# Patient Record
Sex: MALE | Race: WHITE | NOT HISPANIC OR LATINO | Employment: OTHER | URBAN - METROPOLITAN AREA
[De-identification: names, ages, dates, MRNs, and addresses within clinical notes are randomized per-mention and may not be internally consistent; named-entity substitution may affect disease eponyms.]

---

## 2017-01-12 ENCOUNTER — HOSPITAL ENCOUNTER (EMERGENCY)
Facility: HOSPITAL | Age: 67
Discharge: HOME | End: 2017-01-12
Attending: EMERGENCY MEDICINE
Payer: MEDICARE

## 2017-01-12 ENCOUNTER — APPOINTMENT (EMERGENCY)
Dept: RADIOLOGY | Facility: HOSPITAL | Age: 67
End: 2017-01-12
Payer: MEDICARE

## 2017-01-12 VITALS
RESPIRATION RATE: 15 BRPM | OXYGEN SATURATION: 97 % | DIASTOLIC BLOOD PRESSURE: 86 MMHG | WEIGHT: 235.4 LBS | HEART RATE: 80 BPM | TEMPERATURE: 98.2 F | BODY MASS INDEX: 31.93 KG/M2 | SYSTOLIC BLOOD PRESSURE: 152 MMHG

## 2017-01-12 DIAGNOSIS — G89.29 CHRONIC PAIN: ICD-10-CM

## 2017-01-12 DIAGNOSIS — I10 HYPERTENSION: ICD-10-CM

## 2017-01-12 DIAGNOSIS — E11.42 DM TYPE 2 WITH DIABETIC PERIPHERAL NEUROPATHY (HCC): ICD-10-CM

## 2017-01-12 DIAGNOSIS — R07.89 ATYPICAL CHEST PAIN: Primary | ICD-10-CM

## 2017-01-12 LAB
ANION GAP SERPL CALCULATED.3IONS-SCNC: 9 MMOL/L (ref 4–13)
BASOPHILS # BLD AUTO: 0.09 THOUSANDS/ΜL (ref 0–0.1)
BASOPHILS NFR BLD AUTO: 1 % (ref 0–1)
BUN SERPL-MCNC: 20 MG/DL (ref 5–25)
CALCIUM SERPL-MCNC: 9.1 MG/DL (ref 8.3–10.1)
CHLORIDE SERPL-SCNC: 104 MMOL/L (ref 100–108)
CO2 SERPL-SCNC: 26 MMOL/L (ref 21–32)
CREAT SERPL-MCNC: 1.02 MG/DL (ref 0.6–1.3)
EOSINOPHIL # BLD AUTO: 0.23 THOUSAND/ΜL (ref 0–0.61)
EOSINOPHIL NFR BLD AUTO: 2 % (ref 0–6)
ERYTHROCYTE [DISTWIDTH] IN BLOOD BY AUTOMATED COUNT: 18.8 % (ref 11.6–15.1)
GFR SERPL CREATININE-BSD FRML MDRD: >60 ML/MIN/1.73SQ M
GLUCOSE SERPL-MCNC: 216 MG/DL (ref 65–140)
HCT VFR BLD AUTO: 34.8 % (ref 36.5–49.3)
HGB BLD-MCNC: 11 G/DL (ref 12–17)
LYMPHOCYTES # BLD AUTO: 2.11 THOUSANDS/ΜL (ref 0.6–4.47)
LYMPHOCYTES NFR BLD AUTO: 21 % (ref 14–44)
MCH RBC QN AUTO: 23.8 PG (ref 26.8–34.3)
MCHC RBC AUTO-ENTMCNC: 31.6 G/DL (ref 31.4–37.4)
MCV RBC AUTO: 75 FL (ref 82–98)
MONOCYTES # BLD AUTO: 0.96 THOUSAND/ΜL (ref 0.17–1.22)
MONOCYTES NFR BLD AUTO: 10 % (ref 4–12)
NEUTROPHILS # BLD AUTO: 6.6 THOUSANDS/ΜL (ref 1.85–7.62)
NEUTS SEG NFR BLD AUTO: 66 % (ref 43–75)
PLATELET # BLD AUTO: 367 THOUSANDS/UL (ref 149–390)
PMV BLD AUTO: 9.2 FL (ref 8.9–12.7)
POTASSIUM SERPL-SCNC: 4.4 MMOL/L (ref 3.5–5.3)
RBC # BLD AUTO: 4.62 MILLION/UL (ref 3.88–5.62)
SODIUM SERPL-SCNC: 139 MMOL/L (ref 136–145)
SPECIMEN SOURCE: NORMAL
SPECIMEN SOURCE: NORMAL
TROPONIN I BLD-MCNC: 0 NG/ML (ref 0–0.08)
TROPONIN I BLD-MCNC: 0.01 NG/ML (ref 0–0.08)
WBC # BLD AUTO: 9.99 THOUSAND/UL (ref 4.31–10.16)

## 2017-01-12 PROCEDURE — 71020 HB CHEST X-RAY 2VW FRONTAL&LATL: CPT

## 2017-01-12 PROCEDURE — 93005 ELECTROCARDIOGRAM TRACING: CPT

## 2017-01-12 PROCEDURE — 36415 COLL VENOUS BLD VENIPUNCTURE: CPT | Performed by: EMERGENCY MEDICINE

## 2017-01-12 PROCEDURE — 80048 BASIC METABOLIC PNL TOTAL CA: CPT | Performed by: EMERGENCY MEDICINE

## 2017-01-12 PROCEDURE — 99285 EMERGENCY DEPT VISIT HI MDM: CPT

## 2017-01-12 PROCEDURE — 93005 ELECTROCARDIOGRAM TRACING: CPT | Performed by: EMERGENCY MEDICINE

## 2017-01-12 PROCEDURE — 85025 COMPLETE CBC W/AUTO DIFF WBC: CPT | Performed by: EMERGENCY MEDICINE

## 2017-01-12 PROCEDURE — 84484 ASSAY OF TROPONIN QUANT: CPT

## 2017-01-12 RX ORDER — PREGABALIN 100 MG/1
100 CAPSULE ORAL 3 TIMES DAILY
Qty: 90 CAPSULE | Refills: 0 | Status: ON HOLD | OUTPATIENT
Start: 2017-01-12 | End: 2017-03-21

## 2017-01-12 RX ORDER — TRAMADOL HYDROCHLORIDE 50 MG/1
50 TABLET ORAL EVERY 6 HOURS PRN
COMMUNITY
End: 2017-01-12

## 2017-01-12 RX ORDER — PANTOPRAZOLE SODIUM 40 MG/1
40 TABLET, DELAYED RELEASE ORAL DAILY
Qty: 30 TABLET | Refills: 0 | Status: SHIPPED | OUTPATIENT
Start: 2017-01-12 | End: 2017-06-13

## 2017-01-12 RX ORDER — TRAMADOL HYDROCHLORIDE 50 MG/1
50 TABLET ORAL EVERY 6 HOURS PRN
Qty: 30 TABLET | Refills: 0 | Status: SHIPPED | OUTPATIENT
Start: 2017-01-12 | End: 2017-01-22

## 2017-01-12 RX ORDER — ASPIRIN 81 MG/1
324 TABLET, CHEWABLE ORAL ONCE
Status: COMPLETED | OUTPATIENT
Start: 2017-01-12 | End: 2017-01-12

## 2017-01-12 RX ADMIN — ASPIRIN 81 MG 324 MG: 81 TABLET ORAL at 12:21

## 2017-01-13 ENCOUNTER — GENERIC CONVERSION - ENCOUNTER (OUTPATIENT)
Dept: OTHER | Facility: OTHER | Age: 67
End: 2017-01-13

## 2017-01-13 LAB
ATRIAL RATE: 84 BPM
ATRIAL RATE: 97 BPM
P AXIS: 67 DEGREES
P AXIS: 69 DEGREES
PR INTERVAL: 140 MS
PR INTERVAL: 142 MS
QRS AXIS: 33 DEGREES
QRS AXIS: 44 DEGREES
QRSD INTERVAL: 76 MS
QRSD INTERVAL: 78 MS
QT INTERVAL: 324 MS
QT INTERVAL: 374 MS
QTC INTERVAL: 411 MS
QTC INTERVAL: 439 MS
T WAVE AXIS: 19 DEGREES
T WAVE AXIS: 34 DEGREES
VENTRICULAR RATE: 83 BPM
VENTRICULAR RATE: 97 BPM

## 2017-02-14 ENCOUNTER — ALLSCRIPTS OFFICE VISIT (OUTPATIENT)
Dept: OTHER | Facility: OTHER | Age: 67
End: 2017-02-14

## 2017-02-14 DIAGNOSIS — M54.9 DORSALGIA: ICD-10-CM

## 2017-02-14 DIAGNOSIS — E11.49 TYPE 2 DIABETES MELLITUS WITH OTHER DIABETIC NEUROLOGICAL COMPLICATION (HCC): ICD-10-CM

## 2017-02-14 DIAGNOSIS — F32.9 MAJOR DEPRESSIVE DISORDER, SINGLE EPISODE: ICD-10-CM

## 2017-02-14 DIAGNOSIS — I71.4 ABDOMINAL AORTIC ANEURYSM WITHOUT RUPTURE (HCC): ICD-10-CM

## 2017-02-14 DIAGNOSIS — E11.40 TYPE 2 DIABETES MELLITUS WITH DIABETIC NEUROPATHY (HCC): ICD-10-CM

## 2017-02-14 DIAGNOSIS — K21.9 GASTRO-ESOPHAGEAL REFLUX DISEASE WITHOUT ESOPHAGITIS: ICD-10-CM

## 2017-03-16 ENCOUNTER — HOSPITAL ENCOUNTER (INPATIENT)
Facility: HOSPITAL | Age: 67
LOS: 5 days | Discharge: HOME HEALTH | DRG: 871 | End: 2017-03-21
Attending: EMERGENCY MEDICINE | Admitting: INTERNAL MEDICINE
Payer: MEDICARE

## 2017-03-16 ENCOUNTER — APPOINTMENT (EMERGENCY)
Dept: RADIOLOGY | Facility: HOSPITAL | Age: 67
DRG: 871 | End: 2017-03-16
Payer: MEDICARE

## 2017-03-16 DIAGNOSIS — R65.10 SIRS (SYSTEMIC INFLAMMATORY RESPONSE SYNDROME) (HCC): Primary | ICD-10-CM

## 2017-03-16 DIAGNOSIS — R77.8 ELEVATED TROPONIN: ICD-10-CM

## 2017-03-16 PROBLEM — I21.4 NSTEMI (NON-ST ELEVATED MYOCARDIAL INFARCTION) (HCC): Status: ACTIVE | Noted: 2017-03-16

## 2017-03-16 LAB
ALBUMIN SERPL BCP-MCNC: 3.3 G/DL (ref 3.5–5)
ALP SERPL-CCNC: 101 U/L (ref 46–116)
ALT SERPL W P-5'-P-CCNC: 18 U/L (ref 12–78)
ANION GAP SERPL CALCULATED.3IONS-SCNC: 11 MMOL/L (ref 4–13)
APTT PPP: 25 SECONDS (ref 24–36)
AST SERPL W P-5'-P-CCNC: 18 U/L (ref 5–45)
BASOPHILS # BLD MANUAL: 0 THOUSAND/UL (ref 0–0.1)
BASOPHILS NFR MAR MANUAL: 0 % (ref 0–1)
BILIRUB SERPL-MCNC: 0.31 MG/DL (ref 0.2–1)
BUN SERPL-MCNC: 24 MG/DL (ref 5–25)
CALCIUM SERPL-MCNC: 8.8 MG/DL (ref 8.3–10.1)
CHLORIDE SERPL-SCNC: 103 MMOL/L (ref 100–108)
CO2 SERPL-SCNC: 25 MMOL/L (ref 21–32)
CREAT SERPL-MCNC: 1.33 MG/DL (ref 0.6–1.3)
EOSINOPHIL # BLD MANUAL: 0 THOUSAND/UL (ref 0–0.4)
EOSINOPHIL NFR BLD MANUAL: 0 % (ref 0–6)
ERYTHROCYTE [DISTWIDTH] IN BLOOD BY AUTOMATED COUNT: 19.2 % (ref 11.6–15.1)
FLUAV AG SPEC QL IA: NEGATIVE
FLUBV AG SPEC QL IA: NEGATIVE
GFR SERPL CREATININE-BSD FRML MDRD: 53.8 ML/MIN/1.73SQ M
GLUCOSE SERPL-MCNC: 167 MG/DL (ref 65–140)
GLUCOSE SERPL-MCNC: 194 MG/DL (ref 65–140)
GLUCOSE SERPL-MCNC: 260 MG/DL (ref 65–140)
GLUCOSE SERPL-MCNC: 351 MG/DL (ref 65–140)
HCT VFR BLD AUTO: 35.3 % (ref 36.5–49.3)
HGB BLD-MCNC: 11.1 G/DL (ref 12–17)
INR PPP: 1.02 (ref 0.86–1.16)
L PNEUMO1 AG UR QL IA.RAPID: NEGATIVE
LACTATE SERPL-SCNC: 1.6 MMOL/L (ref 0.5–2)
LACTATE SERPL-SCNC: 2.1 MMOL/L (ref 0.5–2)
LYMPHOCYTES # BLD AUTO: 1.27 THOUSAND/UL (ref 0.6–4.47)
LYMPHOCYTES # BLD AUTO: 5 % (ref 14–44)
MCH RBC QN AUTO: 24 PG (ref 26.8–34.3)
MCHC RBC AUTO-ENTMCNC: 31.4 G/DL (ref 31.4–37.4)
MCV RBC AUTO: 76 FL (ref 82–98)
MONOCYTES # BLD AUTO: 0.51 THOUSAND/UL (ref 0–1.22)
MONOCYTES NFR BLD: 2 % (ref 4–12)
NEUTROPHILS # BLD MANUAL: 23.58 THOUSAND/UL (ref 1.85–7.62)
NEUTS BAND NFR BLD MANUAL: 2 % (ref 0–8)
NEUTS SEG NFR BLD AUTO: 91 % (ref 43–75)
NRBC BLD AUTO-RTO: 0 /100 WBCS
PLATELET # BLD AUTO: 355 THOUSANDS/UL (ref 149–390)
PLATELET BLD QL SMEAR: ADEQUATE
PMV BLD AUTO: 9.9 FL (ref 8.9–12.7)
POTASSIUM SERPL-SCNC: 4 MMOL/L (ref 3.5–5.3)
PROT SERPL-MCNC: 8.1 G/DL (ref 6.4–8.2)
PROTHROMBIN TIME: 13.4 SECONDS (ref 12–14.3)
RBC # BLD AUTO: 4.62 MILLION/UL (ref 3.88–5.62)
RBC MORPH BLD: NORMAL
S PNEUM AG UR QL: NEGATIVE
SODIUM SERPL-SCNC: 139 MMOL/L (ref 136–145)
SPECIMEN SOURCE: ABNORMAL
SPECIMEN SOURCE: ABNORMAL
TOTAL CELLS COUNTED SPEC: 100
TROPONIN I BLD-MCNC: 0.25 NG/ML (ref 0–0.08)
TROPONIN I BLD-MCNC: 3.48 NG/ML (ref 0–0.08)
TROPONIN I SERPL-MCNC: 17.39 NG/ML
TROPONIN I SERPL-MCNC: 6.99 NG/ML
WBC # BLD AUTO: 25.35 THOUSAND/UL (ref 4.31–10.16)

## 2017-03-16 PROCEDURE — 83605 ASSAY OF LACTIC ACID: CPT | Performed by: EMERGENCY MEDICINE

## 2017-03-16 PROCEDURE — 85730 THROMBOPLASTIN TIME PARTIAL: CPT | Performed by: EMERGENCY MEDICINE

## 2017-03-16 PROCEDURE — 94640 AIRWAY INHALATION TREATMENT: CPT

## 2017-03-16 PROCEDURE — 87798 DETECT AGENT NOS DNA AMP: CPT | Performed by: EMERGENCY MEDICINE

## 2017-03-16 PROCEDURE — 87400 INFLUENZA A/B EACH AG IA: CPT | Performed by: EMERGENCY MEDICINE

## 2017-03-16 PROCEDURE — 93005 ELECTROCARDIOGRAM TRACING: CPT

## 2017-03-16 PROCEDURE — 85610 PROTHROMBIN TIME: CPT | Performed by: EMERGENCY MEDICINE

## 2017-03-16 PROCEDURE — 80053 COMPREHEN METABOLIC PANEL: CPT | Performed by: EMERGENCY MEDICINE

## 2017-03-16 PROCEDURE — 84484 ASSAY OF TROPONIN QUANT: CPT | Performed by: PHYSICIAN ASSISTANT

## 2017-03-16 PROCEDURE — 92610 EVALUATE SWALLOWING FUNCTION: CPT

## 2017-03-16 PROCEDURE — 87070 CULTURE OTHR SPECIMN AEROBIC: CPT | Performed by: PHYSICIAN ASSISTANT

## 2017-03-16 PROCEDURE — 87449 NOS EACH ORGANISM AG IA: CPT | Performed by: PHYSICIAN ASSISTANT

## 2017-03-16 PROCEDURE — 85007 BL SMEAR W/DIFF WBC COUNT: CPT | Performed by: EMERGENCY MEDICINE

## 2017-03-16 PROCEDURE — 87205 SMEAR GRAM STAIN: CPT | Performed by: PHYSICIAN ASSISTANT

## 2017-03-16 PROCEDURE — 36415 COLL VENOUS BLD VENIPUNCTURE: CPT | Performed by: EMERGENCY MEDICINE

## 2017-03-16 PROCEDURE — 96365 THER/PROPH/DIAG IV INF INIT: CPT

## 2017-03-16 PROCEDURE — 96375 TX/PRO/DX INJ NEW DRUG ADDON: CPT

## 2017-03-16 PROCEDURE — 85027 COMPLETE CBC AUTOMATED: CPT | Performed by: EMERGENCY MEDICINE

## 2017-03-16 PROCEDURE — 87147 CULTURE TYPE IMMUNOLOGIC: CPT | Performed by: PHYSICIAN ASSISTANT

## 2017-03-16 PROCEDURE — 84484 ASSAY OF TROPONIN QUANT: CPT | Performed by: INTERNAL MEDICINE

## 2017-03-16 PROCEDURE — 82948 REAGENT STRIP/BLOOD GLUCOSE: CPT

## 2017-03-16 PROCEDURE — 87040 BLOOD CULTURE FOR BACTERIA: CPT | Performed by: EMERGENCY MEDICINE

## 2017-03-16 PROCEDURE — 71020 HB CHEST X-RAY 2VW FRONTAL&LATL: CPT

## 2017-03-16 PROCEDURE — 84484 ASSAY OF TROPONIN QUANT: CPT

## 2017-03-16 PROCEDURE — 94760 N-INVAS EAR/PLS OXIMETRY 1: CPT

## 2017-03-16 PROCEDURE — 99284 EMERGENCY DEPT VISIT MOD MDM: CPT

## 2017-03-16 RX ORDER — NITROGLYCERIN 0.4 MG/1
0.4 TABLET SUBLINGUAL
Status: DISCONTINUED | OUTPATIENT
Start: 2017-03-16 | End: 2017-03-21 | Stop reason: HOSPADM

## 2017-03-16 RX ORDER — ONDANSETRON 2 MG/ML
4 INJECTION INTRAMUSCULAR; INTRAVENOUS EVERY 6 HOURS PRN
Status: DISCONTINUED | OUTPATIENT
Start: 2017-03-16 | End: 2017-03-21 | Stop reason: HOSPADM

## 2017-03-16 RX ORDER — MAGNESIUM HYDROXIDE/ALUMINUM HYDROXICE/SIMETHICONE 120; 1200; 1200 MG/30ML; MG/30ML; MG/30ML
30 SUSPENSION ORAL EVERY 6 HOURS PRN
Status: DISCONTINUED | OUTPATIENT
Start: 2017-03-16 | End: 2017-03-21 | Stop reason: HOSPADM

## 2017-03-16 RX ORDER — CLOPIDOGREL BISULFATE 75 MG/1
300 TABLET ORAL ONCE
Status: COMPLETED | OUTPATIENT
Start: 2017-03-16 | End: 2017-03-16

## 2017-03-16 RX ORDER — ASPIRIN 325 MG
TABLET ORAL
Status: COMPLETED
Start: 2017-03-16 | End: 2017-03-16

## 2017-03-16 RX ORDER — ASPIRIN 81 MG/1
81 TABLET, CHEWABLE ORAL DAILY
Status: DISCONTINUED | OUTPATIENT
Start: 2017-03-17 | End: 2017-03-16

## 2017-03-16 RX ORDER — PANTOPRAZOLE SODIUM 40 MG/1
40 TABLET, DELAYED RELEASE ORAL
Status: DISCONTINUED | OUTPATIENT
Start: 2017-03-16 | End: 2017-03-20

## 2017-03-16 RX ORDER — MORPHINE SULFATE 2 MG/ML
2 INJECTION, SOLUTION INTRAMUSCULAR; INTRAVENOUS EVERY 4 HOURS PRN
Status: DISCONTINUED | OUTPATIENT
Start: 2017-03-16 | End: 2017-03-21 | Stop reason: HOSPADM

## 2017-03-16 RX ORDER — LORAZEPAM 2 MG/ML
0.5 INJECTION INTRAMUSCULAR 4 TIMES DAILY PRN
Status: DISCONTINUED | OUTPATIENT
Start: 2017-03-16 | End: 2017-03-21 | Stop reason: HOSPADM

## 2017-03-16 RX ORDER — FAMOTIDINE 20 MG/1
20 TABLET, FILM COATED ORAL 2 TIMES DAILY
Status: DISCONTINUED | OUTPATIENT
Start: 2017-03-16 | End: 2017-03-20

## 2017-03-16 RX ORDER — AMPICILLIN AND SULBACTAM 1; .5 G/1; G/1
1.5 INJECTION, POWDER, FOR SOLUTION INTRAMUSCULAR; INTRAVENOUS EVERY 6 HOURS
Status: DISCONTINUED | OUTPATIENT
Start: 2017-03-16 | End: 2017-03-16 | Stop reason: SDUPTHER

## 2017-03-16 RX ORDER — TRAMADOL HYDROCHLORIDE 50 MG/1
50 TABLET ORAL EVERY 6 HOURS PRN
Status: DISCONTINUED | OUTPATIENT
Start: 2017-03-16 | End: 2017-03-21 | Stop reason: HOSPADM

## 2017-03-16 RX ORDER — HEPARIN SODIUM 5000 [USP'U]/ML
5000 INJECTION, SOLUTION INTRAVENOUS; SUBCUTANEOUS EVERY 8 HOURS SCHEDULED
Status: DISCONTINUED | OUTPATIENT
Start: 2017-03-16 | End: 2017-03-21 | Stop reason: HOSPADM

## 2017-03-16 RX ORDER — LEVALBUTEROL 1.25 MG/.5ML
1.25 SOLUTION, CONCENTRATE RESPIRATORY (INHALATION) EVERY 6 HOURS PRN
Status: DISCONTINUED | OUTPATIENT
Start: 2017-03-16 | End: 2017-03-19

## 2017-03-16 RX ORDER — ATORVASTATIN CALCIUM 40 MG/1
40 TABLET, FILM COATED ORAL
Status: DISCONTINUED | OUTPATIENT
Start: 2017-03-16 | End: 2017-03-21 | Stop reason: HOSPADM

## 2017-03-16 RX ORDER — ACETAMINOPHEN 325 MG/1
650 TABLET ORAL EVERY 6 HOURS PRN
Status: DISCONTINUED | OUTPATIENT
Start: 2017-03-16 | End: 2017-03-21 | Stop reason: HOSPADM

## 2017-03-16 RX ORDER — ASPIRIN 81 MG/1
324 TABLET, CHEWABLE ORAL DAILY
Status: DISCONTINUED | OUTPATIENT
Start: 2017-03-17 | End: 2017-03-21 | Stop reason: HOSPADM

## 2017-03-16 RX ORDER — ASPIRIN 325 MG
325 TABLET, DELAYED RELEASE (ENTERIC COATED) ORAL ONCE
Status: DISCONTINUED | OUTPATIENT
Start: 2017-03-16 | End: 2017-03-21 | Stop reason: HOSPADM

## 2017-03-16 RX ORDER — PREGABALIN 50 MG/1
100 CAPSULE ORAL 3 TIMES DAILY
Status: DISCONTINUED | OUTPATIENT
Start: 2017-03-16 | End: 2017-03-21 | Stop reason: HOSPADM

## 2017-03-16 RX ORDER — SODIUM CHLORIDE FOR INHALATION 0.9 %
3 VIAL, NEBULIZER (ML) INHALATION EVERY 6 HOURS PRN
Status: DISCONTINUED | OUTPATIENT
Start: 2017-03-16 | End: 2017-03-21 | Stop reason: HOSPADM

## 2017-03-16 RX ORDER — INSULIN GLARGINE 100 [IU]/ML
35 INJECTION, SOLUTION SUBCUTANEOUS
Status: DISCONTINUED | OUTPATIENT
Start: 2017-03-16 | End: 2017-03-21 | Stop reason: HOSPADM

## 2017-03-16 RX ADMIN — INSULIN LISPRO 6 UNITS: 100 INJECTION, SOLUTION INTRAVENOUS; SUBCUTANEOUS at 18:06

## 2017-03-16 RX ADMIN — ASPIRIN 325 MG: 325 TABLET ORAL at 13:14

## 2017-03-16 RX ADMIN — SODIUM CHLORIDE 1.5 G: 9 INJECTION, SOLUTION INTRAVENOUS at 21:10

## 2017-03-16 RX ADMIN — SODIUM CHLORIDE 1.5 G: 9 INJECTION, SOLUTION INTRAVENOUS at 14:05

## 2017-03-16 RX ADMIN — HEPARIN SODIUM 5000 UNITS: 5000 INJECTION, SOLUTION INTRAVENOUS; SUBCUTANEOUS at 21:10

## 2017-03-16 RX ADMIN — HEPARIN SODIUM 5000 UNITS: 5000 INJECTION, SOLUTION INTRAVENOUS; SUBCUTANEOUS at 14:08

## 2017-03-16 RX ADMIN — FAMOTIDINE 20 MG: 20 TABLET ORAL at 18:05

## 2017-03-16 RX ADMIN — CEFEPIME 2000 MG: 2 INJECTION, POWDER, FOR SOLUTION INTRAMUSCULAR; INTRAVENOUS at 10:51

## 2017-03-16 RX ADMIN — INSULIN LISPRO 1 UNITS: 100 INJECTION, SOLUTION INTRAVENOUS; SUBCUTANEOUS at 21:56

## 2017-03-16 RX ADMIN — INSULIN LISPRO 5 UNITS: 100 INJECTION, SOLUTION INTRAVENOUS; SUBCUTANEOUS at 18:06

## 2017-03-16 RX ADMIN — SODIUM CHLORIDE 3240 ML: 0.9 INJECTION, SOLUTION INTRAVENOUS at 10:53

## 2017-03-16 RX ADMIN — METOPROLOL TARTRATE 5 MG: 5 INJECTION INTRAVENOUS at 13:17

## 2017-03-16 RX ADMIN — TRAMADOL HYDROCHLORIDE 50 MG: 50 TABLET, FILM COATED ORAL at 14:08

## 2017-03-16 RX ADMIN — LEVALBUTEROL 1.25 MG: 1.25 SOLUTION, CONCENTRATE RESPIRATORY (INHALATION) at 17:15

## 2017-03-16 RX ADMIN — ATORVASTATIN CALCIUM 40 MG: 40 TABLET, FILM COATED ORAL at 18:06

## 2017-03-16 RX ADMIN — HYDROMORPHONE HYDROCHLORIDE 1 MG: 1 INJECTION, SOLUTION INTRAMUSCULAR; INTRAVENOUS; SUBCUTANEOUS at 10:24

## 2017-03-16 RX ADMIN — CLOPIDOGREL BISULFATE 300 MG: 75 TABLET, FILM COATED ORAL at 18:06

## 2017-03-16 RX ADMIN — PANTOPRAZOLE SODIUM 40 MG: 40 TABLET, DELAYED RELEASE ORAL at 14:08

## 2017-03-16 RX ADMIN — ACETAMINOPHEN 650 MG: 325 TABLET, FILM COATED ORAL at 18:28

## 2017-03-16 RX ADMIN — METOPROLOL TARTRATE 5 MG: 5 INJECTION INTRAVENOUS at 18:28

## 2017-03-16 RX ADMIN — INSULIN GLARGINE 35 UNITS: 100 INJECTION, SOLUTION SUBCUTANEOUS at 21:55

## 2017-03-16 RX ADMIN — PREGABALIN 100 MG: 50 CAPSULE ORAL at 21:10

## 2017-03-16 RX ADMIN — PREGABALIN 100 MG: 50 CAPSULE ORAL at 18:05

## 2017-03-17 ENCOUNTER — APPOINTMENT (INPATIENT)
Dept: NON INVASIVE DIAGNOSTICS | Facility: HOSPITAL | Age: 67
DRG: 871 | End: 2017-03-17
Payer: MEDICARE

## 2017-03-17 LAB
ANION GAP SERPL CALCULATED.3IONS-SCNC: 8 MMOL/L (ref 4–13)
BUN SERPL-MCNC: 28 MG/DL (ref 5–25)
CALCIUM SERPL-MCNC: 8.4 MG/DL (ref 8.3–10.1)
CHLORIDE SERPL-SCNC: 107 MMOL/L (ref 100–108)
CHOLEST SERPL-MCNC: 171 MG/DL (ref 50–200)
CO2 SERPL-SCNC: 23 MMOL/L (ref 21–32)
CREAT SERPL-MCNC: 1.26 MG/DL (ref 0.6–1.3)
ERYTHROCYTE [DISTWIDTH] IN BLOOD BY AUTOMATED COUNT: 19.8 % (ref 11.6–15.1)
FLUAV AG SPEC QL: NORMAL
FLUBV AG SPEC QL: NORMAL
GFR SERPL CREATININE-BSD FRML MDRD: 57.3 ML/MIN/1.73SQ M
GLUCOSE SERPL-MCNC: 143 MG/DL (ref 65–140)
GLUCOSE SERPL-MCNC: 155 MG/DL (ref 65–140)
GLUCOSE SERPL-MCNC: 160 MG/DL (ref 65–140)
GLUCOSE SERPL-MCNC: 227 MG/DL (ref 65–140)
GLUCOSE SERPL-MCNC: 240 MG/DL (ref 65–140)
HCT VFR BLD AUTO: 34.2 % (ref 36.5–49.3)
HDLC SERPL-MCNC: 48 MG/DL (ref 40–60)
HGB BLD-MCNC: 10.5 G/DL (ref 12–17)
LDLC SERPL CALC-MCNC: 109 MG/DL (ref 0–100)
MCH RBC QN AUTO: 23.9 PG (ref 26.8–34.3)
MCHC RBC AUTO-ENTMCNC: 30.7 G/DL (ref 31.4–37.4)
MCV RBC AUTO: 78 FL (ref 82–98)
NT-PROBNP SERPL-MCNC: 2252 PG/ML
PLATELET # BLD AUTO: 314 THOUSANDS/UL (ref 149–390)
PMV BLD AUTO: 9.9 FL (ref 8.9–12.7)
POTASSIUM SERPL-SCNC: 4.1 MMOL/L (ref 3.5–5.3)
RBC # BLD AUTO: 4.4 MILLION/UL (ref 3.88–5.62)
RSV B RNA SPEC QL NAA+PROBE: NORMAL
SODIUM SERPL-SCNC: 138 MMOL/L (ref 136–145)
TRIGL SERPL-MCNC: 70 MG/DL
TROPONIN I SERPL-MCNC: 14.73 NG/ML
WBC # BLD AUTO: 25.18 THOUSAND/UL (ref 4.31–10.16)

## 2017-03-17 PROCEDURE — 83880 ASSAY OF NATRIURETIC PEPTIDE: CPT | Performed by: INTERNAL MEDICINE

## 2017-03-17 PROCEDURE — 85027 COMPLETE CBC AUTOMATED: CPT | Performed by: PHYSICIAN ASSISTANT

## 2017-03-17 PROCEDURE — 80061 LIPID PANEL: CPT | Performed by: PHYSICIAN ASSISTANT

## 2017-03-17 PROCEDURE — 84484 ASSAY OF TROPONIN QUANT: CPT | Performed by: INTERNAL MEDICINE

## 2017-03-17 PROCEDURE — 93306 TTE W/DOPPLER COMPLETE: CPT

## 2017-03-17 PROCEDURE — 80048 BASIC METABOLIC PNL TOTAL CA: CPT | Performed by: PHYSICIAN ASSISTANT

## 2017-03-17 PROCEDURE — 82948 REAGENT STRIP/BLOOD GLUCOSE: CPT

## 2017-03-17 PROCEDURE — 93005 ELECTROCARDIOGRAM TRACING: CPT

## 2017-03-17 RX ORDER — FUROSEMIDE 20 MG/1
20 TABLET ORAL ONCE
Status: COMPLETED | OUTPATIENT
Start: 2017-03-17 | End: 2017-03-17

## 2017-03-17 RX ORDER — LEVOFLOXACIN 5 MG/ML
750 INJECTION, SOLUTION INTRAVENOUS EVERY 24 HOURS
Status: DISCONTINUED | OUTPATIENT
Start: 2017-03-17 | End: 2017-03-20

## 2017-03-17 RX ORDER — CLOPIDOGREL BISULFATE 75 MG/1
75 TABLET ORAL DAILY
Status: DISCONTINUED | OUTPATIENT
Start: 2017-03-17 | End: 2017-03-21 | Stop reason: HOSPADM

## 2017-03-17 RX ADMIN — HEPARIN SODIUM 5000 UNITS: 5000 INJECTION, SOLUTION INTRAVENOUS; SUBCUTANEOUS at 13:58

## 2017-03-17 RX ADMIN — INSULIN LISPRO 2 UNITS: 100 INJECTION, SOLUTION INTRAVENOUS; SUBCUTANEOUS at 17:26

## 2017-03-17 RX ADMIN — SODIUM CHLORIDE 1.5 G: 9 INJECTION, SOLUTION INTRAVENOUS at 13:58

## 2017-03-17 RX ADMIN — TRAMADOL HYDROCHLORIDE 50 MG: 50 TABLET, FILM COATED ORAL at 17:33

## 2017-03-17 RX ADMIN — ATORVASTATIN CALCIUM 40 MG: 40 TABLET, FILM COATED ORAL at 17:25

## 2017-03-17 RX ADMIN — INSULIN LISPRO 5 UNITS: 100 INJECTION, SOLUTION INTRAVENOUS; SUBCUTANEOUS at 11:47

## 2017-03-17 RX ADMIN — FUROSEMIDE 20 MG: 20 TABLET ORAL at 17:25

## 2017-03-17 RX ADMIN — MORPHINE SULFATE 2 MG: 2 INJECTION, SOLUTION INTRAMUSCULAR; INTRAVENOUS at 01:27

## 2017-03-17 RX ADMIN — TRAMADOL HYDROCHLORIDE 50 MG: 50 TABLET, FILM COATED ORAL at 02:57

## 2017-03-17 RX ADMIN — PANTOPRAZOLE SODIUM 40 MG: 40 TABLET, DELAYED RELEASE ORAL at 05:31

## 2017-03-17 RX ADMIN — HEPARIN SODIUM 5000 UNITS: 5000 INJECTION, SOLUTION INTRAVENOUS; SUBCUTANEOUS at 22:14

## 2017-03-17 RX ADMIN — FAMOTIDINE 20 MG: 20 TABLET ORAL at 17:25

## 2017-03-17 RX ADMIN — INSULIN LISPRO 5 UNITS: 100 INJECTION, SOLUTION INTRAVENOUS; SUBCUTANEOUS at 17:26

## 2017-03-17 RX ADMIN — CLOPIDOGREL BISULFATE 75 MG: 75 TABLET, FILM COATED ORAL at 13:58

## 2017-03-17 RX ADMIN — MORPHINE SULFATE 2 MG: 2 INJECTION, SOLUTION INTRAMUSCULAR; INTRAVENOUS at 20:13

## 2017-03-17 RX ADMIN — INSULIN LISPRO 1 UNITS: 100 INJECTION, SOLUTION INTRAVENOUS; SUBCUTANEOUS at 08:07

## 2017-03-17 RX ADMIN — SODIUM CHLORIDE 1.5 G: 9 INJECTION, SOLUTION INTRAVENOUS at 08:07

## 2017-03-17 RX ADMIN — INSULIN LISPRO 5 UNITS: 100 INJECTION, SOLUTION INTRAVENOUS; SUBCUTANEOUS at 08:07

## 2017-03-17 RX ADMIN — PREGABALIN 100 MG: 50 CAPSULE ORAL at 08:06

## 2017-03-17 RX ADMIN — FAMOTIDINE 20 MG: 20 TABLET ORAL at 08:06

## 2017-03-17 RX ADMIN — HEPARIN SODIUM 5000 UNITS: 5000 INJECTION, SOLUTION INTRAVENOUS; SUBCUTANEOUS at 05:28

## 2017-03-17 RX ADMIN — ASPIRIN 324 MG: 81 TABLET, CHEWABLE ORAL at 08:06

## 2017-03-17 RX ADMIN — METOPROLOL TARTRATE 25 MG: 25 TABLET ORAL at 08:06

## 2017-03-17 RX ADMIN — INSULIN LISPRO 1 UNITS: 100 INJECTION, SOLUTION INTRAVENOUS; SUBCUTANEOUS at 22:18

## 2017-03-17 RX ADMIN — PREGABALIN 100 MG: 50 CAPSULE ORAL at 17:25

## 2017-03-17 RX ADMIN — ONDANSETRON 4 MG: 2 INJECTION INTRAMUSCULAR; INTRAVENOUS at 20:20

## 2017-03-17 RX ADMIN — INSULIN GLARGINE 35 UNITS: 100 INJECTION, SOLUTION SUBCUTANEOUS at 22:15

## 2017-03-17 RX ADMIN — INSULIN LISPRO 3 UNITS: 100 INJECTION, SOLUTION INTRAVENOUS; SUBCUTANEOUS at 11:47

## 2017-03-17 RX ADMIN — METOPROLOL TARTRATE 25 MG: 25 TABLET ORAL at 22:19

## 2017-03-17 RX ADMIN — SODIUM CHLORIDE 1.5 G: 9 INJECTION, SOLUTION INTRAVENOUS at 02:07

## 2017-03-17 RX ADMIN — METRONIDAZOLE 500 MG: 500 INJECTION, SOLUTION INTRAVENOUS at 17:41

## 2017-03-17 RX ADMIN — LEVOFLOXACIN 750 MG: 5 INJECTION, SOLUTION INTRAVENOUS at 18:28

## 2017-03-18 PROBLEM — G47.00 INSOMNIA: Status: ACTIVE | Noted: 2017-03-18

## 2017-03-18 PROBLEM — D64.9 ANEMIA: Status: ACTIVE | Noted: 2017-03-18

## 2017-03-18 PROBLEM — K59.00 CONSTIPATION: Status: ACTIVE | Noted: 2017-03-18

## 2017-03-18 LAB
ANION GAP SERPL CALCULATED.3IONS-SCNC: 9 MMOL/L (ref 4–13)
BACTERIA SPT RESP CULT: NORMAL
BACTERIA SPT RESP CULT: NORMAL
BUN SERPL-MCNC: 24 MG/DL (ref 5–25)
CALCIUM SERPL-MCNC: 8.3 MG/DL (ref 8.3–10.1)
CHLORIDE SERPL-SCNC: 105 MMOL/L (ref 100–108)
CO2 SERPL-SCNC: 26 MMOL/L (ref 21–32)
CREAT SERPL-MCNC: 1.13 MG/DL (ref 0.6–1.3)
ERYTHROCYTE [DISTWIDTH] IN BLOOD BY AUTOMATED COUNT: 19.9 % (ref 11.6–15.1)
GFR SERPL CREATININE-BSD FRML MDRD: >60 ML/MIN/1.73SQ M
GLUCOSE SERPL-MCNC: 114 MG/DL (ref 65–140)
GLUCOSE SERPL-MCNC: 116 MG/DL (ref 65–140)
GLUCOSE SERPL-MCNC: 142 MG/DL (ref 65–140)
GLUCOSE SERPL-MCNC: 171 MG/DL (ref 65–140)
GLUCOSE SERPL-MCNC: 285 MG/DL (ref 65–140)
GRAM STN SPEC: NORMAL
HCT VFR BLD AUTO: 30.3 % (ref 36.5–49.3)
HGB BLD-MCNC: 9.2 G/DL (ref 12–17)
MCH RBC QN AUTO: 23.5 PG (ref 26.8–34.3)
MCHC RBC AUTO-ENTMCNC: 30.4 G/DL (ref 31.4–37.4)
MCV RBC AUTO: 77 FL (ref 82–98)
PLATELET # BLD AUTO: 336 THOUSANDS/UL (ref 149–390)
PMV BLD AUTO: 9.6 FL (ref 8.9–12.7)
POTASSIUM SERPL-SCNC: 3.9 MMOL/L (ref 3.5–5.3)
RBC # BLD AUTO: 3.92 MILLION/UL (ref 3.88–5.62)
SODIUM SERPL-SCNC: 140 MMOL/L (ref 136–145)
WBC # BLD AUTO: 15.86 THOUSAND/UL (ref 4.31–10.16)

## 2017-03-18 PROCEDURE — 82948 REAGENT STRIP/BLOOD GLUCOSE: CPT

## 2017-03-18 PROCEDURE — 85027 COMPLETE CBC AUTOMATED: CPT | Performed by: INTERNAL MEDICINE

## 2017-03-18 PROCEDURE — 80048 BASIC METABOLIC PNL TOTAL CA: CPT | Performed by: INTERNAL MEDICINE

## 2017-03-18 RX ORDER — LACTULOSE 20 G/30ML
20 SOLUTION ORAL 3 TIMES DAILY
Status: DISPENSED | OUTPATIENT
Start: 2017-03-18 | End: 2017-03-19

## 2017-03-18 RX ORDER — AMOXICILLIN 250 MG
1 CAPSULE ORAL 2 TIMES DAILY
Status: DISCONTINUED | OUTPATIENT
Start: 2017-03-18 | End: 2017-03-21 | Stop reason: HOSPADM

## 2017-03-18 RX ORDER — TEMAZEPAM 15 MG/1
30 CAPSULE ORAL
Status: DISCONTINUED | OUTPATIENT
Start: 2017-03-18 | End: 2017-03-21 | Stop reason: HOSPADM

## 2017-03-18 RX ORDER — METOPROLOL TARTRATE 50 MG/1
50 TABLET, FILM COATED ORAL EVERY 12 HOURS SCHEDULED
Status: DISCONTINUED | OUTPATIENT
Start: 2017-03-18 | End: 2017-03-21 | Stop reason: HOSPADM

## 2017-03-18 RX ADMIN — ASPIRIN 324 MG: 81 TABLET, CHEWABLE ORAL at 10:11

## 2017-03-18 RX ADMIN — INSULIN LISPRO 5 UNITS: 100 INJECTION, SOLUTION INTRAVENOUS; SUBCUTANEOUS at 13:42

## 2017-03-18 RX ADMIN — Medication 1 TABLET: at 17:00

## 2017-03-18 RX ADMIN — ATORVASTATIN CALCIUM 40 MG: 40 TABLET, FILM COATED ORAL at 16:56

## 2017-03-18 RX ADMIN — INSULIN LISPRO 5 UNITS: 100 INJECTION, SOLUTION INTRAVENOUS; SUBCUTANEOUS at 17:01

## 2017-03-18 RX ADMIN — CLOPIDOGREL BISULFATE 75 MG: 75 TABLET, FILM COATED ORAL at 10:11

## 2017-03-18 RX ADMIN — PREGABALIN 100 MG: 50 CAPSULE ORAL at 22:44

## 2017-03-18 RX ADMIN — LACTULOSE 20 G: 10 SOLUTION ORAL at 16:55

## 2017-03-18 RX ADMIN — PANTOPRAZOLE SODIUM 40 MG: 40 TABLET, DELAYED RELEASE ORAL at 06:17

## 2017-03-18 RX ADMIN — LEVOFLOXACIN 750 MG: 5 INJECTION, SOLUTION INTRAVENOUS at 16:56

## 2017-03-18 RX ADMIN — PREGABALIN 100 MG: 50 CAPSULE ORAL at 01:08

## 2017-03-18 RX ADMIN — HEPARIN SODIUM 5000 UNITS: 5000 INJECTION, SOLUTION INTRAVENOUS; SUBCUTANEOUS at 06:18

## 2017-03-18 RX ADMIN — TRAMADOL HYDROCHLORIDE 50 MG: 50 TABLET, FILM COATED ORAL at 22:45

## 2017-03-18 RX ADMIN — FAMOTIDINE 20 MG: 20 TABLET ORAL at 10:10

## 2017-03-18 RX ADMIN — INSULIN LISPRO 1 UNITS: 100 INJECTION, SOLUTION INTRAVENOUS; SUBCUTANEOUS at 17:02

## 2017-03-18 RX ADMIN — INSULIN LISPRO 5 UNITS: 100 INJECTION, SOLUTION INTRAVENOUS; SUBCUTANEOUS at 10:13

## 2017-03-18 RX ADMIN — HEPARIN SODIUM 5000 UNITS: 5000 INJECTION, SOLUTION INTRAVENOUS; SUBCUTANEOUS at 13:44

## 2017-03-18 RX ADMIN — TRAMADOL HYDROCHLORIDE 50 MG: 50 TABLET, FILM COATED ORAL at 10:15

## 2017-03-18 RX ADMIN — PREGABALIN 100 MG: 50 CAPSULE ORAL at 10:10

## 2017-03-18 RX ADMIN — FAMOTIDINE 20 MG: 20 TABLET ORAL at 17:02

## 2017-03-18 RX ADMIN — PREGABALIN 100 MG: 50 CAPSULE ORAL at 16:55

## 2017-03-18 RX ADMIN — Medication 1 TABLET: at 10:10

## 2017-03-18 RX ADMIN — TEMAZEPAM 30 MG: 15 CAPSULE ORAL at 22:49

## 2017-03-18 RX ADMIN — METRONIDAZOLE 500 MG: 500 INJECTION, SOLUTION INTRAVENOUS at 10:17

## 2017-03-18 RX ADMIN — LACTULOSE 20 G: 10 SOLUTION ORAL at 10:12

## 2017-03-18 RX ADMIN — METRONIDAZOLE 500 MG: 500 INJECTION, SOLUTION INTRAVENOUS at 18:42

## 2017-03-18 RX ADMIN — METOPROLOL TARTRATE 50 MG: 50 TABLET ORAL at 22:44

## 2017-03-18 RX ADMIN — HEPARIN SODIUM 5000 UNITS: 5000 INJECTION, SOLUTION INTRAVENOUS; SUBCUTANEOUS at 22:45

## 2017-03-18 RX ADMIN — METOPROLOL TARTRATE 25 MG: 25 TABLET ORAL at 10:11

## 2017-03-18 RX ADMIN — METRONIDAZOLE 500 MG: 500 INJECTION, SOLUTION INTRAVENOUS at 01:16

## 2017-03-18 RX ADMIN — INSULIN LISPRO 4 UNITS: 100 INJECTION, SOLUTION INTRAVENOUS; SUBCUTANEOUS at 13:43

## 2017-03-18 RX ADMIN — INSULIN GLARGINE 35 UNITS: 100 INJECTION, SOLUTION SUBCUTANEOUS at 22:45

## 2017-03-19 PROBLEM — D50.9 IRON DEFICIENCY ANEMIA: Status: ACTIVE | Noted: 2017-03-18

## 2017-03-19 LAB
ANION GAP SERPL CALCULATED.3IONS-SCNC: 7 MMOL/L (ref 4–13)
ATRIAL RATE: 132 BPM
BUN SERPL-MCNC: 22 MG/DL (ref 5–25)
CALCIUM SERPL-MCNC: 8.4 MG/DL (ref 8.3–10.1)
CHLORIDE SERPL-SCNC: 103 MMOL/L (ref 100–108)
CO2 SERPL-SCNC: 28 MMOL/L (ref 21–32)
CREAT SERPL-MCNC: 1.23 MG/DL (ref 0.6–1.3)
ERYTHROCYTE [DISTWIDTH] IN BLOOD BY AUTOMATED COUNT: 19.4 % (ref 11.6–15.1)
GFR SERPL CREATININE-BSD FRML MDRD: 58.9 ML/MIN/1.73SQ M
GLUCOSE SERPL-MCNC: 127 MG/DL (ref 65–140)
GLUCOSE SERPL-MCNC: 152 MG/DL (ref 65–140)
GLUCOSE SERPL-MCNC: 152 MG/DL (ref 65–140)
GLUCOSE SERPL-MCNC: 166 MG/DL (ref 65–140)
GLUCOSE SERPL-MCNC: 271 MG/DL (ref 65–140)
HCT VFR BLD AUTO: 32.8 % (ref 36.5–49.3)
HGB BLD-MCNC: 9.9 G/DL (ref 12–17)
IRON SATN MFR SERPL: 7 %
IRON SERPL-MCNC: 26 UG/DL (ref 65–175)
MCH RBC QN AUTO: 23.2 PG (ref 26.8–34.3)
MCHC RBC AUTO-ENTMCNC: 30.2 G/DL (ref 31.4–37.4)
MCV RBC AUTO: 77 FL (ref 82–98)
P AXIS: 66 DEGREES
PLATELET # BLD AUTO: 359 THOUSANDS/UL (ref 149–390)
PMV BLD AUTO: 9.7 FL (ref 8.9–12.7)
POTASSIUM SERPL-SCNC: 4.3 MMOL/L (ref 3.5–5.3)
PR INTERVAL: 126 MS
QRS AXIS: 49 DEGREES
QRSD INTERVAL: 80 MS
QT INTERVAL: 302 MS
QTC INTERVAL: 447 MS
RBC # BLD AUTO: 4.26 MILLION/UL (ref 3.88–5.62)
SODIUM SERPL-SCNC: 138 MMOL/L (ref 136–145)
T WAVE AXIS: 201 DEGREES
TIBC SERPL-MCNC: 356 UG/DL (ref 250–450)
VENTRICULAR RATE: 132 BPM
WBC # BLD AUTO: 12.76 THOUSAND/UL (ref 4.31–10.16)

## 2017-03-19 PROCEDURE — 85027 COMPLETE CBC AUTOMATED: CPT | Performed by: INTERNAL MEDICINE

## 2017-03-19 PROCEDURE — 94640 AIRWAY INHALATION TREATMENT: CPT

## 2017-03-19 PROCEDURE — 83540 ASSAY OF IRON: CPT | Performed by: INTERNAL MEDICINE

## 2017-03-19 PROCEDURE — 94760 N-INVAS EAR/PLS OXIMETRY 1: CPT

## 2017-03-19 PROCEDURE — 83550 IRON BINDING TEST: CPT | Performed by: INTERNAL MEDICINE

## 2017-03-19 PROCEDURE — 82948 REAGENT STRIP/BLOOD GLUCOSE: CPT

## 2017-03-19 PROCEDURE — 80048 BASIC METABOLIC PNL TOTAL CA: CPT | Performed by: INTERNAL MEDICINE

## 2017-03-19 RX ORDER — SODIUM CHLORIDE FOR INHALATION 0.9 %
3 VIAL, NEBULIZER (ML) INHALATION
Status: DISCONTINUED | OUTPATIENT
Start: 2017-03-19 | End: 2017-03-21 | Stop reason: HOSPADM

## 2017-03-19 RX ORDER — FERROUS SULFATE 325(65) MG
325 TABLET ORAL 2 TIMES DAILY WITH MEALS
Status: DISCONTINUED | OUTPATIENT
Start: 2017-03-19 | End: 2017-03-21 | Stop reason: HOSPADM

## 2017-03-19 RX ORDER — METRONIDAZOLE 500 MG/1
500 TABLET ORAL ONCE
Status: COMPLETED | OUTPATIENT
Start: 2017-03-20 | End: 2017-03-20

## 2017-03-19 RX ORDER — HYDROCODONE POLISTIREX AND CHLORPHENIRAMINE POLISTIREX 10; 8 MG/5ML; MG/5ML
5 SUSPENSION, EXTENDED RELEASE ORAL EVERY 12 HOURS PRN
Status: DISCONTINUED | OUTPATIENT
Start: 2017-03-19 | End: 2017-03-20

## 2017-03-19 RX ORDER — LEVALBUTEROL 1.25 MG/.5ML
1.25 SOLUTION, CONCENTRATE RESPIRATORY (INHALATION)
Status: DISCONTINUED | OUTPATIENT
Start: 2017-03-19 | End: 2017-03-21 | Stop reason: HOSPADM

## 2017-03-19 RX ORDER — LEVOFLOXACIN 750 MG/1
750 TABLET ORAL ONCE
Status: COMPLETED | OUTPATIENT
Start: 2017-03-19 | End: 2017-03-19

## 2017-03-19 RX ORDER — LEVOFLOXACIN 750 MG/1
750 TABLET ORAL ONCE
Status: DISCONTINUED | OUTPATIENT
Start: 2017-03-20 | End: 2017-03-19

## 2017-03-19 RX ADMIN — METOPROLOL TARTRATE 50 MG: 50 TABLET ORAL at 08:12

## 2017-03-19 RX ADMIN — INSULIN GLARGINE 35 UNITS: 100 INJECTION, SOLUTION SUBCUTANEOUS at 22:34

## 2017-03-19 RX ADMIN — ATORVASTATIN CALCIUM 40 MG: 40 TABLET, FILM COATED ORAL at 17:22

## 2017-03-19 RX ADMIN — FERROUS SULFATE TAB 325 MG (65 MG ELEMENTAL FE) 325 MG: 325 (65 FE) TAB at 17:22

## 2017-03-19 RX ADMIN — PREGABALIN 100 MG: 50 CAPSULE ORAL at 17:22

## 2017-03-19 RX ADMIN — LEVOFLOXACIN 750 MG: 750 TABLET, FILM COATED ORAL at 22:43

## 2017-03-19 RX ADMIN — INSULIN LISPRO 5 UNITS: 100 INJECTION, SOLUTION INTRAVENOUS; SUBCUTANEOUS at 12:51

## 2017-03-19 RX ADMIN — FAMOTIDINE 20 MG: 20 TABLET ORAL at 08:13

## 2017-03-19 RX ADMIN — INSULIN LISPRO 1 UNITS: 100 INJECTION, SOLUTION INTRAVENOUS; SUBCUTANEOUS at 12:51

## 2017-03-19 RX ADMIN — PREGABALIN 100 MG: 50 CAPSULE ORAL at 22:34

## 2017-03-19 RX ADMIN — PREGABALIN 100 MG: 50 CAPSULE ORAL at 08:13

## 2017-03-19 RX ADMIN — ISODIUM CHLORIDE 3 ML: 0.03 SOLUTION RESPIRATORY (INHALATION) at 19:12

## 2017-03-19 RX ADMIN — ASPIRIN 324 MG: 81 TABLET, CHEWABLE ORAL at 08:13

## 2017-03-19 RX ADMIN — METRONIDAZOLE 500 MG: 500 INJECTION, SOLUTION INTRAVENOUS at 08:12

## 2017-03-19 RX ADMIN — FAMOTIDINE 20 MG: 20 TABLET ORAL at 17:23

## 2017-03-19 RX ADMIN — TEMAZEPAM 30 MG: 15 CAPSULE ORAL at 22:28

## 2017-03-19 RX ADMIN — CLOPIDOGREL BISULFATE 75 MG: 75 TABLET, FILM COATED ORAL at 08:13

## 2017-03-19 RX ADMIN — HEPARIN SODIUM 5000 UNITS: 5000 INJECTION, SOLUTION INTRAVENOUS; SUBCUTANEOUS at 14:27

## 2017-03-19 RX ADMIN — INSULIN LISPRO 5 UNITS: 100 INJECTION, SOLUTION INTRAVENOUS; SUBCUTANEOUS at 17:23

## 2017-03-19 RX ADMIN — INSULIN LISPRO 1 UNITS: 100 INJECTION, SOLUTION INTRAVENOUS; SUBCUTANEOUS at 17:23

## 2017-03-19 RX ADMIN — INSULIN LISPRO 5 UNITS: 100 INJECTION, SOLUTION INTRAVENOUS; SUBCUTANEOUS at 08:13

## 2017-03-19 RX ADMIN — LEVOFLOXACIN 750 MG: 5 INJECTION, SOLUTION INTRAVENOUS at 18:16

## 2017-03-19 RX ADMIN — METRONIDAZOLE 500 MG: 500 INJECTION, SOLUTION INTRAVENOUS at 17:23

## 2017-03-19 RX ADMIN — LEVALBUTEROL 1.25 MG: 1.25 SOLUTION, CONCENTRATE RESPIRATORY (INHALATION) at 16:32

## 2017-03-19 RX ADMIN — HYDROCODONE POLISTIREX AND CHLORPHENIRAMINE POLISTIREX 5 ML: 10; 8 SUSPENSION, EXTENDED RELEASE ORAL at 04:06

## 2017-03-19 RX ADMIN — LEVALBUTEROL 1.25 MG: 1.25 SOLUTION, CONCENTRATE RESPIRATORY (INHALATION) at 19:14

## 2017-03-19 RX ADMIN — HEPARIN SODIUM 5000 UNITS: 5000 INJECTION, SOLUTION INTRAVENOUS; SUBCUTANEOUS at 05:20

## 2017-03-19 RX ADMIN — METOPROLOL TARTRATE 50 MG: 50 TABLET ORAL at 22:41

## 2017-03-19 RX ADMIN — INSULIN LISPRO 4 UNITS: 100 INJECTION, SOLUTION INTRAVENOUS; SUBCUTANEOUS at 22:39

## 2017-03-19 RX ADMIN — TRAMADOL HYDROCHLORIDE 50 MG: 50 TABLET, FILM COATED ORAL at 16:37

## 2017-03-19 RX ADMIN — ISODIUM CHLORIDE 3 ML: 0.03 SOLUTION RESPIRATORY (INHALATION) at 19:14

## 2017-03-19 RX ADMIN — PANTOPRAZOLE SODIUM 40 MG: 40 TABLET, DELAYED RELEASE ORAL at 08:16

## 2017-03-19 RX ADMIN — HEPARIN SODIUM 5000 UNITS: 5000 INJECTION, SOLUTION INTRAVENOUS; SUBCUTANEOUS at 22:45

## 2017-03-19 RX ADMIN — METRONIDAZOLE 500 MG: 500 INJECTION, SOLUTION INTRAVENOUS at 03:00

## 2017-03-19 RX ADMIN — ISODIUM CHLORIDE 3 ML: 0.03 SOLUTION RESPIRATORY (INHALATION) at 16:32

## 2017-03-20 LAB
ATRIAL RATE: 65 BPM
GLUCOSE SERPL-MCNC: 123 MG/DL (ref 65–140)
GLUCOSE SERPL-MCNC: 149 MG/DL (ref 65–140)
GLUCOSE SERPL-MCNC: 159 MG/DL (ref 65–140)
GLUCOSE SERPL-MCNC: 232 MG/DL (ref 65–140)
P AXIS: 58 DEGREES
PR INTERVAL: 140 MS
QRS AXIS: 3 DEGREES
QRSD INTERVAL: 80 MS
QT INTERVAL: 442 MS
QTC INTERVAL: 459 MS
T WAVE AXIS: -16 DEGREES
VENTRICULAR RATE: 65 BPM

## 2017-03-20 PROCEDURE — 94640 AIRWAY INHALATION TREATMENT: CPT

## 2017-03-20 PROCEDURE — 94760 N-INVAS EAR/PLS OXIMETRY 1: CPT

## 2017-03-20 PROCEDURE — 82948 REAGENT STRIP/BLOOD GLUCOSE: CPT

## 2017-03-20 RX ORDER — PANTOPRAZOLE SODIUM 40 MG/1
40 TABLET, DELAYED RELEASE ORAL
Status: DISCONTINUED | OUTPATIENT
Start: 2017-03-20 | End: 2017-03-21 | Stop reason: HOSPADM

## 2017-03-20 RX ORDER — GUAIFENESIN/DEXTROMETHORPHAN 100-10MG/5
10 SYRUP ORAL EVERY 4 HOURS PRN
Status: DISCONTINUED | OUTPATIENT
Start: 2017-03-20 | End: 2017-03-21 | Stop reason: HOSPADM

## 2017-03-20 RX ADMIN — INSULIN LISPRO 5 UNITS: 100 INJECTION, SOLUTION INTRAVENOUS; SUBCUTANEOUS at 18:06

## 2017-03-20 RX ADMIN — ISODIUM CHLORIDE 3 ML: 0.03 SOLUTION RESPIRATORY (INHALATION) at 13:26

## 2017-03-20 RX ADMIN — MENTHOL 5.4 MG: 5.4 LOZENGE ORAL at 18:17

## 2017-03-20 RX ADMIN — PREGABALIN 100 MG: 50 CAPSULE ORAL at 09:26

## 2017-03-20 RX ADMIN — INSULIN LISPRO 5 UNITS: 100 INJECTION, SOLUTION INTRAVENOUS; SUBCUTANEOUS at 09:27

## 2017-03-20 RX ADMIN — PREGABALIN 100 MG: 50 CAPSULE ORAL at 22:10

## 2017-03-20 RX ADMIN — INSULIN LISPRO 5 UNITS: 100 INJECTION, SOLUTION INTRAVENOUS; SUBCUTANEOUS at 12:05

## 2017-03-20 RX ADMIN — FERROUS SULFATE TAB 325 MG (65 MG ELEMENTAL FE) 325 MG: 325 (65 FE) TAB at 18:05

## 2017-03-20 RX ADMIN — METOPROLOL TARTRATE 50 MG: 50 TABLET ORAL at 22:10

## 2017-03-20 RX ADMIN — TRAMADOL HYDROCHLORIDE 50 MG: 50 TABLET, FILM COATED ORAL at 12:04

## 2017-03-20 RX ADMIN — FAMOTIDINE 20 MG: 20 TABLET ORAL at 09:26

## 2017-03-20 RX ADMIN — HEPARIN SODIUM 5000 UNITS: 5000 INJECTION, SOLUTION INTRAVENOUS; SUBCUTANEOUS at 06:19

## 2017-03-20 RX ADMIN — INSULIN LISPRO 1 UNITS: 100 INJECTION, SOLUTION INTRAVENOUS; SUBCUTANEOUS at 22:15

## 2017-03-20 RX ADMIN — METOPROLOL TARTRATE 50 MG: 50 TABLET ORAL at 09:27

## 2017-03-20 RX ADMIN — ISODIUM CHLORIDE 3 ML: 0.03 SOLUTION RESPIRATORY (INHALATION) at 07:40

## 2017-03-20 RX ADMIN — INSULIN LISPRO 3 UNITS: 100 INJECTION, SOLUTION INTRAVENOUS; SUBCUTANEOUS at 12:05

## 2017-03-20 RX ADMIN — HEPARIN SODIUM 5000 UNITS: 5000 INJECTION, SOLUTION INTRAVENOUS; SUBCUTANEOUS at 13:46

## 2017-03-20 RX ADMIN — ATORVASTATIN CALCIUM 40 MG: 40 TABLET, FILM COATED ORAL at 18:05

## 2017-03-20 RX ADMIN — TRAMADOL HYDROCHLORIDE 50 MG: 50 TABLET, FILM COATED ORAL at 22:10

## 2017-03-20 RX ADMIN — TEMAZEPAM 30 MG: 15 CAPSULE ORAL at 22:11

## 2017-03-20 RX ADMIN — PANTOPRAZOLE SODIUM 40 MG: 40 TABLET, DELAYED RELEASE ORAL at 12:03

## 2017-03-20 RX ADMIN — METRONIDAZOLE 500 MG: 500 TABLET ORAL at 02:30

## 2017-03-20 RX ADMIN — ASPIRIN 324 MG: 81 TABLET, CHEWABLE ORAL at 09:24

## 2017-03-20 RX ADMIN — PANTOPRAZOLE SODIUM 40 MG: 40 TABLET, DELAYED RELEASE ORAL at 06:20

## 2017-03-20 RX ADMIN — INSULIN GLARGINE 35 UNITS: 100 INJECTION, SOLUTION SUBCUTANEOUS at 22:14

## 2017-03-20 RX ADMIN — FERROUS SULFATE TAB 325 MG (65 MG ELEMENTAL FE) 325 MG: 325 (65 FE) TAB at 09:30

## 2017-03-20 RX ADMIN — GUAIFENESIN AND DEXTROMETHORPHAN 10 ML: 100; 10 SYRUP ORAL at 18:06

## 2017-03-20 RX ADMIN — HEPARIN SODIUM 5000 UNITS: 5000 INJECTION, SOLUTION INTRAVENOUS; SUBCUTANEOUS at 22:13

## 2017-03-20 RX ADMIN — CLOPIDOGREL BISULFATE 75 MG: 75 TABLET, FILM COATED ORAL at 09:27

## 2017-03-20 RX ADMIN — LEVALBUTEROL 1.25 MG: 1.25 SOLUTION, CONCENTRATE RESPIRATORY (INHALATION) at 07:40

## 2017-03-20 RX ADMIN — HYDROCODONE POLISTIREX AND CHLORPHENIRAMINE POLISTIREX 5 ML: 10; 8 SUSPENSION, EXTENDED RELEASE ORAL at 06:21

## 2017-03-20 RX ADMIN — ISODIUM CHLORIDE 3 ML: 0.03 SOLUTION RESPIRATORY (INHALATION) at 19:00

## 2017-03-20 RX ADMIN — PREGABALIN 100 MG: 50 CAPSULE ORAL at 18:06

## 2017-03-20 RX ADMIN — LEVALBUTEROL 1.25 MG: 1.25 SOLUTION, CONCENTRATE RESPIRATORY (INHALATION) at 19:00

## 2017-03-20 RX ADMIN — LEVALBUTEROL 1.25 MG: 1.25 SOLUTION, CONCENTRATE RESPIRATORY (INHALATION) at 13:25

## 2017-03-21 VITALS
HEIGHT: 72 IN | DIASTOLIC BLOOD PRESSURE: 59 MMHG | HEART RATE: 67 BPM | RESPIRATION RATE: 20 BRPM | TEMPERATURE: 97.9 F | OXYGEN SATURATION: 94 % | WEIGHT: 241.4 LBS | SYSTOLIC BLOOD PRESSURE: 123 MMHG | BODY MASS INDEX: 32.7 KG/M2

## 2017-03-21 PROBLEM — K59.00 CONSTIPATION: Status: RESOLVED | Noted: 2017-03-18 | Resolved: 2017-03-21

## 2017-03-21 LAB
BACTERIA BLD CULT: NORMAL
BACTERIA BLD CULT: NORMAL
GLUCOSE SERPL-MCNC: 143 MG/DL (ref 65–140)
GLUCOSE SERPL-MCNC: 193 MG/DL (ref 65–140)

## 2017-03-21 PROCEDURE — 82948 REAGENT STRIP/BLOOD GLUCOSE: CPT

## 2017-03-21 PROCEDURE — 94760 N-INVAS EAR/PLS OXIMETRY 1: CPT

## 2017-03-21 PROCEDURE — 94640 AIRWAY INHALATION TREATMENT: CPT

## 2017-03-21 RX ORDER — CLOPIDOGREL BISULFATE 75 MG/1
75 TABLET ORAL DAILY
Qty: 30 TABLET | Refills: 0 | Status: SHIPPED | OUTPATIENT
Start: 2017-03-21 | End: 2018-04-18 | Stop reason: SDUPTHER

## 2017-03-21 RX ORDER — NITROGLYCERIN 0.4 MG/1
0.4 TABLET SUBLINGUAL
Qty: 90 TABLET | Refills: 0 | Status: SHIPPED | OUTPATIENT
Start: 2017-03-21 | End: 2018-04-19 | Stop reason: SDUPTHER

## 2017-03-21 RX ORDER — ALBUTEROL SULFATE 90 UG/1
2 AEROSOL, METERED RESPIRATORY (INHALATION) EVERY 6 HOURS PRN
Qty: 1 INHALER | Refills: 1 | Status: SHIPPED | OUTPATIENT
Start: 2017-03-21 | End: 2017-04-20

## 2017-03-21 RX ORDER — ATORVASTATIN CALCIUM 40 MG/1
40 TABLET, FILM COATED ORAL
Qty: 30 TABLET | Refills: 0 | Status: SHIPPED | OUTPATIENT
Start: 2017-03-21 | End: 2018-02-14 | Stop reason: SDUPTHER

## 2017-03-21 RX ORDER — INSULIN GLARGINE 100 [IU]/ML
35 INJECTION, SOLUTION SUBCUTANEOUS
Qty: 1050 UNITS | Refills: 0
Start: 2017-03-21 | End: 2017-06-13

## 2017-03-21 RX ORDER — TRAMADOL HYDROCHLORIDE 50 MG/1
50 TABLET ORAL EVERY 6 HOURS PRN
Qty: 30 TABLET | Refills: 0 | Status: SHIPPED | OUTPATIENT
Start: 2017-03-21 | End: 2017-03-31

## 2017-03-21 RX ORDER — ASPIRIN 81 MG/1
81 TABLET ORAL DAILY
Qty: 30 TABLET | Refills: 0 | Status: SHIPPED | OUTPATIENT
Start: 2017-03-21 | End: 2018-02-14 | Stop reason: SDUPTHER

## 2017-03-21 RX ORDER — METOPROLOL TARTRATE 50 MG/1
50 TABLET, FILM COATED ORAL EVERY 12 HOURS SCHEDULED
Qty: 60 TABLET | Refills: 0 | Status: SHIPPED | OUTPATIENT
Start: 2017-03-21 | End: 2018-02-14 | Stop reason: SDUPTHER

## 2017-03-21 RX ORDER — PREGABALIN 100 MG/1
100 CAPSULE ORAL 3 TIMES DAILY
Qty: 30 CAPSULE | Refills: 0 | Status: SHIPPED | OUTPATIENT
Start: 2017-03-21 | End: 2017-04-06

## 2017-03-21 RX ORDER — METRONIDAZOLE 500 MG/1
500 TABLET ORAL 3 TIMES DAILY
Qty: 12 TABLET | Refills: 0 | Status: SHIPPED | OUTPATIENT
Start: 2017-03-21 | End: 2017-03-25

## 2017-03-21 RX ORDER — GUAIFENESIN/DEXTROMETHORPHAN 100-10MG/5
10 SYRUP ORAL EVERY 4 HOURS PRN
Qty: 118 ML | Refills: 0 | Status: SHIPPED | OUTPATIENT
Start: 2017-03-21 | End: 2017-04-20

## 2017-03-21 RX ORDER — FERROUS SULFATE 325(65) MG
325 TABLET ORAL 2 TIMES DAILY WITH MEALS
Qty: 60 TABLET | Refills: 0 | Status: SHIPPED | OUTPATIENT
Start: 2017-03-21 | End: 2018-01-31

## 2017-03-21 RX ORDER — LEVOFLOXACIN 500 MG/1
500 TABLET, FILM COATED ORAL DAILY
Qty: 4 TABLET | Refills: 0 | Status: SHIPPED | OUTPATIENT
Start: 2017-03-21 | End: 2017-03-25

## 2017-03-21 RX ADMIN — INSULIN LISPRO 2 UNITS: 100 INJECTION, SOLUTION INTRAVENOUS; SUBCUTANEOUS at 08:36

## 2017-03-21 RX ADMIN — INSULIN LISPRO 5 UNITS: 100 INJECTION, SOLUTION INTRAVENOUS; SUBCUTANEOUS at 08:36

## 2017-03-21 RX ADMIN — HEPARIN SODIUM 5000 UNITS: 5000 INJECTION, SOLUTION INTRAVENOUS; SUBCUTANEOUS at 05:41

## 2017-03-21 RX ADMIN — CLOPIDOGREL BISULFATE 75 MG: 75 TABLET, FILM COATED ORAL at 08:33

## 2017-03-21 RX ADMIN — INSULIN LISPRO 5 UNITS: 100 INJECTION, SOLUTION INTRAVENOUS; SUBCUTANEOUS at 12:59

## 2017-03-21 RX ADMIN — LEVALBUTEROL 1.25 MG: 1.25 SOLUTION, CONCENTRATE RESPIRATORY (INHALATION) at 07:47

## 2017-03-21 RX ADMIN — MENTHOL 5.4 MG: 5.4 LOZENGE ORAL at 08:31

## 2017-03-21 RX ADMIN — Medication 1 TABLET: at 08:35

## 2017-03-21 RX ADMIN — PREGABALIN 100 MG: 50 CAPSULE ORAL at 08:33

## 2017-03-21 RX ADMIN — TRAMADOL HYDROCHLORIDE 50 MG: 50 TABLET, FILM COATED ORAL at 08:33

## 2017-03-21 RX ADMIN — ISODIUM CHLORIDE 3 ML: 0.03 SOLUTION RESPIRATORY (INHALATION) at 07:47

## 2017-03-21 RX ADMIN — PANTOPRAZOLE SODIUM 40 MG: 40 TABLET, DELAYED RELEASE ORAL at 05:41

## 2017-03-21 RX ADMIN — FERROUS SULFATE TAB 325 MG (65 MG ELEMENTAL FE) 325 MG: 325 (65 FE) TAB at 08:34

## 2017-03-21 RX ADMIN — METOPROLOL TARTRATE 50 MG: 50 TABLET ORAL at 08:34

## 2017-03-21 RX ADMIN — ASPIRIN 324 MG: 81 TABLET, CHEWABLE ORAL at 08:31

## 2017-03-24 ENCOUNTER — GENERIC CONVERSION - ENCOUNTER (OUTPATIENT)
Dept: OTHER | Facility: OTHER | Age: 67
End: 2017-03-24

## 2017-03-27 ENCOUNTER — GENERIC CONVERSION - ENCOUNTER (OUTPATIENT)
Dept: OTHER | Facility: OTHER | Age: 67
End: 2017-03-27

## 2017-04-06 ENCOUNTER — HOSPITAL ENCOUNTER (EMERGENCY)
Facility: HOSPITAL | Age: 67
Discharge: HOME | End: 2017-04-06
Payer: MEDICARE

## 2017-04-06 VITALS
BODY MASS INDEX: 31.74 KG/M2 | RESPIRATION RATE: 20 BRPM | WEIGHT: 234 LBS | OXYGEN SATURATION: 98 % | DIASTOLIC BLOOD PRESSURE: 79 MMHG | HEART RATE: 74 BPM | SYSTOLIC BLOOD PRESSURE: 174 MMHG | TEMPERATURE: 97.9 F

## 2017-04-06 DIAGNOSIS — Z76.0 ENCOUNTER FOR MEDICATION REFILL: Primary | ICD-10-CM

## 2017-04-06 PROCEDURE — 99281 EMR DPT VST MAYX REQ PHY/QHP: CPT

## 2017-04-06 RX ORDER — TRAMADOL HYDROCHLORIDE 50 MG/1
50 TABLET ORAL EVERY 8 HOURS PRN
COMMUNITY
End: 2018-02-14 | Stop reason: ALTCHOICE

## 2017-04-06 RX ORDER — PREGABALIN 100 MG/1
100 CAPSULE ORAL 3 TIMES DAILY
Qty: 30 CAPSULE | Refills: 0 | Status: SHIPPED | OUTPATIENT
Start: 2017-04-06 | End: 2017-06-13

## 2017-04-26 ENCOUNTER — GENERIC CONVERSION - ENCOUNTER (OUTPATIENT)
Dept: OTHER | Facility: OTHER | Age: 67
End: 2017-04-26

## 2017-04-26 ENCOUNTER — APPOINTMENT (EMERGENCY)
Dept: RADIOLOGY | Facility: HOSPITAL | Age: 67
End: 2017-04-26
Payer: MEDICARE

## 2017-04-26 ENCOUNTER — HOSPITAL ENCOUNTER (EMERGENCY)
Facility: HOSPITAL | Age: 67
Discharge: LEFT BEFORE DISCHARGE | End: 2017-04-26
Attending: EMERGENCY MEDICINE | Admitting: EMERGENCY MEDICINE
Payer: MEDICARE

## 2017-04-26 VITALS
WEIGHT: 238 LBS | HEART RATE: 77 BPM | TEMPERATURE: 97.9 F | DIASTOLIC BLOOD PRESSURE: 70 MMHG | RESPIRATION RATE: 24 BRPM | BODY MASS INDEX: 32.28 KG/M2 | SYSTOLIC BLOOD PRESSURE: 152 MMHG | OXYGEN SATURATION: 98 %

## 2017-04-26 DIAGNOSIS — J20.9 ACUTE BRONCHITIS: Primary | ICD-10-CM

## 2017-04-26 DIAGNOSIS — M94.0 COSTOCHONDRITIS: ICD-10-CM

## 2017-04-26 DIAGNOSIS — Z76.5 DRUG-SEEKING BEHAVIOR: ICD-10-CM

## 2017-04-26 LAB
ALBUMIN SERPL BCP-MCNC: 3.2 G/DL (ref 3.5–5)
ALP SERPL-CCNC: 99 U/L (ref 46–116)
ALT SERPL W P-5'-P-CCNC: 17 U/L (ref 12–78)
ANION GAP SERPL CALCULATED.3IONS-SCNC: 2 MMOL/L (ref 4–13)
AST SERPL W P-5'-P-CCNC: 13 U/L (ref 5–45)
ATRIAL RATE: 74 BPM
BASOPHILS # BLD AUTO: 0.08 THOUSANDS/ΜL (ref 0–0.1)
BASOPHILS NFR BLD AUTO: 0 % (ref 0–1)
BILIRUB SERPL-MCNC: 0.44 MG/DL (ref 0.2–1)
BUN SERPL-MCNC: 20 MG/DL (ref 5–25)
CALCIUM SERPL-MCNC: 8.7 MG/DL (ref 8.3–10.1)
CHLORIDE SERPL-SCNC: 103 MMOL/L (ref 100–108)
CO2 SERPL-SCNC: 27 MMOL/L (ref 21–32)
CREAT SERPL-MCNC: 1.3 MG/DL (ref 0.6–1.3)
EOSINOPHIL # BLD AUTO: 0.18 THOUSAND/ΜL (ref 0–0.61)
EOSINOPHIL NFR BLD AUTO: 1 % (ref 0–6)
ERYTHROCYTE [DISTWIDTH] IN BLOOD BY AUTOMATED COUNT: 19.1 % (ref 11.6–15.1)
GFR SERPL CREATININE-BSD FRML MDRD: 55.2 ML/MIN/1.73SQ M
GLUCOSE SERPL-MCNC: 252 MG/DL (ref 65–140)
HCT VFR BLD AUTO: 35 % (ref 36.5–49.3)
HGB BLD-MCNC: 11 G/DL (ref 12–17)
LACTATE SERPL-SCNC: 1.5 MMOL/L (ref 0.5–2)
LYMPHOCYTES # BLD AUTO: 1.65 THOUSANDS/ΜL (ref 0.6–4.47)
LYMPHOCYTES NFR BLD AUTO: 9 % (ref 14–44)
MCH RBC QN AUTO: 24.2 PG (ref 26.8–34.3)
MCHC RBC AUTO-ENTMCNC: 31.4 G/DL (ref 31.4–37.4)
MCV RBC AUTO: 77 FL (ref 82–98)
MONOCYTES # BLD AUTO: 1.35 THOUSAND/ΜL (ref 0.17–1.22)
MONOCYTES NFR BLD AUTO: 7 % (ref 4–12)
NEUTROPHILS # BLD AUTO: 15.43 THOUSANDS/ΜL (ref 1.85–7.62)
NEUTS SEG NFR BLD AUTO: 83 % (ref 43–75)
NRBC BLD AUTO-RTO: 0 /100 WBCS
P AXIS: 64 DEGREES
PLATELET # BLD AUTO: 313 THOUSANDS/UL (ref 149–390)
PMV BLD AUTO: 9.5 FL (ref 8.9–12.7)
POTASSIUM SERPL-SCNC: 4.3 MMOL/L (ref 3.5–5.3)
PR INTERVAL: 140 MS
PROT SERPL-MCNC: 7.6 G/DL (ref 6.4–8.2)
QRS AXIS: 29 DEGREES
QRSD INTERVAL: 80 MS
QT INTERVAL: 390 MS
QTC INTERVAL: 432 MS
RBC # BLD AUTO: 4.54 MILLION/UL (ref 3.88–5.62)
SODIUM SERPL-SCNC: 132 MMOL/L (ref 136–145)
SPECIMEN SOURCE: NORMAL
T WAVE AXIS: 33 DEGREES
TROPONIN I BLD-MCNC: 0 NG/ML (ref 0–0.08)
VENTRICULAR RATE: 74 BPM
WBC # BLD AUTO: 18.69 THOUSAND/UL (ref 4.31–10.16)

## 2017-04-26 PROCEDURE — 80053 COMPREHEN METABOLIC PANEL: CPT

## 2017-04-26 PROCEDURE — 96365 THER/PROPH/DIAG IV INF INIT: CPT

## 2017-04-26 PROCEDURE — 83605 ASSAY OF LACTIC ACID: CPT | Performed by: EMERGENCY MEDICINE

## 2017-04-26 PROCEDURE — 71020 HB CHEST X-RAY 2VW FRONTAL&LATL: CPT

## 2017-04-26 PROCEDURE — 85025 COMPLETE CBC W/AUTO DIFF WBC: CPT

## 2017-04-26 PROCEDURE — 87040 BLOOD CULTURE FOR BACTERIA: CPT | Performed by: EMERGENCY MEDICINE

## 2017-04-26 PROCEDURE — 36415 COLL VENOUS BLD VENIPUNCTURE: CPT | Performed by: EMERGENCY MEDICINE

## 2017-04-26 PROCEDURE — 93005 ELECTROCARDIOGRAM TRACING: CPT

## 2017-04-26 PROCEDURE — 96375 TX/PRO/DX INJ NEW DRUG ADDON: CPT

## 2017-04-26 PROCEDURE — 84484 ASSAY OF TROPONIN QUANT: CPT

## 2017-04-26 PROCEDURE — 99285 EMERGENCY DEPT VISIT HI MDM: CPT

## 2017-04-26 RX ORDER — LIDOCAINE 50 MG/G
1 PATCH TOPICAL ONCE
Status: DISCONTINUED | OUTPATIENT
Start: 2017-04-26 | End: 2017-04-26 | Stop reason: HOSPADM

## 2017-04-26 RX ORDER — KETOROLAC TROMETHAMINE 30 MG/ML
15 INJECTION, SOLUTION INTRAMUSCULAR; INTRAVENOUS ONCE
Status: COMPLETED | OUTPATIENT
Start: 2017-04-26 | End: 2017-04-26

## 2017-04-26 RX ORDER — LEVOFLOXACIN 5 MG/ML
750 INJECTION, SOLUTION INTRAVENOUS ONCE
Status: COMPLETED | OUTPATIENT
Start: 2017-04-26 | End: 2017-04-26

## 2017-04-26 RX ORDER — IPRATROPIUM BROMIDE AND ALBUTEROL SULFATE 2.5; .5 MG/3ML; MG/3ML
3 SOLUTION RESPIRATORY (INHALATION)
Status: DISCONTINUED | OUTPATIENT
Start: 2017-04-26 | End: 2017-04-26 | Stop reason: HOSPADM

## 2017-04-26 RX ORDER — ONDANSETRON 2 MG/ML
4 INJECTION INTRAMUSCULAR; INTRAVENOUS ONCE
Status: DISCONTINUED | OUTPATIENT
Start: 2017-04-26 | End: 2017-04-26 | Stop reason: HOSPADM

## 2017-04-26 RX ADMIN — LIDOCAINE 1 PATCH: 50 PATCH CUTANEOUS at 13:28

## 2017-04-26 RX ADMIN — SODIUM CHLORIDE 500 ML: 0.9 INJECTION, SOLUTION INTRAVENOUS at 14:20

## 2017-04-26 RX ADMIN — IPRATROPIUM BROMIDE AND ALBUTEROL SULFATE 3 ML: .5; 3 SOLUTION RESPIRATORY (INHALATION) at 13:27

## 2017-04-26 RX ADMIN — LEVOFLOXACIN 750 MG: 5 INJECTION, SOLUTION INTRAVENOUS at 14:27

## 2017-04-26 RX ADMIN — KETOROLAC TROMETHAMINE 15 MG: 30 INJECTION, SOLUTION INTRAMUSCULAR at 13:28

## 2017-04-27 ENCOUNTER — APPOINTMENT (EMERGENCY)
Dept: RADIOLOGY | Facility: HOSPITAL | Age: 67
End: 2017-04-27
Payer: MEDICARE

## 2017-04-27 ENCOUNTER — HOSPITAL ENCOUNTER (EMERGENCY)
Facility: HOSPITAL | Age: 67
Discharge: HOME | End: 2017-04-27
Attending: EMERGENCY MEDICINE | Admitting: EMERGENCY MEDICINE
Payer: MEDICARE

## 2017-04-27 VITALS
WEIGHT: 245.81 LBS | TEMPERATURE: 98 F | HEART RATE: 72 BPM | SYSTOLIC BLOOD PRESSURE: 138 MMHG | DIASTOLIC BLOOD PRESSURE: 78 MMHG | RESPIRATION RATE: 19 BRPM | OXYGEN SATURATION: 99 % | BODY MASS INDEX: 33.34 KG/M2

## 2017-04-27 DIAGNOSIS — Z76.5 DRUG-SEEKING BEHAVIOR: ICD-10-CM

## 2017-04-27 DIAGNOSIS — R07.9 CHEST PAIN WITH LOW RISK OF ACUTE CORONARY SYNDROME: Primary | ICD-10-CM

## 2017-04-27 DIAGNOSIS — R05.9 COUGH: ICD-10-CM

## 2017-04-27 DIAGNOSIS — G89.29 CHRONIC PAIN: ICD-10-CM

## 2017-04-27 LAB
ALBUMIN SERPL BCP-MCNC: 3.1 G/DL (ref 3.5–5)
ALP SERPL-CCNC: 98 U/L (ref 46–116)
ALT SERPL W P-5'-P-CCNC: 18 U/L (ref 12–78)
ANION GAP SERPL CALCULATED.3IONS-SCNC: 6 MMOL/L (ref 4–13)
AST SERPL W P-5'-P-CCNC: 19 U/L (ref 5–45)
ATRIAL RATE: 681 BPM
BASOPHILS # BLD AUTO: 0.08 THOUSANDS/ΜL (ref 0–0.1)
BASOPHILS NFR BLD AUTO: 1 % (ref 0–1)
BILIRUB SERPL-MCNC: 0.24 MG/DL (ref 0.2–1)
BUN SERPL-MCNC: 23 MG/DL (ref 5–25)
CALCIUM SERPL-MCNC: 9 MG/DL (ref 8.3–10.1)
CHLORIDE SERPL-SCNC: 106 MMOL/L (ref 100–108)
CO2 SERPL-SCNC: 28 MMOL/L (ref 21–32)
CREAT SERPL-MCNC: 1.23 MG/DL (ref 0.6–1.3)
EOSINOPHIL # BLD AUTO: 0.22 THOUSAND/ΜL (ref 0–0.61)
EOSINOPHIL NFR BLD AUTO: 2 % (ref 0–6)
ERYTHROCYTE [DISTWIDTH] IN BLOOD BY AUTOMATED COUNT: 19.4 % (ref 11.6–15.1)
GFR SERPL CREATININE-BSD FRML MDRD: 58.9 ML/MIN/1.73SQ M
GLUCOSE SERPL-MCNC: 202 MG/DL (ref 65–140)
HCT VFR BLD AUTO: 35 % (ref 36.5–49.3)
HGB BLD-MCNC: 10.8 G/DL (ref 12–17)
LYMPHOCYTES # BLD AUTO: 2.13 THOUSANDS/ΜL (ref 0.6–4.47)
LYMPHOCYTES NFR BLD AUTO: 21 % (ref 14–44)
MCH RBC QN AUTO: 23.8 PG (ref 26.8–34.3)
MCHC RBC AUTO-ENTMCNC: 30.9 G/DL (ref 31.4–37.4)
MCV RBC AUTO: 77 FL (ref 82–98)
MONOCYTES # BLD AUTO: 0.93 THOUSAND/ΜL (ref 0.17–1.22)
MONOCYTES NFR BLD AUTO: 9 % (ref 4–12)
NEUTROPHILS # BLD AUTO: 6.89 THOUSANDS/ΜL (ref 1.85–7.62)
NEUTS SEG NFR BLD AUTO: 67 % (ref 43–75)
NRBC BLD AUTO-RTO: 0 /100 WBCS
NT-PROBNP SERPL-MCNC: 923 PG/ML
PLATELET # BLD AUTO: 303 THOUSANDS/UL (ref 149–390)
PMV BLD AUTO: 9.7 FL (ref 8.9–12.7)
POTASSIUM SERPL-SCNC: 4.6 MMOL/L (ref 3.5–5.3)
PROT SERPL-MCNC: 7.7 G/DL (ref 6.4–8.2)
QRS AXIS: 31 DEGREES
QRSD INTERVAL: 78 MS
QT INTERVAL: 376 MS
QTC INTERVAL: 423 MS
RBC # BLD AUTO: 4.53 MILLION/UL (ref 3.88–5.62)
SODIUM SERPL-SCNC: 140 MMOL/L (ref 136–145)
SPECIMEN SOURCE: NORMAL
T WAVE AXIS: 26 DEGREES
TROPONIN I BLD-MCNC: 0.01 NG/ML (ref 0–0.08)
VENTRICULAR RATE: 76 BPM
WBC # BLD AUTO: 10.25 THOUSAND/UL (ref 4.31–10.16)

## 2017-04-27 PROCEDURE — 99284 EMERGENCY DEPT VISIT MOD MDM: CPT

## 2017-04-27 PROCEDURE — 36415 COLL VENOUS BLD VENIPUNCTURE: CPT | Performed by: EMERGENCY MEDICINE

## 2017-04-27 PROCEDURE — 96374 THER/PROPH/DIAG INJ IV PUSH: CPT

## 2017-04-27 PROCEDURE — 85025 COMPLETE CBC W/AUTO DIFF WBC: CPT | Performed by: EMERGENCY MEDICINE

## 2017-04-27 PROCEDURE — 80053 COMPREHEN METABOLIC PANEL: CPT | Performed by: EMERGENCY MEDICINE

## 2017-04-27 PROCEDURE — 83880 ASSAY OF NATRIURETIC PEPTIDE: CPT | Performed by: EMERGENCY MEDICINE

## 2017-04-27 PROCEDURE — 84484 ASSAY OF TROPONIN QUANT: CPT

## 2017-04-27 PROCEDURE — 93005 ELECTROCARDIOGRAM TRACING: CPT | Performed by: EMERGENCY MEDICINE

## 2017-04-27 PROCEDURE — 71020 HB CHEST X-RAY 2VW FRONTAL&LATL: CPT

## 2017-04-27 RX ORDER — BENZONATATE 100 MG/1
100 CAPSULE ORAL 3 TIMES DAILY PRN
Qty: 21 CAPSULE | Refills: 0 | Status: SHIPPED | OUTPATIENT
Start: 2017-04-27 | End: 2017-05-04

## 2017-04-27 RX ORDER — KETOROLAC TROMETHAMINE 30 MG/ML
15 INJECTION, SOLUTION INTRAMUSCULAR; INTRAVENOUS ONCE
Status: COMPLETED | OUTPATIENT
Start: 2017-04-27 | End: 2017-04-27

## 2017-04-27 RX ORDER — NAPROXEN 250 MG/1
250 TABLET ORAL
Qty: 21 TABLET | Refills: 0 | Status: SHIPPED | OUTPATIENT
Start: 2017-04-27 | End: 2018-01-31

## 2017-04-27 RX ADMIN — KETOROLAC TROMETHAMINE 15 MG: 30 INJECTION, SOLUTION INTRAMUSCULAR at 12:15

## 2017-04-28 ENCOUNTER — ALLSCRIPTS OFFICE VISIT (OUTPATIENT)
Dept: OTHER | Facility: OTHER | Age: 67
End: 2017-04-28

## 2017-05-01 LAB
BACTERIA BLD CULT: NORMAL
BACTERIA BLD CULT: NORMAL

## 2017-06-13 ENCOUNTER — HOSPITAL ENCOUNTER (EMERGENCY)
Facility: HOSPITAL | Age: 67
Discharge: HOME | End: 2017-06-13
Attending: EMERGENCY MEDICINE | Admitting: EMERGENCY MEDICINE
Payer: MEDICARE

## 2017-06-13 VITALS
DIASTOLIC BLOOD PRESSURE: 80 MMHG | SYSTOLIC BLOOD PRESSURE: 179 MMHG | HEART RATE: 97 BPM | TEMPERATURE: 98.4 F | OXYGEN SATURATION: 97 % | RESPIRATION RATE: 16 BRPM

## 2017-06-13 DIAGNOSIS — Z76.0 ENCOUNTER FOR MEDICATION REFILL: Primary | ICD-10-CM

## 2017-06-13 PROCEDURE — 99283 EMERGENCY DEPT VISIT LOW MDM: CPT

## 2017-06-13 RX ORDER — PANTOPRAZOLE SODIUM 40 MG/1
40 TABLET, DELAYED RELEASE ORAL DAILY
Qty: 30 TABLET | Refills: 0 | Status: SHIPPED | OUTPATIENT
Start: 2017-06-13 | End: 2018-01-31

## 2017-06-13 RX ORDER — PREGABALIN 100 MG/1
100 CAPSULE ORAL 3 TIMES DAILY
Qty: 90 CAPSULE | Refills: 0 | Status: SHIPPED | OUTPATIENT
Start: 2017-06-13 | End: 2018-01-31

## 2017-06-13 RX ORDER — INSULIN GLARGINE 100 [IU]/ML
35 INJECTION, SOLUTION SUBCUTANEOUS
Qty: 1050 UNITS | Refills: 0 | Status: SHIPPED | OUTPATIENT
Start: 2017-06-13 | End: 2018-02-14 | Stop reason: SDUPTHER

## 2017-08-02 ENCOUNTER — APPOINTMENT (EMERGENCY)
Dept: CT IMAGING | Facility: HOSPITAL | Age: 67
End: 2017-08-02
Payer: MEDICARE

## 2017-08-02 ENCOUNTER — HOSPITAL ENCOUNTER (EMERGENCY)
Facility: HOSPITAL | Age: 67
Discharge: HOME | End: 2017-08-02
Attending: EMERGENCY MEDICINE
Payer: MEDICARE

## 2017-08-02 VITALS
SYSTOLIC BLOOD PRESSURE: 179 MMHG | WEIGHT: 297 LBS | DIASTOLIC BLOOD PRESSURE: 78 MMHG | HEART RATE: 74 BPM | RESPIRATION RATE: 16 BRPM | OXYGEN SATURATION: 96 % | TEMPERATURE: 97.7 F | BODY MASS INDEX: 40.28 KG/M2

## 2017-08-02 DIAGNOSIS — Z76.0 MEDICINE REFILL: ICD-10-CM

## 2017-08-02 DIAGNOSIS — R10.9 ABDOMINAL PAIN: Primary | ICD-10-CM

## 2017-08-02 LAB
ALBUMIN SERPL BCP-MCNC: 3.8 G/DL (ref 3.5–5)
ALP SERPL-CCNC: 84 U/L (ref 46–116)
ALT SERPL W P-5'-P-CCNC: 23 U/L (ref 12–78)
ANION GAP SERPL CALCULATED.3IONS-SCNC: 10 MMOL/L (ref 4–13)
APTT PPP: 30 SECONDS (ref 23–35)
AST SERPL W P-5'-P-CCNC: 32 U/L (ref 5–45)
ATRIAL RATE: 70 BPM
BACTERIA UR QL AUTO: ABNORMAL /HPF
BASOPHILS # BLD AUTO: 0.11 THOUSANDS/ΜL (ref 0–0.1)
BASOPHILS NFR BLD AUTO: 1 % (ref 0–1)
BILIRUB SERPL-MCNC: 0.52 MG/DL (ref 0.2–1)
BILIRUB UR QL STRIP: NEGATIVE
BUN SERPL-MCNC: 24 MG/DL (ref 5–25)
CALCIUM SERPL-MCNC: 8.9 MG/DL (ref 8.3–10.1)
CHLORIDE SERPL-SCNC: 101 MMOL/L (ref 100–108)
CLARITY UR: CLEAR
CO2 SERPL-SCNC: 25 MMOL/L (ref 21–32)
COLOR UR: YELLOW
CREAT SERPL-MCNC: 1.12 MG/DL (ref 0.6–1.3)
EOSINOPHIL # BLD AUTO: 0.33 THOUSAND/ΜL (ref 0–0.61)
EOSINOPHIL NFR BLD AUTO: 3 % (ref 0–6)
ERYTHROCYTE [DISTWIDTH] IN BLOOD BY AUTOMATED COUNT: 20.1 % (ref 11.6–15.1)
GFR SERPL CREATININE-BSD FRML MDRD: 68 ML/MIN/1.73SQ M
GLUCOSE SERPL-MCNC: 187 MG/DL (ref 65–140)
GLUCOSE UR STRIP-MCNC: ABNORMAL MG/DL
HCT VFR BLD AUTO: 36.6 % (ref 36.5–49.3)
HGB BLD-MCNC: 11.7 G/DL (ref 12–17)
HGB UR QL STRIP.AUTO: ABNORMAL
INR PPP: 0.99 (ref 0.86–1.16)
KETONES UR STRIP-MCNC: NEGATIVE MG/DL
LEUKOCYTE ESTERASE UR QL STRIP: NEGATIVE
LIPASE SERPL-CCNC: 116 U/L (ref 73–393)
LYMPHOCYTES # BLD AUTO: 2.02 THOUSANDS/ΜL (ref 0.6–4.47)
LYMPHOCYTES NFR BLD AUTO: 21 % (ref 14–44)
MCH RBC QN AUTO: 24 PG (ref 26.8–34.3)
MCHC RBC AUTO-ENTMCNC: 32 G/DL (ref 31.4–37.4)
MCV RBC AUTO: 75 FL (ref 82–98)
MONOCYTES # BLD AUTO: 0.93 THOUSAND/ΜL (ref 0.17–1.22)
MONOCYTES NFR BLD AUTO: 10 % (ref 4–12)
NEUTROPHILS # BLD AUTO: 6.38 THOUSANDS/ΜL (ref 1.85–7.62)
NEUTS SEG NFR BLD AUTO: 65 % (ref 43–75)
NITRITE UR QL STRIP: NEGATIVE
NON-SQ EPI CELLS URNS QL MICRO: ABNORMAL /HPF
NRBC BLD AUTO-RTO: 0 /100 WBCS
P AXIS: 63 DEGREES
PH UR STRIP.AUTO: 5 [PH] (ref 4.5–8)
PLATELET # BLD AUTO: 311 THOUSANDS/UL (ref 149–390)
PMV BLD AUTO: 9.8 FL (ref 8.9–12.7)
POTASSIUM SERPL-SCNC: 4.1 MMOL/L (ref 3.5–5.3)
PR INTERVAL: 142 MS
PROT SERPL-MCNC: 8.8 G/DL (ref 6.4–8.2)
PROT UR STRIP-MCNC: ABNORMAL MG/DL
PROTHROMBIN TIME: 13.1 SECONDS (ref 12.1–14.4)
QRS AXIS: 27 DEGREES
QRSD INTERVAL: 82 MS
QT INTERVAL: 388 MS
QTC INTERVAL: 419 MS
RBC # BLD AUTO: 4.87 MILLION/UL (ref 3.88–5.62)
RBC #/AREA URNS AUTO: ABNORMAL /HPF
SODIUM SERPL-SCNC: 136 MMOL/L (ref 136–145)
SP GR UR STRIP.AUTO: 1.02 (ref 1–1.03)
T WAVE AXIS: 20 DEGREES
UROBILINOGEN UR QL STRIP.AUTO: 0.2 E.U./DL
VENTRICULAR RATE: 70 BPM
WBC # BLD AUTO: 9.77 THOUSAND/UL (ref 4.31–10.16)
WBC #/AREA URNS AUTO: ABNORMAL /HPF

## 2017-08-02 PROCEDURE — 96361 HYDRATE IV INFUSION ADD-ON: CPT

## 2017-08-02 PROCEDURE — 80053 COMPREHEN METABOLIC PANEL: CPT | Performed by: EMERGENCY MEDICINE

## 2017-08-02 PROCEDURE — 81001 URINALYSIS AUTO W/SCOPE: CPT

## 2017-08-02 PROCEDURE — 99284 EMERGENCY DEPT VISIT MOD MDM: CPT

## 2017-08-02 PROCEDURE — 83690 ASSAY OF LIPASE: CPT | Performed by: EMERGENCY MEDICINE

## 2017-08-02 PROCEDURE — 85730 THROMBOPLASTIN TIME PARTIAL: CPT | Performed by: EMERGENCY MEDICINE

## 2017-08-02 PROCEDURE — 74177 CT ABD & PELVIS W/CONTRAST: CPT

## 2017-08-02 PROCEDURE — 81002 URINALYSIS NONAUTO W/O SCOPE: CPT | Performed by: EMERGENCY MEDICINE

## 2017-08-02 PROCEDURE — 85025 COMPLETE CBC W/AUTO DIFF WBC: CPT | Performed by: EMERGENCY MEDICINE

## 2017-08-02 PROCEDURE — 93005 ELECTROCARDIOGRAM TRACING: CPT | Performed by: EMERGENCY MEDICINE

## 2017-08-02 PROCEDURE — 96374 THER/PROPH/DIAG INJ IV PUSH: CPT

## 2017-08-02 PROCEDURE — 85610 PROTHROMBIN TIME: CPT | Performed by: EMERGENCY MEDICINE

## 2017-08-02 PROCEDURE — 36415 COLL VENOUS BLD VENIPUNCTURE: CPT | Performed by: EMERGENCY MEDICINE

## 2017-08-02 PROCEDURE — 96375 TX/PRO/DX INJ NEW DRUG ADDON: CPT

## 2017-08-02 RX ORDER — MORPHINE SULFATE 4 MG/ML
2 INJECTION, SOLUTION INTRAMUSCULAR; INTRAVENOUS ONCE
Status: COMPLETED | OUTPATIENT
Start: 2017-08-02 | End: 2017-08-02

## 2017-08-02 RX ORDER — RISPERIDONE 2 MG/1
2 TABLET, FILM COATED ORAL
COMMUNITY
End: 2018-02-14 | Stop reason: ALTCHOICE

## 2017-08-02 RX ORDER — ONDANSETRON 2 MG/ML
4 INJECTION INTRAMUSCULAR; INTRAVENOUS ONCE
Status: COMPLETED | OUTPATIENT
Start: 2017-08-02 | End: 2017-08-02

## 2017-08-02 RX ORDER — PREGABALIN 100 MG/1
100 CAPSULE ORAL 3 TIMES DAILY
Qty: 30 CAPSULE | Refills: 0 | Status: SHIPPED | OUTPATIENT
Start: 2017-08-02 | End: 2018-03-01 | Stop reason: SDUPTHER

## 2017-08-02 RX ORDER — PANTOPRAZOLE SODIUM 40 MG/1
40 TABLET, DELAYED RELEASE ORAL DAILY
Qty: 15 TABLET | Refills: 0 | Status: SHIPPED | OUTPATIENT
Start: 2017-08-02 | End: 2018-02-14 | Stop reason: SDUPTHER

## 2017-08-02 RX ADMIN — SODIUM CHLORIDE 1000 ML: 0.9 INJECTION, SOLUTION INTRAVENOUS at 08:23

## 2017-08-02 RX ADMIN — ONDANSETRON 4 MG: 2 INJECTION INTRAMUSCULAR; INTRAVENOUS at 08:26

## 2017-08-02 RX ADMIN — IOHEXOL 100 ML: 350 INJECTION, SOLUTION INTRAVENOUS at 10:11

## 2017-08-02 RX ADMIN — MORPHINE SULFATE 2 MG: 4 INJECTION, SOLUTION INTRAMUSCULAR; INTRAVENOUS at 08:26

## 2017-09-13 ENCOUNTER — ALLSCRIPTS OFFICE VISIT (OUTPATIENT)
Dept: OTHER | Facility: OTHER | Age: 67
End: 2017-09-13

## 2017-09-13 ENCOUNTER — GENERIC CONVERSION - ENCOUNTER (OUTPATIENT)
Dept: OTHER | Facility: OTHER | Age: 67
End: 2017-09-13

## 2017-09-13 DIAGNOSIS — F32.9 MAJOR DEPRESSIVE DISORDER, SINGLE EPISODE: ICD-10-CM

## 2017-09-13 DIAGNOSIS — E78.5 HYPERLIPIDEMIA: ICD-10-CM

## 2017-09-13 DIAGNOSIS — I10 ESSENTIAL (PRIMARY) HYPERTENSION: ICD-10-CM

## 2017-09-13 DIAGNOSIS — E11.49 TYPE 2 DIABETES MELLITUS WITH OTHER DIABETIC NEUROLOGICAL COMPLICATION (HCC): ICD-10-CM

## 2017-10-24 ENCOUNTER — GENERIC CONVERSION - ENCOUNTER (OUTPATIENT)
Dept: OTHER | Facility: OTHER | Age: 67
End: 2017-10-24

## 2017-10-25 ENCOUNTER — GENERIC CONVERSION - ENCOUNTER (OUTPATIENT)
Dept: OTHER | Facility: OTHER | Age: 67
End: 2017-10-25

## 2017-10-28 ENCOUNTER — GENERIC CONVERSION - ENCOUNTER (OUTPATIENT)
Dept: OTHER | Facility: OTHER | Age: 67
End: 2017-10-28

## 2017-11-15 ENCOUNTER — ALLSCRIPTS OFFICE VISIT (OUTPATIENT)
Dept: OTHER | Facility: OTHER | Age: 67
End: 2017-11-15

## 2017-11-17 NOTE — PROGRESS NOTES
Assessment    1  Chronic bronchitis (491 9) (J42)    Plan  Chronic bronchitis    · Benzonatate 100 MG Oral Capsule; TAKE 1 CAPSULE 3 TIMES DAILY AS NEEDED   · Cefuroxime Axetil 500 MG Oral Tablet; TAKE 1 TABLET EVERY 12 HOURS DAILY   · PredniSONE 10 MG Oral Tablet; Take 3 tablets for 2 days, then 2 tablets for 2 days, then 1tablet for 2 days, then stop  TAKE WITH FOOD   · Proventil  (90 Base) MCG/ACT Inhalation Aerosol Solution; 1-2 puffs daily prn  Chronic diastolic congestive heart failure    · Complete PFT; Status:Hold For - Scheduling; Requested for:90Mxr9410;     Discussion/Summary  Discussion Summary:   Per patient, he does not have a history of asthma or COPD  Hospital diagnosis was acute bronchitis  Suspect that patient does have COPD or asthma, but would like to have PFT completed before starting other inhalers  Will start abx for chronic bronchitis today, as well as prednisone taper  Take albuterol every 6 hours for the next 3-4 days  Follow up in 1 week for PFT if feeling good, if not feeling well that day will call in the morning and come in for a follow up bronchitis instead of PFT  with the patient today that I will not refill his tramadol as we have not assessed his pain  He will need to make another appointment to address this concern  Of note the patient's blood pressure has been elevated continuously both today and on prior visits, I encouraged him to continue taking his metoprolol as prescribed and recommended checking his blood pressure at the pharmacy once a day  At his follow-up appointment we may need to consider adding on another medication for better control  A discussed with the patient that I do not feel his dizziness is coming from metoprolol as he has been on this for 7 months, his symptoms are more likely related to his illness at the current time and will reassess when patient is feeling better  discharge summary and emergency room records reviewed at length     Counseling Documentation With Imm: The patient was counseled regarding diagnostic results,-- instructions for management,-- risk factor reductions,-- prognosis,-- patient and family education,-- impressions,-- risks and benefits of treatment options,-- importance of compliance with treatment  total time of encounter was 30 minutes-- and-- 25 minutes was spent counseling  Medication SE Review and Pt Understands Tx: Possible side effects of new medications were reviewed with the patient/guardian today  The treatment plan was reviewed with the patient/guardian  The patient/guardian understands and agrees with the treatment plan      Chief Complaint  Chief Complaint Free Text Note Form: pt is here for hospital f/u of COPD , pt was at [address] [facility] on 10/24/2017 and discharged 10/26/2017c/o feeling dizzy, pt would like a prescription cough syrup and a refill of benzonatate, pt would like refill on Tramadol, pt states he does not like metoprolol it does not agree with you and a lot of side effects and gives him a dull dizzy feeling, pt states his sugar was 458 last night       History of Present Illness  HPI: Patient is here today for hospital follow up  He was seen at [facility] on 10/24 and admitted through the ER for chest pain and dizziness  He also had complaints of left rib and left upper quadrant abdominal pain that were worsened by coughing  An EKG was done which showed sinus tachycardia and nonspecific ST and T-wave abnormalities  A CT of the chest was negative for active disease  Blood work showed a mildly elevated troponin at 0 10  He was admitted for observation and was seen by Cardiology  His remaining troponins trended down at 0 08 and 0 07  He then had a stress test completed for which Cardiology did not feel there was ischemia present and no further workup was indicated   Throughout hospital stay he did have wheezing shortness of breath, and productive cough he was diagnosed with acute bronchitis and started on a steroid taper, antitussives and mucolytic therapy  It was felt that his bronchitis was the source of his chest pain  He was then discharged home in stable condition  After being at home he reported back to the emergency room on 10/28 with vomiting and feeling dizzy as well as pain in his legs and feet  It was noted that the patient was asking for pain meds  In Dr Darrick Cuevas note from his hospital summary it did note that the patient PA P D and P site shows 37 different narcotic prescription with 16 different providers  He also complained of fever of 101 then night before  Labs were checked, troponin was normal, EKG showed prolonged QT interval but was otherwise normal, chest x-ray was unremarkable  The patient was discharged home with no change to medications, Zofran was given for nausea  is here today for hospital follow-up, he states that he has finished his steroid pack and feels slightly better but continues to have nasal congestion, runny nose, shortness of breath at rest and on exertion, dizziness, productive cough with yellow mucus, and chest tenderness with coughing, and wheezing  He reports the symptoms are worse at nighttime  He is concerned that his dizziness is caused by his metoprolol and would like it to be changed to a different medication  He states that he would like a refill of his Cassandria Dandy because they were very helpful  He notes that his blood sugar was significantly elevated greater than 400 last night, but âI know it was elevated, I ate a lot of candy, I know it was dumb and i shouldn'tât have done it, I will not do it againâ of note the patient also states that his peripheral neuropathy has been worse since his cold symptoms started and he is requesting a refill of his tramadol for this pain  history includes about --45 years of smoking anywhere from 1-6 cigarettes per day  note, I do not seen any record of COPD exacerbation in patient DC summary, appears to be acute bronchitis  Review of Systems  Complete-Male:  Constitutional: recent 10 lb weight gain-- and-- chills, but-- no fever,-- not feeling poorly-- and-- not feeling tired  ENT: no earache-- and-- no sore throat--   The patient presents with complaints of nasal discharge (yellowish mucous )  Respiratory: shortness of breath,-- wheezing-- and-- shortness of breath during exertion--   The patient presents with complaints of cough (yellowish-green mucous)  Gastrointestinal: no abdominal pain,-- no nausea-- and-- no vomiting  Active Problems  1  Abdominal aortic aneurysm (AAA) (441 4) (I71 4)   2  Chest pain (786 50) (R07 9)   3  Chronic back pain (724 5,338 29) (M54 9,G89 29)   4  Chronic diastolic congestive heart failure (428 32,428 0) (I50 32)   5  Chronic pain (338 29) (G89 29)   6  COPD (chronic obstructive pulmonary disease) (496) (J44 9)   7  Depression (311) (F32 9)   8  Diabetes mellitus type 2 with neurological manifestations (250 60) (E11 49)   9  Diabetic neuropathy (250 60,357 2) (E11 40)   10  Epigastric pain (789 06) (R10 13)   11  Fatty liver disease, nonalcoholic (966 1) (Z05 5)   12  GERD (gastroesophageal reflux disease) (530 81) (K21 9)   13  Hyperlipidemia (272 4) (E78 5)   14  Hypertension (401 9) (I10)   15  Leukocytosis (288 60) (D72 829)   16  Neuropathy (355 9) (G62 9)   17  Troponin level elevated (790 6) (R74 8)   18  Type 2 diabetes mellitus treated with insulin (250 00,V58 67) (E11 9,Z79  4)    Past Medical History  1  History of cardiac murmur (V12 59) (Z86 79)   2  History of myocardial infarction (412) (I25 2)  Active Problems And Past Medical History Reviewed: The active problems and past medical history were reviewed and updated today  Surgical History  1  History of Appendectomy   2  History of Tonsillectomy  Surgical History Reviewed: The surgical history was reviewed and updated today  Family History  Mother    1   Family history of diabetes mellitus (V18 0) (Z83 3)  Father    2  Family history of cardiac disorder (V17 49) (Z82 49)   3  Family history of hypertension (V17 49) (Z82 49)  Brother    4  FH: stomach cancer (V16 0) (Z80 0)  Family History Reviewed: The family history was reviewed and updated today  Social History     · Does not drink alcohol (V49 89) (Z78 9)   · Former smoker (P42 78) (U05 340)   · No illicit drug use  Social History Reviewed: The social history was reviewed and updated today  Current Meds   1  Aspirin 81 MG Oral Tablet Delayed Release; take 1 tablet by mouth every day; Therapy: 28Apr2017 to (Evaluate:98Kci5011)  Requested for: 28Apr2017; Last Rx:28Apr2017 Ordered   2  Atorvastatin Calcium 40 MG Oral Tablet; TAKE 1 TABLET DAILY; Therapy: 28Apr2017 to (Evaluate:11Jan2018)  Requested for: 50Iea9964; Last Rx:13Sep2017 Ordered   3  BD Pen Needle Mini U/F 31G X 5 MM Miscellaneous; USE AS DIRECTED; Therapy: 58KTO0574 to (Evaluate:16Aug2017)  Requested for: 54Fsp9556; Last Rx:10Vwn0465 Ordered   4  DULoxetine HCl - 30 MG Oral Capsule Delayed Release Particles; TAKE 1 CAPSULE DAILY FOR 1 WEEK THEN 2 CAPSULES DAILY; Therapy: 28Apr2017 to (Angel Lewis)  Requested for: 23Oct2017; Last Rx:23Oct2017 Ordered   5  FreeStyle Freedom Lite w/Device Kit; 1 kit - DX  E11 9; Therapy: 79DXV5769 to (Last Rx:04Oct2017)  Requested for: 23PTA0592 Ordered   6  FreeStyle Lite Test In Vitro Strip; TEST TWICE DAILY  Requested for: 81QZL3410; Last Rx:05Oct2017 Ordered   7  Lantus SoloStar 100 UNIT/ML Subcutaneous Solution Pen-injector; inject 35 units every morning  Requested for: 19Zdh4167; Last Rx:13Sep2017 Ordered   8  Lyrica 100 MG Oral Capsule; TAKE 1 CAPSULE 3 times daily; Therapy: 82Efh6591 to (Evaluate:02Jan2018)  Requested for: 11YYH6884; Last Rx:03Nov2017; Status: ACTIVE - Renewal Denied Ordered   9  Metoprolol Tartrate 50 MG Oral Tablet; TAKE 1 TABLET TWICE DAILY;  Therapy: 28Apr2017 to (Evaluate:12Nov2017)  Requested for: 13Sep2017; Last Rx: 74VZG7816 Ordered   10  Pantoprazole Sodium 40 MG Oral Tablet Delayed Release; Take 1 tablet daily  Requested for:  56SEB1410; Last Rx:04Gqm9336 Ordered   11  Proventil  (90 Base) MCG/ACT Inhalation Aerosol Solution; 1-2 puffs daily prn; Therapy: 45Zpk0084 to (Last Rx:95Qfd7129)  Requested for: 69PKQ4360 Ordered   12  TraMADol HCl - 50 MG Oral Tablet; Take 1 tablet every 6 hours as needed for pain; Last  Rx:08Lbs4565 Ordered  Medication List Reviewed: The medication list was reviewed and updated today  Allergies  1  No Known Drug Allergies  2  No Known Environmental Allergies   3  No Known Food Allergies    Vitals  Vital Signs    Recorded: 98VWL9906 02:26PM Recorded: 62QRO1970 01:19PM   Temperature  97 6 F, Oral   Heart Rate  84   Systolic 259 660, LUE, Sitting   Diastolic 85 90, LUE, Sitting   BP CUFF SIZE  Large   Height  5 ft 8 in   Weight  248 lb    BMI Calculated  37 71   BSA Calculated  2 24   O2 Saturation  98, RA       Physical Exam   Constitutional  General appearance: Abnormal   well developed,-- appears healthy,-- well nourished-- and-- obese  Eyes  Conjunctiva and lids: No swelling, erythema, or discharge  Pupils and irises: Equal, round and reactive to light  Ears, Nose, Mouth, and Throat  External inspection of ears and nose: Normal    Otoscopic examination: Tympanic membrance translucent with normal light reflex  Canals patent without erythema  Nasal mucosa, septum, and turbinates: Normal without edema or erythema  Oropharynx: Normal with no erythema, edema, exudate or lesions  Pulmonary  Respiratory effort: No increased work of breathing or signs of respiratory distress  Auscultation of lungs: Abnormal   Auscultation of the lungs revealed diffuse wheezing  Cardiovascular  Auscultation of heart: Normal rate and rhythm, normal S1 and S2, without murmurs     Examination of extremities for edema and/or varicosities: Normal    Lymphatic  Palpation of lymph nodes in neck: No lymphadenopathy  Musculoskeletal  Gait and station: Normal    Psychiatric  Orientation to person, place and time: Normal    Mood and affect: Normal    Additional Exam:  No sinus tenderness  Health Management  History of Screening for genitourinary condition   *VB - Urinary Incontinence Screen (Dx Z13 89 Screen for UI); every 1 year; Last 17Xhh0761; NextDue: 43NIQ8999; Active  History of Screening for neurological condition   *VB - Fall Risk Assessment  (Dx Z13 89 Screen for Neurologic Disorder); every 1 year; ZQZF28YVR9497; Next Due: 63Boa1835; Active  Positive depression screening   *VB-Depression Screening; every 1 year; Last 86Ktw0643; Next Due: 47Hls0760;  Active    Future Appointments    Date/Time Provider Specialty Site   11/28/2017 10:30 AM Salome Cano Internal Medicine [facility] [place] 02/01/2018 10:00 AM Ajay Nazario MD Internal Medicine [facility] [place]       Signatures   Electronically signed by : Glenna Coleman; Nov 15 2017  3:16PM EST                       (Author)    Electronically signed by : Andrew Ramos MD; Nov 16 2017  9:18AM EST                       (Author)

## 2017-11-21 ENCOUNTER — ALLSCRIPTS OFFICE VISIT (OUTPATIENT)
Dept: OTHER | Facility: OTHER | Age: 67
End: 2017-11-21

## 2017-11-21 LAB
BILIRUB UR QL STRIP: NEGATIVE
CLARITY UR: ABNORMAL
COLOR UR: YELLOW
GLUCOSE (HISTORICAL): ABNORMAL
GLUCOSE SERPL-MCNC: 341 MG/DL
HGB UR QL STRIP.AUTO: ABNORMAL
KETONES UR STRIP-MCNC: NEGATIVE MG/DL
LEUKOCYTE ESTERASE UR QL STRIP: NEGATIVE
NITRITE UR QL STRIP: NEGATIVE
PH UR STRIP.AUTO: 5.5 [PH]
PROT UR STRIP-MCNC: ABNORMAL MG/DL
SP GR UR STRIP.AUTO: 1.01
UROBILINOGEN UR QL STRIP.AUTO: 0.2

## 2017-12-14 ENCOUNTER — GENERIC CONVERSION - ENCOUNTER (OUTPATIENT)
Dept: OTHER | Facility: OTHER | Age: 67
End: 2017-12-14

## 2017-12-20 ENCOUNTER — GENERIC CONVERSION - ENCOUNTER (OUTPATIENT)
Dept: OTHER | Facility: OTHER | Age: 67
End: 2017-12-20

## 2018-01-01 ENCOUNTER — TELEPHONE (OUTPATIENT)
Dept: INTERNAL MEDICINE CLINIC | Facility: CLINIC | Age: 68
End: 2018-01-01

## 2018-01-01 ENCOUNTER — HOSPITAL ENCOUNTER (OUTPATIENT)
Facility: HOSPITAL | Age: 68
Setting detail: OBSERVATION
Discharge: HOME | End: 2018-09-30
Attending: EMERGENCY MEDICINE | Admitting: INTERNAL MEDICINE
Payer: COMMERCIAL

## 2018-01-01 ENCOUNTER — APPOINTMENT (EMERGENCY)
Dept: RADIOLOGY | Facility: HOSPITAL | Age: 68
End: 2018-01-01
Payer: COMMERCIAL

## 2018-01-01 ENCOUNTER — OFFICE VISIT (OUTPATIENT)
Dept: INTERNAL MEDICINE CLINIC | Facility: CLINIC | Age: 68
End: 2018-01-01
Payer: COMMERCIAL

## 2018-01-01 ENCOUNTER — HOSPITAL ENCOUNTER (INPATIENT)
Facility: HOSPITAL | Age: 68
LOS: 1 days | Discharge: HOME | DRG: 303 | End: 2018-12-11
Attending: EMERGENCY MEDICINE | Admitting: INTERNAL MEDICINE
Payer: COMMERCIAL

## 2018-01-01 ENCOUNTER — APPOINTMENT (INPATIENT)
Dept: NON INVASIVE DIAGNOSTICS | Facility: HOSPITAL | Age: 68
DRG: 303 | End: 2018-01-01
Payer: COMMERCIAL

## 2018-01-01 ENCOUNTER — TRANSITIONAL CARE MANAGEMENT (OUTPATIENT)
Dept: INTERNAL MEDICINE CLINIC | Facility: CLINIC | Age: 68
End: 2018-01-01

## 2018-01-01 ENCOUNTER — APPOINTMENT (EMERGENCY)
Dept: RADIOLOGY | Facility: HOSPITAL | Age: 68
DRG: 303 | End: 2018-01-01
Payer: COMMERCIAL

## 2018-01-01 VITALS
HEIGHT: 69 IN | WEIGHT: 230.8 LBS | SYSTOLIC BLOOD PRESSURE: 130 MMHG | HEART RATE: 88 BPM | DIASTOLIC BLOOD PRESSURE: 82 MMHG | BODY MASS INDEX: 34.18 KG/M2 | OXYGEN SATURATION: 95 % | TEMPERATURE: 98.8 F

## 2018-01-01 VITALS
BODY MASS INDEX: 33.59 KG/M2 | WEIGHT: 234.61 LBS | DIASTOLIC BLOOD PRESSURE: 87 MMHG | HEIGHT: 70 IN | OXYGEN SATURATION: 96 % | HEART RATE: 72 BPM | TEMPERATURE: 98.6 F | SYSTOLIC BLOOD PRESSURE: 142 MMHG | RESPIRATION RATE: 18 BRPM

## 2018-01-01 VITALS
BODY MASS INDEX: 35.49 KG/M2 | TEMPERATURE: 98.4 F | HEIGHT: 68 IN | SYSTOLIC BLOOD PRESSURE: 112 MMHG | HEART RATE: 70 BPM | OXYGEN SATURATION: 98 % | DIASTOLIC BLOOD PRESSURE: 60 MMHG | WEIGHT: 234.2 LBS

## 2018-01-01 VITALS
HEIGHT: 69 IN | BODY MASS INDEX: 33.98 KG/M2 | TEMPERATURE: 97.9 F | DIASTOLIC BLOOD PRESSURE: 92 MMHG | SYSTOLIC BLOOD PRESSURE: 166 MMHG | OXYGEN SATURATION: 98 % | WEIGHT: 229.4 LBS | HEART RATE: 75 BPM

## 2018-01-01 VITALS
RESPIRATION RATE: 18 BRPM | SYSTOLIC BLOOD PRESSURE: 137 MMHG | OXYGEN SATURATION: 96 % | HEART RATE: 77 BPM | DIASTOLIC BLOOD PRESSURE: 62 MMHG | WEIGHT: 231 LBS | HEIGHT: 68 IN | BODY MASS INDEX: 35.01 KG/M2 | TEMPERATURE: 98.2 F

## 2018-01-01 VITALS
HEART RATE: 80 BPM | DIASTOLIC BLOOD PRESSURE: 74 MMHG | OXYGEN SATURATION: 97 % | HEIGHT: 68 IN | SYSTOLIC BLOOD PRESSURE: 124 MMHG | TEMPERATURE: 98.4 F | BODY MASS INDEX: 34.89 KG/M2 | WEIGHT: 230.2 LBS

## 2018-01-01 VITALS
BODY MASS INDEX: 35.13 KG/M2 | HEART RATE: 96 BPM | WEIGHT: 231.8 LBS | TEMPERATURE: 98.1 F | HEIGHT: 68 IN | SYSTOLIC BLOOD PRESSURE: 150 MMHG | OXYGEN SATURATION: 97 % | DIASTOLIC BLOOD PRESSURE: 90 MMHG

## 2018-01-01 VITALS
WEIGHT: 223.8 LBS | BODY MASS INDEX: 33.15 KG/M2 | SYSTOLIC BLOOD PRESSURE: 106 MMHG | OXYGEN SATURATION: 97 % | HEIGHT: 69 IN | TEMPERATURE: 98.9 F | HEART RATE: 80 BPM | DIASTOLIC BLOOD PRESSURE: 72 MMHG

## 2018-01-01 VITALS
TEMPERATURE: 98.4 F | WEIGHT: 238.2 LBS | HEIGHT: 69 IN | SYSTOLIC BLOOD PRESSURE: 130 MMHG | DIASTOLIC BLOOD PRESSURE: 78 MMHG | HEART RATE: 102 BPM | OXYGEN SATURATION: 98 % | BODY MASS INDEX: 35.28 KG/M2

## 2018-01-01 VITALS
SYSTOLIC BLOOD PRESSURE: 140 MMHG | HEART RATE: 77 BPM | TEMPERATURE: 98.7 F | WEIGHT: 234.9 LBS | HEIGHT: 70 IN | OXYGEN SATURATION: 97 % | BODY MASS INDEX: 33.63 KG/M2 | DIASTOLIC BLOOD PRESSURE: 88 MMHG

## 2018-01-01 DIAGNOSIS — I10 ESSENTIAL HYPERTENSION: Primary | ICD-10-CM

## 2018-01-01 DIAGNOSIS — R61 DIAPHORESIS: ICD-10-CM

## 2018-01-01 DIAGNOSIS — I25.118 CORONARY ARTERY DISEASE OF NATIVE ARTERY OF NATIVE HEART WITH STABLE ANGINA PECTORIS (HCC): Chronic | ICD-10-CM

## 2018-01-01 DIAGNOSIS — T14.8XXA BRUISING: Primary | ICD-10-CM

## 2018-01-01 DIAGNOSIS — G62.9 NEUROPATHY: ICD-10-CM

## 2018-01-01 DIAGNOSIS — R07.9 CHEST PAIN: Primary | ICD-10-CM

## 2018-01-01 DIAGNOSIS — B36.9 FUNGAL SKIN INFECTION: ICD-10-CM

## 2018-01-01 DIAGNOSIS — E11.9 TYPE 2 DIABETES MELLITUS TREATED WITH INSULIN (HCC): Primary | ICD-10-CM

## 2018-01-01 DIAGNOSIS — Z79.4 TYPE 2 DIABETES MELLITUS WITH DIABETIC NEUROPATHY, WITH LONG-TERM CURRENT USE OF INSULIN (HCC): ICD-10-CM

## 2018-01-01 DIAGNOSIS — F11.29 OPIOID DEPENDENCE WITH OPIOID-INDUCED DISORDER (HCC): ICD-10-CM

## 2018-01-01 DIAGNOSIS — R58 ECCHYMOSIS: ICD-10-CM

## 2018-01-01 DIAGNOSIS — I10 ESSENTIAL HYPERTENSION: ICD-10-CM

## 2018-01-01 DIAGNOSIS — R35.0 URINARY FREQUENCY: Primary | ICD-10-CM

## 2018-01-01 DIAGNOSIS — I25.10 DISEASE OF CARDIOVASCULAR SYSTEM: ICD-10-CM

## 2018-01-01 DIAGNOSIS — J41.1 MUCOPURULENT CHRONIC BRONCHITIS (HCC): Primary | ICD-10-CM

## 2018-01-01 DIAGNOSIS — J42 CHRONIC BRONCHITIS, UNSPECIFIED CHRONIC BRONCHITIS TYPE (HCC): ICD-10-CM

## 2018-01-01 DIAGNOSIS — R35.1 BPH ASSOCIATED WITH NOCTURIA: ICD-10-CM

## 2018-01-01 DIAGNOSIS — E11.40 TYPE 2 DIABETES MELLITUS WITH DIABETIC NEUROPATHY, WITH LONG-TERM CURRENT USE OF INSULIN (HCC): ICD-10-CM

## 2018-01-01 DIAGNOSIS — R93.89 ABNORMAL CT OF THE CHEST: Primary | ICD-10-CM

## 2018-01-01 DIAGNOSIS — Z79.4 TYPE 2 DIABETES MELLITUS TREATED WITH INSULIN (HCC): Primary | ICD-10-CM

## 2018-01-01 DIAGNOSIS — I25.10 LEFT MAIN CORONARY ARTERY DISEASE: ICD-10-CM

## 2018-01-01 DIAGNOSIS — Z12.11 SCREENING FOR COLON CANCER: Primary | ICD-10-CM

## 2018-01-01 DIAGNOSIS — M54.42 CHRONIC BILATERAL LOW BACK PAIN WITH BILATERAL SCIATICA: ICD-10-CM

## 2018-01-01 DIAGNOSIS — J41.1 MUCOPURULENT CHRONIC BRONCHITIS (HCC): ICD-10-CM

## 2018-01-01 DIAGNOSIS — K57.30 DIVERTICULOSIS OF COLON: ICD-10-CM

## 2018-01-01 DIAGNOSIS — K21.9 GASTROESOPHAGEAL REFLUX DISEASE, ESOPHAGITIS PRESENCE NOT SPECIFIED: ICD-10-CM

## 2018-01-01 DIAGNOSIS — M54.41 CHRONIC BILATERAL LOW BACK PAIN WITH BILATERAL SCIATICA: ICD-10-CM

## 2018-01-01 DIAGNOSIS — G89.29 CHRONIC BILATERAL LOW BACK PAIN WITH BILATERAL SCIATICA: ICD-10-CM

## 2018-01-01 DIAGNOSIS — S32.009D CLOSED FRACTURE OF TRANSVERSE PROCESS OF LUMBAR VERTEBRA WITH ROUTINE HEALING: ICD-10-CM

## 2018-01-01 DIAGNOSIS — F17.200 SMOKER: ICD-10-CM

## 2018-01-01 DIAGNOSIS — R05.9 COUGH: Primary | ICD-10-CM

## 2018-01-01 DIAGNOSIS — R35.0 INCREASED URINARY FREQUENCY: ICD-10-CM

## 2018-01-01 DIAGNOSIS — N40.1 BPH ASSOCIATED WITH NOCTURIA: ICD-10-CM

## 2018-01-01 DIAGNOSIS — T40.2X5A THERAPEUTIC OPIOID INDUCED CONSTIPATION: ICD-10-CM

## 2018-01-01 DIAGNOSIS — N30.00 ACUTE CYSTITIS WITHOUT HEMATURIA: ICD-10-CM

## 2018-01-01 DIAGNOSIS — E11.65 UNCONTROLLED TYPE 2 DIABETES MELLITUS WITH HYPERGLYCEMIA (HCC): ICD-10-CM

## 2018-01-01 DIAGNOSIS — B35.6 FUNGAL INFECTION OF THE GROIN: ICD-10-CM

## 2018-01-01 DIAGNOSIS — E11.42 DM TYPE 2 WITH DIABETIC PERIPHERAL NEUROPATHY (HCC): ICD-10-CM

## 2018-01-01 DIAGNOSIS — R35.0 FREQUENT URINATION: Primary | ICD-10-CM

## 2018-01-01 DIAGNOSIS — J44.1 COPD EXACERBATION (HCC): ICD-10-CM

## 2018-01-01 DIAGNOSIS — K59.03 THERAPEUTIC OPIOID INDUCED CONSTIPATION: ICD-10-CM

## 2018-01-01 LAB
ALBUMIN SERPL BCP-MCNC: 2.9 G/DL (ref 3.5–5)
ALBUMIN SERPL BCP-MCNC: 3.2 G/DL (ref 3.5–5)
ALBUMIN/CREAT UR: 81.5 MG/G CREAT (ref 0–30)
ALP SERPL-CCNC: 101 U/L (ref 46–116)
ALP SERPL-CCNC: 107 U/L (ref 46–116)
ALT SERPL W P-5'-P-CCNC: 21 U/L (ref 12–78)
ALT SERPL W P-5'-P-CCNC: 27 U/L (ref 12–78)
ANION GAP SERPL CALCULATED.3IONS-SCNC: 5 MMOL/L (ref 4–13)
ANION GAP SERPL CALCULATED.3IONS-SCNC: 6 MMOL/L (ref 4–13)
ANION GAP SERPL CALCULATED.3IONS-SCNC: 7 MMOL/L (ref 4–13)
ANION GAP SERPL CALCULATED.3IONS-SCNC: 7 MMOL/L (ref 4–13)
APPEARANCE UR: CLEAR
APPEARANCE UR: CLEAR
APTT PPP: 29 SEC (ref 24–33)
AST SERPL W P-5'-P-CCNC: 12 U/L (ref 5–45)
AST SERPL W P-5'-P-CCNC: 16 U/L (ref 5–45)
ATRIAL RATE: 68 BPM
ATRIAL RATE: 73 BPM
ATRIAL RATE: 76 BPM
ATRIAL RATE: 82 BPM
ATRIAL RATE: 87 BPM
BACTERIA UR CULT: NORMAL
BACTERIA UR CULT: NORMAL
BACTERIA URNS QL MICRO: ABNORMAL
BACTERIA URNS QL MICRO: NORMAL
BASOPHILS # BLD AUTO: 0.1 X10E3/UL (ref 0–0.2)
BASOPHILS # BLD AUTO: 0.14 THOUSANDS/ΜL (ref 0–0.1)
BASOPHILS # BLD AUTO: 0.14 THOUSANDS/ΜL (ref 0–0.1)
BASOPHILS # BLD AUTO: 0.17 THOUSANDS/ΜL (ref 0–0.1)
BASOPHILS NFR BLD AUTO: 1 %
BASOPHILS NFR BLD AUTO: 1 % (ref 0–1)
BASOPHILS NFR BLD AUTO: 2 % (ref 0–1)
BASOPHILS NFR BLD AUTO: 2 % (ref 0–1)
BILIRUB SERPL-MCNC: 0.26 MG/DL (ref 0.2–1)
BILIRUB SERPL-MCNC: 0.53 MG/DL (ref 0.2–1)
BILIRUB UR QL STRIP: NEGATIVE
BILIRUB UR QL STRIP: NEGATIVE
BUN SERPL-MCNC: 14 MG/DL (ref 5–25)
BUN SERPL-MCNC: 16 MG/DL (ref 5–25)
CALCIUM SERPL-MCNC: 8.6 MG/DL (ref 8.3–10.1)
CALCIUM SERPL-MCNC: 8.6 MG/DL (ref 8.3–10.1)
CALCIUM SERPL-MCNC: 9 MG/DL (ref 8.3–10.1)
CALCIUM SERPL-MCNC: 9 MG/DL (ref 8.3–10.1)
CHLORIDE SERPL-SCNC: 102 MMOL/L (ref 100–108)
CHLORIDE SERPL-SCNC: 104 MMOL/L (ref 100–108)
CHLORIDE SERPL-SCNC: 104 MMOL/L (ref 100–108)
CHLORIDE SERPL-SCNC: 105 MMOL/L (ref 100–108)
CHOLEST SERPL-MCNC: 198 MG/DL (ref 50–200)
CO2 SERPL-SCNC: 23 MMOL/L (ref 21–32)
CO2 SERPL-SCNC: 24 MMOL/L (ref 21–32)
CO2 SERPL-SCNC: 26 MMOL/L (ref 21–32)
CO2 SERPL-SCNC: 26 MMOL/L (ref 21–32)
COLOR UR: YELLOW
COLOR UR: YELLOW
CREAT SERPL-MCNC: 1.12 MG/DL (ref 0.6–1.3)
CREAT SERPL-MCNC: 1.15 MG/DL (ref 0.6–1.3)
CREAT SERPL-MCNC: 1.34 MG/DL (ref 0.6–1.3)
CREAT SERPL-MCNC: 1.39 MG/DL (ref 0.6–1.3)
CREAT UR-MCNC: 89.7 MG/DL
EOSINOPHIL # BLD AUTO: 0.19 THOUSAND/ΜL (ref 0–0.61)
EOSINOPHIL # BLD AUTO: 0.2 X10E3/UL (ref 0–0.4)
EOSINOPHIL # BLD AUTO: 0.28 THOUSAND/ΜL (ref 0–0.61)
EOSINOPHIL # BLD AUTO: 0.33 THOUSAND/ΜL (ref 0–0.61)
EOSINOPHIL NFR BLD AUTO: 2 %
EOSINOPHIL NFR BLD AUTO: 2 % (ref 0–6)
EOSINOPHIL NFR BLD AUTO: 3 % (ref 0–6)
EOSINOPHIL NFR BLD AUTO: 3 % (ref 0–6)
EPI CELLS #/AREA URNS HPF: ABNORMAL /HPF
EPI CELLS #/AREA URNS HPF: NORMAL /HPF
ERYTHROCYTE [DISTWIDTH] IN BLOOD BY AUTOMATED COUNT: 18.2 % (ref 11.6–15.1)
ERYTHROCYTE [DISTWIDTH] IN BLOOD BY AUTOMATED COUNT: 18.6 % (ref 11.6–15.1)
ERYTHROCYTE [DISTWIDTH] IN BLOOD BY AUTOMATED COUNT: 18.6 % (ref 11.6–15.1)
ERYTHROCYTE [DISTWIDTH] IN BLOOD BY AUTOMATED COUNT: 18.7 % (ref 11.6–15.1)
ERYTHROCYTE [DISTWIDTH] IN BLOOD BY AUTOMATED COUNT: 19.8 % (ref 12.3–15.4)
GFR SERPL CREATININE-BSD FRML MDRD: 52 ML/MIN/1.73SQ M
GFR SERPL CREATININE-BSD FRML MDRD: 54 ML/MIN/1.73SQ M
GFR SERPL CREATININE-BSD FRML MDRD: 65 ML/MIN/1.73SQ M
GFR SERPL CREATININE-BSD FRML MDRD: 67 ML/MIN/1.73SQ M
GLUCOSE P FAST SERPL-MCNC: 299 MG/DL (ref 65–99)
GLUCOSE SERPL-MCNC: 207 MG/DL (ref 65–140)
GLUCOSE SERPL-MCNC: 236 MG/DL (ref 65–140)
GLUCOSE SERPL-MCNC: 258 MG/DL (ref 65–140)
GLUCOSE SERPL-MCNC: 289 MG/DL (ref 65–140)
GLUCOSE SERPL-MCNC: 296 MG/DL (ref 65–140)
GLUCOSE SERPL-MCNC: 299 MG/DL (ref 65–140)
GLUCOSE SERPL-MCNC: 320 MG/DL (ref 65–140)
GLUCOSE SERPL-MCNC: 321 MG/DL (ref 65–140)
GLUCOSE SERPL-MCNC: 342 MG/DL (ref 65–140)
GLUCOSE SERPL-MCNC: 386 MG/DL (ref 65–140)
GLUCOSE SERPL-MCNC: 439 MG/DL (ref 65–140)
GLUCOSE UR QL: ABNORMAL
GLUCOSE UR QL: ABNORMAL
HCT VFR BLD AUTO: 37.4 % (ref 36.5–49.3)
HCT VFR BLD AUTO: 41.7 % (ref 36.5–49.3)
HCT VFR BLD AUTO: 42 % (ref 36.5–49.3)
HCT VFR BLD AUTO: 42.8 % (ref 37.5–51)
HCT VFR BLD AUTO: 43.6 % (ref 36.5–49.3)
HDLC SERPL-MCNC: 38 MG/DL (ref 40–60)
HGB BLD-MCNC: 11.7 G/DL (ref 12–17)
HGB BLD-MCNC: 12.9 G/DL (ref 12–17)
HGB BLD-MCNC: 12.9 G/DL (ref 12–17)
HGB BLD-MCNC: 13.6 G/DL (ref 12–17)
HGB BLD-MCNC: 14.2 G/DL (ref 13–17.7)
HGB UR QL STRIP: NEGATIVE
HGB UR QL STRIP: NEGATIVE
IMM GRANULOCYTES # BLD AUTO: 0.12 THOUSAND/UL (ref 0–0.2)
IMM GRANULOCYTES # BLD AUTO: 0.13 THOUSAND/UL (ref 0–0.2)
IMM GRANULOCYTES # BLD AUTO: 0.22 THOUSAND/UL (ref 0–0.2)
IMM GRANULOCYTES # BLD: 0.1 X10E3/UL (ref 0–0.1)
IMM GRANULOCYTES NFR BLD AUTO: 1 % (ref 0–2)
IMM GRANULOCYTES NFR BLD AUTO: 1 % (ref 0–2)
IMM GRANULOCYTES NFR BLD AUTO: 2 % (ref 0–2)
IMM GRANULOCYTES NFR BLD: 1 %
INR PPP: 1 (ref 0.8–1.2)
KETONES UR QL STRIP: NEGATIVE
KETONES UR QL STRIP: NEGATIVE
LDLC SERPL CALC-MCNC: 119 MG/DL (ref 0–100)
LEUKOCYTE ESTERASE UR QL STRIP: ABNORMAL
LEUKOCYTE ESTERASE UR QL STRIP: NEGATIVE
LYMPHOCYTES # BLD AUTO: 1.51 THOUSANDS/ΜL (ref 0.6–4.47)
LYMPHOCYTES # BLD AUTO: 2 X10E3/UL (ref 0.7–3.1)
LYMPHOCYTES # BLD AUTO: 2.48 THOUSANDS/ΜL (ref 0.6–4.47)
LYMPHOCYTES # BLD AUTO: 2.52 THOUSANDS/ΜL (ref 0.6–4.47)
LYMPHOCYTES NFR BLD AUTO: 14 % (ref 14–44)
LYMPHOCYTES NFR BLD AUTO: 21 %
LYMPHOCYTES NFR BLD AUTO: 24 % (ref 14–44)
LYMPHOCYTES NFR BLD AUTO: 28 % (ref 14–44)
Lab: NO GROWTH
Lab: NO GROWTH
MCH RBC QN AUTO: 26.3 PG (ref 26.8–34.3)
MCH RBC QN AUTO: 26.6 PG (ref 26.8–34.3)
MCH RBC QN AUTO: 26.7 PG (ref 26.6–33)
MCH RBC QN AUTO: 26.8 PG (ref 26.8–34.3)
MCH RBC QN AUTO: 26.9 PG (ref 26.8–34.3)
MCHC RBC AUTO-ENTMCNC: 30.7 G/DL (ref 31.4–37.4)
MCHC RBC AUTO-ENTMCNC: 30.9 G/DL (ref 31.4–37.4)
MCHC RBC AUTO-ENTMCNC: 31.2 G/DL (ref 31.4–37.4)
MCHC RBC AUTO-ENTMCNC: 31.3 G/DL (ref 31.4–37.4)
MCHC RBC AUTO-ENTMCNC: 33.2 G/DL (ref 31.5–35.7)
MCV RBC AUTO: 81 FL (ref 79–97)
MCV RBC AUTO: 86 FL (ref 82–98)
MICRO URNS: ABNORMAL
MICRO URNS: ABNORMAL
MICROALBUMIN UR-MCNC: 73.1 UG/ML
MONOCYTES # BLD AUTO: 0.8 X10E3/UL (ref 0.1–0.9)
MONOCYTES # BLD AUTO: 0.84 THOUSAND/ΜL (ref 0.17–1.22)
MONOCYTES # BLD AUTO: 0.87 THOUSAND/ΜL (ref 0.17–1.22)
MONOCYTES # BLD AUTO: 1 THOUSAND/ΜL (ref 0.17–1.22)
MONOCYTES NFR BLD AUTO: 11 % (ref 4–12)
MONOCYTES NFR BLD AUTO: 8 % (ref 4–12)
MONOCYTES NFR BLD AUTO: 8 % (ref 4–12)
MONOCYTES NFR BLD AUTO: 9 %
MUCOUS THREADS URNS QL MICRO: PRESENT
NEUTROPHILS # BLD AUTO: 5.08 THOUSANDS/ΜL (ref 1.85–7.62)
NEUTROPHILS # BLD AUTO: 6.3 X10E3/UL (ref 1.4–7)
NEUTROPHILS # BLD AUTO: 6.33 THOUSANDS/ΜL (ref 1.85–7.62)
NEUTROPHILS # BLD AUTO: 7.82 THOUSANDS/ΜL (ref 1.85–7.62)
NEUTROPHILS NFR BLD AUTO: 66 %
NEUTS SEG NFR BLD AUTO: 55 % (ref 43–75)
NEUTS SEG NFR BLD AUTO: 62 % (ref 43–75)
NEUTS SEG NFR BLD AUTO: 73 % (ref 43–75)
NITRITE UR QL STRIP: NEGATIVE
NITRITE UR QL STRIP: POSITIVE
NONHDLC SERPL-MCNC: 160 MG/DL
NRBC BLD AUTO-RTO: 0 /100 WBCS
P AXIS: 48 DEGREES
P AXIS: 53 DEGREES
P AXIS: 56 DEGREES
P AXIS: 68 DEGREES
P AXIS: 69 DEGREES
PH UR STRIP: 5 [PH] (ref 5–7.5)
PH UR STRIP: 6 [PH] (ref 5–7.5)
PLATELET # BLD AUTO: 152 THOUSANDS/UL (ref 149–390)
PLATELET # BLD AUTO: 162 THOUSANDS/UL (ref 149–390)
PLATELET # BLD AUTO: 173 THOUSANDS/UL (ref 149–390)
PLATELET # BLD AUTO: 176 THOUSANDS/UL (ref 149–390)
PLATELET # BLD AUTO: 184 THOUSANDS/UL (ref 149–390)
PLATELET # BLD AUTO: 208 X10E3/UL (ref 150–379)
PMV BLD AUTO: 10.7 FL (ref 8.9–12.7)
PMV BLD AUTO: 11.2 FL (ref 8.9–12.7)
PMV BLD AUTO: 11.2 FL (ref 8.9–12.7)
PMV BLD AUTO: 11.7 FL (ref 8.9–12.7)
PMV BLD AUTO: 11.8 FL (ref 8.9–12.7)
POTASSIUM SERPL-SCNC: 3.8 MMOL/L (ref 3.5–5.3)
POTASSIUM SERPL-SCNC: 3.9 MMOL/L (ref 3.5–5.3)
POTASSIUM SERPL-SCNC: 4.4 MMOL/L (ref 3.5–5.3)
POTASSIUM SERPL-SCNC: 5.1 MMOL/L (ref 3.5–5.3)
PR INTERVAL: 138 MS
PR INTERVAL: 144 MS
PR INTERVAL: 148 MS
PR INTERVAL: 148 MS
PR INTERVAL: 152 MS
PROT SERPL-MCNC: 7.2 G/DL (ref 6.4–8.2)
PROT SERPL-MCNC: 7.6 G/DL (ref 6.4–8.2)
PROT UR QL STRIP: ABNORMAL
PROT UR QL STRIP: NEGATIVE
PROTHROMBIN TIME: 10.5 SEC (ref 9.1–12)
QRS AXIS: -3 DEGREES
QRS AXIS: 0 DEGREES
QRS AXIS: 36 DEGREES
QRS AXIS: 49 DEGREES
QRS AXIS: 8 DEGREES
QRSD INTERVAL: 76 MS
QRSD INTERVAL: 80 MS
QRSD INTERVAL: 80 MS
QRSD INTERVAL: 82 MS
QRSD INTERVAL: 82 MS
QT INTERVAL: 354 MS
QT INTERVAL: 372 MS
QT INTERVAL: 390 MS
QT INTERVAL: 414 MS
QT INTERVAL: 420 MS
QTC INTERVAL: 413 MS
QTC INTERVAL: 438 MS
QTC INTERVAL: 446 MS
QTC INTERVAL: 447 MS
QTC INTERVAL: 456 MS
RBC # BLD AUTO: 4.35 MILLION/UL (ref 3.88–5.62)
RBC # BLD AUTO: 4.85 MILLION/UL (ref 3.88–5.62)
RBC # BLD AUTO: 4.9 MILLION/UL (ref 3.88–5.62)
RBC # BLD AUTO: 5.08 MILLION/UL (ref 3.88–5.62)
RBC # BLD AUTO: 5.31 X10E6/UL (ref 4.14–5.8)
RBC #/AREA URNS HPF: ABNORMAL /HPF
RBC #/AREA URNS HPF: NORMAL /HPF
SL AMB  POCT GLUCOSE, UA: 500
SL AMB LEUKOCYTE ESTERASE,UA: NEGATIVE
SL AMB POCT BILIRUBIN,UA: NORMAL
SL AMB POCT BLOOD,UA: NORMAL
SL AMB POCT CLARITY,UA: NORMAL
SL AMB POCT COLOR,UA: NORMAL
SL AMB POCT KETONES,UA: NEGATIVE
SL AMB POCT NITRITE,UA: POSITIVE
SL AMB POCT PH,UA: 6
SL AMB POCT SPECIFIC GRAVITY,UA: 1.01
SL AMB POCT URINE PROTEIN: NEGATIVE
SL AMB POCT UROBILINOGEN: 0.2
SL AMB URINALYSIS REFLEX: ABNORMAL
SL AMB URINALYSIS REFLEX: ABNORMAL
SODIUM SERPL-SCNC: 133 MMOL/L (ref 136–145)
SODIUM SERPL-SCNC: 134 MMOL/L (ref 136–145)
SODIUM SERPL-SCNC: 134 MMOL/L (ref 136–145)
SODIUM SERPL-SCNC: 138 MMOL/L (ref 136–145)
SP GR UR: >=1.03 (ref 1–1.03)
SP GR UR: >=1.03 (ref 1–1.03)
T WAVE AXIS: 15 DEGREES
T WAVE AXIS: 20 DEGREES
T WAVE AXIS: 48 DEGREES
T WAVE AXIS: 61 DEGREES
T WAVE AXIS: 62 DEGREES
TRIGL SERPL-MCNC: 204 MG/DL
TROPONIN I SERPL-MCNC: <0.02 NG/ML
UROBILINOGEN UR STRIP-ACNC: 0.2 EU/DL (ref 0.2–1)
UROBILINOGEN UR STRIP-ACNC: 0.2 EU/DL (ref 0.2–1)
VENTRICULAR RATE: 68 BPM
VENTRICULAR RATE: 73 BPM
VENTRICULAR RATE: 76 BPM
VENTRICULAR RATE: 82 BPM
VENTRICULAR RATE: 87 BPM
WBC # BLD AUTO: 10.37 THOUSAND/UL (ref 4.31–10.16)
WBC # BLD AUTO: 10.44 THOUSAND/UL (ref 4.31–10.16)
WBC # BLD AUTO: 10.66 THOUSAND/UL (ref 4.31–10.16)
WBC # BLD AUTO: 9.14 THOUSAND/UL (ref 4.31–10.16)
WBC # BLD AUTO: 9.4 X10E3/UL (ref 3.4–10.8)
WBC #/AREA URNS HPF: ABNORMAL /HPF
WBC #/AREA URNS HPF: NORMAL /HPF

## 2018-01-01 PROCEDURE — 85049 AUTOMATED PLATELET COUNT: CPT | Performed by: INTERNAL MEDICINE

## 2018-01-01 PROCEDURE — 99214 OFFICE O/P EST MOD 30 MIN: CPT | Performed by: FAMILY MEDICINE

## 2018-01-01 PROCEDURE — 1111F DSCHRG MED/CURRENT MED MERGE: CPT | Performed by: NURSE PRACTITIONER

## 2018-01-01 PROCEDURE — 80053 COMPREHEN METABOLIC PANEL: CPT | Performed by: EMERGENCY MEDICINE

## 2018-01-01 PROCEDURE — 99214 OFFICE O/P EST MOD 30 MIN: CPT | Performed by: INTERNAL MEDICINE

## 2018-01-01 PROCEDURE — 85025 COMPLETE CBC W/AUTO DIFF WBC: CPT | Performed by: EMERGENCY MEDICINE

## 2018-01-01 PROCEDURE — 74174 CTA ABD&PLVS W/CONTRAST: CPT

## 2018-01-01 PROCEDURE — 1160F RVW MEDS BY RX/DR IN RCRD: CPT | Performed by: INTERNAL MEDICINE

## 2018-01-01 PROCEDURE — 71046 X-RAY EXAM CHEST 2 VIEWS: CPT

## 2018-01-01 PROCEDURE — 93005 ELECTROCARDIOGRAM TRACING: CPT

## 2018-01-01 PROCEDURE — 84484 ASSAY OF TROPONIN QUANT: CPT | Performed by: INTERNAL MEDICINE

## 2018-01-01 PROCEDURE — 93010 ELECTROCARDIOGRAM REPORT: CPT | Performed by: INTERNAL MEDICINE

## 2018-01-01 PROCEDURE — 99223 1ST HOSP IP/OBS HIGH 75: CPT | Performed by: INTERNAL MEDICINE

## 2018-01-01 PROCEDURE — 99220 PR INITIAL OBSERVATION CARE/DAY 70 MINUTES: CPT | Performed by: INTERNAL MEDICINE

## 2018-01-01 PROCEDURE — 84484 ASSAY OF TROPONIN QUANT: CPT | Performed by: EMERGENCY MEDICINE

## 2018-01-01 PROCEDURE — 99496 TRANSJ CARE MGMT HIGH F2F 7D: CPT | Performed by: INTERNAL MEDICINE

## 2018-01-01 PROCEDURE — 82948 REAGENT STRIP/BLOOD GLUCOSE: CPT

## 2018-01-01 PROCEDURE — 81003 URINALYSIS AUTO W/O SCOPE: CPT | Performed by: INTERNAL MEDICINE

## 2018-01-01 PROCEDURE — 80061 LIPID PANEL: CPT | Performed by: INTERNAL MEDICINE

## 2018-01-01 PROCEDURE — 99213 OFFICE O/P EST LOW 20 MIN: CPT | Performed by: INTERNAL MEDICINE

## 2018-01-01 PROCEDURE — 36415 COLL VENOUS BLD VENIPUNCTURE: CPT

## 2018-01-01 PROCEDURE — 1111F DSCHRG MED/CURRENT MED MERGE: CPT | Performed by: INTERNAL MEDICINE

## 2018-01-01 PROCEDURE — 99217 PR OBSERVATION CARE DISCHARGE MANAGEMENT: CPT | Performed by: INTERNAL MEDICINE

## 2018-01-01 PROCEDURE — 99285 EMERGENCY DEPT VISIT HI MDM: CPT

## 2018-01-01 PROCEDURE — 71275 CT ANGIOGRAPHY CHEST: CPT

## 2018-01-01 PROCEDURE — 85025 COMPLETE CBC W/AUTO DIFF WBC: CPT | Performed by: INTERNAL MEDICINE

## 2018-01-01 PROCEDURE — 36415 COLL VENOUS BLD VENIPUNCTURE: CPT | Performed by: INTERNAL MEDICINE

## 2018-01-01 PROCEDURE — 99496 TRANSJ CARE MGMT HIGH F2F 7D: CPT | Performed by: NURSE PRACTITIONER

## 2018-01-01 PROCEDURE — 80048 BASIC METABOLIC PNL TOTAL CA: CPT | Performed by: INTERNAL MEDICINE

## 2018-01-01 PROCEDURE — 81002 URINALYSIS NONAUTO W/O SCOPE: CPT | Performed by: FAMILY MEDICINE

## 2018-01-01 PROCEDURE — 85027 COMPLETE CBC AUTOMATED: CPT | Performed by: INTERNAL MEDICINE

## 2018-01-01 PROCEDURE — 96360 HYDRATION IV INFUSION INIT: CPT

## 2018-01-01 PROCEDURE — 3008F BODY MASS INDEX DOCD: CPT | Performed by: INTERNAL MEDICINE

## 2018-01-01 RX ORDER — ALBUTEROL SULFATE 90 UG/1
2 AEROSOL, METERED RESPIRATORY (INHALATION) 2 TIMES DAILY
Status: DISCONTINUED | OUTPATIENT
Start: 2018-01-01 | End: 2018-01-01 | Stop reason: SDUPTHER

## 2018-01-01 RX ORDER — AMLODIPINE BESYLATE 5 MG/1
5 TABLET ORAL DAILY
Status: DISCONTINUED | OUTPATIENT
Start: 2018-01-01 | End: 2018-01-01 | Stop reason: HOSPADM

## 2018-01-01 RX ORDER — AZITHROMYCIN 250 MG/1
250 TABLET, FILM COATED ORAL EVERY 24 HOURS
Qty: 4 TABLET | Refills: 0 | OUTPATIENT
Start: 2018-01-01 | End: 2018-01-01

## 2018-01-01 RX ORDER — METOPROLOL TARTRATE 50 MG/1
50 TABLET, FILM COATED ORAL EVERY 12 HOURS SCHEDULED
Qty: 180 TABLET | Refills: 1 | Status: SHIPPED | OUTPATIENT
Start: 2018-01-01 | End: 2019-01-01 | Stop reason: SDUPTHER

## 2018-01-01 RX ORDER — TRAMADOL HYDROCHLORIDE 50 MG/1
50 TABLET ORAL EVERY 6 HOURS PRN
Qty: 30 TABLET | Refills: 0 | Status: CANCELLED | OUTPATIENT
Start: 2018-01-01

## 2018-01-01 RX ORDER — GUAIFENESIN 600 MG
600 TABLET, EXTENDED RELEASE 12 HR ORAL 2 TIMES DAILY
Status: DISCONTINUED | OUTPATIENT
Start: 2018-01-01 | End: 2018-01-01 | Stop reason: HOSPADM

## 2018-01-01 RX ORDER — ATORVASTATIN CALCIUM 40 MG/1
40 TABLET, FILM COATED ORAL
Status: DISCONTINUED | OUTPATIENT
Start: 2018-01-01 | End: 2018-01-01 | Stop reason: HOSPADM

## 2018-01-01 RX ORDER — PEN NEEDLE, DIABETIC 31 GX5/16"
NEEDLE, DISPOSABLE MISCELLANEOUS
Qty: 180 EACH | Refills: 1 | Status: SHIPPED | OUTPATIENT
Start: 2018-01-01

## 2018-01-01 RX ORDER — PANTOPRAZOLE SODIUM 40 MG/1
40 TABLET, DELAYED RELEASE ORAL DAILY
Qty: 90 TABLET | Refills: 0 | Status: SHIPPED | OUTPATIENT
Start: 2018-01-01 | End: 2019-01-01 | Stop reason: SDUPTHER

## 2018-01-01 RX ORDER — ATORVASTATIN CALCIUM 80 MG/1
80 TABLET, FILM COATED ORAL
COMMUNITY
Start: 2018-01-01

## 2018-01-01 RX ORDER — POLYETHYLENE GLYCOL 3350 17 G/17G
17 POWDER, FOR SOLUTION ORAL DAILY
Status: DISCONTINUED | OUTPATIENT
Start: 2018-01-01 | End: 2018-01-01 | Stop reason: HOSPADM

## 2018-01-01 RX ORDER — INSULIN GLARGINE 100 [IU]/ML
20 INJECTION, SOLUTION SUBCUTANEOUS
Status: DISCONTINUED | OUTPATIENT
Start: 2018-01-01 | End: 2018-01-01 | Stop reason: HOSPADM

## 2018-01-01 RX ORDER — BENZONATATE 100 MG/1
200 CAPSULE ORAL 3 TIMES DAILY PRN
Status: DISCONTINUED | OUTPATIENT
Start: 2018-01-01 | End: 2018-01-01 | Stop reason: HOSPADM

## 2018-01-01 RX ORDER — OXYCODONE HYDROCHLORIDE AND ACETAMINOPHEN 5; 325 MG/1; MG/1
1 TABLET ORAL EVERY 12 HOURS PRN
Qty: 60 TABLET | Refills: 0 | Status: SHIPPED | OUTPATIENT
Start: 2018-01-01 | End: 2018-01-01 | Stop reason: ALTCHOICE

## 2018-01-01 RX ORDER — OXYCODONE HYDROCHLORIDE AND ACETAMINOPHEN 5; 325 MG/1; MG/1
1 TABLET ORAL EVERY 12 HOURS PRN
Qty: 60 TABLET | Refills: 0 | Status: CANCELLED | OUTPATIENT
Start: 2018-01-01

## 2018-01-01 RX ORDER — OXYCODONE HYDROCHLORIDE AND ACETAMINOPHEN 5; 325 MG/1; MG/1
1 TABLET ORAL EVERY 12 HOURS PRN
Qty: 60 TABLET | Refills: 0 | Status: SHIPPED | OUTPATIENT
Start: 2018-01-01 | End: 2019-01-01 | Stop reason: SDUPTHER

## 2018-01-01 RX ORDER — PREGABALIN 100 MG/1
100 CAPSULE ORAL 3 TIMES DAILY
Status: DISCONTINUED | OUTPATIENT
Start: 2018-01-01 | End: 2018-01-01 | Stop reason: HOSPADM

## 2018-01-01 RX ORDER — NICOTINE 21 MG/24HR
1 PATCH, TRANSDERMAL 24 HOURS TRANSDERMAL DAILY
Status: DISCONTINUED | OUTPATIENT
Start: 2018-01-01 | End: 2018-01-01

## 2018-01-01 RX ORDER — ALBUTEROL SULFATE 90 UG/1
2 AEROSOL, METERED RESPIRATORY (INHALATION) EVERY 6 HOURS PRN
Qty: 1 INHALER | Refills: 1 | Status: SHIPPED | OUTPATIENT
Start: 2018-01-01 | End: 2019-01-01 | Stop reason: SDUPTHER

## 2018-01-01 RX ORDER — OXYCODONE HYDROCHLORIDE 5 MG/1
5 TABLET ORAL ONCE
Status: COMPLETED | OUTPATIENT
Start: 2018-01-01 | End: 2018-01-01

## 2018-01-01 RX ORDER — QUETIAPINE FUMARATE 25 MG/1
25 TABLET, FILM COATED ORAL
Qty: 30 TABLET | Refills: 3 | Status: SHIPPED | OUTPATIENT
Start: 2018-01-01 | End: 2019-01-01 | Stop reason: SDUPTHER

## 2018-01-01 RX ORDER — ISOSORBIDE MONONITRATE 30 MG/1
30 TABLET, EXTENDED RELEASE ORAL DAILY
Qty: 90 TABLET | Refills: 1 | Status: CANCELLED | OUTPATIENT
Start: 2018-01-01

## 2018-01-01 RX ORDER — ALBUTEROL SULFATE 90 UG/1
2 AEROSOL, METERED RESPIRATORY (INHALATION) EVERY 4 HOURS PRN
Status: DISCONTINUED | OUTPATIENT
Start: 2018-01-01 | End: 2018-01-01 | Stop reason: HOSPADM

## 2018-01-01 RX ORDER — AMLODIPINE BESYLATE 5 MG/1
5 TABLET ORAL DAILY
Qty: 90 TABLET | Refills: 1 | Status: SHIPPED | OUTPATIENT
Start: 2018-01-01 | End: 2018-01-01 | Stop reason: SDUPTHER

## 2018-01-01 RX ORDER — SIMETHICONE 80 MG
80 TABLET,CHEWABLE ORAL 4 TIMES DAILY PRN
Status: DISCONTINUED | OUTPATIENT
Start: 2018-01-01 | End: 2018-01-01 | Stop reason: HOSPADM

## 2018-01-01 RX ORDER — GUAIFENESIN 100 MG/5ML
200 SYRUP ORAL 3 TIMES DAILY PRN
Qty: 120 ML | Refills: 0 | Status: SHIPPED | OUTPATIENT
Start: 2018-01-01 | End: 2018-01-01

## 2018-01-01 RX ORDER — PANTOPRAZOLE SODIUM 40 MG/1
40 TABLET, DELAYED RELEASE ORAL
Status: DISCONTINUED | OUTPATIENT
Start: 2018-01-01 | End: 2018-01-01 | Stop reason: HOSPADM

## 2018-01-01 RX ORDER — OXYCODONE HYDROCHLORIDE AND ACETAMINOPHEN 5; 325 MG/1; MG/1
1 TABLET ORAL EVERY 12 HOURS PRN
Qty: 60 TABLET | Refills: 0 | Status: SHIPPED | OUTPATIENT
Start: 2018-01-01 | End: 2018-01-01 | Stop reason: SDUPTHER

## 2018-01-01 RX ORDER — ASPIRIN 81 MG/1
81 TABLET, CHEWABLE ORAL DAILY
Status: DISCONTINUED | OUTPATIENT
Start: 2018-01-01 | End: 2018-01-01 | Stop reason: HOSPADM

## 2018-01-01 RX ORDER — LEVOFLOXACIN 500 MG/1
500 TABLET, FILM COATED ORAL EVERY 24 HOURS
Qty: 6 TABLET | Refills: 0 | Status: SHIPPED | OUTPATIENT
Start: 2018-01-01 | End: 2018-01-01

## 2018-01-01 RX ORDER — NITROGLYCERIN 0.4 MG/1
0.4 TABLET SUBLINGUAL
Status: DISCONTINUED | OUTPATIENT
Start: 2018-01-01 | End: 2018-01-01 | Stop reason: HOSPADM

## 2018-01-01 RX ORDER — NYSTATIN 100000 U/G
OINTMENT TOPICAL 2 TIMES DAILY
Qty: 45 G | Refills: 1 | Status: SHIPPED | OUTPATIENT
Start: 2018-01-01 | End: 2019-01-01

## 2018-01-01 RX ORDER — MULTIVITAMIN
1 TABLET ORAL DAILY
COMMUNITY

## 2018-01-01 RX ORDER — ASPIRIN 81 MG/1
324 TABLET, CHEWABLE ORAL ONCE
Status: COMPLETED | OUTPATIENT
Start: 2018-01-01 | End: 2018-01-01

## 2018-01-01 RX ORDER — OXYCODONE HYDROCHLORIDE AND ACETAMINOPHEN 5; 325 MG/1; MG/1
1 TABLET ORAL ONCE
Status: COMPLETED | OUTPATIENT
Start: 2018-01-01 | End: 2018-01-01

## 2018-01-01 RX ORDER — ALBUTEROL SULFATE 90 UG/1
2 AEROSOL, METERED RESPIRATORY (INHALATION) EVERY 6 HOURS PRN
Status: DISCONTINUED | OUTPATIENT
Start: 2018-01-01 | End: 2018-01-01

## 2018-01-01 RX ORDER — ACETAMINOPHEN 325 MG/1
650 TABLET ORAL EVERY 4 HOURS PRN
Status: DISCONTINUED | OUTPATIENT
Start: 2018-01-01 | End: 2018-01-01 | Stop reason: HOSPADM

## 2018-01-01 RX ORDER — ISOSORBIDE MONONITRATE 30 MG/1
30 TABLET, EXTENDED RELEASE ORAL DAILY
Status: DISCONTINUED | OUTPATIENT
Start: 2018-01-01 | End: 2018-01-01 | Stop reason: HOSPADM

## 2018-01-01 RX ORDER — PREDNISONE 20 MG/1
40 TABLET ORAL ONCE
Status: COMPLETED | OUTPATIENT
Start: 2018-01-01 | End: 2018-01-01

## 2018-01-01 RX ORDER — FERROUS SULFATE 325(65) MG
325 TABLET ORAL
Status: DISCONTINUED | OUTPATIENT
Start: 2018-01-01 | End: 2018-01-01 | Stop reason: HOSPADM

## 2018-01-01 RX ORDER — PREDNISONE 10 MG/1
TABLET ORAL
COMMUNITY
Start: 2018-01-01 | End: 2018-01-01

## 2018-01-01 RX ORDER — ISOSORBIDE MONONITRATE 60 MG/1
60 TABLET, EXTENDED RELEASE ORAL DAILY
Qty: 30 TABLET | Refills: 0 | Status: CANCELLED | OUTPATIENT
Start: 2018-01-01

## 2018-01-01 RX ORDER — GUAIFENESIN 600 MG
1200 TABLET, EXTENDED RELEASE 12 HR ORAL EVERY 12 HOURS SCHEDULED
Qty: 120 TABLET | Refills: 1 | Status: SHIPPED | OUTPATIENT
Start: 2018-01-01 | End: 2018-01-01

## 2018-01-01 RX ORDER — PREDNISONE 20 MG/1
50 TABLET ORAL DAILY
Qty: 5 TABLET | Refills: 0 | Status: SHIPPED | OUTPATIENT
Start: 2018-01-01 | End: 2018-01-01

## 2018-01-01 RX ORDER — CLOPIDOGREL BISULFATE 75 MG/1
75 TABLET ORAL DAILY
Status: DISCONTINUED | OUTPATIENT
Start: 2018-01-01 | End: 2018-01-01 | Stop reason: HOSPADM

## 2018-01-01 RX ORDER — PHENAZOPYRIDINE HYDROCHLORIDE 100 MG/1
200 TABLET, FILM COATED ORAL
Status: DISCONTINUED | OUTPATIENT
Start: 2018-01-01 | End: 2018-01-01 | Stop reason: HOSPADM

## 2018-01-01 RX ORDER — PREGABALIN 100 MG/1
100 CAPSULE ORAL 3 TIMES DAILY
Qty: 270 CAPSULE | Refills: 1 | Status: SHIPPED | OUTPATIENT
Start: 2018-01-01 | End: 2019-01-01

## 2018-01-01 RX ORDER — ACETAMINOPHEN 325 MG/1
650 TABLET ORAL EVERY 6 HOURS PRN
Status: DISCONTINUED | OUTPATIENT
Start: 2018-01-01 | End: 2018-01-01 | Stop reason: HOSPADM

## 2018-01-01 RX ORDER — BENZONATATE 100 MG/1
100 CAPSULE ORAL 3 TIMES DAILY PRN
Status: DISCONTINUED | OUTPATIENT
Start: 2018-01-01 | End: 2018-01-01 | Stop reason: SDUPTHER

## 2018-01-01 RX ORDER — METOPROLOL TARTRATE 50 MG/1
50 TABLET, FILM COATED ORAL EVERY 12 HOURS SCHEDULED
Status: DISCONTINUED | OUTPATIENT
Start: 2018-01-01 | End: 2018-01-01 | Stop reason: HOSPADM

## 2018-01-01 RX ORDER — TAMSULOSIN HYDROCHLORIDE 0.4 MG/1
0.4 CAPSULE ORAL
Status: DISCONTINUED | OUTPATIENT
Start: 2018-01-01 | End: 2018-01-01 | Stop reason: HOSPADM

## 2018-01-01 RX ORDER — PHENAZOPYRIDINE HYDROCHLORIDE 200 MG/1
200 TABLET, FILM COATED ORAL
Qty: 10 TABLET | Refills: 0 | Status: SHIPPED | OUTPATIENT
Start: 2018-01-01 | End: 2019-01-01

## 2018-01-01 RX ORDER — OXYCODONE HYDROCHLORIDE AND ACETAMINOPHEN 5; 325 MG/1; MG/1
1 TABLET ORAL EVERY 12 HOURS PRN
Status: DISCONTINUED | OUTPATIENT
Start: 2018-01-01 | End: 2018-01-01

## 2018-01-01 RX ORDER — PREDNISONE 20 MG/1
40 TABLET ORAL DAILY
Status: DISCONTINUED | OUTPATIENT
Start: 2018-01-01 | End: 2018-01-01 | Stop reason: HOSPADM

## 2018-01-01 RX ORDER — BENZONATATE 100 MG/1
100 CAPSULE ORAL 3 TIMES DAILY PRN
Qty: 20 CAPSULE | Refills: 0 | Status: CANCELLED
Start: 2018-01-01

## 2018-01-01 RX ORDER — SENNOSIDES 8.6 MG
1 TABLET ORAL
Status: DISCONTINUED | OUTPATIENT
Start: 2018-01-01 | End: 2018-01-01 | Stop reason: HOSPADM

## 2018-01-01 RX ORDER — DOCUSATE SODIUM 100 MG/1
100 CAPSULE, LIQUID FILLED ORAL 2 TIMES DAILY
Status: DISCONTINUED | OUTPATIENT
Start: 2018-01-01 | End: 2018-01-01 | Stop reason: HOSPADM

## 2018-01-01 RX ORDER — ALBUTEROL SULFATE 90 UG/1
2 AEROSOL, METERED RESPIRATORY (INHALATION) EVERY 6 HOURS PRN
Qty: 1 INHALER | Refills: 5 | Status: SHIPPED | OUTPATIENT
Start: 2018-01-01 | End: 2018-01-01 | Stop reason: SDUPTHER

## 2018-01-01 RX ORDER — AMLODIPINE BESYLATE 5 MG/1
5 TABLET ORAL DAILY
COMMUNITY
Start: 2018-01-01 | End: 2018-01-01 | Stop reason: SDUPTHER

## 2018-01-01 RX ORDER — AMLODIPINE BESYLATE 5 MG/1
5 TABLET ORAL DAILY
Qty: 90 TABLET | Refills: 1 | Status: SHIPPED | OUTPATIENT
Start: 2018-01-01 | End: 2019-01-01 | Stop reason: SDUPTHER

## 2018-01-01 RX ORDER — ISOSORBIDE MONONITRATE 30 MG/1
30 TABLET, EXTENDED RELEASE ORAL DAILY
Status: DISCONTINUED | OUTPATIENT
Start: 2018-01-01 | End: 2018-01-01

## 2018-01-01 RX ORDER — QUETIAPINE FUMARATE 25 MG/1
25 TABLET, FILM COATED ORAL
Status: DISCONTINUED | OUTPATIENT
Start: 2018-01-01 | End: 2018-01-01 | Stop reason: HOSPADM

## 2018-01-01 RX ORDER — ALBUTEROL SULFATE 90 UG/1
2 AEROSOL, METERED RESPIRATORY (INHALATION) EVERY 6 HOURS PRN
Qty: 1 INHALER | Refills: 1 | Status: CANCELLED | OUTPATIENT
Start: 2018-01-01

## 2018-01-01 RX ORDER — ASPIRIN 81 MG/1
81 TABLET ORAL DAILY
Status: DISCONTINUED | OUTPATIENT
Start: 2018-01-01 | End: 2018-01-01 | Stop reason: HOSPADM

## 2018-01-01 RX ORDER — CLOPIDOGREL BISULFATE 75 MG/1
75 TABLET ORAL DAILY
Refills: 0 | COMMUNITY
Start: 2018-01-01 | End: 2018-01-01

## 2018-01-01 RX ORDER — IPRATROPIUM BROMIDE AND ALBUTEROL SULFATE 2.5; .5 MG/3ML; MG/3ML
3 SOLUTION RESPIRATORY (INHALATION) 4 TIMES DAILY PRN
Status: DISCONTINUED | OUTPATIENT
Start: 2018-01-01 | End: 2018-01-01

## 2018-01-01 RX ORDER — ISOSORBIDE MONONITRATE 60 MG/1
60 TABLET, EXTENDED RELEASE ORAL DAILY
Qty: 90 TABLET | Refills: 1 | Status: SHIPPED | OUTPATIENT
Start: 2018-01-01 | End: 2019-01-01 | Stop reason: SDUPTHER

## 2018-01-01 RX ORDER — BENZONATATE 100 MG/1
100 CAPSULE ORAL 3 TIMES DAILY PRN
COMMUNITY
End: 2018-01-01 | Stop reason: SDUPTHER

## 2018-01-01 RX ORDER — HEPARIN SODIUM 5000 [USP'U]/ML
5000 INJECTION, SOLUTION INTRAVENOUS; SUBCUTANEOUS EVERY 8 HOURS SCHEDULED
Status: DISCONTINUED | OUTPATIENT
Start: 2018-01-01 | End: 2018-01-01 | Stop reason: HOSPADM

## 2018-01-01 RX ORDER — ALBUTEROL SULFATE 2.5 MG/3ML
2.5 SOLUTION RESPIRATORY (INHALATION) EVERY 2 HOUR PRN
COMMUNITY

## 2018-01-01 RX ORDER — ISOSORBIDE MONONITRATE 60 MG/1
60 TABLET, EXTENDED RELEASE ORAL DAILY
Status: DISCONTINUED | OUTPATIENT
Start: 2018-01-01 | End: 2018-01-01 | Stop reason: HOSPADM

## 2018-01-01 RX ORDER — FLUTICASONE FUROATE AND VILANTEROL 200; 25 UG/1; UG/1
1 POWDER RESPIRATORY (INHALATION) DAILY
Status: DISCONTINUED | OUTPATIENT
Start: 2018-01-01 | End: 2018-01-01 | Stop reason: HOSPADM

## 2018-01-01 RX ORDER — ATORVASTATIN CALCIUM 80 MG/1
80 TABLET, FILM COATED ORAL
Status: DISCONTINUED | OUTPATIENT
Start: 2018-01-01 | End: 2018-01-01 | Stop reason: HOSPADM

## 2018-01-01 RX ORDER — PREDNISONE 20 MG/1
20 TABLET ORAL 2 TIMES DAILY WITH MEALS
Qty: 10 TABLET | Refills: 0 | Status: SHIPPED | OUTPATIENT
Start: 2018-01-01 | End: 2018-01-01

## 2018-01-01 RX ORDER — LIDOCAINE 50 MG/G
1 PATCH TOPICAL DAILY
Status: DISCONTINUED | OUTPATIENT
Start: 2018-01-01 | End: 2018-01-01 | Stop reason: HOSPADM

## 2018-01-01 RX ORDER — BENZONATATE 100 MG/1
200 CAPSULE ORAL 3 TIMES DAILY PRN
Qty: 30 CAPSULE | Refills: 1 | Status: SHIPPED | OUTPATIENT
Start: 2018-01-01 | End: 2019-01-01 | Stop reason: SDUPTHER

## 2018-01-01 RX ORDER — ALBUTEROL SULFATE 2.5 MG/3ML
2.5 SOLUTION RESPIRATORY (INHALATION) EVERY 2 HOUR PRN
Status: CANCELLED | OUTPATIENT
Start: 2018-01-01

## 2018-01-01 RX ORDER — NYSTATIN 100000 [USP'U]/G
POWDER TOPICAL 3 TIMES DAILY
Qty: 56.7 G | Refills: 1 | Status: SHIPPED | OUTPATIENT
Start: 2018-01-01 | End: 2019-01-01 | Stop reason: HOSPADM

## 2018-01-01 RX ORDER — PREDNISONE 20 MG/1
40 TABLET ORAL DAILY
Qty: 8 TABLET | Refills: 0 | OUTPATIENT
Start: 2018-01-01

## 2018-01-01 RX ORDER — PREGABALIN 100 MG/1
100 CAPSULE ORAL 3 TIMES DAILY
Qty: 90 CAPSULE | Refills: 0 | Status: SHIPPED | OUTPATIENT
Start: 2018-01-01 | End: 2018-01-01 | Stop reason: SDUPTHER

## 2018-01-01 RX ORDER — OMEGA-3-ACID ETHYL ESTERS 1 G/1
1 CAPSULE, LIQUID FILLED ORAL DAILY
COMMUNITY

## 2018-01-01 RX ORDER — LEVOFLOXACIN 500 MG/1
500 TABLET, FILM COATED ORAL EVERY 24 HOURS
Status: CANCELLED | OUTPATIENT
Start: 2018-01-01 | End: 2018-01-01

## 2018-01-01 RX ORDER — POLYVINYL ALCOHOL 14 MG/ML
1 SOLUTION/ DROPS OPHTHALMIC AS NEEDED
COMMUNITY

## 2018-01-01 RX ORDER — OXYCODONE HYDROCHLORIDE AND ACETAMINOPHEN 5; 325 MG/1; MG/1
1 TABLET ORAL EVERY 12 HOURS PRN
Status: DISCONTINUED | OUTPATIENT
Start: 2018-01-01 | End: 2018-01-01 | Stop reason: HOSPADM

## 2018-01-01 RX ORDER — PANTOPRAZOLE SODIUM 40 MG/1
40 TABLET, DELAYED RELEASE ORAL DAILY
Qty: 90 TABLET | Refills: 1 | Status: SHIPPED | OUTPATIENT
Start: 2018-01-01 | End: 2018-01-01 | Stop reason: SDUPTHER

## 2018-01-01 RX ORDER — AZITHROMYCIN 250 MG/1
500 TABLET, FILM COATED ORAL EVERY 24 HOURS
Status: DISCONTINUED | OUTPATIENT
Start: 2018-01-01 | End: 2018-01-01 | Stop reason: HOSPADM

## 2018-01-01 RX ORDER — FLUTICASONE FUROATE AND VILANTEROL 200; 25 UG/1; UG/1
1 POWDER RESPIRATORY (INHALATION)
Status: DISCONTINUED | OUTPATIENT
Start: 2018-01-01 | End: 2018-01-01 | Stop reason: HOSPADM

## 2018-01-01 RX ORDER — ONDANSETRON 2 MG/ML
4 INJECTION INTRAMUSCULAR; INTRAVENOUS EVERY 6 HOURS PRN
Status: DISCONTINUED | OUTPATIENT
Start: 2018-01-01 | End: 2018-01-01 | Stop reason: HOSPADM

## 2018-01-01 RX ORDER — BENZONATATE 100 MG/1
100 CAPSULE ORAL
Qty: 20 CAPSULE | Refills: 0 | Status: SHIPPED | OUTPATIENT
Start: 2018-01-01 | End: 2018-01-01

## 2018-01-01 RX ORDER — SULFAMETHOXAZOLE AND TRIMETHOPRIM 800; 160 MG/1; MG/1
1 TABLET ORAL EVERY 12 HOURS SCHEDULED
Qty: 14 TABLET | Refills: 0 | Status: SHIPPED | OUTPATIENT
Start: 2018-01-01 | End: 2018-01-01

## 2018-01-01 RX ORDER — POLYVINYL ALCOHOL 14 MG/ML
1 SOLUTION/ DROPS OPHTHALMIC AS NEEDED
Status: DISCONTINUED | OUTPATIENT
Start: 2018-01-01 | End: 2018-01-01 | Stop reason: HOSPADM

## 2018-01-01 RX ORDER — TRAMADOL HYDROCHLORIDE 50 MG/1
50 TABLET ORAL EVERY 6 HOURS PRN
COMMUNITY
End: 2018-01-01

## 2018-01-01 RX ORDER — ISOSORBIDE MONONITRATE 30 MG/1
30 TABLET, EXTENDED RELEASE ORAL DAILY
Qty: 90 TABLET | Refills: 0 | Status: SHIPPED | OUTPATIENT
Start: 2018-01-01 | End: 2018-01-01 | Stop reason: SDUPTHER

## 2018-01-01 RX ORDER — IPRATROPIUM BROMIDE AND ALBUTEROL SULFATE 2.5; .5 MG/3ML; MG/3ML
3 SOLUTION RESPIRATORY (INHALATION) 4 TIMES DAILY
COMMUNITY
Start: 2018-01-01 | End: 2019-10-29

## 2018-01-01 RX ORDER — PREGABALIN 50 MG/1
100 CAPSULE ORAL 3 TIMES DAILY
Status: DISCONTINUED | OUTPATIENT
Start: 2018-01-01 | End: 2018-01-01 | Stop reason: HOSPADM

## 2018-01-01 RX ADMIN — OXYCODONE HYDROCHLORIDE AND ACETAMINOPHEN 1 TABLET: 5; 325 TABLET ORAL at 13:37

## 2018-01-01 RX ADMIN — PREGABALIN 100 MG: 50 CAPSULE ORAL at 01:03

## 2018-01-01 RX ADMIN — ISOSORBIDE MONONITRATE 30 MG: 30 TABLET, EXTENDED RELEASE ORAL at 11:48

## 2018-01-01 RX ADMIN — INSULIN GLARGINE 20 UNITS: 100 INJECTION, SOLUTION SUBCUTANEOUS at 01:12

## 2018-01-01 RX ADMIN — QUETIAPINE FUMARATE 25 MG: 25 TABLET ORAL at 01:03

## 2018-01-01 RX ADMIN — METOPROLOL TARTRATE 50 MG: 50 TABLET ORAL at 21:52

## 2018-01-01 RX ADMIN — INSULIN LISPRO 9 UNITS: 100 INJECTION, SOLUTION INTRAVENOUS; SUBCUTANEOUS at 06:07

## 2018-01-01 RX ADMIN — OXYCODONE HYDROCHLORIDE 5 MG: 5 TABLET ORAL at 22:19

## 2018-01-01 RX ADMIN — PANTOPRAZOLE SODIUM 40 MG: 40 TABLET, DELAYED RELEASE ORAL at 00:59

## 2018-01-01 RX ADMIN — ASPIRIN 81 MG: 81 TABLET, COATED ORAL at 08:44

## 2018-01-01 RX ADMIN — LIDOCAINE 1 PATCH: 50 PATCH TOPICAL at 10:18

## 2018-01-01 RX ADMIN — FERROUS SULFATE TAB 325 MG (65 MG ELEMENTAL FE) 325 MG: 325 (65 FE) TAB at 08:43

## 2018-01-01 RX ADMIN — PANTOPRAZOLE SODIUM 40 MG: 40 TABLET, DELAYED RELEASE ORAL at 09:30

## 2018-01-01 RX ADMIN — HEPARIN SODIUM 5000 UNITS: 5000 INJECTION, SOLUTION INTRAVENOUS; SUBCUTANEOUS at 21:55

## 2018-01-01 RX ADMIN — HEPARIN SODIUM 5000 UNITS: 5000 INJECTION, SOLUTION INTRAVENOUS; SUBCUTANEOUS at 05:23

## 2018-01-01 RX ADMIN — INSULIN LISPRO 8 UNITS: 100 INJECTION, SOLUTION INTRAVENOUS; SUBCUTANEOUS at 09:28

## 2018-01-01 RX ADMIN — OXYCODONE HYDROCHLORIDE AND ACETAMINOPHEN 1 TABLET: 5; 325 TABLET ORAL at 12:51

## 2018-01-01 RX ADMIN — INSULIN LISPRO 5 UNITS: 100 INJECTION, SOLUTION INTRAVENOUS; SUBCUTANEOUS at 08:43

## 2018-01-01 RX ADMIN — SENNOSIDES 8.6 MG: 8.6 TABLET, FILM COATED ORAL at 01:03

## 2018-01-01 RX ADMIN — PREGABALIN 100 MG: 100 CAPSULE ORAL at 08:44

## 2018-01-01 RX ADMIN — INSULIN LISPRO 2 UNITS: 100 INJECTION, SOLUTION INTRAVENOUS; SUBCUTANEOUS at 11:48

## 2018-01-01 RX ADMIN — INSULIN LISPRO 6 UNITS: 100 INJECTION, SOLUTION INTRAVENOUS; SUBCUTANEOUS at 09:22

## 2018-01-01 RX ADMIN — ATORVASTATIN CALCIUM 40 MG: 40 TABLET, FILM COATED ORAL at 18:07

## 2018-01-01 RX ADMIN — METOPROLOL TARTRATE 50 MG: 25 TABLET, FILM COATED ORAL at 01:02

## 2018-01-01 RX ADMIN — ASPIRIN 81 MG 81 MG: 81 TABLET ORAL at 09:43

## 2018-01-01 RX ADMIN — INSULIN LISPRO 3 UNITS: 100 INJECTION, SOLUTION INTRAVENOUS; SUBCUTANEOUS at 01:26

## 2018-01-01 RX ADMIN — IOHEXOL 100 ML: 350 INJECTION, SOLUTION INTRAVENOUS at 14:14

## 2018-01-01 RX ADMIN — INSULIN LISPRO 9 UNITS: 100 INJECTION, SOLUTION INTRAVENOUS; SUBCUTANEOUS at 11:48

## 2018-01-01 RX ADMIN — PREGABALIN 100 MG: 50 CAPSULE ORAL at 10:17

## 2018-01-01 RX ADMIN — PREGABALIN 100 MG: 100 CAPSULE ORAL at 21:51

## 2018-01-01 RX ADMIN — TAMSULOSIN HYDROCHLORIDE 0.4 MG: 0.4 CAPSULE ORAL at 18:08

## 2018-01-01 RX ADMIN — OXYCODONE HYDROCHLORIDE AND ACETAMINOPHEN 1 TABLET: 5; 325 TABLET ORAL at 13:34

## 2018-01-01 RX ADMIN — NITROGLYCERIN 0.4 MG: 0.4 TABLET SUBLINGUAL at 15:31

## 2018-01-01 RX ADMIN — AMLODIPINE BESYLATE 5 MG: 5 TABLET ORAL at 09:26

## 2018-01-01 RX ADMIN — ASPIRIN 81 MG 324 MG: 81 TABLET ORAL at 22:19

## 2018-01-01 RX ADMIN — OXYCODONE HYDROCHLORIDE AND ACETAMINOPHEN 1 TABLET: 5; 325 TABLET ORAL at 01:04

## 2018-01-01 RX ADMIN — SODIUM CHLORIDE 500 ML: 0.9 INJECTION, SOLUTION INTRAVENOUS at 14:05

## 2018-01-01 RX ADMIN — ATORVASTATIN CALCIUM 80 MG: 80 TABLET, FILM COATED ORAL at 01:01

## 2018-01-01 RX ADMIN — PREDNISONE 40 MG: 20 TABLET ORAL at 09:42

## 2018-01-01 RX ADMIN — ACETAMINOPHEN 650 MG: 325 TABLET, FILM COATED ORAL at 05:04

## 2018-01-01 RX ADMIN — DOCUSATE SODIUM 100 MG: 100 CAPSULE, LIQUID FILLED ORAL at 09:43

## 2018-01-01 RX ADMIN — METOPROLOL TARTRATE 50 MG: 25 TABLET, FILM COATED ORAL at 09:41

## 2018-01-01 RX ADMIN — CLOPIDOGREL BISULFATE 75 MG: 75 TABLET, FILM COATED ORAL at 08:44

## 2018-01-01 RX ADMIN — GUAIFENESIN 600 MG: 600 TABLET, EXTENDED RELEASE ORAL at 09:33

## 2018-01-01 RX ADMIN — PHENAZOPYRIDINE 200 MG: 100 TABLET ORAL at 09:40

## 2018-01-01 RX ADMIN — OXYCODONE HYDROCHLORIDE AND ACETAMINOPHEN 1 TABLET: 5; 325 TABLET ORAL at 20:04

## 2018-01-01 RX ADMIN — PREGABALIN 100 MG: 100 CAPSULE ORAL at 18:08

## 2018-01-01 RX ADMIN — AZITHROMYCIN 500 MG: 250 TABLET, FILM COATED ORAL at 09:32

## 2018-01-01 RX ADMIN — DOCUSATE SODIUM 100 MG: 100 CAPSULE, LIQUID FILLED ORAL at 18:08

## 2018-01-01 RX ADMIN — METOPROLOL TARTRATE 50 MG: 50 TABLET ORAL at 08:44

## 2018-01-01 RX ADMIN — QUETIAPINE 25 MG: 25 TABLET ORAL at 21:51

## 2018-01-01 RX ADMIN — PREDNISONE 40 MG: 20 TABLET ORAL at 01:04

## 2018-01-01 RX ADMIN — SENNOSIDES 8.6 MG: 8.6 TABLET, FILM COATED ORAL at 21:51

## 2018-01-01 RX ADMIN — INSULIN GLARGINE 20 UNITS: 100 INJECTION, SOLUTION SUBCUTANEOUS at 21:52

## 2018-01-01 RX ADMIN — INSULIN LISPRO 9 UNITS: 100 INJECTION, SOLUTION INTRAVENOUS; SUBCUTANEOUS at 18:10

## 2018-01-10 ENCOUNTER — GENERIC CONVERSION - ENCOUNTER (OUTPATIENT)
Dept: OTHER | Facility: OTHER | Age: 68
End: 2018-01-10

## 2018-01-11 NOTE — MISCELLANEOUS
Chief Complaint  Chief Complaint Free Text Note Form: Tried to contact patient numerous time to schedule a hospital follow up, patient did not return my calls  History of Present Illness  TCM Communication [facility]: The patient is being contacted for follow-up after hospitalization and N/A  He was hospitalized at [facility]  The dates of hospitalization: 5, date of admission: 03/16/2017, date of discharge: 03/21/2017  Diagnosis: SIRS and Elevated Troponin  Medications were not reviewed today  Communication performed and completed by Carlene Lefort      Active Problems    1  Abdominal aortic aneurysm (AAA) (441 4) (I71 4)   2  Chronic back pain (724 5,338 29) (M54 9,G89 29)   3  Depression (311) (F32 9)   4  Diabetes mellitus type 2 with neurological manifestations (250 60) (E11 49)   5  Diabetic neuropathy (250 60,357 2) (E11 40)   6  GERD (gastroesophageal reflux disease) (530 81) (K21 9)   7  High blood pressure (401 9) (I10)    Family History  Mother    1  Family history of diabetes mellitus (V18 0) (Z83 3)  Father    2  Family history of cardiac disorder (V17 49) (Z82 49)   3  Family history of hypertension (V17 49) (Z82 49)  Brother    4  FH: stomach cancer (V16 0) (Z80 0)    Social History    · Does not drink alcohol (V49 89) (Z78 9)   · Never a smoker    Current Meds   1  BD Pen Needle Mini U/F 31G X 5 MM Miscellaneous; USE AS DIRECTED; Therapy: 69EQX8293 to (Evaluate:03Rvs7236)  Requested for: 18Vzt6987; Last   Rx:03Pga7659 Ordered   2  Blood Glucose Test In Vitro Strip; TEST TWICE DAILY; Therapy: 95PGS9653 to (Evaluate:60Pxn6750)  Requested for: 61GFQ3504; Last   Rx:06Guo7355 Ordered   3  HumaLOG KwikPen 100 UNIT/ML Subcutaneous Solution Pen-injector; INJECT 5U   BEFORE MEALS  Requested for: 59XHE9875; Last Rx:64Krw5048 Ordered   4  Lantus SoloStar 100 UNIT/ML Subcutaneous Solution Pen-injector; inject 35 units every   morning  Requested for: 87UDG5512; Last Rx:16Iau5895 Ordered   5   Lyrica 100 MG Oral Capsule; TAKE 1 CAPSULE 3 TIMES DAILY; Last Rx:59Zlm5744   Ordered   6  Pantoprazole Sodium 40 MG Oral Tablet Delayed Release; Take 1 tablet daily    Requested for: 96NVD2617; Last Rx:69Mce3083 Ordered   7  TraMADol HCl - 50 MG Oral Tablet; Take 1 tablet every 6 hours as needed for pain; Last   Rx:74Wqa4520 Ordered    Allergies    1  No Known Drug Allergies    Message   Recorded as Task   Date: 03/21/2017 02:17 PM, Created By: System   Task Name: Beba Urrutia   Assigned To: Kamille Tesfaye   Regarding Patient: Melissa Whitley, Status: In Progress   Comment:    System - 21 Mar 2017 2:17 PM     Patient discharged from hospital   Patient Name: Baudilio Browning  Patient YOB: 1950  Discharge Date: 3/21/2017  Facility: [facility] 21 Mar 2017 3:02 PM     TASK REASSIGNED: Previously Assigned To Jemima Sue - 23 Mar 2017 9:21 AM     TASK EDITED  Left message for the patient to call me back   Kamille Tesfaye - 23 Mar 2017 9:21 AM     TASK IN PROGRESS   Kamille Tesfaye - 24 Mar 2017 9:15 AM     TASK EDITED  Left message for the patient to call me back  Kamille Tesfaye - 24 Mar 2017 1:56 PM     TASK EDITED  Left patient a message to give me a call back  Kamille Tesfaye - 24 Mar 2017 2:24 PM     TASK EDITED  Patient called back the office, phone call got disconnected  Called patient back and left a message to please give me a call back       Signatures   Electronically signed by : RASHI Resendiz ; Mar 24 2017  3:25PM EST                       (Author)

## 2018-01-12 VITALS
BODY MASS INDEX: 35.63 KG/M2 | TEMPERATURE: 98.9 F | SYSTOLIC BLOOD PRESSURE: 152 MMHG | WEIGHT: 235.13 LBS | DIASTOLIC BLOOD PRESSURE: 90 MMHG | HEART RATE: 102 BPM | HEIGHT: 68 IN | OXYGEN SATURATION: 98 %

## 2018-01-13 VITALS
DIASTOLIC BLOOD PRESSURE: 90 MMHG | HEIGHT: 68 IN | WEIGHT: 238.5 LBS | HEART RATE: 96 BPM | BODY MASS INDEX: 36.15 KG/M2 | RESPIRATION RATE: 16 BRPM | TEMPERATURE: 98.8 F | SYSTOLIC BLOOD PRESSURE: 160 MMHG

## 2018-01-14 VITALS
WEIGHT: 238.5 LBS | TEMPERATURE: 98.6 F | RESPIRATION RATE: 17 BRPM | BODY MASS INDEX: 36.15 KG/M2 | HEIGHT: 68 IN | HEART RATE: 73 BPM | SYSTOLIC BLOOD PRESSURE: 177 MMHG | DIASTOLIC BLOOD PRESSURE: 88 MMHG | OXYGEN SATURATION: 97 %

## 2018-01-14 VITALS
HEIGHT: 68 IN | OXYGEN SATURATION: 98 % | SYSTOLIC BLOOD PRESSURE: 142 MMHG | WEIGHT: 251.25 LBS | DIASTOLIC BLOOD PRESSURE: 84 MMHG | HEART RATE: 86 BPM | TEMPERATURE: 98.1 F | BODY MASS INDEX: 38.08 KG/M2

## 2018-01-15 VITALS
HEART RATE: 84 BPM | DIASTOLIC BLOOD PRESSURE: 85 MMHG | OXYGEN SATURATION: 98 % | HEIGHT: 68 IN | BODY MASS INDEX: 37.59 KG/M2 | SYSTOLIC BLOOD PRESSURE: 150 MMHG | TEMPERATURE: 97.6 F | WEIGHT: 248 LBS

## 2018-01-16 NOTE — MISCELLANEOUS
Message     Date: 27 Mar 2017 11:08 AM EST, Recorded By: Kel Pineda   Calling For: Julia Reeder   Caller: Yusuf SANDOVAL, Other   Reason: Other   Patient refused home health and PT- not feeling up to it right now  Active Problems    1  Abdominal aortic aneurysm (AAA) (441 4) (I71 4)   2  Chronic back pain (724 5,338 29) (M54 9,G89 29)   3  Depression (311) (F32 9)   4  Diabetes mellitus type 2 with neurological manifestations (250 60) (E11 49)   5  Diabetic neuropathy (250 60,357 2) (E11 40)   6  GERD (gastroesophageal reflux disease) (530 81) (K21 9)   7  High blood pressure (401 9) (I10)    Current Meds   1  BD Pen Needle Mini U/F 31G X 5 MM Miscellaneous; USE AS DIRECTED; Therapy: 21TEM3941 to (Evaluate:51Cel7929)  Requested for: 33Qlp2461; Last   Rx:15Wqn1393 Ordered   2  Blood Glucose Test In Vitro Strip; TEST TWICE DAILY; Therapy: 78YJB0798 to (Evaluate:15Qau3190)  Requested for: 13YFK9943; Last   Rx:29Haj3294 Ordered   3  HumaLOG KwikPen 100 UNIT/ML Subcutaneous Solution Pen-injector; INJECT 5U   BEFORE MEALS  Requested for: 31CTF0129; Last Rx:04Lnb8699 Ordered   4  Lantus SoloStar 100 UNIT/ML Subcutaneous Solution Pen-injector; inject 35 units every   morning  Requested for: 69PBO6768; Last Rx:39Bsi4130 Ordered   5  Lyrica 100 MG Oral Capsule; TAKE 1 CAPSULE 3 TIMES DAILY; Last Rx:45Qzs5130   Ordered   6  Pantoprazole Sodium 40 MG Oral Tablet Delayed Release; Take 1 tablet daily    Requested for: 59DTW4217; Last Rx:60Eln5405 Ordered   7  TraMADol HCl - 50 MG Oral Tablet; Take 1 tablet every 6 hours as needed for pain; Last   Rx:48Mrp2258 Ordered    Allergies    1  No Known Drug Allergies    Discussion/Summary    We have been unable to contact the patient either  Patient has not returned calls        Signatures   Electronically signed by : RASHI Mena ; Mar 28 2017 11:36AM EST                       (Author)

## 2018-01-23 NOTE — MISCELLANEOUS
Message   Recorded as Task   Date: 12/13/2017 04:51 PM, Created By: Mirela Sauceda   Task Name: Med Renewal Request   Assigned To: Pratik Rothman   Regarding Patient: Colletta Colt, Status: Active   CommentJennifer Ramsey - 13 Dec 2017 4:51 PM     TASK CREATED  Caller: Self; Renew Medication; [phone] (Mobile Phone)    PLEASE DISCUSS WITH DR CHIRINOS --    PT L/M ON BT RX LINE REQUESTING REFILL FOR ALBUTEROL SULFATE SOLUTION FOR NEBULIZER  IT WAS LAST PRESCRIBED BY ER DOCTOR FROM  [address] IN [place]  I CONFIRMED THIS WITH [facility]  RX WAS WRITTEN TO USE 1 VIAL VIA NEBULIZER Q2H PRN FOR WHEEZING  NOT SURE IF DR CHIRINOS WANTS TO KEEP INSTRUCTIONS THE SAME OR CHANGE IT TO Q4H WHICH IS USUAL  I HAVE IT RECORDED IN THE CHART THIS WAY UNTIL YOU VERIFY IT WITH HIM  PT WOULD LIKE RX SENT TO [facility] IN [place]  ALSO, PLEASE LET HIM KNOW THAT THE PT IS REQUESTING HIS LYRICA RX EARLY  I VERIFIED PDMP SITE AND WITH [facility] THAT IT WAS LAST FILLED ON 12/4/2017  THE EARLIEST THE MED CAN BE FILLED IS 1/2/2018  I DID ANNOTATE THE MED IN HIS CHART  THANK YOU! Amberly Portillo - 15 Dec 2017 7:59 AM     TASK EDITED  See task note from 12/14/17  This request was addressed by Dr William Paez  Active Problems    1  Abdominal aortic aneurysm (AAA) (441 4) (I71 4)   2  Chronic back pain (724 5,338 29) (M54 9,G89 29)   3  Chronic bronchitis (491 9) (J42)   4  Chronic diastolic congestive heart failure (428 32,428 0) (I50 32)   5  COPD (chronic obstructive pulmonary disease) (496) (J44 9)   6  Depression (311) (F32 9)   7  Diabetes mellitus type 2 with neurological manifestations (250 60) (E11 49)   8  Fatty liver disease, nonalcoholic (710 4) (N82 7)   9  GERD (gastroesophageal reflux disease) (530 81) (K21 9)   10  Hyperlipidemia (272 4) (E78 5)   11  Hypertension (401 9) (I10)   12  Urinary frequency (788 41) (R35 0)    Current Meds   1   Advair Diskus 250-50 MCG/DOSE Inhalation Aerosol Powder Breath Activated; INHALE   1 PUFFS Twice daily; Therapy: 56WBT3462 to (Last Rx:21Nov2017) Ordered   2  Albuterol Sulfate (2 5 MG/3ML) 0 083% Inhalation Nebulization Solution; USE 1 VIAL   Every 4 hours VIA NEBULIZER PRN FOR WHEEZING - DX  J44 9; Therapy: 54JWS0137 to (Evaluate:73Tnn1850)  Requested for: 20JNW3781; Last   Rx:18Cre2743 Ordered   3  Aspirin 81 MG Oral Tablet Delayed Release; take 1 tablet by mouth every day; Therapy: 06ZXS1986 to (Evaluate:87Uyy1460)  Requested for: 28Apr2017; Last   Rx:28Apr2017 Ordered   4  Atorvastatin Calcium 40 MG Oral Tablet; TAKE 1 TABLET DAILY; Therapy: 28Apr2017 to (Evaluate:11Jan2018)  Requested for: 56Bvr0082; Last   Rx:84Pah6552 Ordered   5  BD Pen Needle Mini U/F 31G X 5 MM Miscellaneous; USE AS DIRECTED; Therapy: 65EXX8568 to (Evaluate:16Aug2017)  Requested for: 81Pti3210; Last   Rx:17Feb2017 Ordered   6  Benzonatate 100 MG Oral Capsule; TAKE 1 CAPSULE 3 TIMES DAILY AS NEEDED; Therapy: 41URX7660 to (Evaluate:25Nov2017)  Requested for: 56EEC8409; Last   Rx:15Nov2017 Ordered   7  Cefuroxime Axetil 500 MG Oral Tablet; TAKE 1 TABLET EVERY 12 HOURS DAILY; Therapy: 09PTF6866 to (Evaluate:25Nov2017)  Requested for: 34ERY7415; Last   Rx:15Nov2017 Ordered   8  DULoxetine HCl - 30 MG Oral Capsule Delayed Release Particles (Cymbalta); TAKE 1   CAPSULE DAILY FOR 1 WEEK THEN 2 CAPSULES DAILY; Therapy: 28Apr2017 to (Evaluate:31Ntp0250)  Requested for: 43VGE6160; Last   Rx:22Nov2017 Ordered   9  FreeStyle Freedom Lite w/Device Kit; 1 kit - DX  E11 9; Therapy: 80SEO7226 to (Last Rx:04Oct2017)  Requested for: 74AUI5479 Ordered   10  FreeStyle Lite Test In Vitro Strip; TEST TWICE DAILY  Requested for: 77LUO1642; Last    Rx:05Oct2017 Ordered   11  Gabapentin 100 MG Oral Capsule; TAKE 1 CAPSULE AT BEDTIME; Therapy: 74HZG2955 to (Evaluate:37Ems7218)  Requested for: 26XCB8069; Last    Rx:21Nov2017 Ordered   12   HumaLOG KwikPen 100 UNIT/ML Subcutaneous Solution Pen-injector; INJECT    SUBCUTANEOUSLY AS DIRECTED; Therapy: 13QYT6461 to (Last Rx:21Nov2017)  Requested for: 21Nov2017 Ordered   13  Lantus SoloStar 100 UNIT/ML Subcutaneous Solution Pen-injector; INJECT 45 UNIT    Every morning  Requested for: 21Nov2017; Last Rx:21Nov2017 Ordered   14  Lyrica 100 MG Oral Capsule; TAKE 1 CAPSULE 3 times daily; Therapy: 74Uwy9616 to (Evaluate:02Jan2018)  Requested for: 85Kth5968; Last    Rx:03Nov2017; Status: ACTIVE - Renewal Denied Ordered   15  Metoprolol Tartrate 50 MG Oral Tablet; TAKE 1 TABLET TWICE DAILY; Therapy: 28Apr2017 to (Evaluate:12Nov2017)  Requested for: 52Oui6547; Last    Rx:13Sep2017 Ordered   16  Nebulizer Miscellaneous; Therapy: (Recorded:21Nov2017) to Recorded   17  Pantoprazole Sodium 40 MG Oral Tablet Delayed Release; Take 1 tablet daily     Requested for: 60IZT5641; Last Rx:13Sep2017 Ordered   18  PredniSONE 10 MG Oral Tablet; Take 3 tablets for 2 days, then 2 tablets for 2 days, then    1 tablet for 2 days, then stop  TAKE WITH FOOD; Therapy: 21HZN5730 to (Evaluate:21Nov2017)  Requested for: 56VVA3774; Last    Rx:15Nov2017 Ordered   19  Proventil  (90 Base) MCG/ACT Inhalation Aerosol Solution; 1-2 puffs daily prn; Therapy: 31Kgg0826 to (Last Rx:15Nov2017)  Requested for: 54OYQ6414 Ordered   20  TraMADol HCl - 50 MG Oral Tablet; Take 1 tablet every 6 hours as needed for pain; Last    Rx:85Mvy7904 Ordered    Allergies    1  No Known Drug Allergies    2  No Known Environmental Allergies   3   No Known Food Allergies    Signatures   Electronically signed by : Cecille Willard RN; Dec 15 2017  7:59AM EST                       (Author)

## 2018-01-23 NOTE — MISCELLANEOUS
Message   Recorded as Task   Date: 12/14/2017 02:36 PM, Created By: Arsalan Donaldson   Task Name: Care Coordination   Assigned To: Sayra Bentley   Regarding Patient: Yuliana Carter, Status: Active   CommentLeifkorina Taylor - 14 Dec 2017 2:36 PM     TASK CREATED  Can you please contact Mr Carpio to let him know that I refilled his albuterol nebulization  solution however I did adjust the frequency to every 4 hours as needed for wheezing/ shortness of breath  regarding his request for refill of Lyrica, I will defer on refilling this medication at this time as his next scheduled refill date is January 2, 2018  if the patient has been having increased pain, he should schedule appointment with our office  Thank you  Amberly Portillo - 14 Dec 2017 3:03 PM     TASK EDITED  Message with Dr Jomar Faulkner recommendations on [phone] with a reqeust to call back to onfirm receipt of thee message  Alonsocynthia Preciado - 14 Dec 2017 3:43 PM     TASK EDITED  pt  called back to confirm that he received the msg  Active Problems    1  Abdominal aortic aneurysm (AAA) (441 4) (I71 4)   2  Chronic back pain (724 5,338 29) (M54 9,G89 29)   3  Chronic bronchitis (491 9) (J42)   4  Chronic diastolic congestive heart failure (428 32,428 0) (I50 32)   5  COPD (chronic obstructive pulmonary disease) (496) (J44 9)   6  Depression (311) (F32 9)   7  Diabetes mellitus type 2 with neurological manifestations (250 60) (E11 49)   8  Fatty liver disease, nonalcoholic (101 1) (E39 8)   9  GERD (gastroesophageal reflux disease) (530 81) (K21 9)   10  Hyperlipidemia (272 4) (E78 5)   11  Hypertension (401 9) (I10)   12  Urinary frequency (788 41) (R35 0)    Current Meds   1  Advair Diskus 250-50 MCG/DOSE Inhalation Aerosol Powder Breath Activated; INHALE   1 PUFFS Twice daily; Therapy: 01AUT9851 to (Last Rx:21Nov2017) Ordered   2   Albuterol Sulfate (2 5 MG/3ML) 0 083% Inhalation Nebulization Solution; USE 1 VIAL   Every 4 hours VIA NEBULIZER PRN FOR WHEEZING - DX  J44 9; Therapy: 84HRI3095 to (Evaluate:27Yna6556)  Requested for: 98QZU6366; Last   Rx:05Fci1309 Ordered   3  Aspirin 81 MG Oral Tablet Delayed Release; take 1 tablet by mouth every day; Therapy: 86QZO0582 to (Evaluate:58Vvx9337)  Requested for: 64Xcd5532; Last   Rx:28Apr2017 Ordered   4  Atorvastatin Calcium 40 MG Oral Tablet; TAKE 1 TABLET DAILY; Therapy: 92Nvv8569 to (Evaluate:11Jan2018)  Requested for: 54Bqb3607; Last   Rx:70Xsu1979 Ordered   5  BD Pen Needle Mini U/F 31G X 5 MM Miscellaneous; USE AS DIRECTED; Therapy: 88UJP9811 to (Evaluate:85Nsm9678)  Requested for: 63Qxm7643; Last   Rx:17Feb2017 Ordered   6  Benzonatate 100 MG Oral Capsule; TAKE 1 CAPSULE 3 TIMES DAILY AS NEEDED; Therapy: 93LMC8294 to (Evaluate:25Nov2017)  Requested for: 46DPI9382; Last   Rx:49Tbp2313 Ordered   7  Cefuroxime Axetil 500 MG Oral Tablet; TAKE 1 TABLET EVERY 12 HOURS DAILY; Therapy: 13GZF4295 to (Evaluate:25Nov2017)  Requested for: 04RSU7136; Last   Rx:15Nov2017 Ordered   8  DULoxetine HCl - 30 MG Oral Capsule Delayed Release Particles (Cymbalta); TAKE 1   CAPSULE DAILY FOR 1 WEEK THEN 2 CAPSULES DAILY; Therapy: 28Apr2017 to (Evaluate:71Num0309)  Requested for: 15DHK7221; Last   Rx:22Nov2017 Ordered   9  FreeStyle Freedom Lite w/Device Kit; 1 kit - DX  E11 9; Therapy: 64HZQ9730 to (Last Rx:04Oct2017)  Requested for: 96JIC9378 Ordered   10  FreeStyle Lite Test In Vitro Strip; TEST TWICE DAILY  Requested for: 17XYP9490; Last    Rx:05Oct2017 Ordered   11  Gabapentin 100 MG Oral Capsule; TAKE 1 CAPSULE AT BEDTIME; Therapy: 72VND6635 to (Evaluate:24Vcb5595)  Requested for: 81JAT8236; Last    Rx:21Nov2017 Ordered   12  HumaLOG KwikPen 100 UNIT/ML Subcutaneous Solution Pen-injector; INJECT    SUBCUTANEOUSLY AS DIRECTED; Therapy: 30JHD7501 to (Last Rx:21Nov2017)  Requested for: 21Nov2017 Ordered   13   Lantus SoloStar 100 UNIT/ML Subcutaneous Solution Pen-injector; INJECT 45 UNIT Every morning  Requested for: 21Nov2017; Last Rx:21Nov2017 Ordered   14  Lyrica 100 MG Oral Capsule; TAKE 1 CAPSULE 3 times daily; Therapy: 20Fgo4553 to (Evaluate:02Jan2018)  Requested for: 66Scw7127; Last    Rx:03Nov2017; Status: ACTIVE - Renewal Denied Ordered   15  Metoprolol Tartrate 50 MG Oral Tablet; TAKE 1 TABLET TWICE DAILY; Therapy: 28Apr2017 to (Evaluate:12Nov2017)  Requested for: 38Tuq5537; Last    Rx:46Zld7916 Ordered   16  Nebulizer Miscellaneous; Therapy: (Recorded:21Nov2017) to Recorded   17  Pantoprazole Sodium 40 MG Oral Tablet Delayed Release; Take 1 tablet daily     Requested for: 69CYD3216; Last Rx:13Sep2017 Ordered   18  PredniSONE 10 MG Oral Tablet; Take 3 tablets for 2 days, then 2 tablets for 2 days, then    1 tablet for 2 days, then stop  TAKE WITH FOOD; Therapy: 03IND1928 to (Evaluate:21Nov2017)  Requested for: 64AQL8702; Last    Rx:68Mmr7658 Ordered   19  Proventil  (90 Base) MCG/ACT Inhalation Aerosol Solution; 1-2 puffs daily prn; Therapy: 99Vem2136 to (Last Rx:15Nov2017)  Requested for: 95BNN4422 Ordered   20  TraMADol HCl - 50 MG Oral Tablet; Take 1 tablet every 6 hours as needed for pain; Last    Rx:17Jpb6423 Ordered    Allergies    1  No Known Drug Allergies    2  No Known Environmental Allergies   3   No Known Food Allergies    Signatures   Electronically signed by : Negin Dallas RN; Dec 15 2017  7:58AM EST                       (Author)

## 2018-01-24 VITALS
TEMPERATURE: 98 F | WEIGHT: 248.25 LBS | HEART RATE: 110 BPM | SYSTOLIC BLOOD PRESSURE: 124 MMHG | DIASTOLIC BLOOD PRESSURE: 72 MMHG | HEIGHT: 68 IN | BODY MASS INDEX: 37.62 KG/M2 | OXYGEN SATURATION: 95 %

## 2018-01-24 VITALS
OXYGEN SATURATION: 97 % | TEMPERATURE: 97.8 F | DIASTOLIC BLOOD PRESSURE: 84 MMHG | SYSTOLIC BLOOD PRESSURE: 126 MMHG | HEART RATE: 76 BPM

## 2018-01-31 ENCOUNTER — OFFICE VISIT (OUTPATIENT)
Dept: INTERNAL MEDICINE CLINIC | Facility: CLINIC | Age: 68
End: 2018-01-31
Payer: MEDICARE

## 2018-01-31 ENCOUNTER — APPOINTMENT (EMERGENCY)
Dept: RADIOLOGY | Facility: HOSPITAL | Age: 68
End: 2018-01-31
Payer: MEDICARE

## 2018-01-31 ENCOUNTER — HOSPITAL ENCOUNTER (EMERGENCY)
Facility: HOSPITAL | Age: 68
Discharge: HOME | End: 2018-01-31
Attending: EMERGENCY MEDICINE
Payer: MEDICARE

## 2018-01-31 VITALS
HEART RATE: 109 BPM | HEIGHT: 71 IN | TEMPERATURE: 97.8 F | WEIGHT: 237.2 LBS | DIASTOLIC BLOOD PRESSURE: 82 MMHG | OXYGEN SATURATION: 98 % | BODY MASS INDEX: 33.21 KG/M2 | SYSTOLIC BLOOD PRESSURE: 134 MMHG

## 2018-01-31 VITALS
OXYGEN SATURATION: 98 % | WEIGHT: 238.1 LBS | HEART RATE: 76 BPM | SYSTOLIC BLOOD PRESSURE: 170 MMHG | BODY MASS INDEX: 33.33 KG/M2 | DIASTOLIC BLOOD PRESSURE: 81 MMHG | HEIGHT: 71 IN | TEMPERATURE: 97.7 F | RESPIRATION RATE: 18 BRPM

## 2018-01-31 DIAGNOSIS — E11.10 TYPE 2 DIABETES MELLITUS WITH KETOACIDOSIS WITHOUT COMA, WITH LONG-TERM CURRENT USE OF INSULIN (HCC): ICD-10-CM

## 2018-01-31 DIAGNOSIS — R10.32 ABDOMINAL PAIN, ACUTE, LEFT LOWER QUADRANT: Primary | ICD-10-CM

## 2018-01-31 DIAGNOSIS — Z79.4 TYPE 2 DIABETES MELLITUS WITH KETOACIDOSIS WITHOUT COMA, WITH LONG-TERM CURRENT USE OF INSULIN (HCC): ICD-10-CM

## 2018-01-31 DIAGNOSIS — R10.31 ABDOMINAL PAIN, ACUTE, BILATERAL LOWER QUADRANT: Primary | ICD-10-CM

## 2018-01-31 DIAGNOSIS — R10.32 ABDOMINAL PAIN, ACUTE, BILATERAL LOWER QUADRANT: Primary | ICD-10-CM

## 2018-01-31 DIAGNOSIS — S32.009A LUMBAR TRANSVERSE PROCESS FRACTURE (HCC): ICD-10-CM

## 2018-01-31 DIAGNOSIS — R33.8 ACUTE URINARY RETENTION: ICD-10-CM

## 2018-01-31 PROBLEM — E11.49 DIABETES MELLITUS TYPE 2 WITH NEUROLOGICAL MANIFESTATIONS (HCC): Status: ACTIVE | Noted: 2017-02-14

## 2018-01-31 PROBLEM — R77.8 TROPONIN LEVEL ELEVATED: Status: ACTIVE | Noted: 2017-11-14

## 2018-01-31 PROBLEM — K76.0 FATTY LIVER DISEASE, NONALCOHOLIC: Status: ACTIVE | Noted: 2017-11-14

## 2018-01-31 PROBLEM — E11.40 DIABETIC NEUROPATHY (HCC): Status: RESOLVED | Noted: 2017-02-14 | Resolved: 2018-01-31

## 2018-01-31 PROBLEM — E11.49 DIABETES MELLITUS TYPE 2 WITH NEUROLOGICAL MANIFESTATIONS (HCC): Status: RESOLVED | Noted: 2017-02-14 | Resolved: 2018-01-31

## 2018-01-31 PROBLEM — R35.0 INCREASED FREQUENCY OF URINATION: Status: ACTIVE | Noted: 2017-11-21

## 2018-01-31 PROBLEM — I50.32 CHRONIC DIASTOLIC CONGESTIVE HEART FAILURE (HCC): Status: ACTIVE | Noted: 2017-11-14

## 2018-01-31 PROBLEM — I71.4 ABDOMINAL AORTIC ANEURYSM (AAA) (HCC): Status: ACTIVE | Noted: 2017-02-14

## 2018-01-31 PROBLEM — G89.29 CHRONIC BACK PAIN: Status: ACTIVE | Noted: 2017-02-14

## 2018-01-31 PROBLEM — M54.9 CHRONIC BACK PAIN: Status: ACTIVE | Noted: 2017-02-14

## 2018-01-31 PROBLEM — J42 CHRONIC BRONCHITIS (HCC): Status: ACTIVE | Noted: 2017-11-15

## 2018-01-31 PROBLEM — G62.9 NEUROPATHY: Status: ACTIVE | Noted: 2017-11-14

## 2018-01-31 PROBLEM — R10.13 EPIGASTRIC PAIN: Status: ACTIVE | Noted: 2017-11-14

## 2018-01-31 PROBLEM — E11.9 TYPE 2 DIABETES MELLITUS TREATED WITH INSULIN (HCC): Status: RESOLVED | Noted: 2017-10-04 | Resolved: 2018-01-31

## 2018-01-31 PROBLEM — E11.9 TYPE 2 DIABETES MELLITUS TREATED WITH INSULIN (HCC): Status: ACTIVE | Noted: 2017-10-04

## 2018-01-31 PROBLEM — E11.40 DIABETIC NEUROPATHY (HCC): Status: ACTIVE | Noted: 2017-02-14

## 2018-01-31 PROBLEM — J44.9 COPD (CHRONIC OBSTRUCTIVE PULMONARY DISEASE) (HCC): Status: ACTIVE | Noted: 2017-09-13

## 2018-01-31 LAB
ALBUMIN SERPL BCP-MCNC: 3.3 G/DL (ref 3.5–5)
ALP SERPL-CCNC: 105 U/L (ref 46–116)
ALT SERPL W P-5'-P-CCNC: 18 U/L (ref 12–78)
ANION GAP SERPL CALCULATED.3IONS-SCNC: 4 MMOL/L (ref 4–13)
AST SERPL W P-5'-P-CCNC: 13 U/L (ref 5–45)
BACTERIA UR QL AUTO: ABNORMAL /HPF
BASOPHILS # BLD AUTO: 0.07 THOUSANDS/ΜL (ref 0–0.1)
BASOPHILS NFR BLD AUTO: 1 % (ref 0–1)
BILIRUB SERPL-MCNC: 0.84 MG/DL (ref 0.2–1)
BILIRUB UR QL STRIP: NEGATIVE
BUN SERPL-MCNC: 19 MG/DL (ref 5–25)
CALCIUM SERPL-MCNC: 9 MG/DL (ref 8.3–10.1)
CHLORIDE SERPL-SCNC: 105 MMOL/L (ref 100–108)
CLARITY UR: CLEAR
CO2 SERPL-SCNC: 29 MMOL/L (ref 21–32)
COLOR UR: YELLOW
COLOR, POC: NORMAL
CREAT SERPL-MCNC: 1.02 MG/DL (ref 0.6–1.3)
EOSINOPHIL # BLD AUTO: 0.22 THOUSAND/ΜL (ref 0–0.61)
EOSINOPHIL NFR BLD AUTO: 2 % (ref 0–6)
ERYTHROCYTE [DISTWIDTH] IN BLOOD BY AUTOMATED COUNT: 21 % (ref 11.6–15.1)
GFR SERPL CREATININE-BSD FRML MDRD: 76 ML/MIN/1.73SQ M
GLUCOSE SERPL-MCNC: 143 MG/DL (ref 65–140)
GLUCOSE UR STRIP-MCNC: NEGATIVE MG/DL
HCT VFR BLD AUTO: 39 % (ref 36.5–49.3)
HGB BLD-MCNC: 12.2 G/DL (ref 12–17)
HGB UR QL STRIP.AUTO: NEGATIVE
HYALINE CASTS #/AREA URNS LPF: ABNORMAL /LPF
KETONES UR STRIP-MCNC: NEGATIVE MG/DL
LEUKOCYTE ESTERASE UR QL STRIP: NEGATIVE
LIPASE SERPL-CCNC: 87 U/L (ref 73–393)
LYMPHOCYTES # BLD AUTO: 2.52 THOUSANDS/ΜL (ref 0.6–4.47)
LYMPHOCYTES NFR BLD AUTO: 22 % (ref 14–44)
MCH RBC QN AUTO: 23.6 PG (ref 26.8–34.3)
MCHC RBC AUTO-ENTMCNC: 31.3 G/DL (ref 31.4–37.4)
MCV RBC AUTO: 75 FL (ref 82–98)
MONOCYTES # BLD AUTO: 1.16 THOUSAND/ΜL (ref 0.17–1.22)
MONOCYTES NFR BLD AUTO: 10 % (ref 4–12)
NEUTROPHILS # BLD AUTO: 7.38 THOUSANDS/ΜL (ref 1.85–7.62)
NEUTS SEG NFR BLD AUTO: 65 % (ref 43–75)
NITRITE UR QL STRIP: NEGATIVE
NON-SQ EPI CELLS URNS QL MICRO: ABNORMAL /HPF
NRBC BLD AUTO-RTO: 0 /100 WBCS
PH UR STRIP.AUTO: 5.5 [PH] (ref 4.5–8)
PLATELET # BLD AUTO: 318 THOUSANDS/UL (ref 149–390)
PMV BLD AUTO: 9.4 FL (ref 8.9–12.7)
POTASSIUM SERPL-SCNC: 3.6 MMOL/L (ref 3.5–5.3)
PROT SERPL-MCNC: 7.9 G/DL (ref 6.4–8.2)
PROT UR STRIP-MCNC: ABNORMAL MG/DL
RBC # BLD AUTO: 5.18 MILLION/UL (ref 3.88–5.62)
RBC #/AREA URNS AUTO: ABNORMAL /HPF
SL AMB  POCT GLUCOSE, UA: 100
SL AMB LEUKOCYTE ESTERASE,UA: ABNORMAL
SL AMB POCT BILIRUBIN,UA: ABNORMAL
SL AMB POCT BLOOD,UA: ABNORMAL
SL AMB POCT CLARITY,UA: ABNORMAL
SL AMB POCT COLOR,UA: ABNORMAL
SL AMB POCT GLUCOSE BLD: 177
SL AMB POCT KETONES,UA: ABNORMAL
SL AMB POCT NITRITE,UA: ABNORMAL
SL AMB POCT PH,UA: 6
SL AMB POCT SPECIFIC GRAVITY,UA: 1.02
SL AMB POCT URINE PROTEIN: 100
SL AMB POCT UROBILINOGEN: 1
SODIUM SERPL-SCNC: 138 MMOL/L (ref 136–145)
SP GR UR STRIP.AUTO: 1.01 (ref 1–1.03)
UROBILINOGEN UR QL STRIP.AUTO: 0.2 E.U./DL
WBC # BLD AUTO: 11.38 THOUSAND/UL (ref 4.31–10.16)
WBC #/AREA URNS AUTO: ABNORMAL /HPF

## 2018-01-31 PROCEDURE — 85025 COMPLETE CBC W/AUTO DIFF WBC: CPT | Performed by: EMERGENCY MEDICINE

## 2018-01-31 PROCEDURE — 81002 URINALYSIS NONAUTO W/O SCOPE: CPT | Performed by: EMERGENCY MEDICINE

## 2018-01-31 PROCEDURE — 99284 EMERGENCY DEPT VISIT MOD MDM: CPT | Performed by: NEUROLOGICAL SURGERY

## 2018-01-31 PROCEDURE — 74177 CT ABD & PELVIS W/CONTRAST: CPT

## 2018-01-31 PROCEDURE — 99214 OFFICE O/P EST MOD 30 MIN: CPT | Performed by: PHYSICIAN ASSISTANT

## 2018-01-31 PROCEDURE — 96361 HYDRATE IV INFUSION ADD-ON: CPT

## 2018-01-31 PROCEDURE — 81003 URINALYSIS AUTO W/O SCOPE: CPT | Performed by: PHYSICIAN ASSISTANT

## 2018-01-31 PROCEDURE — 99284 EMERGENCY DEPT VISIT MOD MDM: CPT

## 2018-01-31 PROCEDURE — 83690 ASSAY OF LIPASE: CPT | Performed by: EMERGENCY MEDICINE

## 2018-01-31 PROCEDURE — 36415 COLL VENOUS BLD VENIPUNCTURE: CPT | Performed by: EMERGENCY MEDICINE

## 2018-01-31 PROCEDURE — 80053 COMPREHEN METABOLIC PANEL: CPT | Performed by: EMERGENCY MEDICINE

## 2018-01-31 PROCEDURE — 81001 URINALYSIS AUTO W/SCOPE: CPT

## 2018-01-31 PROCEDURE — 82948 REAGENT STRIP/BLOOD GLUCOSE: CPT | Performed by: PHYSICIAN ASSISTANT

## 2018-01-31 PROCEDURE — 96374 THER/PROPH/DIAG INJ IV PUSH: CPT

## 2018-01-31 RX ORDER — MORPHINE SULFATE 4 MG/ML
4 INJECTION, SOLUTION INTRAMUSCULAR; INTRAVENOUS ONCE
Status: COMPLETED | OUTPATIENT
Start: 2018-01-31 | End: 2018-01-31

## 2018-01-31 RX ORDER — OXYCODONE HYDROCHLORIDE AND ACETAMINOPHEN 5; 325 MG/1; MG/1
1 TABLET ORAL EVERY 6 HOURS PRN
Qty: 12 TABLET | Refills: 0 | Status: SHIPPED | OUTPATIENT
Start: 2018-01-31 | End: 2018-02-10

## 2018-01-31 RX ORDER — LIDOCAINE 50 MG/G
1 PATCH TOPICAL ONCE
Status: DISCONTINUED | OUTPATIENT
Start: 2018-01-31 | End: 2018-01-31 | Stop reason: HOSPADM

## 2018-01-31 RX ORDER — OXYCODONE HYDROCHLORIDE AND ACETAMINOPHEN 5; 325 MG/1; MG/1
1 TABLET ORAL ONCE
Status: COMPLETED | OUTPATIENT
Start: 2018-01-31 | End: 2018-01-31

## 2018-01-31 RX ORDER — LIDOCAINE 50 MG/G
1 PATCH TOPICAL DAILY
Status: DISCONTINUED | OUTPATIENT
Start: 2018-02-01 | End: 2018-01-31

## 2018-01-31 RX ADMIN — OXYCODONE HYDROCHLORIDE AND ACETAMINOPHEN 1 TABLET: 5; 325 TABLET ORAL at 18:32

## 2018-01-31 RX ADMIN — MORPHINE SULFATE 4 MG: 4 INJECTION INTRAVENOUS at 13:09

## 2018-01-31 RX ADMIN — SODIUM CHLORIDE 1000 ML: 0.9 INJECTION, SOLUTION INTRAVENOUS at 13:09

## 2018-01-31 RX ADMIN — LIDOCAINE 1 PATCH: 50 PATCH TOPICAL at 18:06

## 2018-01-31 RX ADMIN — IOHEXOL 100 ML: 350 INJECTION, SOLUTION INTRAVENOUS at 15:36
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
## 2018-01-31 NOTE — ED ATTENDING ATTESTATION
Mile Zaragoza MD, saw and evaluated the patient  I have discussed the patient with the resident/non-physician practitioner and agree with the resident's/non-physician practitioner's findings, Plan of Care, and MDM as documented in the resident's/non-physician practitioner's note, except where noted  All available labs and Radiology studies were reviewed  At this point I agree with the current assessment done in the Emergency Department  I have conducted an independent evaluation of this patient a history and physical is as follows:      Critical Care Time  CritCare Time    Procedures     80 yo male with llq abdominal pain for two weeks and seen at [facility] and dx with uti and tx with abx  Pt symptoms improved and four days ago had a fall and then symptoms worsened  Pt went to pcp today and concern for urinary retention  Pt with no trouble urinating  No n/v/d, no fever  Vss, afebrile, lungs cta, rrr, abdomen soft tender llq,  Urine, labs, ct a/p, bladder scan, ivf  Pain meds

## 2018-01-31 NOTE — ASSESSMENT & PLAN NOTE
Patient with history of ER visit to [facility] 2 weeks ago, underwent CT and urine studies  Tx with abx and d/c to home  Later was called and tx for what appears to be yeast infection in urine  Worsening of urinary retention symptoms  Intermittent fever, low urine output with distended urinary bladder with concerns for acute urinary retention vs acute diverticulitis  Patient needs further eval and treat of acute abdominal pain with acute urinary retention  Discussed at length with patient risks of going without treatment  Will need further eval in ER of acute pain as well as acute urinary retention  Urine dip today in office showing neg nitrites, neg leuks with trace lyced blood  Patient agreeable to go to [facility] by EMS BLS  Prior CT showing elagrged prostate 10/2017 no recent PSA  Needs further eval and treat  Patient was taken to [facility] by [facility]  Will need DIANE upon hospital discharge  ER notified of patient's arrival by [facility] staff

## 2018-01-31 NOTE — PATIENT INSTRUCTIONS
Abdominal pain, acute, bilateral lower quadrant  Patient with history of ER visit to [facility] 2 weeks ago, underwent CT and urine studies  Tx with abx and d/c to home  Later was called and tx for what appears to be yeast infection in urine  Worsening of urinary retention symptoms  Intermittent fever, low urine output with distended urinary bladder with concerns for acute urinary retention vs acute diverticulitis  Patient needs further eval and treat of acute abdominal pain with acute urinary retention  Discussed at length with patient risks of going without treatment  Will need further eval in ER of acute pain as well as acute urinary retention  Urine dip today in office showing neg nitrites, neg leuks with trace lyced blood  Patient agreeable to go to [facility] by EMS BLS  Prior CT showing elagrged prostate 10/2017 no recent PSA  Needs further eval and treat  Increased frequency of urination  See acute abdominal pain plan

## 2018-02-01 NOTE — DISCHARGE INSTRUCTIONS
Acute Low Back Pain   WHAT YOU NEED TO KNOW:   Acute low back pain is sudden discomfort in your lower back area that lasts for up to 6 weeks  The discomfort makes it difficult to tolerate activity  DISCHARGE INSTRUCTIONS:   Return to the emergency department if:   · You have severe pain  · You have sudden stiffness and heaviness on both buttocks down to both legs  · You have numbness or weakness in one leg, or pain in both legs  · You have numbness in your genital area or across your lower back  · You cannot control your urine or bowel movements  Contact your healthcare provider if:   · You have a fever  · You have pain at night or when you rest     · Your pain does not get better with treatment  · You have pain that worsens when you cough or sneeze  · You suddenly feel something pop or snap in your back  · You have questions or concerns about your condition or care  Medicines: The following medicines may be ordered by your healthcare provider:  · Acetaminophen  decreases pain  It is available without a doctor's order  Ask how much to take and how often to take it  Follow directions  Acetaminophen can cause liver damage if not taken correctly  · NSAIDs  help decrease swelling and pain  This medicine is available with or without a doctor's order  NSAIDs can cause stomach bleeding or kidney problems in certain people  If you take blood thinner medicine, always ask your healthcare provider if NSAIDs are safe for you  Always read the medicine label and follow directions  · Prescription pain medicine  may be given  Ask your healthcare provider how to take this medicine safely  · Muscle relaxers  decrease pain by relaxing the muscles in your lower spine  · Take your medicine as directed  Contact your healthcare provider if you think your medicine is not helping or if you have side effects  Tell him of her if you are allergic to any medicine   Keep a list of the medicines, vitamins, and herbs you take  Include the amounts, and when and why you take them  Bring the list or the pill bottles to follow-up visits  Carry your medicine list with you in case of an emergency  Self-care:   · Stay active  as much as you can without causing more pain  Bed rest could make your back pain worse  Start with some light exercises such as walking  Avoid heavy lifting until your pain is gone  Ask for more information about the activities or exercises that are right for you  · Ice  helps decrease swelling, pain, and muscle spams  Put crushed ice in a plastic bag  Cover it with a towel  Place it on your lower back for 20 to 30 minutes every 2 hours  Do this for about 2 to 3 days after your pain starts, or as directed  · Heat  helps decrease pain and muscle spasms  Start to use heat after treatment with ice has stopped  Use a small towel dampened with warm water or a heating pad, or sit in a warm bath  Apply heat on the area for 20 to 30 minutes every 2 hours for as many days as directed  Alternate heat and ice  Prevent acute low back pain:   · Use proper body mechanics  ¨ Bend at the hips and knees when you  objects  Do not bend from the waist  Use your leg muscles as you lift the load  Do not use your back  Keep the object close to your chest as you lift it  Try not to twist or lift anything above your waist     ¨ Change your position often when you stand for long periods of time  Rest one foot on a small box or footrest, and then switch to the other foot often  ¨ Try not to sit for long periods of time  When you do, sit in a straight-backed chair with your feet flat on the floor  Never reach, pull, or push while you are sitting  · Do exercises that strengthen your back muscles  Warm up before you exercise  Ask your healthcare provider the best exercises for you  · Maintain a healthy weight  Ask your healthcare provider how much you should weigh   Ask him to help you create a weight loss plan if you are overweight  Follow up with your healthcare provider as directed:  Return for a follow-up visit if you still have pain after 1 to 3 weeks of treatment  You may need to visit an orthopedist if your back pain lasts more than 12 weeks  Write down your questions so you remember to ask them during your visits  © 2017 [employer]  Information is for End User's use only and may not be sold, redistributed or otherwise used for commercial purposes  All illustrations and images included in CareNotes® are the copyrighted property of [employer]  or Jamil Henson  The above information is an  only  It is not intended as medical advice for individual conditions or treatments  Talk to your doctor, nurse or pharmacist before following any medical regimen to see if it is safe and effective for you

## 2018-02-09 PROBLEM — J20.9 BRONCHITIS, ACUTE: Status: ACTIVE | Noted: 2017-09-25

## 2018-02-09 PROBLEM — D72.829 LEUKOCYTOSIS: Status: ACTIVE | Noted: 2017-10-24

## 2018-02-14 ENCOUNTER — OFFICE VISIT (OUTPATIENT)
Dept: INTERNAL MEDICINE CLINIC | Facility: CLINIC | Age: 68
End: 2018-02-14
Payer: MEDICARE

## 2018-02-14 VITALS
HEART RATE: 106 BPM | OXYGEN SATURATION: 97 % | WEIGHT: 245.2 LBS | HEIGHT: 71 IN | TEMPERATURE: 97.8 F | DIASTOLIC BLOOD PRESSURE: 84 MMHG | SYSTOLIC BLOOD PRESSURE: 130 MMHG | BODY MASS INDEX: 34.33 KG/M2

## 2018-02-14 DIAGNOSIS — R91.8 LUNG INFILTRATE: Primary | ICD-10-CM

## 2018-02-14 DIAGNOSIS — Z79.4 TYPE 2 DIABETES MELLITUS WITH COMPLICATION, WITH LONG-TERM CURRENT USE OF INSULIN (HCC): ICD-10-CM

## 2018-02-14 DIAGNOSIS — K80.20 CALCULUS OF GALLBLADDER WITHOUT CHOLECYSTITIS WITHOUT OBSTRUCTION: ICD-10-CM

## 2018-02-14 DIAGNOSIS — G89.29 OTHER CHRONIC PAIN: ICD-10-CM

## 2018-02-14 DIAGNOSIS — J42 CHRONIC BRONCHITIS, UNSPECIFIED CHRONIC BRONCHITIS TYPE (HCC): ICD-10-CM

## 2018-02-14 DIAGNOSIS — J18.9 PNEUMONIA OF LEFT LOWER LOBE DUE TO INFECTIOUS ORGANISM: ICD-10-CM

## 2018-02-14 DIAGNOSIS — E78.5 HYPERLIPIDEMIA, UNSPECIFIED HYPERLIPIDEMIA TYPE: ICD-10-CM

## 2018-02-14 DIAGNOSIS — K21.9 GASTROESOPHAGEAL REFLUX DISEASE, ESOPHAGITIS PRESENCE NOT SPECIFIED: ICD-10-CM

## 2018-02-14 DIAGNOSIS — S32.009D CLOSED FRACTURE OF TRANSVERSE PROCESS OF LUMBAR VERTEBRA WITH ROUTINE HEALING: ICD-10-CM

## 2018-02-14 DIAGNOSIS — Z79.4 TYPE 2 DIABETES MELLITUS WITH DIABETIC NEUROPATHY, WITH LONG-TERM CURRENT USE OF INSULIN (HCC): ICD-10-CM

## 2018-02-14 DIAGNOSIS — E11.40 TYPE 2 DIABETES MELLITUS WITH DIABETIC NEUROPATHY, WITH LONG-TERM CURRENT USE OF INSULIN (HCC): ICD-10-CM

## 2018-02-14 DIAGNOSIS — E11.8 TYPE 2 DIABETES MELLITUS WITH COMPLICATION, WITH LONG-TERM CURRENT USE OF INSULIN (HCC): ICD-10-CM

## 2018-02-14 DIAGNOSIS — I10 ESSENTIAL HYPERTENSION: ICD-10-CM

## 2018-02-14 PROBLEM — R10.13 EPIGASTRIC PAIN: Status: RESOLVED | Noted: 2017-11-14 | Resolved: 2018-02-14

## 2018-02-14 PROBLEM — R35.0 INCREASED FREQUENCY OF URINATION: Status: RESOLVED | Noted: 2017-11-21 | Resolved: 2018-02-14

## 2018-02-14 PROBLEM — R10.31 ABDOMINAL PAIN, ACUTE, BILATERAL LOWER QUADRANT: Status: RESOLVED | Noted: 2018-01-31 | Resolved: 2018-02-14

## 2018-02-14 PROBLEM — R10.32 ABDOMINAL PAIN, ACUTE, BILATERAL LOWER QUADRANT: Status: RESOLVED | Noted: 2018-01-31 | Resolved: 2018-02-14

## 2018-02-14 PROCEDURE — 99214 OFFICE O/P EST MOD 30 MIN: CPT | Performed by: PHYSICIAN ASSISTANT

## 2018-02-14 RX ORDER — OXYCODONE HYDROCHLORIDE AND ACETAMINOPHEN 5; 325 MG/1; MG/1
1 TABLET ORAL EVERY 6 HOURS PRN
Qty: 45 TABLET | Refills: 0 | Status: SHIPPED | OUTPATIENT
Start: 2018-02-14 | End: 2018-03-01 | Stop reason: SDUPTHER

## 2018-02-14 RX ORDER — ATORVASTATIN CALCIUM 40 MG/1
40 TABLET, FILM COATED ORAL
Qty: 30 TABLET | Refills: 2 | Status: SHIPPED | OUTPATIENT
Start: 2018-02-14 | End: 2018-02-14 | Stop reason: SDUPTHER

## 2018-02-14 RX ORDER — ATORVASTATIN CALCIUM 40 MG/1
40 TABLET, FILM COATED ORAL
Qty: 30 TABLET | Refills: 0 | Status: SHIPPED | OUTPATIENT
Start: 2018-02-14 | End: 2018-02-14 | Stop reason: SDUPTHER

## 2018-02-14 RX ORDER — INSULIN GLARGINE 100 [IU]/ML
45 INJECTION, SOLUTION SUBCUTANEOUS
Qty: 1350 UNITS | Refills: 0 | Status: SHIPPED | OUTPATIENT
Start: 2018-02-14 | End: 2018-04-01 | Stop reason: HOSPADM

## 2018-02-14 RX ORDER — ASPIRIN 81 MG/1
81 TABLET ORAL DAILY
Qty: 30 TABLET | Refills: 0 | Status: SHIPPED | OUTPATIENT
Start: 2018-02-14 | End: 2018-04-06 | Stop reason: SDUPTHER

## 2018-02-14 RX ORDER — ASPIRIN 81 MG/1
81 TABLET ORAL DAILY
Qty: 30 TABLET | Refills: 5 | Status: SHIPPED | OUTPATIENT
Start: 2018-02-14 | End: 2018-02-14 | Stop reason: SDUPTHER

## 2018-02-14 RX ORDER — PANTOPRAZOLE SODIUM 40 MG/1
40 TABLET, DELAYED RELEASE ORAL DAILY
Qty: 15 TABLET | Refills: 0 | Status: SHIPPED | OUTPATIENT
Start: 2018-02-14 | End: 2018-02-14 | Stop reason: SDUPTHER

## 2018-02-14 RX ORDER — PANTOPRAZOLE SODIUM 40 MG/1
40 TABLET, DELAYED RELEASE ORAL DAILY
Qty: 30 TABLET | Refills: 0 | Status: SHIPPED | OUTPATIENT
Start: 2018-02-14 | End: 2018-03-13 | Stop reason: SDUPTHER

## 2018-02-14 RX ORDER — OXYCODONE HYDROCHLORIDE AND ACETAMINOPHEN 5; 325 MG/1; MG/1
1 TABLET ORAL EVERY 6 HOURS PRN
COMMUNITY
End: 2018-02-14 | Stop reason: SDUPTHER

## 2018-02-14 RX ORDER — INSULIN GLARGINE 100 [IU]/ML
45 INJECTION, SOLUTION SUBCUTANEOUS
Qty: 1350 UNITS | Refills: 0 | Status: SHIPPED | OUTPATIENT
Start: 2018-02-14 | End: 2018-02-14 | Stop reason: SDUPTHER

## 2018-02-14 RX ORDER — BENZONATATE 100 MG/1
100 CAPSULE ORAL 3 TIMES DAILY PRN
Qty: 20 CAPSULE | Refills: 0 | Status: SHIPPED | OUTPATIENT
Start: 2018-02-14 | End: 2018-02-15 | Stop reason: SDUPTHER

## 2018-02-14 RX ORDER — ATORVASTATIN CALCIUM 40 MG/1
40 TABLET, FILM COATED ORAL
Qty: 30 TABLET | Refills: 0 | Status: SHIPPED | OUTPATIENT
Start: 2018-02-14 | End: 2018-05-10 | Stop reason: SDUPTHER

## 2018-02-14 RX ORDER — BENZONATATE 100 MG/1
100 CAPSULE ORAL 3 TIMES DAILY PRN
Qty: 20 CAPSULE | Refills: 0 | Status: SHIPPED | OUTPATIENT
Start: 2018-02-14 | End: 2018-02-14 | Stop reason: SDUPTHER

## 2018-02-14 RX ORDER — PANTOPRAZOLE SODIUM 40 MG/1
40 TABLET, DELAYED RELEASE ORAL DAILY
Qty: 30 TABLET | Refills: 2 | Status: SHIPPED | OUTPATIENT
Start: 2018-02-14 | End: 2018-02-14 | Stop reason: SDUPTHER

## 2018-02-14 NOTE — ASSESSMENT & PLAN NOTE
COPD with chronic bronchitis  Prior on adviar, has since ran out  Samples given today  Start 1 puff twice daily

## 2018-02-14 NOTE — ASSESSMENT & PLAN NOTE
CT of the abdomen and pelvis reviewed from 1/31/2018 emergency room visit showing left lower lobe infiltrate  Patient with questionable chronic bronchitis history using nebulizer treatments as directed will continue with Tessalon Perles  We will send patient today for chest x-ray PA and lateral to further evaluate for any infiltrate  Patient will need follow-up CT of the chest in 2-3 months    Recommend pulmonary function test as well once back pain improved to evaluate for underlying COPD

## 2018-02-14 NOTE — PATIENT INSTRUCTIONS
Chronic bronchitis (HCC)  COPD with chronic bronchitis  Prior on adviar, has since ran out  Samples given today  Start 1 puff twice daily  GERD (gastroesophageal reflux disease)   Protonix 40 mg once daily felt today in office  We will continue with close follow-up    Calculus of gallbladder without cholecystitis without obstruction   Without symptoms at this time  Continue with low fat diet  Monitor for any sudden onset right upper quadrant abdominal pain symptoms which could be acute gallbladder pain  In this case will need immediate emergency room evaluation and treatment    Type 2 diabetes mellitus with diabetic neuropathy, with long-term current use of insulin ([address] )   Blood sugars relatively stable per patient  Currently using Lantus 45 units subcu daily as well as Humalog 18 units subcu 3 times daily with meals  Continue with close blood sugar logs  Bring blood sugar logs to follow-up so that we can further adjust insulin dosing    Hypertension   Blood pressure elevated today in the office however patient has not been taking metoprolol daily due to it causing dizziness  Blood pressure is noted today as well as heart rate  Will restart metoprolol tartrate at 25 mg twice daily  Discussed importance of taking medication as directed will titrate up dosage if patient tolerates 25 mg twice daily    Lung infiltrate  CT of the abdomen and pelvis reviewed from 1/31/2018 emergency room visit showing left lower lobe infiltrate  Patient with questionable chronic bronchitis history using nebulizer treatments as directed will continue with Tessalon Perles  We will send patient today for chest x-ray PA and lateral to further evaluate for any infiltrate  Patient will need follow-up CT of the chest in 2-3 months  Recommend pulmonary function test as well once back pain improved to evaluate for underlying COPD    Pneumonia due to infectious organism   Patient recently completed azithromycin course    Continue with nebulizer treatments  Will check PA and lateral chest x-ray to further evaluate for infiltrate  Follow-up CT of the chest in 2-3 months  Closed fracture of transverse process of lumbar vertebra with routine healing    L3-L4 L2 transverse process fracture noted on CT of the abdomen and pelvis 1/31/2018 during emergency room visit  Will refer to neurosurgery for ongoing outpatient management  Hold on physical therapy at this time  Will control patient's pain with Percocet 1 tablet every 6 hours as needed for severe pain  Will give patient prescription for back brace to be worn to help reduce back pain  Discussed red flag symptoms and ER precautions which need of media eval and treat    Hyperlipidemia   Current taking Lipitor  40mg daily  Continue with statin therapy  ER labs reviewed  Normal liver function test   Mildly elevated glucose of 143  BUN and creatinine normal albumin 3 3  Chronic pain   Taking Lyrica for chronic pain  Previously on Ultram   With acute fracture will treat with Percocet along with lyrica for flare up   plans to transition back to Lyrica with p r n  Ultram,  Once acute back pain is resolved  Follow neurosurgery consult    [facility] at [facility] time due to acute fracture

## 2018-02-14 NOTE — ASSESSMENT & PLAN NOTE
Without symptoms at this time  Continue with low fat diet  Monitor for any sudden onset right upper quadrant abdominal pain symptoms which could be acute gallbladder pain    In this case will need immediate emergency room evaluation and treatment

## 2018-02-14 NOTE — ASSESSMENT & PLAN NOTE
Blood sugars relatively stable per patient  Currently using Lantus 45 units subcu daily as well as Humalog 18 units subcu 3 times daily with meals  Continue with close blood sugar logs    Bring blood sugar logs to follow-up so that we can further adjust insulin dosing

## 2018-02-14 NOTE — ASSESSMENT & PLAN NOTE
Patient recently completed azithromycin course  Continue with nebulizer treatments  Will check PA and lateral chest x-ray to further evaluate for infiltrate  Follow-up CT of the chest in 2-3 months

## 2018-02-14 NOTE — ASSESSMENT & PLAN NOTE
Taking Lyrica for chronic pain  Previously on Ultram   With acute fracture will treat with Percocet along with lyrica for flare up   plans to transition back to Lyrica with p r n  Ultram,  Once acute back pain is resolved  Follow neurosurgery consult    [facility] at this time due to acute fracture
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
## 2018-02-14 NOTE — ASSESSMENT & PLAN NOTE
Current taking Lipitor  40mg daily  Continue with statin therapy  ER labs reviewed  Normal liver function test   Mildly elevated glucose of 143  BUN and creatinine normal albumin 3 3

## 2018-02-14 NOTE — ASSESSMENT & PLAN NOTE
Blood pressure elevated today in the office however patient has not been taking metoprolol daily due to it causing dizziness  Blood pressure is noted today as well as heart rate  Will restart metoprolol tartrate at 25 mg twice daily    Discussed importance of taking medication as directed will titrate up dosage if patient tolerates 25 mg twice daily

## 2018-02-14 NOTE — ASSESSMENT & PLAN NOTE
L3-L4 L2 transverse process fracture noted on CT of the abdomen and pelvis 1/31/2018 during emergency room visit  Will refer to neurosurgery for ongoing outpatient management  Hold on physical therapy at this time  Will control patient's pain with Percocet 1 tablet every 6 hours as needed for severe pain  Will give patient prescription for back brace to be worn to help reduce back pain    Discussed red flag symptoms and ER precautions which need of media eval and treat

## 2018-02-15 DIAGNOSIS — R91.8 LUNG INFILTRATE: ICD-10-CM

## 2018-02-15 RX ORDER — BENZONATATE 100 MG/1
100 CAPSULE ORAL 3 TIMES DAILY PRN
Qty: 20 CAPSULE | Refills: 0 | Status: SHIPPED | OUTPATIENT
Start: 2018-02-15 | End: 2018-03-01 | Stop reason: ALTCHOICE

## 2018-02-28 ENCOUNTER — TELEPHONE (OUTPATIENT)
Dept: INTERNAL MEDICINE CLINIC | Facility: CLINIC | Age: 68
End: 2018-02-28

## 2018-03-01 ENCOUNTER — OFFICE VISIT (OUTPATIENT)
Dept: INTERNAL MEDICINE CLINIC | Facility: CLINIC | Age: 68
End: 2018-03-01
Payer: MEDICARE

## 2018-03-01 VITALS
WEIGHT: 244.8 LBS | SYSTOLIC BLOOD PRESSURE: 148 MMHG | HEART RATE: 76 BPM | DIASTOLIC BLOOD PRESSURE: 90 MMHG | RESPIRATION RATE: 22 BRPM | BODY MASS INDEX: 36.26 KG/M2 | OXYGEN SATURATION: 98 % | TEMPERATURE: 98.2 F | HEIGHT: 69 IN

## 2018-03-01 DIAGNOSIS — I50.32 CHRONIC DIASTOLIC CHF (CONGESTIVE HEART FAILURE) (HCC): ICD-10-CM

## 2018-03-01 DIAGNOSIS — I10 ESSENTIAL HYPERTENSION: ICD-10-CM

## 2018-03-01 DIAGNOSIS — Z12.11 SCREENING FOR COLON CANCER: ICD-10-CM

## 2018-03-01 DIAGNOSIS — G62.9 NEUROPATHY: ICD-10-CM

## 2018-03-01 DIAGNOSIS — Z13.6 ENCOUNTER FOR ABDOMINAL AORTIC ANEURYSM (AAA) SCREENING: ICD-10-CM

## 2018-03-01 DIAGNOSIS — J18.9 PNEUMONIA OF LEFT LOWER LOBE DUE TO INFECTIOUS ORGANISM: ICD-10-CM

## 2018-03-01 DIAGNOSIS — J42 CHRONIC BRONCHITIS, UNSPECIFIED CHRONIC BRONCHITIS TYPE (HCC): ICD-10-CM

## 2018-03-01 DIAGNOSIS — R91.8 LUNG INFILTRATE: ICD-10-CM

## 2018-03-01 DIAGNOSIS — I71.4 ABDOMINAL AORTIC ANEURYSM (AAA) WITHOUT RUPTURE (HCC): ICD-10-CM

## 2018-03-01 DIAGNOSIS — K21.9 GASTROESOPHAGEAL REFLUX DISEASE WITHOUT ESOPHAGITIS: ICD-10-CM

## 2018-03-01 DIAGNOSIS — S32.009D CLOSED FRACTURE OF TRANSVERSE PROCESS OF LUMBAR VERTEBRA WITH ROUTINE HEALING: Primary | ICD-10-CM

## 2018-03-01 DIAGNOSIS — I25.2 HISTORY OF NON-ST ELEVATION MYOCARDIAL INFARCTION (NSTEMI): ICD-10-CM

## 2018-03-01 PROBLEM — J20.9 BRONCHITIS, ACUTE: Status: RESOLVED | Noted: 2017-09-25 | Resolved: 2018-03-01

## 2018-03-01 PROBLEM — R33.8 ACUTE URINARY RETENTION: Status: RESOLVED | Noted: 2018-01-31 | Resolved: 2018-03-01

## 2018-03-01 PROCEDURE — 99214 OFFICE O/P EST MOD 30 MIN: CPT | Performed by: PHYSICIAN ASSISTANT

## 2018-03-01 RX ORDER — INSULIN GLARGINE 100 [IU]/ML
INJECTION, SOLUTION SUBCUTANEOUS
Refills: 0 | COMMUNITY
Start: 2018-02-14 | End: 2018-04-01 | Stop reason: HOSPADM

## 2018-03-01 RX ORDER — OXYCODONE HYDROCHLORIDE AND ACETAMINOPHEN 5; 325 MG/1; MG/1
1 TABLET ORAL EVERY 6 HOURS PRN
Qty: 45 TABLET | Refills: 0 | Status: SHIPPED | OUTPATIENT
Start: 2018-03-01 | End: 2018-03-13 | Stop reason: SDUPTHER

## 2018-03-01 RX ORDER — PREGABALIN 100 MG/1
100 CAPSULE ORAL 3 TIMES DAILY
Qty: 30 CAPSULE | Refills: 0 | Status: SHIPPED | OUTPATIENT
Start: 2018-03-01 | End: 2018-03-01 | Stop reason: SDUPTHER

## 2018-03-01 RX ORDER — PREGABALIN 100 MG/1
100 CAPSULE ORAL 3 TIMES DAILY
Qty: 60 CAPSULE | Refills: 0 | Status: SHIPPED | OUTPATIENT
Start: 2018-03-01 | End: 2018-03-20 | Stop reason: SDUPTHER

## 2018-03-01 NOTE — ASSESSMENT & PLAN NOTE
Weight has been stable per patient not currently taking any diuretics not currently following with Cardiology patient had a nuclear stress test 10/2017 showing no abnormal ST changes with pharmacologic stress perfusion study showed decreased uptake involving the inferior wall at stress and rest may be due to attenuation or adjacent diaphragm rather than inferior wall infarct mild hypokinesis of the septum with an EF of 51%  Discussed at length with patient importance of following with Cardiology for regular CHF followup    Cardiology appointment made for patient with [facility] cardiology by [facility] staff

## 2018-03-01 NOTE — ASSESSMENT & PLAN NOTE
The patient is scheduled next week to follow up with his neurosurgeon  He has been set up with a back brace however it did not fit properly and had to be refitted  He is walking with a cane for assistance  He has been without his oxycodone and lyrica  Refills given today  Keep NS follow up     Discussed with time will wean oxycodone as this is not a long-term medication, currently being used for acute pain due to lumbar fracture

## 2018-03-01 NOTE — ASSESSMENT & PLAN NOTE
Blood pressure elevated today on exam however patient has  Yet taken metoprolol this morning  He typically has been taking this medication as directed 25 mg twice daily    Encouraged him to take medication as directed and will follow-up blood pressure in 2-3 week follow-up
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
## 2018-03-01 NOTE — ASSESSMENT & PLAN NOTE
Patient has not been following with Gastroenterology    GERD symptoms controlled at this time on Protonix however with no previous upper endoscopy recommend gastroenterology evaluation for endoscopy

## 2018-03-01 NOTE — ASSESSMENT & PLAN NOTE
Patient has been on Lyrica 3 times daily for some time  Discussed this is not a medication that is to be stopped abruptly and restarted  He is to make us aware immediately if he is running out of his medications to prevent any lapses in his dose schedule

## 2018-03-01 NOTE — PATIENT INSTRUCTIONS
History of non-ST elevation myocardial infarction (NSTEMI)  Weight has been stable per patient not currently taking any diuretics not currently following with Cardiology patient had a nuclear stress test 10/2017 showing no abnormal ST changes with pharmacologic stress perfusion study showed decreased uptake involving the inferior wall at stress and rest may be due to attenuation or adjacent diaphragm rather than inferior wall infarct mild hypokinesis of the septum with an EF of 51%  Discussed at length with patient importance of following with Cardiology  Cardiology appointment made for patient with [facility]ology by [facility] staff    Chronic diastolic CHF (congestive heart failure) ([facility] [address] )   Continue to monitor weight    Closed fracture of transverse process of lumbar vertebra with routine healing   The patient is scheduled next week to follow up with his neurosurgeon  He has been set up with a back brace however it did not fit properly and had to be refitted  He is walking with a cane for assistance  He has been without his oxycodone and lyrica  Refills given today  Keep NS follow up  GERD (gastroesophageal reflux disease)    Patient has not been following with Gastroenterology  GERD symptoms controlled at this time on Protonix however with no previous upper endoscopy recommend gastroenterology evaluation for endoscopy    Hypertension   Blood pressure elevated today on exam however patient has  Yet taken metoprolol this morning  He typically has been taking this medication as directed 25 mg twice daily    Encouraged him to take medication as directed and will follow-up blood pressure in 2-3 week follow-up

## 2018-03-01 NOTE — ASSESSMENT & PLAN NOTE
Patient previously treated for pneumonia and subsequently had a CT showing infiltrate versus previous infectious process  Will follow up CT of the chest to evaluate for resolution  Breathing symptoms much improved per patient  Using inhalers as directed

## 2018-03-02 ENCOUNTER — TELEPHONE (OUTPATIENT)
Dept: INTERNAL MEDICINE CLINIC | Facility: CLINIC | Age: 68
End: 2018-03-02

## 2018-03-02 NOTE — TELEPHONE ENCOUNTER
Lyrica 100mg capsule script take 1 capsule by mouth three times a day for 10 days printed by Cristobal More on 3/1/18 #30/0 was shredded in NH office

## 2018-03-05 ENCOUNTER — TELEPHONE (OUTPATIENT)
Dept: NEUROSURGERY | Facility: CLINIC | Age: 68
End: 2018-03-05

## 2018-03-07 NOTE — PROGRESS NOTES
Dear Anneliese Siddiqui,  My name is Darryn and I am a Registered Nurse and Care Coordinator for [address]  We recently spoke  on the phone regarding your hospital stay and you opted to receive follow-up phone calls from me  Because of the reason that you were in the hospital, Medicare has placed you in a program called [address]  One of the benefits  of the program is that you can have a nurse call regularly to answer any questions or concerns you may have  My phone number is listed below in case you would need to contact me        Sincerely,     Nadeem [address]  [phone]          Electronically signed by:Soha Mann RN  Dec 19 2016  2:10PM EST

## 2018-03-13 DIAGNOSIS — K21.9 GASTROESOPHAGEAL REFLUX DISEASE, ESOPHAGITIS PRESENCE NOT SPECIFIED: ICD-10-CM

## 2018-03-13 DIAGNOSIS — S32.009D CLOSED FRACTURE OF TRANSVERSE PROCESS OF LUMBAR VERTEBRA WITH ROUTINE HEALING: ICD-10-CM

## 2018-03-13 RX ORDER — PANTOPRAZOLE SODIUM 40 MG/1
40 TABLET, DELAYED RELEASE ORAL DAILY
Qty: 30 TABLET | Refills: 1 | Status: SHIPPED | OUTPATIENT
Start: 2018-03-13 | End: 2018-04-10 | Stop reason: SDUPTHER

## 2018-03-13 RX ORDER — OXYCODONE HYDROCHLORIDE AND ACETAMINOPHEN 5; 325 MG/1; MG/1
1 TABLET ORAL EVERY 6 HOURS PRN
Qty: 45 TABLET | Refills: 0 | Status: SHIPPED | OUTPATIENT
Start: 2018-03-13 | End: 2018-04-10 | Stop reason: SDUPTHER

## 2018-03-20 DIAGNOSIS — G62.9 NEUROPATHY: ICD-10-CM

## 2018-03-20 RX ORDER — PREGABALIN 100 MG/1
100 CAPSULE ORAL 3 TIMES DAILY
Qty: 90 CAPSULE | Refills: 0 | Status: SHIPPED | OUTPATIENT
Start: 2018-03-20 | End: 2018-04-16

## 2018-03-27 ENCOUNTER — APPOINTMENT (EMERGENCY)
Dept: RADIOLOGY | Facility: HOSPITAL | Age: 68
DRG: 286 | End: 2018-03-27
Payer: MEDICARE

## 2018-03-27 ENCOUNTER — HOSPITAL ENCOUNTER (INPATIENT)
Facility: HOSPITAL | Age: 68
LOS: 4 days | Discharge: HOME HEALTH | DRG: 286 | End: 2018-04-01
Attending: EMERGENCY MEDICINE | Admitting: INTERNAL MEDICINE
Payer: MEDICARE

## 2018-03-27 DIAGNOSIS — G89.29 CHRONIC PAIN: ICD-10-CM

## 2018-03-27 DIAGNOSIS — E11.40 TYPE 2 DIABETES MELLITUS WITH DIABETIC NEUROPATHY, WITH LONG-TERM CURRENT USE OF INSULIN (HCC): ICD-10-CM

## 2018-03-27 DIAGNOSIS — Z01.810 PRE-OPERATIVE CARDIOVASCULAR EXAMINATION: ICD-10-CM

## 2018-03-27 DIAGNOSIS — R07.9 CHEST PAIN: Primary | ICD-10-CM

## 2018-03-27 DIAGNOSIS — J41.1 MUCOPURULENT CHRONIC BRONCHITIS (HCC): ICD-10-CM

## 2018-03-27 DIAGNOSIS — D50.9 IRON DEFICIENCY ANEMIA, UNSPECIFIED IRON DEFICIENCY ANEMIA TYPE: ICD-10-CM

## 2018-03-27 DIAGNOSIS — I25.10 LEFT MAIN CORONARY ARTERY DISEASE: ICD-10-CM

## 2018-03-27 DIAGNOSIS — Z79.4 TYPE 2 DIABETES MELLITUS WITH DIABETIC NEUROPATHY, WITH LONG-TERM CURRENT USE OF INSULIN (HCC): ICD-10-CM

## 2018-03-27 DIAGNOSIS — R42 DIZZINESS: ICD-10-CM

## 2018-03-27 LAB
ALBUMIN SERPL BCP-MCNC: 3.2 G/DL (ref 3.5–5)
ALP SERPL-CCNC: 92 U/L (ref 46–116)
ALT SERPL W P-5'-P-CCNC: 16 U/L (ref 12–78)
ANION GAP SERPL CALCULATED.3IONS-SCNC: 7 MMOL/L (ref 4–13)
AST SERPL W P-5'-P-CCNC: 10 U/L (ref 5–45)
ATRIAL RATE: 71 BPM
BACTERIA UR QL AUTO: ABNORMAL /HPF
BASOPHILS # BLD AUTO: 0.08 THOUSANDS/ΜL (ref 0–0.1)
BASOPHILS NFR BLD AUTO: 1 % (ref 0–1)
BILIRUB SERPL-MCNC: 0.32 MG/DL (ref 0.2–1)
BILIRUB UR QL STRIP: NEGATIVE
BUN SERPL-MCNC: 15 MG/DL (ref 5–25)
CALCIUM SERPL-MCNC: 8.8 MG/DL (ref 8.3–10.1)
CHLORIDE SERPL-SCNC: 105 MMOL/L (ref 100–108)
CLARITY UR: CLEAR
CLARITY, POC: CLEAR
CO2 SERPL-SCNC: 27 MMOL/L (ref 21–32)
COLOR UR: YELLOW
COLOR, POC: YELLOW
CREAT SERPL-MCNC: 1.02 MG/DL (ref 0.6–1.3)
EOSINOPHIL # BLD AUTO: 0.21 THOUSAND/ΜL (ref 0–0.61)
EOSINOPHIL NFR BLD AUTO: 2 % (ref 0–6)
ERYTHROCYTE [DISTWIDTH] IN BLOOD BY AUTOMATED COUNT: 20.3 % (ref 11.6–15.1)
GFR SERPL CREATININE-BSD FRML MDRD: 76 ML/MIN/1.73SQ M
GLUCOSE SERPL-MCNC: 73 MG/DL (ref 65–140)
GLUCOSE UR STRIP-MCNC: NEGATIVE MG/DL
HCT VFR BLD AUTO: 33.1 % (ref 36.5–49.3)
HGB BLD-MCNC: 10.2 G/DL (ref 12–17)
HGB UR QL STRIP.AUTO: NEGATIVE
HYALINE CASTS #/AREA URNS LPF: ABNORMAL /LPF
KETONES UR STRIP-MCNC: NEGATIVE MG/DL
LEUKOCYTE ESTERASE UR QL STRIP: ABNORMAL
LYMPHOCYTES # BLD AUTO: 2.57 THOUSANDS/ΜL (ref 0.6–4.47)
LYMPHOCYTES NFR BLD AUTO: 19 % (ref 14–44)
MCH RBC QN AUTO: 22.6 PG (ref 26.8–34.3)
MCHC RBC AUTO-ENTMCNC: 30.8 G/DL (ref 31.4–37.4)
MCV RBC AUTO: 73 FL (ref 82–98)
MONOCYTES # BLD AUTO: 1.1 THOUSAND/ΜL (ref 0.17–1.22)
MONOCYTES NFR BLD AUTO: 8 % (ref 4–12)
NEUTROPHILS # BLD AUTO: 9.23 THOUSANDS/ΜL (ref 1.85–7.62)
NEUTS SEG NFR BLD AUTO: 70 % (ref 43–75)
NITRITE UR QL STRIP: NEGATIVE
NON-SQ EPI CELLS URNS QL MICRO: ABNORMAL /HPF
NRBC BLD AUTO-RTO: 0 /100 WBCS
P AXIS: 57 DEGREES
PH UR STRIP.AUTO: 7 [PH] (ref 4.5–8)
PLATELET # BLD AUTO: 316 THOUSANDS/UL (ref 149–390)
PMV BLD AUTO: 9.6 FL (ref 8.9–12.7)
POTASSIUM SERPL-SCNC: 3.5 MMOL/L (ref 3.5–5.3)
PR INTERVAL: 152 MS
PROT SERPL-MCNC: 7.6 G/DL (ref 6.4–8.2)
PROT UR STRIP-MCNC: NEGATIVE MG/DL
QRS AXIS: 23 DEGREES
QRSD INTERVAL: 76 MS
QT INTERVAL: 378 MS
QTC INTERVAL: 410 MS
RBC # BLD AUTO: 4.51 MILLION/UL (ref 3.88–5.62)
RBC #/AREA URNS AUTO: ABNORMAL /HPF
SODIUM SERPL-SCNC: 139 MMOL/L (ref 136–145)
SP GR UR STRIP.AUTO: 1.01 (ref 1–1.03)
T WAVE AXIS: 42 DEGREES
TROPONIN I SERPL-MCNC: <0.02 NG/ML
UROBILINOGEN UR QL STRIP.AUTO: 0.2 E.U./DL
VENTRICULAR RATE: 71 BPM
WBC # BLD AUTO: 13.27 THOUSAND/UL (ref 4.31–10.16)
WBC #/AREA URNS AUTO: ABNORMAL /HPF

## 2018-03-27 PROCEDURE — 71046 X-RAY EXAM CHEST 2 VIEWS: CPT

## 2018-03-27 PROCEDURE — 80053 COMPREHEN METABOLIC PANEL: CPT | Performed by: EMERGENCY MEDICINE

## 2018-03-27 PROCEDURE — 72100 X-RAY EXAM L-S SPINE 2/3 VWS: CPT

## 2018-03-27 PROCEDURE — 81001 URINALYSIS AUTO W/SCOPE: CPT

## 2018-03-27 PROCEDURE — 84484 ASSAY OF TROPONIN QUANT: CPT | Performed by: EMERGENCY MEDICINE

## 2018-03-27 PROCEDURE — 93010 ELECTROCARDIOGRAM REPORT: CPT | Performed by: INTERNAL MEDICINE

## 2018-03-27 PROCEDURE — 36415 COLL VENOUS BLD VENIPUNCTURE: CPT | Performed by: EMERGENCY MEDICINE

## 2018-03-27 PROCEDURE — 99285 EMERGENCY DEPT VISIT HI MDM: CPT

## 2018-03-27 PROCEDURE — 81002 URINALYSIS NONAUTO W/O SCOPE: CPT | Performed by: EMERGENCY MEDICINE

## 2018-03-27 PROCEDURE — 96374 THER/PROPH/DIAG INJ IV PUSH: CPT

## 2018-03-27 PROCEDURE — 85025 COMPLETE CBC W/AUTO DIFF WBC: CPT | Performed by: EMERGENCY MEDICINE

## 2018-03-27 PROCEDURE — 93005 ELECTROCARDIOGRAM TRACING: CPT

## 2018-03-27 RX ORDER — PREGABALIN 50 MG/1
100 CAPSULE ORAL ONCE
Status: COMPLETED | OUTPATIENT
Start: 2018-03-27 | End: 2018-03-27

## 2018-03-27 RX ORDER — ASPIRIN 81 MG/1
324 TABLET, CHEWABLE ORAL ONCE
Status: COMPLETED | OUTPATIENT
Start: 2018-03-27 | End: 2018-03-27

## 2018-03-27 RX ORDER — KETOROLAC TROMETHAMINE 30 MG/ML
15 INJECTION, SOLUTION INTRAMUSCULAR; INTRAVENOUS ONCE
Status: COMPLETED | OUTPATIENT
Start: 2018-03-27 | End: 2018-03-27

## 2018-03-27 RX ADMIN — PREGABALIN 100 MG: 50 CAPSULE ORAL at 19:04

## 2018-03-27 RX ADMIN — ASPIRIN 81 MG 324 MG: 81 TABLET ORAL at 19:40

## 2018-03-27 RX ADMIN — KETOROLAC TROMETHAMINE 15 MG: 30 INJECTION, SOLUTION INTRAMUSCULAR at 19:04
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
## 2018-03-27 NOTE — Clinical Note
Case was discussed with SOD and the patient's admission status was agreed to be Admission Status: observation status to the service of

## 2018-03-28 ENCOUNTER — APPOINTMENT (OUTPATIENT)
Dept: NON INVASIVE DIAGNOSTICS | Facility: HOSPITAL | Age: 68
DRG: 286 | End: 2018-03-28
Payer: MEDICARE

## 2018-03-28 LAB
ATRIAL RATE: 64 BPM
CHOLEST SERPL-MCNC: 148 MG/DL (ref 50–200)
EST. AVERAGE GLUCOSE BLD GHB EST-MCNC: 260 MG/DL
FERRITIN SERPL-MCNC: 29 NG/ML (ref 8–388)
GLUCOSE SERPL-MCNC: 135 MG/DL (ref 65–140)
GLUCOSE SERPL-MCNC: 224 MG/DL (ref 65–140)
GLUCOSE SERPL-MCNC: 287 MG/DL (ref 65–140)
GLUCOSE SERPL-MCNC: 85 MG/DL (ref 65–140)
GLUCOSE SERPL-MCNC: 86 MG/DL (ref 65–140)
HBA1C MFR BLD: 10.7 % (ref 4.2–6.3)
HDLC SERPL-MCNC: 36 MG/DL (ref 40–60)
IRON SATN MFR SERPL: 5 %
IRON SERPL-MCNC: 18 UG/DL (ref 65–175)
LDLC SERPL CALC-MCNC: 83 MG/DL (ref 0–100)
P AXIS: 57 DEGREES
PR INTERVAL: 144 MS
QRS AXIS: 23 DEGREES
QRSD INTERVAL: 92 MS
QT INTERVAL: 448 MS
QTC INTERVAL: 462 MS
T WAVE AXIS: 27 DEGREES
TIBC SERPL-MCNC: 328 UG/DL (ref 250–450)
TRIGL SERPL-MCNC: 146 MG/DL
TROPONIN I SERPL-MCNC: <0.02 NG/ML
TROPONIN I SERPL-MCNC: <0.02 NG/ML
VENTRICULAR RATE: 64 BPM

## 2018-03-28 PROCEDURE — G8988 SELF CARE GOAL STATUS: HCPCS

## 2018-03-28 PROCEDURE — G8978 MOBILITY CURRENT STATUS: HCPCS

## 2018-03-28 PROCEDURE — 80061 LIPID PANEL: CPT | Performed by: INTERNAL MEDICINE

## 2018-03-28 PROCEDURE — 93005 ELECTROCARDIOGRAM TRACING: CPT | Performed by: INTERNAL MEDICINE

## 2018-03-28 PROCEDURE — 93005 ELECTROCARDIOGRAM TRACING: CPT

## 2018-03-28 PROCEDURE — G8979 MOBILITY GOAL STATUS: HCPCS

## 2018-03-28 PROCEDURE — 93306 TTE W/DOPPLER COMPLETE: CPT

## 2018-03-28 PROCEDURE — G8987 SELF CARE CURRENT STATUS: HCPCS

## 2018-03-28 PROCEDURE — 82728 ASSAY OF FERRITIN: CPT | Performed by: INTERNAL MEDICINE

## 2018-03-28 PROCEDURE — 83540 ASSAY OF IRON: CPT | Performed by: INTERNAL MEDICINE

## 2018-03-28 PROCEDURE — 99222 1ST HOSP IP/OBS MODERATE 55: CPT | Performed by: INTERNAL MEDICINE

## 2018-03-28 PROCEDURE — 97166 OT EVAL MOD COMPLEX 45 MIN: CPT

## 2018-03-28 PROCEDURE — 83036 HEMOGLOBIN GLYCOSYLATED A1C: CPT | Performed by: INTERNAL MEDICINE

## 2018-03-28 PROCEDURE — 93306 TTE W/DOPPLER COMPLETE: CPT | Performed by: INTERNAL MEDICINE

## 2018-03-28 PROCEDURE — 82948 REAGENT STRIP/BLOOD GLUCOSE: CPT

## 2018-03-28 PROCEDURE — 97163 PT EVAL HIGH COMPLEX 45 MIN: CPT

## 2018-03-28 PROCEDURE — 83550 IRON BINDING TEST: CPT | Performed by: INTERNAL MEDICINE

## 2018-03-28 PROCEDURE — 84484 ASSAY OF TROPONIN QUANT: CPT | Performed by: INTERNAL MEDICINE

## 2018-03-28 RX ORDER — LISINOPRIL 5 MG/1
5 TABLET ORAL DAILY
Status: DISCONTINUED | OUTPATIENT
Start: 2018-03-28 | End: 2018-03-30

## 2018-03-28 RX ORDER — ACETAMINOPHEN 325 MG/1
650 TABLET ORAL EVERY 4 HOURS PRN
Status: DISCONTINUED | OUTPATIENT
Start: 2018-03-28 | End: 2018-04-01 | Stop reason: HOSPADM

## 2018-03-28 RX ORDER — INSULIN GLARGINE 100 [IU]/ML
45 INJECTION, SOLUTION SUBCUTANEOUS
Status: DISCONTINUED | OUTPATIENT
Start: 2018-03-28 | End: 2018-03-28 | Stop reason: SDUPTHER

## 2018-03-28 RX ORDER — SODIUM CHLORIDE 450 MG/100ML
50 INJECTION, SOLUTION INTRAVENOUS CONTINUOUS
Status: DISCONTINUED | OUTPATIENT
Start: 2018-03-29 | End: 2018-04-01 | Stop reason: HOSPADM

## 2018-03-28 RX ORDER — ASPIRIN 81 MG/1
81 TABLET ORAL DAILY
Status: DISCONTINUED | OUTPATIENT
Start: 2018-03-28 | End: 2018-04-01 | Stop reason: HOSPADM

## 2018-03-28 RX ORDER — CLOPIDOGREL BISULFATE 75 MG/1
75 TABLET ORAL DAILY
Status: DISCONTINUED | OUTPATIENT
Start: 2018-03-28 | End: 2018-03-28

## 2018-03-28 RX ORDER — CLOPIDOGREL BISULFATE 75 MG/1
75 TABLET ORAL DAILY
Status: DISCONTINUED | OUTPATIENT
Start: 2018-03-30 | End: 2018-03-29

## 2018-03-28 RX ORDER — PREGABALIN 100 MG/1
100 CAPSULE ORAL 3 TIMES DAILY
Status: DISCONTINUED | OUTPATIENT
Start: 2018-03-28 | End: 2018-04-01 | Stop reason: HOSPADM

## 2018-03-28 RX ORDER — ALBUTEROL SULFATE 90 UG/1
2 AEROSOL, METERED RESPIRATORY (INHALATION) EVERY 4 HOURS PRN
Status: DISCONTINUED | OUTPATIENT
Start: 2018-03-28 | End: 2018-04-01 | Stop reason: HOSPADM

## 2018-03-28 RX ORDER — PANTOPRAZOLE SODIUM 40 MG/1
40 TABLET, DELAYED RELEASE ORAL
Status: DISCONTINUED | OUTPATIENT
Start: 2018-03-28 | End: 2018-04-01 | Stop reason: HOSPADM

## 2018-03-28 RX ORDER — NITROGLYCERIN 0.4 MG/1
0.4 TABLET SUBLINGUAL
Status: DISCONTINUED | OUTPATIENT
Start: 2018-03-28 | End: 2018-04-01 | Stop reason: HOSPADM

## 2018-03-28 RX ORDER — OXYCODONE HYDROCHLORIDE AND ACETAMINOPHEN 5; 325 MG/1; MG/1
1 TABLET ORAL EVERY 6 HOURS PRN
Status: DISCONTINUED | OUTPATIENT
Start: 2018-03-28 | End: 2018-04-01 | Stop reason: HOSPADM

## 2018-03-28 RX ORDER — INSULIN GLARGINE 100 [IU]/ML
45 INJECTION, SOLUTION SUBCUTANEOUS
Status: DISCONTINUED | OUTPATIENT
Start: 2018-03-28 | End: 2018-03-29

## 2018-03-28 RX ORDER — ATORVASTATIN CALCIUM 40 MG/1
40 TABLET, FILM COATED ORAL
Status: DISCONTINUED | OUTPATIENT
Start: 2018-03-28 | End: 2018-04-01 | Stop reason: HOSPADM

## 2018-03-28 RX ORDER — POTASSIUM CHLORIDE 20 MEQ/1
40 TABLET, EXTENDED RELEASE ORAL ONCE
Status: COMPLETED | OUTPATIENT
Start: 2018-03-28 | End: 2018-03-28

## 2018-03-28 RX ORDER — LIDOCAINE 50 MG/G
1 PATCH TOPICAL DAILY
Status: DISCONTINUED | OUTPATIENT
Start: 2018-03-28 | End: 2018-03-28

## 2018-03-28 RX ORDER — FERROUS SULFATE 325(65) MG
325 TABLET ORAL
Status: DISCONTINUED | OUTPATIENT
Start: 2018-03-28 | End: 2018-04-01 | Stop reason: HOSPADM

## 2018-03-28 RX ORDER — FUROSEMIDE 10 MG/ML
40 INJECTION INTRAMUSCULAR; INTRAVENOUS ONCE
Status: COMPLETED | OUTPATIENT
Start: 2018-03-28 | End: 2018-03-28

## 2018-03-28 RX ORDER — LIDOCAINE 50 MG/G
1 PATCH TOPICAL DAILY
Status: DISCONTINUED | OUTPATIENT
Start: 2018-03-28 | End: 2018-04-01 | Stop reason: HOSPADM

## 2018-03-28 RX ORDER — CLOPIDOGREL BISULFATE 75 MG/1
300 TABLET ORAL ONCE
Status: COMPLETED | OUTPATIENT
Start: 2018-03-29 | End: 2018-03-29

## 2018-03-28 RX ADMIN — INSULIN GLARGINE 45 UNITS: 100 INJECTION, SOLUTION SUBCUTANEOUS at 21:17

## 2018-03-28 RX ADMIN — OXYCODONE HYDROCHLORIDE AND ACETAMINOPHEN 1 TABLET: 5; 325 TABLET ORAL at 21:17

## 2018-03-28 RX ADMIN — OXYCODONE HYDROCHLORIDE AND ACETAMINOPHEN 1 TABLET: 5; 325 TABLET ORAL at 01:52

## 2018-03-28 RX ADMIN — POTASSIUM CHLORIDE 40 MEQ: 1500 TABLET, EXTENDED RELEASE ORAL at 17:17

## 2018-03-28 RX ADMIN — INSULIN LISPRO 9 UNITS: 100 INJECTION, SOLUTION INTRAVENOUS; SUBCUTANEOUS at 12:07

## 2018-03-28 RX ADMIN — ATORVASTATIN CALCIUM 40 MG: 40 TABLET, FILM COATED ORAL at 17:17

## 2018-03-28 RX ADMIN — INSULIN LISPRO 18 UNITS: 100 INJECTION, SOLUTION INTRAVENOUS; SUBCUTANEOUS at 08:06

## 2018-03-28 RX ADMIN — FUROSEMIDE 40 MG: 10 INJECTION, SOLUTION INTRAMUSCULAR; INTRAVENOUS at 12:07

## 2018-03-28 RX ADMIN — OXYCODONE HYDROCHLORIDE AND ACETAMINOPHEN 1 TABLET: 5; 325 TABLET ORAL at 08:13

## 2018-03-28 RX ADMIN — INSULIN LISPRO 9 UNITS: 100 INJECTION, SOLUTION INTRAVENOUS; SUBCUTANEOUS at 17:26

## 2018-03-28 RX ADMIN — DICLOFENAC 2 G: 10 GEL TOPICAL at 17:21

## 2018-03-28 RX ADMIN — PANTOPRAZOLE SODIUM 40 MG: 40 TABLET, DELAYED RELEASE ORAL at 05:46

## 2018-03-28 RX ADMIN — LIDOCAINE 1 PATCH: 50 PATCH TOPICAL at 03:57

## 2018-03-28 RX ADMIN — PREGABALIN 100 MG: 100 CAPSULE ORAL at 21:17

## 2018-03-28 RX ADMIN — PREGABALIN 100 MG: 100 CAPSULE ORAL at 08:01

## 2018-03-28 RX ADMIN — INSULIN LISPRO 2 UNITS: 100 INJECTION, SOLUTION INTRAVENOUS; SUBCUTANEOUS at 08:06

## 2018-03-28 RX ADMIN — FLUTICASONE PROPIONATE AND SALMETEROL 1 PUFF: 50; 250 POWDER RESPIRATORY (INHALATION) at 08:07

## 2018-03-28 RX ADMIN — CLOPIDOGREL BISULFATE 75 MG: 75 TABLET ORAL at 08:00

## 2018-03-28 RX ADMIN — ENOXAPARIN SODIUM 40 MG: 40 INJECTION SUBCUTANEOUS at 08:01

## 2018-03-28 RX ADMIN — INSULIN GLARGINE 45 UNITS: 100 INJECTION, SOLUTION SUBCUTANEOUS at 02:11

## 2018-03-28 RX ADMIN — METOPROLOL TARTRATE 25 MG: 25 TABLET ORAL at 08:00

## 2018-03-28 RX ADMIN — OXYCODONE HYDROCHLORIDE AND ACETAMINOPHEN 1 TABLET: 5; 325 TABLET ORAL at 15:15

## 2018-03-28 RX ADMIN — LISINOPRIL 5 MG: 5 TABLET ORAL at 12:07

## 2018-03-28 RX ADMIN — FERROUS SULFATE TAB 325 MG (65 MG ELEMENTAL FE) 325 MG: 325 (65 FE) TAB at 08:01

## 2018-03-28 RX ADMIN — ASPIRIN 81 MG: 81 TABLET, COATED ORAL at 08:01

## 2018-03-28 RX ADMIN — DICLOFENAC 2 G: 10 GEL TOPICAL at 21:19

## 2018-03-28 RX ADMIN — PREGABALIN 100 MG: 100 CAPSULE ORAL at 15:15

## 2018-03-28 RX ADMIN — DICLOFENAC 2 G: 10 GEL TOPICAL at 12:10

## 2018-03-28 RX ADMIN — METOPROLOL TARTRATE 25 MG: 25 TABLET ORAL at 21:17

## 2018-03-28 RX ADMIN — FLUTICASONE PROPIONATE AND SALMETEROL 1 PUFF: 50; 250 POWDER RESPIRATORY (INHALATION) at 21:19

## 2018-03-28 NOTE — ORTHOTIC NOTE
Orthotic Note            Date: 3/28/2018      Patient Name: Conrad Gutiérrez  Reason for Consult:  Patient Active Problem List   Diagnosis    Type 2 diabetes mellitus with diabetic neuropathy, with long-term current use of insulin ([facility] [address] )    Hypertension    GERD (gastroesophageal reflux disease)    Hyperlipidemia    Opioid dependence ([facility] [address] )    Insomnia    Iron deficiency anemia    Abdominal aortic aneurysm (AAA) without rupture (HCC)    Chronic back pain    Chronic diastolic CHF (congestive heart failure) (HCC)    COPD (chronic obstructive pulmonary disease) (HCC)    Neuropathy    Closed fracture of transverse process of lumbar vertebra with routine healing    Chest pain       (20 minutes)    Ortho Tech measured/adjusted/fit/and donned [place] LSO brace to pt while pt was sitting EOB  Originally bracing order was placed for [facility] but spoke with [employer] about using an LSO due to L2-L4 with transverse process fractures [place] LSO braces are normally used  With that being said LSO brace was adjusted to an XXL for optimal fit/comfort  Pt tolerated well during fitting  Educated pt on donning/doffing, and instructions/adjustments of brace  All/any additional questions were answered during this time  Business card and brace left at bedside  Will continue to follow up daily RN Ronold Peabody aware  Recommendations:  Please call ext  [phone] with questions regarding bracing instruction and or adjustment(s)      Sapna VILLA RS

## 2018-03-28 NOTE — CASE MANAGEMENT
Initial Clinical Review    Admission: Date/Time/Statement:   3/27/18 AT 2026 OBSERVATION    Orders Placed This Encounter   Procedures    Place in Observation (expected length of stay for this patient is less than two midnights)     Standing Status:   Standing     Number of Occurrences:   1     Order Specific Question:   Admitting Physician     Answer:   Amy Padron     Order Specific Question:   Level of Care     Answer:   Med Surg [16]     Order Specific Question:   Bed Type     Answer:   Lisa [4]     ED: Date/Time/Mode of Arrival:   ED Arrival Information     Expected Arrival Acuity Means of Arrival Escorted By Service Admission Type    - 3/27/2018 17:22 Urgent Ambulance [address] Urgent    Arrival Complaint    SOB        Chief Complaint:   Chief Complaint   Patient presents with    Shortness of Breath     Patient presents to the E R  with shortness of breath, back pain, leg pain and chest pain  Chief complaint:  Chest pain and shortness of breath     History of Present Illness      Zo Lopez  is a 79 y o  male with a past medical history of hypertension, hyperlipidemia, presumed coronary artery disease, chronic diastolic heart failure, type 2 diabetes mellitus with neuropathy, chest pain, chronic back pain with acute closed fracture of L2, L3 and L4 right transverse process in January 2018, opiate dependence, COPD, iron deficiency anemia who presented to the emergency department with complaints of chest pain and shortness of breath  Patient is a very poor historian  Patient states that he developed chest pain yesterday in the morning while in bed at rest   He describes the pain as retrosternal burning and pressure-like with radiation down his bilateral arms  Patient states that the pain was present for about an hour and resolved spontaneously  He did not take any nitroglycerin    When asked if nitroglycerin relieved his chest pain patient states "he thinks so sometimes"  No associated nausea or vomiting  Does have associated shortness of breath but is unclear if this is related to his underlying COPD versus chest pain  On review of hospital records, patient apparently had an admission in January of 2017 for pneumonia where he had a troponin elevation  At that time Cardiology was consulted, who believed it was likely an NSTEMI type 1 and felt patient likely had significant coronary artery disease  An ischemic evaluation was planned at that time with cardiac catheterization however patient refused and decided he would like a 2nd opinion after his hospital discharge  Patient fortunately never followed up with Cardiology and had an ischemic workup in the outpatient setting, however it was recommended per Cardiology that patient continue dual antiplatelets for at least 1 year as well as continue beta-blocker, aspirin and statin per their note  Other than this cardiac history, patient has had no prior cardiac surgery or cardiac catheterization  Patient did have a nuclear stress test in October of 2017 which showed no ST changes but decreased uptake involving the inferior wall  He also had an echo in March of 2017 which showed moderate hypokinesis of the mid apical and inferior wall      In the ED, patient received  mg x1 p o  as well as x1 dose Toradol 15 mg IV and Lyrica 100 mg x1 dose as he was complaining of lower back and leg pain (chronic with acute component in setting of mechanical fall back in January 2018 patient suffered L2-L4 right-sided transverse process fracture which has been managed conservatively in the outpatient setting with cane, TLSO brace, and Percocet which was recently filled on 3/1/18 when patient saw his PCP in the office      Troponin x1 negative  No concerning ST or or T-wave changes on EKG - normal sinus rhythm unchanged from prior    Heart score for 4 - patient will be admitted under observation status for ACS workup possible cardiology evaluation with ischemic workup     Review of Systems     Constitutional: Negative for chills, fatigue and fever  Respiratory: Positive for shortness of breath  Negative for cough and wheezing  Cardiovascular: Positive for chest pain and palpitations  Negative for leg swelling  Gastrointestinal: Negative for abdominal pain, constipation, diarrhea, nausea and vomiting  Genitourinary: Negative  Musculoskeletal: Positive for arthralgias, back pain, gait problem and myalgias  Skin: Negative  Neurological: Positive for numbness  Negative for seizures and syncope  Physical Exam  GENERAL: Appears well-developed and well-nourished  Appears in no acute distress   HEENT: Normocephalic and atraumatic  No scleral icterus  PERRLA  EOMI B/L  No oropharyngeal edema  MM moist    NECK: Neck supple with no lymphadenopathy  Trachea midline  No JVD  CARDIOVASCULAR: S1 and S2 are present  Regular rate and rhythm  No murmurs, rubs, or gallops  RESPIRATORY: CTA B/L, no rales, rhonci or wheezes  Normal respiratory expansion           ED Vital Signs:   ED Triage Vitals   Temperature Pulse Respirations Blood Pressure SpO2   03/27/18 1729 03/27/18 1725 03/27/18 1725 03/27/18 1725 03/27/18 1725   98 4 °F (36 9 °C) 75 20 (!) 196/86 96 %      Temp Source Heart Rate Source Patient Position - Orthostatic VS BP Location FiO2 (%)   03/27/18 1729 03/27/18 1912 03/27/18 1725 03/27/18 1725 --   Oral Monitor Lying Right arm       Pain Score       03/27/18 1725       9        Wt Readings from Last 1 Encounters:   03/27/18 108 kg (238 lb)        03/27 0701  03/28 0700 03/28 0701  03/28 1031  Most Recent    Temperature (°F) 97 798 6    97 9 (36 6)    Pulse 6888    71    Respirations 1822    18    Blood Pressure 112/51196/86    143/65    SpO2 (%) 9697    96        LABS/Diagnostic Test Results:   CBC  Results from last 7 days  Lab Units 03/27/18  1903   WBC Thousand/uL 13 27*   RBC Million/uL 4 51 HEMOGLOBIN g/dL 10 2*   HEMATOCRIT % 33 1*   MCV fL 73*   MCH pg 22 6*   MCHC g/dL 30 8*   RDW % 20 3*   MPV fL 9 6   PLATELETS Thousands/uL 316   NRBC AUTO /100 WBCs 0   NEUTROS PCT % 70   LYMPHS PCT % 19   MONOS PCT % 8   EOS PCT % 2   BASOS PCT % 1   NEUTROS ABS Thousands/µL 9 23*   LYMPHS ABS Thousands/µL 2 57   MONOS ABS Thousand/µL 1 10   EOS ABS Thousand/µL 0 21     Results from last 7 days  Lab Units 03/27/18  1903   SODIUM mmol/L 139   POTASSIUM mmol/L 3 5   CHLORIDE mmol/L 105   CO2 mmol/L 27   ANION GAP mmol/L 7   BUN mg/dL 15   CREATININE mg/dL 1 02   GLUCOSE RANDOM mg/dL 73   CALCIUM mg/dL 8 8   AST U/L 10   ALT U/L 16   ALK PHOS U/L 92   TOTAL PROTEIN g/dL 7 6   BILIRUBIN TOTAL mg/dL 0 32   EGFR ml/min/1 73sq m 76      Urinalysis Results from last 7 days  Lab Units 03/27/18  1910 03/27/18  1909   COLOR UA   yellow Yellow   CLARITY UA   clear Clear   SPEC GRAV UA    --  1 015   PH UA    --  7 0   LEUKOCYTES UA    --  Small*   NITRITE UA    --  Negative   PROTEIN UA mg/dl  --  Negative   GLUCOSE UA mg/dl  --  Negative   KETONES UA mg/dl  --  Negative   BILIRUBIN UA    --  Negative   BLOOD UA    --  Negative       Urine Micro  Lab Units 03/27/18  1909   RBC UA /hpf None Seen   WBC UA /hpf 4-10*   EPITHELIAL CELLS WET PREP /hpf None Seen   BACTERIA UA /hpf None Seen       TROPONIN NEG X 3    EKG (per ED ):  Normal sinus rhythm  Normal ECG  When compared with ECG of 02-AUG-2017 08:23,  No significant change was found      ED Treatment:   Medication Administration from 03/27/2018 1722 to 03/27/2018 2125       Date/Time Order Dose Route Action Action by Comments     03/27/2018 1904 pregabalin (LYRICA) capsule 100 mg 100 mg Oral Given Felicity To RN      03/27/2018 1904 ketorolac (TORADOL) injection 15 mg 15 mg Intravenous Given Felicity To RN      03/27/2018 1940 aspirin chewable tablet 324 mg 324 mg Oral Given Kulwinder Henson RN           Past Medical/Surgical History:    Active Ambulatory Problems     Diagnosis Date Noted    Type 2 diabetes mellitus with diabetic neuropathy, with long-term current use of insulin ([place] [id number] )     Hypertension     GERD (gastroesophageal reflux disease)     Hyperlipidemia     Opioid dependence ([place] [address] )     Insomnia 03/18/2017    Iron deficiency anemia 03/18/2017    Abdominal aortic aneurysm (AAA) without rupture ([place] [address] ) 02/14/2017    Chronic back pain 02/14/2017    Chronic diastolic [facility]est[facility] failure) [facility] 11/14/2017    COPD (chronic obstructive pulmonary disease) ([place] [address] ) 09/13/2017    Neuropathy 11/14/2017    Closed fracture of transverse process of lumbar vertebra with routine healing 02/14/2018     Resolved Ambulatory Problems     Diagnosis Date Noted    Chronic midline low back pain without sciatica     History of suicidal ideation     Atypical chest pain 08/15/2016    CAP (community acquired pneumonia) 08/15/2016    Chronic pain     Abdominal pain 11/17/2016    History of aspiration pneumonia     Constipation 03/18/2017    Chronic bronchitis ([place] [address] ) 11/15/2017    Diabetes mellitus type 2 with neurological manifestations ([place] [address] ) 02/14/2017    Diabetic neuropathy ([place] [address] ) 02/14/2017    Epigastric pain 11/14/2017    Increased frequency of urination 11/21/2017    Type 2 diabetes mellitus treated with insulin ([place] [address] ) 10/04/2017    Abdominal pain, acute, bilateral lower quadrant 01/31/2018    Acute urinary retention 01/31/2018    Bronchitis, acute 09/25/2017     Past Medical History:   Diagnosis Date    Back pain     Cardiac murmur     Chest pain     Chronic pain     DM type 2 with diabetic peripheral neuropathy (HCC)     GERD (gastroesophageal reflux disease)     History of aspiration pneumonia     History of suicidal ideation     HLD (hyperlipidemia)     Hypertension     Leukocytosis     MDD (major depressive disorder)     Myocardial infarction     Opioid dependence ([place] [address] )     PTSD (post-traumatic stress disorder)     Troponin level elevated        Admitting Diagnosis: Dizziness [R42]  Chest pain [R07 9]  SOB (shortness of breath) [R06 02]  Chronic pain [G89 29]    Age/Sex: 79 y o  male       Assessment and Plan      1  Chest pain  · Heart score 4  No concerning ST or T-wave abnormalities on EKG, unchanged from prior  Consider ACS unstable angina/NSTEMI versus stable angina  · Patient was given 324 ASA x1 dose, Toradol 15 mg and Lyrica 100 mg in the ED  · Troponin x1 negative thus far, will trend  Trend trops x3  · Continue ASA 81 mg and statin  · Repeat EKG in a m  · Telemetry monitoring  · Patient had a nuclear stress test in 9/15  which showed no ST changes but did show decreased uptake involving the inferior wall  Patient also had an echocardiogram in March of 2017 which showed moderate hypokinesis of the mid apical and inferior wall  Patient was evaluated by Cardiology March of 2017 on prior admission for NSTEMI type 1 versus type 2 in setting of elevated troponin, it was thought at that time that he likely had significant coronary artery disease and was started on ASA Plavix, beta-blocker, statin  He was supposed to have an ischemic workup with cardiac catheterization though I cannot find records that this actually took place - it appears patient refused? And requested 2nd opinion at that time, therefore will consult Cardiology consult for possibility of cardiac catheterization this admission versus following up with Cardiology as an outpatient for cardiac catheterization     2  History of presumed coronary artery disease  · See #1  · Never confirmed, though highly suspected per Cardiology on prior admission in 3/17  Patient declined ischemic eval with cardiac catheterization at that time  Cardiology planned on continuing dual antiplatelets for 1 year as well as statin beta-blocker  · Continue ASA, beta blocker, Plavix and statin  · Recheck echo  · Cardiology consult     3  Hypertension  · Continue beta-blocker     4  Hyperlipidemia  · Continue statin  Check lipid panel     5  Chronic diastolic heart failure  · Stable  Echo in 3/2017 showed EF of 55-60% with moderate hypokinesis of the mid apical inferior wall  Mild mitral and tricuspid regurg  Mild left atrial dilatation  No evidence of impaired diastolic relaxation  Continue blood pressure control as outlined in #3     6  Type 2 diabetes mellitus with neuropathy  · Check A1c  Continue home Lantus 45 units q h s  and Humalog 18 units t i d  with meals  Will add insulin sliding scale and adjust as needed for blood glucose goal  · 140-180 while inpatient continue to monitor  · Continue Lyrica 100 mg t i d      7   Recent closed fracture of transverse process of L2-L4 transverse process seen on CT on 1/31/18  · In setting of mechanical fall a few weeks ago  Recent x-ray of back on 03/27 does not show any acute fractures  Did show mild diffuse facet and endplate degenerative changes  · Patient was evaluated by Orthopedic surgery who recommend did no surgical intervention but did prescribe TLSO brace  Patient has chronic back pain which is now acutely exacerbated  Patient does not have back brace with him and is requesting  Will order TLSO brace for patient while inpatient  · Continue prescribed Percocet ercocet for now, though would consider weaning prior to discharge as patient has history of opiate dependence  · PT/OT eval     8  Chronic back pain  · Worsened acutely in setting of #7  · Patient was prescribed course of Percocet 5-325 q 6 p r n      9  Abdominal aortic aneurysm without rupture  · Incidentally found on CT chest abdomen pelvis on in 11/2016  CT showed stable tiny aneurysm in the left distal abdominal aorta measuring 8 mm, though not seen on follow-up imaging  Continue to monitor     10  COPD  · Patient complains of shortness of breath unclear if associated with his chest pain or not    His lungs are clear on examination he does not appear to be in exacerbation  He likely is noncompliant with his maintenance inhalers  · Chest x-ray on 03/27 showed no acute cardiopulmonary disease  · Will continue home Advair  Albuterol HFA inhaler Q 4 p r n  for wheezing or shortness of breath     11  Chronic opiate dependence  · In setting of chronic pain, specifically back  · Wean opiates  Patient recently seen by his PCP Lazarus Phan PA-C on 03/01 at which time refills were given for Lyrica and Percocet  He was advised at that time that this is not a long-term medication and is only being used for acute pain  · Will add non-narcotics to pain regimen including lidocaine patch and Voltaren cream     12  History of iron deficiency anemia? · With low MCV  Unclear etiology as no recent history of acute blood loss  Denies any melena or hematochezia  No hematemesis  No obvious signs of bleeding, thought concerning in middle-aged male without colonoscopy  · Check iron panel and FOBT  Start supplementation with ferrous sulfate 325mg daily      Code Status: Level 1 - Full Code  VTE Pharmacologic Prophylaxis: Enoxaparin (Lovenox)   VTE Mechanical Prophylaxis: sequential compression device    Admission Status: OBSERVATION        Admission Orders:  3/27/18 AT 2026 OBSERVATION  TELEMETRY  ST SEGMENT MONOITORING  VS Q4HRS    Up + OOB as Tolerated  SCD    Diet Torey/CHO Controlled; Consistent Carbohydrate Diet Level 2 (5 carb servings/75 grams CHO/meal);  Cardiac TLC 2 3 GM NA       IV ACCESS    Scheduled Meds:   Current Facility-Administered Medications:  acetaminophen 650 mg Oral Q4H PRN Karel Johnson MD   albuterol 2 puff Inhalation Q4H PRN Karel Johnson MD   aspirin 81 mg Oral Daily Karel Johnson MD   atorvastatin 40 mg Oral Daily With MD Michelle   clopidogrel 75 mg Oral Daily Karel Johnson MD   diclofenac sodium 2 g Topical 4x Daily Bhupendra Gaming MD   enoxaparin 40 mg Subcutaneous Daily Karel Johnson MD   ferrous sulfate 325 mg Oral Daily With Randall Mckenna MD   fluticasone-salmeterol 1 puff Inhalation Q12H [address] [facility], [address], MD   insulin glargine 45 Units Subcutaneous HS Radha Petty MD   insulin lispro 1-5 Units Subcutaneous HS Radha Petty MD   insulin lispro 1-6 Units Subcutaneous TID Geoff Harris MD   insulin lispro 18 Units Subcutaneous TID AC Radha Petty MD   lidocaine 1 patch Transdermal Daily Bhupendra Gaming MD   lisinopril 5 mg Oral Daily Tristin Rhodes MD   metoprolol tartrate 25 mg Oral Q12H [address] [facility], [address], MD   nitroglycerin 0 4 mg Sublingual Q5 Min PRN Radha Petty MD   oxyCODONE-acetaminophen 1 tablet Oral Q6H PRN Radha Petty MD   pantoprazole 40 mg Oral Early Morning Radha Petty MD   pregabalin 100 mg Oral TID Radha Petty MD     PRN Meds:     acetaminophen    albuterol    nitroglycerin    oxyCODONE-acetaminophen 1 Tablet q6hrs prn given x 2      CONSULT CARD    ECHO    PT EVAL    OT EVAL       Cardiology  Consults Date of Service: 3/28/2018  9:11 AM     Principal Problem:    Chest pain  Active Problems:    Type 2 diabetes mellitus with diabetic neuropathy, with long-term current use of insulin ([facility])    Hypertension    GERD (gastroesophageal reflux disease)    Hyperlipidemia    Opioid dependence ([facility])    Insomnia    Iron deficiency anemia    Abdominal aortic aneurysm (AAA) without rupture ([facility])    Chronic back pain    Chronic diastolic CHF (congestive heart failure) ([facility])    COPD (chronic obstructive pulmonary disease) ([facility])    Neuropathy    Closed fracture of transverse process of lumbar vertebra with routine healing     Assessment and plan:   Chest pain  - his pain is on and off and occurs when he lays flat and improves on standing  The last episode this morning  He states nitroglycerin improved his pain  His EKG this admission reveals normal sinus rhythm with no significant ST or T-wave changes  Troponins x3 are negative      -NSTEMI possibly type  1 in February 2017 in the setting of pneumonia, through went up to 17, had inferolateral ST depressions   Sherre Soulier Refused catheterization at that time  Started on aspirin and Plavix which has been compliant on  He had a Lexiscan nuclear stress test at [facility] in10/2017 for the same chest pain  There was no perfusion defects as per reports  Echo in 09/2017 revealed an LVEF of 55%, normal RV size and function, mild diastolic dysfunction, mild AS     Plan      In view of has a long history of intermittent pains, risk factors suggest former smoking, diabetes and resolution of pain with nitroglycerin, he might be a candidate for cardiac catheterization  Will discuss with attending  - he was given aspirin 324 yesterday, continue aspirin 81, Plavix, atorvastatin 40 Hs, metoprolol 25 q 12h  - repeat an echo to evaluate any new wall motion abnormalities     Hypertension  Uncontrolled on presentation, continue metoprolol 25 q 12h  Would add lisinopril 5 mg daily        Dyslipidemia  Lipid panel 03/2018, total cholesterol is 148, LDL 83, HDL 36, triglyceride 146  On atorvastatin 40 HS     Diabetes with peripheral neuropathy  Uncontrolled, HB A1c is 10 7  On insulin, pregabalin        Closed fracture of transverse process of L2-L4 in 01/2018  - opiate dependence, is on OxyContin at home     AAA  Found on CT in 3/2016, mild ectasia of the proximal descending aorta measuring 3 4 cm
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
## 2018-03-28 NOTE — PLAN OF CARE
Problem: PHYSICAL THERAPY ADULT  Goal: Performs mobility at highest level of function for planned discharge setting  See evaluation for individualized goals  Treatment/Interventions: Functional transfer training, LE strengthening/ROM, Therapeutic exercise, Endurance training, Equipment eval/education, Gait training, Spoke to nursing  Equipment Recommended:  (TBD)       See flowsheet documentation for full assessment, interventions and recommendations  Prognosis: Good  Problem List: Decreased strength, Decreased endurance, Impaired balance, Orthopedic restrictions, Decreased mobility  Assessment: Pt is 79 y o  admitted with hx of CP and SOB; undergoing w/u  Pt 's comorbidities affecting POC include: hypertension, presumed coronary artery disease, chronic diastolic heart failure, type 2 diabetes mellitus with neuropathy, chest pain, chronic back pain with acute closed fracture of L2, L3 and L4 right transverse process in January 2018, opiate dependence, COPD, and iron deficiency anemia  and personal factors of: living alone  Pt's clinical presentation is currently unstable/unpredictable which is evident in ongoing telem monitoring w/ cardio consult pending, abn lab values, current need for spinal bracing and need for stand by assist w/ all phases of observed OOB mobility when usually mobilizing independently  Pt presents w/ generalized weakness, incl min decreased LE strength, decreased functional endurance (HR is stable in the 70s bpm post mobilization), min impaired balance w/ min gait deviations (currently uses SPC; will trial rw next visit), somewhat decreased awareness of general spinal precautions (verbal instructions provided) w/ LSO in place for mobilization at this time and fall risk  Will cont to follow pt in PT for progressive mobilization to address above functional deficits and to max level of (I), endurance, and safety   Otherwise, anticipate pt will return home upon D/C provided he cont improving w/ mobility skills, safety, and endurance and when medically cleared; home PT follow up may need to be considered  Will follow  Barriers to Discharge: Decreased caregiver support     Recommendation: Home PT          See flowsheet documentation for full assessment
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
## 2018-03-28 NOTE — PROGRESS NOTES
Spoke with Lashonda with SOD, pts blood sugar 86, will give half of normal dose which is 9 units of humalog total  Will continue to monitor

## 2018-03-28 NOTE — PROGRESS NOTES
Patient came up from ED at 2130  SOD still has not seen patient  No orders are in  SOD called on previous shift  Called SOD and was told they will be up within the next hour  Notified SOD of patients back and radiating chest pain  No orders placed  Will call again if they do not come see the patient within the hour  Patient is becoming increasingly more irritated with this issue throughout the night
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
## 2018-03-28 NOTE — SOCIAL WORK
Cm spoke with pt and he stated he has no ride home or money to pay for a ride home  Pt is agreeable to [facility] services with [facility]  Cm will f/u with pt at d/c to arrange for transportation

## 2018-03-28 NOTE — PLAN OF CARE
CARDIOVASCULAR - ADULT     Maintains optimal cardiac output and hemodynamic stability Progressing     Absence of cardiac dysrhythmias or at baseline rhythm Progressing        DISCHARGE PLANNING - CARE MANAGEMENT     Discharge to post-acute care or home with appropriate resources Progressing        METABOLIC, FLUID AND ELECTROLYTES - ADULT     Electrolytes maintained within normal limits Progressing     Fluid balance maintained Progressing     Glucose maintained within target range Progressing        Potential for Falls     Patient will remain free of falls Progressing        RESPIRATORY - ADULT     Achieves optimal ventilation and oxygenation Progressing

## 2018-03-28 NOTE — PLAN OF CARE
Problem: OCCUPATIONAL THERAPY ADULT  Goal: Performs self-care activities at highest level of function for planned discharge setting  See evaluation for individualized goals  Treatment Interventions: ADL retraining, Functional transfer training, Endurance training, Patient/family training, Cognitive reorientation, Equipment evaluation/education, Compensatory technique education, Continued evaluation, Energy conservation (cognitive assess/tx  LSO edu)          See flowsheet documentation for full assessment, interventions and recommendations  Outcome: Progressing  Limitation: Decreased ADL status, Decreased Safe judgement during ADL, Decreased cognition, Decreased endurance, Decreased self-care trans, Decreased high-level ADLs  Prognosis: Fair  Assessment: Pt  is a 79 y o  male who was admitted to [facility] on 3/27/2018 with chest pain  Pt  w/a significant PMH of hypertension, presumed coronary artery disease, chronic diastolic heart failure, type 2 diabetes mellitus with neuropathy, chest pain, chronic back pain with acute closed fracture of L2, L3 and L4 right transverse process in January 2018, opiate dependence, COPD, and iron deficiency anemia    Pt  currently lives in a [facility] apartment alone  PTA, pt  (I) in all ADLs/IADLs +driving  Pt used a cane for functional mobility  Currently, pt  requires (S) for functional mobility and transfers, min A-s/u for UB ADLs and (S) for LB ADLs  A is needed d/t the following impairments: decreased functional activity tolerance, decreased balance, increased impulsivity, increased distractibility, decreased problem solving, decreased STM, poor safety/insight/judgment and awareness into deficits, decreased attention/concentration to task, and additional time required for delayed processing  Additionally, pt  requires mod v c  during functional activity 2* to cognitive deficits noted   Therefore, pt  will benefit from skilled OT services to maximize functional gains, monitor status and prevent decline  Will continue to follow pt  2-3x/week to meet the goals described below in 7-10 days  Recommend home with home OT once medically stable  Also going to complete cognitive assessment as pt  Lives alone, drives and is demonstrating various cognitive and safety impairments  Further dispo recs  For level of (s) at home pending cognitive assessment  From an OT perspective pt  Is not ready for d c  Until cognitive screen is completed  OT Discharge Recommendation: (S) Home OT ((S) rec   pending ACLS score)  OT - OK to Discharge: No
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
## 2018-03-28 NOTE — PROGRESS NOTES
Patient with multiple risk factors for coronary artery disease including former smoker, diabetes with long history of intermittent chest pain that resolves with nitroglycerin  Seen and evaluated by cardiology  Plan for cardiac catheterization tomorrow

## 2018-03-28 NOTE — CASE MANAGEMENT
Initial Clinical Review     Admission: Date/Time/Statement:   3/28/18 AT 1655 ADMIT INPATIENT  (3/27/18 AT 2026 OBSERVATION)     03/28/18 1656  Inpatient Admission Once     Transfer Service: General Medicine       Question Answer Comment   Admitting Physician Jose Franz    Level of Care Med Surg    Estimated length of stay More than 2 Midnights    Certification I certify that inpatient services are medically necessary for this patient for a duration of greater than two midnights  See H&P and MD Progress Notes for additional information about the patient's course of treatment  03/28/18 1655       ED: Date/Time/Mode of Arrival:             ED Arrival Information      Expected Arrival Acuity Means of Arrival Escorted By Service Admission Type     - 3/27/2018 17:22 Urgent Ambulance [address] Urgent     Arrival Complaint     SOB          Chief Complaint:        Chief Complaint   Patient presents with    Shortness of Breath       Patient presents to the E R  with shortness of breath, back pain, leg pain and chest pain          Chief complaint:  Chest pain and shortness of breath     History of Present Illness      Ju Ken Jese Kingston Jr  is a 79 y  o  male with a past medical history of hypertension, hyperlipidemia, presumed coronary artery disease, chronic diastolic heart failure, type 2 diabetes mellitus with neuropathy, chest pain, chronic back pain with acute closed fracture of L2, L3 and L4 right transverse process in January 2018, opiate dependence, COPD, iron deficiency anemia who presented to the emergency department with complaints of chest pain and shortness of breath   Patient is a very poor historian  Delon Bennett states that he developed chest pain yesterday in the morning while in bed at rest  [place] describes the pain as retrosternal burning and pressure-like with radiation down his bilateral arms   Patient states that the pain was present for about an hour and resolved spontaneously  Viji Hernandez did not take any nitroglycerin   When asked if nitroglycerin relieved his chest pain patient states "he thinks so sometimes"   No associated nausea or vomiting   Does have associated shortness of breath but is unclear if this is related to his underlying COPD versus chest pain   On review of hospital records, patient apparently had an admission in January of 2017 for pneumonia where he had a troponin elevation   At that time Cardiology was consulted, who believed it was likely an NSTEMI type 1 and felt patient likely had significant coronary artery disease   An ischemic evaluation was planned at that time with cardiac catheterization however patient refused and decided he would like a 2nd opinion after his hospital discharge  Latesha Mcintyre fortunately never followed up with Cardiology and had an ischemic workup in the outpatient setting, however it was recommended per Cardiology that patient continue dual antiplatelets for at least 1 year as well as continue beta-blocker, aspirin and statin per their note   Other than this cardiac history, patient has had no prior cardiac surgery or cardiac catheterization   Patient did have a nuclear stress test in October of 2017 which showed no ST changes but decreased uptake involving the inferior wall   He also had an echo in March of 2017 which showed moderate hypokinesis of the mid apical and inferior wall      In the ED, patient received  mg x1 p o  as well as x1 dose Toradol 15 mg IV and Lyrica 100 mg x1 dose as he was complaining of lower back and leg pain (chronic with acute component in setting of mechanical fall back in January 2018 patient suffered L2-L4 right-sided transverse process fracture which has been managed conservatively in the outpatient setting with cane, TLSO brace, and Percocet which was recently filled on 3/1/18 when patient saw his PCP in the office      Troponin x1 negative   No concerning ST or or T-wave changes on EKG - normal sinus rhythm unchanged from prior   Heart score for 4 - patient will be admitted under observation status for ACS workup possible cardiology evaluation with ischemic workup     Review of Systems      Constitutional: Negative for chills, fatigue and fever  Respiratory: Positive for shortness of breath  Negative for cough and wheezing     Cardiovascular: Positive for chest pain and palpitations  Negative for leg swelling  Gastrointestinal: Negative for abdominal pain, constipation, diarrhea, nausea and vomiting  Genitourinary: Negative     Musculoskeletal: Positive for arthralgias, back pain, gait problem and myalgias  Skin: Negative     Neurological: Positive for numbness  Negative for seizures and syncope          Physical Exam  GENERAL: Appears well-developed and well-nourished  Appears in no acute distress   HEENT: Normocephalic and atraumatic  No scleral icterus  PERRLA  EOMI B/L  No oropharyngeal edema  MM moist    NECK: Neck supple with no lymphadenopathy  Trachea midline  No JVD  CARDIOVASCULAR: S1 and S2 are present   Regular rate and rhythm  No murmurs, rubs, or gallops  RESPIRATORY: CTA B/L, no rales, rhonci or wheezes   Normal respiratory expansion             ED Vital Signs:            ED Triage Vitals   Temperature Pulse Respirations Blood Pressure SpO2   03/27/18 1729 03/27/18 1725 03/27/18 1725 03/27/18 1725 03/27/18 1725   98 4 °F (36 9 °C) 75 20 (!) 196/86 96 %       Temp Source Heart Rate Source Patient Position - Orthostatic VS BP Location FiO2 (%)   03/27/18 1729 03/27/18 1912 03/27/18 1725 03/27/18 1725 --   Oral Monitor Lying Right arm         Pain Score           03/27/18 1725           9                Wt Readings from Last 1 Encounters:   03/27/18 108 kg (238 lb)           03/27 0701  03/28 0700 03/28 0701  03/28 1031   Most Recent     Temperature (°F) 97 798  6     97 9 (36 6)     Pulse 6888     71     Respirations 1822     18     Blood Pressure 112/51196/86     143/65   SpO2 (%) 9697     96           LABS/Diagnostic Test Results:   CBC  Results from last 7 days  Lab Units 03/27/18  1903   WBC Thousand/uL 13 27*   RBC Million/uL 4 51   HEMOGLOBIN g/dL 10 2*   HEMATOCRIT % 33 1*   MCV fL 73*   MCH pg 22 6*   MCHC g/dL 30 8*   RDW % 20 3*   MPV fL 9 6   PLATELETS Thousands/uL 316   NRBC AUTO /100 WBCs 0   NEUTROS PCT % 70   LYMPHS PCT % 19   MONOS PCT % 8   EOS PCT % 2   BASOS PCT % 1   NEUTROS ABS Thousands/µL 9 23*   LYMPHS ABS Thousands/µL 2 57   MONOS ABS Thousand/µL 1 10   EOS ABS Thousand/µL 0 21      Results from last 7 days  Lab Units 03/27/18  1903   SODIUM mmol/L 139   POTASSIUM mmol/L 3 5   CHLORIDE mmol/L 105   CO2 mmol/L 27   ANION GAP mmol/L 7   BUN mg/dL 15   CREATININE mg/dL 1 02   GLUCOSE RANDOM mg/dL 73   CALCIUM mg/dL 8 8   AST U/L 10   ALT U/L 16   ALK PHOS U/L 92   TOTAL PROTEIN g/dL 7 6   BILIRUBIN TOTAL mg/dL 0 32   EGFR ml/min/1 73sq m 76      Urinalysis Results from last 7 days  Lab Units 03/27/18  1910 03/27/18  1909   COLOR UA   yellow Yellow   CLARITY UA   clear Clear   SPEC GRAV UA    --  1 015   PH UA    --  7 0   LEUKOCYTES UA    --  Small*   NITRITE UA    --  Negative   PROTEIN UA mg/dl  --  Negative   GLUCOSE UA mg/dl  --  Negative   KETONES UA mg/dl  --  Negative   BILIRUBIN UA    --  Negative   BLOOD UA    --  Negative       Urine Micro  Lab Units 03/27/18  1909   RBC UA /hpf None Seen   WBC UA /hpf 4-10*   EPITHELIAL CELLS WET PREP /hpf None Seen   BACTERIA UA /hpf None Seen         TROPONIN NEG X 3     EKG (per ED ):  Normal sinus rhythm  Normal ECG  When compared with ECG of 02-AUG-2017 08:23,  No significant change was found        ED Treatment:              Medication Administration from 03/27/2018 1722 to 03/27/2018 2125        Date/Time Order Dose Route Action Action by Comments       03/27/2018 1904 pregabalin (LYRICA) capsule 100 mg 100 mg Oral Given Nasir Cancer, RN         03/27/2018 1904 ketorolac (TORADOL) injection 15 mg 15 mg Intravenous Given Ebonie Mukherjee RN         03/27/2018 1940 aspirin chewable tablet 324 mg 324 mg Oral Given Tobi Hernandez RN               Past Medical/Surgical History:         Active Ambulatory Problems     Diagnosis Date Noted    Type 2 diabetes mellitus with diabetic neuropathy, with long-term current use of insulin (HCC)      Hypertension      GERD (gastroesophageal reflux disease)      Hyperlipidemia      Opioid dependence ([facility] )      Insomnia 03/18/2017    Iron deficiency anemia 03/18/2017    Abdominal aortic aneurysm (AAA) without rupture ([place] [address] ) 02/14/2017    Chronic back pain 02/14/2017    Chronic diastolic CHF (congestive heart failure) ([facility] ) 11/14/2017    COPD (chronic obstructive pulmonary disease) ([place] [address] ) 09/13/2017    Neuropathy 11/14/2017    Closed fracture of transverse process of lumbar vertebra with routine healing 02/14/2018           Resolved Ambulatory Problems     Diagnosis Date Noted    Chronic midline low back pain without sciatica      History of suicidal ideation      Atypical chest pain 08/15/2016    CAP (community acquired pneumonia) 08/15/2016    Chronic pain      Abdominal pain 11/17/2016    History of aspiration pneumonia      Constipation 03/18/2017    Chronic bronchitis ([facility] ) 11/15/2017    Diabetes mellitus type 2 with neurological manifestations ([place] [address] ) 02/14/2017    Diabetic neuropathy ([place] [address] ) 02/14/2017    Epigastric pain 11/14/2017    Increased frequency of urination 11/21/2017    Type 2 diabetes mellitus treated with insulin ([facility] ) 10/04/2017    Abdominal pain, acute, bilateral lower quadrant 01/31/2018    Acute urinary retention 01/31/2018    Bronchitis, acute 09/25/2017           Past Medical History:   Diagnosis Date    Back pain      Cardiac murmur      Chest pain      Chronic pain      DM type 2 with diabetic peripheral neuropathy (HCC)      GERD (gastroesophageal reflux disease)      History of aspiration pneumonia      History of suicidal ideation      HLD (hyperlipidemia)      Hypertension      Leukocytosis      MDD (major depressive disorder)      Myocardial infarction      Opioid dependence (HCC)      PTSD (post-traumatic stress disorder)      Troponin level elevated           Admitting Diagnosis: Dizziness [R42]  Chest pain [R07 9]  SOB (shortness of breath) [R06 02]  Chronic pain [G89 29]     Age/Sex: 79 y o  male        Assessment and Plan      1   Chest pain  · Heart score 4   No concerning ST or T-wave abnormalities on EKG, unchanged from prior   Consider ACS unstable angina/NSTEMI versus stable angina  · Patient was given 324 ASA x1 dose, Toradol 15 mg and Lyrica 100 mg in the ED  · Troponin x1 negative thus far, will trend  Trend trops x3  · Continue ASA 81 mg and statin  · Repeat EKG in a m    · Telemetry monitoring  · Patient had a nuclear stress test in 9/15  which showed no ST changes but did show decreased uptake involving the inferior wall   Patient also had an echocardiogram in March of 2017 which showed moderate hypokinesis of the mid apical and inferior wall   Patient was evaluated by Cardiology March of 2017 on prior admission for NSTEMI type 1 versus type 2 in setting of elevated troponin, it was thought at that time that he likely had significant coronary artery disease and was started on ASA Plavix, beta-blocker, statin  Edwardo Scanlon was supposed to have an ischemic workup with cardiac catheterization though I cannot find records that this actually took place - it appears patient refused?  And requested 2nd opinion at that time, therefore will consult Cardiology consult for possibility of cardiac catheterization this admission versus following up with Cardiology as an outpatient for cardiac catheterization     2   History of presumed coronary artery disease  · See #1  · Never confirmed, though highly suspected per Cardiology on prior admission in 3/17  Evonne Piper declined ischemic eval with cardiac catheterization at that time   Cardiology planned on continuing dual antiplatelets for 1 year as well as statin beta-blocker  · Continue ASA, beta blocker, Plavix and statin  · Recheck echo  · Cardiology consult     3   Hypertension  · Continue beta-blocker     4   Hyperlipidemia  · Continue statin   Check lipid panel     5   Chronic diastolic heart failure  · Stable   Echo in 3/2017 showed EF of 55-60% with moderate hypokinesis of the mid apical inferior wall   Mild mitral and tricuspid regurg   Mild left atrial dilatation   No evidence of impaired diastolic relaxation   Continue blood pressure control as outlined in #3     6   Type 2 diabetes mellitus with neuropathy  · Check A1c   Continue home Lantus 45 units q h s  and Humalog 18 units t i d  with meals   Will add insulin sliding scale and adjust as needed for blood glucose goal  · 140-180 while inpatient continue to monitor  · Continue Lyrica 100 mg t i d      7   Recent closed fracture of transverse process of L2-L4 transverse process seen on CT on 1/31/18  · In setting of mechanical fall a few weeks ago   Recent x-ray of back on 03/27 does not show any acute fractures   Did show mild diffuse facet and endplate degenerative changes  · Patient was evaluated by Orthopedic surgery who recommend did no surgical intervention but did prescribe TLSO brace   Patient has chronic back pain which is now acutely exacerbated   Patient does not have back brace with him and is requesting   Will order TLSO brace for patient while inpatient  · Continue prescribed Percocet ercocet for now, though would consider weaning prior to discharge as patient has history of opiate dependence  · PT/OT eval     8   Chronic back pain  · Worsened acutely in setting of #7  · Patient was prescribed course of Percocet 5-325 q 6 p r n      9   Abdominal aortic aneurysm without rupture  · Incidentally found on CT chest abdomen pelvis on in 11/2016   CT showed stable tiny aneurysm in the left distal abdominal aorta measuring 8 mm, though not seen on follow-up imaging   Continue to monitor     10   COPD  · Patient complains of shortness of breath unclear if associated with his chest pain or not   His lungs are clear on examination he does not appear to be in exacerbation   He likely is noncompliant with his maintenance inhalers  · Chest x-ray on 03/27 showed no acute cardiopulmonary disease  · Will continue home Advair   Albuterol HFA inhaler Q 4 p r n  for wheezing or shortness of breath     11   Chronic opiate dependence  · In setting of chronic pain, specifically back  · Wean opiates   Patient recently seen by his PCP Manuel Cervantes PA-C on 03/01 at which time refills were given for Lyrica and Myriam Schillingo was advised at that time that this is not a long-term medication and is only being used for acute pain  · Will add non-narcotics to pain regimen including lidocaine patch and Voltaren cream     12   History of iron deficiency anemia? · With low MCV   Unclear etiology as no recent history of acute blood loss   Denies any melena or hematochezia   No hematemesis   No obvious signs of bleeding, thought concerning in middle-aged male without colonoscopy  · Check iron panel and FOBT   Start supplementation with ferrous sulfate 325mg daily      Code Status: Level 1 - Full Code  VTE Pharmacologic Prophylaxis: Enoxaparin (Lovenox)   VTE Mechanical Prophylaxis: sequential compression device     Admission Status: OBSERVATION           Admission Orders:  3/28/18 AT 1655 ADMIT INPATIENT  (3/27/18 AT 2026 OBSERVATION)   TELEMETRY  ST SEGMENT MONOITORING  VS Q4HRS     Up + OOB as Tolerated  SCD     Diet Torey/CHO Controlled; Consistent Carbohydrate Diet Level 2 (5 carb servings/75 grams CHO/meal);  Cardiac TLC 2 3 GM NA        IV ACCESS     Scheduled Meds:   Current Facility-Administered Medications:  acetaminophen 650 mg Oral Q4H PRN Mykel Eldridge MD   albuterol 2 puff Inhalation Q4H PRN Mykel Eldridge MD   aspirin 81 mg Oral Daily Krunal Barlow MD   atorvastatin 40 mg Oral Daily With Splashscore, MD   clopidogrel 75 mg Oral Daily Krunal Barlow MD   diclofenac sodium 2 g Topical 4x Daily Bhupendra Gaming MD   enoxaparin 40 mg Subcutaneous Daily Krunal Barlow MD   ferrous sulfate 325 mg Oral Daily With Breakfast Krunal Barlow MD   fluticasone-salmeterol 1 puff Inhalation Q12H [facility] Krunal Barlow MD   insulin glargine 45 Units Subcutaneous HS Krunal Barlow MD   insulin lispro 1-5 Units Subcutaneous HS Krunal Barlow MD   insulin lispro 1-6 Units Subcutaneous TID Lainey Anders MD   insulin lispro 18 Units Subcutaneous TID AC Krunal Barlow MD   lidocaine 1 patch Transdermal Daily Bhupendra Gaming MD   lisinopril 5 mg Oral Daily Astrid Degroot MD   metoprolol tartrate 25 mg Oral Q12H [facility] [address], MD   nitroglycerin 0 4 mg Sublingual Q5 Min PRN Krunal Barlow MD   oxyCODONE-acetaminophen 1 tablet Oral Q6H PRN Krunal Barlow MD   pantoprazole 40 mg Oral Early Morning Krunal Barlow MD   pregabalin 100 mg Oral TID Krunal Barlow MD      PRN Meds:     acetaminophen    albuterol    nitroglycerin    oxyCODONE-acetaminophen 1 Tablet q6hrs prn given x 2        CONSULT CARD     ECHO     PT EVAL     OT EVAL          Cardiology   Consults Date of Service: 3/28/2018  9:11 AM      Principal Problem:    Chest pain  Active Problems:    Type 2 diabetes mellitus with diabetic neuropathy, with long-term current use of insulin ([facility])    Hypertension    GERD (gastroesophageal reflux disease)    Hyperlipidemia    Opioid dependence ([facility] )    Insomnia    Iron deficiency anemia    Abdominal aortic aneurysm (AAA) without rupture ([facility])    Chronic back pain    Chronic diastolic CHF (congestive heart failure) ([facility])    COPD (chronic obstructive pulmonary disease) ([facility] )    Neuropathy    Closed fracture of transverse process of lumbar vertebra with routine healing     Assessment and plan:   Chest pain  - his pain is on and off and occurs when he lays flat and improves on standing   The last episode this morning   He states nitroglycerin improved his pain  His EKG this admission reveals normal sinus rhythm with no significant ST or T-wave changes   Troponins x3 are negative     -NSTEMI possibly type  1 in February 2017 in the setting of pneumonia, through went up to 17, had inferolateral ST depressions      Refused catheterization at that time  Started on aspirin and Plavix which has been compliant on  He had a Lexiscan nuclear stress test at [facility] in10/2017 for the same chest pain  Nell [place] was no perfusion defects as per reports    Echo in 09/2017 revealed an LVEF of 55%, normal RV size and function, mild diastolic dysfunction, mild AS     Plan      In view of has a long history of intermittent pains, risk factors suggest former smoking, diabetes and resolution of pain with nitroglycerin, he might be a candidate for cardiac catheterization   Will discuss with attending  - he was given aspirin 324 yesterday, continue aspirin 81, Plavix, atorvastatin 40 Hs, metoprolol 25 q 12h  - repeat an echo to evaluate any new wall motion abnormalities     Hypertension  Uncontrolled on presentation, continue metoprolol 25 q 12h  Would add lisinopril 5 mg daily        Dyslipidemia  Lipid panel 03/2018, total cholesterol is 148, LDL 83, HDL 36, triglyceride 146  On atorvastatin 40 HS     Diabetes with peripheral neuropathy  Uncontrolled, HB A1c is 10 7  On insulin, pregabalin        Closed fracture of transverse process of L2-L4 in 01/2018  - opiate dependence, is on OxyContin at home     AAA  Found on CT in 3/2016, mild ectasia of the proximal descending aorta measuring 3 4 cm

## 2018-03-28 NOTE — SOCIAL WORK
CM met with patient to review CM role and to complete assessment  Patient lives alone in a second story apartment with no steps to enter and elevator access  Patient has a rolling walker and wheelchair at home  He uses a cane for ambulation  Patient was independent with ADLs prior to admission  Patient does not drive  Typically friends transport patient  Patient will require transportation at discharge  Per patient his brother is currently in [place] and his friends are not available to transport  Patient is retired  His PCP is Dr Bianca Siemens ([address])  He reports his brother, Lucila Ledesma ([phone]) is his POA, CM requested a copy of POA for patient's medical record  Patient does not have a history of STR or Acute Rehab stays  Patient denies psychiatric history or substance abuse history  Patient prefers [facility] in [place]  Patient has used [facility] in the past     Discharge checklist reviewed with patient and left a copy in patient's room  CM reviewed d/c planning process including the following: identifying help at home, patient preference for d/c planning needs, Discharge Lounge, [facility] Meds to Bed program, availability of treatment team to discuss questions or concerns patient and/or family may have regarding understanding medications and recognizing signs and symptoms once discharged  CM also encouraged patient to follow up with all recommended appointments after discharge  Patient advised of importance for patient and family to participate in managing patients medical well being

## 2018-03-29 ENCOUNTER — APPOINTMENT (INPATIENT)
Dept: NON INVASIVE DIAGNOSTICS | Facility: HOSPITAL | Age: 68
DRG: 286 | End: 2018-03-29
Payer: MEDICARE

## 2018-03-29 ENCOUNTER — APPOINTMENT (INPATIENT)
Dept: NON INVASIVE DIAGNOSTICS | Facility: HOSPITAL | Age: 68
DRG: 286 | End: 2018-03-29
Attending: INTERNAL MEDICINE
Payer: MEDICARE

## 2018-03-29 LAB
ABO GROUP BLD: NORMAL
ANION GAP SERPL CALCULATED.3IONS-SCNC: 5 MMOL/L (ref 4–13)
ATRIAL RATE: 58 BPM
ATRIAL RATE: 63 BPM
BILIRUB UR QL STRIP: NEGATIVE
BLD GP AB SCN SERPL QL: NEGATIVE
BUN SERPL-MCNC: 25 MG/DL (ref 5–25)
CALCIUM SERPL-MCNC: 8.4 MG/DL (ref 8.3–10.1)
CHLORIDE SERPL-SCNC: 106 MMOL/L (ref 100–108)
CLARITY UR: CLEAR
CO2 SERPL-SCNC: 27 MMOL/L (ref 21–32)
COLOR UR: YELLOW
CREAT SERPL-MCNC: 1.33 MG/DL (ref 0.6–1.3)
ERYTHROCYTE [DISTWIDTH] IN BLOOD BY AUTOMATED COUNT: 20.9 % (ref 11.6–15.1)
GFR SERPL CREATININE-BSD FRML MDRD: 55 ML/MIN/1.73SQ M
GLUCOSE SERPL-MCNC: 122 MG/DL (ref 65–140)
GLUCOSE SERPL-MCNC: 144 MG/DL (ref 65–140)
GLUCOSE SERPL-MCNC: 215 MG/DL (ref 65–140)
GLUCOSE SERPL-MCNC: 249 MG/DL (ref 65–140)
GLUCOSE SERPL-MCNC: 257 MG/DL (ref 65–140)
GLUCOSE SERPL-MCNC: 392 MG/DL (ref 65–140)
GLUCOSE SERPL-MCNC: 498 MG/DL (ref 65–140)
GLUCOSE SERPL-MCNC: 83 MG/DL (ref 65–140)
GLUCOSE UR STRIP-MCNC: NEGATIVE MG/DL
HCT VFR BLD AUTO: 34.5 % (ref 36.5–49.3)
HEMOCCULT STL QL: NEGATIVE
HGB BLD-MCNC: 10.6 G/DL (ref 12–17)
HGB UR QL STRIP.AUTO: NEGATIVE
KETONES UR STRIP-MCNC: NEGATIVE MG/DL
LEUKOCYTE ESTERASE UR QL STRIP: NEGATIVE
MCH RBC QN AUTO: 22.8 PG (ref 26.8–34.3)
MCHC RBC AUTO-ENTMCNC: 30.7 G/DL (ref 31.4–37.4)
MCV RBC AUTO: 74 FL (ref 82–98)
NITRITE UR QL STRIP: NEGATIVE
P AXIS: 50 DEGREES
P AXIS: 56 DEGREES
PH UR STRIP.AUTO: 6 [PH] (ref 4.5–8)
PLATELET # BLD AUTO: 317 THOUSANDS/UL (ref 149–390)
PMV BLD AUTO: 9.6 FL (ref 8.9–12.7)
POTASSIUM SERPL-SCNC: 4.7 MMOL/L (ref 3.5–5.3)
PR INTERVAL: 142 MS
PR INTERVAL: 142 MS
PROT UR STRIP-MCNC: NEGATIVE MG/DL
QRS AXIS: 15 DEGREES
QRS AXIS: 24 DEGREES
QRSD INTERVAL: 76 MS
QRSD INTERVAL: 92 MS
QT INTERVAL: 464 MS
QT INTERVAL: 468 MS
QTC INTERVAL: 459 MS
QTC INTERVAL: 474 MS
RBC # BLD AUTO: 4.64 MILLION/UL (ref 3.88–5.62)
RH BLD: POSITIVE
SODIUM SERPL-SCNC: 138 MMOL/L (ref 136–145)
SP GR UR STRIP.AUTO: 1.03 (ref 1–1.03)
SPECIMEN EXPIRATION DATE: NORMAL
T WAVE AXIS: 27 DEGREES
T WAVE AXIS: 35 DEGREES
UROBILINOGEN UR QL STRIP.AUTO: 1 E.U./DL
VENTRICULAR RATE: 58 BPM
VENTRICULAR RATE: 63 BPM
WBC # BLD AUTO: 10.06 THOUSAND/UL (ref 4.31–10.16)

## 2018-03-29 PROCEDURE — 82948 REAGENT STRIP/BLOOD GLUCOSE: CPT

## 2018-03-29 PROCEDURE — 87081 CULTURE SCREEN ONLY: CPT | Performed by: PHYSICIAN ASSISTANT

## 2018-03-29 PROCEDURE — 93454 CORONARY ARTERY ANGIO S&I: CPT | Performed by: INTERNAL MEDICINE

## 2018-03-29 PROCEDURE — 86900 BLOOD TYPING SEROLOGIC ABO: CPT | Performed by: PHYSICIAN ASSISTANT

## 2018-03-29 PROCEDURE — 97110 THERAPEUTIC EXERCISES: CPT

## 2018-03-29 PROCEDURE — 82272 OCCULT BLD FECES 1-3 TESTS: CPT | Performed by: INTERNAL MEDICINE

## 2018-03-29 PROCEDURE — 99232 SBSQ HOSP IP/OBS MODERATE 35: CPT | Performed by: INTERNAL MEDICINE

## 2018-03-29 PROCEDURE — 93970 EXTREMITY STUDY: CPT | Performed by: SURGERY

## 2018-03-29 PROCEDURE — 99152 MOD SED SAME PHYS/QHP 5/>YRS: CPT | Performed by: INTERNAL MEDICINE

## 2018-03-29 PROCEDURE — 93005 ELECTROCARDIOGRAM TRACING: CPT

## 2018-03-29 PROCEDURE — 97530 THERAPEUTIC ACTIVITIES: CPT

## 2018-03-29 PROCEDURE — 80048 BASIC METABOLIC PNL TOTAL CA: CPT | Performed by: INTERNAL MEDICINE

## 2018-03-29 PROCEDURE — 85027 COMPLETE CBC AUTOMATED: CPT | Performed by: INTERNAL MEDICINE

## 2018-03-29 PROCEDURE — 87147 CULTURE TYPE IMMUNOLOGIC: CPT | Performed by: PHYSICIAN ASSISTANT

## 2018-03-29 PROCEDURE — 97116 GAIT TRAINING THERAPY: CPT

## 2018-03-29 PROCEDURE — 93880 EXTRACRANIAL BILAT STUDY: CPT

## 2018-03-29 PROCEDURE — 81003 URINALYSIS AUTO W/O SCOPE: CPT | Performed by: PHYSICIAN ASSISTANT

## 2018-03-29 PROCEDURE — 4A023N6 MEASUREMENT OF CARDIAC SAMPLING AND PRESSURE, RIGHT HEART, PERCUTANEOUS APPROACH: ICD-10-PCS | Performed by: INTERNAL MEDICINE

## 2018-03-29 PROCEDURE — C1769 GUIDE WIRE: HCPCS | Performed by: INTERNAL MEDICINE

## 2018-03-29 PROCEDURE — B2111ZZ FLUOROSCOPY OF MULTIPLE CORONARY ARTERIES USING LOW OSMOLAR CONTRAST: ICD-10-PCS | Performed by: INTERNAL MEDICINE

## 2018-03-29 PROCEDURE — C1894 INTRO/SHEATH, NON-LASER: HCPCS | Performed by: INTERNAL MEDICINE

## 2018-03-29 PROCEDURE — 86850 RBC ANTIBODY SCREEN: CPT | Performed by: PHYSICIAN ASSISTANT

## 2018-03-29 PROCEDURE — 99153 MOD SED SAME PHYS/QHP EA: CPT | Performed by: INTERNAL MEDICINE

## 2018-03-29 PROCEDURE — 86901 BLOOD TYPING SEROLOGIC RH(D): CPT | Performed by: PHYSICIAN ASSISTANT

## 2018-03-29 PROCEDURE — 99223 1ST HOSP IP/OBS HIGH 75: CPT | Performed by: PHYSICIAN ASSISTANT

## 2018-03-29 PROCEDURE — 93971 EXTREMITY STUDY: CPT

## 2018-03-29 PROCEDURE — 93880 EXTRACRANIAL BILAT STUDY: CPT | Performed by: SURGERY

## 2018-03-29 RX ORDER — SODIUM CHLORIDE 9 MG/ML
INJECTION, SOLUTION INTRAVENOUS
Status: COMPLETED | OUTPATIENT
Start: 2018-03-29 | End: 2018-03-29

## 2018-03-29 RX ORDER — INSULIN GLARGINE 100 [IU]/ML
20 INJECTION, SOLUTION SUBCUTANEOUS
Status: DISCONTINUED | OUTPATIENT
Start: 2018-03-29 | End: 2018-04-01 | Stop reason: HOSPADM

## 2018-03-29 RX ORDER — VERAPAMIL HYDROCHLORIDE 2.5 MG/ML
INJECTION, SOLUTION INTRAVENOUS CODE/TRAUMA/SEDATION MEDICATION
Status: COMPLETED | OUTPATIENT
Start: 2018-03-29 | End: 2018-03-29

## 2018-03-29 RX ORDER — LIDOCAINE HYDROCHLORIDE 10 MG/ML
INJECTION, SOLUTION INFILTRATION; PERINEURAL CODE/TRAUMA/SEDATION MEDICATION
Status: COMPLETED | OUTPATIENT
Start: 2018-03-29 | End: 2018-03-29

## 2018-03-29 RX ORDER — HEPARIN SODIUM 1000 [USP'U]/ML
INJECTION, SOLUTION INTRAVENOUS; SUBCUTANEOUS CODE/TRAUMA/SEDATION MEDICATION
Status: COMPLETED | OUTPATIENT
Start: 2018-03-29 | End: 2018-03-29

## 2018-03-29 RX ORDER — SODIUM CHLORIDE 9 MG/ML
100 INJECTION, SOLUTION INTRAVENOUS CONTINUOUS
Status: DISPENSED | OUTPATIENT
Start: 2018-03-29 | End: 2018-03-29

## 2018-03-29 RX ORDER — MIDAZOLAM HYDROCHLORIDE 1 MG/ML
INJECTION INTRAMUSCULAR; INTRAVENOUS CODE/TRAUMA/SEDATION MEDICATION
Status: COMPLETED | OUTPATIENT
Start: 2018-03-29 | End: 2018-03-29

## 2018-03-29 RX ORDER — LABETALOL HYDROCHLORIDE 5 MG/ML
INJECTION, SOLUTION INTRAVENOUS
Status: COMPLETED
Start: 2018-03-29 | End: 2018-03-29

## 2018-03-29 RX ORDER — NITROGLYCERIN 20 MG/100ML
INJECTION INTRAVENOUS CODE/TRAUMA/SEDATION MEDICATION
Status: COMPLETED | OUTPATIENT
Start: 2018-03-29 | End: 2018-03-29

## 2018-03-29 RX ORDER — LABETALOL HYDROCHLORIDE 5 MG/ML
10 INJECTION, SOLUTION INTRAVENOUS ONCE
Status: COMPLETED | OUTPATIENT
Start: 2018-03-29 | End: 2018-03-29

## 2018-03-29 RX ORDER — NITROGLYCERIN 20 MG/100ML
5-200 INJECTION INTRAVENOUS
Status: DISCONTINUED | OUTPATIENT
Start: 2018-03-29 | End: 2018-04-01 | Stop reason: HOSPADM

## 2018-03-29 RX ORDER — FENTANYL CITRATE 50 UG/ML
INJECTION, SOLUTION INTRAMUSCULAR; INTRAVENOUS CODE/TRAUMA/SEDATION MEDICATION
Status: COMPLETED | OUTPATIENT
Start: 2018-03-29 | End: 2018-03-29

## 2018-03-29 RX ORDER — LABETALOL HYDROCHLORIDE 5 MG/ML
5 INJECTION, SOLUTION INTRAVENOUS ONCE
Status: COMPLETED | OUTPATIENT
Start: 2018-03-29 | End: 2018-03-29

## 2018-03-29 RX ORDER — HYDRALAZINE HYDROCHLORIDE 20 MG/ML
5 INJECTION INTRAMUSCULAR; INTRAVENOUS EVERY 6 HOURS PRN
Status: DISCONTINUED | OUTPATIENT
Start: 2018-03-29 | End: 2018-04-01 | Stop reason: HOSPADM

## 2018-03-29 RX ORDER — METOPROLOL TARTRATE 50 MG/1
50 TABLET, FILM COATED ORAL EVERY 12 HOURS SCHEDULED
Status: DISCONTINUED | OUTPATIENT
Start: 2018-03-29 | End: 2018-04-01 | Stop reason: HOSPADM

## 2018-03-29 RX ORDER — BENZONATATE 100 MG/1
100 CAPSULE ORAL 3 TIMES DAILY PRN
Status: DISCONTINUED | OUTPATIENT
Start: 2018-03-29 | End: 2018-04-01 | Stop reason: HOSPADM

## 2018-03-29 RX ADMIN — OXYCODONE HYDROCHLORIDE AND ACETAMINOPHEN 1 TABLET: 5; 325 TABLET ORAL at 03:27

## 2018-03-29 RX ADMIN — SODIUM CHLORIDE 100 ML/HR: 0.9 INJECTION, SOLUTION INTRAVENOUS at 09:10

## 2018-03-29 RX ADMIN — INSULIN GLARGINE 20 UNITS: 100 INJECTION, SOLUTION SUBCUTANEOUS at 22:33

## 2018-03-29 RX ADMIN — IOHEXOL 100 ML: 350 INJECTION, SOLUTION INTRAVENOUS at 09:34

## 2018-03-29 RX ADMIN — NITROGLYCERIN 5 MCG/MIN: 20 INJECTION INTRAVENOUS at 13:09

## 2018-03-29 RX ADMIN — LISINOPRIL 5 MG: 5 TABLET ORAL at 08:14

## 2018-03-29 RX ADMIN — INSULIN LISPRO 18 UNITS: 100 INJECTION, SOLUTION INTRAVENOUS; SUBCUTANEOUS at 07:45

## 2018-03-29 RX ADMIN — HEPARIN SODIUM 4000 UNITS: 1000 INJECTION INTRAVENOUS; SUBCUTANEOUS at 09:20

## 2018-03-29 RX ADMIN — LIDOCAINE 1 PATCH: 50 PATCH TOPICAL at 08:17

## 2018-03-29 RX ADMIN — INSULIN LISPRO 2 UNITS: 100 INJECTION, SOLUTION INTRAVENOUS; SUBCUTANEOUS at 22:34

## 2018-03-29 RX ADMIN — PREGABALIN 100 MG: 100 CAPSULE ORAL at 22:28

## 2018-03-29 RX ADMIN — SODIUM CHLORIDE 100 ML/HR: 0.9 INJECTION, SOLUTION INTRAVENOUS at 10:16

## 2018-03-29 RX ADMIN — FLUTICASONE PROPIONATE AND SALMETEROL 1 PUFF: 50; 250 POWDER RESPIRATORY (INHALATION) at 22:27

## 2018-03-29 RX ADMIN — CLOPIDOGREL BISULFATE 300 MG: 75 TABLET ORAL at 07:48

## 2018-03-29 RX ADMIN — OXYCODONE HYDROCHLORIDE AND ACETAMINOPHEN 1 TABLET: 5; 325 TABLET ORAL at 19:38

## 2018-03-29 RX ADMIN — LABETALOL 20 MG/4 ML (5 MG/ML) INTRAVENOUS SYRINGE 10 MG: at 11:46

## 2018-03-29 RX ADMIN — LABETALOL HYDROCHLORIDE 5 MG: 5 INJECTION, SOLUTION INTRAVENOUS at 10:19

## 2018-03-29 RX ADMIN — LABETALOL HYDROCHLORIDE 10 MG: 5 INJECTION, SOLUTION INTRAVENOUS at 11:46

## 2018-03-29 RX ADMIN — IOHEXOL 10 ML: 350 INJECTION, SOLUTION INTRAVENOUS at 09:41

## 2018-03-29 RX ADMIN — METOPROLOL TARTRATE 50 MG: 50 TABLET ORAL at 22:27

## 2018-03-29 RX ADMIN — ALBUTEROL SULFATE 2 PUFF: 90 AEROSOL, METERED RESPIRATORY (INHALATION) at 03:47

## 2018-03-29 RX ADMIN — METOPROLOL TARTRATE 25 MG: 25 TABLET ORAL at 08:14

## 2018-03-29 RX ADMIN — NITROGLYCERIN 0.4 MG: 0.4 TABLET SUBLINGUAL at 06:34

## 2018-03-29 RX ADMIN — LIDOCAINE HYDROCHLORIDE 1 ML: 10 INJECTION, SOLUTION INFILTRATION; PERINEURAL at 09:18

## 2018-03-29 RX ADMIN — ATORVASTATIN CALCIUM 40 MG: 40 TABLET, FILM COATED ORAL at 16:50

## 2018-03-29 RX ADMIN — MUPIROCIN 1 APPLICATION: 20 OINTMENT TOPICAL at 22:28

## 2018-03-29 RX ADMIN — PANTOPRAZOLE SODIUM 40 MG: 40 TABLET, DELAYED RELEASE ORAL at 05:42

## 2018-03-29 RX ADMIN — NITROGLYCERIN 0.4 MG: 0.4 TABLET SUBLINGUAL at 06:25

## 2018-03-29 RX ADMIN — LABETALOL 20 MG/4 ML (5 MG/ML) INTRAVENOUS SYRINGE 5 MG: at 10:19

## 2018-03-29 RX ADMIN — NITROGLYCERIN 200 MCG: 20 INJECTION INTRAVENOUS at 09:20

## 2018-03-29 RX ADMIN — MIDAZOLAM 1 MG: 1 INJECTION INTRAMUSCULAR; INTRAVENOUS at 09:15

## 2018-03-29 RX ADMIN — OXYCODONE HYDROCHLORIDE AND ACETAMINOPHEN 1 TABLET: 5; 325 TABLET ORAL at 13:21

## 2018-03-29 RX ADMIN — DICLOFENAC 2 G: 10 GEL TOPICAL at 08:20

## 2018-03-29 RX ADMIN — VERAPAMIL HYDROCHLORIDE 2.5 MG: 2.5 INJECTION, SOLUTION INTRAVENOUS at 09:20

## 2018-03-29 RX ADMIN — PREGABALIN 100 MG: 100 CAPSULE ORAL at 16:50

## 2018-03-29 RX ADMIN — SODIUM CHLORIDE 50 ML/HR: 0.45 INJECTION, SOLUTION INTRAVENOUS at 05:42

## 2018-03-29 RX ADMIN — FERROUS SULFATE TAB 325 MG (65 MG ELEMENTAL FE) 325 MG: 325 (65 FE) TAB at 07:49

## 2018-03-29 RX ADMIN — PREGABALIN 100 MG: 100 CAPSULE ORAL at 08:14

## 2018-03-29 RX ADMIN — FENTANYL CITRATE 50 MCG: 50 INJECTION, SOLUTION INTRAMUSCULAR; INTRAVENOUS at 09:15

## 2018-03-29 RX ADMIN — OXYCODONE HYDROCHLORIDE AND ACETAMINOPHEN 1 TABLET: 5; 325 TABLET ORAL at 07:49

## 2018-03-29 RX ADMIN — ASPIRIN 81 MG: 81 TABLET, COATED ORAL at 07:49

## 2018-03-29 NOTE — PHYSICAL THERAPY NOTE
Physical Therapy Progress Note     03/29/18 1540   Pain Assessment   Pain Assessment No/denies pain   Pain Score No Pain   Restrictions/Precautions   Weight Bearing Precautions Per Order No   Braces or Orthoses LSO   Other Precautions Cognitive; Bed Alarm;Spinal precautions; Telemetry;O2   General   Chart Reviewed Yes   Family/Caregiver Present No   Cognition   Overall Cognitive Status Impaired   Arousal/Participation Cooperative   Subjective   Subjective Pt agrees to participate   Bed Mobility   Sit to Supine 6  Modified independent   Transfers   Sit to Stand 5  Supervision   Additional items Assist x 1;Verbal cues   Stand to Sit 5  Supervision   Additional items Assist x 1;Verbal cues   Ambulation/Elevation   Gait pattern Excessively slow; Short stride; Wide CASH; Inconsistent grant   Gait Assistance 4  Minimal assist   Additional items Assist x 1;Verbal cues   Assistive Device Rolling walker   Distance 120 feet x 2   Stair Management Assistance Not tested   Balance   Static Sitting Fair +   Static Standing Fair   Dynamic Standing Poor +   Ambulatory Poor +   Activity Tolerance   Activity Tolerance Patient limited by fatigue   Nurse [address] to see per EYAL Bejarano   Exercises   THR Supine;20 reps;AROM; Bilateral   Assessment   Prognosis Good   Problem List Decreased strength;Decreased endurance; Impaired balance;Decreased mobility; Decreased cognition; Impaired judgement;Decreased safety awareness   Assessment Pt is making steady progress with use of rolling walker  Safety cues required throughout session to avoid walking into nurse computers in hallway, min assist   Reported mild chest pain initially however upon ambulating stated "pain went away "  Rehab aide assisted with IV pole and oxygen tank  Steady today however due to cognitive status required occasional min assist   Pt would benefit from continued physical therapy to maximize functional mobility and safety     Barriers to Discharge Decreased caregiver support   Goals   Patient Goals None stated   STG Expiration Date 04/07/18   Plan   Treatment/Interventions Functional transfer training;LE strengthening/ROM; Therapeutic exercise; Endurance training;Cognitive reorientation;Patient/family training;Bed mobility;Gait training   Progress Progressing toward goals   PT Frequency 5x/wk   Recommendation   Recommendation Home PT   Equipment Recommended Cane;Walker     Ma Friday, PTA

## 2018-03-29 NOTE — PLAN OF CARE
Problem: PHYSICAL THERAPY ADULT  Goal: Performs mobility at highest level of function for planned discharge setting  See evaluation for individualized goals  Treatment/Interventions: Functional transfer training, LE strengthening/ROM, Therapeutic exercise, Endurance training, Equipment eval/education, Gait training, Spoke to nursing  Equipment Recommended:  (TBD)       See flowsheet documentation for full assessment, interventions and recommendations  Outcome: Progressing  Prognosis: Good  Problem List: Decreased strength, Decreased endurance, Impaired balance, Decreased mobility, Decreased cognition, Impaired judgement, Decreased safety awareness  Assessment: Pt is making steady progress with use of rolling walker  Safety cues required throughout session to avoid walking into nurse computers in hallway, min assist   Reported mild chest pain initially however upon ambulating stated "pain went away "  Rehab aide assisted with IV pole and oxygen tank  Steady today however due to cognitive status required occasional min assist   Pt would benefit from continued physical therapy to maximize functional mobility and safety  Barriers to Discharge: Decreased caregiver support     Recommendation: Home PT          See flowsheet documentation for full assessment
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
## 2018-03-29 NOTE — PROGRESS NOTES
Patient complaining of pain  Patient no due for Percocet at this time    OK to administer early per Dr Sarah Harris with [facility]

## 2018-03-29 NOTE — PLAN OF CARE
Problem: OCCUPATIONAL THERAPY ADULT  Goal: Performs self-care activities at highest level of function for planned discharge setting  See evaluation for individualized goals  Treatment Interventions: ADL retraining, Functional transfer training, Endurance training, Patient/family training, Cognitive reorientation, Equipment evaluation/education, Compensatory technique education, Continued evaluation, Energy conservation (cognitive assess/tx  LSO edu)          See flowsheet documentation for full assessment, interventions and recommendations  Outcome: Progressing  Limitation: Decreased ADL status, Decreased Safe judgement during ADL, Decreased cognition, Decreased endurance, Decreased self-care trans, Decreased high-level ADLs  Prognosis: Fair  Assessment: Pt  seen for OT treatment session to address cognition/safety via ACLS screen  Pt  pleasant and agreeable to participate and overall, pt  tolerated session well  Of note, when entering room, pt  Was up OOB with RN staff, pt  Had gotten OOB without A causing bed alarm to go off, brace was not on and pt  Urinating on the floor  With help of RN was able to guide pt  To chair and donned brace  ACLS was completed and pt  scored 3 4 indicating 24 hour care is recommended to sequence through routine steps of toileting, bathing, grooming and dressing  Pt  will continue to benefit from OT services to address noted deficits and maximize functional gains  Continue to rec   Home with home OT and 24 hr  S s/p d c      OT Discharge Recommendation: (S) Home OT (24 hr  S)  OT - OK to Discharge: Yes (needs 24 hr  S/A)

## 2018-03-29 NOTE — PROGRESS NOTES
Paged and spoke to Dr Lara Brumfield again  Nitro gtt not started bc sbps 126  Dr Lara Brumfield agreed to leave nitro gtt of and give SL nitro as needed for CP  Will monitor
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
## 2018-03-29 NOTE — PROGRESS NOTES
Verified with Dr Edilma Garrett from cardiology  Pt with minimal chest pain, but chest pain has been on going since 2mos before admission (per shift report)  Pt needing CABG  Clarified with her that pt is to be on nitro gtt at low dose to assist with CP  Keep SBPs no lower than 117  Will monitor

## 2018-03-29 NOTE — PROGRESS NOTES
Patients /84  Dr Radha Carter with SOD notified  5mg labetalol ordered for patient  Will continue to monitor

## 2018-03-29 NOTE — PROGRESS NOTES
Paged [facility]-C's Dr Sofia Arguelles and notified of pt elevated B//90s s/p 5mg iv labetolol  Told to paged cardiology since pt is s/p Cardiology cath  Spoke with Dr Isiah Kirkpatrick and IV labetolol ordered-10mg  Will monitor

## 2018-03-29 NOTE — OCCUPATIONAL THERAPY NOTE
[address] Progress Note     Patient Name: Saint Snipe  Today's Date: 3/29/2018  Problem List  Patient Active Problem List   Diagnosis    Type 2 diabetes mellitus with diabetic neuropathy, with long-term current use of insulin ([facility] )    Hypertension    GERD (gastroesophageal reflux disease)    Hyperlipidemia    Opioid dependence ([facility] )    Insomnia    Iron deficiency anemia    Abdominal aortic aneurysm (AAA) without rupture (HCC)    Chronic back pain    Chronic diastolic CHF (congestive heart failure) ([facility] )    COPD (chronic obstructive pulmonary disease) ([place] [address] )    Neuropathy    Closed fracture of transverse process of lumbar vertebra with routine healing    Chest pain        03/29/18 1400   Restrictions/Precautions   Weight Bearing Precautions Per Order No   Other Precautions Cognitive; Bed Alarm; Fall Risk;Telemetry;Multiple lines;O2   Pain Assessment   Pain Assessment No/denies pain   Pain Score No Pain   Cognition   Overall Cognitive Status Impaired   Arousal/Participation Alert; Cooperative   Attention Attends with cues to redirect   Memory Decreased short term memory;Decreased recall of precautions   Following Commands Follows one step commands with increased time or repetition   Cognition Assessment Tools ACLS   Score 3 4   Activity Tolerance   Activity Tolerance Patient limited by fatigue   Medical Staff Made Aware yes, Spoke with EYAL Hernandez and CM    Assessment   Assessment Pt  seen for OT treatment session to address cognition/safety via ACLS screen  Pt  pleasant and agreeable to participate and overall, pt  tolerated session well  Of note, when entering room, pt  Was up OOB with RN staff, pt  Had gotten OOB without A causing bed alarm to go off, brace was not on and pt  Urinating on the floor  With help of RN was able to guide pt  To chair and donned brace   ACLS was completed and pt  scored 3 4 indicating 24 hour care is recommended to sequence through routine steps of toileting, bathing, grooming and dressing  Pt  will continue to benefit from OT services to address noted deficits and maximize functional gains  Continue to rec  Home with home OT and 24 hr  S s/p d c    Plan   Treatment Interventions ADL retraining;Functional transfer training; Endurance training;Patient/family training;Cognitive reorientation;Equipment evaluation/education; Compensatory technique education;Continued evaluation; Energy conservation  (cognitive assess/tx  LSO edu)   Goal Expiration Date 04/07/18   OT Frequency 3-5x/wk   Recommendation   OT Discharge Recommendation Home OT  (24 hr  S)   OT - OK to Discharge Yes  (needs 24 hr  S/A)   Modified [place] Scale   Modified [place] Scale 3     3 4    Administered Dwayne Cognitive Level Screen (ACLS)  Pt scored 3 4/6 0 indicating 24 hour care is recommended to sequence through routine steps of toileting, bathing, grooming and dressing  Behavior:  Speaks without considering comprehension of listener  May be distractible  Grooming:  Spontaneously sustains actions of combing, brushing, shaving with electric razor or applying makeup  Uses too much or too little toothpaste/makeup  Looks at objects but fails to note effects  Restrict access to harmful objects  Dressing:  Selects items laid out and begins to don garments  May pick several items and be unable to decide  May quit before finished or don several layers  May not pay attention to condition of garments (cleanliness or need for repair)  Bathing:  Picks up washcloth, soap, towel and wipes easy to reach body parts  May wash in one spot, forget to use soap, may not remove all dirt or quit before task is complete  Patient should not be left alone during bathing tasks  Walking and exercising:  Ambulates within 2 or 3 familiar rooms to access desirable activities  Can alter ambulation pace but is easily distracted  May be impulsive when changing positions    Has difficulty walking and talking at the same time   Is at risk for falls  Eating:  May anticipate meal times based on familiar signs (activity in kitchen)  Uses all utensils except knife  Rate may be rushed and may eat strongly preferred items only  Failure to observe manners may alienate others  Check food and beverage temperature  Cannot follow dietary restrictions  Toileting:  Recognizes need to void and goes to familiar bathrooms  May not close door while in bathroom  Dons and doffs clothing slowly, may need help with unusual fasteners  Wipes but does not check results or wipes repeatedly using excess toilet paper  May forget to wash hands  May leave zipper open  May need reminders to toilet in order to avoid accidents  Medications: May recognize medications by color or shape when it is given daily but does not note amounts or time of day  Does not understand purpose of medications or side effects, may mistake for candy  Prescription bottles need to be child proof  Store medications in secure location away from patient  Pre-measured medications should be handed to the patient  Use of adaptive devices: May be able to propel a wheelchair but cannot get around furniture and may get lost if allowed outside  May not be able to utilize adaptive devices in safe manner  May not be able to learn use of new adaptive device  Housekeeping:  No awareness of need for housekeeping  May  cloth and begin action of cleaning  Does not note results and may quit when distracted  Meal Preparation:  Does not plan for food  May eat from refrigerator  May be able to replicate repetitive actions for simple meal prep with supervision  May be impulsive and require frequent redirection  Restrict access to sharp tools and hot objects/surfaces  Spending money:  May recognize local currency  May hand money to another person in a familiar exchange situation; however, may not attend to amount given/received    May not understand money is owed for services  May loose money  Should have assistance for all finances  Shopping: Follows a guide to shopping areas  Looks in windows or at displays with no intent to purchase  May take items without paying  May fail to notice price or if they have enough money to pay  Do not leave alone in shopping areas  Laundry:  May not recognize clothing is dirty  Has no awareness of methods of doing laundry  May do repetitive actions but may not be able to sequence through steps of task  Traveling: May be able to sit for 30 minutes in a car  May not be aware of destination  May recognize features on a familiar route  May attempt to enter or leave car before it is fully stopped  Use child safety locks  Telephone:  Able to  phone when it rings and say hello  May be able to call for another person or hang up when phone is not for them  Not able to take messages  May dial 1 or 2 known numbers, but may call for no reason  May forget to hang up     Driving:  Should not operate a motor vehicle     Time: [address] Crowder, RONNIE, OTR/L

## 2018-03-29 NOTE — PROGRESS NOTES
Cardiology Progress Note - Mary Felder  79 y o  male MRN: [id number]    Unit/Bed#: [facility] 522-01 Encounter: [id number]        Subjective:   Patient still with intermittent chest pain  Cath demonstrated left main and multivessel CAD  Review of Systems   Cardiovascular: Positive for chest pain  Negative for leg swelling and palpitations  Respiratory: Negative for shortness of breath  Objective:   Vitals: Blood pressure 149/79, pulse 88, temperature 97 7 °F (36 5 °C), resp  rate (!) 24, height 5' 10" (1 778 m), weight 112 kg (247 lb 12 8 oz), SpO2 96 %  , Body mass index is 35 56 kg/m² , Orthostatic Blood Pressures    Flowsheet Row Most Recent Value   Blood Pressure  149/79 filed at 03/29/2018 1353   Patient Position - Orthostatic VS  Lying filed at 03/29/2018 8288         Systolic (17GTC), RCE:990 , Min:116 , HNI:521     Diastolic (76MGJ), ZDR:46, Min:59, Max:96      Intake/Output Summary (Last 24 hours) at 03/29/18 1400  Last data filed at 03/29/18 1201   Gross per 24 hour   Intake              595 ml   Output             2450 ml   Net            -1855 ml     Weight (last 2 days)     Date/Time   Weight    03/28/18 1109  112 (247 8)    03/27/18 1725  108 (238)                Telemetry Review: NSR    Physical Exam   Cardiovascular: Normal rate, regular rhythm and normal heart sounds  Exam reveals no gallop and no friction rub  No murmur heard  Pulmonary/Chest: Breath sounds normal  He has no wheezes  He has no rales  Musculoskeletal: He exhibits no edema           Laboratory Results:    Results from last 7 days  Lab Units 03/28/18  0503 03/28/18  0207 03/27/18  1903   TROPONIN I ng/mL <0 02 <0 02 <0 02       CBC with diff:   Results from last 7 days  Lab Units 03/29/18  0432 03/27/18  1903   WBC Thousand/uL 10 06 13 27*   HEMOGLOBIN g/dL 10 6* 10 2*   HEMATOCRIT % 34 5* 33 1*   MCV fL 74* 73*   PLATELETS Thousands/uL 317 316   MCH pg 22 8* 22 6*   MCHC g/dL 30 7* 30 8*   RDW % 20 9* 20 3* MPV fL 9 6 9 6   NRBC AUTO /100 WBCs  --  0         CMP:  Results from last 7 days  Lab Units 18  0432 18  1903   SODIUM mmol/L 138 139   POTASSIUM mmol/L 4 7 3 5   CHLORIDE mmol/L 106 105   CO2 mmol/L 27 27   ANION GAP mmol/L 5 7   BUN mg/dL 25 15   CREATININE mg/dL 1 33* 1 02   GLUCOSE RANDOM mg/dL 215* 73   CALCIUM mg/dL 8 4 8 8   AST U/L  --  10   ALT U/L  --  16   ALK PHOS U/L  --  92   TOTAL PROTEIN g/dL  --  7 6   BILIRUBIN TOTAL mg/dL  --  0 32   EGFR ml/min/1 73sq m 55 76         BMP:  Results from last 7 days  Lab Units 18  0432 18  1903   SODIUM mmol/L 138 139   POTASSIUM mmol/L 4 7 3 5   CHLORIDE mmol/L 106 105   CO2 mmol/L 27 27   BUN mg/dL 25 15   CREATININE mg/dL 1 33* 1 02   GLUCOSE RANDOM mg/dL 215* 73   CALCIUM mg/dL 8 4 8 8     Lipid Profile:     Results from last 7 days  Lab Units 18  0503   CHOLESTEROL mg/dL 148   TRIGLYCERIDES mg/dL 146   HDL mg/dL 36*           Cardiac testing:   Results for orders placed during the hospital encounter of 18   Echo complete with contrast if indicated    Quintin Ayala [address]  [address] [place] [address]  [phone]    Transthoracic Echocardiogram  2D, M-mode, Doppler, and Color Doppler    Study date:  28-Mar-2018    Patient: Leyla Quick  MR number: [id number]  Account number: [de-identified]  : 1950  Age: 79 years  Gender: Male  Status: Outpatient  Location: Bedside  Height: 70 in  Weight: 238 lb  BP: 107/ 62 mmHg    Indications: CAD    Diagnoses: I25 119 - Atherosclerotic heart disease of native coronary artery with unspecified angina pectoris    Sonographer:  ALLEN Lopez, RDCS  Primary Physician: Leila Esparza MD  Referring Physician:  David Marques MD  Group:  [id number]  Interpreting Physician:  Sri Larsen MD    SUMMARY    LEFT VENTRICLE:  Systolic function was normal  Ejection fraction was estimated to be 60 %    There were no regional wall motion abnormalities  Doppler parameters were consistent with abnormal left ventricular relaxation (grade 1 diastolic dysfunction)  LEFT ATRIUM:  The atrium was mildly dilated  AORTIC VALVE:  Transaortic velocity was increased due to valvular stenosis  There was mild stenosis  Valve mean gradient was 14 mmHg  HISTORY: PRIOR HISTORY: MI, Former Smoker, Diabetes    PROCEDURE: The procedure was performed at the bedside  This was a routine study  The transthoracic approach was used  The study included complete 2D imaging, M-mode, complete spectral Doppler, and color Doppler  The heart rate was 80 bpm,  at the start of the study  Images were obtained from the parasternal, apical, subcostal, and suprasternal notch acoustic windows  This was a technically difficult study  LEFT VENTRICLE: Size was normal  Systolic function was normal  Ejection fraction was estimated to be 60 %  There were no regional wall motion abnormalities  Wall thickness was normal  DOPPLER: Doppler parameters were consistent with  abnormal left ventricular relaxation (grade 1 diastolic dysfunction)  RIGHT VENTRICLE: The size was normal  Systolic function was normal  Wall thickness was normal     LEFT ATRIUM: The atrium was mildly dilated  RIGHT ATRIUM: Size was normal     MITRAL VALVE: Valve structure was normal  There was normal leaflet separation  DOPPLER: The transmitral velocity was within the normal range  There was no evidence for stenosis  There was no significant regurgitation  AORTIC VALVE: The valve was trileaflet  Leaflets exhibited mildly increased thickness, mild calcification, and mildly reduced cuspal separation  DOPPLER: Transaortic velocity was increased due to valvular stenosis  There was mild stenosis  There was no significant regurgitation  TRICUSPID VALVE: The valve structure was normal  There was normal leaflet separation  DOPPLER: The transtricuspid velocity was within the normal range   There was no evidence for stenosis  There was no significant regurgitation  PULMONIC VALVE: Leaflets exhibited normal thickness, no calcification, and normal cuspal separation  DOPPLER: The transpulmonic velocity was within the normal range  There was no significant regurgitation  PERICARDIUM: There was no pericardial effusion  The pericardium was normal in appearance  AORTA: The root exhibited normal size  SYSTEMIC VEINS: IVC: The inferior vena cava was normal in size  MEASUREMENT TABLES    DOPPLER MEASUREMENTS  Aortic valve   (Reference normals)  Peak sánchez   281 cm/s   (--)  Peak gradient   32 mmHg   (--)  Mean gradient   14 mmHg   (--)    SYSTEM MEASUREMENT TABLES    2D  %FS: 28 91 %  Ao Diam: 3 11 cm  EDV(Teich): 125 79 ml  EF(Teich): 55 26 %  ESV(Teich): 56 28 ml  IVSd: 0 98 cm  LA Area: 24 43 cm2  LA Diam: 4 44 cm  LVEDV MOD A4C: 154 09 ml  LVEF MOD A4C: 62 22 %  LVESV MOD A4C: 58 22 ml  LVIDd: 5 14 cm  LVIDs: 3 65 cm  LVLd A4C: 8 36 cm  LVLs A4C: 6 88 cm  LVOT Diam: 2 11 cm  LVPWd: 1 01 cm  RA Area: 14 88 cm2  RVIDd: 3 71 cm  SV MOD A4C: 95 87 ml  SV(Teich): 69 51 ml    CW  AV Env  Ti: 291 15 ms  AV VTI: 46 6 cm  AV Vmax: 2 62 m/s  AV Vmax: 2 57 m/s  AV Vmean: 1 61 m/s  AV maxP 79 mmHg  AV maxP 71 mmHg  AV meanP 2 mmHg  TR Vmax: 2 68 m/s  TR maxP 79 mmHg    MM  TAPSE: 1 8 cm    PW  E': 0 05 m/s  E/E': 21 69  MV A Sánchez: 1 31 m/s  MV Dec [place]: 5 17 m/s2  MV DecT: 225 58 ms  MV E Sánchez: 1 17 m/s  MV E/A Ratio: 0 89  MV PHT: 65 42 ms  MVA By PHT: 3 36 cm2    [employer] Accredited Echocardiography Laboratory    Prepared and electronically signed by    Linda Gonzales MD  Signed 28-Mar-2018 12:12:42       Meds/Allergies     Current Facility-Administered Medications:  acetaminophen 650 mg Oral Q4H PRN Adonis Nguyen MD    albuterol 2 puff Inhalation Q4H PRN Adonis Nguyen MD    aspirin 81 mg Oral Daily Adonis Nguyen MD    atorvastatin 40 mg Oral Daily With [employer], MD benzonatate 100 mg Oral TID PRN Jesseea Montezr, DO    diclofenac sodium 2 g Topical 4x Daily Bhupendra Gaming MD    ferrous sulfate 325 mg Oral Daily With Breakfast Mykel Eldridge MD    fluticasone-salmeterol 1 puff Inhalation Q12H [facility] Mykel Eldridge MD    hydrALAZINE 5 mg Intravenous Q6H PRN Doyal Flakes, DO    insulin glargine 20 Units Subcutaneous HS Kayla Lau, DO    insulin lispro 1-5 Units Subcutaneous HS Mykel Eldridge MD    insulin lispro 1-6 Units Subcutaneous TID AC Mykel Eldridge MD    insulin lispro 9 Units Subcutaneous TID AC Kayla Lau,     lidocaine 1 patch Transdermal Daily Bhupendra Gaming MD    lisinopril 5 mg Oral Daily Gaby Vergara MD    metoprolol tartrate 25 mg Oral Q12H [address] [facility],  [address], MD    mupirocin 1 application Nasal Z71P [facility] Yoly Durant PA-C    nitroglycerin 0 4 mg Sublingual Q5 Min PRN Mykel Eldridge MD    nitroGLYcerin 5-200 mcg/min Intravenous Titrated Doyal Flakes, DO Last Rate: 10 mcg/min (03/29/18 1358)   oxyCODONE-acetaminophen 1 tablet Oral Q6H PRN Mykel Eldridge MD    pantoprazole 40 mg Oral Early Morning Mykel Eldridge MD    pregabalin 100 mg Oral TID Mykel Eldridge MD    sodium chloride 100 mL/hr Intravenous Continuous Rea Dumas MD Last Rate: 100 mL/hr (03/29/18 1016)   sodium chloride 50 mL/hr Intravenous Continuous Andrea Modi MD Last Rate: Stopped (03/29/18 1004)       nitroGLYcerin 5-200 mcg/min Last Rate: 10 mcg/min (03/29/18 1358)   sodium chloride 100 mL/hr Last Rate: 100 mL/hr (03/29/18 1016)   sodium chloride 50 mL/hr Last Rate: Stopped (03/29/18 1004)     Prescriptions Prior to Admission   Medication    aspirin (ECOTRIN LOW STRENGTH) 81 mg EC tablet    atorvastatin (LIPITOR) 40 mg tablet    clopidogrel (PLAVIX) 75 mg tablet    fluticasone-salmeterol (ADVAIR DISKUS) 250-50 mcg/dose inhaler    insulin glargine (LANTUS) 100 units/mL subcutaneous injection    insulin lispro (HumaLOG) 100 units/mL injection    LANTUS SOLOSTAR injection pen 100 units/mL    metoprolol tartrate (LOPRESSOR) 25 mg tablet    nitroglycerin (NITROSTAT) 0 4 mg SL tablet    oxyCODONE-acetaminophen (PERCOCET) 5-325 mg per tablet    pantoprazole (PROTONIX) 40 mg tablet    pregabalin (LYRICA) 100 mg capsule       Assessment:  Principal Problem:    Chest pain  Active Problems:    Type 2 diabetes mellitus with diabetic neuropathy, with long-term current use of insulin ([facility])    Hypertension    GERD (gastroesophageal reflux disease)    Hyperlipidemia    Opioid dependence ([facility])    Insomnia    Iron deficiency anemia    Abdominal aortic aneurysm (AAA) without rupture ([facility])    Chronic back pain    Chronic diastolic CHF (congestive heart failure) ([facility])    COPD (chronic obstructive pulmonary disease) ([facility])    Neuropathy    Closed fracture of transverse process of lumbar vertebra with routine healing      Impression:  1  CAD - patient with LM/3VD - will need surgical evaluation  2  HTN    Plan:  1  Continue current medications  2  Continue NTG gtt  3  Plan on cardiac surgical evaluation

## 2018-03-29 NOTE — RESTORATIVE TECHNICIAN NOTE
Restorative Specialist Mobility Note       Pt refused OOB/Ambulation attempt at this time  Pt states he didn't get any sleep last night and just wants to rest  Will continue to follow up as needed       Nasra Ott Restorative Technician

## 2018-03-29 NOTE — PROGRESS NOTES
IM Residency Progress Note   Unit/Bed#: [facility] 522-01 Encounter: [id number]  SOD Team C       [place]  79 y o  male [id number]    Hospital Stay Days: 1      Assessment/Plan:    Principal Problem:    Chest pain  Chest pain is improved, although required nitro glycerin x2 overnight  Follow-up EKG this morning  Telemetry monitoring:  Bradycardic overnight in the 40s  Continue aspirin Plavix and statin  Troponin were negative x3  Patient is going for catheterization today    CAD/chronic diastolic heart failure  To continue with home meds aspirin Lipitor Plavix metoprolol 25 mg Q 12 lisinopril 5 mg daily  Echocardiogram :  Ejection fraction 60% no regional wall abnormalities  Grade 1 diastolic dysfunction    Active Problems:    Type 2 diabetes mellitus with diabetic neuropathy, with long-term current use of insulin ([facility])  A1c above 11, likely noncompliant with insulin at home  Supposed to be on Lantus 45 units nighttime and lispro 18 units 3 times daily with meals  His sugars this morning were in the 500 range  Suspect Symoggi after Lantus at nighttime  Will decrease his insulin home regimen Lantus 20 units at nighttime plus lispro 9 units 3 times a day with meals plus sliding scale insulin  Will further adjust based on the readings      Hypertension  Not well  Control  Home medications include beta-blocker lisinopril 5  Blood pressure persistent elevated after catheterization  Refractory to IV labetalol and hydralazine  Per Cardiology recommendation to be started on nitro drip  Will continue to monitor      GERD (gastroesophageal reflux disease)  Continue home pantoprazole 40 mg daily      Hyperlipidemia  Continue home statin      Opioid dependence ([facility] )  Likely related lumbar radiculopathy  Percocet as needed       Iron deficiency anemia  Fecal occult blood test negative  Her hemoglobin remained stable 10 6  Item panel confirm iron deficient anemia  Will likely benefit from IV iron    Will start that tomorrow  At  discharge okay to continue with ferrous sulfate 325 daily with breakfast      Abdominal aortic aneurysm (AAA) without rupture (HCC)  No surgical intervention recommended at this time  Continue to monitor      COPD (chronic obstructive pulmonary disease) ([facility] [address] )  Not exacerbated  Continue with albuterol two puff q 4 hours p r n  Continue with Advair 250/50 mcg 1 puff q 12 hours      Neuropathy  Continue home pre couple in 100 mg 3 times daily      Closed fracture of transverse process of lumbar vertebra with routine healing  Continue home Percocet q 6 hours p r n  as needed for moderate to severe pain      Disposition:   Continue medical care       Subjective:   Patient seen examined bedside  Looks anxious this morning  Aware the plan is for him to go to [facility]  Mild shortness of breath however is no hypoxic, using 2 L per nasal cannula oxygen for comfort  Complaining of mild chest pain  Denies lightheadedness dizziness abdominal pain     Vitals: Temp (24hrs), Av 5 °F (36 9 °C), Min:97 7 °F (36 5 °C), Max:98 9 °F (37 2 °C)  Current: Temperature: 97 7 °F (36 5 °C)  Vitals:    18 1100 18 1129 18 1135 18 1208   BP: (!) 180/95 (!) 216/96 (!) 183/80 (!) 204/86   BP Location:       Pulse: 67 74 73 77   Resp:       Temp:   97 7 °F (36 5 °C)    TempSrc:       SpO2:   96%    Weight:       Height:        Body mass index is 35 56 kg/m²  I/O last 24 hours:   In: 4457 [P O :956; I V :175]  Out: 2900 [Urine:2900]      Physical Exam: BP (!) 204/86   Pulse 77   Temp 97 7 °F (36 5 °C)   Resp (!) 24   Ht 5' 10" (1 778 m)   Wt 112 kg (247 lb 12 8 oz)   SpO2 96%   BMI 35 56 kg/m²     General Appearance:    Alert, cooperative, no distress, appears stated age   Head:    Normocephalic, without obvious abnormality, atraumatic   Eyes:    PERRL, conjunctiva/corneas clear, EOM's intact, fundi     benign, both eyes        Neck:   Supple, no JVD   Lungs:     Clear to auscultation bilaterally, respirations unlabored   Heart:    Regular rate and rhythm, S1 and S2 normal, no murmur, rub   or gallop   Abdomen:     Soft, non-tender, bowel sounds active all four quadrants,     no masses, no organomegaly   Extremities:   Extremities normal, atraumatic, no cyanosis or edema   Neurologic:   CNII-XII intact   Normal strength, sensation and reflexes       throughout        Invasive Devices     Peripheral Intravenous Line            Peripheral IV 03/28/18 Left Antecubital 1 day                          Labs:   Recent Results (from the past 24 hour(s))   Fingerstick Glucose (POCT)    Collection Time: 03/28/18  4:14 PM   Result Value Ref Range    POC Glucose 86 65 - 140 mg/dl   Fingerstick Glucose (POCT)    Collection Time: 03/28/18  9:05 PM   Result Value Ref Range    POC Glucose 135 65 - 140 mg/dl   Basic metabolic panel    Collection Time: 03/29/18  4:32 AM   Result Value Ref Range    Sodium 138 136 - 145 mmol/L    Potassium 4 7 3 5 - 5 3 mmol/L    Chloride 106 100 - 108 mmol/L    CO2 27 21 - 32 mmol/L    Anion Gap 5 4 - 13 mmol/L    BUN 25 5 - 25 mg/dL    Creatinine 1 33 (H) 0 60 - 1 30 mg/dL    Glucose 215 (H) 65 - 140 mg/dL    Calcium 8 4 8 3 - 10 1 mg/dL    eGFR 55 ml/min/1 73sq m   CBC    Collection Time: 03/29/18  4:32 AM   Result Value Ref Range    WBC 10 06 4 31 - 10 16 Thousand/uL    RBC 4 64 3 88 - 5 62 Million/uL    Hemoglobin 10 6 (L) 12 0 - 17 0 g/dL    Hematocrit 34 5 (L) 36 5 - 49 3 %    MCV 74 (L) 82 - 98 fL    MCH 22 8 (L) 26 8 - 34 3 pg    MCHC 30 7 (L) 31 4 - 37 4 g/dL    RDW 20 9 (H) 11 6 - 15 1 %    Platelets 783 587 - 421 Thousands/uL    MPV 9 6 8 9 - 12 7 fL   Fingerstick Glucose (POCT)    Collection Time: 03/29/18  6:23 AM   Result Value Ref Range    POC Glucose 392 (H) 65 - 140 mg/dl   ECG 12 lead    Collection Time: 03/29/18  6:51 AM   Result Value Ref Range    Ventricular Rate 63 BPM    Atrial Rate 63 BPM    VA Interval 142 ms    QRSD Interval 76 ms    QT Interval 464 ms    QTC Interval 474 ms    P Axis 56 degrees    QRS Axis 24 degrees    T Wave Axis 35 degrees   Fingerstick Glucose (POCT)    Collection Time: 03/29/18  7:41 AM   Result Value Ref Range    POC Glucose 498 (H) 65 - 140 mg/dl   Fingerstick Glucose (POCT)    Collection Time: 03/29/18  8:15 AM   Result Value Ref Range    POC Glucose 249 (H) 65 - 140 mg/dl   Occult blood 1-3, stool    Collection Time: 03/29/18  8:50 AM   Result Value Ref Range    Fecal Occult Blood Diagnostic Negative Negative    Fecal Occult Blood Diagnostic 2  Negative    Fecal Occult Blood Diagnostic 3  Negative   Fingerstick Glucose (POCT)    Collection Time: 03/29/18 10:51 AM   Result Value Ref Range    POC Glucose 122 65 - 140 mg/dl       Radiology Results: I have personally reviewed pertinent reports  Other Diagnostic Testing:   I have personally reviewed pertinent reports          Active Meds:   Current Facility-Administered Medications   Medication Dose Route Frequency    acetaminophen (TYLENOL) tablet 650 mg  650 mg Oral Q4H PRN    albuterol (PROVENTIL HFA,VENTOLIN HFA) inhaler 2 puff  2 puff Inhalation Q4H PRN    aspirin (ECOTRIN LOW STRENGTH) EC tablet 81 mg  81 mg Oral Daily    atorvastatin (LIPITOR) tablet 40 mg  40 mg Oral Daily With Dinner    benzonatate (TESSALON PERLES) capsule 100 mg  100 mg Oral TID PRN    diclofenac sodium (VOLTAREN) 1 % topical gel 2 g  2 g Topical 4x Daily    ferrous sulfate tablet 325 mg  325 mg Oral Daily With Breakfast    fluticasone-salmeterol (ADVAIR) 250-50 mcg/dose inhaler 1 puff  1 puff Inhalation Q12H [facility]    hydrALAZINE (APRESOLINE) injection 5 mg  5 mg Intravenous Q6H PRN    insulin glargine (LANTUS) subcutaneous injection 20 Units  20 Units Subcutaneous HS    insulin lispro (HumaLOG) 100 units/mL subcutaneous injection 1-5 Units  1-5 Units Subcutaneous HS    insulin lispro (HumaLOG) 100 units/mL subcutaneous injection 1-6 Units  1-6 Units Subcutaneous TID AC    insulin lispro (HumaLOG) 100 units/mL subcutaneous injection 9 Units  9 Units Subcutaneous TID AC    lidocaine (LIDODERM) 5 % patch 1 patch  1 patch Transdermal Daily    lisinopril (ZESTRIL) tablet 5 mg  5 mg Oral Daily    metoprolol tartrate (LOPRESSOR) tablet 25 mg  25 mg Oral Q12H [address]    nitroglycerin (NITROSTAT) SL tablet 0 4 mg  0 4 mg Sublingual Q5 Min PRN    nitroPRUSside (NIPRIDE) 50,000 mcg in dextrose 5 % 250 mL infusion  0 1-2 mcg/kg/min Intravenous Titrated    oxyCODONE-acetaminophen (PERCOCET) 5-325 mg per tablet 1 tablet  1 tablet Oral Q6H PRN    pantoprazole (PROTONIX) EC tablet 40 mg  40 mg Oral Early Morning    pregabalin (LYRICA) capsule 100 mg  100 mg Oral TID    sodium chloride 0 9 % infusion  100 mL/hr Intravenous Continuous    sodium chloride infusion 0 45 %  50 mL/hr Intravenous Continuous         VTE Pharmacologic Prophylaxis: Heparin  VTE Mechanical Prophylaxis: sequential compression device    Jimy Ohara DO

## 2018-03-29 NOTE — PROGRESS NOTES
Called Dr Juan Srivastava to clarify if he wanted to nitroprusside gtt vs  Nitroglycerin gt  Per , he wanted to order the latter

## 2018-03-30 ENCOUNTER — APPOINTMENT (INPATIENT)
Dept: RADIOLOGY | Facility: HOSPITAL | Age: 68
DRG: 286 | End: 2018-03-30
Payer: MEDICARE

## 2018-03-30 PROBLEM — I25.10 DISEASE OF CARDIOVASCULAR SYSTEM: Status: ACTIVE | Noted: 2018-03-27

## 2018-03-30 LAB
ANION GAP SERPL CALCULATED.3IONS-SCNC: 3 MMOL/L (ref 4–13)
BASOPHILS # BLD AUTO: 0.06 THOUSANDS/ΜL (ref 0–0.1)
BASOPHILS NFR BLD AUTO: 0 % (ref 0–1)
BUN SERPL-MCNC: 35 MG/DL (ref 5–25)
CALCIUM SERPL-MCNC: 8.3 MG/DL (ref 8.3–10.1)
CHLORIDE SERPL-SCNC: 103 MMOL/L (ref 100–108)
CO2 SERPL-SCNC: 28 MMOL/L (ref 21–32)
CREAT SERPL-MCNC: 1.77 MG/DL (ref 0.6–1.3)
EOSINOPHIL # BLD AUTO: 0.04 THOUSAND/ΜL (ref 0–0.61)
EOSINOPHIL NFR BLD AUTO: 0 % (ref 0–6)
ERYTHROCYTE [DISTWIDTH] IN BLOOD BY AUTOMATED COUNT: 20.9 % (ref 11.6–15.1)
ERYTHROCYTE [DISTWIDTH] IN BLOOD BY AUTOMATED COUNT: 21 % (ref 11.6–15.1)
GFR SERPL CREATININE-BSD FRML MDRD: 39 ML/MIN/1.73SQ M
GLUCOSE SERPL-MCNC: 143 MG/DL (ref 65–140)
GLUCOSE SERPL-MCNC: 164 MG/DL (ref 65–140)
GLUCOSE SERPL-MCNC: 209 MG/DL (ref 65–140)
GLUCOSE SERPL-MCNC: 212 MG/DL (ref 65–140)
GLUCOSE SERPL-MCNC: 216 MG/DL (ref 65–140)
GLUCOSE SERPL-MCNC: 335 MG/DL (ref 65–140)
HCT VFR BLD AUTO: 33.8 % (ref 36.5–49.3)
HCT VFR BLD AUTO: 34.9 % (ref 36.5–49.3)
HGB BLD-MCNC: 10.5 G/DL (ref 12–17)
HGB BLD-MCNC: 10.8 G/DL (ref 12–17)
L PNEUMO1 AG UR QL IA.RAPID: NEGATIVE
LYMPHOCYTES # BLD AUTO: 1.63 THOUSANDS/ΜL (ref 0.6–4.47)
LYMPHOCYTES NFR BLD AUTO: 4 % (ref 14–44)
MCH RBC QN AUTO: 22.8 PG (ref 26.8–34.3)
MCH RBC QN AUTO: 22.9 PG (ref 26.8–34.3)
MCHC RBC AUTO-ENTMCNC: 30.9 G/DL (ref 31.4–37.4)
MCHC RBC AUTO-ENTMCNC: 31.1 G/DL (ref 31.4–37.4)
MCV RBC AUTO: 74 FL (ref 82–98)
MCV RBC AUTO: 74 FL (ref 82–98)
MONOCYTES # BLD AUTO: 3.37 THOUSAND/ΜL (ref 0.17–1.22)
MONOCYTES NFR BLD AUTO: 9 % (ref 4–12)
MRSA NOSE QL CULT: ABNORMAL
NEUTROPHILS # BLD AUTO: 33.41 THOUSANDS/ΜL (ref 1.85–7.62)
NEUTS SEG NFR BLD AUTO: 87 % (ref 43–75)
NRBC BLD AUTO-RTO: 0 /100 WBCS
PLATELET # BLD AUTO: 309 THOUSANDS/UL (ref 149–390)
PLATELET # BLD AUTO: 326 THOUSANDS/UL (ref 149–390)
PMV BLD AUTO: 10.3 FL (ref 8.9–12.7)
PMV BLD AUTO: 10.6 FL (ref 8.9–12.7)
POTASSIUM SERPL-SCNC: 5.3 MMOL/L (ref 3.5–5.3)
RBC # BLD AUTO: 4.59 MILLION/UL (ref 3.88–5.62)
RBC # BLD AUTO: 4.74 MILLION/UL (ref 3.88–5.62)
S PNEUM AG UR QL: NEGATIVE
SODIUM SERPL-SCNC: 134 MMOL/L (ref 136–145)
WBC # BLD AUTO: 37.64 THOUSAND/UL (ref 4.31–10.16)
WBC # BLD AUTO: 38.9 THOUSAND/UL (ref 4.31–10.16)

## 2018-03-30 PROCEDURE — 97535 SELF CARE MNGMENT TRAINING: CPT

## 2018-03-30 PROCEDURE — 97530 THERAPEUTIC ACTIVITIES: CPT

## 2018-03-30 PROCEDURE — 99232 SBSQ HOSP IP/OBS MODERATE 35: CPT | Performed by: INTERNAL MEDICINE

## 2018-03-30 PROCEDURE — 87040 BLOOD CULTURE FOR BACTERIA: CPT | Performed by: INTERNAL MEDICINE

## 2018-03-30 PROCEDURE — 80048 BASIC METABOLIC PNL TOTAL CA: CPT | Performed by: INTERNAL MEDICINE

## 2018-03-30 PROCEDURE — 71045 X-RAY EXAM CHEST 1 VIEW: CPT

## 2018-03-30 PROCEDURE — 71250 CT THORAX DX C-: CPT

## 2018-03-30 PROCEDURE — 87449 NOS EACH ORGANISM AG IA: CPT | Performed by: INTERNAL MEDICINE

## 2018-03-30 PROCEDURE — 85027 COMPLETE CBC AUTOMATED: CPT | Performed by: INTERNAL MEDICINE

## 2018-03-30 PROCEDURE — 85025 COMPLETE CBC W/AUTO DIFF WBC: CPT | Performed by: INTERNAL MEDICINE

## 2018-03-30 PROCEDURE — 99231 SBSQ HOSP IP/OBS SF/LOW 25: CPT | Performed by: THORACIC SURGERY (CARDIOTHORACIC VASCULAR SURGERY)

## 2018-03-30 PROCEDURE — 82948 REAGENT STRIP/BLOOD GLUCOSE: CPT

## 2018-03-30 RX ORDER — SODIUM CHLORIDE 9 MG/ML
100 INJECTION, SOLUTION INTRAVENOUS ONCE
Status: COMPLETED | OUTPATIENT
Start: 2018-03-30 | End: 2018-03-31

## 2018-03-30 RX ORDER — CHLORHEXIDINE GLUCONATE 0.12 MG/ML
15 RINSE ORAL EVERY 12 HOURS SCHEDULED
Status: DISCONTINUED | OUTPATIENT
Start: 2018-03-30 | End: 2018-04-01 | Stop reason: HOSPADM

## 2018-03-30 RX ADMIN — SODIUM CHLORIDE 100 ML/HR: 0.9 INJECTION, SOLUTION INTRAVENOUS at 08:32

## 2018-03-30 RX ADMIN — BENZONATATE 100 MG: 100 CAPSULE ORAL at 08:28

## 2018-03-30 RX ADMIN — OXYCODONE HYDROCHLORIDE AND ACETAMINOPHEN 1 TABLET: 5; 325 TABLET ORAL at 17:50

## 2018-03-30 RX ADMIN — FLUTICASONE PROPIONATE AND SALMETEROL 1 PUFF: 50; 250 POWDER RESPIRATORY (INHALATION) at 08:28

## 2018-03-30 RX ADMIN — CEFEPIME HYDROCHLORIDE 2000 MG: 2 INJECTION, SOLUTION INTRAVENOUS at 17:51

## 2018-03-30 RX ADMIN — DICLOFENAC 2 G: 10 GEL TOPICAL at 21:40

## 2018-03-30 RX ADMIN — ASPIRIN 81 MG: 81 TABLET, COATED ORAL at 08:29

## 2018-03-30 RX ADMIN — PANTOPRAZOLE SODIUM 40 MG: 40 TABLET, DELAYED RELEASE ORAL at 05:14

## 2018-03-30 RX ADMIN — DICLOFENAC 2 G: 10 GEL TOPICAL at 08:29

## 2018-03-30 RX ADMIN — INSULIN GLARGINE 20 UNITS: 100 INJECTION, SOLUTION SUBCUTANEOUS at 21:38

## 2018-03-30 RX ADMIN — IRON SUCROSE 200 MG: 20 INJECTION, SOLUTION INTRAVENOUS at 17:48

## 2018-03-30 RX ADMIN — OXYCODONE HYDROCHLORIDE AND ACETAMINOPHEN 1 TABLET: 5; 325 TABLET ORAL at 08:28

## 2018-03-30 RX ADMIN — METOPROLOL TARTRATE 50 MG: 50 TABLET ORAL at 08:29

## 2018-03-30 RX ADMIN — PREGABALIN 100 MG: 100 CAPSULE ORAL at 21:38

## 2018-03-30 RX ADMIN — CHLORHEXIDINE GLUCONATE 15 ML: 1.2 RINSE ORAL at 21:38

## 2018-03-30 RX ADMIN — PREGABALIN 100 MG: 100 CAPSULE ORAL at 08:29

## 2018-03-30 RX ADMIN — ATORVASTATIN CALCIUM 40 MG: 40 TABLET, FILM COATED ORAL at 17:48

## 2018-03-30 RX ADMIN — PREGABALIN 100 MG: 100 CAPSULE ORAL at 17:48

## 2018-03-30 RX ADMIN — MUPIROCIN 1 APPLICATION: 20 OINTMENT TOPICAL at 08:28

## 2018-03-30 RX ADMIN — FLUTICASONE PROPIONATE AND SALMETEROL 1 PUFF: 50; 250 POWDER RESPIRATORY (INHALATION) at 21:39

## 2018-03-30 RX ADMIN — INSULIN LISPRO 5 UNITS: 100 INJECTION, SOLUTION INTRAVENOUS; SUBCUTANEOUS at 17:52

## 2018-03-30 RX ADMIN — METOPROLOL TARTRATE 50 MG: 50 TABLET ORAL at 21:38

## 2018-03-30 RX ADMIN — LIDOCAINE 1 PATCH: 50 PATCH TOPICAL at 08:28

## 2018-03-30 RX ADMIN — BENZONATATE 100 MG: 100 CAPSULE ORAL at 02:27

## 2018-03-30 RX ADMIN — INSULIN LISPRO 1 UNITS: 100 INJECTION, SOLUTION INTRAVENOUS; SUBCUTANEOUS at 08:30

## 2018-03-30 RX ADMIN — INSULIN LISPRO 1 UNITS: 100 INJECTION, SOLUTION INTRAVENOUS; SUBCUTANEOUS at 21:41

## 2018-03-30 RX ADMIN — DICLOFENAC 2 G: 10 GEL TOPICAL at 17:51

## 2018-03-30 RX ADMIN — BENZONATATE 100 MG: 100 CAPSULE ORAL at 21:38

## 2018-03-30 RX ADMIN — FERROUS SULFATE TAB 325 MG (65 MG ELEMENTAL FE) 325 MG: 325 (65 FE) TAB at 08:29

## 2018-03-30 NOTE — PLAN OF CARE
Problem: OCCUPATIONAL THERAPY ADULT  Goal: Performs self-care activities at highest level of function for planned discharge setting  See evaluation for individualized goals  Treatment Interventions: ADL retraining, Functional transfer training, Endurance training, Patient/family training, Cognitive reorientation, Equipment evaluation/education, Compensatory technique education, Continued evaluation, Energy conservation (cognitive assess/tx  LSO edu)          See flowsheet documentation for full assessment, interventions and recommendations  Outcome: Progressing  Limitation: Decreased ADL status, Decreased Safe judgement during ADL, Decreased cognition, Decreased endurance, Decreased self-care trans, Decreased high-level ADLs  Prognosis: Fair  Assessment: Pt  seen for OT treatment session to address don/dofffing brace, LB dressing, edu on spinal precautions, transfers, functional mobility, bed mobility  Pt  pleasant and agreeable to participate and overall, pt  tolerated session well  Pt  functioning at a (S) level for functional mobility and transfers with the use of Rw  (s) was provided as pt  Highly distractible requiring v c  For safe transfers, safe RW use, and decreased attention to task however, no physical A was needed  Pt  Educated on spinal precautions and donning brace  Pt  Able to recall precautions with v c  For BLT acronym  Pt  Able to verbalize how to don brace however required min A when attempting as pt  Had difficulty threading around back  Pt  Completed LB dressing at a (s) level with v c  For spinal precautions  Pt  Mod I for bed mobility  Pt  Will cont  To benefit from OT services in acute care  Continue to rec  Home WITH 24 HOUR SUPERVISION and home OT     OT Discharge Recommendation: Home OT (24 hour S)  OT - OK to Discharge:  Yes

## 2018-03-30 NOTE — OCCUPATIONAL THERAPY NOTE
[address] Progress Note     Patient Name: Kartik Juarez  Today's Date: 3/30/2018  Problem List  Patient Active Problem List   Diagnosis    Type 2 diabetes mellitus with diabetic neuropathy, with long-term current use of insulin ([facility] )    Hypertension    GERD (gastroesophageal reflux disease)    Hyperlipidemia    Opioid dependence ([facility] )    Insomnia    Iron deficiency anemia    Abdominal aortic aneurysm (AAA) without rupture (HCC)    Chronic back pain    Chronic diastolic [facility]F [facility]congestive [facility] failure) [facility]    COPD (chronic obstructive pulmonary disease) ([facility] )    Neuropathy    Closed fracture of transverse process of lumbar vertebra with routine healing    Chest pain    Disease of cardiovascular system           03/30/18 1615   Restrictions/Precautions   Weight Bearing Precautions Per Order No   Other Precautions Spinal precautions; Fall Risk;Telemetry;Hard of hearing   Pain Assessment   Pain Assessment No/denies pain   Pain Score No Pain   ADL   UB Dressing Assistance 4  Minimal Assistance   UB Dressing Deficit (brace)   LB Dressing Assistance 5  Supervision/Setup   LB Dressing Deficit Supervision/safety;Verbal cueing; Increased time to complete   Bed Mobility   Supine to Sit 6  Modified independent   Additional items HOB elevated   Sit to Supine 6  Modified independent   Additional items HOB elevated   Transfers   Sit to Stand 5  Supervision   Additional items Verbal cues; Impulsive   Stand to Sit 5  Supervision   Additional items Verbal cues; Impulsive   Functional Mobility   Functional Mobility 5  Supervision   Additional Comments ~150', v c     Additional items Rolling walker   Cognition   Overall Cognitive Status Impaired   Arousal/Participation Cooperative   Attention Attends with cues to redirect   Memory Decreased short term memory;Decreased recall of precautions   Following Commands Follows one step commands without difficulty   Activity Tolerance   Activity Tolerance Patient tolerated treatment well   Medical Staff Made Aware yes, RN cleared for session   Assessment   Assessment Pt  seen for OT treatment session to address don/dofffing brace, LB dressing, edu on spinal precautions, transfers, functional mobility, bed mobility  Pt  pleasant and agreeable to participate and overall, pt  tolerated session well  Pt  functioning at a (S) level for functional mobility and transfers with the use of Rw  (s) was provided as pt  Highly distractible requiring v c  For safe transfers, safe RW use, and decreased attention to task however, no physical A was needed  Pt  Educated on spinal precautions and donning brace  Pt  Able to recall precautions with v c  For BLT acronym  Pt  Able to verbalize how to don brace however required min A when attempting as pt  Had difficulty threading around back  Pt  Completed LB dressing at a (s) level with v c  For spinal precautions  Pt  Mod I for bed mobility  Pt  Will cont  To benefit from OT services in acute care  Continue to rec  Home WITH 24 HOUR SUPERVISION and home OT   Plan   Treatment Interventions ADL retraining;Functional transfer training; Endurance training;Patient/family training;Cognitive reorientation;Equipment evaluation/education; Compensatory technique education;Continued evaluation; Energy conservation  (cognitive assess/tx   LSO edu)   Goal Expiration Date 04/07/18   OT Frequency 3-5x/wk   Recommendation   OT Discharge Recommendation Home OT  (24 hour S)   OT - OK to Discharge Yes   Modified [place] Scale   Modified [place] Scale 3        RONNIE Richard, OTR/L

## 2018-03-30 NOTE — PLAN OF CARE
CARDIOVASCULAR - ADULT     Maintains optimal cardiac output and hemodynamic stability Progressing     Absence of cardiac dysrhythmias or at baseline rhythm Progressing        DISCHARGE PLANNING - CARE MANAGEMENT     Discharge to post-acute care or home with appropriate resources Progressing        METABOLIC, FLUID AND ELECTROLYTES - ADULT     Electrolytes maintained within normal limits Progressing     Fluid balance maintained Progressing     Glucose maintained within target range Progressing        Nutrition/Hydration-ADULT     Nutrient/Hydration intake appropriate for improving, restoring or maintaining nutritional needs Progressing        Potential for Falls     Patient will remain free of falls Progressing        RESPIRATORY - ADULT     Achieves optimal ventilation and oxygenation Progressing

## 2018-03-30 NOTE — PHYSICAL THERAPY NOTE
Physical Therapy Cancellation Note          Attempted to see PT for PT treatment, however, pt currently off floor for CAT scan  Will continue to follow pt      Akhil Murray, PT

## 2018-03-30 NOTE — PROGRESS NOTES
Progress Note - Cardiothoracic Surgery   [facility]  79 y o  male MRN: [id number]  Unit/Bed#: [facility] 522-01 Encounter: [id number]      24 Hour Events: Vascular studies reported  CT chest for today  Leukocytosis this AM, afebrile, CXR (-), blood cx & sputum cx ordered  C/o persistent dry cough  Denies further angina on nitro gtt  Denies other major cardiac symptomatology  Still agreeable to sx, all questions addressed during encounter, to be evaluate by Dr Jimi Johnson today      Medications:   Scheduled Meds:  Current Facility-Administered Medications:  acetaminophen 650 mg Oral Q4H PRN Rk Arrieta MD    albuterol 2 puff Inhalation Q4H PRN Rk Arrieta MD    aspirin 81 mg Oral Daily Rk Arrieta MD    atorvastatin 40 mg Oral Daily With Alleantia MD Carlos    benzonatate 100 mg Oral TID PRN Lara Mejia, DO    cefepime 2,000 mg Intravenous Q12H Ted Rivera, DO    chlorhexidine 15 mL Swish & Spit Q12H [facility] Fabian Torres PA-C    diclofenac sodium 2 g Topical 4x Daily Bhupendra Gaming MD    ferrous sulfate 325 mg Oral Daily With Breakfast Rk Arrieta MD    fluticasone-salmeterol 1 puff Inhalation Q12H [facility] Rk Arrieta MD    hydrALAZINE 5 mg Intravenous Q6H PRN Eron Lovelace DO    insulin glargine 20 Units Subcutaneous HS Lara Mejia, DO    insulin lispro 1-5 Units Subcutaneous HS Rk Arrieta MD    insulin lispro 1-6 Units Subcutaneous TID [facility] Rk Arrieta MD    insulin lispro 9 Units Subcutaneous TID [facility] Lara Mejia, DO    iron sucrose 200 mg Intravenous Once Eron Lovelace DO    lidocaine 1 patch Transdermal Daily Bhupendra Gaming MD    metoprolol tartrate 50 mg Oral Q12H [facility] Eron Lovelace DO    mupirocin 1 application Nasal C59F [facility] Francy Rivera, PASKYE    nitroglycerin 0 4 mg Sublingual Q5 Min PRN Rk Arrieta MD    nitroGLYcerin 5-200 mcg/min Intravenous Titrated Eron Lovelace DO Last Rate: Stopped (03/29/18 1441)   oxyCODONE-acetaminophen 1 tablet Oral Q6H PRN Rk Arrieta, MD    pantoprazole 40 mg Oral Early Morning Mehreen Wade MD    pregabalin 100 mg Oral TID Mehreen Wade MD    sodium chloride 50 mL/hr Intravenous Continuous Rubén Milton MD Last Rate: Stopped (03/29/18 1004)     Continuous Infusions:  nitroGLYcerin 5-200 mcg/min Last Rate: Stopped (03/29/18 1441)   sodium chloride 50 mL/hr Last Rate: Stopped (03/29/18 1004)       Results:     Results from last 7 days  Lab Units 03/30/18  0511 03/29/18  0432 03/27/18  1903   WBC Thousand/uL 37 64* 10 06 13 27*   HEMOGLOBIN g/dL 10 8* 10 6* 10 2*   HEMATOCRIT % 34 9* 34 5* 33 1*   PLATELETS Thousands/uL 326 317 316       Results from last 7 days  Lab Units 03/30/18  0511 03/29/18  0432 03/27/18  1903   SODIUM mmol/L 134* 138 139   POTASSIUM mmol/L 5 3 4 7 3 5   CHLORIDE mmol/L 103 106 105   CO2 mmol/L 28 27 27   BUN mg/dL 35* 25 15   CREATININE mg/dL 1 77* 1 33* 1 02   GLUCOSE RANDOM mg/dL 143* 215* 73   CALCIUM mg/dL 8 3 8 4 8 8           Studies:     Cardiac Cath 3/29: 60% distal LM, 60% mid LAD, 60% ostial D1, 80% OM1, 85% prox RCA    TTE 3/28: EF 60%, no RWMA, grade 1 DD, LA mildly dilated, mild AS    Vein Mapping 3/29: adequate b/l    Carotid Ultrasound 3/29: 50-69% GABBY stenosis, <48% LICA stenosis, antegrade flow b/l, no subclavian disease b/l    CXR 3/27: no disease    EKG 3/28: NSR, rate 64    CT chest w/o: ordered 3/29    Vitals:   Vitals:    03/29/18 2258 03/30/18 0153 03/30/18 0158 03/30/18 0711   BP: 103/51 112/60 157/83 133/60   BP Location:    Right arm   Pulse: 78 72 102 85   Resp: 20 20 20 20   Temp: 98 6 °F (37 °C) 98 6 °F (37 °C) 99 1 °F (37 3 °C) 99 1 °F (37 3 °C)   TempSrc:    Oral   SpO2: 93% 97% 98% 95%   Weight:       Height:           Physical Exam:  HEENT/NECK:  PERRLA  No jugular venous distention  Cardiac: Regular rate and rhythm  Pulmonary:  Breath clear bilaterally  Abdomen:  Non-tender, Non-distended  Positive bowel sounds  Lower extremities: Extremities warm/dry    Radial/PT/DP pulses 2+ bilaterally  Trace edema B/L  Neuro: Alert and oriented X 3  Sensation is grossly intact  No focal deficits  Skin: Warm/Dry, without rashes or lesions  Assessment:  Patient Active Problem List   Diagnosis    Type 2 diabetes mellitus with diabetic neuropathy, with long-term current use of insulin ([facility] )    Hypertension    GERD (gastroesophageal reflux disease)    Hyperlipidemia    Opioid dependence ([facility] )    Insomnia    Iron deficiency anemia    [facility] ([facility] ([facility]    Chronic back pain    Chronic diastolic CHF (congestive heart failure) ([facility])    COPD (chronic obstructive pulmonary disease) ([facility] )    Neuropathy    Closed fracture of transverse process of lumbar vertebra with routine healing    Chest pain    Disease of cardiovascular system     Severe coronary artery disease;  Ongoing CABG workup    Plan:  Patient agreeable to proceed with surgery;   Pre-op work-up underway   CT chest w/o for today  Leukocytosis   Afebrile   CXR (-)   Blood cx & sputum cx ordered   If has infx will delay sx timing  Continue Mupirocin 2% nasal ointment q 12 hrs  Continue topical chlorhexidine bath and mouth rise  Continue nitro infusion per cardiology  coronary artery bypass grafting tentatively scheduled for 4/2 with RASHI Love  TF pending leukocytosis/blood cx results over weekend    SIGNATURE: Leatha Wolf PA-C  DATE: March 30, 2018  TIME: 10:55 AM

## 2018-03-30 NOTE — PROGRESS NOTES
Cardiology Progress Note - Blaine Lambert  79 y o  male MRN: [id number]    Unit/Bed#: [facility] 522-01 Encounter: [id number]      Assessment:  Principal Problem:    Chest pain  Active Problems:    Type 2 diabetes mellitus with diabetic neuropathy, with long-term current use of insulin (HCC)    Hypertension    GERD (gastroesophageal reflux disease)    Hyperlipidemia    Opioid dependence (HCC)    Insomnia    Iron deficiency anemia    Abdominal aortic aneurysm (AAA) without rupture (HCC)    Chronic back pain    Chronic diastolic CHF (congestive heart failure) (HCC)    COPD (chronic obstructive pulmonary disease) (HCC)    Neuropathy    Closed fracture of transverse process of lumbar vertebra with routine healing    Disease of cardiovascular system      Assessment and plan     Chest pain  - history of NSTEMI in 02/2017, refused catheterization that time, maintained on aspirin, Plavix  - normal EKG and troponins this admission    -Lexiscan NST at [facility] in10/2017-no perfusion defects   - Echo in on 03/28/2018 revealed LVEF 58%, grade 1 diastolic dysfunction, mild AS      Plan   Status post cardiac catheterization on 03/29/2018- 60% distal left main, 60% mid LAD, 60% D1, 80% OM1, 85% prox RCA disease   - undergoing CT surgery evaluation for possible CABG this admission  On my evaluation, patient was reluctant to undergo sx, appears to have agreed for surgery later on, he is tentatively scheduled for CABG on 04/ 218 with Dr Peggy Venegas   - continue aspirin 81,  atorvastatin 40 Hs, metoprolol 25 q 12h  - continue to hold Plavix    - DELLA  Creatinine up to 1 7 from a baseline of 1 3    - could be contrast induced nephropathy, continue gentle hydration with normal saline  Monitor urine output    Leukocytosis  WBC count is 35, same on repeat  Unclear etiology sudden significant leukocytosis, reactive?     Hypertension  Uncontrolled on presentation, continue metoprolol 25 q 12h    Dyslipidemia  Lipid panel 03/2018, total cholesterol is 148, LDL 83, HDL 36, triglyceride 146  On atorvastatin 40 HS     Diabetes with peripheral neuropathy  Uncontrolled, HB A1c is 10 7  On insulin, pregabalin     Closed fracture of transverse process of L2-L4 in 01/2018  - opiate dependence, is on OxyContin at home     AAA  Found on CT in 3/2016, mild ectasia of the proximal descending aorta measuring 3 4 cm  Subjective:   Patient seen and examined bedside  No more episodes of chest pain  He is now off nitro drip denies any other complaints and states he wants to go home  Objective:     Telemetry:  Normal sinus rhythm, 6 beat run of V-tach     Vitals: Blood pressure 150/61, pulse 77, temperature 98 5 °F (36 9 °C), temperature source Oral, resp  rate 19, height 5' 10" (1 778 m), weight 112 kg (247 lb 12 8 oz), SpO2 95 %  , Body mass index is 35 56 kg/m² , Orthostatic Blood Pressures    Flowsheet Row Most Recent Value   Blood Pressure  150/61 filed at 03/30/2018 1235   Patient Position - Orthostatic VS  Lying filed at 03/30/2018 0711            Intake/Output Summary (Last 24 hours) at 03/30/18 1337  Last data filed at 03/30/18 1100   Gross per 24 hour   Intake          1654 65 ml   Output              900 ml   Net           754 65 ml           Physical Exam:    Gen: No acute distress  HEENT: anicteric, mucous membranes moist  Neck: supple, no jugular venous distention, or carotid bruit  Heart: regular, normal s1 and s2, no murmur/rub or gallop  Lungs :clear to auscultation bilaterally, no rales/rhonchi or wheeze  Abdomen: soft nontender, normoactive bowel sounds, no organomegaly  Ext: warm and perfused, normal femoral pulses, no edema, clubbing  Skin: warm, no rashes  Neuro: AAO x 3, no focal findings  Psychiatric: normal affect  Musculoskeletal: no obvious joint deformities      Current Facility-Administered Medications:     acetaminophen (TYLENOL) tablet 650 mg, 650 mg, Oral, Q4H PRN, Jairon Brown MD    albuterol (PROVENTIL HFA,VENTOLIN HFA) inhaler 2 puff, 2 puff, Inhalation, Q4H PRN, Rasheeda Hardy MD, 2 puff at 03/29/18 0347    aspirin (ECOTRIN LOW STRENGTH) EC tablet 81 mg, 81 mg, Oral, Daily, Rasheeda Hardy MD, 81 mg at 03/30/18 6539    atorvastatin (LIPITOR) tablet 40 mg, 40 mg, Oral, Daily With Mohit Mayfield MD, 40 mg at 03/29/18 1650    benzonatate (TESSALON PERLES) capsule 100 mg, 100 mg, Oral, TID PRN, Jennifer Burch DO, 100 mg at 03/30/18 0828    cefepime (MAXIPIME) IVPB (premix) 2,000 mg, 2,000 mg, Intravenous, Q12H, Ted Rivera DO    chlorhexidine (PERIDEX) 0 12 % oral rinse 15 mL, 15 mL, Swish & Spit, Q12H [facility], R[place]    diclofenac sodium (VOLTAREN) 1 % topical gel 2 g, 2 g, Topical, 4x Daily, Bhupendra Gaming MD, 2 g at 03/30/18 3713    ferrous sulfate tablet 325 mg, 325 mg, Oral, Daily With Breakfast, Rasheeda Hardy MD, 325 mg at 03/30/18 0829    fluticasone-salmeterol (ADVAIR) 250-50 mcg/dose inhaler 1 puff, 1 puff, Inhalation, Q12H [facility], Rasheeda Hardy MD, 1 puff at 03/30/18 0828    hydrALAZINE (APRESOLINE) injection 5 mg, 5 mg, Intravenous, Q6H PRN, Ted Rivera DO    insulin glargine (LANTUS) subcutaneous injection 20 Units, 20 Units, Subcutaneous, HS, Jennifer Burch DO, 20 Units at 03/29/18 2233    insulin lispro (HumaLOG) 100 units/mL subcutaneous injection 1-5 Units, 1-5 Units, Subcutaneous, HS, Rasheeda Hardy MD, 2 Units at 03/29/18 2234    insulin lispro (HumaLOG) 100 units/mL subcutaneous injection 1-6 Units, 1-6 Units, Subcutaneous, TID AC, 1 Units at 03/30/18 0830 **AND** Fingerstick Glucose (POCT), , , TID AC, Rasheeda Hardy MD    insulin lispro (HumaLOG) 100 units/mL subcutaneous injection 9 Units, 9 Units, Subcutaneous, TID Juan Alberto Rivera DO, 9 Units at 03/30/18 0831    iron sucrose (VENOFER) 200 mg in sodium chloride 0 9 % 100 mL IVPB, 200 mg, Intravenous, Once, Ted Rivera DO    lidocaine (LIDODERM) 5 % patch 1 patch, 1 patch, Transdermal, Daily, Bhupendra Gaming MD, 1 patch at 03/30/18 0828    metoprolol tartrate (LOPRESSOR) tablet 50 mg, 50 mg, Oral, Q12H [address], Ted Rivera DO, 50 mg at 03/30/18 0829    mupirocin (BACTROBAN) 2 % nasal ointment 1 application, 1 application, Nasal, G57P [address], Francy Rivera PA-C, 1 application at 96/97/21 0828    nitroglycerin (NITROSTAT) SL tablet 0 4 mg, 0 4 mg, Sublingual, Q5 Min PRN, Rk Arrieta MD, 0 4 mg at 03/29/18 4771    nitroGLYcerin (TRIDIL) 50 mg in 250 mL infusion (premix), 5-200 mcg/min, Intravenous, Titrated, Eron Lovelace DO, Stopped at 03/29/18 1441    oxyCODONE-acetaminophen (PERCOCET) 5-325 mg per tablet 1 tablet, 1 tablet, Oral, Q6H PRN, Rk Arrieta MD, 1 tablet at 03/30/18 0828    pantoprazole (PROTONIX) EC tablet 40 mg, 40 mg, Oral, Early Morning, Rk Arrieta MD, 40 mg at 03/30/18 0514    pregabalin (LYRICA) capsule 100 mg, 100 mg, Oral, TID, Rk Arrieta MD, 100 mg at 03/30/18 7541    sodium chloride infusion 0 45 %, 50 mL/hr, Intravenous, Continuous, Javier Myles MD, Stopped at 03/29/18 1004    Labs & Results:    Lab Results   Component Value Date    CKTOTAL 60 01/12/2015    CKTOTAL 261 (H) 09/14/2014    CKMBINDEX 3 6 (H) 09/14/2014    TROPONINI <0 02 03/28/2018    TROPONINI <0 02 03/28/2018    TROPONINI <0 02 03/27/2018       Lab Results   Component Value Date    GLUCOSE 143 (H) 03/30/2018    CALCIUM 8 3 03/30/2018     (L) 03/30/2018    K 5 3 03/30/2018    CO2 28 03/30/2018     03/30/2018    BUN 35 (H) 03/30/2018    CREATININE 1 77 (H) 03/30/2018       Lab Results   Component Value Date    WBC 38 90 (HH) 03/30/2018    HGB 10 5 (L) 03/30/2018    HCT 33 8 (L) 03/30/2018    MCV 74 (L) 03/30/2018     03/30/2018           Lab Results   Component Value Date    CHOL 148 03/28/2018    CHOL 171 03/17/2017     Lab Results   Component Value Date    HDL 36 (L) 03/28/2018    HDL 48 03/17/2017     Lab Results   Component Value Date    LDLCALC 83 03/28/2018    LDLCALC 109 (H) 03/17/2017 Lab Results   Component Value Date    TRIG 146 03/28/2018    TRIG 70 03/17/2017       Lab Results   Component Value Date    ALT 16 03/27/2018    AST 10 03/27/2018

## 2018-03-30 NOTE — PROGRESS NOTES
IM Residency Progress Note   Unit/Bed#: [facility] 522-01 Encounter: [id number]  SOD Team C       Magui Cevallos  79 y o  male [id number]    Hospital Stay Days: 2      Assessment/Plan:    Principal Problem:    CAD, three-vessel disease  S/p Cath 3/29/2018: Significant epicardial CAD as described w/ dLMCA, OM and RCA disease  Cardiac Surgery consulted --> CABG recommended during this admission, although patients preference is going home to have procedure done in the near future  Chest pain is improved  Telemetry monitorin run of Vtach overnight  Continue aspirin Plavix and statin  Troponin were negative x3    NSVT  6 run of Vtach overnight  Metoprolol IV 5 mg p r n  Continue to monitor    Acute kidney injury  Creatinine baseline is 1 1   Creatinine today's 1 77, differential includes:  Contrast related versus pre renal  Will give some IV fluids:  Normal saline 0 9% 75 cc/hour  Will hold ACE-inhibitor    Leukocytosis  WBC  today trended up from 13 to 37  Differential includes:  Acute bronchitis since persistent nonproductive cough from admission  Chest x-ray shows no consolidation so less likely pneumonia  Could be multifactorial as well reactive after procedure  Patient is not having diarrhea so less likely C diff  Will continue to monitor, remains afebrile    chronic diastolic heart failure  Grade 1 diastolic dysfunction, without exacerbation  continue  home meds aspirin Lipitor Plavix metoprolol 25 mg Q 12 lisinopril 5 mg daily  Echocardiogram :  Ejection fraction 60% no regional wall abnormalities  Grade 1 diastolic dysfunction    Active Problems:    Type 2 diabetes mellitus with diabetic neuropathy, with long-term current use of insulin ([facility])  A1c above 11, likely noncompliant with insulin at home  Supposed to be on Lantus 45 units nighttime and lispro 18 units 3 times daily with meals  Continue Lantus 20 units at nighttime plus lispro 9 units 3 times a day with meals plus sliding scale insulin  Will further adjust based on the readings      Hypertension  At better controlled today  Require nitro drip momentarily right after catheterization  Home beta-blocker was increased to metoprolol tartrate 50 mg twice daily  Will hold lisinopril 5 mg daily in the setting of acute kidney injury      GERD (gastroesophageal reflux disease)  Continue home pantoprazole 40 mg daily      Hyperlipidemia  Continue home statin      Opioid dependence ([facility] [address] )  Likely related lumbar radiculopathy  Percocet as needed       Iron deficiency anemia  Fecal occult blood test negative  Her hemoglobin remained stable 10 6  Item panel confirm iron deficient anemia  Will likely benefit from IV iron  At  discharge okay to continue with ferrous sulfate 325 daily with breakfast      Abdominal aortic aneurysm (AAA) without rupture (HCC)  No surgical intervention recommended at this time  Continue to monitor      [facility]  Not exacerbated  Continue with albuterol two puff q 4 hours p r n  Continue with Advair 250/50 mcg 1 puff q 12 hours      Neuropathy  Continue home pre couple in 100 mg 3 times daily      Closed fracture of transverse process of lumbar vertebra with routine healing  Continue home Percocet q 6 hours p r n  as needed for moderate to severe pain      Disposition:   Continue medical care   PT recommendations:  Home PT      Subjective:   Patient seen examined bedside  Complains this morning of from for persistent cough since admission, nonproductive  He is afebrile this time  Denies shortness of breath chest pain, denies lightheadedness dizziness  No abdominal pain either, denies nausea vomiting diarrhea  Last bowel movement was 2 days ago        Vitals: Temp (24hrs), Av 7 °F (37 1 °C), Min:97 7 °F (36 5 °C), Max:99 4 °F (37 4 °C)  Current: Temperature: 99 1 °F (37 3 °C)  Vitals:    18 2258 18 0153 18 0158 18 0711   BP: 103/51 112/60 157/83 133/60   BP Location:    Right arm   Pulse: 78 72 102 85   Resp: 20 20 20 20   Temp: 98 6 °F (37 °C) 98 6 °F (37 °C) 99 1 °F (37 3 °C) 99 1 °F (37 3 °C)   TempSrc:    Oral   SpO2: 93% 97% 98% 95%   Weight:       Height:        Body mass index is 35 56 kg/m²  I/O last 24 hours: In: 1829 7 [P O :600; I V :1229 7]  Out: 1175 [Urine:1175]      Physical Exam: /60 (BP Location: Right arm)   Pulse 85   Temp 99 1 °F (37 3 °C) (Oral)   Resp 20   Ht 5' 10" (1 778 m)   Wt 112 kg (247 lb 12 8 oz)   SpO2 95%   BMI 35 56 kg/m²     General Appearance:    Alert, cooperative, no distress, appears stated age   Head:    Normocephalic, without obvious abnormality, atraumatic   Eyes:    PERRL, conjunctiva/corneas clear, EOM's intact, fundi     benign, both eyes        Neck:   Supple, no JVD   Lungs:     Clear to auscultation bilaterally, respirations unlabored   Heart:    Regular rate and rhythm, S1 and S2 normal, no murmur, rub   or gallop   Abdomen:     Soft, non-tender, bowel sounds active all four quadrants,     no masses, no organomegaly   Extremities:   Extremities normal, atraumatic, no cyanosis or edema   Neurologic:   CNII-XII intact   Normal strength, sensation and reflexes       throughout        Invasive Devices     Peripheral Intravenous Line            Peripheral IV 03/28/18 Left Antecubital 2 days                          Labs:   Recent Results (from the past 24 hour(s))   Fingerstick Glucose (POCT)    Collection Time: 03/29/18  8:15 AM   Result Value Ref Range    POC Glucose 249 (H) 65 - 140 mg/dl   Occult blood 1-3, stool    Collection Time: 03/29/18  8:50 AM   Result Value Ref Range    Fecal Occult Blood Diagnostic Negative Negative    Fecal Occult Blood Diagnostic 2  Negative    Fecal Occult Blood Diagnostic 3  Negative   Fingerstick Glucose (POCT)    Collection Time: 03/29/18 10:51 AM   Result Value Ref Range    POC Glucose 122 65 - 140 mg/dl   Urinalysis    Collection Time: 03/29/18  2:19 PM   Result Value Ref Range    Color, UA Yellow     Clarity, UA Clear     Specific Gravity, UA 1 033 (H) 1 003 - 1 030    pH, UA 6 0 4 5 - 8 0    Leukocytes, UA Negative Negative    Nitrite, UA Negative Negative    Protein, UA Negative Negative mg/dl    Glucose, UA Negative Negative mg/dl    Ketones, UA Negative Negative mg/dl    Urobilinogen, UA 1 0 0 2, 1 0 E U /dl E U /dl    Bilirubin, UA Negative Negative    Blood, UA Negative Negative   Type and screen    Collection Time: 03/29/18  2:28 PM   Result Value Ref Range    ABO Grouping A     Rh Factor Positive     Antibody Screen Negative     Specimen Expiration Date [id number]    Fingerstick Glucose (POCT)    Collection Time: 03/29/18  4:05 PM   Result Value Ref Range    POC Glucose 83 65 - 140 mg/dl   Fingerstick Glucose (POCT)    Collection Time: 03/29/18  4:55 PM   Result Value Ref Range    POC Glucose 144 (H) 65 - 140 mg/dl   Fingerstick Glucose (POCT)    Collection Time: 03/29/18  9:03 PM   Result Value Ref Range    POC Glucose 257 (H) 65 - 140 mg/dl   Fingerstick Glucose (POCT)    Collection Time: 03/30/18  1:56 AM   Result Value Ref Range    POC Glucose 216 (H) 65 - 140 mg/dl   Basic metabolic panel    Collection Time: 03/30/18  5:11 AM   Result Value Ref Range    Sodium 134 (L) 136 - 145 mmol/L    Potassium 5 3 3 5 - 5 3 mmol/L    Chloride 103 100 - 108 mmol/L    CO2 28 21 - 32 mmol/L    Anion Gap 3 (L) 4 - 13 mmol/L    BUN 35 (H) 5 - 25 mg/dL    Creatinine 1 77 (H) 0 60 - 1 30 mg/dL    Glucose 143 (H) 65 - 140 mg/dL    Calcium 8 3 8 3 - 10 1 mg/dL    eGFR 39 ml/min/1 73sq m   CBC    Collection Time: 03/30/18  5:11 AM   Result Value Ref Range    WBC 37 64 (HH) 4 31 - 10 16 Thousand/uL    RBC 4 74 3 88 - 5 62 Million/uL    Hemoglobin 10 8 (L) 12 0 - 17 0 g/dL    Hematocrit 34 9 (L) 36 5 - 49 3 %    MCV 74 (L) 82 - 98 fL    MCH 22 8 (L) 26 8 - 34 3 pg    MCHC 30 9 (L) 31 4 - 37 4 g/dL    RDW 20 9 (H) 11 6 - 15 1 %    Platelets 561 150 - 140 Thousands/uL    MPV 10 6 8 9 - 12 7 fL Fingerstick Glucose (POCT)    Collection Time: 03/30/18  6:22 AM   Result Value Ref Range    POC Glucose 164 (H) 65 - 140 mg/dl       Radiology Results: I have personally reviewed pertinent reports  Xr Chest 2 Views    Result Date: 3/27/2018  Impression: No acute cardiopulmonary disease  Workstation performed: [id number]     Xr Lumbar Spine 2 Or 3 Views    Result Date: 3/27/2018  Impression: No acute fracture  Workstation performed: [id number]       Other Diagnostic Testing:   I have personally reviewed pertinent reports          Active Meds:   Current Facility-Administered Medications   Medication Dose Route Frequency    acetaminophen (TYLENOL) tablet 650 mg  650 mg Oral Q4H PRN    albuterol (PROVENTIL HFA,VENTOLIN HFA) inhaler 2 puff  2 puff Inhalation Q4H PRN    aspirin (ECOTRIN LOW STRENGTH) EC tablet 81 mg  81 mg Oral Daily    atorvastatin (LIPITOR) tablet 40 mg  40 mg Oral Daily With Dinner    benzonatate (TESSALON PERLES) capsule 100 mg  100 mg Oral TID PRN    diclofenac sodium (VOLTAREN) 1 % topical gel 2 g  2 g Topical 4x Daily    ferrous sulfate tablet 325 mg  325 mg Oral Daily With Breakfast    fluticasone-salmeterol (ADVAIR) 250-50 mcg/dose inhaler 1 puff  1 puff Inhalation Q12H [address]    hydrALAZINE (APRESOLINE) injection 5 mg  5 mg Intravenous Q6H PRN    insulin glargine (LANTUS) subcutaneous injection 20 Units  20 Units Subcutaneous HS    insulin lispro (HumaLOG) 100 units/mL subcutaneous injection 1-5 Units  1-5 Units Subcutaneous HS    insulin lispro (HumaLOG) 100 units/mL subcutaneous injection 1-6 Units  1-6 Units Subcutaneous TID AC    insulin lispro (HumaLOG) 100 units/mL subcutaneous injection 9 Units  9 Units Subcutaneous TID AC    lidocaine (LIDODERM) 5 % patch 1 patch  1 patch Transdermal Daily    metoprolol tartrate (LOPRESSOR) tablet 50 mg  50 mg Oral Q12H RHONDA    mupirocin (BACTROBAN) 2 % nasal ointment 1 application  1 application Nasal I35V [address]    nitroglycerin (NITROSTAT) SL tablet 0 4 mg  0 4 mg Sublingual Q5 Min PRN    nitroGLYcerin (TRIDIL) 50 mg in 250 mL infusion (premix)  5-200 mcg/min Intravenous Titrated    oxyCODONE-acetaminophen (PERCOCET) 5-325 mg per tablet 1 tablet  1 tablet Oral Q6H PRN    pantoprazole (PROTONIX) EC tablet 40 mg  40 mg Oral Early Morning    pregabalin (LYRICA) capsule 100 mg  100 mg Oral TID    sodium chloride 0 9 % infusion  100 mL/hr Intravenous Once    sodium chloride infusion 0 45 %  50 mL/hr Intravenous Continuous         VTE Pharmacologic Prophylaxis: Heparin  VTE Mechanical Prophylaxis: sequential compression device    Beba Lomax DO

## 2018-03-31 LAB
APTT PPP: 22 SECONDS (ref 23–35)
BASOPHILS # BLD AUTO: 0.06 THOUSANDS/ΜL (ref 0–0.1)
BASOPHILS NFR BLD AUTO: 0 % (ref 0–1)
EOSINOPHIL # BLD AUTO: 0.11 THOUSAND/ΜL (ref 0–0.61)
EOSINOPHIL NFR BLD AUTO: 1 % (ref 0–6)
ERYTHROCYTE [DISTWIDTH] IN BLOOD BY AUTOMATED COUNT: 20.8 % (ref 11.6–15.1)
GLUCOSE SERPL-MCNC: 220 MG/DL (ref 65–140)
GLUCOSE SERPL-MCNC: 253 MG/DL (ref 65–140)
GLUCOSE SERPL-MCNC: 261 MG/DL (ref 65–140)
GLUCOSE SERPL-MCNC: 278 MG/DL (ref 65–140)
GLUCOSE SERPL-MCNC: 295 MG/DL (ref 65–140)
GLUCOSE SERPL-MCNC: 428 MG/DL (ref 65–140)
HCT VFR BLD AUTO: 33 % (ref 36.5–49.3)
HGB BLD-MCNC: 9.8 G/DL (ref 12–17)
INR PPP: 1.18 (ref 0.86–1.16)
LYMPHOCYTES # BLD AUTO: 1.55 THOUSANDS/ΜL (ref 0.6–4.47)
LYMPHOCYTES NFR BLD AUTO: 7 % (ref 14–44)
MCH RBC QN AUTO: 22.5 PG (ref 26.8–34.3)
MCHC RBC AUTO-ENTMCNC: 29.7 G/DL (ref 31.4–37.4)
MCV RBC AUTO: 76 FL (ref 82–98)
MONOCYTES # BLD AUTO: 2.73 THOUSAND/ΜL (ref 0.17–1.22)
MONOCYTES NFR BLD AUTO: 12 % (ref 4–12)
NEUTROPHILS # BLD AUTO: 18.94 THOUSANDS/ΜL (ref 1.85–7.62)
NEUTS SEG NFR BLD AUTO: 80 % (ref 43–75)
NRBC BLD AUTO-RTO: 0 /100 WBCS
PLATELET # BLD AUTO: 287 THOUSANDS/UL (ref 149–390)
PMV BLD AUTO: 9.8 FL (ref 8.9–12.7)
PROTHROMBIN TIME: 15.1 SECONDS (ref 12.1–14.4)
RBC # BLD AUTO: 4.35 MILLION/UL (ref 3.88–5.62)
WBC # BLD AUTO: 23.58 THOUSAND/UL (ref 4.31–10.16)

## 2018-03-31 PROCEDURE — 85025 COMPLETE CBC W/AUTO DIFF WBC: CPT | Performed by: INTERNAL MEDICINE

## 2018-03-31 PROCEDURE — 87205 SMEAR GRAM STAIN: CPT | Performed by: INTERNAL MEDICINE

## 2018-03-31 PROCEDURE — 85610 PROTHROMBIN TIME: CPT | Performed by: PHYSICIAN ASSISTANT

## 2018-03-31 PROCEDURE — 87070 CULTURE OTHR SPECIMN AEROBIC: CPT | Performed by: INTERNAL MEDICINE

## 2018-03-31 PROCEDURE — 99232 SBSQ HOSP IP/OBS MODERATE 35: CPT | Performed by: INTERNAL MEDICINE

## 2018-03-31 PROCEDURE — 85730 THROMBOPLASTIN TIME PARTIAL: CPT | Performed by: PHYSICIAN ASSISTANT

## 2018-03-31 PROCEDURE — 82948 REAGENT STRIP/BLOOD GLUCOSE: CPT

## 2018-03-31 RX ADMIN — LIDOCAINE 1 PATCH: 50 PATCH TOPICAL at 08:55

## 2018-03-31 RX ADMIN — ASPIRIN 81 MG: 81 TABLET, COATED ORAL at 08:54

## 2018-03-31 RX ADMIN — DICLOFENAC 2 G: 10 GEL TOPICAL at 11:50

## 2018-03-31 RX ADMIN — PREGABALIN 100 MG: 100 CAPSULE ORAL at 21:35

## 2018-03-31 RX ADMIN — CHLORHEXIDINE GLUCONATE 15 ML: 1.2 RINSE ORAL at 21:37

## 2018-03-31 RX ADMIN — PREGABALIN 100 MG: 100 CAPSULE ORAL at 08:55

## 2018-03-31 RX ADMIN — INSULIN LISPRO 2 UNITS: 100 INJECTION, SOLUTION INTRAVENOUS; SUBCUTANEOUS at 12:00

## 2018-03-31 RX ADMIN — FLUTICASONE PROPIONATE AND SALMETEROL 1 PUFF: 50; 250 POWDER RESPIRATORY (INHALATION) at 21:36

## 2018-03-31 RX ADMIN — FERROUS SULFATE TAB 325 MG (65 MG ELEMENTAL FE) 325 MG: 325 (65 FE) TAB at 08:05

## 2018-03-31 RX ADMIN — DICLOFENAC 2 G: 10 GEL TOPICAL at 08:56

## 2018-03-31 RX ADMIN — PREGABALIN 100 MG: 100 CAPSULE ORAL at 15:47

## 2018-03-31 RX ADMIN — INSULIN LISPRO 2 UNITS: 100 INJECTION, SOLUTION INTRAVENOUS; SUBCUTANEOUS at 21:36

## 2018-03-31 RX ADMIN — DICLOFENAC 2 G: 10 GEL TOPICAL at 17:39

## 2018-03-31 RX ADMIN — METOPROLOL TARTRATE 50 MG: 50 TABLET ORAL at 08:55

## 2018-03-31 RX ADMIN — DICLOFENAC 2 G: 10 GEL TOPICAL at 21:37

## 2018-03-31 RX ADMIN — CEFEPIME HYDROCHLORIDE 2000 MG: 2 INJECTION, SOLUTION INTRAVENOUS at 12:00

## 2018-03-31 RX ADMIN — INSULIN LISPRO 4 UNITS: 100 INJECTION, SOLUTION INTRAVENOUS; SUBCUTANEOUS at 17:00

## 2018-03-31 RX ADMIN — ATORVASTATIN CALCIUM 40 MG: 40 TABLET, FILM COATED ORAL at 15:47

## 2018-03-31 RX ADMIN — MUPIROCIN 1 APPLICATION: 20 OINTMENT TOPICAL at 21:37

## 2018-03-31 RX ADMIN — SODIUM CHLORIDE 50 ML/HR: 0.45 INJECTION, SOLUTION INTRAVENOUS at 00:18

## 2018-03-31 RX ADMIN — OXYCODONE HYDROCHLORIDE AND ACETAMINOPHEN 1 TABLET: 5; 325 TABLET ORAL at 05:47

## 2018-03-31 RX ADMIN — PANTOPRAZOLE SODIUM 40 MG: 40 TABLET, DELAYED RELEASE ORAL at 05:42

## 2018-03-31 RX ADMIN — INSULIN GLARGINE 20 UNITS: 100 INJECTION, SOLUTION SUBCUTANEOUS at 21:35

## 2018-03-31 RX ADMIN — CEFEPIME HYDROCHLORIDE 2000 MG: 2 INJECTION, SOLUTION INTRAVENOUS at 00:17

## 2018-03-31 RX ADMIN — FLUTICASONE PROPIONATE AND SALMETEROL 1 PUFF: 50; 250 POWDER RESPIRATORY (INHALATION) at 08:56

## 2018-03-31 RX ADMIN — METOPROLOL TARTRATE 50 MG: 50 TABLET ORAL at 21:35

## 2018-03-31 RX ADMIN — CHLORHEXIDINE GLUCONATE 15 ML: 1.2 RINSE ORAL at 08:55

## 2018-03-31 RX ADMIN — INSULIN LISPRO 3 UNITS: 100 INJECTION, SOLUTION INTRAVENOUS; SUBCUTANEOUS at 08:05

## 2018-03-31 RX ADMIN — OXYCODONE HYDROCHLORIDE AND ACETAMINOPHEN 1 TABLET: 5; 325 TABLET ORAL at 19:58

## 2018-03-31 NOTE — PROGRESS NOTES
Rounding completed with SOD Resident Gaby Akbar  All questions and concerns addressed  Plan of care discussed

## 2018-03-31 NOTE — SOCIAL WORK
Patient was provided appointment card for Antonieta Delcid, [facility], Tuesday, 4/3/18 12:45pm  Called PO Care Coordinator Intake phone and left message about discharge

## 2018-03-31 NOTE — PROGRESS NOTES
IM Residency Progress Note   Unit/Bed#: [facility] 522-01 Encounter: [id number]  SOD Team C       Blaine Reason  79 y o  male [id number]    Hospital Stay Days: 3      Assessment/Plan:    Principal Problem:  Community-acquired pneumonia   WBC 37 -->23  Differential includes:  Bacterial bronchitis versus pneumonia,  since persistent nonproductive cough from admission and leukocytosis  Patient improving symptomatically after antibiotics were initiated  Continue with cefepime 2 g Q 12  S patient is discharge transition to Augmentin p o  to complete 10 days      CAD, three-vessel disease  S/p Cath 3/29/2018: Significant epicardial CAD as described w/ dLMCA, OM and RCA disease  Cardiac Surgery consulted --> CABG recommended during this admission, although patients preference is going home to have procedure done in the near future  Chest pain is improved  Telemetry monitoring:  No events  Aspirin 325 mg daily Plavix 75 mg daily  Metoprolol tartrate 50 mg twice daily  Atorvastatin 40 mg daily  Imdur 30 mg daily  Troponin were negative x3    NSVT  6 run of Vtach overnight on March 30, 2018  No more events  Metoprolol IV 5 mg p r n  Continue to monitor    Acute kidney injury  Creatinine baseline is 1 1   Creatinine today's 1 77, differential includes:  Contrast related versus pre renal  Will give some IV fluids:  Normal saline 0 9% 75 cc/hour  Will hold ACE-inhibitor  BMP at discharge to be follow in a week by his primary care doctor    chronic diastolic heart failure  Grade 1 diastolic dysfunction, without exacerbation  continue  home meds aspirin Lipitor Plavix metoprolol 25 mg Q 12 lisinopril 5 mg daily  Echocardiogram :  Ejection fraction 60% no regional wall abnormalities    Grade 1 diastolic dysfunction    Active Problems:    Type 2 diabetes mellitus with diabetic neuropathy, with long-term current use of insulin ([facility])  A1c above 11, likely noncompliant with insulin at home  Supposed to be on Lantus 45 units nighttime and lispro 18 units 3 times daily with meals  Continue Lantus 20 units at nighttime plus lispro 9 units 3 times a day with meals plus sliding scale insulin  Will further adjust based on the readings      Hypertension  /68  Continue metoprolol tartrate 50 mg twice daily  Holding  lisinopril 5 mg daily in the setting of acute kidney injury      GERD (gastroesophageal reflux disease)  Continue home pantoprazole 40 mg daily      Hyperlipidemia  Continue home statin      Opio[facility] ([facility] )  Likely related lumbar radiculopathy  Percocet as needed       Iron deficiency anemia  Fecal occult blood test negative  Her hemoglobin remained stable 10 6  Iron panel confirm iron deficient anemia  Had  IV iron x1  At  discharge okay to continue with ferrous sulfate 325 daily with breakfast      Abdominal aortic aneurysm (AAA) without rupture ([facility] )  No surgical intervention recommended at this time  Continue to monitor      COPD (chronic obstructive pulmonary disease) ([facility] )  Not exacerbated  Continue with albuterol two puff q 4 hours p r n  Continue with Advair 250/50 mcg 1 puff q 12 hours      Neuropathy  Continue home pre couple in 100 mg 3 times daily      Closed fracture of transverse process of lumbar vertebra with routine healing  Continue home Percocet q 6 hours p r n  as needed for moderate to severe pain      Disposition:   Continue medical care  PT recommendations:  Home PT      Subjective:   Patient seen examined bedside  States today feeling great would like to go home but agreeable to stay one more day  Denies lightheadedness dizziness shortness of breath chest pain abdominal pain, cough is improved, remains afebrile, no longer requiring supplemental oxygen    Appetite is good, had bowel movement early on     Vitals: Temp (24hrs), Av 9 °F (37 2 °C), Min:98 1 °F (36 7 °C), Max:99 9 °F (37 7 °C)  Current: Temperature: 99 5 °F (37 5 °C)  Vitals:    18 1521 18 1930 18 4659 03/31/18 0145   BP: 120/59 127/60 122/70 145/68   BP Location:       Pulse: 87 92 76 70   Resp: 19 18 18 18   Temp: 98 3 °F (36 8 °C) 98 1 °F (36 7 °C) 99 9 °F (37 7 °C) 99 5 °F (37 5 °C)   TempSrc: Oral Oral     SpO2: 95% 91% 94% 91%   Weight:       Height:        Body mass index is 35 56 kg/m²  I/O last 24 hours: In: 1970 [P O :770; I V :1000; IV Piggyback:200]  Out: 1200 [Urine:1200]      Physical Exam: /68   Pulse 70   Temp 99 5 °F (37 5 °C)   Resp 18   Ht 5' 10" (1 778 m)   Wt 112 kg (247 lb 12 8 oz)   SpO2 91%   BMI 35 56 kg/m²     General Appearance:    Alert, cooperative, no distress, appears stated age   Head:    Normocephalic, without obvious abnormality, atraumatic   Eyes:    PERRL, conjunctiva/corneas clear, EOM's intact, fundi     benign, both eyes        Neck:   Supple, no JVD   Lungs:     Clear to auscultation bilaterally, respirations unlabored   Heart:    Regular rate and rhythm, S1 and S2 normal, no murmur, rub   or gallop   Abdomen:     Soft, non-tender, bowel sounds active all four quadrants,     no masses, no organomegaly   Extremities:   Extremities normal, atraumatic, no cyanosis or edema   Neurologic:   CNII-XII intact   Normal strength, sensation and reflexes       throughout        Invasive Devices     Peripheral Intravenous Line            Peripheral IV 03/28/18 Left Antecubital 3 days                          Labs:   Recent Results (from the past 24 hour(s))   Fingerstick Glucose (POCT)    Collection Time: 03/30/18 11:03 AM   Result Value Ref Range    POC Glucose 212 (H) 65 - 140 mg/dl   CBC and differential    Collection Time: 03/30/18 11:30 AM   Result Value Ref Range    WBC 38 90 (HH) 4 31 - 10 16 Thousand/uL    RBC 4 59 3 88 - 5 62 Million/uL    Hemoglobin 10 5 (L) 12 0 - 17 0 g/dL    Hematocrit 33 8 (L) 36 5 - 49 3 %    MCV 74 (L) 82 - 98 fL    MCH 22 9 (L) 26 8 - 34 3 pg    MCHC 31 1 (L) 31 4 - 37 4 g/dL    RDW 21 0 (H) 11 6 - 15 1 %    MPV 10 3 8 9 - 12 7 fL Platelets 081 533 - 892 Thousands/uL    nRBC 0 /100 WBCs    Neutrophils Relative 87 (H) 43 - 75 %    Lymphocytes Relative 4 (L) 14 - 44 %    Monocytes Relative 9 4 - 12 %    Eosinophils Relative 0 0 - 6 %    Basophils Relative 0 0 - 1 %    Neutrophils Absolute 33 41 (H) 1 85 - 7 62 Thousands/µL    Lymphocytes Absolute 1 63 0 60 - 4 47 Thousands/µL    Monocytes Absolute 3 37 (H) 0 17 - 1 22 Thousand/µL    Eosinophils Absolute 0 04 0 00 - 0 61 Thousand/µL    Basophils Absolute 0 06 0 00 - 0 10 Thousands/µL   Fingerstick Glucose (POCT)    Collection Time: 03/30/18  4:30 PM   Result Value Ref Range    POC Glucose 335 (H) 65 - 140 mg/dl   Strep Pneumoniae, Urine    Collection Time: 03/30/18  6:13 PM   Result Value Ref Range    Strep pneumoniae antigen, urine Negative Negative   Legionella antigen, urine    Collection Time: 03/30/18  6:13 PM   Result Value Ref Range    Legionella Urinary Antigen Negative Negative   Fingerstick Glucose (POCT)    Collection Time: 03/30/18  9:10 PM   Result Value Ref Range    POC Glucose 209 (H) 65 - 140 mg/dl   Fingerstick Glucose (POCT)    Collection Time: 03/31/18  1:47 AM   Result Value Ref Range    POC Glucose 428 (H) 65 - 140 mg/dl   Fingerstick Glucose (POCT)    Collection Time: 03/31/18  2:45 AM   Result Value Ref Range    POC Glucose 278 (H) 65 - 140 mg/dl   APTT    Collection Time: 03/31/18  4:33 AM   Result Value Ref Range    PTT 22 (L) 23 - 35 seconds   Protime-INR    Collection Time: 03/31/18  4:33 AM   Result Value Ref Range    Protime 15 1 (H) 12 1 - 14 4 seconds    INR 1 18 (H) 0 86 - 1 16   CBC and differential    Collection Time: 03/31/18  5:42 AM   Result Value Ref Range    WBC 23 58 (H) 4 31 - 10 16 Thousand/uL    RBC 4 35 3 88 - 5 62 Million/uL    Hemoglobin 9 8 (L) 12 0 - 17 0 g/dL    Hematocrit 33 0 (L) 36 5 - 49 3 %    MCV 76 (L) 82 - 98 fL    MCH 22 5 (L) 26 8 - 34 3 pg    MCHC 29 7 (L) 31 4 - 37 4 g/dL    RDW 20 8 (H) 11 6 - 15 1 %    MPV 9 8 8 9 - 12 7 fL Platelets 568 004 - 806 Thousands/uL    nRBC 0 /100 WBCs    Neutrophils Relative 80 (H) 43 - 75 %    Lymphocytes Relative 7 (L) 14 - 44 %    Monocytes Relative 12 4 - 12 %    Eosinophils Relative 1 0 - 6 %    Basophils Relative 0 0 - 1 %    Neutrophils Absolute 18 94 (H) 1 85 - 7 62 Thousands/µL    Lymphocytes Absolute 1 55 0 60 - 4 47 Thousands/µL    Monocytes Absolute 2 73 (H) 0 17 - 1 22 Thousand/µL    Eosinophils Absolute 0 11 0 00 - 0 61 Thousand/µL    Basophils Absolute 0 06 0 00 - 0 10 Thousands/µL   Fingerstick Glucose (POCT)    Collection Time: 03/31/18  6:20 AM   Result Value Ref Range    POC Glucose 253 (H) 65 - 140 mg/dl       Radiology Results: I have personally reviewed pertinent reports  Xr Chest 2 Views    Result Date: 3/27/2018  Impression: No acute cardiopulmonary disease  Workstation performed: [id number]     Xr Lumbar Spine 2 Or 3 Views    Result Date: 3/27/2018  Impression: No acute fracture  Workstation performed: [id number]       Other Diagnostic Testing:   I have personally reviewed pertinent reports          Active Meds:   Current Facility-Administered Medications   Medication Dose Route Frequency    acetaminophen (TYLENOL) tablet 650 mg  650 mg Oral Q4H PRN    albuterol (PROVENTIL HFA,VENTOLIN HFA) inhaler 2 puff  2 puff Inhalation Q4H PRN    aspirin (ECOTRIN LOW STRENGTH) EC tablet 81 mg  81 mg Oral Daily    atorvastatin (LIPITOR) tablet 40 mg  40 mg Oral Daily With Dinner    benzonatate (TESSALON PERLES) capsule 100 mg  100 mg Oral TID PRN    cefepime (MAXIPIME) IVPB (premix) 2,000 mg  2,000 mg Intravenous Q12H    chlorhexidine (PERIDEX) 0 12 % oral rinse 15 mL  15 mL Swish & Spit Q12H [address]    diclofenac sodium (VOLTAREN) 1 % topical gel 2 g  2 g Topical 4x Daily    ferrous sulfate tablet 325 mg  325 mg Oral Daily With Breakfast    fluticasone-salmeterol (ADVAIR) 250-50 mcg/dose inhaler 1 puff  1 puff Inhalation Q12H [address]    hydrALAZINE (APRESOLINE) injection 5 mg  5 mg Intravenous Q6H PRN    insulin glargine (LANTUS) subcutaneous injection 20 Units  20 Units Subcutaneous HS    insulin lispro (HumaLOG) 100 units/mL subcutaneous injection 1-5 Units  1-5 Units Subcutaneous HS    insulin lispro (HumaLOG) 100 units/mL subcutaneous injection 1-6 Units  1-6 Units Subcutaneous TID AC    insulin lispro (HumaLOG) 100 units/mL subcutaneous injection 10 Units  10 Units Subcutaneous Once    insulin lispro (HumaLOG) 100 units/mL subcutaneous injection 9 Units  9 Units Subcutaneous TID AC    lidocaine (LIDODERM) 5 % patch 1 patch  1 patch Transdermal Daily    metoprolol tartrate (LOPRESSOR) tablet 50 mg  50 mg Oral Q12H RHONDA    mupirocin (BACTROBAN) 2 % nasal ointment 1 application  1 application Nasal F25S [address]    nitroglycerin (NITROSTAT) SL tablet 0 4 mg  0 4 mg Sublingual Q5 Min PRN    nitroGLYcerin (TRIDIL) 50 mg in 250 mL infusion (premix)  5-200 mcg/min Intravenous Titrated    oxyCODONE-acetaminophen (PERCOCET) 5-325 mg per tablet 1 tablet  1 tablet Oral Q6H PRN    pantoprazole (PROTONIX) EC tablet 40 mg  40 mg Oral Early Morning    pregabalin (LYRICA) capsule 100 mg  100 mg Oral TID    sodium chloride infusion 0 45 %  50 mL/hr Intravenous Continuous         VTE Pharmacologic Prophylaxis: Heparin  VTE Mechanical Prophylaxis: sequential compression device    Ca Lee DO

## 2018-04-01 VITALS
OXYGEN SATURATION: 97 % | HEART RATE: 61 BPM | SYSTOLIC BLOOD PRESSURE: 152 MMHG | RESPIRATION RATE: 20 BRPM | HEIGHT: 70 IN | WEIGHT: 247.8 LBS | DIASTOLIC BLOOD PRESSURE: 70 MMHG | BODY MASS INDEX: 35.48 KG/M2 | TEMPERATURE: 97.9 F

## 2018-04-01 LAB
ANION GAP SERPL CALCULATED.3IONS-SCNC: 7 MMOL/L (ref 4–13)
BASOPHILS # BLD AUTO: 0.08 THOUSANDS/ΜL (ref 0–0.1)
BASOPHILS NFR BLD AUTO: 1 % (ref 0–1)
BUN SERPL-MCNC: 25 MG/DL (ref 5–25)
CALCIUM SERPL-MCNC: 8.5 MG/DL (ref 8.3–10.1)
CHLORIDE SERPL-SCNC: 109 MMOL/L (ref 100–108)
CO2 SERPL-SCNC: 24 MMOL/L (ref 21–32)
CREAT SERPL-MCNC: 1.11 MG/DL (ref 0.6–1.3)
EOSINOPHIL # BLD AUTO: 0.27 THOUSAND/ΜL (ref 0–0.61)
EOSINOPHIL NFR BLD AUTO: 2 % (ref 0–6)
ERYTHROCYTE [DISTWIDTH] IN BLOOD BY AUTOMATED COUNT: 21 % (ref 11.6–15.1)
GFR SERPL CREATININE-BSD FRML MDRD: 68 ML/MIN/1.73SQ M
GLUCOSE SERPL-MCNC: 193 MG/DL (ref 65–140)
GLUCOSE SERPL-MCNC: 194 MG/DL (ref 65–140)
GLUCOSE SERPL-MCNC: 250 MG/DL (ref 65–140)
HCT VFR BLD AUTO: 31.8 % (ref 36.5–49.3)
HGB BLD-MCNC: 9.4 G/DL (ref 12–17)
LYMPHOCYTES # BLD AUTO: 2.34 THOUSANDS/ΜL (ref 0.6–4.47)
LYMPHOCYTES NFR BLD AUTO: 18 % (ref 14–44)
MCH RBC QN AUTO: 22.8 PG (ref 26.8–34.3)
MCHC RBC AUTO-ENTMCNC: 29.6 G/DL (ref 31.4–37.4)
MCV RBC AUTO: 77 FL (ref 82–98)
MONOCYTES # BLD AUTO: 1.26 THOUSAND/ΜL (ref 0.17–1.22)
MONOCYTES NFR BLD AUTO: 9 % (ref 4–12)
NEUTROPHILS # BLD AUTO: 9.35 THOUSANDS/ΜL (ref 1.85–7.62)
NEUTS SEG NFR BLD AUTO: 70 % (ref 43–75)
NRBC BLD AUTO-RTO: 0 /100 WBCS
PLATELET # BLD AUTO: 262 THOUSANDS/UL (ref 149–390)
PMV BLD AUTO: 9.7 FL (ref 8.9–12.7)
POTASSIUM SERPL-SCNC: 4.1 MMOL/L (ref 3.5–5.3)
RBC # BLD AUTO: 4.13 MILLION/UL (ref 3.88–5.62)
SODIUM SERPL-SCNC: 140 MMOL/L (ref 136–145)
WBC # BLD AUTO: 13.39 THOUSAND/UL (ref 4.31–10.16)

## 2018-04-01 PROCEDURE — 82948 REAGENT STRIP/BLOOD GLUCOSE: CPT

## 2018-04-01 PROCEDURE — 85025 COMPLETE CBC W/AUTO DIFF WBC: CPT | Performed by: INTERNAL MEDICINE

## 2018-04-01 PROCEDURE — 99239 HOSP IP/OBS DSCHRG MGMT >30: CPT | Performed by: INTERNAL MEDICINE

## 2018-04-01 PROCEDURE — 80048 BASIC METABOLIC PNL TOTAL CA: CPT | Performed by: INTERNAL MEDICINE

## 2018-04-01 RX ORDER — FERROUS SULFATE 325(65) MG
325 TABLET ORAL
Qty: 30 TABLET | Refills: 0 | Status: SHIPPED | OUTPATIENT
Start: 2018-04-02 | End: 2018-04-01

## 2018-04-01 RX ORDER — FERROUS SULFATE 325(65) MG
325 TABLET ORAL
Qty: 30 TABLET | Refills: 0 | Status: SHIPPED | OUTPATIENT
Start: 2018-04-02 | End: 2019-01-01 | Stop reason: HOSPADM

## 2018-04-01 RX ORDER — METOPROLOL TARTRATE 50 MG/1
50 TABLET, FILM COATED ORAL EVERY 12 HOURS SCHEDULED
Qty: 60 TABLET | Refills: 0 | Status: SHIPPED | OUTPATIENT
Start: 2018-04-01 | End: 2018-04-27 | Stop reason: SDUPTHER

## 2018-04-01 RX ORDER — BENZONATATE 100 MG/1
100 CAPSULE ORAL 3 TIMES DAILY PRN
Qty: 20 CAPSULE | Refills: 0 | Status: SHIPPED | OUTPATIENT
Start: 2018-04-01 | End: 2018-04-01

## 2018-04-01 RX ORDER — AMOXICILLIN 500 MG/1
500 CAPSULE ORAL EVERY 12 HOURS SCHEDULED
Qty: 14 CAPSULE | Refills: 0 | Status: SHIPPED | OUTPATIENT
Start: 2018-04-01 | End: 2018-04-08

## 2018-04-01 RX ORDER — INSULIN GLARGINE 100 [IU]/ML
20 INJECTION, SOLUTION SUBCUTANEOUS
Qty: 10 ML | Refills: 0 | Status: SHIPPED | OUTPATIENT
Start: 2018-04-01 | End: 2018-04-27 | Stop reason: SDUPTHER

## 2018-04-01 RX ORDER — METOPROLOL TARTRATE 50 MG/1
50 TABLET, FILM COATED ORAL EVERY 12 HOURS SCHEDULED
Qty: 60 TABLET | Refills: 0 | Status: SHIPPED | OUTPATIENT
Start: 2018-04-01 | End: 2018-04-01

## 2018-04-01 RX ORDER — INSULIN GLARGINE 100 [IU]/ML
20 INJECTION, SOLUTION SUBCUTANEOUS
Qty: 10 ML | Refills: 0 | Status: SHIPPED | OUTPATIENT
Start: 2018-04-01 | End: 2018-04-01

## 2018-04-01 RX ORDER — BENZONATATE 100 MG/1
100 CAPSULE ORAL 3 TIMES DAILY PRN
Qty: 20 CAPSULE | Refills: 0 | Status: SHIPPED | OUTPATIENT
Start: 2018-04-01 | End: 2018-04-25 | Stop reason: ALTCHOICE

## 2018-04-01 RX ADMIN — OXYCODONE HYDROCHLORIDE AND ACETAMINOPHEN 1 TABLET: 5; 325 TABLET ORAL at 05:12

## 2018-04-01 RX ADMIN — LIDOCAINE 1 PATCH: 50 PATCH TOPICAL at 09:45

## 2018-04-01 RX ADMIN — METOPROLOL TARTRATE 50 MG: 50 TABLET ORAL at 09:44

## 2018-04-01 RX ADMIN — MUPIROCIN 1 APPLICATION: 20 OINTMENT TOPICAL at 09:44

## 2018-04-01 RX ADMIN — FERROUS SULFATE TAB 325 MG (65 MG ELEMENTAL FE) 325 MG: 325 (65 FE) TAB at 09:44

## 2018-04-01 RX ADMIN — PANTOPRAZOLE SODIUM 40 MG: 40 TABLET, DELAYED RELEASE ORAL at 05:12

## 2018-04-01 RX ADMIN — DICLOFENAC 2 G: 10 GEL TOPICAL at 09:45

## 2018-04-01 RX ADMIN — ASPIRIN 81 MG: 81 TABLET, COATED ORAL at 09:43

## 2018-04-01 RX ADMIN — PREGABALIN 100 MG: 100 CAPSULE ORAL at 09:44

## 2018-04-01 RX ADMIN — CHLORHEXIDINE GLUCONATE 15 ML: 1.2 RINSE ORAL at 09:43

## 2018-04-01 RX ADMIN — CEFEPIME HYDROCHLORIDE 2000 MG: 2 INJECTION, SOLUTION INTRAVENOUS at 02:04

## 2018-04-01 RX ADMIN — INSULIN LISPRO 2 UNITS: 100 INJECTION, SOLUTION INTRAVENOUS; SUBCUTANEOUS at 09:46

## 2018-04-01 RX ADMIN — FLUTICASONE PROPIONATE AND SALMETEROL 1 PUFF: 50; 250 POWDER RESPIRATORY (INHALATION) at 09:45

## 2018-04-01 NOTE — PROGRESS NOTES
IM Residency Progress Note   Unit/Bed#: [facility] 522-01 Encounter: [id number]  SOD Team C       Madie Bertrand  79 y o  male [id number]    Hospital Stay Days: 4      Assessment/Plan:    Principal Problem:  Community-acquired pneumonia   -WBC 37 >23>13  Differential includes:  Bacterial bronchitis versus pneumonia, patient improving symptomatically after antibiotics were initiated  -Continue with cefepime 2 g Q 12    -If patient is discharge , we will transition to Augmentin p o  to complete 10 days      CAD, three-vessel disease  -S/p Cath 3/29/2018: Significant epicardial CAD as described w/ dLMCA, OM and RCA disease  Cardiac Surgery consulted --> CABG recommended during this admission, although patients preference is going home to have procedure done in the near future  -Telemetry monitoring:  No events    NSVT  -short run of Vtach  on March 30, 2018  No more events  -Continue to monitor    Acute kidney injury  -Creatinine baseline is 1 1   -Creatinine today's 1 1 after fluids were started and ACE was held, yesterday  -BMP at discharge to be follow in a week by his primary care doctor    chronic diastolic heart failure  -Ejection fraction 60% no regional wall abnormalities  Grade 1 diastolic dysfunction  -continue  home meds    Active Problems:    Type 2 diabetes mellitus with diabetic neuropathy, with long-term current use of insulin ([facility])  -A1c above 11, likely noncompliant with insulin at home  -Supposed to be on Lantus 45 units nighttime and lispro 18 units 3 times daily with meals, had episode of hypoglycemia on that dose  -Continue Lantus 20 units at nighttime plus lispro 9 units 3 times a day with meals plus sliding scale insulin          Hypertension  -/68  -Continue metoprolol   -Holding  lisinopril in the setting of acute kidney injury, since GFR improved, probably will re start today      GERD (gastroesophageal reflux disease)  -Continue home pantoprazole      Hyperlipidemia  -Continue home statin      Opioid dependence ([facility] )  -Likely related lumbar radiculopathy  -Percocet as needed       Iron deficiency anemia  -Fecal occult blood test negative  -hemoglobin remained stable 9 4  -Iron panel confirm iron deficient anemia, at  discharge will continue with ferrous sulfate t      Abdominal aortic aneurysm (AAA) without rupture ([facility])  -No surgical intervention recommended at this time  -Continue to monitor      COPD (chronic obstructive pulmonary disease) ([facility])  -Not exacerbated  -Continue with albuterol two puff q 4 hours p r n   -Continue with Advair 250/50 mcg 1 puff q 12 hours        Closed fracture of transverse process of lumbar vertebra with routine healing  -Continue home Percocet q 6 hours p r n  as needed for moderate to severe pain      Disposition:   PT recommendations:  Home PT, will discuss d/c to home today      Subjective:   Patient seen and examined at his bed  Patient currently  denies lightheadedness dizziness shortness of breath chest pain abdominal pain, cough is improved, remains afebrile, no longer requiring supplemental oxygen  Appetite is good, had bowel movement early on, patient would like to go home today     Vitals: Temp (24hrs), Av 5 °F (36 9 °C), Min:97 7 °F (36 5 °C), Max:98 9 °F (37 2 °C)  Current: Temperature: 98 6 °F (37 °C)  Vitals:    18 1900 18 2318 18 0235 18 0656   BP: 133/71 126/62 133/62 133/61   BP Location:    Right arm   Pulse: 82 70 60 59   Resp: 18 18 18 20   Temp: 98 4 °F (36 9 °C) 98 9 °F (37 2 °C) 97 7 °F (36 5 °C) 98 6 °F (37 °C)   TempSrc: Oral Oral Oral Oral   SpO2: 95% 95% 94% 97%   Weight:       Height:        Body mass index is 35 56 kg/m²  I/O last 24 hours: In: 0733 [P O :710;  I V :1500; IV Piggyback:100]  Out: 1650 [Urine:1650]      Physical Exam: /61 (BP Location: Right arm)   Pulse 59   Temp 98 6 °F (37 °C) (Oral)   Resp 20   Ht 5' 10" (1 778 m)   Wt 112 kg (247 lb 12 8 oz)   SpO2 97%   BMI 35 56 kg/m²     Physical Exam   Constitutional: He is oriented to person, place, and time  He appears well-developed  HENT:   Head: Normocephalic and atraumatic  Eyes: Pupils are equal, round, and reactive to light  Neck: Normal range of motion  Neck supple  Cardiovascular: Normal rate  Pulmonary/Chest: Effort normal and breath sounds normal  No respiratory distress  He has no wheezes  Abdominal: Soft  Musculoskeletal: Normal range of motion  Neurological: He is alert and oriented to person, place, and time  Skin: Skin is warm and dry  Psychiatric: He has a normal mood and affect         Invasive Devices     Peripheral Intravenous Line            Peripheral IV 03/28/18 Left Antecubital 4 days                          Labs:   Recent Results (from the past 24 hour(s))   Fingerstick Glucose (POCT)    Collection Time: 03/31/18 10:50 AM   Result Value Ref Range    POC Glucose 220 (H) 65 - 140 mg/dl   Fingerstick Glucose (POCT)    Collection Time: 03/31/18  4:22 PM   Result Value Ref Range    POC Glucose 295 (H) 65 - 140 mg/dl   Fingerstick Glucose (POCT)    Collection Time: 03/31/18  9:15 PM   Result Value Ref Range    POC Glucose 261 (H) 65 - 140 mg/dl   CBC and differential    Collection Time: 04/01/18  4:38 AM   Result Value Ref Range    WBC 13 39 (H) 4 31 - 10 16 Thousand/uL    RBC 4 13 3 88 - 5 62 Million/uL    Hemoglobin 9 4 (L) 12 0 - 17 0 g/dL    Hematocrit 31 8 (L) 36 5 - 49 3 %    MCV 77 (L) 82 - 98 fL    MCH 22 8 (L) 26 8 - 34 3 pg    MCHC 29 6 (L) 31 4 - 37 4 g/dL    RDW 21 0 (H) 11 6 - 15 1 %    MPV 9 7 8 9 - 12 7 fL    Platelets 754 639 - 134 Thousands/uL    nRBC 0 /100 WBCs    Neutrophils Relative 70 43 - 75 %    Lymphocytes Relative 18 14 - 44 %    Monocytes Relative 9 4 - 12 %    Eosinophils Relative 2 0 - 6 %    Basophils Relative 1 0 - 1 %    Neutrophils Absolute 9 35 (H) 1 85 - 7 62 Thousands/µL    Lymphocytes Absolute 2 34 0 60 - 4 47 Thousands/µL    Monocytes Absolute 1 26 (H) 0 17 - 1 22 Thousand/µL    Eosinophils Absolute 0 27 0 00 - 0 61 Thousand/µL    Basophils Absolute 0 08 0 00 - 0 10 Thousands/µL   Basic metabolic panel    Collection Time: 04/01/18  4:38 AM   Result Value Ref Range    Sodium 140 136 - 145 mmol/L    Potassium 4 1 3 5 - 5 3 mmol/L    Chloride 109 (H) 100 - 108 mmol/L    CO2 24 21 - 32 mmol/L    Anion Gap 7 4 - 13 mmol/L    BUN 25 5 - 25 mg/dL    Creatinine 1 11 0 60 - 1 30 mg/dL    Glucose 193 (H) 65 - 140 mg/dL    Calcium 8 5 8 3 - 10 1 mg/dL    eGFR 68 ml/min/1 73sq m   Fingerstick Glucose (POCT)    Collection Time: 04/01/18  6:56 AM   Result Value Ref Range    POC Glucose 194 (H) 65 - 140 mg/dl       Radiology Results: I have personally reviewed pertinent reports  Xr Chest 2 Views    Result Date: 3/27/2018  Impression: No acute cardiopulmonary disease  Workstation performed: [id number]     Xr Lumbar Spine 2 Or 3 Views    Result Date: 3/27/2018  Impression: No acute fracture  Workstation performed: [id number]       Other Diagnostic Testing:   I have personally reviewed pertinent reports          Active Meds:   Current Facility-Administered Medications   Medication Dose Route Frequency    acetaminophen (TYLENOL) tablet 650 mg  650 mg Oral Q4H PRN    albuterol (PROVENTIL HFA,VENTOLIN HFA) inhaler 2 puff  2 puff Inhalation Q4H PRN    aspirin (ECOTRIN LOW STRENGTH) EC tablet 81 mg  81 mg Oral Daily    atorvastatin (LIPITOR) tablet 40 mg  40 mg Oral Daily With Dinner    benzonatate (TESSALON PERLES) capsule 100 mg  100 mg Oral TID PRN    cefepime (MAXIPIME) IVPB (premix) 2,000 mg  2,000 mg Intravenous Q12H    chlorhexidine (PERIDEX) 0 12 % oral rinse 15 mL  15 mL Swish & Spit Q12H [address]    diclofenac sodium (VOLTAREN) 1 % topical gel 2 g  2 g Topical 4x Daily    ferrous sulfate tablet 325 mg  325 mg Oral Daily With Breakfast    fluticasone-salmeterol (ADVAIR) 250-50 mcg/dose inhaler 1 puff  1 puff Inhalation Q12H [address]    hydrALAZINE (APRESOLINE) injection 5 mg  5 mg Intravenous Q6H PRN    insulin glargine (LANTUS) subcutaneous injection 20 Units  20 Units Subcutaneous HS    insulin lispro (HumaLOG) 100 units/mL subcutaneous injection 1-5 Units  1-5 Units Subcutaneous HS    insulin lispro (HumaLOG) 100 units/mL subcutaneous injection 1-6 Units  1-6 Units Subcutaneous TID AC    insulin lispro (HumaLOG) 100 units/mL subcutaneous injection 10 Units  10 Units Subcutaneous Once    insulin lispro (HumaLOG) 100 units/mL subcutaneous injection 9 Units  9 Units Subcutaneous TID AC    lidocaine (LIDODERM) 5 % patch 1 patch  1 patch Transdermal Daily    metoprolol tartrate (LOPRESSOR) tablet 50 mg  50 mg Oral Q12H RHONDA    mupirocin (BACTROBAN) 2 % nasal ointment 1 application  1 application Nasal P62N [address]    nitroglycerin (NITROSTAT) SL tablet 0 4 mg  0 4 mg Sublingual Q5 Min PRN    nitroGLYcerin (TRIDIL) 50 mg in 250 mL infusion (premix)  5-200 mcg/min Intravenous Titrated    oxyCODONE-acetaminophen (PERCOCET) 5-325 mg per tablet 1 tablet  1 tablet Oral Q6H PRN    pantoprazole (PROTONIX) EC tablet 40 mg  40 mg Oral Early Morning    pregabalin (LYRICA) capsule 100 mg  100 mg Oral TID    sodium chloride infusion 0 45 %  50 mL/hr Intravenous Continuous         VTE Pharmacologic Prophylaxis: Heparin  VTE Mechanical Prophylaxis: sequential compression device    [employer]

## 2018-04-01 NOTE — DISCHARGE SUMMARY
IMR Discharge Summary - Medical Casie Lisbeth  79 y o  male MRN: [id number]    [address] [facility] Bed: [facility] 522/[facility] 522-01 Encounter: [id number]    BRIEF OVERVIEW    Admitting Provider: Jayden Sprague MD  Discharge Provider: Jayden Sprague MD  Primary Care Physician at Discharge: Jayden Sprague MD    Discharge To: Home      Admission Date: 3/27/2018     Discharge Date: 4/1/18  Primary Discharge Diagnosis  Principal Problem:    Chest pain  Active Problems:    Type 2 diabetes mellitus with diabetic neuropathy, with long-term current use of insulin (HCC)    Hypertension    GERD (gastroesophageal reflux disease)    Hyperlipidemia    Opioid dependence (HCC)    Insomnia    Iron deficiency anemia    Abdominal aortic aneurysm (AAA) without rupture (HCC)    Chronic back pain    Chronic diastolic CHF (congestive heart failure) (HCC)    COPD (chronic obstructive pulmonary disease) (HCC)    Neuropathy    Closed fracture of transverse process of lumbar vertebra with routine healing    Disease of cardiovascular system  Resolved Problems:    * No resolved hospital problems  *      Other Problems Addressed: no    Consulting Providers           Therapeutic Operative Procedures Performed  none    Diagnostic Procedures Performed  labs: CBC BMP, microbiology: blood culture: negative, radiology: CXR and cardiac graphics: cardiac cath    Discharge Disposition: Home/Self Care  Discharged With Lines: no    Test Results Pending at Discharge: none    Outpatient Follow-Up  With PCP in 2 days  Follow up with consulting providers  Cardiology at [facility] THE [facility]   Active Issues Requiring Follow-up   CAD    Code Status: Level 1 - Full Code  Advance Directive and Living Will: <no information>  Power of :    POLST:      Medications   See after visit summary for reconciled discharge medications provided to patient and family    Released Discharge Orders   Current as of patient's discharge on: 04/01/18   Order Details Provider Status Print    [Print All]   aspirin (ECOTRIN LOW STRENGTH) 81 mg EC tablet Take 1 tablet (81 mg total) by mouth daily [facility] at Discharge (Prescription) [Print]   atorvastatin (LIPITOR) 40 mg tablet Take 1 tablet (40 mg total) by mouth daily with dinner for 30 days Romelia Cones Resume at Discharge (Prescription) [Print]   clopidogrel (PLAVIX) 75 mg tablet Take 1 tablet by mouth daily for 30 days Romelia Cones Resume at Discharge (Prescription) [Print]   fluticasone-salmeterol (ADVAIR DISKUS) 250-50 mcg/dose inhaler Inhale 1 puff every 12 (twelve) hours Romelia Cones Resume at Discharge (Prescription) [Print]   nitroglycerin (NITROSTAT) 0 4 mg SL tablet Place 1 tablet under the tongue every 5 (five) minutes as needed for chest pain for up to 30 days [address] [facility] at Discharge (Prescription) [Print]   oxyCODONE-acetaminophen (PERCOCET) 5-325 mg per tablet Take 1 tablet by mouth every 6 (six) hours as needed for severe pain Earliest Fill Date: 3/13/18 Max Daily Amount: 4 tablets Romelia Cones Resume at Discharge (Prescription) [Print]   pantoprazole (PROTONIX) 40 mg tablet Take 1 tablet (40 mg total) by mouth daily [facility] at Discharge (Prescription) [Print]   pregabalin (LYRICA) 100 mg capsule Take 1 capsule (100 mg total) by mouth 3 (three) times a day for 10 days [facility] at Discharge (Prescription) [Print]   insulin glargine (LANTUS) 100 units/mL subcutaneous injection Inject 45 Units under the skin daily at bedtime for 30 days Romelia Cones Do Not Resume at Discharge    insulin lispro (HumaLOG) 100 units/mL injection Inject 18 Units under the skin 3 (three) times a day before meals for 30 days Romelia Cones Do Not Resume at Discharge    LANTUS SOLOSTAR injection pen 100 units/mL INJECT 45 UNITS UNDER THE SKIN DAILY AT BEDTIME Aguilar Corpus Sahil Neat Not Resume at Discharge    metoprolol tartrate (LOPRESSOR) 25 mg tablet Take 1 tablet (25 mg total) by mouth every 12 (twelve) hours for 30 days Anuradha Estrada Do Not Resume at Discharge    amoxicillin (AMOXIL) 500 mg capsule Take 1 capsule (500 mg total) by mouth every 12 (twelve) hours for 7 days [address] at Discharge [Print]   benzonatate (TESSALON PERLES) 100 mg capsule Take 1 capsule (100 mg total) by mouth 3 (three) times a day as needed for cough Joleen Hill MD Prescribed [Print]   ferrous sulfate 325 (65 Fe) mg tablet Take 1 tablet (325 mg total) by mouth daily with breakfast Joleen Hill MD Prescribed [Print]   insulin glargine (LANTUS) 100 units/mL subcutaneous injection Inject 20 Units under the skin daily at bedtime Joleen Hill MD Prescribed [Print]   insulin lispro (HumaLOG) 100 units/mL injection Inject 9 Units under the skin 3 (three) times a day before meals Joleen Hill MD Prescribed [Print]   metoprolol tartrate (LOPRESSOR) 50 mg tablet Take 1 tablet (50 mg total) by mouth every 12 (twelve) hours Joleen Hill MD Prescribed [Print]   benzonatate (TESSALON PERLES) 100 mg capsule Take 1 capsule (100 mg total) by mouth 3 (three) times a day as needed for cough Gilbertna Patch, MD Discontinued    ferrous sulfate 325 (65 Fe) mg tablet Take 1 tablet (325 mg total) by mouth daily with breakfast Joleen Hill MD Discontinued    insulin glargine (LANTUS) 100 units/mL subcutaneous injection Inject 20 Units under the skin daily at bedtime Joleen Hill MD Discontinued    insulin lispro (HumaLOG) 100 units/mL injection Inject 9 Units under the skin 3 (three) times a day before meals Joleen Hill MD Discontinued    metoprolol tartrate (LOPRESSOR) 50 mg tablet Take 1 tablet (50 mg total) by mouth every 12 (twelve) hours Joleen Hill MD Discontinued    Ambulatory Referral to [address] [place] [address] Zhou Cunningham [facility] Internal Referral, Routine, Home Health Services, Specialty Services Required [address] at Discharge [Print]   Activity as tolerated Routine, Clinic Performed [address] at Discharge [Print]   Discharge Diet Routine, Clinic Performed [address] at Discharge [Print]   Call provider for: Jun Montaño uncontrolled pain Routine, Clinic Performed [address] at Discharge [Print           Allergies  No Known Allergies  Discharge Diet: cardiac diet  Activity restrictions: no strenous exercise    [address] Augusto Levin  is a 79 y o  male with a past medical history of hypertension, hyperlipidemia, presumed coronary artery disease, chronic diastolic heart failure, type 2 diabetes mellitus with neuropathy, chest pain, chronic back pain with acute closed fracture of L2, L3 and L4 right transverse process in January 2018, opiate dependence, COPD, iron deficiency anemia  The patient  presented to the ED with complaints of chest pain and shortness of breath,patient described the pain as retrosternal burning and pressure-like with radiation down his bilateral arms     On review of hospital records, patient apparently had an admission in January of 2017 for pneumonia where he had a troponin elevation  At that time Cardiology was consulted, who believed it was likely an NSTEMI type 1 and felt patient likely had significant coronary artery disease  An ischemic evaluation was planned at that time with cardiac catheterization however patient refused and decided he would like a 2nd opinion after his hospital discharge  Patient never followed up with Cardiology and did not have an ischemic workup in the outpatient setting, however it was recommended per Cardiology that patient continue dual antiplatelets for at least 1 year as well as continue beta-blocker, aspirin and statin  Patient did have a nuclear stress test in October of 2017 which showed no ST changes but decreased uptake involving the inferior wall    He also had an echo in March of 2017 which showed moderate hypokinesis of the mid apical and inferior wall  After being admitted  the patient was found to have pneumonia and was treated with antibiotics, patient was also evaluated by Cardiology, Cardiology performed cardiac catheterization and found triple-vessel disease, surgery was offered to the patient but the patient declined because he would like a second opinion, patient was treated for pneumonia and after symptoms improved he was discharged home on p o  antibiotics    Presenting Problem/History of Present Illness  Principal Problem:    Chest pain  Active Problems:    Type 2 diabetes mellitus with diabetic neuropathy, with long-term current use of insulin ([facility])    Hypertension    GERD (gastroesophageal reflux disease)    Hyperlipidemia    Opioid dependence ([facility])    Insomnia    Iron deficiency anemia    Abdominal aortic aneurysm (AAA) without rupture ([facility])    Chronic back pain    Chronic diastolic CHF (congestive heart failure) ([facility])    [facility]    Neuropathy    Closed fracture of transverse process of lumbar vertebra with routine healing    Disease of cardiovascular system  Resolved Problems:    * No resolved hospital problems  *    Community-acquired pneumonia   -WBC 37 >23>13  Differential includes:  Bacterial bronchitis versus pneumonia, patient improved symptomatically after antibiotics were initiated and was  transitioned to Augmentin p o  to complete 10 days       CAD, three-vessel disease  -S/p Cath 3/29/2018: Significant epicardial CAD as described w/ dLMCA, OM and RCA disease    Cardiac Surgery consulted --> CABG recommended during this admission, although patients preference is going home to have procedure done in the near future     Acute kidney injury  -Creatinine baseline is 1 1, creatinine improved  after fluids were started and ACE was held     chronic diastolic heart failure  -Ejection fraction 60% no regional wall abnormalities  Grade 1 diastolic dysfunction      Type 2 diabetes mellitus with diabetic neuropathy, with long-term current use of insulin (HCC)  -A1c above 11, likely noncompliant with insulin at home, supposed to be on Lantus 45 units night time and lispro 18 units 3 times daily with meals, but had episode of hypoglycemia on that dose  During this admission the insuline dose was reduce and was on Lantus 20 units at night time plus lispro 9 units 3 times a day with meals plus sliding scale insulin and was discharged on this regimen         Hypertension  -patient was d/c on metoprolol, ACE was held due to the recent DELLA       GERD (gastroesophageal reflux disease)  -Continue home pantoprazole       Hyperlipidemia  -Continue home statin        Iron deficiency anemia  -Fecal occult blood test negative, hemoglobin remained stable 9 4, Iron panel confirm iron deficient anemia, at  discharge was prescribed with ferrous sulfate        Abdominal aortic aneurysm (AAA) without rupture ([facility])  -No surgical intervention recommended at this time      COPD (chronic obstructive pulmonary disease) ([facility] )  -Not exacerbated, continue with albuterol and advair       Closed fracture of transverse process of lumbar vertebra with routine healing  -Continue home Percocet   as needed for moderate to severe pain       Other Pertinent Test Results  none    Discharge Condition: stable      Discharge  Statement   I spent 45 minutes minutes discharging the patient  This time was spent on the day of discharge  I had direct contact with the patient on the day of discharge  Additional documentation is required if more than 30 minutes were spent on discharge

## 2018-04-02 LAB
BACTERIA SPT RESP CULT: ABNORMAL
BACTERIA SPT RESP CULT: ABNORMAL
GRAM STN SPEC: ABNORMAL

## 2018-04-03 ENCOUNTER — TRANSITIONAL CARE MANAGEMENT (OUTPATIENT)
Dept: INTERNAL MEDICINE CLINIC | Facility: CLINIC | Age: 68
End: 2018-04-03

## 2018-04-03 ENCOUNTER — TELEPHONE (OUTPATIENT)
Dept: INTERNAL MEDICINE CLINIC | Facility: CLINIC | Age: 68
End: 2018-04-03

## 2018-04-03 ENCOUNTER — TELEPHONE (OUTPATIENT)
Dept: INTERNAL MEDICINE CLINIC | Age: 68
End: 2018-04-03

## 2018-04-03 NOTE — TELEPHONE ENCOUNTER
Called [facility]  Attempted to call Cayla Aponte at [phone]  Left message requesting her to call back to discuss this patient

## 2018-04-03 NOTE — TELEPHONE ENCOUNTER
Alphonso Cornejo called regarding patient and has questions regarding the patients meds, appointments and social work   he was in the hospital for pneumonia and home care is there to follow up with him  Please call to discuss these issues

## 2018-04-03 NOTE — TELEPHONE ENCOUNTER
Cayla Aponte called with an update  Wyvonna Dakins was discharged from the hospital on 4/2/18 with a diagnosis of pneumonia  He had a follow up appointment today that he cancelled stating that he did not have transportation  He did not fill the Amoxiciliin 500 mg, BID for 7 days prescription and is not taking any antibiotics since discharge  The nurse did not find any ASA Albuterol or Plavix in the home  She is asking for an order for a   This order was approved  His discharge paperwork indicated a need for a BMP in 1 week and there is no order for this testing  The patient wants a nicotine patch to assist him in smoking cessation  His blood sugar today was 336 and he is non compliant with his diet  Prior to his hospitalization his Lantus dose was 45 units daily  The discharge instructions stated 20 units daily and this is the dose that the patient took last night  Cayla Aponte recommended to the patient that he choose and use a pharmacy that delivers and the patient declined this recommendation  I reviewed the medication list with Cayla Aponte and she stated that the above medications are the only ones that he is not taking  I will also contact our patient care coordinator, Rodrigo Santiago RN with this information

## 2018-04-04 LAB
BACTERIA BLD CULT: NORMAL
BACTERIA BLD CULT: NORMAL

## 2018-04-04 NOTE — TELEPHONE ENCOUNTER
Riki Gibson from [facility] wants you to know that if there are any new orders, they can be faxed to the visiting nurses  Fax # [phone]

## 2018-04-05 ENCOUNTER — TRANSITIONAL CARE MANAGEMENT (OUTPATIENT)
Dept: INTERNAL MEDICINE CLINIC | Facility: CLINIC | Age: 68
End: 2018-04-05

## 2018-04-05 ENCOUNTER — TELEPHONE (OUTPATIENT)
Dept: INTERNAL MEDICINE CLINIC | Facility: CLINIC | Age: 68
End: 2018-04-05

## 2018-04-05 NOTE — TELEPHONE ENCOUNTER
I spoke with Jnue Funez  on 4/4  Please advise if there ar e/18  She is follow ing this patient  Please place any orders needed for patient to pass onto [facility]

## 2018-04-05 NOTE — TELEPHONE ENCOUNTER
Discussed case with Kristyn, this patient   Absolutely needs to be seen by either Kristyn or Dr Marycruz Rodriguez who is familiar with his case  Please call and schedule him to be seen with Kristyn or Dr Marycruz Rodriguez, sooner rather then later

## 2018-04-05 NOTE — TELEPHONE ENCOUNTER
[facility] visiting nurse [facility]  He was discharged from [facility] on 4/1/18 he was admitted for pnuemonia  He is coming in on 4/10/18 with bridgette

## 2018-04-06 DIAGNOSIS — E11.40 TYPE 2 DIABETES MELLITUS WITH DIABETIC NEUROPATHY, WITH LONG-TERM CURRENT USE OF INSULIN (HCC): ICD-10-CM

## 2018-04-06 DIAGNOSIS — J42 CHRONIC BRONCHITIS, UNSPECIFIED CHRONIC BRONCHITIS TYPE (HCC): Primary | ICD-10-CM

## 2018-04-06 DIAGNOSIS — Z79.4 TYPE 2 DIABETES MELLITUS WITH DIABETIC NEUROPATHY, WITH LONG-TERM CURRENT USE OF INSULIN (HCC): ICD-10-CM

## 2018-04-06 DIAGNOSIS — I10 ESSENTIAL HYPERTENSION: ICD-10-CM

## 2018-04-06 DIAGNOSIS — J42 CHRONIC BRONCHITIS, UNSPECIFIED CHRONIC BRONCHITIS TYPE (HCC): ICD-10-CM

## 2018-04-06 RX ORDER — ASPIRIN 81 MG/1
81 TABLET ORAL DAILY
Qty: 30 TABLET | Refills: 5 | Status: SHIPPED | OUTPATIENT
Start: 2018-04-06 | End: 2018-06-29 | Stop reason: SDUPTHER

## 2018-04-06 RX ORDER — ALBUTEROL SULFATE 90 UG/1
2 AEROSOL, METERED RESPIRATORY (INHALATION) DAILY
Qty: 1 INHALER | Refills: 0 | Status: SHIPPED | OUTPATIENT
Start: 2018-04-06 | End: 2018-04-19 | Stop reason: SDUPTHER

## 2018-04-06 RX ORDER — ALBUTEROL SULFATE 2.5 MG/3ML
2.5 SOLUTION RESPIRATORY (INHALATION) EVERY 4 HOURS PRN
Qty: 75 ML | Refills: 0 | Status: SHIPPED | OUTPATIENT
Start: 2018-04-06 | End: 2018-05-16 | Stop reason: SDUPTHER

## 2018-04-06 NOTE — TELEPHONE ENCOUNTER
Refilled these medications  Could we please make sure patient keeps appointment that is scheduled for 4/10/2018? Let me know if this visit isn't kept as patient will need a letter sent  Thanks  Claritza Chi

## 2018-04-10 ENCOUNTER — OFFICE VISIT (OUTPATIENT)
Dept: INTERNAL MEDICINE CLINIC | Facility: CLINIC | Age: 68
End: 2018-04-10
Payer: MEDICARE

## 2018-04-10 VITALS
OXYGEN SATURATION: 97 % | DIASTOLIC BLOOD PRESSURE: 90 MMHG | TEMPERATURE: 98.1 F | BODY MASS INDEX: 36.88 KG/M2 | RESPIRATION RATE: 22 BRPM | HEART RATE: 89 BPM | WEIGHT: 249 LBS | HEIGHT: 69 IN | SYSTOLIC BLOOD PRESSURE: 140 MMHG

## 2018-04-10 DIAGNOSIS — D50.9 IRON DEFICIENCY ANEMIA, UNSPECIFIED IRON DEFICIENCY ANEMIA TYPE: ICD-10-CM

## 2018-04-10 DIAGNOSIS — Z79.4 TYPE 2 DIABETES MELLITUS WITH DIABETIC NEUROPATHY, WITH LONG-TERM CURRENT USE OF INSULIN (HCC): ICD-10-CM

## 2018-04-10 DIAGNOSIS — S32.009D CLOSED FRACTURE OF TRANSVERSE PROCESS OF LUMBAR VERTEBRA WITH ROUTINE HEALING: ICD-10-CM

## 2018-04-10 DIAGNOSIS — I50.32 CHRONIC DIASTOLIC CHF (CONGESTIVE HEART FAILURE) (HCC): ICD-10-CM

## 2018-04-10 DIAGNOSIS — J18.9 COMMUNITY ACQUIRED PNEUMONIA OF LEFT LOWER LOBE OF LUNG: ICD-10-CM

## 2018-04-10 DIAGNOSIS — I20.8 STABLE ANGINA PECTORIS (HCC): ICD-10-CM

## 2018-04-10 DIAGNOSIS — G62.9 NEUROPATHY: ICD-10-CM

## 2018-04-10 DIAGNOSIS — J18.9 PNEUMONIA OF LEFT LOWER LOBE DUE TO INFECTIOUS ORGANISM: ICD-10-CM

## 2018-04-10 DIAGNOSIS — K21.9 GASTROESOPHAGEAL REFLUX DISEASE, ESOPHAGITIS PRESENCE NOT SPECIFIED: ICD-10-CM

## 2018-04-10 DIAGNOSIS — R91.8 LUNG NODULES: Primary | ICD-10-CM

## 2018-04-10 DIAGNOSIS — I25.10 DISEASE OF CARDIOVASCULAR SYSTEM: ICD-10-CM

## 2018-04-10 DIAGNOSIS — E78.5 HYPERLIPIDEMIA, UNSPECIFIED HYPERLIPIDEMIA TYPE: ICD-10-CM

## 2018-04-10 DIAGNOSIS — J42 CHRONIC BRONCHITIS, UNSPECIFIED CHRONIC BRONCHITIS TYPE (HCC): ICD-10-CM

## 2018-04-10 DIAGNOSIS — E11.40 TYPE 2 DIABETES MELLITUS WITH DIABETIC NEUROPATHY, WITH LONG-TERM CURRENT USE OF INSULIN (HCC): ICD-10-CM

## 2018-04-10 LAB
ANION GAP SERPL CALCULATED.3IONS-SCNC: 8 MMOL/L (ref 4–13)
BASOPHILS # BLD AUTO: 0.11 THOUSANDS/ΜL (ref 0–0.1)
BASOPHILS NFR BLD AUTO: 1 % (ref 0–1)
BUN SERPL-MCNC: 18 MG/DL (ref 5–25)
CALCIUM SERPL-MCNC: 9.1 MG/DL
CHLORIDE SERPL-SCNC: 101 MMOL/L (ref 100–108)
CO2 SERPL-SCNC: 28 MMOL/L (ref 21–32)
CREAT SERPL-MCNC: 1.29 MG/DL (ref 0.6–1.3)
EOSINOPHIL # BLD AUTO: 0.28 THOUSAND/ΜL (ref 0–0.61)
EOSINOPHIL NFR BLD AUTO: 3 % (ref 0–6)
ERYTHROCYTE [DISTWIDTH] IN BLOOD BY AUTOMATED COUNT: 21.9 % (ref 11.6–15.1)
GFR SERPL CREATININE-BSD FRML MDRD: 57 ML/MIN/1.73SQ M
GLUCOSE SERPL-MCNC: 282 MG/DL (ref 65–140)
HCT VFR BLD AUTO: 40 % (ref 36.5–49.3)
HGB BLD-MCNC: 11.7 G/DL (ref 12–17)
LYMPHOCYTES # BLD AUTO: 2.64 THOUSANDS/ΜL (ref 0.6–4.47)
LYMPHOCYTES NFR BLD AUTO: 24 % (ref 14–44)
MCH RBC QN AUTO: 22.9 PG (ref 26.8–34.3)
MCHC RBC AUTO-ENTMCNC: 29.3 G/DL (ref 31.4–37.4)
MCV RBC AUTO: 78 FL (ref 82–98)
MONOCYTES # BLD AUTO: 0.86 THOUSAND/ΜL (ref 0.17–1.22)
MONOCYTES NFR BLD AUTO: 8 % (ref 4–12)
NEUTROPHILS # BLD AUTO: 7.23 THOUSANDS/ΜL (ref 1.85–7.62)
NEUTS SEG NFR BLD AUTO: 64 % (ref 43–75)
NRBC BLD AUTO-RTO: 0 /100 WBCS
PLATELET # BLD AUTO: 384 THOUSANDS/UL (ref 149–390)
PMV BLD AUTO: 9.8 FL (ref 8.9–12.7)
POTASSIUM SERPL-SCNC: 4.8 MMOL/L (ref 3.5–5.3)
RBC # BLD AUTO: 5.11 MILLION/UL (ref 3.88–5.62)
SODIUM SERPL-SCNC: 137 MMOL/L (ref 136–145)
WBC # BLD AUTO: 11.2 THOUSAND/UL (ref 4.31–10.16)

## 2018-04-10 PROCEDURE — 36415 COLL VENOUS BLD VENIPUNCTURE: CPT | Performed by: NURSE PRACTITIONER

## 2018-04-10 PROCEDURE — 80048 BASIC METABOLIC PNL TOTAL CA: CPT | Performed by: NURSE PRACTITIONER

## 2018-04-10 PROCEDURE — 99214 OFFICE O/P EST MOD 30 MIN: CPT | Performed by: NURSE PRACTITIONER

## 2018-04-10 PROCEDURE — 85025 COMPLETE CBC W/AUTO DIFF WBC: CPT | Performed by: NURSE PRACTITIONER

## 2018-04-10 RX ORDER — PANTOPRAZOLE SODIUM 40 MG/1
40 TABLET, DELAYED RELEASE ORAL DAILY
Qty: 30 TABLET | Refills: 5 | Status: SHIPPED | OUTPATIENT
Start: 2018-04-10 | End: 2018-06-29 | Stop reason: SDUPTHER

## 2018-04-10 RX ORDER — OXYCODONE HYDROCHLORIDE AND ACETAMINOPHEN 5; 325 MG/1; MG/1
1 TABLET ORAL EVERY 6 HOURS PRN
Qty: 45 TABLET | Refills: 0 | Status: SHIPPED | OUTPATIENT
Start: 2018-04-10 | End: 2018-04-25 | Stop reason: SDUPTHER
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
## 2018-04-12 ENCOUNTER — PATIENT OUTREACH (OUTPATIENT)
Dept: INTERNAL MEDICINE CLINIC | Facility: CLINIC | Age: 68
End: 2018-04-12

## 2018-04-12 NOTE — PROGRESS NOTES
Outpatient Care Management Note:Pt states that he has CORAL coming in for nursing visits post discharge from [facility]  Asked if he would agree to a social work visit,  he did say that he had an order for one, but missed the visit because he was at the PCP 2 days ago  He believes that she will come back next week  He is looking for reduced rent, transportation help  I suggested the Waiver program, as well  He is adamant that if he decides to pursue cardiac surgery, that he will seek a second opinion first  He has been checking glucose 2x a day and taking meds as ordered, he states  He may have a med that he was asking about waiting for  at [facility]  Encouraged him to consider a pharmacy who delivers, or is closer for him to walk to, such as [facility]  Will follow up with him next week

## 2018-04-16 ENCOUNTER — APPOINTMENT (EMERGENCY)
Dept: RADIOLOGY | Facility: HOSPITAL | Age: 68
DRG: 303 | End: 2018-04-16
Payer: MEDICARE

## 2018-04-16 ENCOUNTER — HOSPITAL ENCOUNTER (INPATIENT)
Facility: HOSPITAL | Age: 68
LOS: 1 days | Discharge: HOME HEALTH | DRG: 303 | End: 2018-04-17
Attending: EMERGENCY MEDICINE | Admitting: INTERNAL MEDICINE
Payer: MEDICARE

## 2018-04-16 DIAGNOSIS — I20.0 UNSTABLE ANGINA (HCC): Primary | ICD-10-CM

## 2018-04-16 DIAGNOSIS — I25.118 CORONARY ARTERY DISEASE OF NATIVE ARTERY OF NATIVE HEART WITH STABLE ANGINA PECTORIS (HCC): Chronic | ICD-10-CM

## 2018-04-16 DIAGNOSIS — Z79.4 TYPE 2 DIABETES MELLITUS WITH DIABETIC NEUROPATHY, WITH LONG-TERM CURRENT USE OF INSULIN (HCC): ICD-10-CM

## 2018-04-16 DIAGNOSIS — I20.8 STABLE ANGINA PECTORIS (HCC): ICD-10-CM

## 2018-04-16 DIAGNOSIS — E11.40 TYPE 2 DIABETES MELLITUS WITH DIABETIC NEUROPATHY, WITH LONG-TERM CURRENT USE OF INSULIN (HCC): ICD-10-CM

## 2018-04-16 PROBLEM — B35.6 TINEA INGUINALIS: Status: ACTIVE | Noted: 2018-04-16

## 2018-04-16 LAB
ALBUMIN SERPL BCP-MCNC: 3.2 G/DL (ref 3.5–5)
ALP SERPL-CCNC: 96 U/L (ref 46–116)
ALT SERPL W P-5'-P-CCNC: 22 U/L (ref 12–78)
ANION GAP SERPL CALCULATED.3IONS-SCNC: 6 MMOL/L (ref 4–13)
AST SERPL W P-5'-P-CCNC: 34 U/L (ref 5–45)
ATRIAL RATE: 681 BPM
ATRIAL RATE: 85 BPM
BASOPHILS # BLD AUTO: 0.09 THOUSANDS/ΜL (ref 0–0.1)
BASOPHILS NFR BLD AUTO: 1 % (ref 0–1)
BILIRUB SERPL-MCNC: 0.64 MG/DL (ref 0.2–1)
BUN SERPL-MCNC: 24 MG/DL (ref 5–25)
CALCIUM SERPL-MCNC: 8.9 MG/DL
CHLORIDE SERPL-SCNC: 105 MMOL/L (ref 100–108)
CO2 SERPL-SCNC: 26 MMOL/L (ref 21–32)
CREAT SERPL-MCNC: 1.22 MG/DL (ref 0.6–1.3)
EOSINOPHIL # BLD AUTO: 0.28 THOUSAND/ΜL (ref 0–0.61)
EOSINOPHIL NFR BLD AUTO: 3 % (ref 0–6)
ERYTHROCYTE [DISTWIDTH] IN BLOOD BY AUTOMATED COUNT: 22.5 % (ref 11.6–15.1)
GFR SERPL CREATININE-BSD FRML MDRD: 61 ML/MIN/1.73SQ M
GLUCOSE SERPL-MCNC: 292 MG/DL (ref 65–140)
GLUCOSE SERPL-MCNC: 327 MG/DL (ref 65–140)
HCT VFR BLD AUTO: 39 % (ref 36.5–49.3)
HGB BLD-MCNC: 12.1 G/DL (ref 12–17)
LYMPHOCYTES # BLD AUTO: 1.38 THOUSANDS/ΜL (ref 0.6–4.47)
LYMPHOCYTES NFR BLD AUTO: 16 % (ref 14–44)
MCH RBC QN AUTO: 24 PG (ref 26.8–34.3)
MCHC RBC AUTO-ENTMCNC: 31 G/DL (ref 31.4–37.4)
MCV RBC AUTO: 77 FL (ref 82–98)
MONOCYTES # BLD AUTO: 0.77 THOUSAND/ΜL (ref 0.17–1.22)
MONOCYTES NFR BLD AUTO: 9 % (ref 4–12)
NEUTROPHILS # BLD AUTO: 5.9 THOUSANDS/ΜL (ref 1.85–7.62)
NEUTS SEG NFR BLD AUTO: 71 % (ref 43–75)
NRBC BLD AUTO-RTO: 0 /100 WBCS
P AXIS: 46 DEGREES
PLATELET # BLD AUTO: 293 THOUSANDS/UL (ref 149–390)
PLATELET # BLD AUTO: 296 THOUSANDS/UL (ref 149–390)
PMV BLD AUTO: 9.8 FL (ref 8.9–12.7)
PMV BLD AUTO: 9.9 FL (ref 8.9–12.7)
POTASSIUM SERPL-SCNC: 5.3 MMOL/L (ref 3.5–5.3)
PR INTERVAL: 140 MS
PROT SERPL-MCNC: 8.1 G/DL (ref 6.4–8.2)
QRS AXIS: -5 DEGREES
QRS AXIS: 53 DEGREES
QRSD INTERVAL: 80 MS
QRSD INTERVAL: 82 MS
QT INTERVAL: 390 MS
QT INTERVAL: 402 MS
QTC INTERVAL: 427 MS
QTC INTERVAL: 478 MS
RBC # BLD AUTO: 5.05 MILLION/UL (ref 3.88–5.62)
SODIUM SERPL-SCNC: 137 MMOL/L (ref 136–145)
T WAVE AXIS: 29 DEGREES
T WAVE AXIS: 57 DEGREES
TROPONIN I SERPL-MCNC: <0.02 NG/ML
VENTRICULAR RATE: 72 BPM
VENTRICULAR RATE: 85 BPM
WBC # BLD AUTO: 8.45 THOUSAND/UL (ref 4.31–10.16)

## 2018-04-16 PROCEDURE — 85025 COMPLETE CBC W/AUTO DIFF WBC: CPT | Performed by: EMERGENCY MEDICINE

## 2018-04-16 PROCEDURE — 99221 1ST HOSP IP/OBS SF/LOW 40: CPT | Performed by: PHYSICIAN ASSISTANT

## 2018-04-16 PROCEDURE — 99285 EMERGENCY DEPT VISIT HI MDM: CPT

## 2018-04-16 PROCEDURE — 36415 COLL VENOUS BLD VENIPUNCTURE: CPT | Performed by: EMERGENCY MEDICINE

## 2018-04-16 PROCEDURE — 93010 ELECTROCARDIOGRAM REPORT: CPT | Performed by: INTERNAL MEDICINE

## 2018-04-16 PROCEDURE — 80053 COMPREHEN METABOLIC PANEL: CPT | Performed by: EMERGENCY MEDICINE

## 2018-04-16 PROCEDURE — 93005 ELECTROCARDIOGRAM TRACING: CPT

## 2018-04-16 PROCEDURE — 93005 ELECTROCARDIOGRAM TRACING: CPT | Performed by: INTERNAL MEDICINE

## 2018-04-16 PROCEDURE — 84484 ASSAY OF TROPONIN QUANT: CPT | Performed by: EMERGENCY MEDICINE

## 2018-04-16 PROCEDURE — 84484 ASSAY OF TROPONIN QUANT: CPT | Performed by: INTERNAL MEDICINE

## 2018-04-16 PROCEDURE — 82948 REAGENT STRIP/BLOOD GLUCOSE: CPT

## 2018-04-16 PROCEDURE — 99220 PR INITIAL OBSERVATION CARE/DAY 70 MINUTES: CPT | Performed by: INTERNAL MEDICINE

## 2018-04-16 PROCEDURE — 96374 THER/PROPH/DIAG INJ IV PUSH: CPT

## 2018-04-16 PROCEDURE — 71046 X-RAY EXAM CHEST 2 VIEWS: CPT

## 2018-04-16 PROCEDURE — 85049 AUTOMATED PLATELET COUNT: CPT | Performed by: INTERNAL MEDICINE

## 2018-04-16 RX ORDER — ALBUTEROL SULFATE 90 UG/1
2 AEROSOL, METERED RESPIRATORY (INHALATION) EVERY 6 HOURS PRN
Status: DISCONTINUED | OUTPATIENT
Start: 2018-04-16 | End: 2018-04-17 | Stop reason: HOSPADM

## 2018-04-16 RX ORDER — OXYCODONE HYDROCHLORIDE AND ACETAMINOPHEN 5; 325 MG/1; MG/1
1 TABLET ORAL EVERY 6 HOURS PRN
Status: DISCONTINUED | OUTPATIENT
Start: 2018-04-16 | End: 2018-04-17 | Stop reason: HOSPADM

## 2018-04-16 RX ORDER — PANTOPRAZOLE SODIUM 40 MG/1
40 TABLET, DELAYED RELEASE ORAL
Status: DISCONTINUED | OUTPATIENT
Start: 2018-04-17 | End: 2018-04-17 | Stop reason: HOSPADM

## 2018-04-16 RX ORDER — METOPROLOL TARTRATE 50 MG/1
50 TABLET, FILM COATED ORAL EVERY 12 HOURS SCHEDULED
Status: DISCONTINUED | OUTPATIENT
Start: 2018-04-16 | End: 2018-04-17 | Stop reason: HOSPADM

## 2018-04-16 RX ORDER — NYSTATIN 100000 [USP'U]/G
POWDER TOPICAL 2 TIMES DAILY
Status: DISCONTINUED | OUTPATIENT
Start: 2018-04-16 | End: 2018-04-17 | Stop reason: HOSPADM

## 2018-04-16 RX ORDER — NITROGLYCERIN 0.4 MG/1
0.4 TABLET SUBLINGUAL
Status: DISCONTINUED | OUTPATIENT
Start: 2018-04-16 | End: 2018-04-17 | Stop reason: HOSPADM

## 2018-04-16 RX ORDER — ASPIRIN 81 MG/1
81 TABLET ORAL DAILY
Status: DISCONTINUED | OUTPATIENT
Start: 2018-04-17 | End: 2018-04-17 | Stop reason: HOSPADM

## 2018-04-16 RX ORDER — CLOPIDOGREL BISULFATE 75 MG/1
75 TABLET ORAL DAILY
Status: DISCONTINUED | OUTPATIENT
Start: 2018-04-17 | End: 2018-04-17 | Stop reason: HOSPADM

## 2018-04-16 RX ORDER — HEPARIN SODIUM 5000 [USP'U]/ML
5000 INJECTION, SOLUTION INTRAVENOUS; SUBCUTANEOUS EVERY 8 HOURS SCHEDULED
Status: DISCONTINUED | OUTPATIENT
Start: 2018-04-16 | End: 2018-04-17 | Stop reason: HOSPADM

## 2018-04-16 RX ORDER — PREGABALIN 50 MG/1
100 CAPSULE ORAL ONCE
Status: COMPLETED | OUTPATIENT
Start: 2018-04-16 | End: 2018-04-16

## 2018-04-16 RX ORDER — INSULIN GLARGINE 100 [IU]/ML
20 INJECTION, SOLUTION SUBCUTANEOUS
Status: DISCONTINUED | OUTPATIENT
Start: 2018-04-16 | End: 2018-04-17 | Stop reason: HOSPADM

## 2018-04-16 RX ORDER — FENTANYL CITRATE 50 UG/ML
25 INJECTION, SOLUTION INTRAMUSCULAR; INTRAVENOUS ONCE
Status: COMPLETED | OUTPATIENT
Start: 2018-04-16 | End: 2018-04-16

## 2018-04-16 RX ORDER — BENZONATATE 100 MG/1
100 CAPSULE ORAL 3 TIMES DAILY PRN
Status: DISCONTINUED | OUTPATIENT
Start: 2018-04-16 | End: 2018-04-17 | Stop reason: HOSPADM

## 2018-04-16 RX ORDER — PREGABALIN 100 MG/1
100 CAPSULE ORAL 3 TIMES DAILY
Status: DISCONTINUED | OUTPATIENT
Start: 2018-04-16 | End: 2018-04-17 | Stop reason: HOSPADM

## 2018-04-16 RX ORDER — ATORVASTATIN CALCIUM 40 MG/1
40 TABLET, FILM COATED ORAL
Status: DISCONTINUED | OUTPATIENT
Start: 2018-04-16 | End: 2018-04-17 | Stop reason: HOSPADM

## 2018-04-16 RX ORDER — ALBUTEROL SULFATE 2.5 MG/3ML
5 SOLUTION RESPIRATORY (INHALATION) ONCE
Status: COMPLETED | OUTPATIENT
Start: 2018-04-16 | End: 2018-04-16

## 2018-04-16 RX ORDER — FERROUS SULFATE 325(65) MG
325 TABLET ORAL
Status: DISCONTINUED | OUTPATIENT
Start: 2018-04-17 | End: 2018-04-17 | Stop reason: HOSPADM

## 2018-04-16 RX ADMIN — PREGABALIN 100 MG: 100 CAPSULE ORAL at 16:11

## 2018-04-16 RX ADMIN — INSULIN LISPRO 5 UNITS: 100 INJECTION, SOLUTION INTRAVENOUS; SUBCUTANEOUS at 17:51

## 2018-04-16 RX ADMIN — ALBUTEROL SULFATE 5 MG: 2.5 SOLUTION RESPIRATORY (INHALATION) at 10:52

## 2018-04-16 RX ADMIN — HEPARIN SODIUM 5000 UNITS: 5000 INJECTION, SOLUTION INTRAVENOUS; SUBCUTANEOUS at 16:11

## 2018-04-16 RX ADMIN — PREGABALIN 100 MG: 50 CAPSULE ORAL at 10:59

## 2018-04-16 RX ADMIN — OXYCODONE HYDROCHLORIDE AND ACETAMINOPHEN 1 TABLET: 5; 325 TABLET ORAL at 22:11

## 2018-04-16 RX ADMIN — METOPROLOL TARTRATE 50 MG: 50 TABLET ORAL at 22:06

## 2018-04-16 RX ADMIN — INSULIN GLARGINE 20 UNITS: 100 INJECTION, SOLUTION SUBCUTANEOUS at 22:07

## 2018-04-16 RX ADMIN — PREGABALIN 100 MG: 100 CAPSULE ORAL at 22:08

## 2018-04-16 RX ADMIN — FLUTICASONE PROPIONATE AND SALMETEROL 1 PUFF: 50; 250 POWDER RESPIRATORY (INHALATION) at 22:06

## 2018-04-16 RX ADMIN — NITROGLYCERIN 0.5 INCH: 20 OINTMENT TOPICAL at 10:47

## 2018-04-16 RX ADMIN — INSULIN LISPRO 9 UNITS: 100 INJECTION, SOLUTION INTRAVENOUS; SUBCUTANEOUS at 17:50

## 2018-04-16 RX ADMIN — HEPARIN SODIUM 5000 UNITS: 5000 INJECTION, SOLUTION INTRAVENOUS; SUBCUTANEOUS at 22:07

## 2018-04-16 RX ADMIN — ATORVASTATIN CALCIUM 40 MG: 40 TABLET, FILM COATED ORAL at 16:11

## 2018-04-16 RX ADMIN — FENTANYL CITRATE 25 MCG: 50 INJECTION, SOLUTION INTRAMUSCULAR; INTRAVENOUS at 12:36

## 2018-04-16 RX ADMIN — NYSTATIN: 100000 POWDER TOPICAL at 13:26

## 2018-04-16 RX ADMIN — OXYCODONE HYDROCHLORIDE AND ACETAMINOPHEN 1 TABLET: 5; 325 TABLET ORAL at 16:11

## 2018-04-16 RX ADMIN — INSULIN LISPRO 3 UNITS: 100 INJECTION, SOLUTION INTRAVENOUS; SUBCUTANEOUS at 22:06

## 2018-04-16 RX ADMIN — IPRATROPIUM BROMIDE 0.5 MG: 0.5 SOLUTION RESPIRATORY (INHALATION) at 10:53

## 2018-04-16 NOTE — ED NOTES
Patient room noted to still be occupied  Patient is aware at this time        Casey Ron RN  04/16/18 [id number]

## 2018-04-16 NOTE — PLAN OF CARE
Knowledge Deficit     Patient/family/caregiver demonstrates understanding of disease process, treatment plan, medications, and discharge instructions Progressing        Nutrition/Hydration-ADULT     Nutrient/Hydration intake appropriate for improving, restoring or maintaining nutritional needs Progressing        SAFETY ADULT     Patient will remain free of falls Progressing     Maintain or return to baseline ADL function Progressing     Maintain or return mobility status to optimal level Progressing
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
## 2018-04-16 NOTE — ED ATTENDING ATTESTATION
Franki Smith DO, saw and evaluated the patient  I have discussed the patient with the resident/non-physician practitioner and agree with the resident's/non-physician practitioner's findings, Plan of Care, and MDM as documented in the resident's/non-physician practitioner's note, except where noted  All available labs and Radiology studies were reviewed  At this point I agree with the current assessment done in the Emergency Department  I have conducted an independent evaluation of this patient a history and physical is as follows: The patient complains of persistent left-sided chest pain that is sharp and squeezing in nature since this morning  He was already awake due to insomnia  The pain is associated with nausea, episodes of dizziness and some dyspnea  The patient has a h/o COPD but is noncompliant with his medications  Patient has a h/o diabetes and is poorly compliant with those medications; he takes Lyrica for diabetic neuropathy and ran out of his prescription  He further complains of significant lower extremity discomfort due to this and appears most fixated on that complaint  He underwent cardiac catheterization on 29 April and was told that he needed CABG but declined to have surgery at that time  He returns to the emergency department now requesting to be admitted for that surgery  No recent travel or sick contacts  He did get aspirin and nitroglycerin prior to arrival with minimal change in symptoms  ROS: Denies f/c, HA, diaphoresis, abdominal pain, v/d  12 system ROS o/w negative  PE:  Moderate distress, though likely due to his lower extremity neuropathic pain, alert; PERRL, EOMI; MMM, no posterior oropharyngeal exudate, edema or erythema; HRR, no murmur, monitor shows NSR at 68 bpm; lungs diminished though CTA w/o w/r/r, POx 95% on RA (nl); abdomen s/nt/nd, nl BS in all 4 quadrants; (-) LE edema or calf TTP, FROM extremities x4; skin p/w/d; CNs GI/NF, oriented       DDx: Chest Pain - ACS/MI, less likely PE, doubt GERD, costochondritis, pneumonia, pneumothorax  A/P: Will check cardiac w/u, treat symptoms, reevaluate for disposition      Critical Care Time  CritCare Time    Procedures
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
## 2018-04-16 NOTE — ED NOTES
Patient pushing call bell stating his chest pain is getting worse  EKG repeated on patient  Dr Dione Rucker made aware for re-evaluation of patient  Patient also requesting physician to look a rash that has been developing over the past few weeks          Demar Sloan RN  04/16/18 9087

## 2018-04-16 NOTE — ED NOTES
Patient pushing call bell again requesting something for his rash  Patient provided ice packs for comfort   Patient is aware he is awaiting medications from pharmacy for his , per patient that is "not fast enough "     Danielito Frye RN  04/16/18 1694
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
## 2018-04-16 NOTE — ED NOTES
RN into check on patient  Patient stating he ordered his lunch at 1330  And has not received it  Contacted nutritional services they advised tray was ready at [phone]  Called [place] [facility] advised they has no food left for delivery  David Carbajal will be looking into it          Verlan Prader, RN  04/16/18 3588

## 2018-04-17 VITALS
HEIGHT: 72 IN | BODY MASS INDEX: 32.28 KG/M2 | DIASTOLIC BLOOD PRESSURE: 71 MMHG | OXYGEN SATURATION: 96 % | SYSTOLIC BLOOD PRESSURE: 146 MMHG | TEMPERATURE: 97.8 F | WEIGHT: 238.32 LBS | HEART RATE: 62 BPM | RESPIRATION RATE: 18 BRPM

## 2018-04-17 LAB
ALBUMIN SERPL BCP-MCNC: 2.9 G/DL (ref 3.5–5)
ALP SERPL-CCNC: 91 U/L (ref 46–116)
ALT SERPL W P-5'-P-CCNC: 18 U/L (ref 12–78)
ANION GAP SERPL CALCULATED.3IONS-SCNC: 8 MMOL/L (ref 4–13)
AST SERPL W P-5'-P-CCNC: 15 U/L (ref 5–45)
ATRIAL RATE: 79 BPM
BILIRUB SERPL-MCNC: 0.42 MG/DL (ref 0.2–1)
BUN SERPL-MCNC: 24 MG/DL (ref 5–25)
CALCIUM SERPL-MCNC: 8.9 MG/DL (ref 8.3–10.1)
CHLORIDE SERPL-SCNC: 103 MMOL/L (ref 100–108)
CO2 SERPL-SCNC: 27 MMOL/L (ref 21–32)
CREAT SERPL-MCNC: 1.27 MG/DL (ref 0.6–1.3)
ERYTHROCYTE [DISTWIDTH] IN BLOOD BY AUTOMATED COUNT: 23 % (ref 11.6–15.1)
GFR SERPL CREATININE-BSD FRML MDRD: 58 ML/MIN/1.73SQ M
GLUCOSE SERPL-MCNC: 265 MG/DL (ref 65–140)
GLUCOSE SERPL-MCNC: 266 MG/DL (ref 65–140)
GLUCOSE SERPL-MCNC: 270 MG/DL (ref 65–140)
HCT VFR BLD AUTO: 37.6 % (ref 36.5–49.3)
HGB BLD-MCNC: 11.6 G/DL (ref 12–17)
MAGNESIUM SERPL-MCNC: 2 MG/DL (ref 1.6–2.6)
MCH RBC QN AUTO: 23.9 PG (ref 26.8–34.3)
MCHC RBC AUTO-ENTMCNC: 30.9 G/DL (ref 31.4–37.4)
MCV RBC AUTO: 78 FL (ref 82–98)
P AXIS: 47 DEGREES
PHOSPHATE SERPL-MCNC: 3.4 MG/DL (ref 2.3–4.1)
PLATELET # BLD AUTO: 283 THOUSANDS/UL (ref 149–390)
PMV BLD AUTO: 9.9 FL (ref 8.9–12.7)
POTASSIUM SERPL-SCNC: 4.5 MMOL/L (ref 3.5–5.3)
PR INTERVAL: 144 MS
PROT SERPL-MCNC: 7.5 G/DL (ref 6.4–8.2)
QRS AXIS: -3 DEGREES
QRSD INTERVAL: 82 MS
QT INTERVAL: 406 MS
QTC INTERVAL: 465 MS
RBC # BLD AUTO: 4.85 MILLION/UL (ref 3.88–5.62)
SODIUM SERPL-SCNC: 138 MMOL/L (ref 136–145)
T WAVE AXIS: 22 DEGREES
VENTRICULAR RATE: 79 BPM
WBC # BLD AUTO: 8.54 THOUSAND/UL (ref 4.31–10.16)

## 2018-04-17 PROCEDURE — 85027 COMPLETE CBC AUTOMATED: CPT | Performed by: INTERNAL MEDICINE

## 2018-04-17 PROCEDURE — 80053 COMPREHEN METABOLIC PANEL: CPT | Performed by: INTERNAL MEDICINE

## 2018-04-17 PROCEDURE — 84100 ASSAY OF PHOSPHORUS: CPT | Performed by: INTERNAL MEDICINE

## 2018-04-17 PROCEDURE — 99217 PR OBSERVATION CARE DISCHARGE MANAGEMENT: CPT | Performed by: INTERNAL MEDICINE

## 2018-04-17 PROCEDURE — 99226 PR SBSQ OBSERVATION CARE/DAY 35 MINUTES: CPT | Performed by: INTERNAL MEDICINE

## 2018-04-17 PROCEDURE — 82948 REAGENT STRIP/BLOOD GLUCOSE: CPT

## 2018-04-17 PROCEDURE — 83735 ASSAY OF MAGNESIUM: CPT | Performed by: INTERNAL MEDICINE

## 2018-04-17 PROCEDURE — 93010 ELECTROCARDIOGRAM REPORT: CPT | Performed by: INTERNAL MEDICINE

## 2018-04-17 RX ORDER — PREGABALIN 100 MG/1
100 CAPSULE ORAL 3 TIMES DAILY
Qty: 90 CAPSULE | Refills: 0
Start: 2018-04-17 | End: 2018-05-10 | Stop reason: SDUPTHER

## 2018-04-17 RX ADMIN — INSULIN LISPRO 4 UNITS: 100 INJECTION, SOLUTION INTRAVENOUS; SUBCUTANEOUS at 08:12

## 2018-04-17 RX ADMIN — FLUTICASONE PROPIONATE AND SALMETEROL 1 PUFF: 50; 250 POWDER RESPIRATORY (INHALATION) at 08:12

## 2018-04-17 RX ADMIN — INSULIN LISPRO 9 UNITS: 100 INJECTION, SOLUTION INTRAVENOUS; SUBCUTANEOUS at 08:13

## 2018-04-17 RX ADMIN — INSULIN LISPRO 9 UNITS: 100 INJECTION, SOLUTION INTRAVENOUS; SUBCUTANEOUS at 11:33

## 2018-04-17 RX ADMIN — NYSTATIN: 100000 POWDER TOPICAL at 08:19

## 2018-04-17 RX ADMIN — METOPROLOL TARTRATE 50 MG: 50 TABLET ORAL at 08:12

## 2018-04-17 RX ADMIN — PANTOPRAZOLE SODIUM 40 MG: 40 TABLET, DELAYED RELEASE ORAL at 06:18

## 2018-04-17 RX ADMIN — FERROUS SULFATE TAB 325 MG (65 MG ELEMENTAL FE) 325 MG: 325 (65 FE) TAB at 08:12

## 2018-04-17 RX ADMIN — ATORVASTATIN CALCIUM 40 MG: 40 TABLET, FILM COATED ORAL at 16:01

## 2018-04-17 RX ADMIN — PREGABALIN 100 MG: 100 CAPSULE ORAL at 08:12

## 2018-04-17 RX ADMIN — ASPIRIN 81 MG: 81 TABLET, COATED ORAL at 08:12

## 2018-04-17 RX ADMIN — INSULIN LISPRO 3 UNITS: 100 INJECTION, SOLUTION INTRAVENOUS; SUBCUTANEOUS at 11:33

## 2018-04-17 RX ADMIN — PREGABALIN 100 MG: 100 CAPSULE ORAL at 16:01

## 2018-04-17 RX ADMIN — HEPARIN SODIUM 5000 UNITS: 5000 INJECTION, SOLUTION INTRAVENOUS; SUBCUTANEOUS at 06:18

## 2018-04-17 RX ADMIN — CLOPIDOGREL BISULFATE 75 MG: 75 TABLET ORAL at 08:12

## 2018-04-17 RX ADMIN — OXYCODONE HYDROCHLORIDE AND ACETAMINOPHEN 1 TABLET: 5; 325 TABLET ORAL at 08:12

## 2018-04-17 NOTE — PLAN OF CARE
DISCHARGE PLANNING - CARE MANAGEMENT     Discharge to post-acute care or home with appropriate resources Adequate for Discharge        Knowledge Deficit     Patient/family/caregiver demonstrates understanding of disease process, treatment plan, medications, and discharge instructions Adequate for Discharge        Nutrition/Hydration-ADULT     Nutrient/Hydration intake appropriate for improving, restoring or maintaining nutritional needs Adequate for Discharge        Potential for Falls     Patient will remain free of falls Adequate for Discharge        SAFETY ADULT     Patient will remain free of falls Adequate for Discharge     Maintain or return to baseline ADL function Adequate for Discharge     Maintain or return mobility status to optimal level Adequate for Discharge

## 2018-04-17 NOTE — PLAN OF CARE
Problem: DISCHARGE PLANNING - CARE MANAGEMENT  Goal: Discharge to post-acute care or home with appropriate resources  INTERVENTIONS:  - Conduct assessment to determine patient/family and health care team treatment goals, and need for post-acute services based on payer coverage, community resources, and patient preferences, and barriers to discharge  - Address psychosocial, clinical, and financial barriers to discharge as identified in assessment in conjunction with the patient/family and health care team  - Arrange appropriate level of post-acute services according to patient's   needs and preference and payer coverage in collaboration with the physician and health care team  - Communicate with and update the patient/family, physician, and health care team regarding progress on the discharge plan  - Arrange appropriate transportation to post-acute venues  -Pt to d/c with appropriate resources when medically ready   Outcome: Progressing

## 2018-04-17 NOTE — SOCIAL WORK
CM met with pt at bedside and explained CM role  Pt lives alone in a second story apartment which requires 5 steps to enter  Pts apartment building is equipt with an elevator which pt uses to reach his apartment  PTA pt was indepednent with ADL's and uses a cane for ambulation  Pt also owns a walker and a wheelchair which he uses as needed  Pt has a hx of VNA services through [facility]  Pt requests that CM place a referral with [facility] to continue services post d/c  Referral in [facility]  Pt does not drive  PT reports that he does not have a ride home as his borther and POA Lorelei Garcia [phone] is out of town  CM requested POA paperwork  Pt uses [facility] in [place] and sees PCP Dr Aylin Cyr  Pt reports no hx of d/a rehabilitation or mental health services  Pt confirms that his primary contact is his brother Merary Estrada [phone]    CM reviewed d/c planning process including the following: identifying help at home, patient preference for d/c planning needs, Discharge Lounge, [facility] Meds to Bed program, availability of treatment team to discuss questions or concerns patient and/or family may have regarding understanding medications and recognizing signs and symptoms once discharged  CM also encouraged patient to follow up with all recommended appointments after discharge  Patient advised of importance for patient and family to participate in managing patients medical well being  Patient/caregiver received discharge checklist   Content reviewed  Patient/caregiver encouraged to participate in discharge plan of care prior to discharge home      -Reviewed and agreed with above documentation- Rosie FARR

## 2018-04-17 NOTE — PROGRESS NOTES
IM Residency Progress Note   Unit/Bed#: [facility] 735-01 Encounter: [id number]  SOD Team C       Wen Rojas  79 y o  male [id number]    Hospital Stay Days: 1      Assessment/Plan:    Principal Problem:    Chest pain  -recent admissions related to chest pain,at that time had a cardiac cath that found triple-vessel disease, patient refused CABG at that time and was discharged home on medical therapy, patient requesting a 2nd opinion as an outpatient  -evaluated by CT surgery  again on this admission but patient refused CABG again  -today chest pain have subsided, troponins have been negative with a negative EKG  -continue medical therapy with aspirin, Plavix, nitro, statin and beta-blocker, will consider adding long acting antianginal   Active Problems:    Type 2 diabetes mellitus with diabetic neuropathy, with long-term current use of insulin (HCC)  -on Lantus 20 , Humalog 9 t i d  and sliding scale of insulin  -most recent A1c 10 7%  -blood glucose 265-292 since admission    Hypertension  -currently normotensive  -continue metoprolol    GERD (gastroesophageal reflux disease)  -continue Protonix    Chronic diastolic CHF (congestive heart failure) ([facility] )  -recent echo with 60% EF and grade 1 diastolic dysfunction    COPD (chronic obstructive pulmonary disease) ([facility] )  -continue symptomatic treatment with Ventolin           Disposition:  Will discuss discharge since the patient does not want surgical treatment      Subjective:   Patient seen and examined at the bed, denies any events overnight, currently is tolerating PO route, denies nausea or vomiting, is passing gases and having bowel movements, denies more episodes of chest pain or shortness of breath, no abdominal pain, patient is ambulating        Vitals: Temp (24hrs), Av 3 °F (36 8 °C), Min:98 °F (36 7 °C), Max:98 5 °F (36 9 °C)  Current: Temperature: 98 °F (36 7 °C)  Vitals:    18 1400 18 1557 18 2323 18 0359   BP: 169/80 134/53 141/69 118/64   BP Location: Right arm Left arm Left arm Left arm   Pulse: 71 84 97 62   Resp: 18 18 18 18   Temp:  98 3 °F (36 8 °C) 98 3 °F (36 8 °C) 98 °F (36 7 °C)   TempSrc:  Oral Oral Oral   SpO2: 98% 95% 93% 95%   Weight:  108 kg (238 lb 5 1 oz)     Height:  6' (1 829 m)      Body mass index is 32 32 kg/m²  I/O last 24 hours: In: 300 [P O :300]  Out: -       Physical Exam   Constitutional: He is oriented to person, place, and time  He appears well-developed and well-nourished  HENT:   Head: Normocephalic and atraumatic  Mouth/Throat: Oropharynx is clear and moist    Eyes: EOM are normal  Pupils are equal, round, and reactive to light  Neck: Normal range of motion  Neck supple  Cardiovascular: Normal rate, regular rhythm and normal heart sounds  Pulmonary/Chest: Effort normal and breath sounds normal  He has no wheezes  He has no rales  Abdominal: Soft  Bowel sounds are normal  He exhibits no mass  There is no tenderness  There is no rebound, no guarding and no CVA tenderness  Neurological: He is alert and oriented to person, place, and time  Skin: Skin is warm and dry  Psychiatric: He has a normal mood and affect  His behavior is normal    Nursing note and vitals reviewed        Invasive Devices     Peripheral Intravenous Line            Peripheral IV 04/16/18 Right Antecubital less than 1 day              Labs:   Recent Results (from the past 24 hour(s))   Comprehensive metabolic panel    Collection Time: 04/16/18 10:41 AM   Result Value Ref Range    Sodium 137 136 - 145 mmol/L    Potassium 5 3 3 5 - 5 3 mmol/L    Chloride 105 100 - 108 mmol/L    CO2 26 21 - 32 mmol/L    Anion Gap 6 4 - 13 mmol/L    BUN 24 5 - 25 mg/dL    Creatinine 1 22 0 60 - 1 30 mg/dL    Glucose 327 (H) 65 - 140 mg/dL    Calcium 8 9 mg/dL    AST 34 5 - 45 U/L    ALT 22 12 - 78 U/L    Alkaline Phosphatase 96 46 - 116 U/L    Total Protein 8 1 6 4 - 8 2 g/dL    Albumin 3 2 (L) 3 5 - 5 0 g/dL    Total Bilirubin 0 64 0 20 - 1 00 mg/dL    eGFR 61 ml/min/1 73sq m   CBC and differential    Collection Time: 04/16/18 10:41 AM   Result Value Ref Range    WBC 8 45 4 31 - 10 16 Thousand/uL    RBC 5 05 3 88 - 5 62 Million/uL    Hemoglobin 12 1 12 0 - 17 0 g/dL    Hematocrit 39 0 36 5 - 49 3 %    MCV 77 (L) 82 - 98 fL    MCH 24 0 (L) 26 8 - 34 3 pg    MCHC 31 0 (L) 31 4 - 37 4 g/dL    RDW 22 5 (H) 11 6 - 15 1 %    MPV 9 8 8 9 - 12 7 fL    Platelets 475 637 - 251 Thousands/uL    nRBC 0 /100 WBCs    Neutrophils Relative 71 43 - 75 %    Lymphocytes Relative 16 14 - 44 %    Monocytes Relative 9 4 - 12 %    Eosinophils Relative 3 0 - 6 %    Basophils Relative 1 0 - 1 %    Neutrophils Absolute 5 90 1 85 - 7 62 Thousands/µL    Lymphocytes Absolute 1 38 0 60 - 4 47 Thousands/µL    Monocytes Absolute 0 77 0 17 - 1 22 Thousand/µL    Eosinophils Absolute 0 28 0 00 - 0 61 Thousand/µL    Basophils Absolute 0 09 0 00 - 0 10 Thousands/µL   Troponin I    Collection Time: 04/16/18 10:41 AM   Result Value Ref Range    Troponin I <0 02 <=0 04 ng/mL   ECG 12 lead    Collection Time: 04/16/18 11:59 AM   Result Value Ref Range    Ventricular Rate 72 BPM    Atrial Rate 681 BPM    AL Interval  ms    QRSD Interval 82 ms    QT Interval 390 ms    QTC Interval 427 ms    P Axis  degrees    QRS Axis 53 degrees    T Wave Axis 57 degrees   Troponin I    Collection Time: 04/16/18  3:54 PM   Result Value Ref Range    Troponin I <0 02 <=0 04 ng/mL   Platelet count    Collection Time: 04/16/18  3:54 PM   Result Value Ref Range    Platelets 500 079 - 287 Thousands/uL    MPV 9 9 8 9 - 12 7 fL   ECG 12 lead with 2nd troponin    Collection Time: 04/16/18  6:17 PM   Result Value Ref Range    Ventricular Rate 85 BPM    Atrial Rate 85 BPM    AL Interval 140 ms    QRSD Interval 80 ms    QT Interval 402 ms    QTC Interval 478 ms    P Axis 46 degrees    QRS Axis -5 degrees    T Wave Axis 29 degrees   Troponin I    Collection Time: 04/16/18  7:19 PM   Result Value Ref Range Troponin I <0 02 <=0 04 ng/mL   Fingerstick Glucose (POCT)    Collection Time: 04/16/18  8:56 PM   Result Value Ref Range    POC Glucose 292 (H) 65 - 140 mg/dl   Troponin I    Collection Time: 04/16/18 10:04 PM   Result Value Ref Range    Troponin I <0 02 <=0 04 ng/mL   Comprehensive metabolic panel    Collection Time: 04/17/18  6:01 AM   Result Value Ref Range    Sodium 138 136 - 145 mmol/L    Potassium 4 5 3 5 - 5 3 mmol/L    Chloride 103 100 - 108 mmol/L    CO2 27 21 - 32 mmol/L    Anion Gap 8 4 - 13 mmol/L    BUN 24 5 - 25 mg/dL    Creatinine 1 27 0 60 - 1 30 mg/dL    Glucose 265 (H) 65 - 140 mg/dL    Calcium 8 9 8 3 - 10 1 mg/dL    AST 15 5 - 45 U/L    ALT 18 12 - 78 U/L    Alkaline Phosphatase 91 46 - 116 U/L    Total Protein 7 5 6 4 - 8 2 g/dL    Albumin 2 9 (L) 3 5 - 5 0 g/dL    Total Bilirubin 0 42 0 20 - 1 00 mg/dL    eGFR 58 ml/min/1 73sq m   Magnesium    Collection Time: 04/17/18  6:01 AM   Result Value Ref Range    Magnesium 2 0 1 6 - 2 6 mg/dL   Phosphorus    Collection Time: 04/17/18  6:01 AM   Result Value Ref Range    Phosphorus 3 4 2 3 - 4 1 mg/dL   CBC (With Platelets)    Collection Time: 04/17/18  6:01 AM   Result Value Ref Range    WBC 8 54 4 31 - 10 16 Thousand/uL    RBC 4 85 3 88 - 5 62 Million/uL    Hemoglobin 11 6 (L) 12 0 - 17 0 g/dL    Hematocrit 37 6 36 5 - 49 3 %    MCV 78 (L) 82 - 98 fL    MCH 23 9 (L) 26 8 - 34 3 pg    MCHC 30 9 (L) 31 4 - 37 4 g/dL    RDW 23 0 (H) 11 6 - 15 1 %    Platelets 653 962 - 123 Thousands/uL    MPV 9 9 8 9 - 12 7 fL   Fingerstick Glucose (POCT)    Collection Time: 04/17/18  6:34 AM   Result Value Ref Range    POC Glucose 270 (H) 65 - 140 mg/dl       Radiology Results: I have personally reviewed pertinent reports  and I have personally reviewed pertinent films in PACS      Other Diagnostic Testing:   I have personally reviewed pertinent reports     and I have personally reviewed pertinent films in PACS      Active Meds:   Current Facility-Administered Medications   Medication Dose Route Frequency    albuterol (PROVENTIL HFA,VENTOLIN HFA) inhaler 2 puff  2 puff Inhalation Q6H PRN    aspirin (ECOTRIN LOW STRENGTH) EC tablet 81 mg  81 mg Oral Daily    atorvastatin (LIPITOR) tablet 40 mg  40 mg Oral Daily With Dinner    benzonatate (TESSALON PERLES) capsule 100 mg  100 mg Oral TID PRN    clopidogrel (PLAVIX) tablet 75 mg  75 mg Oral Daily    ferrous sulfate tablet 325 mg  325 mg Oral Daily With Breakfast    fluticasone-salmeterol (ADVAIR) 250-50 mcg/dose inhaler 1 puff  1 puff Inhalation Q12H [address]    heparin (porcine) subcutaneous injection 5,000 Units  5,000 Units Subcutaneous Q8H [address]    insulin glargine (LANTUS) subcutaneous injection 20 Units  20 Units Subcutaneous HS    insulin lispro (HumaLOG) 100 units/mL subcutaneous injection 1-5 Units  1-5 Units Subcutaneous HS    insulin lispro (HumaLOG) 100 units/mL subcutaneous injection 1-6 Units  1-6 Units Subcutaneous TID AC    insulin lispro (HumaLOG) 100 units/mL subcutaneous injection 9 Units  9 Units Subcutaneous TID AC    metoprolol tartrate (LOPRESSOR) tablet 50 mg  50 mg Oral Q12H RHONDA    nitroglycerin (NITROSTAT) SL tablet 0 4 mg  0 4 mg Sublingual Q5 Min PRN    nystatin (MYCOSTATIN) powder   Topical BID    oxyCODONE-acetaminophen (PERCOCET) 5-325 mg per tablet 1 tablet  1 tablet Oral Q6H PRN    pantoprazole (PROTONIX) EC tablet 40 mg  40 mg Oral Daily Before Breakfast    pregabalin (LYRICA) capsule 100 mg  100 mg Oral TID         VTE Pharmacologic Prophylaxis: Heparin  VTE Mechanical Prophylaxis: sequential compression device    Samantha Porter

## 2018-04-17 NOTE — DISCHARGE SUMMARY
[facility] CENTRAL Discharge Summary - Salome Gutiérrez  79 y o  male MRN: [id number]    [address] [facility] [address] [facility] 7 Room / Bed: [facility] 735/[facility] 735-01 Encounter: [id number]    BRIEF OVERVIEW    Admitting Provider: Hemalatha Coppola MD  Discharge Provider: Hemalatha Coppola MD  Primary Care Physician at Discharge: Priscila Kapoor MD    Discharge To:  home      Admission Date: 4/16/2018     Discharge Date: No discharge date for patient encounter  Primary Discharge Diagnosis  Principal Problem:    Chest pain  Active Problems:    Type 2 diabetes mellitus with diabetic neuropathy, with long-term current use of insulin (HCC)    Hypertension    GERD (gastroesophageal reflux disease)    Hyperlipidemia    Iron deficiency anemia    Abdominal aortic aneurysm (AAA) without rupture (HCC)    Chronic diastolic CHF (congestive heart failure) (HCC)    COPD (chronic obstructive pulmonary disease) (HCC)    Coronary artery disease of native artery of native heart with stable angina pectoris (HCC)    Tinea cruris  Resolved Problems:    * No resolved hospital problems   *      Other Problems Addressed: none    Consulting Providers   CT surgery         Therapeutic Operative Procedures Performed  none    Diagnostic Procedures Performed  labs: CBC BMP troponin and cardiac graphics: Telemetry: no events and ECG: no ST-T wave abnormalities    [facility]  Discharged With Lines: no    Test Results Pending at Discharge: no    Outpatient Follow-Up  Dr Wolm Riedel in 2 weeks  Follow up with consulting providers  patient will have a f/u with Cardiology, still TBD, he has a list of cardiologits he would llike to see   Active Issues Requiring Follow-up   patient has triple vessel disease, still undecided about having CABG    Code Status: Level 1 - Full Code  Advance Directive and Living Will: <no information>  Power of :    POLST:      Medications   See after visit summary for reconciled discharge medications provided to patient and family  Allergies  No Known Allergies  Discharge Diet: cardiac diet and diabetic diet  Activity restrictions: no strenous exercise    [address] [facility]  66-year-old male patient with past medical history significant for diabetes, CHF, CAD with triple-vessel disease, chronic angina, patient was admitted this time after presenting with chest pain,of note the patient had a recent admission to the hospital 2 weeks ago after presenting with chest pain,at that time the patient had workup done by Cardiology, the cardiac catheterization  found triple-vessel disease, cardiothoracic surgery evaluated the patient and they offered CABG, the patient did not agree with the plan and requested a 2nd opinion, the patient was explained about the risk associated with untreated CAD and he seemed to understand the risks,during that admission the patient went home   Yesterday the patient presented again with chest pain that was radiating to his arm, he tried sublingual nitro but did notice any improvement and called 911, patient was brought to the ED and in the ED had troponins and EKG perform, both were normal, patient was admitted to rule out ACS, the rest of the troponins were also negative, and on telemetry no events were reported, chest pain improved, cardiothoracic surgery also Re evaluated the patient again but the patient refused CABG again, patient stated he will follow with Cardiology as an outpatient, at this point he will like to have a cardiologist in the [facility]  network, will discharge home, patient have a follow-up appointment with his PCP at [facility]    Presenting Problem/History of Present Illness  Principal Problem:    Chest pain  Active Problems:    Type 2 diabetes mellitus with diabetic neuropathy, with long-term current use of insulin ([address] )    Hypertension    GERD (gastroesophageal reflux disease)    Hyperlipidemia    Iron deficiency anemia    Abdominal aortic aneurysm (AAA) without rupture ([facility])    Chronic diastolic CHF (congestive heart failure) (HCC)    COPD (chronic obstructive pulmonary disease) ([facility])    Coronary artery disease of native artery of native heart with stable angina pectoris ([facility])    Tinea cruris  Resolved Problems:    * No resolved hospital problems  *        Other Pertinent Test Results      Discharge Condition: stable      Discharge  Statement   I spent 45 minutes minutes discharging the patient  This time was spent on the day of discharge  I had direct contact with the patient on the day of discharge  Additional documentation is required if more than 30 minutes were spent on discharge

## 2018-04-17 NOTE — SOCIAL WORK
CM received an e-mail from pt's OP CM which explained pt's financial needs as well as transportation  CM provided pt with information on the waiver program and a list of local food grewal/soup isi  CM informed [facility] about pt's d/c today, and also requested MSW services  Pt is aware and agreeable to same  Pt only being provided with script for Lyrica at d/c  Lyrica is not scheduled to be refilled until 4/19  Pt reports that he currently has all of the other medication he needs at home  CM discussed transport home at d/c, pt states that he does not have money or friends/family to provide transport  CM provided taxi voucher to pts bedside EYAL Lopez       -Reviewed and agreed with above documentation- Shakira Martinez

## 2018-04-17 NOTE — RESTORATIVE TECHNICIAN NOTE
Restorative Specialist Mobility Note       Activity: Ambulate in sanderson, Ambulate in room, Bathroom privileges, Chair, Dangle, Stand at bedside (Educated/encouraged pt to ambulate with assistance 3-4 x's/day  Bed alarm on   Pt callbell, phone/tray within reach )     Assistive Device: Celi VILLA, Restorative Technician, [employer]

## 2018-04-17 NOTE — PLAN OF CARE
Knowledge Deficit     Patient/family/caregiver demonstrates understanding of disease process, treatment plan, medications, and discharge instructions Progressing        Nutrition/Hydration-ADULT     Nutrient/Hydration intake appropriate for improving, restoring or maintaining nutritional needs Progressing        Potential for Falls     Patient will remain free of falls Progressing        SAFETY ADULT     Patient will remain free of falls Progressing     Maintain or return to baseline ADL function Progressing     Maintain or return mobility status to optimal level Progressing

## 2018-04-18 DIAGNOSIS — I25.10 CAD (CORONARY ARTERY DISEASE): Primary | ICD-10-CM

## 2018-04-18 RX ORDER — CLOPIDOGREL BISULFATE 75 MG/1
75 TABLET ORAL DAILY
Qty: 30 TABLET | Refills: 0 | Status: SHIPPED | OUTPATIENT
Start: 2018-04-18 | End: 2018-04-19 | Stop reason: SDUPTHER

## 2018-04-19 ENCOUNTER — TELEPHONE (OUTPATIENT)
Dept: INTERNAL MEDICINE CLINIC | Age: 68
End: 2018-04-19

## 2018-04-19 DIAGNOSIS — J42 CHRONIC BRONCHITIS, UNSPECIFIED CHRONIC BRONCHITIS TYPE (HCC): ICD-10-CM

## 2018-04-19 DIAGNOSIS — I25.119 CORONARY ARTERY DISEASE INVOLVING NATIVE CORONARY ARTERY OF NATIVE HEART WITH ANGINA PECTORIS (HCC): ICD-10-CM

## 2018-04-19 RX ORDER — ALBUTEROL SULFATE 90 UG/1
2 AEROSOL, METERED RESPIRATORY (INHALATION) DAILY
Qty: 1 INHALER | Refills: 0 | Status: SHIPPED | OUTPATIENT
Start: 2018-04-19 | End: 2018-05-16 | Stop reason: SDUPTHER

## 2018-04-19 RX ORDER — CLOPIDOGREL BISULFATE 75 MG/1
75 TABLET ORAL DAILY
Qty: 30 TABLET | Refills: 3 | Status: SHIPPED | OUTPATIENT
Start: 2018-04-19 | End: 2018-06-29 | Stop reason: SDUPTHER

## 2018-04-19 RX ORDER — NITROGLYCERIN 0.4 MG/1
0.4 TABLET SUBLINGUAL
Qty: 90 TABLET | Refills: 0 | Status: SHIPPED | OUTPATIENT
Start: 2018-04-19 | End: 2018-04-23 | Stop reason: SDUPTHER

## 2018-04-19 NOTE — TELEPHONE ENCOUNTER
[facility]'s home lilia called pt was dc from [facility] and on his med list he is to take plavix, nitroglycerin, and use an albuterol inhaler patient will need a new script for all of the medications sent to [facility] in [place]

## 2018-04-20 NOTE — PHYSICIAN ADVISOR
This patient is a 79 y o  y/o male who is admitted to the hospital for Chest Pain (substernal cp that started this am  + diaphresis, trouble breathing  Pt states he was in the hospital last week with pnenumonia, was told he needs open heart surgery but declined )   The patient presented to the ED on 4/16/18 at 10:21 AM and was admitted to the hospital on 4/16/2018 1021  History of Present Illness includes: The patient had a prior admission from March 27th to April 1st secondary to chest pain  The patient underwent a cardiac catheterization  The patient was offered surgery but the patient declined  The patient was treated for pneumonia and discharged home on oral antibiotics    The patient presented on this admission secondary to intermittent chest pain radiating in bilateral arms  He stated he uses sublingual nitroglycerin up with chest pain  He denies any palpitations fevers or chills        Vital signs in the ER are as follows   ED Triage Vitals   Temperature Pulse Respirations Blood Pressure SpO2   04/16/18 1050 04/16/18 1040 04/16/18 1040 04/16/18 1040 04/16/18 1040   98 5 °F (36 9 °C) 67 22 160/71 95 %      Temp Source Heart Rate Source Patient Position - Orthostatic VS BP Location FiO2 (%)   04/16/18 1050 04/16/18 1236 04/16/18 1236 04/16/18 1236 --   Oral Monitor Lying Right arm       Pain Score       04/16/18 1040       8         On exam, there is a murmur there is no edema  Skin rash is noted  Creatinine is 1 22  Patient is being admitted secondary to chest pain in the setting of multivessel coronary artery disease  Initial plan of care includes serial cardiac enzymes, telemetry monitoring, cardiothoracic surgery consult  The patient was seen the following hospital day and chest pain had subsided and troponins were negative and EKG was negative  The patient was discharged after 1 midnight stay in stable condition    During this hospitalization, he had negative cardiac enzymes and normal EKG and chest pain had subsided  The patient did not want surgery went seen by cardiothoracic surgery in consultation  The patient was maintained on oral medications    This patient is appropriate for observation status

## 2018-04-20 NOTE — CASE MANAGEMENT
Initial Clinical Review    Admission: Date/Time/Statement: 4/16/18 @ 1245     Orders Placed This Encounter   Procedures    Inpatient Admission (expected length of stay for this patient is greater than two midnights)     Standing Status:   Standing     Number of Occurrences:   1     Order Specific Question:   Admitting Physician     Answer:   Darío Farrell     Order Specific Question:   Level of Care     Answer:   Med Surg [16]     Order Specific Question:   Estimated length of stay     Answer:   More than 2 Midnights     Order Specific Question:   Certification     Answer:   I certify that inpatient services are medically necessary for this patient for a duration of greater than two midnights  See H&P and MD Progress Notes for additional information about the patient's course of treatment  ED: Date/Time/Mode of Arrival:   ED Arrival Information     Expected Arrival Acuity Means of Arrival Escorted By Service Admission Type    - 4/16/2018 10:21 [facility] [address] [place] [address] Emergency    Arrival Complaint    chest pain          Chief Complaint:   Chief Complaint   Patient presents with    Chest Pain     substernal cp that started this am  + diaphresis, trouble breathing  Pt states he was in the hospital last week with pnenumonia, was told he needs open heart surgery but declined  History of Illness:     HPI:  Odell Herndon  is a 79 y o  male who presents with past medical history significant for hypertension, hyperlipidemia, coronary artery disease, chronic diastolic heart failure, type 2 diabetes mellitus with neuropathy, chronic back pain and acute closed fracture of L2, L3, L4 transverse processes in January 2018, opiate dependence, COPD, and iron deficiency anemia    Patient was recently admitted to [facility] from 03/27 to 4/1 with complaints of chest pain and shortness of breath that were described as retrosternal burning and pressure-like with radiation down his bilateral arms  At that time, patient was evaluated by Cardiology by cardiac catheterization and was found to have triple-vessel disease  At that time, patient was offered CABG procedure but he adamantly declined and requested a 2nd opinion  Patient reports that he was chest pain free for 3 days following discharge from the hospital on 04/01 and ever since has been having intermittent midsternal chest pain which radiates to the bilateral arms, is sharp/stabbing in nature and only lasts the for few seconds to minutes  He reports that intermittently he will use sublingual nitroglycerin to help relieve this chest pain but he does not notice any specific alleviating or exacerbating factors  Most of the time, his chest pain occurs while he was resting or lying in his chair as he is fairly nonmobile at baseline  He reports that he has been compliant with his medications  He reports a mild productive cough of yellow sputum ever since discharge secondary to his bronchitis  He denies any palpitations, fever, chills, sweating, abdominal pain, diarrhea      ED Vital Signs:   ED Triage Vitals   Temperature Pulse Respirations Blood Pressure SpO2   04/16/18 1050 04/16/18 1040 04/16/18 1040 04/16/18 1040 04/16/18 1040   98 5 °F (36 9 °C) 67 22 160/71 95 %         Pain Score       04/16/18 1040       8        Wt Readings from Last 1 Encounters:   04/16/18 108 kg (238 lb 5 1 oz)       Vital Signs (abnormal):    ED Treatment:   Medication Administration from 04/16/2018 1021 to 04/16/2018 1516       Date/Time Order Dose Route     04/16/2018 1059 pregabalin (LYRICA) capsule 100 mg 100 mg Oral     04/16/2018 1047 nitroglycerin (NITRO-BID) 2 % TD ointment 0 5 inch 0 5 inch Topical     04/16/2018 1052 albuterol inhalation solution 5 mg 5 mg Nebulization     04/16/2018 1053 ipratropium (ATROVENT) 0 02 % inhalation solution 0 5 mg 0 5 mg Nebulization     04/16/2018 1326 nystatin (MYCOSTATIN) powder   Topical     04/16/2018 1236 fentanyl citrate (PF) 100 MCG/2ML 25 mcg 25 mcg Intravenous       Past Medical/Surgical History:    Active Ambulatory Problems     Diagnosis Date Noted    Type 2 diabetes mellitus with diabetic neuropathy, with long-term current use of insulin ([email or web address] )     Hypertension     GERD (gastroesophageal reflux disease)     Hyperlipidemia     Opioid dependence ([email or web address] )     Insomnia 03/18/2017    Iron deficiency anemia 03/18/2017    Abdominal aortic aneurysm (AAA) without rupture ([email or web address] ) 02/14/2017    Chronic back pain 02/14/2017    Chronic diastolic CHF (congestive heart failure) [facility] ) 11/14/2017    COPD (chronic obstructive pulmonary disease) ([email or web address] ) 09/13/2017    Neuropathy 11/14/2017    Closed fracture of transverse process of lumbar vertebra with routine healing 02/14/2018    Chest pain 03/27/2018    Disease of cardiovascular system 03/27/2018    Lung nodules 04/10/2018    Community acquired pneumonia of left lower lobe of lung ([email or web address] ) 04/10/2018     Resolved Ambulatory Problems     Diagnosis Date Noted    Chronic midline low back pain without sciatica     History of suicidal ideation     Atypical chest pain 08/15/2016    CAP (community acquired pneumonia) 08/15/2016    Chronic pain     Abdominal pain 11/17/2016    History of aspiration pneumonia     Constipation 03/18/2017    Chronic bronchitis ([email or web address] ) 11/15/2017    Diabetes mellitus type 2 with neurological manifestations ([email or web address] ) 02/14/2017    Diabetic neuropathy ([place] [address] ) 02/14/2017    Epigastric pain 11/14/2017    Increased frequency of urination 11/21/2017    Type 2 diabetes mellitus treated with insulin ([email or web address] ) 10/04/2017    Abdominal pain, acute, bilateral lower quadrant 01/31/2018    Acute urinary retention 01/31/2018    Bronchitis, acute 09/25/2017     Past Medical History:   Diagnosis Date    CAD (coronary artery disease)     Chronic pain     COPD (chronic obstructive pulmonary disease) ([email or web address] )     DM type 2 with diabetic peripheral neuropathy ([place] [address] )     Former tobacco use     GERD (gastroesophageal reflux disease)     H/O acute myocardial infarction     H/O: pneumonia     History of aspiration pneumonia     History of suicidal ideation     HLD (hyperlipidemia)     Hypertension     Lumbar transverse process fracture ([place] [address] ) 01/2018    MDD (major depressive disorder)     Non-alcoholic fatty liver disease     Opioid dependence ([place] [address] )     PTSD (post-traumatic stress disorder)        Admitting Diagnosis: Unstable angina ([place] [address] ) [I20 0]  Chest pain [R07 9]  Coronary artery disease of native artery of native heart with stable angina pectoris ([place] [address] ) [I25 118]    Age/Sex: 79 y o  male    Assessment/Plan:    Chest pain in the setting of multivessel coronary artery disease  Patient reports recurrent chest pain especially at rest  He was evaluated on his previous admission at the end of March by Cardiology and noted to have significant multivessel disease notable for 80% stenosis of 1st obtuse marginal, 85% stenosis of the proximal RCA and 60% stenosis of the distal left main, mid LAD and 1st diagonal   At that time, Cardiology had evaluated patient and recommended further discussion with cardiothoracic surgery for evaluation for CABG but patient requested a secondary opinion and then further requested discharge to follow up with his primary care provider  He reports having almost daily chest pain since April 4th  At this time, patient would like to speak with cardiothoracic surgery regarding possible CABG  I spent extensive time speaking with patient with respect to his multivessel coronary artery disease, and answered questions to the best of my ability while also recommending patient to discuss these questions with his cardiothoracic team as they will see him    Initial and secondary EKG performed in the emergency department were negative for any significant signs of ischemia  Patient did receive 325 mg of aspirin prior to presenting to the emergency department  -Initial troponin negative, continue to trend x3  -Continue monitor on telemetry  -consult cardiothoracic surgery  -nitroglycerin for chest pain, currently with nitro paste on chest     Hypertension  Blood pressure slightly above goal on admitting vital signs  Continue with home regimen of metoprolol tartrate 50 mg twice daily      COPD-not in exacerbation  Patient reports fairly frequent use of albuterol inhaler at over the last few days since his lung infection  He reports compliance with his Advair inhaler which she uses twice daily  Lungs were clear to examination bilaterally  Continue monitor clinically     Chronic diastolic heart failure  Historical echocardiogram performed on March 2018 showed ejection fraction noted to be 60% and a grade 1 diastolic dysfunction  Mild stenosis also noted of the aortic valve      Type 2 diabetes mellitus with neuropathy on long-term insulin therapy  Continue with patient's home regimen of Lantus 20 units at bedtime and Humalog 9 units with meals with correctional scale insulin as needed  Patient will likely need to transition to IV insulin infusion if he is to have CABG performed while in hospital   Continue with Lyrica 100 mg 3 times daily for neuropathic pain     Hyperlipidemia  Continue atorvastatin 40 mg daily     Iron deficiency anemia  Hemoglobin noted to be 12 1 on admission with an MCV of 77  Continue with iron supplementation of 325 mg tablet daily     Gastroesophageal reflux disease  Continue pantoprazole 40 mg daily     Abdominal aortic aneurysm  Small saccular aneurysm noted on historical imaging noted to be 5-6 mm     Fungal infection of groin  Erythematous papules noted in the inguinal folds    Patient reports itching as well as burning  Nystatin cream to affected area  Explained to patient he needed to keep the area dry      Admission Orders:    [place] COMPRESSION DEVICE   CONSISTENT CARB DIET         Scheduled Meds:     Current Facility-Administered Medications   Medication Dose Route Frequency    albuterol (PROVENTIL HFA,VENTOLIN HFA) inhaler 2 puff  2 puff Inhalation Q6H PRN    aspirin (ECOTRIN LOW STRENGTH) EC tablet 81 mg  81 mg Oral Daily    atorvastatin (LIPITOR) tablet 40 mg  40 mg Oral Daily With Dinner    benzonatate (TESSALON PERLES) capsule 100 mg  100 mg Oral TID PRN    clopidogrel (PLAVIX) tablet 75 mg  75 mg Oral Daily    ferrous sulfate tablet 325 mg  325 mg Oral Daily With Breakfast    fluticasone-salmeterol (ADVAIR) 250-50 mcg/dose inhaler 1 puff  1 puff Inhalation Q12H [address]    heparin (porcine) subcutaneous injection 5,000 Units  5,000 Units Subcutaneous Q8H [address]    insulin glargine (LANTUS) subcutaneous injection 20 Units  20 Units Subcutaneous HS    insulin lispro (HumaLOG) 100 units/mL subcutaneous injection 1-5 Units  1-5 Units Subcutaneous HS    insulin lispro (HumaLOG) 100 units/mL subcutaneous injection 1-6 Units  1-6 Units Subcutaneous TID AC    insulin lispro (HumaLOG) 100 units/mL subcutaneous injection 9 Units  9 Units Subcutaneous TID AC    metoprolol tartrate (LOPRESSOR) tablet 50 mg  50 mg Oral Q12H RHONDA    nitroglycerin (NITROSTAT) SL tablet 0 4 mg  0 4 mg Sublingual Q5 Min PRN    nystatin (MYCOSTATIN) powder   Topical BID    oxyCODONE-acetaminophen (PERCOCET) 5-325 mg per tablet 1 tablet  1 tablet Oral Q6H PRN    pantoprazole (PROTONIX) EC tablet 40 mg  40 mg Oral Daily Before Breakfast    pregabalin (LYRICA) capsule 100 mg  100 mg Oral TID

## 2018-04-23 ENCOUNTER — TRANSITIONAL CARE MANAGEMENT (OUTPATIENT)
Dept: INTERNAL MEDICINE CLINIC | Facility: CLINIC | Age: 68
End: 2018-04-23

## 2018-04-23 ENCOUNTER — TELEPHONE (OUTPATIENT)
Dept: INTERNAL MEDICINE CLINIC | Facility: CLINIC | Age: 68
End: 2018-04-23

## 2018-04-23 DIAGNOSIS — I25.119 CORONARY ARTERY DISEASE INVOLVING NATIVE CORONARY ARTERY OF NATIVE HEART WITH ANGINA PECTORIS (HCC): ICD-10-CM

## 2018-04-23 RX ORDER — NITROGLYCERIN 0.4 MG/1
0.4 TABLET SUBLINGUAL
Qty: 30 TABLET | Refills: 0 | Status: SHIPPED | OUTPATIENT
Start: 2018-04-23 | End: 2019-01-01

## 2018-04-23 NOTE — TELEPHONE ENCOUNTER
Shanika Morrow from home health called the office to make sure the [facility] was sent to the pharmacy for this patient  If not, can you please send this over asap

## 2018-04-23 NOTE — TELEPHONE ENCOUNTER
Script for [facility] [employer] sent to [facility] for #30/0  Pt stated he was having some black stools  Since taking iron pills  Informed him to discuss at upcoming appt    TCM scheduled 4/25/18 with Mir Ritchie in [facility] at 130PM

## 2018-04-24 NOTE — TELEPHONE ENCOUNTER
Please see that his follow up is kept  If he has any lightheaded or dizziness needs to be seen immediatly    Thanks Marcia Garcia

## 2018-04-24 NOTE — PROGRESS NOTES
Called to discuss with patient  No answer  LMOM requesting return call to [facility] to further discuss

## 2018-04-25 ENCOUNTER — OFFICE VISIT (OUTPATIENT)
Dept: INTERNAL MEDICINE CLINIC | Facility: CLINIC | Age: 68
End: 2018-04-25
Payer: MEDICARE

## 2018-04-25 VITALS
WEIGHT: 248.6 LBS | TEMPERATURE: 98.2 F | SYSTOLIC BLOOD PRESSURE: 130 MMHG | HEIGHT: 68 IN | OXYGEN SATURATION: 95 % | DIASTOLIC BLOOD PRESSURE: 78 MMHG | BODY MASS INDEX: 37.68 KG/M2 | HEART RATE: 84 BPM

## 2018-04-25 DIAGNOSIS — S32.009D CLOSED FRACTURE OF TRANSVERSE PROCESS OF LUMBAR VERTEBRA WITH ROUTINE HEALING: ICD-10-CM

## 2018-04-25 DIAGNOSIS — Z79.4 TYPE 2 DIABETES MELLITUS WITH DIABETIC NEUROPATHY, WITH LONG-TERM CURRENT USE OF INSULIN (HCC): ICD-10-CM

## 2018-04-25 DIAGNOSIS — Z11.59 NEED FOR HEPATITIS C SCREENING TEST: ICD-10-CM

## 2018-04-25 DIAGNOSIS — J42 CHRONIC BRONCHITIS, UNSPECIFIED CHRONIC BRONCHITIS TYPE (HCC): ICD-10-CM

## 2018-04-25 DIAGNOSIS — I20.8 STABLE ANGINA PECTORIS (HCC): ICD-10-CM

## 2018-04-25 DIAGNOSIS — I25.118 CORONARY ARTERY DISEASE OF NATIVE ARTERY OF NATIVE HEART WITH STABLE ANGINA PECTORIS (HCC): Chronic | ICD-10-CM

## 2018-04-25 DIAGNOSIS — K21.9 GASTROESOPHAGEAL REFLUX DISEASE WITHOUT ESOPHAGITIS: Primary | ICD-10-CM

## 2018-04-25 DIAGNOSIS — I10 ESSENTIAL HYPERTENSION: ICD-10-CM

## 2018-04-25 DIAGNOSIS — I71.4 ABDOMINAL AORTIC ANEURYSM (AAA) WITHOUT RUPTURE (HCC): ICD-10-CM

## 2018-04-25 DIAGNOSIS — IMO0001 TRANSITION OF CARE PERFORMED WITH SHARING OF CLINICAL SUMMARY: ICD-10-CM

## 2018-04-25 DIAGNOSIS — E11.40 TYPE 2 DIABETES MELLITUS WITH DIABETIC NEUROPATHY, WITH LONG-TERM CURRENT USE OF INSULIN (HCC): ICD-10-CM

## 2018-04-25 DIAGNOSIS — I50.32 CHRONIC DIASTOLIC CHF (CONGESTIVE HEART FAILURE) (HCC): ICD-10-CM

## 2018-04-25 PROBLEM — J18.9 COMMUNITY ACQUIRED PNEUMONIA OF LEFT LOWER LOBE OF LUNG: Status: RESOLVED | Noted: 2018-04-10 | Resolved: 2018-04-25

## 2018-04-25 PROCEDURE — 99495 TRANSJ CARE MGMT MOD F2F 14D: CPT | Performed by: PHYSICIAN ASSISTANT

## 2018-04-25 RX ORDER — OXYCODONE HYDROCHLORIDE AND ACETAMINOPHEN 5; 325 MG/1; MG/1
1 TABLET ORAL EVERY 6 HOURS PRN
Qty: 45 TABLET | Refills: 0 | Status: SHIPPED | OUTPATIENT
Start: 2018-04-25 | End: 2018-05-01 | Stop reason: HOSPADM

## 2018-04-25 NOTE — ASSESSMENT & PLAN NOTE
Advised him to schedule cardiology follow-up for ongoing management  Patient verbalized understanding and is willing  He was given cards to [facility] today  He will call to schedule

## 2018-04-25 NOTE — ASSESSMENT & PLAN NOTE
Encouraged him to follow his weight    Patient currently has home nursing who is monitoring compliance with medications

## 2018-04-25 NOTE — PATIENT INSTRUCTIONS
Transition of care performed with sharing of clinical summary  Reviewed hospital course  Patient currently stable  Discussed at length the degree of CAD  Patient verbalized understanding and is willing  He is willing to meet with Cardiology and here what his surgical options are  Referral given today for cardiology  No medication changes  He was given  Samples of Advair as he notes he does not have this medication at home  Continue albuterol as needed for wheeze    No further medication changes at this time

## 2018-04-25 NOTE — ASSESSMENT & PLAN NOTE
Patient has several questions regarding possible cardiac surgery  I answered what I could however instructed him to follow up with Cardiology to get his additional questions answered  Patient is willing to meet with Cardiology and discuss his treatment options  He will call to schedule      Discussed risks of not seeking further evaluation and treatment for his known underlying CAD

## 2018-04-25 NOTE — ASSESSMENT & PLAN NOTE
Patient currently using Lantus with Humalog  Discussed importance of following blood sugar    Keep blood sugar log to bring to follow-up

## 2018-04-25 NOTE — ASSESSMENT & PLAN NOTE
Resolved since hospital   Discussed  Red flag symptoms which need immediate evaluation and treat  Patient verbalized understanding and is willing to meet with Cardiology to discuss his treatment options  He will call to schedule

## 2018-04-25 NOTE — ASSESSMENT & PLAN NOTE
Pain controlled at this time on medications  Refill given today  Continue Lyrica with  Percocet p r n
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
## 2018-04-25 NOTE — ASSESSMENT & PLAN NOTE
Reviewed hospital course  Patient currently stable  Discussed at length the degree of CAD  Patient verbalized understanding and is willing  He is willing to meet with Cardiology and here what his surgical options are  Referral given today for cardiology  No medication changes  He was given  Samples of Advair as he notes he does not have this medication at home  Continue albuterol as needed for wheeze  No further medication changes at this time   Close office follow-up in 3 weeks with myself    Spent more than 30 minutes directly counseling patient

## 2018-04-27 DIAGNOSIS — E11.40 TYPE 2 DIABETES MELLITUS WITH DIABETIC NEUROPATHY, WITH LONG-TERM CURRENT USE OF INSULIN (HCC): ICD-10-CM

## 2018-04-27 DIAGNOSIS — I25.10 LEFT MAIN CORONARY ARTERY DISEASE: ICD-10-CM

## 2018-04-27 DIAGNOSIS — Z79.4 TYPE 2 DIABETES MELLITUS WITH DIABETIC NEUROPATHY, WITH LONG-TERM CURRENT USE OF INSULIN (HCC): ICD-10-CM

## 2018-04-27 RX ORDER — METOPROLOL TARTRATE 50 MG/1
50 TABLET, FILM COATED ORAL EVERY 12 HOURS SCHEDULED
Qty: 180 TABLET | Refills: 3 | Status: SHIPPED | OUTPATIENT
Start: 2018-04-27 | End: 2018-06-05 | Stop reason: SDUPTHER

## 2018-04-27 RX ORDER — INSULIN GLARGINE 100 [IU]/ML
20 INJECTION, SOLUTION SUBCUTANEOUS
Qty: 5 PEN | Refills: 5 | Status: SHIPPED | OUTPATIENT
Start: 2018-04-27 | End: 2018-01-01 | Stop reason: SDUPTHER

## 2018-04-30 ENCOUNTER — APPOINTMENT (EMERGENCY)
Dept: RADIOLOGY | Facility: HOSPITAL | Age: 68
DRG: 690 | End: 2018-04-30
Payer: MEDICARE

## 2018-04-30 ENCOUNTER — HOSPITAL ENCOUNTER (INPATIENT)
Facility: HOSPITAL | Age: 68
LOS: 1 days | Discharge: HOME HEALTH | DRG: 690 | End: 2018-05-01
Attending: EMERGENCY MEDICINE | Admitting: INTERNAL MEDICINE
Payer: MEDICARE

## 2018-04-30 DIAGNOSIS — D72.829 LEUKOCYTOSIS: ICD-10-CM

## 2018-04-30 DIAGNOSIS — K80.20 CHOLELITHIASIS: ICD-10-CM

## 2018-04-30 DIAGNOSIS — K21.9 GASTROESOPHAGEAL REFLUX DISEASE WITHOUT ESOPHAGITIS: ICD-10-CM

## 2018-04-30 DIAGNOSIS — J41.1 MUCOPURULENT CHRONIC BRONCHITIS (HCC): ICD-10-CM

## 2018-04-30 DIAGNOSIS — R10.9 ABDOMINAL PAIN: Primary | ICD-10-CM

## 2018-04-30 DIAGNOSIS — R10.84 GENERALIZED ABDOMINAL PAIN: ICD-10-CM

## 2018-04-30 LAB
ALBUMIN SERPL BCP-MCNC: 3.2 G/DL (ref 3.5–5)
ALP SERPL-CCNC: 100 U/L (ref 46–116)
ALT SERPL W P-5'-P-CCNC: 19 U/L (ref 12–78)
ANION GAP SERPL CALCULATED.3IONS-SCNC: 9 MMOL/L (ref 4–13)
AST SERPL W P-5'-P-CCNC: 12 U/L (ref 5–45)
BASOPHILS # BLD AUTO: 0.07 THOUSANDS/ΜL (ref 0–0.1)
BASOPHILS NFR BLD AUTO: 0 % (ref 0–1)
BILIRUB SERPL-MCNC: 0.93 MG/DL (ref 0.2–1)
BUN SERPL-MCNC: 14 MG/DL (ref 5–25)
CALCIUM SERPL-MCNC: 8.9 MG/DL (ref 8.3–10.1)
CHLORIDE SERPL-SCNC: 101 MMOL/L (ref 100–108)
CO2 SERPL-SCNC: 23 MMOL/L (ref 21–32)
CREAT SERPL-MCNC: 1.12 MG/DL (ref 0.6–1.3)
EOSINOPHIL # BLD AUTO: 0.01 THOUSAND/ΜL (ref 0–0.61)
EOSINOPHIL NFR BLD AUTO: 0 % (ref 0–6)
ERYTHROCYTE [DISTWIDTH] IN BLOOD BY AUTOMATED COUNT: 23 % (ref 11.6–15.1)
GFR SERPL CREATININE-BSD FRML MDRD: 68 ML/MIN/1.73SQ M
GLUCOSE SERPL-MCNC: 307 MG/DL (ref 65–140)
HCT VFR BLD AUTO: 40.1 % (ref 36.5–49.3)
HGB BLD-MCNC: 13.2 G/DL (ref 12–17)
LACTATE SERPL-SCNC: 2 MMOL/L (ref 0.5–2)
LIPASE SERPL-CCNC: 51 U/L (ref 73–393)
LYMPHOCYTES # BLD AUTO: 1.71 THOUSANDS/ΜL (ref 0.6–4.47)
LYMPHOCYTES NFR BLD AUTO: 5 % (ref 14–44)
MCH RBC QN AUTO: 25.4 PG (ref 26.8–34.3)
MCHC RBC AUTO-ENTMCNC: 32.9 G/DL (ref 31.4–37.4)
MCV RBC AUTO: 77 FL (ref 82–98)
MONOCYTES # BLD AUTO: 2.11 THOUSAND/ΜL (ref 0.17–1.22)
MONOCYTES NFR BLD AUTO: 6 % (ref 4–12)
NEUTROPHILS # BLD AUTO: 30.16 THOUSANDS/ΜL (ref 1.85–7.62)
NEUTS SEG NFR BLD AUTO: 89 % (ref 43–75)
NRBC BLD AUTO-RTO: 0 /100 WBCS
PLATELET # BLD AUTO: 192 THOUSANDS/UL (ref 149–390)
POTASSIUM SERPL-SCNC: 4.1 MMOL/L (ref 3.5–5.3)
PROT SERPL-MCNC: 8 G/DL (ref 6.4–8.2)
RBC # BLD AUTO: 5.19 MILLION/UL (ref 3.88–5.62)
SODIUM SERPL-SCNC: 133 MMOL/L (ref 136–145)
TROPONIN I SERPL-MCNC: <0.02 NG/ML
WBC # BLD AUTO: 34.27 THOUSAND/UL (ref 4.31–10.16)

## 2018-04-30 PROCEDURE — 84484 ASSAY OF TROPONIN QUANT: CPT

## 2018-04-30 PROCEDURE — 74177 CT ABD & PELVIS W/CONTRAST: CPT

## 2018-04-30 PROCEDURE — 36415 COLL VENOUS BLD VENIPUNCTURE: CPT

## 2018-04-30 PROCEDURE — 71045 X-RAY EXAM CHEST 1 VIEW: CPT

## 2018-04-30 PROCEDURE — 96361 HYDRATE IV INFUSION ADD-ON: CPT

## 2018-04-30 PROCEDURE — 76705 ECHO EXAM OF ABDOMEN: CPT

## 2018-04-30 PROCEDURE — 96374 THER/PROPH/DIAG INJ IV PUSH: CPT

## 2018-04-30 PROCEDURE — 96365 THER/PROPH/DIAG IV INF INIT: CPT

## 2018-04-30 PROCEDURE — 80053 COMPREHEN METABOLIC PANEL: CPT

## 2018-04-30 PROCEDURE — 87040 BLOOD CULTURE FOR BACTERIA: CPT | Performed by: EMERGENCY MEDICINE

## 2018-04-30 PROCEDURE — 83690 ASSAY OF LIPASE: CPT | Performed by: EMERGENCY MEDICINE

## 2018-04-30 PROCEDURE — 85025 COMPLETE CBC W/AUTO DIFF WBC: CPT

## 2018-04-30 PROCEDURE — 93005 ELECTROCARDIOGRAM TRACING: CPT

## 2018-04-30 PROCEDURE — 83605 ASSAY OF LACTIC ACID: CPT | Performed by: EMERGENCY MEDICINE

## 2018-04-30 PROCEDURE — 96375 TX/PRO/DX INJ NEW DRUG ADDON: CPT

## 2018-04-30 RX ORDER — MORPHINE SULFATE 4 MG/ML
4 INJECTION, SOLUTION INTRAMUSCULAR; INTRAVENOUS ONCE
Status: COMPLETED | OUTPATIENT
Start: 2018-04-30 | End: 2018-04-30

## 2018-04-30 RX ORDER — METRONIDAZOLE 500 MG/1
500 TABLET ORAL ONCE
Status: DISCONTINUED | OUTPATIENT
Start: 2018-04-30 | End: 2018-04-30

## 2018-04-30 RX ADMIN — METRONIDAZOLE 500 MG: 500 INJECTION, SOLUTION INTRAVENOUS at 20:17

## 2018-04-30 RX ADMIN — CEFEPIME HYDROCHLORIDE 2000 MG: 2 INJECTION, POWDER, FOR SOLUTION INTRAVENOUS at 20:01

## 2018-04-30 RX ADMIN — SODIUM CHLORIDE 1000 ML: 0.9 INJECTION, SOLUTION INTRAVENOUS at 20:01

## 2018-04-30 RX ADMIN — MORPHINE SULFATE 4 MG: 4 INJECTION INTRAVENOUS at 19:38

## 2018-04-30 RX ADMIN — IOHEXOL 100 ML: 350 INJECTION, SOLUTION INTRAVENOUS at 19:55
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
## 2018-05-01 VITALS
HEIGHT: 72 IN | SYSTOLIC BLOOD PRESSURE: 140 MMHG | OXYGEN SATURATION: 94 % | DIASTOLIC BLOOD PRESSURE: 80 MMHG | BODY MASS INDEX: 33.5 KG/M2 | HEART RATE: 67 BPM | WEIGHT: 247.36 LBS | RESPIRATION RATE: 18 BRPM | TEMPERATURE: 98.4 F

## 2018-05-01 PROBLEM — D72.829 LEUKOCYTOSIS: Status: ACTIVE | Noted: 2018-05-01

## 2018-05-01 PROBLEM — E87.1 HYPONATREMIA: Status: ACTIVE | Noted: 2018-05-01

## 2018-05-01 LAB
ANION GAP SERPL CALCULATED.3IONS-SCNC: 6 MMOL/L (ref 4–13)
ATRIAL RATE: 100 BPM
BACTERIA UR QL AUTO: ABNORMAL /HPF
BILIRUB UR QL STRIP: NEGATIVE
BUN SERPL-MCNC: 17 MG/DL (ref 5–25)
CALCIUM SERPL-MCNC: 8.5 MG/DL (ref 8.3–10.1)
CHLORIDE SERPL-SCNC: 105 MMOL/L (ref 100–108)
CLARITY UR: CLEAR
CO2 SERPL-SCNC: 25 MMOL/L (ref 21–32)
COLOR UR: YELLOW
CREAT SERPL-MCNC: 0.99 MG/DL (ref 0.6–1.3)
ERYTHROCYTE [DISTWIDTH] IN BLOOD BY AUTOMATED COUNT: 23.2 % (ref 11.6–15.1)
GFR SERPL CREATININE-BSD FRML MDRD: 78 ML/MIN/1.73SQ M
GLUCOSE SERPL-MCNC: 223 MG/DL (ref 65–140)
GLUCOSE SERPL-MCNC: 270 MG/DL (ref 65–140)
GLUCOSE SERPL-MCNC: 292 MG/DL (ref 65–140)
GLUCOSE SERPL-MCNC: 311 MG/DL (ref 65–140)
GLUCOSE SERPL-MCNC: 346 MG/DL (ref 65–140)
GLUCOSE UR STRIP-MCNC: ABNORMAL MG/DL
HCT VFR BLD AUTO: 38 % (ref 36.5–49.3)
HGB BLD-MCNC: 11.6 G/DL (ref 12–17)
HGB UR QL STRIP.AUTO: NEGATIVE
HYALINE CASTS #/AREA URNS LPF: ABNORMAL /LPF
KETONES UR STRIP-MCNC: ABNORMAL MG/DL
LACTATE SERPL-SCNC: 1.5 MMOL/L (ref 0.5–2)
LEUKOCYTE ESTERASE UR QL STRIP: ABNORMAL
MCH RBC QN AUTO: 24.4 PG (ref 26.8–34.3)
MCHC RBC AUTO-ENTMCNC: 30.5 G/DL (ref 31.4–37.4)
MCV RBC AUTO: 80 FL (ref 82–98)
NITRITE UR QL STRIP: NEGATIVE
NON-SQ EPI CELLS URNS QL MICRO: ABNORMAL /HPF
P AXIS: 83 DEGREES
PH UR STRIP.AUTO: 6.5 [PH] (ref 4.5–8)
PLATELET # BLD AUTO: 163 THOUSANDS/UL (ref 149–390)
PLATELET # BLD AUTO: 173 THOUSANDS/UL (ref 149–390)
PMV BLD AUTO: 9.7 FL (ref 8.9–12.7)
POTASSIUM SERPL-SCNC: 4 MMOL/L (ref 3.5–5.3)
PR INTERVAL: 138 MS
PROCALCITONIN SERPL-MCNC: 0.3 NG/ML
PROT UR STRIP-MCNC: ABNORMAL MG/DL
QRS AXIS: 3 DEGREES
QRSD INTERVAL: 76 MS
QT INTERVAL: 336 MS
QTC INTERVAL: 433 MS
RBC # BLD AUTO: 4.76 MILLION/UL (ref 3.88–5.62)
RBC #/AREA URNS AUTO: ABNORMAL /HPF
SODIUM SERPL-SCNC: 136 MMOL/L (ref 136–145)
SP GR UR STRIP.AUTO: >1.045 (ref 1–1.03)
T WAVE AXIS: 62 DEGREES
TROPONIN I SERPL-MCNC: <0.02 NG/ML
UROBILINOGEN UR QL STRIP.AUTO: 0.2 E.U./DL
VENTRICULAR RATE: 100 BPM
WBC # BLD AUTO: 18.16 THOUSAND/UL (ref 4.31–10.16)
WBC # BLD AUTO: 23.94 THOUSAND/UL (ref 4.31–10.16)
WBC #/AREA URNS AUTO: ABNORMAL /HPF

## 2018-05-01 PROCEDURE — 85048 AUTOMATED LEUKOCYTE COUNT: CPT | Performed by: INTERNAL MEDICINE

## 2018-05-01 PROCEDURE — 99222 1ST HOSP IP/OBS MODERATE 55: CPT | Performed by: SURGERY

## 2018-05-01 PROCEDURE — 84484 ASSAY OF TROPONIN QUANT: CPT | Performed by: EMERGENCY MEDICINE

## 2018-05-01 PROCEDURE — 83605 ASSAY OF LACTIC ACID: CPT | Performed by: EMERGENCY MEDICINE

## 2018-05-01 PROCEDURE — 99236 HOSP IP/OBS SAME DATE HI 85: CPT | Performed by: INTERNAL MEDICINE

## 2018-05-01 PROCEDURE — 81001 URINALYSIS AUTO W/SCOPE: CPT | Performed by: EMERGENCY MEDICINE

## 2018-05-01 PROCEDURE — 93010 ELECTROCARDIOGRAM REPORT: CPT | Performed by: INTERNAL MEDICINE

## 2018-05-01 PROCEDURE — 99239 HOSP IP/OBS DSCHRG MGMT >30: CPT | Performed by: INTERNAL MEDICINE

## 2018-05-01 PROCEDURE — 82948 REAGENT STRIP/BLOOD GLUCOSE: CPT

## 2018-05-01 PROCEDURE — 85049 AUTOMATED PLATELET COUNT: CPT | Performed by: INTERNAL MEDICINE

## 2018-05-01 PROCEDURE — 84145 PROCALCITONIN (PCT): CPT | Performed by: STUDENT IN AN ORGANIZED HEALTH CARE EDUCATION/TRAINING PROGRAM

## 2018-05-01 PROCEDURE — 36415 COLL VENOUS BLD VENIPUNCTURE: CPT | Performed by: EMERGENCY MEDICINE

## 2018-05-01 PROCEDURE — 87086 URINE CULTURE/COLONY COUNT: CPT | Performed by: EMERGENCY MEDICINE

## 2018-05-01 PROCEDURE — 80048 BASIC METABOLIC PNL TOTAL CA: CPT | Performed by: INTERNAL MEDICINE

## 2018-05-01 PROCEDURE — 99233 SBSQ HOSP IP/OBS HIGH 50: CPT | Performed by: INTERNAL MEDICINE

## 2018-05-01 PROCEDURE — 85027 COMPLETE CBC AUTOMATED: CPT | Performed by: INTERNAL MEDICINE

## 2018-05-01 PROCEDURE — 99285 EMERGENCY DEPT VISIT HI MDM: CPT

## 2018-05-01 RX ORDER — INSULIN GLARGINE 100 [IU]/ML
25 INJECTION, SOLUTION SUBCUTANEOUS
Status: DISCONTINUED | OUTPATIENT
Start: 2018-05-01 | End: 2018-05-01 | Stop reason: HOSPADM

## 2018-05-01 RX ORDER — CEFDINIR 300 MG/1
300 CAPSULE ORAL EVERY 12 HOURS SCHEDULED
Qty: 12 CAPSULE | Refills: 0 | Status: SHIPPED | OUTPATIENT
Start: 2018-05-01 | End: 2018-05-07

## 2018-05-01 RX ORDER — NITROGLYCERIN 0.4 MG/1
0.4 TABLET SUBLINGUAL
Status: DISCONTINUED | OUTPATIENT
Start: 2018-05-01 | End: 2018-05-01 | Stop reason: HOSPADM

## 2018-05-01 RX ORDER — METOPROLOL TARTRATE 50 MG/1
50 TABLET, FILM COATED ORAL EVERY 12 HOURS SCHEDULED
Status: DISCONTINUED | OUTPATIENT
Start: 2018-05-01 | End: 2018-05-01 | Stop reason: HOSPADM

## 2018-05-01 RX ORDER — CLOPIDOGREL BISULFATE 75 MG/1
75 TABLET ORAL DAILY
Status: DISCONTINUED | OUTPATIENT
Start: 2018-05-01 | End: 2018-05-01 | Stop reason: HOSPADM

## 2018-05-01 RX ORDER — INSULIN GLARGINE 100 [IU]/ML
20 INJECTION, SOLUTION SUBCUTANEOUS
Status: DISCONTINUED | OUTPATIENT
Start: 2018-05-01 | End: 2018-05-01

## 2018-05-01 RX ORDER — ALBUTEROL SULFATE 2.5 MG/3ML
2.5 SOLUTION RESPIRATORY (INHALATION) EVERY 4 HOURS PRN
Status: DISCONTINUED | OUTPATIENT
Start: 2018-05-01 | End: 2018-05-01 | Stop reason: HOSPADM

## 2018-05-01 RX ORDER — PANTOPRAZOLE SODIUM 40 MG/1
40 TABLET, DELAYED RELEASE ORAL DAILY
Status: DISCONTINUED | OUTPATIENT
Start: 2018-05-01 | End: 2018-05-01 | Stop reason: HOSPADM

## 2018-05-01 RX ORDER — FERROUS SULFATE 325(65) MG
325 TABLET ORAL
Status: DISCONTINUED | OUTPATIENT
Start: 2018-05-01 | End: 2018-05-01 | Stop reason: HOSPADM

## 2018-05-01 RX ORDER — PREGABALIN 100 MG/1
100 CAPSULE ORAL 3 TIMES DAILY
Status: DISCONTINUED | OUTPATIENT
Start: 2018-05-01 | End: 2018-05-01 | Stop reason: HOSPADM

## 2018-05-01 RX ORDER — OXYCODONE HYDROCHLORIDE AND ACETAMINOPHEN 5; 325 MG/1; MG/1
1 TABLET ORAL EVERY 6 HOURS PRN
Status: DISCONTINUED | OUTPATIENT
Start: 2018-05-01 | End: 2018-05-01

## 2018-05-01 RX ORDER — ONDANSETRON 2 MG/ML
4 INJECTION INTRAMUSCULAR; INTRAVENOUS EVERY 6 HOURS PRN
Status: DISCONTINUED | OUTPATIENT
Start: 2018-05-01 | End: 2018-05-01 | Stop reason: HOSPADM

## 2018-05-01 RX ORDER — OXYCODONE HYDROCHLORIDE AND ACETAMINOPHEN 5; 325 MG/1; MG/1
1 TABLET ORAL EVERY 8 HOURS PRN
Status: DISCONTINUED | OUTPATIENT
Start: 2018-05-01 | End: 2018-05-01 | Stop reason: HOSPADM

## 2018-05-01 RX ORDER — ALBUTEROL SULFATE 90 UG/1
2 AEROSOL, METERED RESPIRATORY (INHALATION) DAILY
Status: DISCONTINUED | OUTPATIENT
Start: 2018-05-01 | End: 2018-05-01 | Stop reason: HOSPADM

## 2018-05-01 RX ORDER — ASPIRIN 81 MG/1
81 TABLET ORAL DAILY
Status: DISCONTINUED | OUTPATIENT
Start: 2018-05-01 | End: 2018-05-01 | Stop reason: HOSPADM

## 2018-05-01 RX ORDER — ATORVASTATIN CALCIUM 40 MG/1
40 TABLET, FILM COATED ORAL
Status: DISCONTINUED | OUTPATIENT
Start: 2018-05-01 | End: 2018-05-01 | Stop reason: HOSPADM

## 2018-05-01 RX ORDER — METRONIDAZOLE 500 MG/1
500 TABLET ORAL EVERY 8 HOURS SCHEDULED
Qty: 18 TABLET | Refills: 0 | Status: SHIPPED | OUTPATIENT
Start: 2018-05-01 | End: 2018-05-07

## 2018-05-01 RX ADMIN — METOPROLOL TARTRATE 50 MG: 50 TABLET ORAL at 08:56

## 2018-05-01 RX ADMIN — ASPIRIN 81 MG: 81 TABLET, COATED ORAL at 08:56

## 2018-05-01 RX ADMIN — METRONIDAZOLE 500 MG: 500 INJECTION, SOLUTION INTRAVENOUS at 12:03

## 2018-05-01 RX ADMIN — METRONIDAZOLE 500 MG: 500 INJECTION, SOLUTION INTRAVENOUS at 05:05

## 2018-05-01 RX ADMIN — ALBUTEROL SULFATE 2 PUFF: 90 AEROSOL, METERED RESPIRATORY (INHALATION) at 07:46

## 2018-05-01 RX ADMIN — Medication 325 MG: at 07:42

## 2018-05-01 RX ADMIN — PREGABALIN 100 MG: 100 CAPSULE ORAL at 08:56

## 2018-05-01 RX ADMIN — INSULIN LISPRO 4 UNITS: 100 INJECTION, SOLUTION INTRAVENOUS; SUBCUTANEOUS at 07:43

## 2018-05-01 RX ADMIN — CLOPIDOGREL BISULFATE 75 MG: 75 TABLET ORAL at 08:56

## 2018-05-01 RX ADMIN — INSULIN LISPRO 3 UNITS: 100 INJECTION, SOLUTION INTRAVENOUS; SUBCUTANEOUS at 01:21

## 2018-05-01 RX ADMIN — INSULIN GLARGINE 20 UNITS: 100 INJECTION, SOLUTION SUBCUTANEOUS at 01:02

## 2018-05-01 RX ADMIN — INSULIN LISPRO 4 UNITS: 100 INJECTION, SOLUTION INTRAVENOUS; SUBCUTANEOUS at 12:05

## 2018-05-01 RX ADMIN — FLUTICASONE PROPIONATE AND SALMETEROL 1 PUFF: 50; 250 POWDER RESPIRATORY (INHALATION) at 09:01

## 2018-05-01 RX ADMIN — OXYCODONE HYDROCHLORIDE AND ACETAMINOPHEN 1 TABLET: 5; 325 TABLET ORAL at 01:02

## 2018-05-01 RX ADMIN — ENOXAPARIN SODIUM 40 MG: 40 INJECTION SUBCUTANEOUS at 08:55

## 2018-05-01 RX ADMIN — PREGABALIN 100 MG: 100 CAPSULE ORAL at 01:02

## 2018-05-01 RX ADMIN — INSULIN LISPRO 9 UNITS: 100 INJECTION, SOLUTION INTRAVENOUS; SUBCUTANEOUS at 12:06

## 2018-05-01 RX ADMIN — CEFEPIME HYDROCHLORIDE 2000 MG: 2 INJECTION, POWDER, FOR SOLUTION INTRAVENOUS at 07:43

## 2018-05-01 RX ADMIN — INSULIN LISPRO 9 UNITS: 100 INJECTION, SOLUTION INTRAVENOUS; SUBCUTANEOUS at 08:55

## 2018-05-01 RX ADMIN — OXYCODONE HYDROCHLORIDE AND ACETAMINOPHEN 1 TABLET: 5; 325 TABLET ORAL at 07:48

## 2018-05-01 RX ADMIN — PANTOPRAZOLE SODIUM 40 MG: 40 TABLET, DELAYED RELEASE ORAL at 08:56

## 2018-05-01 RX ADMIN — METOPROLOL TARTRATE 50 MG: 50 TABLET ORAL at 01:02

## 2018-05-01 NOTE — DISCHARGE SUMMARY
[facility] CENTRAL Discharge Summary - Salome Loera  79 y o  male MRN: [id number]    [address] [facility] 8 Room / Bed: [facility] 811/[facility] 059-00 Encounter: [id number]    BRIEF OVERVIEW    Admitting Provider: Flores Wetzel MD  Discharge Provider: Rudi Quinteros MD  Primary Care Physician at Discharge: [address]  Admission Date: 4/30/2018     Discharge Date: 05/01/18  Hospital Course  Patient is a 77-year-old male who presented to the emergency department on April 30, 2018 with complaints of abdominal pain and chest pain  He has a past medical history of hypertension, hyperlipidemia, diabetes mellitus mellitus, CAD with multivessel disease  Patient states he has a 1 day history of abdominal pain upon presentation to the emergency department he was noted to have leukocytosis [place] [id number] this improved 18,000 on the day of discharge  He also had complaints of dysuria and chest pain  CT of the abdomen was performed which showed evidence of cholelithiasis, subsequently ultrasound of the gallbladder did show a sonographic Mortensen sign  He was evaluated by General surgery who recommended to continue antibiotics for treatment of his UTI  On May 1, 2018 patient was examined, and was adamant on being discharged to home today, we explained to him in detail with Dr Zo Causey and Patients EYAL Mcnally importance of remaining in the hospital to complete his workup however the patient did not want to remain hospital   This time was explained to the patient that we will be discharging him to home with oral antibiotics to complete a 7 day course, and he was instructed to follow up with his physician assistant Donnie Chamberlain or any available physician before end of this week  He was also informed that some of his lab workup was pending including blood and urine cultures, he understood the risks of an early discharge      Of note patient does have multi-vessel CAD which was recommended for evaluation by cardiothoracic surgery and possible CABG, however patient still continues to refuse this  He is informed to follow up with his primary care doctor and even Cardiology as an outpatient  He was given return precautions  Referral for nursing VNA (known to them)  Presenting Problem/History of Present Illness  Principal Problem:    Generalized abdominal pain  Active Problems:    Type 2 diabetes mellitus with diabetic neuropathy, with long-term current use of insulin (HCC)    Hypertension    GERD (gastroesophageal reflux disease)    Hyperlipidemia    Opioid dependence (HCC)    Iron deficiency anemia    Abdominal aortic aneurysm (AAA) without rupture (HCC)    Chronic diastolic CHF (congestive heart failure) (HCC)    COPD (chronic obstructive pulmonary disease) (HCC)    Neuropathy    Closed fracture of transverse process of lumbar vertebra with routine healing    Chest pain    Lung nodules    Coronary artery disease of native artery of native heart with stable angina pectoris (HCC)    Leukocytosis    Hyponatremia  Resolved Problems:    * No resolved hospital problems  *        Diagnostic Procedures Performed  Imaging Studies:  Xr Chest Portable    Result Date: 5/1/2018  Impression: Mild enlargement of cardiac silhouette with findings suspicious for mild pulmonary vascular congestion  Workstation performed: [id number]     Us Gallbladder    Result Date: 4/30/2018  Impression: 1  Cholelithiasis with positive sonographic Mortensen sign, correlate for cholecystitis  2   Hepatic steatosis and mild hepatomegaly  Workstation performed: [id number]     Ct Abdomen Pelvis With Contrast    Result Date: 4/30/2018  Impression: No acute intra-abdominal abnormality  No free air or free fluid  Scattered tree-in-bud opacities noted at both visualized lung bases suspicious for an infectious or inflammatory bronchiolitis    A repeat CT chest in 4-6 weeks following treatment could be performed to assess for improvement/resolution  Diffuse fatty infiltration of the liver  Cholelithiasis with no pericholecystic inflammatory change  Workstation performed: [id number]     Therapeutic Procedures Performed  N/A    Test Results Pending at Discharge: Blood and Urine Cultures    Medications     Medication List to be Continued at Discharge  Current Discharge Medication List      CONTINUE these medications which have NOT CHANGED    Details   albuterol (2 5 mg/3 mL) 0 083 % nebulizer solution Take 3 mL (2 5 mg total) by nebulization every 4 (four) hours as needed for wheezing (Wheezing)  Qty: 75 mL, Refills: 0    Associated Diagnoses: Chronic bronchitis, unspecified chronic bronchitis type (HCC)      albuterol (VENTOLIN HFA) 90 mcg/act inhaler Inhale 2 puffs daily  Qty: 1 Inhaler, Refills: 0    Associated Diagnoses: Chronic bronchitis, unspecified chronic bronchitis type (HCC)      aspirin (ECOTRIN LOW STRENGTH) 81 mg EC tablet Take 1 tablet (81 mg total) by mouth daily  Qty: 30 tablet, Refills: 5    Associated Diagnoses: Type 2 diabetes mellitus with diabetic neuropathy, with long-term current use of insulin ([facility] [address] );  Essential hypertension      atorvastatin (LIPITOR) 40 mg tablet Take 1 tablet (40 mg total) by mouth daily with dinner for 30 days  Qty: 30 tablet, Refills: 0    Associated Diagnoses: Hyperlipidemia, unspecified hyperlipidemia type      clopidogrel (PLAVIX) 75 mg tablet Take 1 tablet (75 mg total) by mouth daily for 30 days  Qty: 30 tablet, Refills: 3    Associated Diagnoses: Coronary artery disease involving native coronary artery of native heart with angina pectoris (HCC)      ferrous sulfate 325 (65 Fe) mg tablet Take 1 tablet (325 mg total) by mouth daily with breakfast  Qty: 30 tablet, Refills: 0    Associated Diagnoses: Iron deficiency anemia, unspecified iron deficiency anemia type      fluticasone-salmeterol (ADVAIR DISKUS) 250-50 mcg/dose inhaler Inhale 1 puff every 12 (twelve) hours  Qty: 2 each, Refills: 0    Comments: Lot # [id number]  Exp 7/2018  Associated Diagnoses: Chronic bronchitis, unspecified chronic bronchitis type ([facility])      insulin lispro (HumaLOG) 100 units/mL injection Inject 9 Units under the skin 3 (three) times a day before meals  Qty: 10 mL, Refills: 0    Associated Diagnoses: Type 2 diabetes mellitus with diabetic neuropathy, with long-term current use of insulin ([facility])      LANTUS SOLOSTAR injection pen 100 units/mL INJECT 20 UNITS UNDER THE SKIN DAILY AT BEDTIME  Qty: 5 pen, Refills: 5    Associated Diagnoses: Type 2 diabetes mellitus with diabetic neuropathy, with long-term current use of insulin ([facility])      metoprolol tartrate (LOPRESSOR) 50 mg tablet TAKE 1 TABLET (50 MG TOTAL) BY MOUTH EVERY 12 (TWELVE) HOURS  Qty: 180 tablet, Refills: 3    Associated Diagnoses: Left main coronary artery disease      nitroglycerin (NITROSTAT) 0 4 mg SL tablet Place 1 tablet (0 4 mg total) under the tongue every 5 (five) minutes as needed for chest pain for up to 30 days  Qty: 30 tablet, Refills: 0    Associated Diagnoses: Coronary artery disease involving native coronary artery of native heart with angina pectoris ([facility])      pantoprazole (PROTONIX) 40 mg tablet Take 1 tablet (40 mg total) by mouth daily  Qty: 30 tablet, Refills: 5    Associated Diagnoses: Gastroesophageal reflux disease, esophagitis presence not specified      pregabalin (LYRICA) 100 mg capsule Take 1 capsule (100 mg total) by mouth 3 (three) times a day for 30 days  Qty: 90 capsule, Refills: 0    Associated Diagnoses: Type 2 diabetes mellitus with diabetic neuropathy, with long-term current use of insulin ([facility])           Current Discharge Medication List      START taking these medications    Details   cefdinir (OMNICEF) 300 mg capsule Take 1 capsule (300 mg total) by mouth every 12 (twelve) hours for 6 days  Qty: 12 capsule, Refills: 0    Associated Diagnoses: Leukocytosis      metroNIDAZOLE (FLAGYL) 500 mg tablet Take 1 tablet (500 mg total) by mouth every 8 (eight) hours for 6 days  Qty: 18 tablet, Refills: 0    Associated Diagnoses: Leukocytosis           Current Discharge Medication List      STOP taking these medications       oxyCODONE-acetaminophen (PERCOCET) 5-325 mg per tablet Comments:   Reason for Stopping:               Allergies  No Known Allergies  Discharge Diet: cardiac diet, diabetic diet and low fat, low cholesterol diet  Activity restrictions: as tolerated  Discharge Condition: fair  Discharged With Lines: no    Discharge Disposition: Home with Home Health Care        Code Status: Level 1 - Full Code  Advance Directive and Living Will: <no information>  Power of :    POLST:      Discharge  Statement   I spent 45 minutes minutes discharging the patient  This time was spent on the day of discharge  I had direct contact with the patient on the day of discharge  Additional documentation is required if more than 30 minutes were spent on discharge
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
## 2018-05-01 NOTE — ED ATTENDING ATTESTATION
Stuart Cee MD, saw and evaluated the patient  I have discussed the patient with the resident/non-physician practitioner and agree with the resident's/non-physician practitioner's findings, Plan of Care, and MDM as documented in the resident's/non-physician practitioner's note, except where noted  All available labs and Radiology studies were reviewed  At this point I agree with the current assessment done in the Emergency Department  I have conducted an independent evaluation of this patient a history and physical is as follows: This 60-year-old male presents with two complaints today  1st complaint is of substernal chest pain  2nd complaint is of diffuse abdominal tenderness  Patient is a poor historian, appears uncomfortable and mildly tachypneic  On exam patient with diffuse abdominal tenderness, with rebound and guarding in the lower quadrants  This is concerning for peritoneal neck abdomen  Patient's lungs are diminished bilaterally  Patient's heart is regular without murmur  Patient appears uncomfortable  Of note there is some bruising across his lower abdomen, however patient does give himself subcu insulin and recently received subcu heparin  Patient will be evaluated here for known cardiac disease as well as abdominal pathology  Given patient's elevated [place] [address] will give empiric abdominal focused antibiotics  Patient's CT scan and blood work are unremarkable aside from the elevated white blood cell count  Patient will be admitted for further evaluation of cardiac disease  Surgery will also be consulted regarding patient's abdominal exam and elevated white blood cell count    Critical Care Time  CritCare Time    Procedures

## 2018-05-01 NOTE — PROGRESS NOTES
Progress Note - General Surgery  De Bland  79 y o  male MRN: [id number]  Unit/Bed#: [facility] 811-01 Encounter: [id number]    Assessment:  79y o -year-old male with cholelithiasis and abdomina pain and signs/symptoms associated with a UTI    Plan:  Cholelithiasis   - likely will observe for now on abx   - f/u blood and urine cultures   - possible UTI given u/a w/ leukocytoes   - OK for diet    Deedee Parra MD PGY-4  9:47 AM  05/01/18      Subjective:   pain is improved and patient would like to eat, mostly complaining of urinary type symptoms  Objective:  Patient Vitals for the past 24 hrs:   BP Temp Temp src Pulse Resp SpO2 Height Weight   05/01/18 0735 140/80 98 4 °F (36 9 °C) Oral 67 18 94 % - -   05/01/18 0300 147/70 - - 59 - - - -   05/01/18 0041 162/82 97 9 °F (36 6 °C) Oral 85 20 100 % 6' (1 829 m) 112 kg (247 lb 5 7 oz)   04/30/18 2330 142/65 - - 68 14 94 % - -   04/30/18 2300 139/67 - - 72 17 95 % - -   04/30/18 2200 137/62 - - 74 16 95 % - -   04/30/18 2100 134/66 - - 80 22 95 % - -   04/30/18 2030 131/59 - - 82 18 96 % - -   04/30/18 1900 163/75 - - 100 20 95 % - -   04/30/18 1851 163/75 98 5 °F (36 9 °C) Oral 100 18 96 % 6' (1 829 m) 112 kg (248 lb)          Diet Orders            Start     Ordered    05/01/18 0529  Diet [place]/CHO Controlled; Consistent Carbohydrate Diet Level 3 (6 carb servings/90 grams CHO/meal)  Diet effective now     Question Answer Comment   Diet Type Torey/CHO Controlled    [place]orey/CHO Controlled Consistent Carbohydrate Diet Level 3 (6 carb servings/90 grams CHO/meal)    RD to adjust diet per protocol?  Yes        05/01/18 0529        Intake/Output Summary (Last 24 hours) at 05/01/18 0947  Last data filed at 05/01/18 0735   Gross per 24 hour   Intake             1386 ml   Output              300 ml   Net             1086 ml        Physical Exam:  General: NAD  Cardiovascular: RRR  Respiratory: breath sounds b/l   Abdomen: soft, tender ill-defined location  Extremities: no edema    Medications:    Current Facility-Administered Medications:  albuterol 2 puff Inhalation Daily [facility], DO    albuterol 2 5 mg Nebulization Q4H PRN Somers Shandra, DO    aspirin 81 mg Oral Daily [facility], DO    atorvastatin 40 mg Oral Daily With [employer], DO    cefepime 2,000 mg Intravenous Q12H [facility],  Last Rate: 2,000 mg (05/01/18 0743)   clopidogrel 75 mg Oral Daily [facility], DO    enoxaparin 40 mg Subcutaneous Daily [facility], DO    ferrous sulfate 325 mg Oral Daily With Breakfast [facility], DO    fluticasone-salmeterol 1 puff Inhalation Q12H [facility], DO    insulin glargine 20 Units Subcutaneous HS [facility], DO    insulin lispro 1-5 Units Subcutaneous HS [facility], DO    insulin lispro 1-6 Units Subcutaneous TID AC [facility], DO    insulin lispro 9 Units Subcutaneous TID With Meals [facility], DO    metoprolol tartrate 50 mg Oral Q12H [facility], DO    metroNIDAZOLE 500 mg Intravenous Q8H [facility],  Last Rate: 500 mg (05/01/18 0505)   nitroglycerin 0 4 mg Sublingual Q5 Min PRN [facility], DO    ondansetron 4 mg Intravenous Q6H PRN [facility],     oxyCODONE-acetaminophen 1 tablet Oral Q6H PRN [facility], DO    pantoprazole 40 mg Oral Daily [facility], DO    pregabalin 100 mg Oral TID [facility], DO       albuterol 2 5 mg Q4H PRN   nitroglycerin 0 4 mg Q5 Min PRN   ondansetron 4 mg Q6H PRN   oxyCODONE-acetaminophen 1 tablet Q6H PRN     Laboratory results:   CBC:   Lab Results   Component Value Date    WBC 18 16 (H) 05/01/2018    HGB 11 6 (L) 05/01/2018    HCT 38 0 05/01/2018    MCV 80 (L) 05/01/2018     05/01/2018    MCH 24 4 (L) 05/01/2018    MCHC 30 5 (L) 05/01/2018    RDW 23 2 (H) 05/01/2018    MPV 9 7 05/01/2018    NRBC 0 04/30/2018   , CMP:   Lab Results   Component Value Date     05/01/2018    K 4 0 05/01/2018     05/01/2018    CO2 25 05/01/2018 ANIONGAP 6 05/01/2018    BUN 17 05/01/2018    CREATININE 0 99 05/01/2018    GLUCOSE 223 (H) 05/01/2018    CALCIUM 8 5 05/01/2018    AST 12 04/30/2018    ALT 19 04/30/2018    ALKPHOS 100 04/30/2018    PROT 8 0 04/30/2018    BILITOT 0 93 04/30/2018    EGFR 78 05/01/2018   , Coagulation: No results found for: PT, INR, APTT, Urinalysis:   Lab Results   Component Value Date    COLORU Yellow 05/01/2018    CLARITYU Clear 05/01/2018    SPECGRAV >1 045 (H) 05/01/2018    PHUR 6 5 05/01/2018    LEUKOCYTESUR (A) 05/01/2018     Elevated glucose may cause decreased leukocyte values   See urine microscopic for [facility] result/    NITRITE Negative 05/01/2018    PROTEINUA 30 (1+) (A) 05/01/2018    GLUCOSEU >=1000 (1%) (A) 05/01/2018    KETONESU Trace (A) 05/01/2018    BILIRUBINUR Negative 05/01/2018    BLOODU Negative 05/01/2018   , Amylase: No results found for: AMYLASE, Lipase:   Lab Results   Component Value Date    LIPASE 51 (L) 04/30/2018       VTE Pharmacologic Prophylaxis: Enoxaparin (Lovenox)  VTE Mechanical Prophylaxis: sequential compression device

## 2018-05-01 NOTE — PROGRESS NOTES
Agree with antibiotics for probable UTI  OK for regular diet    [facility] signing off   Please call if questions       Thanks     Sherwin Goodwin  12:31 PM  05/01/18
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
## 2018-05-01 NOTE — CASE MANAGEMENT
Notification of Inpatient Admission/Inpatient Authorization Request  This is a Notification of Inpatient Admission/Request for Inpatient Authorization to our facility [facility]  Please be advised that this patient is currently in our facility under Inpatient Status  Below you will find the Attending Physician and Facilitys information including NPI# and contact information for the Utilization  assigned to the [facility] where the patient is receiving services  Please feel free to contact the Utilization Review Department with any questions  Patient Information:  PATIENT NAME: Jacqueline Rose  MRN: [id number]  YOB: 1950    PRESENTATION DATE: 4/30/2018  6:42 PM  IP ADMISSION DATE: 4/30/18 2323  DISCHARGE DATE: No discharge date for patient encounter  DISPOSITION: Home with [facility]    Attending Physician:    RASHI Nielson  Specialty- Internal Medicine  Medicare Number- [id number]  Medicaid Number -   UPIN Number - [id number]  [facility] ID- [id number]     Primary Office:  [address] [facility], [address] [facility]  Phone 1: [phone]  Phone 2:        Facility:  [address] [place] [address] [address] [facility] [place] [address] [facility], [address] [facility] [phone]  NPI: [id number]  TAX ID# [id number]  MEDICARE ID: [id number]    [address] [facility] in the [facility] by Jamil Henson for 2017  Network Utilization Review Department  Phone: [phone]; Fax [phone]  ATTENTION: The Network Utilization Review Department is now centralized for our 7 Facilities  Make a note that we have a new phone and fax numbers for our Department  Please call with any questions or concerns to [phone] and carefully follow the prompts so that you are directed to the right person   All voicemails are confidential  Fax any determinations, approvals, denials, and requests for initial or continue stay review clinical to [phone]  Due to HIGH CALL volume, it would be easier if you could please send faxed requests to expedite your requests and in part, help us provide discharge notifications faster

## 2018-05-01 NOTE — CASE MANAGEMENT
Initial Clinical Review    Admission: Date/Time/Statement: 4/30/18 @ 2323     Orders Placed This Encounter   Procedures    Inpatient Admission (expected length of stay for this patient is greater than two midnights)     Standing Status:   Standing     Number of Occurrences:   1     Order Specific Question:   Admitting Physician     Answer:   Josette Ann [[id number]]     Order Specific Question:   Level of Care     Answer:   Med Surg [[id number]]     Order Specific Question:   Estimated length of stay     Answer:   More than 2 Midnights     Order Specific Question:   Certification     Answer:   I certify that inpatient services are medically necessary for this patient for a duration of greater than two midnights  See H&P and MD Progress Notes for additional information about the patient's course of treatment  ED: Date/Time/Mode of Arrival:   ED Arrival Information     Expected Arrival Acuity Means of Arrival Escorted By Service Admission Type    - 4/30/2018 18:42 Urgent Ambulance [facility] General Medicine Urgent    Arrival Complaint    Chest Pain          Chief Complaint:   Chief Complaint   Patient presents with    Chest Pain     pt c o chest pain, abdominal pain, b/l feet pain, and 3 blockages in his heart  pt states "they told me I need an operation"       History of Illness: 79 y o  male with a past medical history of HTN, HLD, DM, CAD w/ multivessel disease presenting with abdominal pain and chest pain x1 day       The patient states that early this morning he awoke from sleep with severe sharp/aching predominantly left-sided abdominal pain  The pain has been persistent since its onset  He admits to some nausea without any episodes of vomiting  States he usually has loose-consistency stools a few times a week, had a stool per usual consistency yesterday  The patient does mention it was "dark " Has been recently placed on iron   He notes increased burning with urination over the last few days in addition to significant increased frequency  He additionally mentions a new bruised area over his abdomen which is not particularly tender or pruritic        The patient states that his chest pain began at some point after his abdominal pain, described as a left-sided squeezing sensation similar to his prior myocardial infarctions  Of note the patient has refused recommended CABG for known multivessel disease several times  ED Vital Signs:   ED Triage Vitals   Temperature Pulse Respirations Blood Pressure SpO2   04/30/18 1851 04/30/18 1851 04/30/18 1851 04/30/18 1851 04/30/18 1851   98 5 °F (36 9 °C) 100 18 163/75 96 %      Temp Source Heart Rate Source Patient Position - Orthostatic VS BP Location FiO2 (%)   04/30/18 1851 04/30/18 1851 04/30/18 1900 04/30/18 1900 --   Oral Monitor Lying Right arm       Pain Score       04/30/18 1851       9        Wt Readings from Last 1 Encounters:   05/01/18 112 kg (247 lb 5 7 oz)       Abnormal Labs/Diagnostic Test Results: WBC 34 27, Neutros PCT 89, Na 133, Glucose 307    Gallbladder Ultrasound: 1   Cholelithiasis with positive sonographic Mortensen sign, correlate for cholecystitis  2   Hepatic steatosis and mild hepatomegaly  CXR: Mild enlargement of cardiac silhouette with findings suspicious for mild pulmonary vascular congestion     CT of Abd/Pelvis: No acute intra-abdominal abnormality   No free air or free fluid  Scattered tree-in-bud opacities noted at both visualized lung bases suspicious for an infectious or inflammatory bronchiolitis   A repeat CT chest in 4-6 weeks following treatment could be performed to assess for improvement/resolution  Diffuse fatty infiltration of the liver  Cholelithiasis with no pericholecystic inflammatory change       ED Treatment:   Medication Administration from 04/30/2018 1842 to 05/01/2018 0028       Date/Time Order Dose Route Action Action by Comments     04/30/2018 2012 cefepime (MAXIPIME) 2 g/50 mL dextrose IVPB 0 mg Intravenous Stopped Sorin Nance RN      04/30/2018 2001 cefepime (MAXIPIME) 2 g/50 mL dextrose IVPB 2,000 mg Intravenous New Bag Sorin Nance RN      04/30/2018 1943 metroNIDAZOLE (FLAGYL) tablet 500 mg   Oral Canceled Entry Sorin Nance RN      04/30/2018 1938 morphine (PF) 4 mg/mL injection 4 mg 4 mg Intravenous Given Sorin Nance RN      04/30/2018 2059 metroNIDAZOLE (FLAGYL) IVPB (premix) 500 mg 0 mg Intravenous Stopped Sorin Nance RN      04/30/2018 2017 metroNIDAZOLE (FLAGYL) IVPB (premix) 500 mg 500 mg Intravenous New Bag Sorin Nance RN      04/30/2018 1955 iohexol (OMNIPAQUE) 350 MG/ML injection (MULTI-DOSE) 100 mL 100 mL Intravenous Given Maria T Host      05/01/2018 0013 sodium chloride 0 9 % bolus 1,000 mL 0 mL Intravenous Stopped Sorin Nance RN      04/30/2018 2001 sodium chloride 0 9 % bolus 1,000 mL 1,000 mL Intravenous New Bag Sorin Nance RN         Physical Exam  GENERAL: Appears well-developed and well-nourished  Appears in no acute distress   HEENT: Normocephalic and atraumatic  No scleral icterus  PERRLA  EOMI B/L  No oropharyngeal edema  MM moist    NECK: Neck supple with no lymphadenopathy  Trachea midline  No JVD  CARDIOVASCULAR: S1 and S2 are present  Regular rate and rhythm  No murmurs, rubs, or gallops  RESPIRATORY: CTA B/L, no rales, rhonci or wheezes  Normal respiratory expansion  ABDOMINAL: Abdomen exquisitely tender predominantly over left periumbilical area though tender throughout  Slightly distended but soft/non-peritoneal  Area of bruising over lower left abdomen not notably tender no surrounding erythema  +Bowel sounds     EXTREMITIES: 2+ DP and PT pulses bilaterally; no cyanosis, clubbing, edema  ROM intact  LOBO x4   MUSCULOSKELETAL: No joint tenderness, deformity or swelling, full range of motion without pain  NEUROLOGIC: Patient is alert and oriented to person, place, and time  No sensory or motor deficits  CN 2-12 intact  Plantars downgoing bilaterally  Speech fluent  SKIN: Skin is warm and dry  No skin lesions are present  Past Medical/Surgical History:    Active Ambulatory Problems     Diagnosis Date Noted    Type 2 diabetes mellitus with diabetic neuropathy, with long-term current use of insulin ([facility])     Hypertension     GERD (gastroesophageal reflux disease)     Hyperlipidemia     Opioid dependence ([facility] )     Generalized abdominal pain 11/17/2016    Insomnia 03/18/2017    Iron deficiency anemia 03/18/2017    Abdominal aortic aneurysm (AAA) without rupture ([facility] ) 02/14/2017    Chronic back pain 02/14/2017    Chronic diastolic CHF (congestive heart failure) ([facility] ) 11/14/2017    COPD (chronic obstructive pulmonary disease) ([facility] ) 09/13/2017    Neuropathy 11/14/2017    Closed fracture of transverse process of lumbar vertebra with routine healing 02/14/2018    Chest pain 03/27/2018    Disease of cardiovascular system 03/27/2018    Lung nodules 04/10/2018    Coronary artery disease of native artery of native heart with stable angina pectoris ([place] [address] ) 04/16/2018    Tinea cruris 04/16/2018    Transition of care performed with sharing of clinical summary 04/25/2018     Resolved Ambulatory Problems     Diagnosis Date Noted    Chronic midline low back pain without sciatica     History of suicidal ideation     Atypical chest pain 08/15/2016    CAP (community acquired pneumonia) 08/15/2016    Chronic pain     History of aspiration pneumonia     Constipation 03/18/2017    Chronic bronchitis ([facility] ) 11/15/2017    Diabetes mellitus type 2 with neurological manifestations ([place] [address] ) 02/14/2017    Diabetic neuropathy ([place] [address] ) 02/14/2017    Epigastric pain 11/14/2017    Increased frequency of urination 11/21/2017    Type 2 diabetes mellitus treated with insulin ([facility] ) 10/04/2017    Abdominal pain, acute, bilateral lower quadrant 01/31/2018    Acute urinary retention 01/31/2018    Bronchitis, acute 09/25/2017    Community acquired pneumonia of left lower lobe of lung ([facility] ) 04/10/2018     Past Medical History:   Diagnosis Date    CAD (coronary artery disease)     Chronic pain     COPD (chronic obstructive pulmonary disease) ([place] [address] )     DM type 2 with diabetic peripheral neuropathy ([place] [address] )     Former tobacco use     GERD (gastroesophageal reflux disease)     H/O acute myocardial infarction     H/O: pneumonia     History of aspiration pneumonia     History of suicidal ideation     HLD (hyperlipidemia)     Hypertension     Lumbar transverse process fracture ([place] [address] ) 01/2018    MDD (major depressive disorder)     Non-alcoholic fatty liver disease     Opioid dependence ([place] [address] )     PTSD (post-traumatic stress disorder)        Admitting Diagnosis: Cholelithiasis [K80 20]  Leukocytosis [D72 829]  Chest pain [R07 9]  Abdominal pain [R10 9]    Age/Sex: 79 y o  male    Assessment/Plan:      Acute-onset abdominal pain w/ isolated leukocytosis: With WBC 34, left shift  Lactate 2 0  Presentation concerning for acute infectious/abdominal pathology however work-up so far unrevealing, CT AP unrevealing UA negative only with sonographic garibay however as diffusely tender unclear how reliable to cholecystitis  Consideration for early diverticulitis/bowel ischemia/pyelo/cellulitis/nec fasc    ? Continues to be afebrile/hemodynamically stable  Appears to be in pain though non-toxic appearing  ? Continue broad-spectrum antibiotics Cefepime/Flagyl for now  ? Send repeat lactate, WBC  ? Continue home percocet pain control   ? Follow-up blood/urine cultures  ? NPO for now, surgery to evaluate follow-up recs  ? Consideration for ID consult per day team      Chest pain: Patient states similar to prior MIs   EKG non-ischemic trops x2 negative  Suspect chest pain exacerbated by predominant abdominal process  ? Monitor closely for recurrent chest pain     CAD with multivessel disease:  Had refused recommended CABG by cardiology/CTS multiple times  ? Continue home aspirin/Plavix/lopressor      IDDM: Blood glucose 300s on admission bicarb on CMP 23 no AG  Hemoglobin A1C 10 7 3/28/2018  ? Continue home lantus 20QHS   ? ISS/POCT     Diabetic neuropathy:   ? Continue home pregabalin      Iron-deficiency anemia:   ? Continue home ferrous sulfate     COPD: No evidence exacerbation  ?  Continue home Advair/albuterol     Code Status: Level 1 - Full Code  VTE Pharmacologic Prophylaxis: Enoxaparin (Lovenox)   VTE Mechanical Prophylaxis: sequential compression device  Admission Status: INPATIENT     Admission Orders:  Inpatient/MedSurg  Consult Surgery r/e abd pain, cholelithiasis   Bilateral Sequential Compression Device  OT/PT Consult  SSI  Scheduled Meds:   Current Facility-Administered Medications:  albuterol 2 puff Inhalation Daily   aspirin 81 mg Oral Daily   atorvastatin 40 mg Oral Daily With Dinner   cefepime 2,000 mg Intravenous Q12H   clopidogrel 75 mg Oral Daily   enoxaparin 40 mg Subcutaneous Daily   ferrous sulfate 325 mg Oral Daily With Breakfast   fluticasone-salmeterol 1 puff Inhalation Q12H RHONDA   insulin glargine 25 Units Subcutaneous HS   insulin lispro 1-5 Units Subcutaneous HS   insulin lispro 1-6 Units Subcutaneous TID AC   insulin lispro 9 Units Subcutaneous TID With Meals   metoprolol tartrate 50 mg Oral Q12H RHONDA   metroNIDAZOLE 500 mg Intravenous Q8H   pantoprazole 40 mg Oral Daily   pregabalin 100 mg Oral TID     Percocet po x 3 thus far

## 2018-05-01 NOTE — PLAN OF CARE
DISCHARGE PLANNING     Discharge to home or other facility with appropriate resources Progressing        INFECTION - ADULT     Absence or prevention of progression during hospitalization Progressing        Knowledge Deficit     Patient/family/caregiver demonstrates understanding of disease process, treatment plan, medications, and discharge instructions Progressing        PAIN - ADULT     Verbalizes/displays adequate comfort level or baseline comfort level Progressing        Potential for Falls     Patient will remain free of falls Progressing        Prexisting or High Potential for Compromised Skin Integrity     Skin integrity is maintained or improved Progressing

## 2018-05-01 NOTE — PROGRESS NOTES
[address] Senior Admission Note - Internal Medicine TUNDE King  79 y o  male MRN: [id number]  Unit/Bed#: [facility] 496-28 Encounter: [id number]         Patient seen and examined  Reviewed H&P per Dr Randy Mcbride  Agree with the assessment and plan except where otherwise noted  Physical Exam:  Physical Exam   Constitutional: He is oriented to person, place, and time  He appears well-developed and well-nourished  HENT:   Head: Normocephalic and atraumatic  Mucous membranes dry/tachy   Eyes: Conjunctivae are normal  Pupils are equal, round, and reactive to light  Right eye exhibits no discharge  Left eye exhibits no discharge  No scleral icterus  Neck: Normal range of motion  Neck supple  No JVD present  No tracheal deviation present  Cardiovascular: Normal rate, regular rhythm and normal heart sounds  Exam reveals no gallop and no friction rub  No murmur heard  Pulmonary/Chest: Effort normal and breath sounds normal  No respiratory distress  Abdominal: Soft  Bowel sounds are normal  There is tenderness  There is rebound  Musculoskeletal: Normal range of motion  He exhibits tenderness  He exhibits no edema or deformity  Neurological: He is alert and oriented to person, place, and time  Skin: Skin is warm and dry  ecchymosis at anterior waist line, linear in fashion, tender over area    Psychiatric: He has a normal mood and affect  Nursing note and vitals reviewed      Impression:  Active Problems:    Type 2 diabetes mellitus with diabetic neuropathy, with long-term current use of insulin (HCC)    Hypertension    GERD (gastroesophageal reflux disease)    Hyperlipidemia    Opioid dependence (HCC)    Iron deficiency anemia    Abdominal aortic aneurysm (AAA) without rupture ([facility])    Chronic diastolic CHF (congestive heart failure) ([facility])    COPD (chronic obstructive pulmonary disease) ([facility] [address] ) Neuropathy    Closed fracture of transverse process of lumbar vertebra with routine healing    Chest pain    Lung nodules    Coronary artery disease of native artery of native heart with stable angina pectoris (HCC)    Leukocytosis     A/P:    LLQ abdominal pain  -Ddx: colitis vs ischemic colitis vs infectious colitis vs UTI vs prostatitis  -CT abd unrevealing, cholelithiasis without inflammatory changes noted, pt denies correlation with food intake  -pt denies history of diarrhea or change in bowel habits making C  Diff unlikely, if patient has loose stools overnight will check for C   Diff  -denies abdominal trauma to account for ecchymosis, does not inject insulin in abdomen (mostly in arms), unclear etiology of bruising, lipase WNL making pancreatitis unlikely  -LA 2 0, will recheck to ensure no rise  -RUQ US revealed cholelithiasis with no surrounding inflammation  -given degree of discomfort keep NPO for now, discussed with surgery resident, will follow for their recommendations as patient reports he is hungry and asking to eat  -consider UTI vs prostatitis, check UA, recheck WBC as does not correlate clinically given afebrile and otherwise stable vitals  -continue serial abdominal exams    Leukocytosis  -recheck WBC stat, if remains elevated will continue abx  -follow-up blood and urine studies, trend LA    DM-II with neuropathy with long term insulin use  -HbA1c 10 7, continue home Lantus 20U HS, Humalog 9U TIDWM and add correctional insulin for goal glu 140-180, titrate insulin to goal glu  -continue Lyrica for diabetic neuropathy  -continue PCP follow-up for routine diabetic treatment    Chest pain in patient with CAD with unstable angina  -pt reports chest pain earlier today intermittently through day at rest which he attributed to abdominal pain  -troponin neg, will trend  -EKG with no ST-T changes from prior, recheck if chest pain occurs again  -continue SL Nitro PRN chest pain, call team if pt requires  -patient has recent admissions for chest pain, cardiac cath 3/18 revealed multi vessel disease, CTS recommended CABG at that time and again during last admission, pt was not amenable and wanted second opinion, has not yet followed up with Cardiology as o/p but reports plans to do so  -strongly recommend O/P cardiology F/U, if trops trend upwards consider consult this admission    AAA without rupture  -last imaging 11/16 revealed aneurysm vs penetrating ulcer in left distal abdominal aorta measuring 8mm, CT scan abd performed today in ED revealed atherosclerosis with no aneurysmal dilation, consider dedicated CTA if abdominal pain does not improve    Chronic diastolic CHF  -last Echo 5/06/73: EF 60% with no wall motion abn  -continue medical management with Lopressor, ASA, lipitor     HTN  -well controlled, continue current regimen    GERD  -continue protonix, consider down titration to H2 blocker in future given renal and osteoporotic risk of long term PPI    Iron deficiency anemia  -continue daily iron replacement   -?hx colonoscopy    Closed fracture of lumbar vertebra with opiate dependence  -continue Percocet 5-325mg Q6H PRN pain    COPD without exacerbation  -continue Advair daily and albuterol PRN    VTE Pharmacologic Prophylaxis: Enoxaparin (Lovenox)  VTE Mechanical Prophylaxis: sequential compression device    Disposition: Admit to Dr Belkis SIMMS, inpatient, expect >2 midnight stay     JERMAIN Camarillo    Internal Medicine PGY-2  5/1/2018 12:29 AM

## 2018-05-01 NOTE — SOCIAL WORK
Pt is medically clear for discharge and is open to [facility] pt would like to continue referral in ecin as such   Cm also called out patient care coordination for referral   Pt will take a taxi home
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
## 2018-05-01 NOTE — DISCHARGE INSTRUCTIONS
Abdominal Pain   Georges Bob RE & Ismael S: Abdominal Pain  In: Tai Nguyễn, Denise RM, [employer], et al, eds  [address] [facility] Consult, 4th ed  [address] [facility],7[address] [place], [place], 2011  Ardith Schilder, Renan MATTSON, & Justin F: Approach to Abdominal Pain  In: Matt Ranjan [employer]  The [address] [facility],5[address], [facility], 2nd ed  [address], [place], [place], 2006 © 2017 [address] Information is for End User's use only and may not be sold, redistributed or otherwise used for commercial purposes  All illustrations and images included in CareNotes® are the copyrighted property of A JERMAIN MARKHAM , [employer]  or Jamil Henson  The above information is an  only  It is not intended as medical advice for individual conditions or treatments  Talk to your doctor, nurse or pharmacist before following any medical regimen to see if it is safe and effective for you  Against Medical Advice   WHAT YOU NEED TO KNOW:   Discharge against medical advice means that you choose to leave the hospital before your healthcare provider recommends that you do  Your healthcare provider may still need to diagnose or treat your condition or your treatment may not be complete  DISCHARGE INSTRUCTIONS:   Risks:  Risks of leaving the hospital before your healthcare providers recommend include the following:  · Your condition may cause other health problems if not treated properly  · You may need to be admitted to the hospital again for the same condition  · Your condition could become life-threatening  Follow up with your healthcare provider as directed:  Write down your questions so you remember to ask them during your visits  © 2017 [address] Information is for End User's use only and may not be sold, redistributed or otherwise used for commercial purposes   All illustrations and images included in CareNotes® are the copyrighted property of A D SHANNAN MARKHAM , Yosef  or Jamil Henson  The above information is an  only  It is not intended as medical advice for individual conditions or treatments  Talk to your doctor, nurse or pharmacist before following any medical regimen to see if it is safe and effective for you

## 2018-05-01 NOTE — PROGRESS NOTES
Patient was seen at bedside by primary team, Endoscopy recommended but patient is adamant about being discharged home  Patient was educated about the possible consquences of not receiving recommended treatment  Patient still refusing

## 2018-05-01 NOTE — PROGRESS NOTES
Residency Progress Note   Unit/Bed#: [facility] 811-01 Encounter: [id number]  SOD Team C       William Trujillo  79 y o  male [id number]    Assessment/Plan:    Principal Problem:    Generalized abdominal pain  Active Problems:    Type 2 diabetes mellitus with diabetic neuropathy, with long-term current use of insulin (HCC)    Hypertension    GERD (gastroesophageal reflux disease)    Hyperlipidemia    Opioid dependence (HCC)    Iron deficiency anemia    Abdominal aortic aneurysm (AAA) without rupture (HCC)    Chronic diastolic CHF (congestive heart failure) (HCC)    COPD (chronic obstructive pulmonary disease) (HCC)    Neuropathy    Closed fracture of transverse process of lumbar vertebra with routine healing    Chest pain    Lung nodules    Coronary artery disease of native artery of native heart with stable angina pectoris (HCC)    Leukocytosis    Hyponatremia    Generalized abdominal pain likely secondary to UTI:  This is likely secondary to UTI, bladder scan showed less than 150 cc of urine, patient is afebrile, his white count did improve but remains elevated    -patient wishes to be discharged home  Again we explained to him that is in his best interest to remain in the hospital to complete his workup  However despite our explanation of the risks of being discharged prematurely he wishes to go home    -I explained to him that parts of his workup is still pending including blood cultures, urine cultures     -we have canceled the GI consult as he would does not wish to be seen by any specialist    -gallbladder ultrasound reviewed as well as CT abdomen and pelvis    Abnormal CT of the chest:  Instructed the patient that he will need a repeat CT scan of his chest in 4-6 weeks as he had abnormalities at his lung bases    -risk of failure follow-up was also discussed with the patient     Chest pain:  Resolved     -troponin negative x2    History of CAD with multivessel disease:  Patient was recommended to have CABG performed, but he refuses this  Diabetes mellitus mellitus type 2:  His blood sugar was elevated, I instructed him to follow up with his PCP at discharge    COPD:  No acute exacerbation  Iron deficiency anemia:  Continue iron supplementation    Disposition: Discharge to home  Subjective:   Patient continues to have abdominal pain he has the bilateral lower quadrants, but upon palpation he also has diffuse abdominal pain in upper and lower quadrants  He is tolerating p o  intake including muffin for breakfast   He states that eating actually makes his abdominal pain improved  He denies any associated nausea or vomiting, his chest pain is resolved    Update: While rounding with attending, as well as nurse, patient expressed wish to go home  He states he was feeling better  We recommended to him that he should remain in the hospital until we have completed his workup for his abdominal pain  He states that he feels fine and wishes to go home today  He does not want to stay in the hospital    We explained him the risks of leaving the hospital without completing his workup, he understands the risks including but not limited to worsening infection, sepsis, permanent disability, or even possibly death  Vitals: Temp (24hrs), Av 3 °F (36 8 °C), Min:97 9 °F (36 6 °C), Max:98 5 °F (36 9 °C)  Current: Temperature: 98 4 °F (36 9 °C)  Vitals:    18 2330 18 0041 18 0300 18 0735   BP: 142/65 162/82 147/70 140/80   BP Location: Right arm Left arm Right arm Right arm   Pulse: 68 85 59 67   Resp: 14 20  18   Temp:  97 9 °F (36 6 °C)  98 4 °F (36 9 °C)   TempSrc:  Oral  Oral   SpO2: 94% 100%  94%   Weight:  112 kg (247 lb 5 7 oz)     Height:  6' (1 829 m)      Body mass index is 33 55 kg/m²  I/O last 24 hours:   In: 26 [P O :236; IV Piggyback:1150]  Out: 300 [Urine:300]      Physical Exam: General appearance: alert, appears stated age, cooperative and no distress  Head: Normocephalic, without obvious abnormality, atraumatic  Eyes: EOMI, no icterus  Lungs: clear to auscultation bilaterally, no rales/rhonchi/wheezes  Heart: regular rate and rhythm, S1, S2 normal, no murmur, click, rub or gallop  Abdomen: soft, mild tenderness to palpation bilateral lower quadrants; bowel sounds normal; no masses,  no organomegaly  Extremities: extremities normal, atraumatic, no cyanosis or edema  Neurologic no gross deficits, speech fluent    Invasive Devices     Peripheral Intravenous Line            Peripheral IV 04/30/18 Left Antecubital less than 1 day    Peripheral IV 04/30/18 Right Antecubital less than 1 day                          Labs:   Recent Results (from the past 24 hour(s))   Comprehensive metabolic panel    Collection Time: 04/30/18  6:56 PM   Result Value Ref Range    Sodium 133 (L) 136 - 145 mmol/L    Potassium 4 1 3 5 - 5 3 mmol/L    Chloride 101 100 - 108 mmol/L    CO2 23 21 - 32 mmol/L    Anion Gap 9 4 - 13 mmol/L    BUN 14 5 - 25 mg/dL    Creatinine 1 12 0 60 - 1 30 mg/dL    Glucose 307 (H) 65 - 140 mg/dL    Calcium 8 9 8 3 - 10 1 mg/dL    AST 12 5 - 45 U/L    ALT 19 12 - 78 U/L    Alkaline Phosphatase 100 46 - 116 U/L    Total Protein 8 0 6 4 - 8 2 g/dL    Albumin 3 2 (L) 3 5 - 5 0 g/dL    Total Bilirubin 0 93 0 20 - 1 00 mg/dL    eGFR 68 ml/min/1 73sq m   CBC and differential    Collection Time: 04/30/18  6:56 PM   Result Value Ref Range    WBC 34 27 (HH) 4 31 - 10 16 Thousand/uL    RBC 5 19 3 88 - 5 62 Million/uL    Hemoglobin 13 2 12 0 - 17 0 g/dL    Hematocrit 40 1 36 5 - 49 3 %    MCV 77 (L) 82 - 98 fL    MCH 25 4 (L) 26 8 - 34 3 pg    MCHC 32 9 31 4 - 37 4 g/dL    RDW 23 0 (H) 11 6 - 15 1 %    Platelets 728 269 - 431 Thousands/uL    nRBC 0 /100 WBCs    Neutrophils Relative 89 (H) 43 - 75 %    Lymphocytes Relative 5 (L) 14 - 44 %    Monocytes Relative 6 4 - 12 %    Eosinophils Relative 0 0 - 6 %    Basophils Relative 0 0 - 1 %    Neutrophils Absolute 30 16 (H) 1 85 - 7 62 Thousands/µL Lymphocytes Absolute 1 71 0 60 - 4 47 Thousands/µL    Monocytes Absolute 2 11 (H) 0 17 - 1 22 Thousand/µL    Eosinophils Absolute 0 01 0 00 - 0 61 Thousand/µL    Basophils Absolute 0 07 0 00 - 0 10 Thousands/µL   Troponin I    Collection Time: 04/30/18  6:56 PM   Result Value Ref Range    Troponin I <0 02 <=0 04 ng/mL   Lipase    Collection Time: 04/30/18  6:56 PM   Result Value Ref Range    Lipase 51 (L) 73 - 393 u/L   Lactic acid x2 Q2H    Collection Time: 04/30/18  7:34 PM   Result Value Ref Range    LACTIC ACID 2 0 0 5 - 2 0 mmol/L   Troponin I    Collection Time: 05/01/18 12:14 AM   Result Value Ref Range    Troponin I <0 02 <=0 04 ng/mL   Platelet count    Collection Time: 05/01/18 12:15 AM   Result Value Ref Range    Platelets 463 361 - 207 Thousands/uL    MPV 9 7 8 9 - 12 7 fL   WBC    Collection Time: 05/01/18 12:15 AM   Result Value Ref Range    WBC 23 94 (H) 4 31 - 10 16 Thousand/uL   UA w Reflex to Microscopic w Reflex to Culture    Collection Time: 05/01/18 12:16 AM   Result Value Ref Range    Color, UA Yellow     Clarity, UA Clear     Specific Gravity, UA >1 045 (H) 1 003 - 1 030    pH, UA 6 5 4 5 - 8 0    Leukocytes, UA (A) Negative     Elevated glucose may cause decreased leukocyte values   See urine microscopic for [facility] result/    Nitrite, UA Negative Negative    Protein, UA 30 (1+) (A) Negative mg/dl    Glucose, UA >=1000 (1%) (A) Negative mg/dl    Ketones, UA Trace (A) Negative mg/dl    Urobilinogen, UA 0 2 0 2, 1 0 E U /dl E U /dl    Bilirubin, UA Negative Negative    Blood, UA Negative Negative   Urine Microscopic    Collection Time: 05/01/18 12:16 AM   Result Value Ref Range    RBC, UA None Seen None Seen, 0-5 /hpf    WBC, UA 10-20 (A) None Seen, 0-5, 5-55, 5-65 /hpf    Epithelial Cells None Seen None Seen, Occasional /hpf    Bacteria, UA None Seen None Seen, Occasional /hpf    Hyaline Casts, UA None Seen None Seen /lpf   Fingerstick Glucose (POCT)    Collection Time: 05/01/18 12:38 AM   Result Value Ref Range    POC Glucose 311 (H) 65 - 140 mg/dl   Lactic acid x2 Q2H    Collection Time: 05/01/18  1:11 AM   Result Value Ref Range    LACTIC ACID 1 5 0 5 - 2 0 mmol/L   CBC and Platelet    Collection Time: 05/01/18  4:46 AM   Result Value Ref Range    WBC 18 16 (H) 4 31 - 10 16 Thousand/uL    RBC 4 76 3 88 - 5 62 Million/uL    Hemoglobin 11 6 (L) 12 0 - 17 0 g/dL    Hematocrit 38 0 36 5 - 49 3 %    MCV 80 (L) 82 - 98 fL    MCH 24 4 (L) 26 8 - 34 3 pg    MCHC 30 5 (L) 31 4 - 37 4 g/dL    RDW 23 2 (H) 11 6 - 15 1 %    Platelets 892 606 - 250 Thousands/uL   Basic metabolic panel    Collection Time: 05/01/18  4:46 AM   Result Value Ref Range    Sodium 136 136 - 145 mmol/L    Potassium 4 0 3 5 - 5 3 mmol/L    Chloride 105 100 - 108 mmol/L    CO2 25 21 - 32 mmol/L    Anion Gap 6 4 - 13 mmol/L    BUN 17 5 - 25 mg/dL    Creatinine 0 99 0 60 - 1 30 mg/dL    Glucose 223 (H) 65 - 140 mg/dL    Calcium 8 5 8 3 - 10 1 mg/dL    eGFR 78 ml/min/1 73sq m   Procalcitonin    Collection Time: 05/01/18  6:27 AM   Result Value Ref Range    Procalcitonin 0 30 (H) <=0 25 ng/ml   Fingerstick Glucose (POCT)    Collection Time: 05/01/18  7:35 AM   Result Value Ref Range    POC Glucose 270 (H) 65 - 140 mg/dl       Radiology Results: I have personally reviewed pertinent reports  Other Diagnostic Testing:   I have personally reviewed pertinent reports          Active Meds:   Current Facility-Administered Medications   Medication Dose Route Frequency    albuterol (PROVENTIL HFA,VENTOLIN HFA) inhaler 2 puff  2 puff Inhalation Daily    albuterol inhalation solution 2 5 mg  2 5 mg Nebulization Q4H PRN    aspirin (ECOTRIN LOW STRENGTH) EC tablet 81 mg  81 mg Oral Daily    atorvastatin (LIPITOR) tablet 40 mg  40 mg Oral Daily With Dinner    cefepime (MAXIPIME) 2,000 mg in dextrose 5 % 50 mL IVPB  2,000 mg Intravenous Q12H    clopidogrel (PLAVIX) tablet 75 mg  75 mg Oral Daily    enoxaparin (LOVENOX) subcutaneous injection 40 mg  40 mg Subcutaneous Daily    ferrous sulfate tablet 325 mg  325 mg Oral Daily With Breakfast    fluticasone-salmeterol (ADVAIR) 250-50 mcg/dose inhaler 1 puff  1 puff Inhalation Q12H [address]    insulin glargine (LANTUS) subcutaneous injection 20 Units 0 2 mL  20 Units Subcutaneous HS    insulin lispro (HumaLOG) 100 units/mL subcutaneous injection 1-5 Units  1-5 Units Subcutaneous HS    insulin lispro (HumaLOG) 100 units/mL subcutaneous injection 1-6 Units  1-6 Units Subcutaneous TID AC    insulin lispro (HumaLOG) 100 units/mL subcutaneous injection 9 Units  9 Units Subcutaneous TID With Meals    metoprolol tartrate (LOPRESSOR) tablet 50 mg  50 mg Oral Q12H RHONDA    metroNIDAZOLE (FLAGYL) IVPB (premix) 500 mg  500 mg Intravenous Q8H    nitroglycerin (NITROSTAT) SL tablet 0 4 mg  0 4 mg Sublingual Q5 Min PRN    ondansetron (ZOFRAN) injection 4 mg  4 mg Intravenous Q6H PRN    oxyCODONE-acetaminophen (PERCOCET) 5-325 mg per tablet 1 tablet  1 tablet Oral Q6H PRN    pantoprazole (PROTONIX) EC tablet 40 mg  40 mg Oral Daily    pregabalin (LYRICA) capsule 100 mg  100 mg Oral TID         VTE Pharmacologic Prophylaxis: Enoxaparin (Lovenox)  VTE Mechanical Prophylaxis: sequential compression device    Layne King DO

## 2018-05-02 LAB — BACTERIA UR CULT: NORMAL

## 2018-05-05 DIAGNOSIS — Z79.4 TYPE 2 DIABETES MELLITUS WITH DIABETIC NEUROPATHY, WITH LONG-TERM CURRENT USE OF INSULIN (HCC): ICD-10-CM

## 2018-05-05 DIAGNOSIS — E11.40 TYPE 2 DIABETES MELLITUS WITH DIABETIC NEUROPATHY, WITH LONG-TERM CURRENT USE OF INSULIN (HCC): ICD-10-CM

## 2018-05-05 LAB
BACTERIA BLD CULT: NORMAL
BACTERIA BLD CULT: NORMAL

## 2018-05-10 ENCOUNTER — OFFICE VISIT (OUTPATIENT)
Dept: INTERNAL MEDICINE CLINIC | Facility: CLINIC | Age: 68
End: 2018-05-10
Payer: COMMERCIAL

## 2018-05-10 ENCOUNTER — TRANSITIONAL CARE MANAGEMENT (OUTPATIENT)
Dept: INTERNAL MEDICINE CLINIC | Facility: CLINIC | Age: 68
End: 2018-05-10

## 2018-05-10 VITALS
HEIGHT: 70 IN | TEMPERATURE: 98.5 F | WEIGHT: 244.2 LBS | BODY MASS INDEX: 34.96 KG/M2 | SYSTOLIC BLOOD PRESSURE: 142 MMHG | HEART RATE: 80 BPM | DIASTOLIC BLOOD PRESSURE: 82 MMHG | OXYGEN SATURATION: 98 %

## 2018-05-10 DIAGNOSIS — R10.84 GENERALIZED ABDOMINAL PAIN: Primary | ICD-10-CM

## 2018-05-10 DIAGNOSIS — S32.009D CLOSED FRACTURE OF TRANSVERSE PROCESS OF LUMBAR VERTEBRA WITH ROUTINE HEALING: ICD-10-CM

## 2018-05-10 DIAGNOSIS — Z79.4 TYPE 2 DIABETES MELLITUS WITH DIABETIC NEUROPATHY, WITH LONG-TERM CURRENT USE OF INSULIN (HCC): ICD-10-CM

## 2018-05-10 DIAGNOSIS — E78.5 HYPERLIPIDEMIA, UNSPECIFIED HYPERLIPIDEMIA TYPE: ICD-10-CM

## 2018-05-10 DIAGNOSIS — I25.118 CORONARY ARTERY DISEASE OF NATIVE ARTERY OF NATIVE HEART WITH STABLE ANGINA PECTORIS (HCC): Chronic | ICD-10-CM

## 2018-05-10 DIAGNOSIS — J41.1 MUCOPURULENT CHRONIC BRONCHITIS (HCC): ICD-10-CM

## 2018-05-10 DIAGNOSIS — R93.89 ABNORMAL CT OF THE CHEST: ICD-10-CM

## 2018-05-10 DIAGNOSIS — N40.1 BPH ASSOCIATED WITH NOCTURIA: ICD-10-CM

## 2018-05-10 DIAGNOSIS — D50.9 IRON DEFICIENCY ANEMIA, UNSPECIFIED IRON DEFICIENCY ANEMIA TYPE: ICD-10-CM

## 2018-05-10 DIAGNOSIS — D72.829 LEUKOCYTOSIS, UNSPECIFIED TYPE: ICD-10-CM

## 2018-05-10 DIAGNOSIS — I10 ESSENTIAL HYPERTENSION: ICD-10-CM

## 2018-05-10 DIAGNOSIS — E11.40 TYPE 2 DIABETES MELLITUS WITH DIABETIC NEUROPATHY, WITH LONG-TERM CURRENT USE OF INSULIN (HCC): ICD-10-CM

## 2018-05-10 DIAGNOSIS — K21.9 GASTROESOPHAGEAL REFLUX DISEASE WITHOUT ESOPHAGITIS: ICD-10-CM

## 2018-05-10 DIAGNOSIS — R35.1 BPH ASSOCIATED WITH NOCTURIA: ICD-10-CM

## 2018-05-10 DIAGNOSIS — I50.32 CHRONIC DIASTOLIC CHF (CONGESTIVE HEART FAILURE) (HCC): ICD-10-CM

## 2018-05-10 PROBLEM — R07.9 CHEST PAIN: Status: RESOLVED | Noted: 2018-03-27 | Resolved: 2018-05-10

## 2018-05-10 PROBLEM — IMO0001 TRANSITION OF CARE PERFORMED WITH SHARING OF CLINICAL SUMMARY: Status: RESOLVED | Noted: 2018-04-25 | Resolved: 2018-05-10

## 2018-05-10 PROCEDURE — 99214 OFFICE O/P EST MOD 30 MIN: CPT | Performed by: PHYSICIAN ASSISTANT

## 2018-05-10 PROCEDURE — 1111F DSCHRG MED/CURRENT MED MERGE: CPT | Performed by: PHYSICIAN ASSISTANT

## 2018-05-10 RX ORDER — ATORVASTATIN CALCIUM 40 MG/1
40 TABLET, FILM COATED ORAL
Qty: 30 TABLET | Refills: 1 | Status: SHIPPED | OUTPATIENT
Start: 2018-05-10 | End: 2018-06-29 | Stop reason: SDUPTHER

## 2018-05-10 RX ORDER — OXYCODONE HYDROCHLORIDE AND ACETAMINOPHEN 5; 325 MG/1; MG/1
1 TABLET ORAL EVERY 6 HOURS PRN
COMMUNITY
End: 2018-05-10 | Stop reason: SDUPTHER

## 2018-05-10 RX ORDER — PREGABALIN 100 MG/1
100 CAPSULE ORAL 3 TIMES DAILY
Qty: 90 CAPSULE | Refills: 0 | Status: SHIPPED | OUTPATIENT
Start: 2018-05-10 | End: 2018-05-30 | Stop reason: SDUPTHER

## 2018-05-10 RX ORDER — TAMSULOSIN HYDROCHLORIDE 0.4 MG/1
0.4 CAPSULE ORAL
Qty: 30 CAPSULE | Refills: 1 | Status: SHIPPED | OUTPATIENT
Start: 2018-05-10 | End: 2018-06-29 | Stop reason: SDUPTHER

## 2018-05-10 RX ORDER — OXYCODONE HYDROCHLORIDE AND ACETAMINOPHEN 5; 325 MG/1; MG/1
1 TABLET ORAL EVERY 6 HOURS PRN
Qty: 60 TABLET | Refills: 0 | Status: SHIPPED | OUTPATIENT
Start: 2018-05-10 | End: 2018-05-30 | Stop reason: SDUPTHER

## 2018-05-10 NOTE — PATIENT INSTRUCTIONS
Generalized abdominal pain  Patient seen today hospital records reviewed  He was admitted to the hospital [place] [id number]  With acute abdominal pain  He had an ultrasound of his gallbladder showing possible cholecystitis with a positive sonographic Mortensen sign  He had a CT of his abdomen and pelvis which showed possible pulmonary infiltrate but ruled out cholecystitis  He was seen by General surgery who recommended he continue treatment for his UTI  Blood cultures x2 were obtained 4/30/2018 which were negative x5 days  Urine culture 5/1/2018 which was negative for growth  He was continued on Omnicef and Flagyl which she has taken as directed  Currently on his last day of antibiotic  Will obtain repeat CT scan of the chest in 4-5 weeks to evaluate for resolution of symptoms  Patient with continued nocturia  Unable to give a urine sample today  Currently on antibiotics and with  Urine culture negative while in hospital   Will start patient on Flomax  Discussed risks and benefits of this medication  Take Flomax 0 4 mg daily at night  No other medication changes  Discussed importance of blood sugar control as urinary frequency will increase if blood sugars are increased  Discussed low-fat diet to prevent biliary colic  Go for repeat labs tomorrow  Will add PSA to lab set  Will follow white count which was elevated while inpatient and had decreased to 18,000 on the day of discharge

## 2018-05-10 NOTE — ASSESSMENT & PLAN NOTE
Stable at this time on albuterol p r n  Along with Advair  No dose change  Will follow    Repeat CT scan ordered
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
## 2018-05-10 NOTE — ASSESSMENT & PLAN NOTE
Patient currently on Lantus with Humalog    Discussed importance of blood sugar control to help with urinary frequency as well as to prevent chronic affects of uncontrolled diabetes

## 2018-05-10 NOTE — ASSESSMENT & PLAN NOTE
Repeat CT of the chest in 4-6 weeks ordered  Discussed importance of albuterol p r n  And Advair to be continued as directed

## 2018-05-10 NOTE — ASSESSMENT & PLAN NOTE
Prostate exam performed today in the office  Nontender nonnodular  Enlarged prostate seen on CT imaging while inpatient  Will start patient on low-dose Flomax  Discussed importance of blood sugar control  Will follow up patient closely in 2-3 weeks  Advised him to go for PSA

## 2018-05-10 NOTE — ASSESSMENT & PLAN NOTE
Patient currently considering possible surgical intervention for his underlying CAD  He has [facility] cardiology phone number and notes he will schedule when he is ready to do so  At this time he elects to proceed with medical management and is not ready to proceed with any surgical intervention    Discuss risks of CAD

## 2018-05-10 NOTE — ASSESSMENT & PLAN NOTE
Patient seen today hospital records reviewed  He was admitted to the hospital with a white count of 85677  With acute abdominal pain  He had an ultrasound of his gallbladder showing possible cholecystitis with a positive sonographic Mortensen sign  He had a CT of his abdomen and pelvis which showed possible pulmonary infiltrate but ruled out cholecystitis  He was seen by General surgery who recommended he continue treatment for his UTI  Blood cultures x2 were obtained 4/30/2018 which were negative x5 days  Urine culture 5/1/2018 which was negative for growth  He was continued on Omnicef and Flagyl which she has taken as directed  Currently on his last day of antibiotic  Will obtain repeat CT scan of the chest in 4-5 weeks to evaluate for resolution of symptoms  Patient with continued nocturia  Unable to give a urine sample today  Currently on antibiotics and with  Urine culture negative while in hospital   Will start patient on Flomax  Discussed risks and benefits of this medication  Take Flomax 0 4 mg daily at night  No other medication changes  Discussed importance of blood sugar control as urinary frequency will increase if blood sugars are increased  Discussed low-fat diet to prevent biliary colic  Go for repeat labs tomorrow  Will add PSA to lab set  Will follow white count which was elevated while inpatient and had decreased to 18,000 on the day of discharge

## 2018-05-12 ENCOUNTER — APPOINTMENT (EMERGENCY)
Dept: RADIOLOGY | Facility: HOSPITAL | Age: 68
DRG: 392 | End: 2018-05-12
Payer: COMMERCIAL

## 2018-05-12 ENCOUNTER — HOSPITAL ENCOUNTER (INPATIENT)
Facility: HOSPITAL | Age: 68
LOS: 2 days | Discharge: HOME HEALTH | DRG: 392 | End: 2018-05-15
Attending: EMERGENCY MEDICINE | Admitting: INTERNAL MEDICINE
Payer: COMMERCIAL

## 2018-05-12 DIAGNOSIS — F11.29 OPIOID DEPENDENCE WITH OPIOID-INDUCED DISORDER (HCC): ICD-10-CM

## 2018-05-12 DIAGNOSIS — R74.02 ELEVATED SERUM LACTATE DEHYDROGENASE: Primary | ICD-10-CM

## 2018-05-12 DIAGNOSIS — K80.20 CHOLELITHIASIS: ICD-10-CM

## 2018-05-12 DIAGNOSIS — J41.1 MUCOPURULENT CHRONIC BRONCHITIS (HCC): ICD-10-CM

## 2018-05-12 DIAGNOSIS — N17.9 ACUTE KIDNEY INJURY (HCC): ICD-10-CM

## 2018-05-12 DIAGNOSIS — I50.32 CHRONIC DIASTOLIC CHF (CONGESTIVE HEART FAILURE) (HCC): ICD-10-CM

## 2018-05-12 DIAGNOSIS — R10.11 RUQ PAIN: ICD-10-CM

## 2018-05-12 DIAGNOSIS — R10.9 ABDOMINAL PAIN: ICD-10-CM

## 2018-05-12 PROBLEM — R07.89 CHEST TIGHTNESS: Status: ACTIVE | Noted: 2018-05-12

## 2018-05-12 LAB
ALBUMIN SERPL BCP-MCNC: 3.1 G/DL (ref 3.5–5)
ALP SERPL-CCNC: 84 U/L (ref 46–116)
ALT SERPL W P-5'-P-CCNC: 25 U/L (ref 12–78)
ANION GAP SERPL CALCULATED.3IONS-SCNC: 7 MMOL/L (ref 4–13)
APTT PPP: 26 SECONDS (ref 23–35)
AST SERPL W P-5'-P-CCNC: 15 U/L (ref 5–45)
ATRIAL RATE: 82 BPM
BACTERIA UR QL AUTO: ABNORMAL /HPF
BASOPHILS # BLD AUTO: 0.06 THOUSANDS/ΜL (ref 0–0.1)
BASOPHILS NFR BLD AUTO: 1 % (ref 0–1)
BILIRUB SERPL-MCNC: 0.34 MG/DL (ref 0.2–1)
BILIRUB UR QL STRIP: NEGATIVE
BUN SERPL-MCNC: 18 MG/DL (ref 5–25)
CALCIUM SERPL-MCNC: 8.8 MG/DL (ref 8.3–10.1)
CHLORIDE SERPL-SCNC: 103 MMOL/L (ref 100–108)
CK SERPL-CCNC: 64 U/L (ref 39–308)
CLARITY UR: CLEAR
CO2 SERPL-SCNC: 24 MMOL/L (ref 21–32)
COLOR UR: YELLOW
COLOR, POC: NORMAL
CREAT SERPL-MCNC: 1.2 MG/DL (ref 0.6–1.3)
EOSINOPHIL # BLD AUTO: 0.08 THOUSAND/ΜL (ref 0–0.61)
EOSINOPHIL NFR BLD AUTO: 1 % (ref 0–6)
ERYTHROCYTE [DISTWIDTH] IN BLOOD BY AUTOMATED COUNT: 23.2 % (ref 11.6–15.1)
GFR SERPL CREATININE-BSD FRML MDRD: 62 ML/MIN/1.73SQ M
GLUCOSE SERPL-MCNC: 204 MG/DL (ref 65–140)
GLUCOSE SERPL-MCNC: 286 MG/DL (ref 65–140)
GLUCOSE SERPL-MCNC: 289 MG/DL (ref 65–140)
GLUCOSE SERPL-MCNC: 303 MG/DL (ref 65–140)
GLUCOSE UR STRIP-MCNC: ABNORMAL MG/DL
HCT VFR BLD AUTO: 43.4 % (ref 36.5–49.3)
HGB BLD-MCNC: 13.4 G/DL (ref 12–17)
HGB UR QL STRIP.AUTO: NEGATIVE
HYALINE CASTS #/AREA URNS LPF: ABNORMAL /LPF
INR PPP: 0.98 (ref 0.86–1.16)
KETONES UR STRIP-MCNC: NEGATIVE MG/DL
LACTATE SERPL-SCNC: 1.3 MMOL/L (ref 0.5–2)
LACTATE SERPL-SCNC: 2.4 MMOL/L (ref 0.5–2)
LEUKOCYTE ESTERASE UR QL STRIP: NEGATIVE
LIPASE SERPL-CCNC: 64 U/L (ref 73–393)
LYMPHOCYTES # BLD AUTO: 1.45 THOUSANDS/ΜL (ref 0.6–4.47)
LYMPHOCYTES NFR BLD AUTO: 15 % (ref 14–44)
MCH RBC QN AUTO: 25.3 PG (ref 26.8–34.3)
MCHC RBC AUTO-ENTMCNC: 30.9 G/DL (ref 31.4–37.4)
MCV RBC AUTO: 82 FL (ref 82–98)
MONOCYTES # BLD AUTO: 0.56 THOUSAND/ΜL (ref 0.17–1.22)
MONOCYTES NFR BLD AUTO: 6 % (ref 4–12)
NEUTROPHILS # BLD AUTO: 7.74 THOUSANDS/ΜL (ref 1.85–7.62)
NEUTS SEG NFR BLD AUTO: 77 % (ref 43–75)
NITRITE UR QL STRIP: NEGATIVE
NON-SQ EPI CELLS URNS QL MICRO: ABNORMAL /HPF
NRBC BLD AUTO-RTO: 0 /100 WBCS
NT-PROBNP SERPL-MCNC: 331 PG/ML
P AXIS: 5 DEGREES
PH UR STRIP.AUTO: 6.5 [PH] (ref 4.5–8)
PLATELET # BLD AUTO: 271 THOUSANDS/UL (ref 149–390)
PLATELET # BLD AUTO: 289 THOUSANDS/UL (ref 149–390)
PMV BLD AUTO: 9.4 FL (ref 8.9–12.7)
PMV BLD AUTO: 9.5 FL (ref 8.9–12.7)
POTASSIUM SERPL-SCNC: 4.3 MMOL/L (ref 3.5–5.3)
PR INTERVAL: 144 MS
PROT SERPL-MCNC: 7.9 G/DL (ref 6.4–8.2)
PROT UR STRIP-MCNC: ABNORMAL MG/DL
PROTHROMBIN TIME: 13 SECONDS (ref 12.1–14.4)
QRS AXIS: 39 DEGREES
QRSD INTERVAL: 78 MS
QT INTERVAL: 368 MS
QTC INTERVAL: 429 MS
RBC # BLD AUTO: 5.3 MILLION/UL (ref 3.88–5.62)
RBC #/AREA URNS AUTO: ABNORMAL /HPF
SODIUM SERPL-SCNC: 134 MMOL/L (ref 136–145)
SP GR UR STRIP.AUTO: 1.01 (ref 1–1.03)
T WAVE AXIS: 12 DEGREES
TROPONIN I SERPL-MCNC: <0.02 NG/ML
TROPONIN I SERPL-MCNC: <0.02 NG/ML
UROBILINOGEN UR QL STRIP.AUTO: 0.2 E.U./DL
VENTRICULAR RATE: 82 BPM
WBC # BLD AUTO: 9.96 THOUSAND/UL (ref 4.31–10.16)
WBC #/AREA URNS AUTO: ABNORMAL /HPF

## 2018-05-12 PROCEDURE — 85610 PROTHROMBIN TIME: CPT | Performed by: EMERGENCY MEDICINE

## 2018-05-12 PROCEDURE — 71046 X-RAY EXAM CHEST 2 VIEWS: CPT

## 2018-05-12 PROCEDURE — 83605 ASSAY OF LACTIC ACID: CPT | Performed by: EMERGENCY MEDICINE

## 2018-05-12 PROCEDURE — 80053 COMPREHEN METABOLIC PANEL: CPT | Performed by: EMERGENCY MEDICINE

## 2018-05-12 PROCEDURE — 74177 CT ABD & PELVIS W/CONTRAST: CPT

## 2018-05-12 PROCEDURE — 83690 ASSAY OF LIPASE: CPT | Performed by: EMERGENCY MEDICINE

## 2018-05-12 PROCEDURE — 99285 EMERGENCY DEPT VISIT HI MDM: CPT

## 2018-05-12 PROCEDURE — 82948 REAGENT STRIP/BLOOD GLUCOSE: CPT

## 2018-05-12 PROCEDURE — 83880 ASSAY OF NATRIURETIC PEPTIDE: CPT | Performed by: EMERGENCY MEDICINE

## 2018-05-12 PROCEDURE — 82550 ASSAY OF CK (CPK): CPT | Performed by: EMERGENCY MEDICINE

## 2018-05-12 PROCEDURE — 76705 ECHO EXAM OF ABDOMEN: CPT

## 2018-05-12 PROCEDURE — 81001 URINALYSIS AUTO W/SCOPE: CPT

## 2018-05-12 PROCEDURE — 84484 ASSAY OF TROPONIN QUANT: CPT | Performed by: INTERNAL MEDICINE

## 2018-05-12 PROCEDURE — 83605 ASSAY OF LACTIC ACID: CPT | Performed by: INTERNAL MEDICINE

## 2018-05-12 PROCEDURE — 36415 COLL VENOUS BLD VENIPUNCTURE: CPT | Performed by: EMERGENCY MEDICINE

## 2018-05-12 PROCEDURE — 99223 1ST HOSP IP/OBS HIGH 75: CPT | Performed by: INTERNAL MEDICINE

## 2018-05-12 PROCEDURE — 93010 ELECTROCARDIOGRAM REPORT: CPT | Performed by: INTERNAL MEDICINE

## 2018-05-12 PROCEDURE — 84484 ASSAY OF TROPONIN QUANT: CPT | Performed by: EMERGENCY MEDICINE

## 2018-05-12 PROCEDURE — 93005 ELECTROCARDIOGRAM TRACING: CPT

## 2018-05-12 PROCEDURE — 96375 TX/PRO/DX INJ NEW DRUG ADDON: CPT

## 2018-05-12 PROCEDURE — 85049 AUTOMATED PLATELET COUNT: CPT | Performed by: INTERNAL MEDICINE

## 2018-05-12 PROCEDURE — 96361 HYDRATE IV INFUSION ADD-ON: CPT

## 2018-05-12 PROCEDURE — 81002 URINALYSIS NONAUTO W/O SCOPE: CPT | Performed by: EMERGENCY MEDICINE

## 2018-05-12 PROCEDURE — 85025 COMPLETE CBC W/AUTO DIFF WBC: CPT | Performed by: EMERGENCY MEDICINE

## 2018-05-12 PROCEDURE — 96374 THER/PROPH/DIAG INJ IV PUSH: CPT

## 2018-05-12 PROCEDURE — 85730 THROMBOPLASTIN TIME PARTIAL: CPT | Performed by: EMERGENCY MEDICINE

## 2018-05-12 RX ORDER — INSULIN GLARGINE 100 [IU]/ML
20 INJECTION, SOLUTION SUBCUTANEOUS
Status: DISCONTINUED | OUTPATIENT
Start: 2018-05-12 | End: 2018-05-15 | Stop reason: HOSPADM

## 2018-05-12 RX ORDER — ASPIRIN 81 MG/1
81 TABLET ORAL DAILY
Status: DISCONTINUED | OUTPATIENT
Start: 2018-05-12 | End: 2018-05-15 | Stop reason: HOSPADM

## 2018-05-12 RX ORDER — ATORVASTATIN CALCIUM 40 MG/1
40 TABLET, FILM COATED ORAL
Status: DISCONTINUED | OUTPATIENT
Start: 2018-05-12 | End: 2018-05-15 | Stop reason: HOSPADM

## 2018-05-12 RX ORDER — MORPHINE SULFATE 2 MG/ML
2 INJECTION, SOLUTION INTRAMUSCULAR; INTRAVENOUS ONCE
Status: COMPLETED | OUTPATIENT
Start: 2018-05-12 | End: 2018-05-12

## 2018-05-12 RX ORDER — ALBUTEROL SULFATE 2.5 MG/3ML
2.5 SOLUTION RESPIRATORY (INHALATION) EVERY 4 HOURS PRN
Status: DISCONTINUED | OUTPATIENT
Start: 2018-05-12 | End: 2018-05-15 | Stop reason: HOSPADM

## 2018-05-12 RX ORDER — TAMSULOSIN HYDROCHLORIDE 0.4 MG/1
0.4 CAPSULE ORAL
Status: DISCONTINUED | OUTPATIENT
Start: 2018-05-12 | End: 2018-05-15 | Stop reason: HOSPADM

## 2018-05-12 RX ORDER — ALBUTEROL SULFATE 90 UG/1
2 AEROSOL, METERED RESPIRATORY (INHALATION) 4 TIMES DAILY
Status: DISCONTINUED | OUTPATIENT
Start: 2018-05-12 | End: 2018-05-15 | Stop reason: HOSPADM

## 2018-05-12 RX ORDER — PANTOPRAZOLE SODIUM 40 MG/1
40 TABLET, DELAYED RELEASE ORAL DAILY
Status: DISCONTINUED | OUTPATIENT
Start: 2018-05-13 | End: 2018-05-15 | Stop reason: HOSPADM

## 2018-05-12 RX ORDER — PREGABALIN 100 MG/1
100 CAPSULE ORAL 3 TIMES DAILY
Status: DISCONTINUED | OUTPATIENT
Start: 2018-05-12 | End: 2018-05-12

## 2018-05-12 RX ORDER — PREGABALIN 100 MG/1
100 CAPSULE ORAL 3 TIMES DAILY
Status: DISCONTINUED | OUTPATIENT
Start: 2018-05-12 | End: 2018-05-15 | Stop reason: HOSPADM

## 2018-05-12 RX ORDER — KETOROLAC TROMETHAMINE 30 MG/ML
15 INJECTION, SOLUTION INTRAMUSCULAR; INTRAVENOUS ONCE
Status: COMPLETED | OUTPATIENT
Start: 2018-05-12 | End: 2018-05-12

## 2018-05-12 RX ORDER — CLOPIDOGREL BISULFATE 75 MG/1
75 TABLET ORAL DAILY
Status: DISCONTINUED | OUTPATIENT
Start: 2018-05-12 | End: 2018-05-15 | Stop reason: HOSPADM

## 2018-05-12 RX ORDER — FERROUS SULFATE 325(65) MG
325 TABLET ORAL
Status: DISCONTINUED | OUTPATIENT
Start: 2018-05-13 | End: 2018-05-15 | Stop reason: HOSPADM

## 2018-05-12 RX ORDER — HEPARIN SODIUM 5000 [USP'U]/ML
5000 INJECTION, SOLUTION INTRAVENOUS; SUBCUTANEOUS EVERY 8 HOURS SCHEDULED
Status: DISCONTINUED | OUTPATIENT
Start: 2018-05-12 | End: 2018-05-15 | Stop reason: HOSPADM

## 2018-05-12 RX ORDER — METOPROLOL TARTRATE 50 MG/1
50 TABLET, FILM COATED ORAL EVERY 12 HOURS SCHEDULED
Status: DISCONTINUED | OUTPATIENT
Start: 2018-05-12 | End: 2018-05-15 | Stop reason: HOSPADM

## 2018-05-12 RX ORDER — OXYCODONE HYDROCHLORIDE AND ACETAMINOPHEN 5; 325 MG/1; MG/1
1 TABLET ORAL EVERY 6 HOURS PRN
Status: DISCONTINUED | OUTPATIENT
Start: 2018-05-12 | End: 2018-05-15 | Stop reason: HOSPADM

## 2018-05-12 RX ORDER — ALBUTEROL SULFATE 90 UG/1
2 AEROSOL, METERED RESPIRATORY (INHALATION) DAILY
Status: DISCONTINUED | OUTPATIENT
Start: 2018-05-12 | End: 2018-05-12

## 2018-05-12 RX ORDER — NITROGLYCERIN 0.4 MG/1
0.4 TABLET SUBLINGUAL
Status: DISCONTINUED | OUTPATIENT
Start: 2018-05-12 | End: 2018-05-15 | Stop reason: HOSPADM

## 2018-05-12 RX ORDER — ONDANSETRON 2 MG/ML
4 INJECTION INTRAMUSCULAR; INTRAVENOUS ONCE
Status: COMPLETED | OUTPATIENT
Start: 2018-05-12 | End: 2018-05-12

## 2018-05-12 RX ADMIN — ASPIRIN 81 MG: 81 TABLET, COATED ORAL at 15:55

## 2018-05-12 RX ADMIN — FLUTICASONE PROPIONATE AND SALMETEROL 1 PUFF: 50; 250 POWDER RESPIRATORY (INHALATION) at 21:18

## 2018-05-12 RX ADMIN — SODIUM CHLORIDE 1000 ML: 0.9 INJECTION, SOLUTION INTRAVENOUS at 10:17

## 2018-05-12 RX ADMIN — PREGABALIN 100 MG: 100 CAPSULE ORAL at 15:55

## 2018-05-12 RX ADMIN — MORPHINE SULFATE 2 MG: 2 INJECTION, SOLUTION INTRAMUSCULAR; INTRAVENOUS at 10:16

## 2018-05-12 RX ADMIN — SODIUM CHLORIDE 1000 ML: 0.9 INJECTION, SOLUTION INTRAVENOUS at 13:40

## 2018-05-12 RX ADMIN — IOHEXOL 100 ML: 350 INJECTION, SOLUTION INTRAVENOUS at 10:58

## 2018-05-12 RX ADMIN — INSULIN GLARGINE 20 UNITS: 100 INJECTION, SOLUTION SUBCUTANEOUS at 22:27

## 2018-05-12 RX ADMIN — ONDANSETRON 4 MG: 2 INJECTION INTRAMUSCULAR; INTRAVENOUS at 10:16

## 2018-05-12 RX ADMIN — HEPARIN SODIUM 5000 UNITS: 5000 INJECTION, SOLUTION INTRAVENOUS; SUBCUTANEOUS at 15:54

## 2018-05-12 RX ADMIN — INSULIN LISPRO 4 UNITS: 100 INJECTION, SOLUTION INTRAVENOUS; SUBCUTANEOUS at 21:35

## 2018-05-12 RX ADMIN — ALBUTEROL SULFATE 2 PUFF: 90 AEROSOL, METERED RESPIRATORY (INHALATION) at 21:15

## 2018-05-12 RX ADMIN — HEPARIN SODIUM 5000 UNITS: 5000 INJECTION, SOLUTION INTRAVENOUS; SUBCUTANEOUS at 21:18

## 2018-05-12 RX ADMIN — SODIUM CHLORIDE 1000 ML: 0.9 INJECTION, SOLUTION INTRAVENOUS at 12:41

## 2018-05-12 RX ADMIN — OXYCODONE HYDROCHLORIDE AND ACETAMINOPHEN 1 TABLET: 5; 325 TABLET ORAL at 15:55

## 2018-05-12 RX ADMIN — OXYCODONE HYDROCHLORIDE AND ACETAMINOPHEN 1 TABLET: 5; 325 TABLET ORAL at 22:27

## 2018-05-12 RX ADMIN — ATORVASTATIN CALCIUM 40 MG: 40 TABLET, FILM COATED ORAL at 15:55

## 2018-05-12 RX ADMIN — TAMSULOSIN HYDROCHLORIDE 0.4 MG: 0.4 CAPSULE ORAL at 15:55

## 2018-05-12 RX ADMIN — CLOPIDOGREL BISULFATE 75 MG: 75 TABLET ORAL at 15:55

## 2018-05-12 RX ADMIN — METOPROLOL TARTRATE 50 MG: 50 TABLET ORAL at 21:15

## 2018-05-12 RX ADMIN — PREGABALIN 100 MG: 100 CAPSULE ORAL at 21:15

## 2018-05-12 RX ADMIN — KETOROLAC TROMETHAMINE 15 MG: 30 INJECTION, SOLUTION INTRAMUSCULAR at 12:39

## 2018-05-12 NOTE — PLAN OF CARE
Problem: SAFETY ADULT  Goal: Patient will remain free of falls  INTERVENTIONS:  - Assess patient frequently for physical needs  -  Identify cognitive and physical deficits and behaviors that affect risk of falls    -  [place] fall precautions as indicated by assessment   - Educate patient/family on patient safety including physical limitations  - Instruct patient to call for assistance with activity based on assessment  - Modify environment to reduce risk of injury  - Consider OT/PT consult to assist with strengthening/mobility  Outcome: Progressing

## 2018-05-12 NOTE — PROGRESS NOTES
[facility]mission Note   Unit/Bed # @DBLINK (SEVEN,76500)@ Encounter: [id number]  SOD Team C           Doyce Kussmaul  79 y o  male [id number]       Patient seen and examined  Reviewed H&P per Dr Jojo Dyer  Agree with the assessment and plan except NA  Assessment/Plan: Principal Problem:    Chest tightness, acs rule out    Active Problems:    Abdominal pain, intactable    Type 2 diabetes mellitus with diabetic neuropathy, with long-term current use of insulin (HCC)    Essential hypertension    GERD (gastroesophageal reflux disease)    Hyperlipidemia    Opioid dependence (HCC)    Generalized abdominal pain    Abdominal aortic aneurysm (AAA) without rupture (HCC)    Chronic diastolic CHF (congestive heart failure) (HCC)    COPD (chronic obstructive pulmonary disease) ([facility])    Coronary artery disease of native artery of native heart with stable angina pectoris [facility])    59-year-old male past medical history of diabetes type 2 coronary artery disease with CABG, who presented once again complaint generalized abdominal pain and chest tightness  Patient has multiple admissions for abdominal pain past couple months  Extensive workup done without clear etiology so far  His diabetes is rather a poor control also shown per A1c 10 7  Vitals remain stable at this point blood pressure 169/79 heart rate 70  Temperature 97 6  Blood work remarkable for lactic acid 2 4, unremarkable CBC and BMP  The lipase 64 1st troponin was negative around 10 in the morning  Plan    1  Chest tightness  Will continue to trend troponins  Continue on telemetry monitoring  EKG if any Sx of chest pain    2  Intractable abdominal pain  Differential diagnosis includes chronic mesenteric ischemia versus gallbladder pathology  Right upper quadrant ultrasound:  Cholelithiasis with apparent positive sonographic Mortensen's sign (subjective finding)  No wall thickening or pericholecystic fluid    No evidence for acute cholecystitis was seen on CT study earlier today   Abdomen CT: Cholelithiasis  No wall thickening or pericholecystic inflammation  Unfortunately at this time, patient is not a candidate for CT angiogram, because he received contrast early on  Will proceed with symptomatic approached his pain, continue to trend lactic acid, lactic acid continued to keep trending up will further consider surgical evaluation              Disposition:  OBSERVATION    Expected LOS: <2 82 Meryl Romero DO

## 2018-05-12 NOTE — ED ATTENDING ATTESTATION
Gretel Eastman MD, saw and evaluated the patient  I have discussed the patient with the resident/non-physician practitioner and agree with the resident's/non-physician practitioner's findings, Plan of Care, and MDM as documented in the resident's/non-physician practitioner's note, except where noted  All available labs and Radiology studies were reviewed  At this point I agree with the current assessment done in the Emergency Department  I have conducted an independent evaluation of this patient a history and physical is as follows:  Patient here with abdominal pain, chest pain, shortness of breath, lower extremity pain  Patient with similar complaints with recent admission, just discharged 2 days ago  Patient is a poor historian  He describes chest tightness  He states that his abdomen hurts throughout and states that he is nauseated  The patient states that he has some dyspnea which is worse on exertion  He has not had fevers or chills  He has not had diarrhea  The patient states that he gets swelling in his feet, although not in his legs or ankles  The patient's recent hospitalization resulted in him being treated for urinary tract infection  During his stay, the patient had an ultrasound showing gallstones, and also had a full medical evaluation  Patient states that he is feeling worse now  His review of systems otherwise negative in 12 systems were reviewed  On exam Vital signs were reviewed  Patient is awake, alert, interactive  The patient's pupils are equally round reactive to light  Oropharynx is clear with moist mucous membranes  Neck is supple and nontender with no adenopathy or JVD  Heart is regular with no murmurs, rubs, or gallops  Lungs are clear and equal with no wheezes, rales, or rhonchi  Abdomen is diffusely tender r with no masses, rebound, or guarding  The patient grabs my arm during palpation of his abdomen  There is no CVA tenderness    The patient was completely exposed  There is no skin breakdown  There are no rashes or skin changes  Extremities are warm and well perfused with good pulses  The patient has normal strength, sensation, and cranial nerves  Impression:  Recurrent abdominal pain, chest pain, shortness of breath  Will plan to do cardiac evaluation, CT abdomen, likely ultrasound for gallbladder as well      Critical Care Time  CritCare Time    Procedures
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
## 2018-05-13 ENCOUNTER — APPOINTMENT (OUTPATIENT)
Dept: RADIOLOGY | Facility: HOSPITAL | Age: 68
DRG: 392 | End: 2018-05-13
Payer: COMMERCIAL

## 2018-05-13 LAB
ANION GAP SERPL CALCULATED.3IONS-SCNC: 4 MMOL/L (ref 4–13)
BUN SERPL-MCNC: 20 MG/DL (ref 5–25)
CALCIUM SERPL-MCNC: 8.6 MG/DL (ref 8.3–10.1)
CHLORIDE SERPL-SCNC: 108 MMOL/L (ref 100–108)
CO2 SERPL-SCNC: 26 MMOL/L (ref 21–32)
CREAT SERPL-MCNC: 1.06 MG/DL (ref 0.6–1.3)
ERYTHROCYTE [DISTWIDTH] IN BLOOD BY AUTOMATED COUNT: 23.8 % (ref 11.6–15.1)
GFR SERPL CREATININE-BSD FRML MDRD: 72 ML/MIN/1.73SQ M
GLUCOSE SERPL-MCNC: 191 MG/DL (ref 65–140)
GLUCOSE SERPL-MCNC: 196 MG/DL (ref 65–140)
GLUCOSE SERPL-MCNC: 214 MG/DL (ref 65–140)
GLUCOSE SERPL-MCNC: 250 MG/DL (ref 65–140)
GLUCOSE SERPL-MCNC: 257 MG/DL (ref 65–140)
HCT VFR BLD AUTO: 37.1 % (ref 36.5–49.3)
HGB BLD-MCNC: 11.1 G/DL (ref 12–17)
LACTATE SERPL-SCNC: 1.4 MMOL/L (ref 0.5–2)
MCH RBC QN AUTO: 24.8 PG (ref 26.8–34.3)
MCHC RBC AUTO-ENTMCNC: 29.9 G/DL (ref 31.4–37.4)
MCV RBC AUTO: 83 FL (ref 82–98)
PLATELET # BLD AUTO: 252 THOUSANDS/UL (ref 149–390)
PMV BLD AUTO: 9.8 FL (ref 8.9–12.7)
POTASSIUM SERPL-SCNC: 4.3 MMOL/L (ref 3.5–5.3)
RBC # BLD AUTO: 4.47 MILLION/UL (ref 3.88–5.62)
SODIUM SERPL-SCNC: 138 MMOL/L (ref 136–145)
WBC # BLD AUTO: 7.67 THOUSAND/UL (ref 4.31–10.16)

## 2018-05-13 PROCEDURE — 99232 SBSQ HOSP IP/OBS MODERATE 35: CPT | Performed by: INTERNAL MEDICINE

## 2018-05-13 PROCEDURE — 82948 REAGENT STRIP/BLOOD GLUCOSE: CPT

## 2018-05-13 PROCEDURE — 80048 BASIC METABOLIC PNL TOTAL CA: CPT | Performed by: INTERNAL MEDICINE

## 2018-05-13 PROCEDURE — 85027 COMPLETE CBC AUTOMATED: CPT | Performed by: INTERNAL MEDICINE

## 2018-05-13 PROCEDURE — 83605 ASSAY OF LACTIC ACID: CPT | Performed by: INTERNAL MEDICINE

## 2018-05-13 PROCEDURE — 74175 CTA ABDOMEN W/CONTRAST: CPT

## 2018-05-13 RX ORDER — LORAZEPAM 2 MG/ML
0.5 INJECTION INTRAMUSCULAR ONCE
Status: COMPLETED | OUTPATIENT
Start: 2018-05-13 | End: 2018-05-13

## 2018-05-13 RX ORDER — BENZONATATE 100 MG/1
100 CAPSULE ORAL 3 TIMES DAILY PRN
Status: DISCONTINUED | OUTPATIENT
Start: 2018-05-13 | End: 2018-05-15 | Stop reason: HOSPADM

## 2018-05-13 RX ORDER — POLYETHYLENE GLYCOL 3350 17 G/17G
17 POWDER, FOR SOLUTION ORAL DAILY
Status: DISCONTINUED | OUTPATIENT
Start: 2018-05-14 | End: 2018-05-15 | Stop reason: HOSPADM

## 2018-05-13 RX ORDER — POLYETHYLENE GLYCOL 3350 17 G/17G
17 POWDER, FOR SOLUTION ORAL DAILY PRN
Status: DISCONTINUED | OUTPATIENT
Start: 2018-05-13 | End: 2018-05-13

## 2018-05-13 RX ORDER — ONDANSETRON 2 MG/ML
4 INJECTION INTRAMUSCULAR; INTRAVENOUS ONCE
Status: COMPLETED | OUTPATIENT
Start: 2018-05-13 | End: 2018-05-13

## 2018-05-13 RX ORDER — ONDANSETRON 4 MG/1
4 TABLET, ORALLY DISINTEGRATING ORAL EVERY 6 HOURS PRN
Status: DISCONTINUED | OUTPATIENT
Start: 2018-05-13 | End: 2018-05-13

## 2018-05-13 RX ORDER — LACTULOSE 20 G/30ML
20 SOLUTION ORAL 2 TIMES DAILY
Status: DISCONTINUED | OUTPATIENT
Start: 2018-05-13 | End: 2018-05-15 | Stop reason: HOSPADM

## 2018-05-13 RX ORDER — BISACODYL 10 MG
10 SUPPOSITORY, RECTAL RECTAL DAILY
Status: DISCONTINUED | OUTPATIENT
Start: 2018-05-14 | End: 2018-05-15 | Stop reason: HOSPADM

## 2018-05-13 RX ADMIN — ALBUTEROL SULFATE 2 PUFF: 90 AEROSOL, METERED RESPIRATORY (INHALATION) at 21:22

## 2018-05-13 RX ADMIN — ALBUTEROL SULFATE 2 PUFF: 90 AEROSOL, METERED RESPIRATORY (INHALATION) at 08:41

## 2018-05-13 RX ADMIN — HEPARIN SODIUM 5000 UNITS: 5000 INJECTION, SOLUTION INTRAVENOUS; SUBCUTANEOUS at 21:21

## 2018-05-13 RX ADMIN — INSULIN GLARGINE 20 UNITS: 100 INJECTION, SOLUTION SUBCUTANEOUS at 21:21

## 2018-05-13 RX ADMIN — OXYCODONE HYDROCHLORIDE AND ACETAMINOPHEN 1 TABLET: 5; 325 TABLET ORAL at 19:40

## 2018-05-13 RX ADMIN — Medication 325 MG: at 08:39

## 2018-05-13 RX ADMIN — LACTULOSE 20 G: 20 SOLUTION ORAL at 19:37

## 2018-05-13 RX ADMIN — TAMSULOSIN HYDROCHLORIDE 0.4 MG: 0.4 CAPSULE ORAL at 16:43

## 2018-05-13 RX ADMIN — OXYCODONE HYDROCHLORIDE AND ACETAMINOPHEN 1 TABLET: 5; 325 TABLET ORAL at 08:39

## 2018-05-13 RX ADMIN — ONDANSETRON 4 MG: 4 TABLET, ORALLY DISINTEGRATING ORAL at 10:11

## 2018-05-13 RX ADMIN — METOPROLOL TARTRATE 50 MG: 50 TABLET ORAL at 08:39

## 2018-05-13 RX ADMIN — PREGABALIN 100 MG: 100 CAPSULE ORAL at 08:39

## 2018-05-13 RX ADMIN — METOPROLOL TARTRATE 50 MG: 50 TABLET ORAL at 21:21

## 2018-05-13 RX ADMIN — INSULIN LISPRO 9 UNITS: 100 INJECTION, SOLUTION INTRAVENOUS; SUBCUTANEOUS at 11:37

## 2018-05-13 RX ADMIN — ASPIRIN 81 MG: 81 TABLET, COATED ORAL at 08:39

## 2018-05-13 RX ADMIN — IOHEXOL 100 ML: 350 INJECTION, SOLUTION INTRAVENOUS at 15:33

## 2018-05-13 RX ADMIN — LORAZEPAM 0.5 MG: 2 INJECTION INTRAMUSCULAR; INTRAVENOUS at 15:07

## 2018-05-13 RX ADMIN — FLUTICASONE PROPIONATE AND SALMETEROL 1 PUFF: 50; 250 POWDER RESPIRATORY (INHALATION) at 21:22

## 2018-05-13 RX ADMIN — INSULIN LISPRO 9 UNITS: 100 INJECTION, SOLUTION INTRAVENOUS; SUBCUTANEOUS at 08:41

## 2018-05-13 RX ADMIN — POLYETHYLENE GLYCOL 3350 17 G: 17 POWDER, FOR SOLUTION ORAL at 13:58

## 2018-05-13 RX ADMIN — PANTOPRAZOLE SODIUM 40 MG: 40 TABLET, DELAYED RELEASE ORAL at 08:42

## 2018-05-13 RX ADMIN — ONDANSETRON 4 MG: 2 INJECTION INTRAMUSCULAR; INTRAVENOUS at 18:34

## 2018-05-13 RX ADMIN — ONDANSETRON 4 MG: 4 TABLET, ORALLY DISINTEGRATING ORAL at 17:06

## 2018-05-13 RX ADMIN — HEPARIN SODIUM 5000 UNITS: 5000 INJECTION, SOLUTION INTRAVENOUS; SUBCUTANEOUS at 06:17

## 2018-05-13 RX ADMIN — FLUTICASONE PROPIONATE AND SALMETEROL 1 PUFF: 50; 250 POWDER RESPIRATORY (INHALATION) at 08:41

## 2018-05-13 RX ADMIN — PREGABALIN 100 MG: 100 CAPSULE ORAL at 21:22

## 2018-05-13 RX ADMIN — CLOPIDOGREL BISULFATE 75 MG: 75 TABLET ORAL at 08:39

## 2018-05-13 RX ADMIN — PREGABALIN 100 MG: 100 CAPSULE ORAL at 16:43

## 2018-05-13 RX ADMIN — BISACODYL 10 MG: 5 TABLET, COATED ORAL at 08:42

## 2018-05-13 RX ADMIN — HEPARIN SODIUM 5000 UNITS: 5000 INJECTION, SOLUTION INTRAVENOUS; SUBCUTANEOUS at 13:12

## 2018-05-13 RX ADMIN — ALBUTEROL SULFATE 2 PUFF: 90 AEROSOL, METERED RESPIRATORY (INHALATION) at 11:37

## 2018-05-13 RX ADMIN — ATORVASTATIN CALCIUM 40 MG: 40 TABLET, FILM COATED ORAL at 16:43

## 2018-05-13 NOTE — SOCIAL WORK
Met with pt and explained CM role  Pt lives alone in a 2nd floor apt with elevator access  Pt was independent PTA and does not drive  Pt uses [employer] for transportation  Pt's PCP is Patti Stacy with [facility]  Pt has a walker, cane, and w/c at home  Pt is open to  VNA for RN, but no hx of rehab  No hx of mental health issues or drug or alcohol use  Pt has a prescription plan and uses [facility] in [place] for his prescriptions  Primary contact is pt's brother Iona Dubose, contact # [phone]  Pt is unsure of his transportation home upon discharge  Pt does not have  POA  Pt would like to continue with  VNA upon discharge  [facility] referral sent for same  CM reviewed d/c planning process including the following: identifying help at home, patient preference for d/c planning needs, Discharge Lounge, [facility] Meds to Bed program, availability of treatment team to discuss questions or concerns patient and/or family may have regarding understanding medications and recognizing signs and symptoms once discharged  CM also encouraged patient to follow up with all recommended appointments after discharge  Patient advised of importance for patient and family to participate in managing patients medical well being  Patient/caregiver received discharge checklist  Content reviewed  Patient/caregiver encouraged to participate in discharge plan of care prior to discharge home

## 2018-05-13 NOTE — PROGRESS NOTES
Patient with vomiting at this time on two separate occasions  Large amounts of yellow/green with some undigested food  Charted appropriately  Zofran tablet given at this time as CTA results are still pending  Radiology room aware of need for official read  If unable to tolerate ordered PO zofran will page SOD for IV zofran orders  Dinner tray being held at this time per SOD request, as well as mealtime insulin  Patient

## 2018-05-13 NOTE — CASE MANAGEMENT
Initial Clinical Review    Thank you,  [address] [facility] in the [facility] by Jamil Henson for 2017  Network Utilization Review Department  Phone: [phone]; Fax [phone]  ATTENTION: The Network Utilization Review Department is now centralized for our 7 Facilities  Make a note that we have a new phone and fax numbers for our Department  Please call with any questions or concerns to [phone] and carefully follow the prompts so that you are directed to the right person  All voicemails are confidential  Fax any determinations, approvals, denials, and requests for initial or continue stay review clinical to [phone]  Due to HIGH CALL volume, it would be easier if you could please send faxed requests to expedite your requests and in part, help us provide discharge notifications faster  Admission: Date/Time/Statement: 05/12/18 @ 1313 -- OBS -- upgraded to INPATIENT 5/13/18 @ 1024 d/t continued severe abdominal pain    Start   Ordered   05/13/18 1024  Inpatient Admission Once     Transfer Service: General Medicine       Question Answer Comment   Admitting Physician Ewa Mesa    Level of Care Med Surg    Estimated length of stay More than 2 Midnights    Certification I certify that inpatient services are medically necessary for this patient for a duration of greater than two midnights  See H&P and MD Progress Notes for additional information about the patient's course of treatment  05/13/18        ED: Date/Time/Mode of Arrival:   ED Arrival Information     Expected Arrival Acuity Means of Arrival Escorted By Service Admission Type    - 5/12/2018 08:24 [facility] General Medicine Urgent    Arrival Complaint    -          Chief Complaint:   Chief Complaint   Patient presents with    Abdominal Pain     Patient presents to the E R  with abdominal, chest and leg pain  Patient "left the hospital last week    They wanted me to stay and I wanted to get home "       History of Illness: 79 y o  male with a PMHx of HTN, HLD, IDDM, CAD w multivessel disease presenting with abdominal pain  Patient states he has been experiencing these symptoms for the last week and has been progressively getting worse  He states that the pain is diffuse, both in the right and left side of the abdomen  No association with diet  Symptoms worse with specific positioning  No changes in bowel habits, no diarrhea, constipation, nausea, vomiting  However, the patient said that he has not felt hungry secondary to the abdominal pain  He also experiences some vague chest discomfort, but states that this has improved significantly since his last admission  The chest discomfort is described as a tightness, without any radiation or associated symptoms        ED Vital Signs:   ED Triage Vitals [05/12/18 0854]   Temperature Pulse Respirations Blood Pressure SpO2   97 6 °F (36 4 °C) 85 18 165/77 96 %      Temp Source Heart Rate Source Patient Position - Orthostatic VS BP Location FiO2 (%)   Oral Monitor Lying Left arm --      Pain Score       9        Wt Readings from Last 1 Encounters:   05/13/18 114 kg (252 lb)       Vital Signs (abnormal):  WNL    Abnormal Labs/Diagnostic Test Results: , glucose 289, Lipase 64, lactic acid 2 4 troponin neg  UA -- 5-- glucose, 1+ protein    EKG -- NSR, nonspecific ST change  CXR -- improving vascular congestion  CT a/p -- showed bibasilar tree-in-bud nodules, hepatic steatosis, cholelithiasis  US RUQ -- cholelithiasis with positive sonographic Mortensen sign, no wall thickening or pericholecystic fluid, enlarged fatty liver       ED Treatment:   Medication Administration from 05/12/2018 0824 to 05/12/2018 1434       Date/Time Order Dose Route Action Action by Comments     05/12/2018 1017 sodium chloride 0 9 % bolus 1,000 mL 1,000 mL Intravenous New Bag Royce Centeno RN      05/12/2018 1016 morphine injection 2 mg 2 mg Intravenous Given Jacques Holland RN      05/12/2018 1016 ondansetron (ZOFRAN) injection 4 mg 4 mg Intravenous Given Jacques Holland RN      05/12/2018 1058 iohexol (OMNIPAQUE) 350 MG/ML injection (MULTI-DOSE) 100 mL 100 mL Intravenous Given Rubénjada ROBBY Michael      05/12/2018 1239 ketorolac (TORADOL) injection 15 mg 15 mg Intravenous Given Lea Comer RN      35/20/0414 1241 sodium chloride 0 9 % bolus 1,000 mL 1,000 mL Intravenous New Bag Lea Cancer, [facility]      57/75/2371 1340 sodium chloride 0 9 % bolus 1,000 mL 1,000 mL Intravenous New Bag Lea Cancer, RN           Past Medical/Surgical History:   Past Medical History:   Diagnosis Date    CAD (coronary artery disease)     Chronic pain     [facility]ch[facility] obstructive pulmonary disease) [facility]     DM type 2 with diabetic peripheral neuropathy ([facility] )     Former tobacco use     GERD (gastroesophageal reflux disease)     H/O acute myocardial infarction     H/O: pneumonia     History of aspiration pneumonia     History of suicidal ideation     HLD (hyperlipidemia)     Hypertension     [facility]bar transverse process fracture [facility] ) 01/2018    MDD (major depressive disorder)     Non-alcoholic fatty liver disease     Opioid dependence ([facility] )     PTSD (post-traumatic stress disorder)        Admitting Diagnosis: Cholelithiasis [K80 20]  Abdominal pain [R10 9]  Elevated serum lactate dehydrogenase [R74 0]    Age/Sex: 79 y o  male    Assessment/Plan:   Intractable Abdominal Pain  Differential is wide including mesenteric ischemia, biliary colic, coronary disease  Notably, the patient came in with lactic acid of 2 4  He was given IV fluids and repeat lactate was normal at 1 3      My index of suspicion is high for mesenteric ischemia in light of patient's being a vasculopath and diabetic  Patient needs imaging of the mesenteric arteries, however due to body habitus he will likely not be a good candidate for Doppler    Notably, the patient did have a CT scan with contrast done on day of admission, but he will benefit from a CTA to further evaluate his mesenteric vessels       It may be appropriate to touch base with Radiology to determine whether the patient may benefit from a Doppler study and whether this CT with contrast is sufficient for imaging      In addition, ultrasound once again shows gallstones without evidence of cholecystitis  However, due to patient's multiple admissions and persistent right upper quadrant pain, he will be a good candidate for a HIDA scan to evaluate for biliary disease      Chest pain:  Secondary to patient's documented multivessel disease and diabetes, he could be presenting atypically for acute coronary syndrome  Initial 2 troponins were negative, will obtain 1 more to confirm  Start telemetry monitoring  Patient on Plavix and aspirin, Lipitor 40 mg, metoprolol 50 mg b i d      CAD with multivessel disease  Patient has been evaluated by cardiothoracic surgery  He has been recommended to undergo CABG, but he he has continued to refuse surgery  He is on appropriate medical therapy as noted above with aspirin, Plavix, high-dose statin and beta-blocker      To me, the patient states that he still has not decided whether he wishes to undergo CABG  It will be appropriate to discuss with the patient again during this admission whether not he has decided to undergo CABG  If so, consult to cardiothoracic surgery would be appropriate       Insulin-dependent diabetes mellitus  Continue home Lantus 20 q h s  with correctional insulin  Notably, the patient's hemoglobin A1c was 10 7 on 03/28/2018  He certainly needs better titration of his insulin  Will monitor blood sugars here and adjust insulin dose as necessary      Diabetic neuropathy  Resulting in lower extremity pain and autonomic dysfunction of bladder  Patient has been prescribed Lyrica for pain and tamsulosin for urinary dysfunction    Will continue while inpatient      Iron deficiency anemia  Hemoglobin 13 4  Continue home dose of ferrous sulfate 325 mg     COPD  Stable  Continue home dose of Advair and albuterol       Admission Orders:  M/S/Tele unit  Telem  Troponin q3h  Nuclear stress test  Cons carb diet  Fingerstick glucose checks qid w/ ssi  Monitor I&O's  SCD's  Up & oob as tolerated  f/u CT a/p     Scheduled Meds:   Current Facility-Administered Medications:  albuterol 2 puff Inhalation 4x Daily Sammy Cody MD   albuterol 2 5 mg Nebulization Q4H PRN Ted Rivera, DO   aspirin 81 mg Oral Daily Ted Rivera, DO   atorvastatin 40 mg Oral Daily With [employer], DO   clopidogrel 75 mg Oral Daily Ted Rivera, DO   ferrous sulfate 325 mg Oral Daily With Breakfast Ted Rivera, DO   fluticasone-salmeterol 1 puff Inhalation Q12H [facility] Romana, DO   heparin (porcine) 5,000 Units Subcutaneous [facility] Romana, DO   insulin glargine 20 Units Subcutaneous HS Ted Rivera, DO   insulin lispro 9 Units Subcutaneous TID AC Ted Rivera, DO   metoprolol tartrate 50 mg Oral Q12H [facility] Ted Rivera, DO   nitroglycerin 0 4 mg Sublingual Q5 Min PRN [facility], [facility]   oxyCODONE-acetaminophen 1 tablet Oral Q6H PRN [facility] Coral, DO   pantoprazole 40 mg Oral Daily Ted Miguel, DO   polyethylene glycol 17 g Oral Daily PRN Tavon Hurtado, DO   pregabalin 100 mg Oral TID Stacy Bonilla MD   tamsulosin 0 4 mg Oral Daily With Dinner [facility] Rom[facility], DO     Continuous Infusions:    PRN Meds: albuterol    nitroglycerin    oxyCODONE-acetaminophen    polyethylene glycol      5/13 --- pt  Continues to c/o 10/10 abdominal pain; then had vomiting of large amounts of yellow/green with some undigested food  CT a/p 5/13 -- 1  Extensive atherosclerotic plaque without evidence of abdominal aortic aneurysm or dissection  No evidence of significant stenosis, thrombus or occlusion within mesenteric arteries    2   Moderate to large amount of stool in the colon may indicate constipation  No evidence of bowel obstruction or visualized colitis    3   Cholelithiasis without evidence of cholecystitis or biliary obstruction    5/13 -- Zofran 4 mg po 5/12 x2, 5/13 x2, percocet 1 tab 5/12 x2  513 x2, miralax po 5/13  /81, 171/76     5/14 -- WBC 19 40

## 2018-05-13 NOTE — PROGRESS NOTES
SOD paged due to patients abdominal pain  On chart review, patient presents with 1 week generalized abdominal pain not associated with food sometimes associated with position  SOD working up concern for mesenteric ischemia versus biliary colic versus constipation  CT scan will abdomen with contrast and HIDA scan ordered  On presentation to room, patient moaning  pointing to his left lower quadrant asking for pain medication to sooth the pain  He reports that he has not gone to the bathroom in over 3 days and feels constipated  On chart review, patient has received 3 doses of Percocet throughout hospital stay  He received MiraLax 1 hour prior to examination with no passage of stool  He he notes he is passing gas  On exam bowel sounds are present abdomen is slightly distended but soft, no rigidity or rebound present  Tenderness most in left lower quadrant  Concern at this time still for mesenteric ischemia versus constipation  Advised patient that further treatment for his pain and for constipation must be delayed at this time until CT results can confirm etiology of his pain  As patient is very anxious and unsure if he will be able to complete CT scan, 0 5 mg IV Ativan to give prior to CT scan  Will follow up imaging and progression

## 2018-05-13 NOTE — PROGRESS NOTES
IM Residency Progress Note   Unit/Bed#: CW2 210-01 Encounter: [id number]  TUNDE Martinez  79 y o  male [id number]    Assessment/Plan:    Principal Problem:    Chest tightness, acs rule out  Active Problems:    Type 2 diabetes mellitus with diabetic neuropathy, with long-term current use of insulin (HCC)    Essential hypertension    GERD (gastroesophageal reflux disease)    Hyperlipidemia    Opioid dependence (HCC)    Generalized abdominal pain    Abdominal aortic aneurysm (AAA) without rupture (HCC)    Chronic diastolic CHF (congestive heart failure) (HCC)    COPD (chronic obstructive pulmonary disease) (HCC)    Coronary artery disease of native artery of native heart with stable angina pectoris (HCC)    Abdominal pain, intactable    Generalized abdominal pain:  This concern for underlying mesenteric ischemia versus biliary colic    -follow-up CT abdomen and pelvis   -HIDA scan has been ordered   Will obtain lactic acid      Abnormal CT of the chest:  Patient does have a history of abnormal CT chest   He will need outpatient follow-up of the CT chest      Chest pain: troponin negative  -he was recommended to have CABG in the past for his CAD but patient refused  DM2: tight BG control  -BG elevated this morning  Continue Lantus and Lispro  Will trend and if remains elevated will increase Lantus dosing    HLD: continue statin    GERD: continue protonix      COPD: no acute exacerbation  Disposition: F/u CT abd/pel      Subjective:   Patient continues to have lower abdominal pain, he also had some nausea without any vomiting  The pain is not associated with eating or drinking, he has the pain constantly  Denies any recent trauma  Denies any headaches, fevers, chills, his chest pain has resolved  Denies any shortness of breath, he did feel constipated earlier         Vitals: Temp (24hrs), Av 9 °F (36 6 °C), Min:97 8 °F (36 6 °C), Max:98 °F (36 7 °C)  Current: Temperature: 98 °F (36 7 °C)  Vitals:    05/12/18 1900 05/13/18 0534 05/13/18 0730 05/13/18 0839   BP: 168/84  140/67    BP Location: Left arm  Left arm    Pulse: 70  58 67   Resp: 18 18    Temp:   98 °F (36 7 °C)    TempSrc:   Oral    SpO2: 96%  98%    Weight:  114 kg (252 lb)     Height:        Body mass index is 36 16 kg/m²  I/O last 24 hours:   In: 2000 [IV Piggyback:2000]  Out: 825 [Urine:825]      Physical Exam: General appearance: alert, appears stated age, cooperative  Head: Normocephalic, without obvious abnormality, atraumatic  Eyes: EOMI, no icterus  Lungs: clear to auscultation bilaterally, no rales/rhonchi/wheezes  Heart: regular rate and rhythm, S1, S2 normal, no murmur, click, rub or gallop  Abdomen: soft, tenderness to palpation across all 4 quadrants; bowel sounds normal; no masses,  no organomegaly  Extremities: extremities normal, atraumatic, no cyanosis or edema  Neurologic: CN 2-12 intact, 5/5 UE/LE muscle strength, speech fluent    Invasive Devices     Peripheral Intravenous Line            Peripheral IV 05/12/18 Left Antecubital 1 day                          Labs:   Recent Results (from the past 24 hour(s))   POCT urinalysis dipstick    Collection Time: 05/12/18  1:21 PM   Result Value Ref Range    Color, UA see chart    ED Urine Macroscopic    Collection Time: 05/12/18  1:25 PM   Result Value Ref Range    Color, UA Yellow     Clarity, UA Clear     pH, UA 6 5 4 5 - 8 0    Leukocytes, UA Negative Negative    Nitrite, UA Negative Negative    Protein, UA 30 (1+) (A) Negative mg/dl    Glucose,  (1/2%) (A) Negative mg/dl    Ketones, UA Negative Negative mg/dl    Urobilinogen, UA 0 2 0 2, 1 0 E U /dl E U /dl    Bilirubin, UA Negative Negative    Blood, UA Negative Negative    Specific Gravity, UA 1 015 1 003 - 1 030   Urine Microscopic    Collection Time: 05/12/18  1:25 PM   Result Value Ref Range    RBC, UA None Seen None Seen, 0-5 /hpf    WBC, UA 4-10 (A) None Seen, 0-5, 5-55, 5-65 /hpf    Epithelial Cells None Seen None Seen, Occasional /hpf    Bacteria, UA None Seen None Seen, Occasional /hpf    Hyaline Casts, UA None Seen None Seen /lpf   Fingerstick Glucose (POCT)    Collection Time: 05/12/18  4:03 PM   Result Value Ref Range    POC Glucose 204 (H) 65 - 140 mg/dl   Platelet count    Collection Time: 05/12/18  4:26 PM   Result Value Ref Range    Platelets 165 096 - 392 Thousands/uL    MPV 9 5 8 9 - 12 7 fL   Lactic acid, plasma    Collection Time: 05/12/18  4:26 PM   Result Value Ref Range    LACTIC ACID 1 3 0 5 - 2 0 mmol/L   Troponin I    Collection Time: 05/12/18  4:26 PM   Result Value Ref Range    Troponin I <0 02 <=0 04 ng/mL   Fingerstick Glucose (POCT)    Collection Time: 05/12/18  8:55 PM   Result Value Ref Range    POC Glucose 303 (H) 65 - 140 mg/dl   Fingerstick Glucose (POCT)    Collection Time: 05/12/18 10:15 PM   Result Value Ref Range    POC Glucose 286 (H) 65 - 140 mg/dl   Basic metabolic panel    Collection Time: 05/13/18  4:52 AM   Result Value Ref Range    Sodium 138 136 - 145 mmol/L    Potassium 4 3 3 5 - 5 3 mmol/L    Chloride 108 100 - 108 mmol/L    CO2 26 21 - 32 mmol/L    Anion Gap 4 4 - 13 mmol/L    BUN 20 5 - 25 mg/dL    Creatinine 1 06 0 60 - 1 30 mg/dL    Glucose 250 (H) 65 - 140 mg/dL    Calcium 8 6 8 3 - 10 1 mg/dL    eGFR 72 ml/min/1 73sq m   CBC (With Platelets)    Collection Time: 05/13/18  6:04 AM   Result Value Ref Range    WBC 7 67 4 31 - 10 16 Thousand/uL    RBC 4 47 3 88 - 5 62 Million/uL    Hemoglobin 11 1 (L) 12 0 - 17 0 g/dL    Hematocrit 37 1 36 5 - 49 3 %    MCV 83 82 - 98 fL    MCH 24 8 (L) 26 8 - 34 3 pg    MCHC 29 9 (L) 31 4 - 37 4 g/dL    RDW 23 8 (H) 11 6 - 15 1 %    Platelets 638 311 - 032 Thousands/uL    MPV 9 8 8 9 - 12 7 fL   Fingerstick Glucose (POCT)    Collection Time: 05/13/18  6:17 AM   Result Value Ref Range    POC Glucose 257 (H) 65 - 140 mg/dl       Radiology Results: I have personally reviewed pertinent reports          Other Diagnostic Testing:   I have personally reviewed pertinent reports          Active Meds:   Current Facility-Administered Medications   Medication Dose Route Frequency    albuterol (PROVENTIL HFA,VENTOLIN HFA) inhaler 2 puff  2 puff Inhalation 4x Daily    albuterol inhalation solution 2 5 mg  2 5 mg Nebulization Q4H PRN    aspirin (ECOTRIN LOW STRENGTH) EC tablet 81 mg  81 mg Oral Daily    atorvastatin (LIPITOR) tablet 40 mg  40 mg Oral Daily With Dinner    clopidogrel (PLAVIX) tablet 75 mg  75 mg Oral Daily    ferrous sulfate tablet 325 mg  325 mg Oral Daily With Breakfast    fluticasone-salmeterol (ADVAIR) 250-50 mcg/dose inhaler 1 puff  1 puff Inhalation Q12H [facility]    heparin (porcine) subcutaneous injection 5,000 Units  5,000 Units Subcutaneous Q8H [facility]    insulin glargine (LANTUS) subcutaneous injection 20 Units 0 2 mL  20 Units Subcutaneous HS    insulin lispro (HumaLOG) 100 units/mL subcutaneous injection 9 Units  9 Units Subcutaneous TID AC    metoprolol tartrate (LOPRESSOR) tablet 50 mg  50 mg Oral Q12H RHONDA    nitroglycerin (NITROSTAT) SL tablet 0 4 mg  0 4 mg Sublingual Q5 Min PRN    ondansetron (ZOFRAN-ODT) dispersible tablet 4 mg  4 mg Oral Q6H PRN    oxyCODONE-acetaminophen (PERCOCET) 5-325 mg per tablet 1 tablet  1 tablet Oral Q6H PRN    pantoprazole (PROTONIX) EC tablet 40 mg  40 mg Oral Daily    polyethylene glycol (MIRALAX) packet 17 g  17 g Oral Daily PRN    pregabalin (LYRICA) capsule 100 mg  100 mg Oral TID    tamsulosin (FLOMAX) capsule 0 4 mg  0 4 mg Oral Daily With Dinner         VTE Pharmacologic Prophylaxis: Heparin  VTE Mechanical Prophylaxis: sequential compression device    Snehal Kirkpatrick DO

## 2018-05-13 NOTE — PROGRESS NOTES
CT abdomen reveals moderate to large amount of stool in colon that may indicate constipation, no evidence of bowel obstruction or visualize colitis  Will add lactulose, MiraLax, duplex suppository  Patient can resume diet  If still no bowel movement, can order tap water enema  Refrain from opioid therapy as this can worsen constipation

## 2018-05-13 NOTE — PROGRESS NOTES
Patient bladder scanned at this time per SOD request  Alejandrodeepti Holley for 15ml urine, charted appropriately

## 2018-05-13 NOTE — PROGRESS NOTES
Patient complaining about 10/10 pain in the lower quadrants of his abdomen, stating, "I have to have a bowel movement now  I've never had pain like this ever before  Nothing is working"  Patient writhing in pain at this time  Going to have a CT scan at this time  Miralax was given per ordered as PRN daily  Patient states he last had a BM two days ago  Patient is crying at this time  Patient's belly is distended  BS present in all quadrants  SOD aware of patient's clinical presentation  Transport pending for patient to go for CT scan of the abdomen at this time

## 2018-05-14 ENCOUNTER — APPOINTMENT (INPATIENT)
Dept: RADIOLOGY | Facility: HOSPITAL | Age: 68
DRG: 392 | End: 2018-05-14
Payer: COMMERCIAL

## 2018-05-14 LAB
ANION GAP SERPL CALCULATED.3IONS-SCNC: 6 MMOL/L (ref 4–13)
ANISOCYTOSIS BLD QL SMEAR: PRESENT
BASOPHILS # BLD MANUAL: 0 THOUSAND/UL (ref 0–0.1)
BASOPHILS NFR MAR MANUAL: 0 % (ref 0–1)
BUN SERPL-MCNC: 19 MG/DL (ref 5–25)
CALCIUM SERPL-MCNC: 8.7 MG/DL (ref 8.3–10.1)
CHLORIDE SERPL-SCNC: 106 MMOL/L (ref 100–108)
CO2 SERPL-SCNC: 26 MMOL/L (ref 21–32)
CREAT SERPL-MCNC: 1.19 MG/DL (ref 0.6–1.3)
EOSINOPHIL # BLD MANUAL: 0.39 THOUSAND/UL (ref 0–0.4)
EOSINOPHIL NFR BLD MANUAL: 2 % (ref 0–6)
ERYTHROCYTE [DISTWIDTH] IN BLOOD BY AUTOMATED COUNT: 23.7 % (ref 11.6–15.1)
GFR SERPL CREATININE-BSD FRML MDRD: 63 ML/MIN/1.73SQ M
GLUCOSE SERPL-MCNC: 172 MG/DL (ref 65–140)
GLUCOSE SERPL-MCNC: 179 MG/DL (ref 65–140)
GLUCOSE SERPL-MCNC: 192 MG/DL (ref 65–140)
GLUCOSE SERPL-MCNC: 201 MG/DL (ref 65–140)
GLUCOSE SERPL-MCNC: 289 MG/DL (ref 65–140)
HCT VFR BLD AUTO: 42.4 % (ref 36.5–49.3)
HGB BLD-MCNC: 12.9 G/DL (ref 12–17)
LYMPHOCYTES # BLD AUTO: 1.94 THOUSAND/UL (ref 0.6–4.47)
LYMPHOCYTES # BLD AUTO: 10 % (ref 14–44)
MCH RBC QN AUTO: 25.1 PG (ref 26.8–34.3)
MCHC RBC AUTO-ENTMCNC: 30.4 G/DL (ref 31.4–37.4)
MCV RBC AUTO: 83 FL (ref 82–98)
MONOCYTES # BLD AUTO: 0.58 THOUSAND/UL (ref 0–1.22)
MONOCYTES NFR BLD: 3 % (ref 4–12)
NEUTROPHILS # BLD MANUAL: 16.49 THOUSAND/UL (ref 1.85–7.62)
NEUTS SEG NFR BLD AUTO: 85 % (ref 43–75)
NRBC BLD AUTO-RTO: 1 /100 WBCS
PLATELET # BLD AUTO: 306 THOUSANDS/UL (ref 149–390)
PLATELET BLD QL SMEAR: ADEQUATE
PMV BLD AUTO: 10.1 FL (ref 8.9–12.7)
POIKILOCYTOSIS BLD QL SMEAR: PRESENT
POTASSIUM SERPL-SCNC: 4.2 MMOL/L (ref 3.5–5.3)
RBC # BLD AUTO: 5.13 MILLION/UL (ref 3.88–5.62)
RBC MORPH BLD: PRESENT
SODIUM SERPL-SCNC: 138 MMOL/L (ref 136–145)
WBC # BLD AUTO: 19.4 THOUSAND/UL (ref 4.31–10.16)

## 2018-05-14 PROCEDURE — A9537 TC99M MEBROFENIN: HCPCS

## 2018-05-14 PROCEDURE — 80048 BASIC METABOLIC PNL TOTAL CA: CPT | Performed by: INTERNAL MEDICINE

## 2018-05-14 PROCEDURE — 82948 REAGENT STRIP/BLOOD GLUCOSE: CPT

## 2018-05-14 PROCEDURE — 85027 COMPLETE CBC AUTOMATED: CPT | Performed by: INTERNAL MEDICINE

## 2018-05-14 PROCEDURE — 99233 SBSQ HOSP IP/OBS HIGH 50: CPT | Performed by: INTERNAL MEDICINE

## 2018-05-14 PROCEDURE — 78227 HEPATOBIL SYST IMAGE W/DRUG: CPT

## 2018-05-14 PROCEDURE — 85007 BL SMEAR W/DIFF WBC COUNT: CPT | Performed by: INTERNAL MEDICINE

## 2018-05-14 RX ORDER — LABETALOL HYDROCHLORIDE 5 MG/ML
10 INJECTION, SOLUTION INTRAVENOUS EVERY 6 HOURS PRN
Status: DISCONTINUED | OUTPATIENT
Start: 2018-05-14 | End: 2018-05-15 | Stop reason: HOSPADM

## 2018-05-14 RX ADMIN — ALBUTEROL SULFATE 2 PUFF: 90 AEROSOL, METERED RESPIRATORY (INHALATION) at 21:28

## 2018-05-14 RX ADMIN — BENZONATATE 100 MG: 100 CAPSULE ORAL at 01:09

## 2018-05-14 RX ADMIN — LABETALOL 20 MG/4 ML (5 MG/ML) INTRAVENOUS SYRINGE 10 MG: at 01:09

## 2018-05-14 RX ADMIN — PREGABALIN 100 MG: 100 CAPSULE ORAL at 21:22

## 2018-05-14 RX ADMIN — METOPROLOL TARTRATE 50 MG: 50 TABLET ORAL at 21:23

## 2018-05-14 RX ADMIN — PANTOPRAZOLE SODIUM 40 MG: 40 TABLET, DELAYED RELEASE ORAL at 09:36

## 2018-05-14 RX ADMIN — PREGABALIN 100 MG: 100 CAPSULE ORAL at 09:36

## 2018-05-14 RX ADMIN — FLUTICASONE PROPIONATE AND SALMETEROL 1 PUFF: 50; 250 POWDER RESPIRATORY (INHALATION) at 09:41

## 2018-05-14 RX ADMIN — POLYETHYLENE GLYCOL 3350 17 G: 17 POWDER, FOR SOLUTION ORAL at 09:37

## 2018-05-14 RX ADMIN — HEPARIN SODIUM 5000 UNITS: 5000 INJECTION, SOLUTION INTRAVENOUS; SUBCUTANEOUS at 21:23

## 2018-05-14 RX ADMIN — TAMSULOSIN HYDROCHLORIDE 0.4 MG: 0.4 CAPSULE ORAL at 17:26

## 2018-05-14 RX ADMIN — INSULIN LISPRO 9 UNITS: 100 INJECTION, SOLUTION INTRAVENOUS; SUBCUTANEOUS at 17:27

## 2018-05-14 RX ADMIN — ALBUTEROL SULFATE 2 PUFF: 90 AEROSOL, METERED RESPIRATORY (INHALATION) at 09:41

## 2018-05-14 RX ADMIN — LACTULOSE 20 G: 20 SOLUTION ORAL at 09:37

## 2018-05-14 RX ADMIN — INSULIN LISPRO 9 UNITS: 100 INJECTION, SOLUTION INTRAVENOUS; SUBCUTANEOUS at 06:47

## 2018-05-14 RX ADMIN — INSULIN GLARGINE 20 UNITS: 100 INJECTION, SOLUTION SUBCUTANEOUS at 21:22

## 2018-05-14 RX ADMIN — CLOPIDOGREL BISULFATE 75 MG: 75 TABLET ORAL at 09:36

## 2018-05-14 RX ADMIN — FLUTICASONE PROPIONATE AND SALMETEROL 1 PUFF: 50; 250 POWDER RESPIRATORY (INHALATION) at 21:28

## 2018-05-14 RX ADMIN — LACTULOSE 20 G: 20 SOLUTION ORAL at 17:26

## 2018-05-14 RX ADMIN — BENZONATATE 100 MG: 100 CAPSULE ORAL at 09:44

## 2018-05-14 RX ADMIN — ATORVASTATIN CALCIUM 40 MG: 40 TABLET, FILM COATED ORAL at 17:26

## 2018-05-14 RX ADMIN — Medication 325 MG: at 09:36

## 2018-05-14 RX ADMIN — ASPIRIN 81 MG: 81 TABLET, COATED ORAL at 09:38

## 2018-05-14 RX ADMIN — ALBUTEROL SULFATE 2 PUFF: 90 AEROSOL, METERED RESPIRATORY (INHALATION) at 12:34

## 2018-05-14 RX ADMIN — HEPARIN SODIUM 5000 UNITS: 5000 INJECTION, SOLUTION INTRAVENOUS; SUBCUTANEOUS at 06:46

## 2018-05-14 RX ADMIN — OXYCODONE HYDROCHLORIDE AND ACETAMINOPHEN 1 TABLET: 5; 325 TABLET ORAL at 17:26

## 2018-05-14 RX ADMIN — ALBUTEROL SULFATE 2 PUFF: 90 AEROSOL, METERED RESPIRATORY (INHALATION) at 17:27

## 2018-05-14 RX ADMIN — PREGABALIN 100 MG: 100 CAPSULE ORAL at 17:26

## 2018-05-14 RX ADMIN — OXYCODONE HYDROCHLORIDE AND ACETAMINOPHEN 1 TABLET: 5; 325 TABLET ORAL at 23:30

## 2018-05-14 RX ADMIN — METOPROLOL TARTRATE 50 MG: 50 TABLET ORAL at 09:36

## 2018-05-14 NOTE — PROGRESS NOTES
IM Residency Progress Note   Unit/Bed#: -01 Encounter: [id number]  SOD Team C       Casie Lisbeth  79 y o  male [id number]    Hospital Stay Days: 1      Assessment/Plan:    Principal Problem:    Chest tightness, acs rule out  Active Problems:    Type 2 diabetes mellitus with diabetic neuropathy, with long-term current use of insulin (HCC)    Essential hypertension    GERD (gastroesophageal reflux disease)    Hyperlipidemia    Opioid dependence (HCC)    Generalized abdominal pain    Abdominal aortic aneurysm (AAA) without rupture (HCC)    Chronic diastolic CHF (congestive heart failure) (HCC)    COPD (chronic obstructive pulmonary disease) (HCC)    Coronary artery disease of native artery of native heart with stable angina pectoris (HCC)    Abdominal pain, intactable    Generalized abdominal pain: Initially concerning for underlying mesenteric ischemia versus biliary colic  -CTA Abdomen/Pelvis negative for AAA or dissection, no evidence of significant stenosis, thrombus, or occlusion in mesenteric arteries  Moderate to large amount of stool present   -HIDA scan is pending   -Continue bowel regimen, will consider tap water enema if failing this    -Has refused EGD/colonoscopy in the past     Abnormal CT of the chest:  Patient does have a history of abnormal CT chest   He will need outpatient follow-up of the CT chest       Chest pain: troponin negative  -he was recommended to have CABG in the past for his CAD but patient refused  Needs to discuss as outpatient     DM2: tight BG control  -BG elevated this morning  Continue Lantus and Lispro  Will trend and if remains elevated will increase Lantus dosing     HLD: continue statin     GERD: continue protonix    Disposition: Continue inpatient care      Subjective:   No acute events overnight, however with persistent abdominal pain through night    He reports persistent abdominal pain today primarily in the epigastric and LUQ regions  He reports some nausea  Reports one bowel movement overnight described as "hard "         Vitals: Temp (24hrs), Av 2 °F (36 8 °C), Min:97 9 °F (36 6 °C), Max:99 1 °F (37 3 °C)  Current: Temperature: 99 1 °F (37 3 °C)  Vitals:    18 1508 18 1934 18 2300 18 0708   BP: 165/67 157/70 (!) 181/81 (!) 171/76   BP Location: Right arm Left arm Left arm Right arm   Pulse: 67 75 75 73   Resp:    Temp: 98 °F (36 7 °C)  98 1 °F (36 7 °C) 99 1 °F (37 3 °C)   TempSrc: Oral  Oral Oral   SpO2: 94% 94% 94% 95%   Weight:       Height:        Body mass index is 36 16 kg/m²  I/O last 24 hours: In: 118 [P O :118]  Out: 700 [Urine:700]      Physical Exam:   General appearance: alert, appears stated age and cooperative  Head: Normocephalic, without obvious abnormality, atraumatic  Eyes: negative findings: conjunctivae and sclerae normal and pupils equal, round, reactive to light and accomodation  Neck: no JVD and supple, symmetrical, trachea midline  Lungs: clear to auscultation bilaterally  Heart: regular rate and rhythm, S1, S2 normal, no murmur, click, rub or gallop  Abdomen: normal findings: soft and diffuse mild tenderness to palpation across all 4 quadrants, however reduced when disctracted   Bowel sounds normal, no masses or organomegaly  Extremities: extremities normal, warm and well-perfused; no cyanosis, clubbing, or edema  Neurologic: Grossly normal      Invasive Devices     Peripheral Intravenous Line            Peripheral IV 18 Left Antecubital 2 days                          Labs:   Recent Results (from the past 24 hour(s))   Fingerstick Glucose (POCT)    Collection Time: 18 11:13 AM   Result Value Ref Range    POC Glucose 191 (H) 65 - 140 mg/dl   Lactic acid, plasma    Collection Time: 18 12:15 PM   Result Value Ref Range    LACTIC ACID 1 4 0 5 - 2 0 mmol/L   Fingerstick Glucose (POCT)    Collection Time: 18  4:36 PM   Result Value Ref Range    POC Glucose 196 (H) 65 - 140 mg/dl   Fingerstick Glucose (POCT)    Collection Time: 05/13/18  9:24 PM   Result Value Ref Range    POC Glucose 214 (H) 65 - 140 mg/dl   CBC and differential    Collection Time: 05/14/18  5:43 AM   Result Value Ref Range    WBC 19 40 (H) 4 31 - 10 16 Thousand/uL    RBC 5 13 3 88 - 5 62 Million/uL    Hemoglobin 12 9 12 0 - 17 0 g/dL    Hematocrit 42 4 36 5 - 49 3 %    MCV 83 82 - 98 fL    MCH 25 1 (L) 26 8 - 34 3 pg    MCHC 30 4 (L) 31 4 - 37 4 g/dL    RDW 23 7 (H) 11 6 - 15 1 %    MPV 10 1 8 9 - 12 7 fL    Platelets 956 558 - 950 Thousands/uL    nRBC 1 /100 WBCs   Basic metabolic panel    Collection Time: 05/14/18  5:43 AM   Result Value Ref Range    Sodium 138 136 - 145 mmol/L    Potassium 4 2 3 5 - 5 3 mmol/L    Chloride 106 100 - 108 mmol/L    CO2 26 21 - 32 mmol/L    Anion Gap 6 4 - 13 mmol/L    BUN 19 5 - 25 mg/dL    Creatinine 1 19 0 60 - 1 30 mg/dL    Glucose 192 (H) 65 - 140 mg/dL    Calcium 8 7 8 3 - 10 1 mg/dL    eGFR 63 ml/min/1 73sq m   Manual Differential(PHLEBS Do Not Order)    Collection Time: 05/14/18  5:43 AM   Result Value Ref Range    Segmented % 85 (H) 43 - 75 %    Lymphocytes % 10 (L) 14 - 44 %    Monocytes % 3 (L) 4 - 12 %    Eosinophils % 2 0 - 6 %    Basophils % 0 0 - 1 %    Absolute Neutrophils 16 49 (H) 1 85 - 7 62 Thousand/uL    Lymphocytes Absolute 1 94 0 60 - 4 47 Thousand/uL    Monocytes Absolute 0 58 0 00 - 1 22 Thousand/uL    Eosinophils Absolute 0 39 0 00 - 0 40 Thousand/uL    Basophils Absolute 0 00 0 00 - 0 10 Thousand/uL    Total Counted      RBC Morphology Present     Anisocytosis Present     Poikilocytes Present     Platelet Estimate Adequate Adequate   Fingerstick Glucose (POCT)    Collection Time: 05/14/18  6:45 AM   Result Value Ref Range    POC Glucose 201 (H) 65 - 140 mg/dl       Radiology Results: I have personally reviewed pertinent reports  and I have personally reviewed pertinent films in PACS  CTA abdomen w/wo contrast:   1  Extensive atherosclerotic plaque without evidence of abdominal aortic aneurysm or dissection  No evidence of significant stenosis, thrombus or occlusion within mesenteric arteries  2   Moderate to large amount of stool in the colon may indicate constipation  No evidence of bowel obstruction or visualized colitis  3   Cholelithiasis without evidence of cholecystitis or biliary obstruction    Other Diagnostic Testing:   I have personally reviewed pertinent reports          Active Meds:   Current Facility-Administered Medications   Medication Dose Route Frequency    albuterol (PROVENTIL HFA,VENTOLIN HFA) inhaler 2 puff  2 puff Inhalation 4x Daily    albuterol inhalation solution 2 5 mg  2 5 mg Nebulization Q4H PRN    aspirin (ECOTRIN LOW STRENGTH) EC tablet 81 mg  81 mg Oral Daily    atorvastatin (LIPITOR) tablet 40 mg  40 mg Oral Daily With Dinner    benzonatate (TESSALON PERLES) capsule 100 mg  100 mg Oral TID PRN    bisacodyl (DULCOLAX) rectal suppository 10 mg  10 mg Rectal Daily    clopidogrel (PLAVIX) tablet 75 mg  75 mg Oral Daily    ferrous sulfate tablet 325 mg  325 mg Oral Daily With Breakfast    fluticasone-salmeterol (ADVAIR) 250-50 mcg/dose inhaler 1 puff  1 puff Inhalation Q12H [facility]    heparin (porcine) subcutaneous injection 5,000 Units  5,000 Units Subcutaneous Q8H [facility]    insulin glargine (LANTUS) subcutaneous injection 20 Units 0 2 mL  20 Units Subcutaneous HS    insulin lispro (HumaLOG) 100 units/mL subcutaneous injection 9 Units  9 Units Subcutaneous TID AC    labetalol (NORMODYNE) injection 10 mg  10 mg Intravenous Q6H PRN    lactulose 20 g/30 mL oral solution 20 g  20 g Oral BID    metoprolol tartrate (LOPRESSOR) tablet 50 mg  50 mg Oral Q12H [facility]    nitroglycerin (NITROSTAT) SL tablet 0 4 mg  0 4 mg Sublingual Q5 Min PRN    oxyCODONE-acetaminophen (PERCOCET) 5-325 mg per tablet 1 tablet  1 tablet Oral Q6H PRN    pantoprazole (PROTONIX) EC tablet 40 mg  40 mg Oral Daily    polyethylene glycol (MIRALAX) packet 17 g  17 g Oral Daily    pregabalin (LYRICA) capsule 100 mg  100 mg Oral TID    tamsulosin (FLOMAX) capsule 0 4 mg  0 4 mg Oral Daily With Dinner         VTE Pharmacologic Prophylaxis: Heparin  VTE Mechanical Prophylaxis: sequential compression device    Armando Parmar DO  PGY-3 IM

## 2018-05-15 VITALS
WEIGHT: 240.3 LBS | RESPIRATION RATE: 20 BRPM | TEMPERATURE: 98.1 F | HEIGHT: 70 IN | DIASTOLIC BLOOD PRESSURE: 64 MMHG | HEART RATE: 78 BPM | BODY MASS INDEX: 34.4 KG/M2 | OXYGEN SATURATION: 95 % | SYSTOLIC BLOOD PRESSURE: 119 MMHG

## 2018-05-15 LAB
ANION GAP SERPL CALCULATED.3IONS-SCNC: 3 MMOL/L (ref 4–13)
BUN SERPL-MCNC: 22 MG/DL (ref 5–25)
CALCIUM SERPL-MCNC: 8.4 MG/DL (ref 8.3–10.1)
CHLORIDE SERPL-SCNC: 103 MMOL/L (ref 100–108)
CO2 SERPL-SCNC: 29 MMOL/L (ref 21–32)
CREAT SERPL-MCNC: 1.58 MG/DL (ref 0.6–1.3)
ERYTHROCYTE [DISTWIDTH] IN BLOOD BY AUTOMATED COUNT: 23.6 % (ref 11.6–15.1)
GFR SERPL CREATININE-BSD FRML MDRD: 45 ML/MIN/1.73SQ M
GLUCOSE SERPL-MCNC: 225 MG/DL (ref 65–140)
GLUCOSE SERPL-MCNC: 283 MG/DL (ref 65–140)
GLUCOSE SERPL-MCNC: 316 MG/DL (ref 65–140)
HCT VFR BLD AUTO: 36.5 % (ref 36.5–49.3)
HGB BLD-MCNC: 11.4 G/DL (ref 12–17)
MCH RBC QN AUTO: 25.3 PG (ref 26.8–34.3)
MCHC RBC AUTO-ENTMCNC: 31.2 G/DL (ref 31.4–37.4)
MCV RBC AUTO: 81 FL (ref 82–98)
PLATELET # BLD AUTO: 220 THOUSANDS/UL (ref 149–390)
PMV BLD AUTO: 9.6 FL (ref 8.9–12.7)
POTASSIUM SERPL-SCNC: 4.2 MMOL/L (ref 3.5–5.3)
RBC # BLD AUTO: 4.5 MILLION/UL (ref 3.88–5.62)
SODIUM SERPL-SCNC: 135 MMOL/L (ref 136–145)
WBC # BLD AUTO: 13.64 THOUSAND/UL (ref 4.31–10.16)

## 2018-05-15 PROCEDURE — 80048 BASIC METABOLIC PNL TOTAL CA: CPT | Performed by: INTERNAL MEDICINE

## 2018-05-15 PROCEDURE — 82948 REAGENT STRIP/BLOOD GLUCOSE: CPT

## 2018-05-15 PROCEDURE — 85027 COMPLETE CBC AUTOMATED: CPT | Performed by: INTERNAL MEDICINE

## 2018-05-15 PROCEDURE — 99238 HOSP IP/OBS DSCHRG MGMT 30/<: CPT | Performed by: INTERNAL MEDICINE

## 2018-05-15 RX ORDER — DOCUSATE SODIUM 100 MG/1
100 CAPSULE, LIQUID FILLED ORAL 2 TIMES DAILY
Qty: 60 CAPSULE | Refills: 0 | Status: SHIPPED | OUTPATIENT
Start: 2018-05-15 | End: 2018-05-30 | Stop reason: SDUPTHER

## 2018-05-15 RX ORDER — SENNOSIDES 8.6 MG
1 TABLET ORAL
Qty: 30 EACH | Refills: 0 | Status: SHIPPED | OUTPATIENT
Start: 2018-05-15 | End: 2019-01-01 | Stop reason: HOSPADM

## 2018-05-15 RX ADMIN — PANTOPRAZOLE SODIUM 40 MG: 40 TABLET, DELAYED RELEASE ORAL at 08:28

## 2018-05-15 RX ADMIN — POLYETHYLENE GLYCOL 3350 17 G: 17 POWDER, FOR SOLUTION ORAL at 08:28

## 2018-05-15 RX ADMIN — METOPROLOL TARTRATE 50 MG: 50 TABLET ORAL at 08:28

## 2018-05-15 RX ADMIN — HEPARIN SODIUM 5000 UNITS: 5000 INJECTION, SOLUTION INTRAVENOUS; SUBCUTANEOUS at 05:44

## 2018-05-15 RX ADMIN — ALBUTEROL SULFATE 2 PUFF: 90 AEROSOL, METERED RESPIRATORY (INHALATION) at 08:27

## 2018-05-15 RX ADMIN — Medication 325 MG: at 08:29

## 2018-05-15 RX ADMIN — OXYCODONE HYDROCHLORIDE AND ACETAMINOPHEN 1 TABLET: 5; 325 TABLET ORAL at 13:38

## 2018-05-15 RX ADMIN — INSULIN LISPRO 2 UNITS: 100 INJECTION, SOLUTION INTRAVENOUS; SUBCUTANEOUS at 11:37

## 2018-05-15 RX ADMIN — ALBUTEROL SULFATE 2 PUFF: 90 AEROSOL, METERED RESPIRATORY (INHALATION) at 11:36

## 2018-05-15 RX ADMIN — PREGABALIN 100 MG: 100 CAPSULE ORAL at 08:28

## 2018-05-15 RX ADMIN — FLUTICASONE PROPIONATE AND SALMETEROL 1 PUFF: 50; 250 POWDER RESPIRATORY (INHALATION) at 08:27

## 2018-05-15 RX ADMIN — OXYCODONE HYDROCHLORIDE AND ACETAMINOPHEN 1 TABLET: 5; 325 TABLET ORAL at 05:44

## 2018-05-15 RX ADMIN — CLOPIDOGREL BISULFATE 75 MG: 75 TABLET ORAL at 08:28

## 2018-05-15 RX ADMIN — INSULIN LISPRO 5 UNITS: 100 INJECTION, SOLUTION INTRAVENOUS; SUBCUTANEOUS at 06:29

## 2018-05-15 RX ADMIN — ASPIRIN 81 MG: 81 TABLET, COATED ORAL at 08:28

## 2018-05-15 RX ADMIN — LACTULOSE 20 G: 20 SOLUTION ORAL at 08:28

## 2018-05-15 RX ADMIN — INSULIN LISPRO 9 UNITS: 100 INJECTION, SOLUTION INTRAVENOUS; SUBCUTANEOUS at 11:36

## 2018-05-15 RX ADMIN — INSULIN LISPRO 9 UNITS: 100 INJECTION, SOLUTION INTRAVENOUS; SUBCUTANEOUS at 06:29

## 2018-05-15 NOTE — DISCHARGE SUMMARY
[facility] CENTRAL Discharge Summary - Medical Mariia Vazquez  79 y o  male MRN: [id number]    [address] [facility]  Room / Bed: [place] 205/-22 Encounter: [id number]    BRIEF OVERVIEW    Admitting Provider: Benton Pepe MD  Discharge Provider: David Herrera MD  Primary Care Physician at Discharge: David Herrera MD    Discharge To: Home/Self-Care    Admission Date: 5/12/2018     Discharge Date: 5/15/2018  2:34 PM    Primary Discharge Diagnosis  Principal Problem:    Chest tightness, acs rule out  Active Problems:    Type 2 diabetes mellitus with diabetic neuropathy, with long-term current use of insulin (HCC)    Essential hypertension    GERD (gastroesophageal reflux disease)    Hyperlipidemia    Opioid dependence (HCC)    Generalized abdominal pain    Abdominal aortic aneurysm (AAA) without rupture (HCC)    Chronic diastolic CHF (congestive heart failure) (HCC)    COPD (chronic obstructive pulmonary disease) (HCC)    Coronary artery disease of native artery of native heart with stable angina pectoris (HCC)    Abdominal pain, intactable  Resolved Problems:    * No resolved hospital problems  *    Consulting Providers   None      Diagnostic Procedures Performed  US right upper quadrant: limited study, cholelithiasis with apparent positive sonographic Mortensen's sign without wall thickening or pericholecystic fluid  CTA abdomen w/wo contrast:   1  Extensive atherosclerotic plaque without evidence of abdominal aortic aneurysm or dissection  No evidence of significant stenosis, thrombus or occlusion within mesenteric arteries  2   Moderate to large amount of stool in the colon may indicate constipation  No evidence of bowel obstruction or visualized colitis    3   Cholelithiasis without evidence of cholecystitis or biliary obstruction    HIDA scan: Normal hepatobiliary study     Discharge Disposition: Home/Self Care  Discharged With Lines: no    Test Results Pending at Discharge: none    Code Status: Prior  Advance Directive and Living Will: <no information>  Power of :    POLST:      Medications   See after visit summary for reconciled discharge medications provided to patient and family  Allergies  No Known Allergies  Discharge Diet: cardiac diet and diabetic diet  Activity restrictions: none    [address] [facility] Course  30-year-old male with past medical history significant for hypertension, hyperlipidemia, CAD, IDDM presenting with one-week history of worsening diffuse abdominal pain without association with diet  He denied nausea/vomiting, diarrhea, constipation, but admits to decreased appetite  He also noted chest pain, for which troponins x3 were negative and EKG unchanged  He was admitted for further evaluation into abdominal pain, however he developed nausea and vomiting on the medical floor  He underwent extensive evaluation including ultrasound right upper quadrant, CTA of abdomen, and HIDA scan, all of which were negative for any acute disease, however CTA of abdomen significant for evidence of constipation, for which bowel regimen was initiated  After patient had bowel movement, patient's pain improved, and on discharge date patient was hemodynamically stable and pain was improved  On day of discharge however patient had elevated creatinine from baseline indicated acute kidney injury, likely secondary to poor p o  intake in setting of abdominal pain  He remained stable for discharge, however, and will obtain BMP approximately 5 days after discharge to reassess kidney function  He was discharged with bowel regimen, advised to take daily  Suspect constipation secondary to opioid use        Presenting Problem/History of Present Illness  Principal Problem:    Chest tightness, acs rule out  Active Problems:    Type 2 diabetes mellitus with diabetic neuropathy, with long-term current use of insulin (HCC)    Essential hypertension    GERD (gastroesophageal reflux disease)    Hyperlipidemia    Opioid dependence (HCC)    Generalized abdominal pain    Abdominal aortic aneurysm (AAA) without rupture (HCC)    Chronic diastolic CHF (congestive heart failure) (HCC)    COPD (chronic obstructive pulmonary disease) (HCC)    Coronary artery disease of native artery of native heart with stable angina pectoris (HCC)    Abdominal pain, intactable  Resolved Problems:    * No resolved hospital problems  *        Other Pertinent Test Results  None    Discharge Condition: stable    Discharge  Statement   I spent 30 minutes minutes discharging the patient  This time was spent on the day of discharge  I had direct contact with the patient on the day of discharge  Additional documentation is required if more than 30 minutes were spent on discharge

## 2018-05-15 NOTE — PROGRESS NOTES
IM Residency Progress Note   Unit/Bed#: -01 Encounter: [id number]  SOD Team C       Wen Rojas  79 y o  male [id number]    Hospital Stay Days: 2      Assessment/Plan:    Principal Problem:    Chest tightness, acs rule out  Active Problems:    Type 2 diabetes mellitus with diabetic neuropathy, with long-term current use of insulin (HCC)    Essential hypertension    GERD (gastroesophageal reflux disease)    Hyperlipidemia    Opioid dependence (HCC)    Generalized abdominal pain    Abdominal aortic aneurysm (AAA) without rupture (HCC)    Chronic diastolic CHF (congestive heart failure) (HCC)    COPD (chronic obstructive pulmonary disease) (HCC)    Coronary artery disease of native artery of native heart with stable angina pectoris (HCC)    Abdominal pain, intactable    Generalized abdominal pain: Initially concerning for underlying mesenteric ischemia versus biliary colic  These have been ruled out as below and pain is likely secondary to constipation  -CTA Abdomen/Pelvis negative for AAA or dissection, no evidence of significant stenosis, thrombus, or occlusion in mesenteric arteries  Moderate to large amount of stool present   -HIDA scan is negative   -Continue bowel regimen, will consider tap water enema if failing this    -Has refused EGD/colonoscopy in the past    Acute kidney injury: New this AM, Cr 1 58 elevated from baseline ~1 1  Likely due to poor PO intake  To recheck BMP as outpatient     Abnormal CT of the chest:  Patient does have a history of abnormal CT chest   He will need outpatient follow-up of the CT chest       Chest pain: troponin negative  -he was recommended to have CABG in the past for his CAD but patient refused  Needs to discuss as outpatient     DM2: tight BG control  -BG elevated this morning  Continue Lantus and Lispro   Will trend and if remains elevated will increase Lantus dosing     HLD: continue statin     GERD: continue protonix    Disposition: Anticipate discharge today  Subjective:   No acute events overnight  Patient reports some improvement in his abdominal pain today, and is able to walk around  Reports bowel movements and improvement in appetite  Denies fever/chills, headache, chest pain, shortness of breath, nausea/vomiting  Vitals: Temp (24hrs), Av 2 °F (36 8 °C), Min:97 9 °F (36 6 °C), Max:98 7 °F (37 1 °C)  Current: Temperature: 98 1 °F (36 7 °C)  Vitals:    18 1720 18 2336 05/15/18 0500 05/15/18 0841   BP: 170/72 104/55  119/64   BP Location: Right arm Right arm  Right arm   Pulse: 68 74  78   Resp: 20 20  20   Temp: 97 9 °F (36 6 °C) 98 °F (36 7 °C)  98 1 °F (36 7 °C)   TempSrc: Oral Oral  Oral   SpO2: 96% 95%  95%   Weight:   109 kg (240 lb 4 8 oz)    Height:        Body mass index is 34 48 kg/m²  No intake/output data recorded        Physical Exam:   General appearance: alert and oriented, in no acute distress  Head: Normocephalic, without obvious abnormality, atraumatic  Eyes: negative findings: conjunctivae and sclerae normal and pupils equal, round, reactive to light and accomodation  Throat: lips, mucosa, and tongue normal; teeth and gums normal  Neck: no JVD and supple, symmetrical, trachea midline  Lungs: clear to auscultation bilaterally  Heart: regular rate and rhythm, S1, S2 normal, no murmur, click, rub or gallop  Abdomen: soft, non-tender; bowel sounds normal; no masses,  no organomegaly  Extremities: extremities normal, warm and well-perfused; no cyanosis, clubbing, or edema  Neurologic: Grossly normal    Invasive Devices     Peripheral Intravenous Line            Peripheral IV 18 Left Antecubital less than 1 day                          Labs:   Recent Results (from the past 24 hour(s))   Fingerstick Glucose (POCT)    Collection Time: 18 10:57 AM   Result Value Ref Range    POC Glucose 179 (H) 65 - 140 mg/dl   Fingerstick Glucose (POCT)    Collection Time: 05/14/18  5:21 PM   Result Value Ref Range    POC Glucose 172 (H) 65 - 140 mg/dl   Fingerstick Glucose (POCT)    Collection Time: 05/14/18  8:47 PM   Result Value Ref Range    POC Glucose 289 (H) 65 - 140 mg/dl   Basic metabolic panel    Collection Time: 05/15/18  4:36 AM   Result Value Ref Range    Sodium 135 (L) 136 - 145 mmol/L    Potassium 4 2 3 5 - 5 3 mmol/L    Chloride 103 100 - 108 mmol/L    CO2 29 21 - 32 mmol/L    Anion Gap 3 (L) 4 - 13 mmol/L    BUN 22 5 - 25 mg/dL    Creatinine 1 58 (H) 0 60 - 1 30 mg/dL    Glucose 283 (H) 65 - 140 mg/dL    Calcium 8 4 8 3 - 10 1 mg/dL    eGFR 45 ml/min/1 73sq m   CBC    Collection Time: 05/15/18  4:36 AM   Result Value Ref Range    WBC 13 64 (H) 4 31 - 10 16 Thousand/uL    RBC 4 50 3 88 - 5 62 Million/uL    Hemoglobin 11 4 (L) 12 0 - 17 0 g/dL    Hematocrit 36 5 36 5 - 49 3 %    MCV 81 (L) 82 - 98 fL    MCH 25 3 (L) 26 8 - 34 3 pg    MCHC 31 2 (L) 31 4 - 37 4 g/dL    RDW 23 6 (H) 11 6 - 15 1 %    Platelets 732 120 - 143 Thousands/uL    MPV 9 6 8 9 - 12 7 fL   Fingerstick Glucose (POCT)    Collection Time: 05/15/18  5:48 AM   Result Value Ref Range    POC Glucose 316 (H) 65 - 140 mg/dl       Radiology Results: I have personally reviewed pertinent reports  and I have personally reviewed pertinent films in PACS  CTA abdomen w/wo contrast:   1  Extensive atherosclerotic plaque without evidence of abdominal aortic aneurysm or dissection  No evidence of significant stenosis, thrombus or occlusion within mesenteric arteries  2   Moderate to large amount of stool in the colon may indicate constipation  No evidence of bowel obstruction or visualized colitis  3   Cholelithiasis without evidence of cholecystitis or biliary obstruction    HIDA scan: normal hepatobiliary study    Other Diagnostic Testing:   I have personally reviewed pertinent reports          Active Meds:   Current Facility-Administered Medications   Medication Dose Route Frequency    albuterol (PROVENTIL HFA,VENTOLIN HFA) inhaler 2 puff  2 puff Inhalation 4x Daily    albuterol inhalation solution 2 5 mg  2 5 mg Nebulization Q4H PRN    aspirin (ECOTRIN LOW STRENGTH) EC tablet 81 mg  81 mg Oral Daily    atorvastatin (LIPITOR) tablet 40 mg  40 mg Oral Daily With Dinner    benzonatate (TESSALON PERLES) capsule 100 mg  100 mg Oral TID PRN    bisacodyl (DULCOLAX) rectal suppository 10 mg  10 mg Rectal Daily    clopidogrel (PLAVIX) tablet 75 mg  75 mg Oral Daily    ferrous sulfate tablet 325 mg  325 mg Oral Daily With Breakfast    fluticasone-salmeterol (ADVAIR) 250-50 mcg/dose inhaler 1 puff  1 puff Inhalation Q12H [address]    heparin (porcine) subcutaneous injection 5,000 Units  5,000 Units Subcutaneous Q8H [address]    insulin glargine (LANTUS) subcutaneous injection 20 Units 0 2 mL  20 Units Subcutaneous HS    insulin lispro (HumaLOG) 100 units/mL subcutaneous injection 1-6 Units  1-6 Units Subcutaneous TID AC    insulin lispro (HumaLOG) 100 units/mL subcutaneous injection 1-6 Units  1-6 Units Subcutaneous HS    insulin lispro (HumaLOG) 100 units/mL subcutaneous injection 9 Units  9 Units Subcutaneous TID AC    labetalol (NORMODYNE) injection 10 mg  10 mg Intravenous Q6H PRN    lactulose 20 g/30 mL oral solution 20 g  20 g Oral BID    metoprolol tartrate (LOPRESSOR) tablet 50 mg  50 mg Oral Q12H [facility]    nitroglycerin (NITROSTAT) SL tablet 0 4 mg  0 4 mg Sublingual Q5 Min PRN    oxyCODONE-acetaminophen (PERCOCET) 5-325 mg per tablet 1 tablet  1 tablet Oral Q6H PRN    pantoprazole (PROTONIX) EC tablet 40 mg  40 mg Oral Daily    polyethylene glycol (MIRALAX) packet 17 g  17 g Oral Daily    pregabalin (LYRICA) capsule 100 mg  100 mg Oral TID    tamsulosin (FLOMAX) capsule 0 4 mg  0 4 mg Oral Daily With Dinner         VTE Pharmacologic Prophylaxis: Heparin  VTE Mechanical Prophylaxis: sequential compression device    Adra Cones, DO  PGY-3 IM

## 2018-05-15 NOTE — DISCHARGE INSTRUCTIONS
Take Colace and Senna daily        Constipation   WHAT YOU NEED TO KNOW:   Constipation is when you have hard, dry bowel movements, or you go longer than usual between bowel movements  DISCHARGE INSTRUCTIONS:   Seek care immediately if:   · You have blood in your bowel movements  · You have a fever and abdominal pain with the constipation  Contact your healthcare provider if:   · Your constipation gets worse  · You start to vomit  · You have questions or concerns about your condition or care  Medicines:   · Medicine or a fiber supplement  may help make your bowel movement softer  A laxative may help relax and loosen your intestines to help you have a bowel movement  You may also be given medicine to increase fluid in your intestines  The fluid may help move bowel movements through your intestines  · Take your medicine as directed  Contact your healthcare provider if you think your medicine is not helping or if you have side effects  Tell him of her if you are allergic to any medicine  Keep a list of the medicines, vitamins, and herbs you take  Include the amounts, and when and why you take them  Bring the list or the pill bottles to follow-up visits  Carry your medicine list with you in case of an emergency  Manage your constipation:   · Drink liquids as directed  You may need to drink extra liquids to help soften and move your bowels  Ask how much liquid to drink each day and which liquids are best for you  · Eat high-fiber foods  This may help decrease constipation by adding bulk to your bowel movements  High-fiber foods include fruit, vegetables, whole-grain breads and cereals, and beans  Your healthcare provider or dietitian can help you create a high-fiber meal plan  · Exercise regularly  Regular physical activity can help stimulate your intestines  Ask which exercises are best for you  · Schedule a time each day to have a bowel movement    This may help train your body to have regular bowel movements  Bend forward while you are on the toilet to help move the bowel movement out  Sit on the toilet for at least 10 minutes, even if you do not have a bowel movement  Follow up with your healthcare provider as directed:  Write down your questions so you remember to ask them during your visits  © 2017 [employer] Information is for End User's use only and may not be sold, redistributed or otherwise used for commercial purposes  All illustrations and images included in CareNotes® are the copyrighted property of [employer]  or Jamil Henson  The above information is an  only  It is not intended as medical advice for individual conditions or treatments  Talk to your doctor, nurse or pharmacist before following any medical regimen to see if it is safe and effective for you

## 2018-05-16 ENCOUNTER — TRANSITIONAL CARE MANAGEMENT (OUTPATIENT)
Dept: INTERNAL MEDICINE CLINIC | Facility: CLINIC | Age: 68
End: 2018-05-16

## 2018-05-16 DIAGNOSIS — J42 CHRONIC BRONCHITIS, UNSPECIFIED CHRONIC BRONCHITIS TYPE (HCC): ICD-10-CM

## 2018-05-16 RX ORDER — ALBUTEROL SULFATE 2.5 MG/3ML
2.5 SOLUTION RESPIRATORY (INHALATION) EVERY 4 HOURS PRN
Qty: 75 ML | Refills: 0 | Status: SHIPPED | OUTPATIENT
Start: 2018-05-16 | End: 2018-01-01 | Stop reason: DRUGHIGH

## 2018-05-16 RX ORDER — ALBUTEROL SULFATE 2.5 MG/3ML
2.5 SOLUTION RESPIRATORY (INHALATION) EVERY 4 HOURS PRN
Qty: 75 ML | Refills: 0 | Status: SHIPPED | OUTPATIENT
Start: 2018-05-16 | End: 2018-05-16 | Stop reason: SDUPTHER

## 2018-05-16 RX ORDER — ALBUTEROL SULFATE 90 UG/1
2 AEROSOL, METERED RESPIRATORY (INHALATION) DAILY
Qty: 1 INHALER | Refills: 1 | Status: SHIPPED | OUTPATIENT
Start: 2018-05-16 | End: 2018-05-16 | Stop reason: SDUPTHER

## 2018-05-16 RX ORDER — ALBUTEROL SULFATE 90 UG/1
2 AEROSOL, METERED RESPIRATORY (INHALATION) DAILY
Qty: 1 INHALER | Refills: 1 | Status: SHIPPED | OUTPATIENT
Start: 2018-05-16 | End: 2018-06-29 | Stop reason: SDUPTHER

## 2018-05-16 NOTE — CASE MANAGEMENT
Notification of Discharge  This is a Notification of Discharge from our facility [address]  Please be advised that this patient has been discharge from our facility  Below you will find the admission and discharge date and time including the patients disposition  PRESENTATION DATE: 5/12/2018  8:24 AM  IP ADMISSION DATE: 5/13/18 1023  DISCHARGE DATE: 5/15/2018  2:34 PM  DISPOSITION: Home with [address] [place] [address] in the [facility] by Jamil Henson for 2017  Network Utilization Review Department  Phone: [phone]; Fax [phone]  ATTENTION: The Network Utilization Review Department is now centralized for our 7 Facilities  Make a note that we have a new phone and fax numbers for our Department  Please call with any questions or concerns to [phone] and carefully follow the prompts so that you are directed to the right person  All voicemails are confidential  Fax any determinations, approvals, denials, and requests for initial or continue stay review clinical to [phone]  Due to HIGH CALL volume, it would be easier if you could please send faxed requests to expedite your requests and in part, help us provide discharge notifications faster

## 2018-05-16 NOTE — TELEPHONE ENCOUNTER
I called the pharmacy with permission to release the Lyrica 1 day early as requested by patient  The patient told me that EMS took the bottle with his last 3 pills along to the hospital and the bottle was not returned to him  I called [facility] in [place]  The patient had requested the early fill from the pharmacist telling her that he dumped the last pills down the sin by accident  The pharmacist is requesting a note on office letterhead permitting the early fill

## 2018-05-16 NOTE — TELEPHONE ENCOUNTER
Updated pt's pharmacy to [facility]  Canceled Rx with [facility] in [place]  Please resend scripts to [facility] on file  Thank you!

## 2018-05-16 NOTE — TELEPHONE ENCOUNTER
This patient called the office today and stated that [facility] called him and told him his medication was ready for  but he stated that he has new insurance and his medication needs to go to [facility] in [place]  The [facility] no longer uses [facility]  Can you please look into this and make the changes that need to be done  If you need to speak to him for any reason, please give him a call

## 2018-05-17 ENCOUNTER — TELEPHONE (OUTPATIENT)
Dept: INTERNAL MEDICINE CLINIC | Facility: CLINIC | Age: 68
End: 2018-05-17

## 2018-05-17 NOTE — CASE MANAGEMENT
Marylou Sheehan RN Registered Nurse Addendum   Case Management Date of Service: 5/13/2018  9:04 AM         []Hide copied text  Initial Clinical Review     Thank you,  [address]  [facility] in the [employer] by Jamil Henson for 2017  Network Utilization Review Department  Phone: [phone]; Fax [phone]  ATTENTION: The Network Utilization Review Department is now centralized for our 7 Facilities  Make a note that we have a new phone and fax numbers for our Department  Please call with any questions or concerns to [phone] and carefully follow the prompts so that you are directed to the right person  All voicemails are confidential  Fax any determinations, approvals, denials, and requests for initial or continue stay review clinical to [phone]  Due to HIGH CALL volume, it would be easier if you could please send faxed requests to expedite your requests and in part, help us provide discharge notifications faster         Admission: Date/Time/Statement: 05/12/18 @ 1313 -- OBS -- upgraded to INPATIENT 5/13/18 @ 1024 d/t continued severe abdominal pain     Start     Ordered   05/13/18 1024  Inpatient Admission Once     Transfer Service: General Medicine       Question Answer Comment   Admitting Physician Emma Rueda     Level of Care Med Surg     Estimated length of stay More than 2 Midnights     Certification I certify that inpatient services are medically necessary for this patient for a duration of greater than two midnights   See H&P and MD Progress Notes for additional information about the patient's course of treatment          05/13/18          ED: Date/Time/Mode of Arrival:             ED Arrival Information      Expected Arrival [address] [place] of Arrival Escorted By Service Admission Type     - 5/12/2018 08:24 Urgent Ambulance [facility] General Medicine Urgent     Arrival Complaint     -             Chief Complaint:        Chief Complaint   Patient presents with    Abdominal Pain       Patient presents to the [facility]  with abdominal, chest and leg pain  Patient "left the hospital last week  They wanted me to stay and I wanted to get home "         History of Illness: 79 y  o  male with a PMHx of HTN, HLD, IDDM, CAD w multivessel disease presenting with abdominal pain  Patient states he has been experiencing these symptoms for the last week and has been progressively getting worse  Bryce Padilla states that the pain is diffuse, both in the right and left side of the abdomen   No association with diet   Symptoms worse with specific positioning   No changes in bowel habits, no diarrhea, constipation, nausea, vomiting   However, the patient said that he has not felt hungry secondary to the abdominal pain  He also experiences some vague chest discomfort, but states that this has improved significantly since his last admission   The chest discomfort is described as a tightness, without any radiation or associated symptoms         ED Vital Signs:            ED Triage Vitals [05/12/18 0854]   Temperature Pulse Respirations Blood Pressure SpO2   97 6 °F (36 4 °C) 85 18 165/77 96 %       Temp Source Heart Rate Source Patient Position - Orthostatic VS BP Location FiO2 (%)   Oral Monitor Lying Left arm --       Pain Score           9                Wt Readings from Last 1 Encounters:   05/13/18 114 kg (252 lb)         Vital Signs (abnormal):  WNL     Abnormal Labs/Diagnostic Test Results: , glucose 289, Lipase 64, lactic acid 2 4 troponin neg  UA -- 5-- glucose, 1+ protein     EKG -- NSR, nonspecific ST change  CXR -- improving vascular congestion  CT a/p -- showed bibasilar tree-in-bud nodules, hepatic steatosis, cholelithiasis  US RUQ -- cholelithiasis with positive sonographic Mortensen sign, no wall thickening or pericholecystic fluid, enlarged fatty liver        ED Treatment:              Medication Administration from 05/12/2018 0874 to 05/12/2018 5287     Date/Time Order Dose Route Action Action by Comments       05/12/2018 1017 sodium chloride 0 9 % bolus 1,000 mL 1,000 mL Intravenous New Bag Rudy Maynard RN         05/12/2018 1016 morphine injection 2 mg 2 mg Intravenous Given Rudy Maynard RN         05/12/2018 1016 ondansetron (ZOFRAN) injection 4 mg 4 mg Intravenous Given Rudy Maynard RN         05/12/2018 1058 iohexol (OMNIPAQUE) 350 MG/ML injection (MULTI-DOSE) 100 mL 100 mL Intravenous Given Kayden Rodas         05/12/2018 1239 ketorolac (TORADOL) injection 15 mg 15 mg Intravenous Given Shoaib Sanchez RN         27/07/7893 1241 sodium chloride 0 9 % bolus 1,000 mL 1,000 mL Intravenous New Bag Shoaib Sanchez RN         94/36/7643 1340 sodium chloride 0 9 % bolus 1,000 mL 1,000 mL Intravenous New Bag Shoaib Sanchez RN               Past Medical/Surgical History:        Past Medical History:   Diagnosis Date    CAD (coronary artery disease)      Chronic pain      [facility]      DM type 2 with diabetic peripheral neuropathy ([facility] )      Former tobacco use      GERD (gastroesophageal reflux disease)      H/O acute myocardial infarction      H/O: pneumonia      History of aspiration pneumonia      History of suicidal ideation      HLD (hyperlipidemia)      Hypertension      [facility] transverse process [facility] 01/2018    MDD (major depressive disorder)      Non-alcoholic fatty liver disease      Opioid dependence (HCC)      PTSD (post-traumatic stress disorder)           Admitting Diagnosis: Cholelithiasis [K80 20]  Abdominal pain [R10 9]  Elevated serum lactate dehydrogenase [R74 0]     Age/Sex: 79 y o  male     Assessment/Plan:   Intractable Abdominal Pain  Differential is wide including mesenteric ischemia, biliary colic, coronary disease    Notably, the patient came in with lactic acid of 2 4   He was given IV fluids and repeat lactate was normal at 1 3      My index of suspicion is high for mesenteric ischemia in light of patient's being a vasculopath and diabetic  Patient needs imaging of the mesenteric arteries, however due to body habitus he will likely not be a good candidate for Doppler   Notably, the patient did have a CT scan with contrast done on day of admission, but he will benefit from a CTA to further evaluate his mesenteric vessels       It may be appropriate to touch base with Radiology to determine whether the patient may benefit from a Doppler study and whether this CT with contrast is sufficient for imaging      In addition, ultrasound once again shows gallstones without evidence of cholecystitis   However, due to patient's multiple admissions and persistent right upper quadrant pain, he will be a good candidate for a HIDA scan to evaluate for biliary disease      Chest pain:  Secondary to patient's documented multivessel disease and diabetes, he could be presenting atypically for acute coronary syndrome   Initial 2 troponins were negative, will obtain 1 more to confirm   Start telemetry monitoring   Patient on Plavix and aspirin, Lipitor 40 mg, metoprolol 50 mg b i d      CAD with multivessel disease  Patient has been evaluated by cardiothoracic surgery  Aniyah Peterson has been recommended to undergo CABG, but he he has continued to refuse surgery  Aniyah Peterson is on appropriate medical therapy as noted above with aspirin, Plavix, high-dose statin and beta-blocker      To me, the patient states that he still has not decided whether he wishes to undergo CABG  Kellie Verma will be appropriate to discuss with the patient again during this admission whether not he has decided to undergo CABG   If so, consult to cardiothoracic surgery would be appropriate       Insulin-dependent diabetes mellitus  Continue home Lantus 20 q h s  with correctional insulin  Notably, the patient's hemoglobin A1c was 10 7 on 02/47/3890  Aniyah Peterson certainly needs better titration of his insulin    Will monitor blood sugars here and adjust insulin dose as necessary      Diabetic neuropathy  Resulting in lower extremity pain and autonomic dysfunction of bladder  Patient has been prescribed Lyrica for pain and tamsulosin for urinary dysfunction   Will continue while inpatient      Iron deficiency anemia  Hemoglobin 13 4  Continue home dose of ferrous sulfate 325 mg     COPD  Stable  Continue home dose of Advair and albuterol        Admission Orders:  M/S/Tele unit  Telem  Troponin q3h  Nuclear stress test  Cons carb diet  Fingerstick glucose checks qid w/ ssi  Monitor I&O's  SCD's  Up & oob as tolerated  f/u CT a/p      Scheduled Meds:   Current Facility-Administered Medications:  albuterol 2 puff Inhalation 4x Daily Riddhi Alex MD   albuterol 2 5 mg Nebulization Q4H PRN Ted Rivera, DO   aspirin 81 mg Oral Daily Ted Rivera, DO   atorvastatin 40 mg Oral Daily With [employer] DO   clopidogrel 75 mg Oral Daily Ted Rivera, DO   ferrous sulfate 325 mg Oral Daily With Breakfast Ted Rivera, DO   fluticasone-salmeterol 1 puff Inhalation Q12H [address] Alanna Perkins DO   heparin (porcine) 5,000 Units Subcutaneous [facility] Alanna Perkins, DO   insulin glargine 20 Units Subcutaneous HS Ted Rivera, DO   insulin lispro 9 Units Subcutaneous TID AC Ted Rivera, DO   metoprolol tartrate 50 mg Oral Q12H [address] Ted Rivera, DO   nitroglycerin 0 4 mg Sublingual Q5 Min PRN Alanna Perkins, DO   oxyCODONE-acetaminophen 1 tablet Oral Q6H PRN Ted Rivera, DO   pantoprazole 40 mg Oral Daily Ted Rivera, DO   polyethylene glycol 17 g Oral Daily PRN Sean Casillas, DO   pregabalin 100 mg Oral TID Corbin Baker MD   tamsulosin 0 4 mg Oral Daily With Dinner Alanna Perkisn, DO      Continuous Infusions:    PRN Meds: albuterol    nitroglycerin    oxyCODONE-acetaminophen    polyethylene glycol        5/13 --- pt   Continues to c/o 10/10 abdominal pain; then had vomiting of large amounts of yellow/green with some undigested food      CT a/p 5/13 -- 1   Extensive atherosclerotic plaque without evidence of abdominal aortic aneurysm or dissection   No evidence of significant stenosis, thrombus or occlusion within mesenteric arteries  2   Moderate to large amount of stool in the colon may indicate constipation   No evidence of bowel obstruction or visualized colitis  3   Cholelithiasis without evidence of cholecystitis or biliary obstruction     5/13 -- Zofran 4 mg po 5/12 x2, 5/13 x2, percocet 1 tab 5/12 x2  513 x2, miralax po 5/13  /81, 171/76     5/14 -- WBC 19 40       Revision History    Thank you,  [address] [facility] in the [facility] [place] for 2017  Network Utilization Review Department  Phone: [phone]; Fax [phone]  ATTENTION: The Network Utilization Review Department is now centralized for our 7 Facilities  Make a note that we have a new phone and fax numbers for our Department  Please call with any questions or concerns to [phone] and carefully follow the prompts so that you are directed to the right person  All voicemails are confidential  Fax any determinations, approvals, denials, and requests for initial or continue stay review clinical to [phone]  Due to HIGH CALL volume, it would be easier if you could please send faxed requests to expedite your requests and in part, help us provide discharge notifications faster

## 2018-05-17 NOTE — TELEPHONE ENCOUNTER
[facility]  Nurse Megan Hopes called  She called Fanny Alvarenga to arrange for a visit  He did not want the nurse to come to the home today  They are not going to open a case for providing home care to him  Historically, he does not answer the door, refuses care after they arrived or does not answer the door or telephone

## 2018-05-30 ENCOUNTER — TRANSITIONAL CARE MANAGEMENT (OUTPATIENT)
Dept: INTERNAL MEDICINE CLINIC | Facility: CLINIC | Age: 68
End: 2018-05-30

## 2018-05-30 ENCOUNTER — OFFICE VISIT (OUTPATIENT)
Dept: INTERNAL MEDICINE CLINIC | Facility: CLINIC | Age: 68
End: 2018-05-30
Payer: COMMERCIAL

## 2018-05-30 VITALS
OXYGEN SATURATION: 96 % | HEIGHT: 70 IN | SYSTOLIC BLOOD PRESSURE: 140 MMHG | WEIGHT: 237.4 LBS | DIASTOLIC BLOOD PRESSURE: 82 MMHG | BODY MASS INDEX: 33.99 KG/M2 | HEART RATE: 98 BPM | TEMPERATURE: 98 F

## 2018-05-30 DIAGNOSIS — Z79.4 TYPE 2 DIABETES MELLITUS WITH DIABETIC NEUROPATHY, WITH LONG-TERM CURRENT USE OF INSULIN (HCC): ICD-10-CM

## 2018-05-30 DIAGNOSIS — D50.9 IRON DEFICIENCY ANEMIA, UNSPECIFIED IRON DEFICIENCY ANEMIA TYPE: ICD-10-CM

## 2018-05-30 DIAGNOSIS — R93.89 ABNORMAL CT OF THE CHEST: ICD-10-CM

## 2018-05-30 DIAGNOSIS — I25.118 CORONARY ARTERY DISEASE OF NATIVE ARTERY OF NATIVE HEART WITH STABLE ANGINA PECTORIS (HCC): Chronic | ICD-10-CM

## 2018-05-30 DIAGNOSIS — M79.605 PAIN IN BOTH LOWER EXTREMITIES: ICD-10-CM

## 2018-05-30 DIAGNOSIS — IMO0001 TRANSITION OF CARE PERFORMED WITH SHARING OF CLINICAL SUMMARY: ICD-10-CM

## 2018-05-30 DIAGNOSIS — E78.5 HYPERLIPIDEMIA, UNSPECIFIED HYPERLIPIDEMIA TYPE: ICD-10-CM

## 2018-05-30 DIAGNOSIS — I10 ESSENTIAL HYPERTENSION: ICD-10-CM

## 2018-05-30 DIAGNOSIS — G89.29 CHRONIC LOW BACK PAIN, UNSPECIFIED BACK PAIN LATERALITY, WITH SCIATICA PRESENCE UNSPECIFIED: ICD-10-CM

## 2018-05-30 DIAGNOSIS — K59.03 THERAPEUTIC OPIOID INDUCED CONSTIPATION: ICD-10-CM

## 2018-05-30 DIAGNOSIS — N40.1 BPH ASSOCIATED WITH NOCTURIA: ICD-10-CM

## 2018-05-30 DIAGNOSIS — M54.5 CHRONIC LOW BACK PAIN, UNSPECIFIED BACK PAIN LATERALITY, WITH SCIATICA PRESENCE UNSPECIFIED: ICD-10-CM

## 2018-05-30 DIAGNOSIS — N17.9 AKI (ACUTE KIDNEY INJURY) (HCC): Primary | ICD-10-CM

## 2018-05-30 DIAGNOSIS — R35.1 BPH ASSOCIATED WITH NOCTURIA: ICD-10-CM

## 2018-05-30 DIAGNOSIS — T40.2X5A THERAPEUTIC OPIOID INDUCED CONSTIPATION: ICD-10-CM

## 2018-05-30 DIAGNOSIS — S32.009D CLOSED FRACTURE OF TRANSVERSE PROCESS OF LUMBAR VERTEBRA WITH ROUTINE HEALING: ICD-10-CM

## 2018-05-30 DIAGNOSIS — Z11.59 NEED FOR HEPATITIS C SCREENING TEST: ICD-10-CM

## 2018-05-30 DIAGNOSIS — M79.604 PAIN IN BOTH LOWER EXTREMITIES: ICD-10-CM

## 2018-05-30 DIAGNOSIS — F11.29 OPIOID DEPENDENCE WITH OPIOID-INDUCED DISORDER (HCC): ICD-10-CM

## 2018-05-30 DIAGNOSIS — D64.9 ANEMIA, UNSPECIFIED TYPE: ICD-10-CM

## 2018-05-30 DIAGNOSIS — E11.40 TYPE 2 DIABETES MELLITUS WITH DIABETIC NEUROPATHY, WITH LONG-TERM CURRENT USE OF INSULIN (HCC): ICD-10-CM

## 2018-05-30 PROBLEM — R07.89 CHEST TIGHTNESS: Status: RESOLVED | Noted: 2018-05-12 | Resolved: 2018-05-30

## 2018-05-30 PROBLEM — R10.9 ABDOMINAL PAIN: Status: RESOLVED | Noted: 2018-05-12 | Resolved: 2018-05-30

## 2018-05-30 PROCEDURE — 99214 OFFICE O/P EST MOD 30 MIN: CPT | Performed by: PHYSICIAN ASSISTANT

## 2018-05-30 RX ORDER — DOCUSATE SODIUM 100 MG/1
100 CAPSULE, LIQUID FILLED ORAL 2 TIMES DAILY
Qty: 60 CAPSULE | Refills: 0 | Status: SHIPPED | OUTPATIENT
Start: 2018-05-30 | End: 2018-06-29 | Stop reason: SDUPTHER

## 2018-05-30 RX ORDER — OXYCODONE HYDROCHLORIDE AND ACETAMINOPHEN 5; 325 MG/1; MG/1
1 TABLET ORAL EVERY 6 HOURS PRN
Qty: 60 TABLET | Refills: 0 | Status: SHIPPED | OUTPATIENT
Start: 2018-05-30 | End: 2018-06-29 | Stop reason: SDUPTHER

## 2018-05-30 RX ORDER — PREGABALIN 100 MG/1
100 CAPSULE ORAL 3 TIMES DAILY
Qty: 90 CAPSULE | Refills: 0 | Status: SHIPPED | OUTPATIENT
Start: 2018-05-30 | End: 2018-06-29 | Stop reason: SDUPTHER

## 2018-05-30 RX ORDER — POLYETHYLENE GLYCOL 3350 17 G/17G
17 POWDER, FOR SOLUTION ORAL DAILY
Qty: 225 G | Refills: 0 | Status: SHIPPED | OUTPATIENT
Start: 2018-05-30 | End: 2019-01-01 | Stop reason: HOSPADM

## 2018-05-30 NOTE — ASSESSMENT & PLAN NOTE
Patient presents to the office today for transition of care from hospitalization admitted 5/12/2018 discharge 5/15/2018  During his hospitalization he was found to have opioid induced constipation  He was started on senna and Colace and after having a bowel movement his GI symptoms improved  He had a CTA of the abdomen and HIDA scan which were negative for active disease  Patient's symptoms improved since the hospital with being on a bowel regimen  He has not yet gotten a chance to get follow-up BMP as he was directed at the time of his discharge  Last colonoscopy was at [facility]   Patient notes if he gets another colonoscopy he would like it to be done through [facility]
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
## 2018-05-30 NOTE — PATIENT INSTRUCTIONS
Transition of care performed with sharing of clinical summary  Patient presents to the office today for transition of care from hospitalization admitted 5/12/2018 discharge 5/15/2018  During his hospitalization he was found to have opioid induced constipation  He was started on senna and Colace and after having a bowel movement his GI symptoms improved  He had a CTA of the abdomen and HIDA scan which were negative for active disease  Patient's symptoms improved since the hospital with being on a bowel regimen  He has not yet gotten a chance to get follow-up BMP as he was directed at the time of his discharge  Last colonoscopy was at [facility]   Patient notes if he gets another colonoscopy he would like it to be done through [facility]

## 2018-05-31 NOTE — ASSESSMENT & PLAN NOTE
Chronic back pain for sometime with history of more recent transverse process fracture  Referral to pain management given today    Discussed with time will wean off of oxycodone

## 2018-05-31 NOTE — ASSESSMENT & PLAN NOTE
PSA does not appear to have been performed  Will check it with next lab set  Some improvement on Flomax    Await PSA and consider urologic workup

## 2018-05-31 NOTE — ASSESSMENT & PLAN NOTE
Relatively controlled at this time on medications  Previously well controlled    Continue same meds will follow

## 2018-05-31 NOTE — ASSESSMENT & PLAN NOTE
Encouraged him to schedule neurosurgery follow-up  No longer using his back brace  Discussed with time will taper off of oxycodone and manage his pain with   Lyrica alone  Discussed if pain persists he should see pain management  Referral given

## 2018-05-31 NOTE — ASSESSMENT & PLAN NOTE
Patient has not yet scheduled Cardiology follow-up as an outpatient  Discussed at length the importance of cardiology follow-up  Patient notes he will consider but is not ready at this time to follow with a cardiologist   Continue same medications  Patient has not needed sublingual nitro    Discussed red flag symptoms and ER precautions that need immediate evaluation and treatment

## 2018-05-31 NOTE — ASSESSMENT & PLAN NOTE
Patient has not yet gone for repeat CT of the chest ordered last visit    Printed  CT order an given to patient to schedule

## 2018-05-31 NOTE — ASSESSMENT & PLAN NOTE
Patient notes last colonoscopy was with [facility]   Will need repeat colonoscopy    Referral given today

## 2018-05-31 NOTE — ASSESSMENT & PLAN NOTE
Stable at this time on insulin  Patient notes he does not read refill at this time    Will continue to follow A1c

## 2018-05-31 NOTE — ASSESSMENT & PLAN NOTE
Continue with senna and ace  Will add MiraLax to be used once daily    Encouraged high-fiber diet and water intake

## 2018-06-01 ENCOUNTER — HOSPITAL ENCOUNTER (EMERGENCY)
Facility: HOSPITAL | Age: 68
Discharge: HOME | End: 2018-06-01
Attending: EMERGENCY MEDICINE
Payer: COMMERCIAL

## 2018-06-01 VITALS
HEIGHT: 70 IN | BODY MASS INDEX: 33.5 KG/M2 | TEMPERATURE: 97.6 F | DIASTOLIC BLOOD PRESSURE: 83 MMHG | OXYGEN SATURATION: 95 % | HEART RATE: 83 BPM | SYSTOLIC BLOOD PRESSURE: 177 MMHG | WEIGHT: 234 LBS | RESPIRATION RATE: 18 BRPM

## 2018-06-01 DIAGNOSIS — G62.9 NEUROPATHY: ICD-10-CM

## 2018-06-01 DIAGNOSIS — G89.29 CHRONIC PAIN: Primary | ICD-10-CM

## 2018-06-01 LAB
ALBUMIN SERPL BCP-MCNC: 3.3 G/DL (ref 3.5–5)
ALP SERPL-CCNC: 98 U/L (ref 46–116)
ALT SERPL W P-5'-P-CCNC: 24 U/L (ref 12–78)
ANION GAP SERPL CALCULATED.3IONS-SCNC: 6 MMOL/L (ref 4–13)
AST SERPL W P-5'-P-CCNC: 16 U/L (ref 5–45)
ATRIAL RATE: 80 BPM
BASOPHILS # BLD AUTO: 0.14 THOUSANDS/ΜL (ref 0–0.1)
BASOPHILS NFR BLD AUTO: 2 % (ref 0–1)
BILIRUB SERPL-MCNC: 0.65 MG/DL (ref 0.2–1)
BUN SERPL-MCNC: 25 MG/DL (ref 5–25)
CALCIUM SERPL-MCNC: 8.9 MG/DL (ref 8.3–10.1)
CHLORIDE SERPL-SCNC: 102 MMOL/L (ref 100–108)
CO2 SERPL-SCNC: 26 MMOL/L (ref 21–32)
CREAT SERPL-MCNC: 1.13 MG/DL (ref 0.6–1.3)
EOSINOPHIL # BLD AUTO: 0.17 THOUSAND/ΜL (ref 0–0.61)
EOSINOPHIL NFR BLD AUTO: 2 % (ref 0–6)
ERYTHROCYTE [DISTWIDTH] IN BLOOD BY AUTOMATED COUNT: 22.6 % (ref 11.6–15.1)
GFR SERPL CREATININE-BSD FRML MDRD: 67 ML/MIN/1.73SQ M
GLUCOSE SERPL-MCNC: 309 MG/DL (ref 65–140)
HCT VFR BLD AUTO: 43.7 % (ref 36.5–49.3)
HGB BLD-MCNC: 13.4 G/DL (ref 12–17)
IMM GRANULOCYTES # BLD AUTO: 0.04 THOUSAND/UL (ref 0–0.2)
IMM GRANULOCYTES NFR BLD AUTO: 1 % (ref 0–2)
LIPASE SERPL-CCNC: 69 U/L (ref 73–393)
LYMPHOCYTES # BLD AUTO: 1.47 THOUSANDS/ΜL (ref 0.6–4.47)
LYMPHOCYTES NFR BLD AUTO: 17 % (ref 14–44)
MCH RBC QN AUTO: 25.4 PG (ref 26.8–34.3)
MCHC RBC AUTO-ENTMCNC: 30.7 G/DL (ref 31.4–37.4)
MCV RBC AUTO: 83 FL (ref 82–98)
MONOCYTES # BLD AUTO: 0.71 THOUSAND/ΜL (ref 0.17–1.22)
MONOCYTES NFR BLD AUTO: 8 % (ref 4–12)
NEUTROPHILS # BLD AUTO: 6.19 THOUSANDS/ΜL (ref 1.85–7.62)
NEUTS SEG NFR BLD AUTO: 70 % (ref 43–75)
NRBC BLD AUTO-RTO: 0 /100 WBCS
P AXIS: 55 DEGREES
PLATELET # BLD AUTO: 188 THOUSANDS/UL (ref 149–390)
PMV BLD AUTO: 10.3 FL (ref 8.9–12.7)
POTASSIUM SERPL-SCNC: 4.5 MMOL/L (ref 3.5–5.3)
PR INTERVAL: 158 MS
PROT SERPL-MCNC: 7.9 G/DL (ref 6.4–8.2)
QRS AXIS: 8 DEGREES
QRSD INTERVAL: 80 MS
QT INTERVAL: 380 MS
QTC INTERVAL: 438 MS
RBC # BLD AUTO: 5.27 MILLION/UL (ref 3.88–5.62)
SODIUM SERPL-SCNC: 134 MMOL/L (ref 136–145)
T WAVE AXIS: 42 DEGREES
TROPONIN I SERPL-MCNC: <0.02 NG/ML
VENTRICULAR RATE: 80 BPM
WBC # BLD AUTO: 8.72 THOUSAND/UL (ref 4.31–10.16)

## 2018-06-01 PROCEDURE — 99284 EMERGENCY DEPT VISIT MOD MDM: CPT

## 2018-06-01 PROCEDURE — 84484 ASSAY OF TROPONIN QUANT: CPT | Performed by: EMERGENCY MEDICINE

## 2018-06-01 PROCEDURE — 83690 ASSAY OF LIPASE: CPT | Performed by: EMERGENCY MEDICINE

## 2018-06-01 PROCEDURE — 96372 THER/PROPH/DIAG INJ SC/IM: CPT

## 2018-06-01 PROCEDURE — 93005 ELECTROCARDIOGRAM TRACING: CPT

## 2018-06-01 PROCEDURE — 85025 COMPLETE CBC W/AUTO DIFF WBC: CPT | Performed by: EMERGENCY MEDICINE

## 2018-06-01 PROCEDURE — 80053 COMPREHEN METABOLIC PANEL: CPT | Performed by: EMERGENCY MEDICINE

## 2018-06-01 PROCEDURE — 36415 COLL VENOUS BLD VENIPUNCTURE: CPT | Performed by: EMERGENCY MEDICINE

## 2018-06-01 PROCEDURE — 93010 ELECTROCARDIOGRAM REPORT: CPT | Performed by: INTERNAL MEDICINE

## 2018-06-01 RX ORDER — PREGABALIN 100 MG/1
100 CAPSULE ORAL 3 TIMES DAILY
Qty: 9 CAPSULE | Refills: 0 | Status: SHIPPED | OUTPATIENT
Start: 2018-06-01 | End: 2018-07-09 | Stop reason: SDUPTHER

## 2018-06-01 RX ORDER — PREGABALIN 50 MG/1
100 CAPSULE ORAL ONCE
Status: COMPLETED | OUTPATIENT
Start: 2018-06-01 | End: 2018-06-01

## 2018-06-01 RX ORDER — OXYCODONE HYDROCHLORIDE 5 MG/1
5 TABLET ORAL ONCE
Status: COMPLETED | OUTPATIENT
Start: 2018-06-01 | End: 2018-06-01

## 2018-06-01 RX ORDER — ACETAMINOPHEN 325 MG/1
975 TABLET ORAL ONCE
Status: COMPLETED | OUTPATIENT
Start: 2018-06-01 | End: 2018-06-01

## 2018-06-01 RX ORDER — OLANZAPINE 10 MG/1
5 INJECTION, POWDER, LYOPHILIZED, FOR SOLUTION INTRAMUSCULAR ONCE
Status: COMPLETED | OUTPATIENT
Start: 2018-06-01 | End: 2018-06-01

## 2018-06-01 RX ADMIN — ACETAMINOPHEN 975 MG: 325 TABLET ORAL at 06:37

## 2018-06-01 RX ADMIN — PREGABALIN 100 MG: 50 CAPSULE ORAL at 06:37

## 2018-06-01 RX ADMIN — OLANZAPINE 5 MG: 10 INJECTION, POWDER, FOR SOLUTION INTRAMUSCULAR at 06:47

## 2018-06-01 RX ADMIN — WATER 10 ML: 1 INJECTION INTRAMUSCULAR; INTRAVENOUS; SUBCUTANEOUS at 06:46

## 2018-06-01 RX ADMIN — OXYCODONE HYDROCHLORIDE 5 MG: 5 TABLET ORAL at 06:37

## 2018-06-01 NOTE — DISCHARGE INSTRUCTIONS
Chronic Pain   WHAT YOU NEED TO KNOW:   Chronic pain is pain that does not get better for 3 months or longer  Chronic pain may hurt all the time, or come and go  DISCHARGE INSTRUCTIONS:   Call 911 or have someone call 911 for any of the following:   · You are breathing slower than normal, or you have trouble breathing  · You cannot be awakened  · You have a seizure  Return to the emergency department if:   · Your heart is beating slower than normal     · Your heart feels like it is jumping or fluttering  · You cannot think clearly  Contact your healthcare provider if:   · You have side effects from prescription pain medicine, such as itching, nausea, or vomiting  · You have trouble sleeping  · Your pain gets worse, even after you take medicine  · You don't think the medicine is working  · You have questions or concerns about your condition or care  Medicines: You may need any of the following:  · Acetaminophen  decreases pain  It is available without a doctor's order  Ask how much to take and how often to take it  Follow directions  Read the labels of all other medicines you are using to see if they also contain acetaminophen, or ask your doctor or pharmacist  Acetaminophen can cause liver damage if not taken correctly  Do not use more than 4 grams (4,000 milligrams) total of acetaminophen in one day  · NSAIDs , such as ibuprofen, help decrease swelling, pain, and fever  This medicine is available with or without a doctor's order  NSAIDs can cause stomach bleeding or kidney problems in certain people  If you take blood thinner medicine, always ask your healthcare provider if NSAIDs are safe for you  Always read the medicine label and follow directions  · Prescription pain medicine  called narcotics or opioids may be given  Ask your healthcare provider how to take this medicine safely       · Anesthetics  can be rubbed on your skin or injected into a nerve or muscle to numb an area     · Other medicines  may reduce pain, anxiety, muscle tension, or swelling  · Take your medicine as directed  Contact your healthcare provider if you think your medicine is not helping or if you have side effects  Tell him of her if you are allergic to any medicine  Keep a list of the medicines, vitamins, and herbs you take  Include the amounts, and when and why you take them  Bring the list or the pill bottles to follow-up visits  Carry your medicine list with you in case of an emergency  Manage your chronic pain:   · Apply heat  on the area in pain for 20 to 30 minutes every 2 hours for as many days as directed  Heat helps decrease pain and muscle spasms  · Apply ice  on the part of your body that hurts for 15 to 20 minutes every hour or as directed  Use an ice pack, or put crushed ice in a plastic bag  Cover it with a towel  Ice decreases pain and swelling, and helps prevent tissue damage  · Go to physical therapy as directed  A physical therapist teaches you exercises to help improve movement and strength, and to decrease pain  · Exercise for 30 minutes, 3 times a week  Regular physical activity can help decrease pain and improve your quality of life  Ask your healthcare provider about the best exercise plan for your type of pain  · Get enough sleep  Create a relaxing bedtime routine  Go to sleep and wake up at the same time every day  Avoid caffeine in the afternoon  · Talk with a counselor or therapist   A type of counseling called cognitive behavioral therapy (CBT) can help your chronic pain by changing the way you think about it  CBT can also improve your mood, sleep, and ability to move  What you must know if you take narcotic pain medicine:   · You may need to take a bowel movement softener  The most common side effect of prescription pain medicine is constipation  Bowel movement softeners are available over the counter  · Do not mix prescription pain medicines    This can cause an overdose of medicine, which can become life-threatening  Read labels  Make sure you know the ingredients in all of your medicines  · Do not drink alcohol  when you take prescription pain medicine  It is not safe to mix narcotics or opioids with alcohol or illegal drugs  · Prescription pain medicine may impair your ability to drive or work safely  They may also cause dizziness and increase your risk for falling  · Store prescription pain medicine in a safe location at home  Keep your medicine away from children and other people  Never share your medicine with anyone  Follow up with your healthcare provider as directed: You may be referred to a pain specialist  Write down your questions so you remember to ask them during your visits  © 2017 [employer] [employer] Information is for End User's use only and may not be sold, redistributed or otherwise used for commercial purposes  All illustrations and images included in CareNotes® are the copyrighted property of [employer] , [employer]  or Jamil Henson  The above information is an  only  It is not intended as medical advice for individual conditions or treatments  Talk to your doctor, nurse or pharmacist before following any medical regimen to see if it is safe and effective for you
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
## 2018-06-05 ENCOUNTER — PATIENT OUTREACH (OUTPATIENT)
Dept: INTERNAL MEDICINE CLINIC | Facility: CLINIC | Age: 68
End: 2018-06-05

## 2018-06-05 DIAGNOSIS — I25.10 LEFT MAIN CORONARY ARTERY DISEASE: ICD-10-CM

## 2018-06-05 RX ORDER — METOPROLOL TARTRATE 50 MG/1
50 TABLET, FILM COATED ORAL EVERY 12 HOURS SCHEDULED
Qty: 60 TABLET | Refills: 5 | Status: SHIPPED | OUTPATIENT
Start: 2018-06-05 | End: 2018-01-01 | Stop reason: SDUPTHER

## 2018-06-20 ENCOUNTER — TELEPHONE (OUTPATIENT)
Dept: INTERNAL MEDICINE CLINIC | Facility: CLINIC | Age: 68
End: 2018-06-20

## 2018-06-20 DIAGNOSIS — E11.40 TYPE 2 DIABETES MELLITUS WITH DIABETIC NEUROPATHY, WITHOUT LONG-TERM CURRENT USE OF INSULIN (HCC): Primary | ICD-10-CM

## 2018-06-20 RX ORDER — BLOOD-GLUCOSE METER
EACH MISCELLANEOUS
Qty: 1 EACH | Refills: 0 | Status: SHIPPED | OUTPATIENT
Start: 2018-06-20

## 2018-06-20 RX ORDER — LANCETS 33 GAUGE
EACH MISCELLANEOUS
Qty: 100 EACH | Refills: 2 | Status: SHIPPED | OUTPATIENT
Start: 2018-06-20

## 2018-06-20 NOTE — TELEPHONE ENCOUNTER
Can we please order these supplies for this patient  I will sign them when they are sent back to our bin

## 2018-06-20 NOTE — TELEPHONE ENCOUNTER
Patient calling because his insurance is no longer paying for his freestyle machine, lancets or test strips  He claims that his insurance will cover the one touch ultra 2 machine and test strips and lancets  He has been unable to test his blood sugar for 11 days  He uses [facility] in [place]  Are we able to send a script to the pharmacy for him  He would like a phone call when this has been completed so he knows he can go pick it up

## 2018-06-21 NOTE — TELEPHONE ENCOUNTER
Called patient and made him aware the medications have been sent to the pharmacy and are ready to be picked up

## 2018-06-28 ENCOUNTER — TELEPHONE (OUTPATIENT)
Dept: INTERNAL MEDICINE CLINIC | Facility: CLINIC | Age: 68
End: 2018-06-28

## 2018-06-28 NOTE — TELEPHONE ENCOUNTER
Patient called to confirm he needed an appointment (already scheduled) for OxyCodone refill- noted in appt for narcotic agreement to be signed at appointment

## 2018-06-29 ENCOUNTER — PATIENT OUTREACH (OUTPATIENT)
Dept: INTERNAL MEDICINE CLINIC | Facility: CLINIC | Age: 68
End: 2018-06-29

## 2018-06-29 ENCOUNTER — OFFICE VISIT (OUTPATIENT)
Dept: INTERNAL MEDICINE CLINIC | Facility: CLINIC | Age: 68
End: 2018-06-29
Payer: COMMERCIAL

## 2018-06-29 VITALS
HEIGHT: 70 IN | RESPIRATION RATE: 20 BRPM | WEIGHT: 244.2 LBS | SYSTOLIC BLOOD PRESSURE: 170 MMHG | DIASTOLIC BLOOD PRESSURE: 88 MMHG | BODY MASS INDEX: 34.96 KG/M2 | OXYGEN SATURATION: 97 % | HEART RATE: 77 BPM | TEMPERATURE: 97.9 F

## 2018-06-29 DIAGNOSIS — R07.9 CHEST PAIN, UNSPECIFIED TYPE: Primary | ICD-10-CM

## 2018-06-29 DIAGNOSIS — T40.2X5A THERAPEUTIC OPIOID INDUCED CONSTIPATION: ICD-10-CM

## 2018-06-29 DIAGNOSIS — K21.9 GASTROESOPHAGEAL REFLUX DISEASE, ESOPHAGITIS PRESENCE NOT SPECIFIED: ICD-10-CM

## 2018-06-29 DIAGNOSIS — J42 CHRONIC BRONCHITIS, UNSPECIFIED CHRONIC BRONCHITIS TYPE (HCC): ICD-10-CM

## 2018-06-29 DIAGNOSIS — I25.118 CORONARY ARTERY DISEASE OF NATIVE ARTERY OF NATIVE HEART WITH STABLE ANGINA PECTORIS (HCC): Chronic | ICD-10-CM

## 2018-06-29 DIAGNOSIS — N40.1 BPH ASSOCIATED WITH NOCTURIA: ICD-10-CM

## 2018-06-29 DIAGNOSIS — Z12.11 SCREENING FOR COLON CANCER: ICD-10-CM

## 2018-06-29 DIAGNOSIS — F11.29 OPIOID DEPENDENCE WITH OPIOID-INDUCED DISORDER (HCC): ICD-10-CM

## 2018-06-29 DIAGNOSIS — D50.9 IRON DEFICIENCY ANEMIA, UNSPECIFIED IRON DEFICIENCY ANEMIA TYPE: ICD-10-CM

## 2018-06-29 DIAGNOSIS — Z79.4 TYPE 2 DIABETES MELLITUS WITH DIABETIC NEUROPATHY, WITH LONG-TERM CURRENT USE OF INSULIN (HCC): ICD-10-CM

## 2018-06-29 DIAGNOSIS — K59.03 THERAPEUTIC OPIOID INDUCED CONSTIPATION: ICD-10-CM

## 2018-06-29 DIAGNOSIS — G89.29 CHRONIC BACK PAIN, UNSPECIFIED BACK LOCATION, UNSPECIFIED BACK PAIN LATERALITY: ICD-10-CM

## 2018-06-29 DIAGNOSIS — E78.5 HYPERLIPIDEMIA, UNSPECIFIED HYPERLIPIDEMIA TYPE: ICD-10-CM

## 2018-06-29 DIAGNOSIS — M54.9 CHRONIC BACK PAIN, UNSPECIFIED BACK LOCATION, UNSPECIFIED BACK PAIN LATERALITY: ICD-10-CM

## 2018-06-29 DIAGNOSIS — E11.40 TYPE 2 DIABETES MELLITUS WITH DIABETIC NEUROPATHY, WITH LONG-TERM CURRENT USE OF INSULIN (HCC): ICD-10-CM

## 2018-06-29 DIAGNOSIS — I25.119 CORONARY ARTERY DISEASE INVOLVING NATIVE CORONARY ARTERY OF NATIVE HEART WITH ANGINA PECTORIS (HCC): ICD-10-CM

## 2018-06-29 DIAGNOSIS — R35.1 BPH ASSOCIATED WITH NOCTURIA: ICD-10-CM

## 2018-06-29 DIAGNOSIS — I10 ESSENTIAL HYPERTENSION: ICD-10-CM

## 2018-06-29 DIAGNOSIS — S32.009D CLOSED FRACTURE OF TRANSVERSE PROCESS OF LUMBAR VERTEBRA WITH ROUTINE HEALING: ICD-10-CM

## 2018-06-29 LAB — ECG INTERP DURING EX: NORMAL MS

## 2018-06-29 PROCEDURE — 99214 OFFICE O/P EST MOD 30 MIN: CPT | Performed by: NURSE PRACTITIONER

## 2018-06-29 PROCEDURE — 93000 ELECTROCARDIOGRAM COMPLETE: CPT | Performed by: NURSE PRACTITIONER

## 2018-06-29 RX ORDER — ASPIRIN 81 MG/1
81 TABLET ORAL DAILY
Qty: 90 TABLET | Refills: 1 | Status: SHIPPED | OUTPATIENT
Start: 2018-06-29 | End: 2018-01-01 | Stop reason: SDUPTHER

## 2018-06-29 RX ORDER — OXYCODONE HYDROCHLORIDE AND ACETAMINOPHEN 5; 325 MG/1; MG/1
1 TABLET ORAL 2 TIMES DAILY PRN
Qty: 60 TABLET | Refills: 0 | Status: SHIPPED | OUTPATIENT
Start: 2018-06-29 | End: 2018-07-23 | Stop reason: ALTCHOICE

## 2018-06-29 RX ORDER — CLOPIDOGREL BISULFATE 75 MG/1
75 TABLET ORAL DAILY
Qty: 90 TABLET | Refills: 1 | Status: SHIPPED | OUTPATIENT
Start: 2018-06-29 | End: 2018-06-29 | Stop reason: SDUPTHER

## 2018-06-29 RX ORDER — PANTOPRAZOLE SODIUM 40 MG/1
40 TABLET, DELAYED RELEASE ORAL DAILY
Qty: 90 TABLET | Refills: 1 | Status: SHIPPED | OUTPATIENT
Start: 2018-06-29 | End: 2018-01-01 | Stop reason: SDUPTHER

## 2018-06-29 RX ORDER — TAMSULOSIN HYDROCHLORIDE 0.4 MG/1
0.4 CAPSULE ORAL
Qty: 90 CAPSULE | Refills: 1 | Status: SHIPPED | OUTPATIENT
Start: 2018-06-29

## 2018-06-29 RX ORDER — ATORVASTATIN CALCIUM 40 MG/1
40 TABLET, FILM COATED ORAL
Qty: 90 TABLET | Refills: 1 | Status: SHIPPED | OUTPATIENT
Start: 2018-06-29 | End: 2018-01-01 | Stop reason: DRUGHIGH

## 2018-06-29 RX ORDER — ALBUTEROL SULFATE 90 UG/1
2 AEROSOL, METERED RESPIRATORY (INHALATION) EVERY 6 HOURS PRN
Qty: 1 INHALER | Refills: 1 | Status: SHIPPED | OUTPATIENT
Start: 2018-06-29 | End: 2018-08-14 | Stop reason: SDUPTHER

## 2018-06-29 RX ORDER — DOCUSATE SODIUM 100 MG/1
100 CAPSULE, LIQUID FILLED ORAL 2 TIMES DAILY
Qty: 180 CAPSULE | Refills: 1 | Status: SHIPPED | OUTPATIENT
Start: 2018-06-29 | End: 2018-06-29 | Stop reason: SDUPTHER

## 2018-06-29 NOTE — PATIENT INSTRUCTIONS
As discussed if you have any new chest pain you need to call 911 and go to the hospital   He declined being admitted today  Please call Cardiology this week to schedule appointment to discuss her surgery  Follow-up in 1 month  You can call for Concepcion mota on 7/11  You will need to be seen before the next refills   Start Advair 1 pudd twice a day, you acn use albuterol 1-2 puffs every 6 hours
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
## 2018-06-29 NOTE — PROGRESS NOTES
Outpatient Care Management Note:  RE:  Spoke with pt at his appt  Today  He is agreeable to have [facility] come in to do assessment for qualification of services  Explained that someone may need to ask himm personal questions about his finances in order to determine what he would qualify for  He states he is agreeable to having cardiac surgery n August, but has not followed through in scheduling appt with Cardiology  Will follow up after speaking with [facility]

## 2018-07-02 RX ORDER — CLOPIDOGREL BISULFATE 75 MG/1
75 TABLET ORAL DAILY
Qty: 90 TABLET | Refills: 1 | Status: SHIPPED | OUTPATIENT
Start: 2018-07-02 | End: 2019-01-01 | Stop reason: SDUPTHER

## 2018-07-02 RX ORDER — DOCUSATE SODIUM 100 MG/1
100 CAPSULE, LIQUID FILLED ORAL 2 TIMES DAILY
Qty: 180 CAPSULE | Refills: 1 | Status: SHIPPED | OUTPATIENT
Start: 2018-07-02

## 2018-07-09 DIAGNOSIS — G62.9 NEUROPATHY: ICD-10-CM

## 2018-07-09 NOTE — TELEPHONE ENCOUNTER
Pt needs lyrica  100mg 3x day send to [facility] in [place]  Please call when sent so he knows bc he needs to call a ride to take him

## 2018-07-10 ENCOUNTER — TELEPHONE (OUTPATIENT)
Dept: INTERNAL MEDICINE CLINIC | Age: 68
End: 2018-07-10

## 2018-07-10 RX ORDER — PREGABALIN 100 MG/1
100 CAPSULE ORAL 3 TIMES DAILY
Qty: 90 CAPSULE | Refills: 0 | Status: SHIPPED | OUTPATIENT
Start: 2018-07-10 | End: 2018-08-07 | Stop reason: SDUPTHER

## 2018-07-10 NOTE — TELEPHONE ENCOUNTER
Patient called the answering service on 06:45 p m  On 07/09/2018 regarding his medications  He called regarding refills on the medications  I called the patient and he stated that he is grateful for me calling him back on this matter, but the medication was sent over to the pharmacy  Patient was getting worried because of all his health issues he has      Patient was satisfied and told me that he loves this office and is happy with this practice

## 2018-07-19 DIAGNOSIS — J42 CHRONIC BRONCHITIS, UNSPECIFIED CHRONIC BRONCHITIS TYPE (HCC): ICD-10-CM

## 2018-07-23 ENCOUNTER — OFFICE VISIT (OUTPATIENT)
Dept: INTERNAL MEDICINE CLINIC | Facility: CLINIC | Age: 68
End: 2018-07-23
Payer: COMMERCIAL

## 2018-07-23 VITALS
BODY MASS INDEX: 35.76 KG/M2 | DIASTOLIC BLOOD PRESSURE: 90 MMHG | TEMPERATURE: 98.7 F | OXYGEN SATURATION: 96 % | SYSTOLIC BLOOD PRESSURE: 170 MMHG | WEIGHT: 241.4 LBS | HEIGHT: 69 IN | HEART RATE: 84 BPM | RESPIRATION RATE: 22 BRPM

## 2018-07-23 DIAGNOSIS — M54.42 CHRONIC BILATERAL LOW BACK PAIN WITH BILATERAL SCIATICA: Primary | ICD-10-CM

## 2018-07-23 DIAGNOSIS — M54.41 CHRONIC BILATERAL LOW BACK PAIN WITH BILATERAL SCIATICA: Primary | ICD-10-CM

## 2018-07-23 DIAGNOSIS — G89.29 CHRONIC BILATERAL LOW BACK PAIN WITH BILATERAL SCIATICA: Primary | ICD-10-CM

## 2018-07-23 PROCEDURE — 99213 OFFICE O/P EST LOW 20 MIN: CPT | Performed by: INTERNAL MEDICINE

## 2018-07-23 RX ORDER — TRAMADOL HYDROCHLORIDE 50 MG/1
50 TABLET ORAL EVERY 8 HOURS PRN
Qty: 90 TABLET | Refills: 0 | Status: SHIPPED | OUTPATIENT
Start: 2018-07-23 | End: 2018-09-06 | Stop reason: ALTCHOICE

## 2018-07-23 NOTE — PATIENT INSTRUCTIONS
Back Pain   AMBULATORY CARE:   Back pain  is common  You may feel sore or stiff on one or both sides of your back  The pain may spread to your buttocks or thighs  Back pain may be caused by an injury, lack of exercise, or obesity  Repeated bending, lifting, twisting, or lifting heavy items can also cause back pain  Seek immediate care for the following symptoms:   · Pain, numbness, or weakness in one or both legs    · Pain that is so severe, you cannot walk    · Unable to control your urine or bowel movements    · Severe back pain with chest pain    · Severe back pain, nausea, and vomiting    · Severe back pain that spreads to your side or genital area  Contact your healthcare provider if:   · You have back pain that does not get better with rest and pain medicine  · You have a fever  · You have pain that worsens when you are on your back or when you rest     · You have pain that worsens when you cough or sneeze  · You lose weight without trying  · You have questions or concerns about your condition or care  Treatment for back pain  may include any of the following:  · NSAIDs , such as ibuprofen, help decrease swelling, pain, and fever  This medicine is available with or without a doctor's order  NSAIDs can cause stomach bleeding or kidney problems in certain people  If you take blood thinner medicine, always ask your healthcare provider if NSAIDs are safe for you  Always read the medicine label and follow directions  · Acetaminophen  decreases pain  It is available without a doctor's order  Ask how much to take and how often to take it  Follow directions  Acetaminophen can cause liver damage if not taken correctly  · Prescription pain medicine  may be given  Ask your healthcare provider how to take this medicine safely  Manage your back pain:   · Apply ice  on your back for 15 to 20 minutes every hour or as directed  Use an ice pack, or put crushed ice in a plastic bag  Cover it with a towel  Ice helps decrease swelling and pain  · Apply heat  on your back for 20 to 30 minutes every 2 hours for as many days as directed  Heat helps decrease pain and muscle spasms  You can alternate ice and heat  · Stay active  as much as you can without causing more pain  Bed rest could make your back pain worse  Avoid heavy lifting until your pain is gone  Follow up with your healthcare provider as directed:  Write down your questions so you remember to ask them during your visits  © 2017 [facility] Information is for End User's use only and may not be sold, redistributed or otherwise used for commercial purposes  All illustrations and images included in CareNotes® are the copyrighted property of A [employer] M [employer]  or Jamil Henson  The above information is an  only  It is not intended as medical advice for individual conditions or treatments  Talk to your doctor, nurse or pharmacist before following any medical regimen to see if it is safe and effective for you
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
## 2018-07-24 ENCOUNTER — TELEPHONE (OUTPATIENT)
Dept: INTERNAL MEDICINE CLINIC | Facility: CLINIC | Age: 68
End: 2018-07-24

## 2018-07-24 NOTE — TELEPHONE ENCOUNTER
Can we do a prior auth on this medication, and see which medications would be covered  Please forward to MA who roomed [facility] patients yesterdays

## 2018-07-24 NOTE — TELEPHONE ENCOUNTER
Pt called the cream that was prescribed yesterday is not covered by insurance and he cant afford to pay for it  Please call back and send something else if possible

## 2018-07-27 ENCOUNTER — TELEPHONE (OUTPATIENT)
Dept: INTERNAL MEDICINE CLINIC | Facility: CLINIC | Age: 68
End: 2018-07-27

## 2018-07-30 ENCOUNTER — PATIENT OUTREACH (OUTPATIENT)
Dept: INTERNAL MEDICINE CLINIC | Facility: CLINIC | Age: 68
End: 2018-07-30

## 2018-07-30 ENCOUNTER — TELEPHONE (OUTPATIENT)
Dept: INTERNAL MEDICINE CLINIC | Facility: CLINIC | Age: 68
End: 2018-07-30

## 2018-07-30 NOTE — TELEPHONE ENCOUNTER
7/30/18  Just got off the phone with Anne in [place]  The pharmacist stated that she ran the diclofenac sodium through the system and she said that the   [place] [address] is still stating that its not covered  I spoke to "Lida Norton" the first time from [facility] and she said that it was approved  I then called back and spoke to "[facility]" she apologized for the wrong information   Mario Laughlin stated that the only way it could possible be covered is with an appeal

## 2018-07-30 NOTE — TELEPHONE ENCOUNTER
I called and spoke to Mr Carpio and he states that he can no longer take his tramadol because he is having side effects of nausea, dizziness and shortness of breath  Of note, during his appointment with me on 7/23/18, he had reported that he had taken tramadol in the past and that it worked very well for him and he had no adverse effects to it   I informed him to pack up the remainder of the tramadol which he states he stopped taking yesterday and return to the office so that he can be seen today  On 7/23/18 he was prescribed 90 tablets of tramadol to be taken one three times daily for 30 days  Of note, Pt finished his percocet 6 days early last time it was prescribed

## 2018-07-30 NOTE — TELEPHONE ENCOUNTER
Called Mr  Dontrellemerson and told him to return to the office with the left over tramadol to be assessed by a provider today

## 2018-07-30 NOTE — TELEPHONE ENCOUNTER
Will defer to Dr Delfina Bhakta as she saw pt on 7/23  Please inform pt that she is back in the office tomorrow  Forwarding to Dr Delfina Bhakta as well    THANKS!!

## 2018-07-30 NOTE — TELEPHONE ENCOUNTER
Teresa Teran called stating Dr Razia Decker had called him and advised him to bring his tramadol into the office in exchange for a new script for lyrica  Upon reviewing her note I called him back and advised that he is scheduled for Philyl Veliz Friday 8/3/18 and he already has Lyrica  He should keep his appointment and discuss the Lyrica and if they want to increase him from 3 to 4 pills per day

## 2018-07-30 NOTE — TELEPHONE ENCOUNTER
Pt was approved for the diclofenac sodium 1%   Spoke with "Eden Naval" form [facility]  [facility] made aware of this   DG

## 2018-07-30 NOTE — TELEPHONE ENCOUNTER
7/30/18  Called and spoke to "Page" from [facility]  She stated that the Voltaren 1% isn't covered but the diclofenac sodium was  Ref# [id number] PA#- [phone]  ICD10 code: K38 000A  Notified [facility] in [place] @ [phone]

## 2018-07-30 NOTE — TELEPHONE ENCOUNTER
Judy Botello called and stated that he is experiencing negative side affects from Tramadol  He is dizzy and nauseous  He asked if there is another medication he could take in its place for the pain her is having  He also inquired if he could have a prescription replacement for Voltaren which is a topical cream he was prescribed  His insurance will not cover

## 2018-07-30 NOTE — PROGRESS NOTES
Outpatient Care Management Note:Left a message regarding contacts and information that was gathered to give to pt that may help him in some areas of his living  Requested a follow up call back to provide these numbers

## 2018-08-07 DIAGNOSIS — G62.9 NEUROPATHY: ICD-10-CM

## 2018-08-07 DIAGNOSIS — S32.009D CLOSED FRACTURE OF TRANSVERSE PROCESS OF LUMBAR VERTEBRA WITH ROUTINE HEALING: Primary | ICD-10-CM

## 2018-08-07 NOTE — TELEPHONE ENCOUNTER
Last O/V: 7/23/18  Next O/V:  NO show for appt with Nicole Scheuermann on 8/3/18    2/14/18 office visit was discussed to taper off of oxycodone and take Lyrica only    Pt called left message today for refill of Oxycodone

## 2018-08-09 ENCOUNTER — TELEPHONE (OUTPATIENT)
Dept: INTERNAL MEDICINE CLINIC | Facility: CLINIC | Age: 68
End: 2018-08-09

## 2018-08-09 RX ORDER — PREGABALIN 100 MG/1
100 CAPSULE ORAL 3 TIMES DAILY
Qty: 90 CAPSULE | Refills: 0 | Status: SHIPPED | OUTPATIENT
Start: 2018-08-09 | End: 2018-09-06 | Stop reason: SDUPTHER

## 2018-08-09 RX ORDER — OXYCODONE HYDROCHLORIDE AND ACETAMINOPHEN 5; 325 MG/1; MG/1
1 TABLET ORAL EVERY 12 HOURS PRN
Qty: 60 TABLET | Refills: 0 | Status: SHIPPED | OUTPATIENT
Start: 2018-08-09 | End: 2018-09-06 | Stop reason: ALTCHOICE

## 2018-08-14 DIAGNOSIS — J42 CHRONIC BRONCHITIS, UNSPECIFIED CHRONIC BRONCHITIS TYPE (HCC): ICD-10-CM

## 2018-08-14 RX ORDER — ALBUTEROL SULFATE 90 UG/1
2 AEROSOL, METERED RESPIRATORY (INHALATION) EVERY 6 HOURS PRN
Qty: 1 INHALER | Refills: 0 | Status: SHIPPED | OUTPATIENT
Start: 2018-08-14 | End: 2018-01-01 | Stop reason: SDUPTHER

## 2018-08-14 NOTE — TELEPHONE ENCOUNTER
Noted   voltaren gel denied for back pain  Pt is to continue his other medications for pain and may use OTC icy hot or lidoderm patch  Please inform pt and forward back to Dr Nadya Gregory to review when she returns from [facility]    THANKS!!

## 2018-08-21 ENCOUNTER — TELEPHONE (OUTPATIENT)
Dept: INTERNAL MEDICINE CLINIC | Facility: CLINIC | Age: 68
End: 2018-08-21

## 2018-08-21 NOTE — TELEPHONE ENCOUNTER
Patient should be evaluated for this, as what he may think is a rash that requires nystatin may require a different type of treatment  Please have him make an appointment   Thank you

## 2018-08-21 NOTE — TELEPHONE ENCOUNTER
Pt l/m on [place] Rx line requesting refill for Nystatin for rash under his legs  I can find that we ever filled this for him  I reviewed [facility] and hospital records  Please review and see if refill is appropriate or does pt need to be seen? He would like Rx sent to [facility] in [place]  Thank you!

## 2018-08-22 ENCOUNTER — OFFICE VISIT (OUTPATIENT)
Dept: INTERNAL MEDICINE CLINIC | Facility: CLINIC | Age: 68
End: 2018-08-22
Payer: COMMERCIAL

## 2018-08-22 VITALS
DIASTOLIC BLOOD PRESSURE: 80 MMHG | HEART RATE: 77 BPM | HEIGHT: 69 IN | WEIGHT: 241.4 LBS | BODY MASS INDEX: 35.76 KG/M2 | TEMPERATURE: 98.1 F | OXYGEN SATURATION: 96 % | SYSTOLIC BLOOD PRESSURE: 140 MMHG

## 2018-08-22 DIAGNOSIS — N30.01 ACUTE CYSTITIS WITH HEMATURIA: ICD-10-CM

## 2018-08-22 DIAGNOSIS — R42 DIZZINESS: ICD-10-CM

## 2018-08-22 DIAGNOSIS — R30.0 DYSURIA: ICD-10-CM

## 2018-08-22 DIAGNOSIS — B35.6 FUNGAL INFECTION OF THE GROIN: Primary | ICD-10-CM

## 2018-08-22 PROBLEM — N30.00 ACUTE CYSTITIS: Status: ACTIVE | Noted: 2018-08-22

## 2018-08-22 PROBLEM — N39.0 UTI (URINARY TRACT INFECTION): Status: ACTIVE | Noted: 2018-08-22

## 2018-08-22 LAB
SL AMB  POCT GLUCOSE, UA: 500
SL AMB LEUKOCYTE ESTERASE,UA: ABNORMAL
SL AMB POCT BILIRUBIN,UA: ABNORMAL
SL AMB POCT BLOOD,UA: ABNORMAL
SL AMB POCT CLARITY,UA: CLEAR
SL AMB POCT COLOR,UA: YELLOW
SL AMB POCT KETONES,UA: ABNORMAL
SL AMB POCT NITRITE,UA: ABNORMAL
SL AMB POCT PH,UA: 6.5
SL AMB POCT SPECIFIC GRAVITY,UA: 1.01
SL AMB POCT URINE PROTEIN: 30
SL AMB POCT UROBILINOGEN: 0.2

## 2018-08-22 PROCEDURE — 81003 URINALYSIS AUTO W/O SCOPE: CPT | Performed by: NURSE PRACTITIONER

## 2018-08-22 PROCEDURE — 99214 OFFICE O/P EST MOD 30 MIN: CPT | Performed by: NURSE PRACTITIONER

## 2018-08-22 RX ORDER — PEN NEEDLE, DIABETIC 31 GX5/16"
NEEDLE, DISPOSABLE MISCELLANEOUS
Refills: 0 | COMMUNITY
Start: 2018-08-04 | End: 2018-01-01 | Stop reason: SDUPTHER

## 2018-08-22 RX ORDER — CEPHALEXIN 500 MG/1
500 CAPSULE ORAL EVERY 12 HOURS SCHEDULED
Qty: 14 CAPSULE | Refills: 0 | Status: SHIPPED | OUTPATIENT
Start: 2018-08-22 | End: 2018-08-29

## 2018-08-22 RX ORDER — NYSTATIN 100000 [USP'U]/G
POWDER TOPICAL 3 TIMES DAILY
Qty: 56.7 G | Refills: 3 | Status: SHIPPED | OUTPATIENT
Start: 2018-08-22 | End: 2018-01-01 | Stop reason: SDUPTHER

## 2018-08-22 NOTE — ASSESSMENT & PLAN NOTE
Will treat with nystatin powder to use up to 4 times a day to groin  Patient advised to keep groin clean and dry to prevent further fungal infection

## 2018-08-22 NOTE — ASSESSMENT & PLAN NOTE
Feel that patient's dizziness is multifactorial   Advised patient to change positions slowly and continue to stay hydrated  Discussed red flag warning signs with patient and when he should report to the ER  Of note patient is diabetic, and his blood sugars could be affecting his dizziness, advised patient to eat a well-balanced diet to continue to check his blood sugars and make us aware of his blood sugars dip to low

## 2018-08-22 NOTE — ASSESSMENT & PLAN NOTE
Patient's urine sample positive for nitrates, and trace blood, negative leukocytes  Will send urine for culture,   And adjust antibiotics as necessary once culture results are back  Will start patient on Keflex 500 mg to take twice a day for the next 7 days  Discussed red flag symptoms with patient and when she should report back to the office  Advised patient to drink plenty of water, wipe front to back, and urinating after sexual intercourse
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
## 2018-08-24 LAB
BACTERIA UR CULT: NORMAL
Lab: NO GROWTH

## 2018-09-05 ENCOUNTER — TELEPHONE (OUTPATIENT)
Dept: INTERNAL MEDICINE CLINIC | Facility: CLINIC | Age: 68
End: 2018-09-05

## 2018-09-05 NOTE — TELEPHONE ENCOUNTER
According to Dr Tee Lopez was no on 07/23/2018, patient was to stop Percocet and to take tramadol  Will forward to Dr Tee Lopez for review

## 2018-09-05 NOTE — TELEPHONE ENCOUNTER
Pt's calling for Oxycodone Acetaminophen refill  I see in [address] notes that medication was to be stopped  I also see where it says Dr Delfina Bhakta wrote in a telephone note on 7/30/18 last time it was filled he finished it 6 days early  Can someone please review and see if the pt is to be taking this or not? Dr Farhana Rodney refilled it on 8/9/18 as a refill request from the pharmacy ([facility])  Thank you!

## 2018-09-05 NOTE — TELEPHONE ENCOUNTER
He called to say that he was having adverse effects on the tramadol and Dr Shabnam Porter gave him the percocet on 8/9/18 so I believe that means he is no longer on Tramadol and is now taking percocet again

## 2018-09-06 ENCOUNTER — TELEPHONE (OUTPATIENT)
Dept: INTERNAL MEDICINE CLINIC | Facility: CLINIC | Age: 68
End: 2018-09-06

## 2018-09-06 ENCOUNTER — OFFICE VISIT (OUTPATIENT)
Dept: INTERNAL MEDICINE CLINIC | Facility: CLINIC | Age: 68
End: 2018-09-06
Payer: COMMERCIAL

## 2018-09-06 VITALS
WEIGHT: 234 LBS | DIASTOLIC BLOOD PRESSURE: 84 MMHG | HEART RATE: 102 BPM | BODY MASS INDEX: 34.66 KG/M2 | HEIGHT: 69 IN | TEMPERATURE: 98.5 F | OXYGEN SATURATION: 97 % | SYSTOLIC BLOOD PRESSURE: 144 MMHG

## 2018-09-06 DIAGNOSIS — F11.29 OPIOID DEPENDENCE WITH OPIOID-INDUCED DISORDER (HCC): ICD-10-CM

## 2018-09-06 DIAGNOSIS — G62.9 NEUROPATHY: ICD-10-CM

## 2018-09-06 DIAGNOSIS — G89.29 CHRONIC BILATERAL LOW BACK PAIN WITH BILATERAL SCIATICA: ICD-10-CM

## 2018-09-06 DIAGNOSIS — M54.42 CHRONIC BILATERAL LOW BACK PAIN WITH BILATERAL SCIATICA: ICD-10-CM

## 2018-09-06 DIAGNOSIS — M54.41 CHRONIC BILATERAL LOW BACK PAIN WITH BILATERAL SCIATICA: ICD-10-CM

## 2018-09-06 DIAGNOSIS — L03.115 CELLULITIS OF RIGHT LOWER EXTREMITY: Primary | ICD-10-CM

## 2018-09-06 PROBLEM — B35.6 TINEA CRURIS: Status: RESOLVED | Noted: 2018-04-16 | Resolved: 2018-09-06

## 2018-09-06 PROBLEM — N39.0 UTI (URINARY TRACT INFECTION): Status: RESOLVED | Noted: 2018-08-22 | Resolved: 2018-09-06

## 2018-09-06 PROBLEM — R42 DIZZINESS: Status: RESOLVED | Noted: 2018-08-22 | Resolved: 2018-09-06

## 2018-09-06 PROBLEM — E87.1 HYPONATREMIA: Status: RESOLVED | Noted: 2018-05-01 | Resolved: 2018-09-06

## 2018-09-06 PROBLEM — N17.9 AKI (ACUTE KIDNEY INJURY) (HCC): Status: RESOLVED | Noted: 2018-05-30 | Resolved: 2018-09-06

## 2018-09-06 PROBLEM — R30.0 DYSURIA: Status: RESOLVED | Noted: 2018-08-22 | Resolved: 2018-09-06

## 2018-09-06 PROCEDURE — 99214 OFFICE O/P EST MOD 30 MIN: CPT | Performed by: INTERNAL MEDICINE

## 2018-09-06 RX ORDER — CEPHALEXIN 500 MG/1
500 CAPSULE ORAL EVERY 12 HOURS SCHEDULED
Qty: 20 CAPSULE | Refills: 0 | Status: SHIPPED | OUTPATIENT
Start: 2018-09-06 | End: 2018-01-01

## 2018-09-06 RX ORDER — CLOTRIMAZOLE 1 %
CREAM (GRAM) TOPICAL 2 TIMES DAILY
Qty: 30 G | Refills: 0 | Status: SHIPPED | OUTPATIENT
Start: 2018-09-06 | End: 2018-01-01

## 2018-09-06 RX ORDER — PREGABALIN 100 MG/1
100 CAPSULE ORAL 3 TIMES DAILY
Qty: 90 CAPSULE | Refills: 0 | Status: SHIPPED | OUTPATIENT
Start: 2018-09-09 | End: 2018-01-01 | Stop reason: SDUPTHER

## 2018-09-06 NOTE — TELEPHONE ENCOUNTER
Discussed with Dr Tasia Martinez and explained to her that Dr Kerry Grigsby never saw the pt to prescribe the pain medication to him  The medication came in as an automatic refill from the pharmacy and was filled  She said the pt then needs to come in in order to get assessed to see if refill for pain medication is appropriate  Pt has not seen pain management as previously ordered  Please call pt and have him scheduled an appt if he wants pain medication  Thank you!

## 2018-09-06 NOTE — TELEPHONE ENCOUNTER
Karson Curry    Patient is looking to see if you would consider changing the order date on his Lyrica to 9/7/18 --He is looking to pick them up at the pharmacy tomorrow which is within the 28 day rule of the script  Tomorrow is day 29  Please let advise so that I can call him to let him know as he has requested      Thank you--

## 2018-09-06 NOTE — ASSESSMENT & PLAN NOTE
Will prescribe Keflex 500 mg twice a day for 10 days  Advised he also start using Clotrimazole twice a day for 14 days for tinea pedis

## 2018-09-06 NOTE — TELEPHONE ENCOUNTER
Unfortunately, I will not be able to refill prescription any sooner  Can you contact Mr Carpio to inform him?  Thank you

## 2018-09-06 NOTE — TELEPHONE ENCOUNTER
Pt was seen in the [place] office today and Lyrica was refilled, but it seems that pt has requested the refill too soon
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
## 2018-09-14 NOTE — PROGRESS NOTES
Assessment/Plan:    No problem-specific Assessment & Plan notes found for this encounter  Problem List Items Addressed This Visit        Nervous and Auditory    Neuropathy       Musculoskeletal and Integument    Closed fracture of transverse process of lumbar vertebra with routine healing    Relevant Medications    oxyCODONE-acetaminophen (PERCOCET) 5-325 mg per tablet    predniSONE 20 mg tablet    QUEtiapine (SEROquel) 25 mg tablet       Other    Opioid dependence (HCC)    Relevant Medications    QUEtiapine (SEROquel) 25 mg tablet    Chronic back pain    Abnormal CT of the chest - Primary      Other Visit Diagnoses     Smoker        Relevant Medications    predniSONE 20 mg tablet    Other Relevant Orders    CT chest w contrast             discussion, July note from Dr Cam High reviewed which states that he took more pills hours prescribed  Percocet was stopped at that time however per review of the chart and calling the pharmacy patient room received refill on 08/09/2018 for 60 tablets dose that b i d   If taken appropriately he should be out of this prescription  I had advised him that he will be getting a prescription for 60 pills which will last him for 30 days  If he finishes sooner or takes them inappropriately he will be discharged from our practice and no further refills will be given  Patient understands and agrees and states he is adamant about taking the pills as prescribed and that he is honest   Refer patient to Pain Management however he states he will go after his open heart surgery which will be done at [facility] soon, he does not give me a date for this  Will prescribe prednisone  Burst at for 5 days as well as Seroquel for bedtime due to patient's agitation, anxiety and pain issues  He is to follow up in November for his regular routine visit for diabetes and will need lab work at that time  He states he has prescription at home    I advised him to be compliant otherwise we are unable to follow him for his medical care  Patient should make his routine visits with his known providers and PCP in our office and not jump from provider to provider due to his chronic pain issues  Last note states that a narcotic agreement was signed by Dr Alisa Barbosa during the visit  Discussed CT scan of the chest from April  Advised him that he is well overdue for repeat since it showed tree-in-bud appearance  Order today and patient is states he is not sure when he will have time to get this done  I advised him of the benefits of getting this repeated and questions and concerns were addressed today    Subjective:  Pain medications     Patient ID: Shirley Mathew  is a 79 y o  male  HPI    Back Pain   This is a chronic problem  The current episode started more than 1 year ago  The problem occurs daily  The problem is unchanged  The pain is present in the lumbar spine  The pain radiates to the left thigh and right thigh  The pain is moderate  The pain is the same all the time  The symptoms are aggravated by bending, twisting and position  Stiffness is present all day  Pertinent negatives include no abdominal pain, bladder incontinence, bowel incontinence, chest pain, dysuria, fever, headaches, leg pain, numbness, paresis, paresthesias, pelvic pain, perianal numbness, tingling, weakness or weight loss  Treatments tried: Lyrica  The treatment provided mild relief  9/14/18: got dizzy with tramadol  He stats he through them out  States he ran out weeks ago of narcotics and takes lyrica only  Not taking any OTC meds  However after closer review of medication, refills, chart and calling the pharmacy-  Patient received 60 tablets on 08/09  He states he was here few days ago and was refused any refills and was told that he is not getting any more narcotics from our office however does not admit to picking up prescription on August 9th or taking them in appropriately     He states he did not follow up with pain management because he has open heart surgery at [facility] coming up soon  The following portions of the patient's history were reviewed and updated as appropriate: allergies, current medications, past family history, past medical history, past social history, past surgical history and problem list     Review of Systems    Constitutional:  Denies fever or chills   Eyes:  Denies change in visual acuity   HENT:  Denies nasal congestion or sore throat   Respiratory:  + cough or shortness of breath or wheezing  Cardiovascular:  Denies palpitations or chest pain  GI:  Denies abdominal pain, nausea, or vomiting  Integument:  Denies rash   Neurologic:  Denies headache or focal weakness      Objective:      /78 (BP Location: Left arm, Patient Position: Sitting, Cuff Size: Large)   Pulse 102   Temp 98 4 °F (36 9 °C) (Oral)   Ht 5' 9" (1 753 m)   Wt 108 kg (238 lb 3 2 oz)   SpO2 98%   BMI 35 18 kg/m²          Physical Exam    Constitutional:  Well developed, well nourished,   Anxious, speaking very loudly and appears to be agitated, non-toxic appearance ,  Morbidly obese  Eyes:  PERRL, conjunctiva normal , non icteric sclera  HENT:  Atraumatic, oropharynx moist  Neck-  supple   Respiratory:  Decreased breath sound bilaterally, no rales, no wheezing   Cardiovascular:  RRR, no murmurs, no LE edema b/l  GI:  Soft, nondistended, normal bowel sounds x 4, nontender, no organomegaly, no mass, no rebound, no guarding   Neurologic:  no focal deficits noted   Psychiatric:  Speech and behavior appropriate , AAO x 3  MS:  With cane but normal   Unable to properly assess back due to patient not being cooperative with exam   Jumps and has rapid movement before I start to examine him  Unclear of straight leg test since bilateral hamstrings  Have significant hypertonicity and he will not let me raise his legs  Tender to palpation with light touch in lumbar spine and paraspinal areas    No rashes noted    However at the slightest touch, even lifting his shirt  for examination causes him great discomfort and he is unable to sit still or stand still for proper examination

## 2018-09-17 NOTE — TELEPHONE ENCOUNTER
Patient took the 2 days of prednisone and found no relief from the prednisone and is up coughing all night  He would like a call from the provider    I did not tell him earlier the prednisone was for the cough because the diagnosis was as follows on the script:    Diagnosis Association: Closed fracture of transverse process of lumbar vertebra with routine healing (S32 009D);  Smoker ([id number])      Pharmacy:  [address] [place] [address] [id number], [place] [address] [place] [address] HUE #:  [id number]

## 2018-09-17 NOTE — TELEPHONE ENCOUNTER
Patient called and states that Jaime Jones was to call in a script for his coughing    Nothing is showing in his chart     Please review and submit to [facility] on [address]

## 2018-09-24 PROBLEM — L03.115 CELLULITIS OF RIGHT LOWER EXTREMITY: Status: RESOLVED | Noted: 2018-09-06 | Resolved: 2018-01-01

## 2018-09-24 PROBLEM — B35.6 FUNGAL INFECTION OF THE GROIN: Status: RESOLVED | Noted: 2018-08-22 | Resolved: 2018-01-01

## 2018-09-24 NOTE — PROGRESS NOTES
Assessment/Plan:    No problem-specific Assessment & Plan notes found for this encounter  Problem List Items Addressed This Visit        Endocrine    Type 2 diabetes mellitus with diabetic neuropathy, with long-term current use of insulin ([facility])    Relevant Medications    insulin glargine (LANTUS SOLOSTAR) 100 units/mL injection pen    Other Relevant Orders    UA w Reflex to Microscopic w Reflex to Culture - [address] / creatinine urine ratio (Completed)       Respiratory    COPD (chronic obstructive pulmonary disease) (HCC)       Genitourinary    BPH associated with nocturia    Relevant Orders    Urine culture    UA w Reflex to Microscopic w Reflex to Culture - Clinic Collect    Microalbumin / creatinine urine ratio (Completed)      Other Visit Diagnoses     Urinary frequency    -  Primary    Relevant Orders    POCT urine dip (Completed)    Urine culture    UA w Reflex to Microscopic w Reflex to Culture - Clinic Collect    Microalbumin / creatinine urine ratio (Completed)    Fungal infection of the groin        Relevant Medications    nystatin (MYCOSTATIN) ointment    nystatin (MYCOSTATIN) powder    Fungal skin infection        Relevant Medications    nystatin (MYCOSTATIN) ointment    nystatin (MYCOSTATIN) powder            Subjective:      Patient ID: Carlos Carmichael  is a 76 y o  male  HPI       having increased urinary frequency over the past 7-10 days  States he goes 20-30 times a day  States his blood sugars are in the 120s in the morning  Diabetes mellitus, needs refills on his insulin  States he is not out of it but taking it appropriately and having good fasting blood sugars at home  Right foot pain and swelling  Has history of diabetic neuropathy however had a history of 4th toe pain and swelling and was told he had a fungal infection  Treated without any issues and now is coming back with slight edema and ecchymoses    Denies any traumas any falls and hitting his toe on anything  Needs refill on his cream    The following portions of the patient's history were reviewed and updated as appropriate: allergies, current medications, past family history, past medical history, past social history, past surgical history and problem list     Review of Systems      Constitutional:  Denies fever or chills   Eyes:  Denies change in visual acuity   HENT:  Denies nasal congestion or sore throat   Respiratory:  Denies cough or shortness of breath or wheezing  Cardiovascular:  Denies palpitations or chest pain  GI:  Denies abdominal pain, nausea, or vomiting  Integument:  Denies rash   Neurologic:  Denies headache or focal weakness        Objective:      /60 (BP Location: Left arm, Patient Position: Sitting, Cuff Size: Large)   Pulse 70   Temp 98 4 °F (36 9 °C) (Oral)   Ht 5' 8" (1 727 m)   Wt 106 kg (234 lb 3 2 oz)   SpO2 98%   BMI 35 61 kg/m²          Physical Exam      Constitutional:  Well developed, well nourished, no acute distress, non-toxic appearance ,   Obese  Eyes:  PERRL, conjunctiva normal , non icteric sclera  HENT:  Atraumatic, oropharynx moist  Neck-  supple   Respiratory:  CTA b/l, normal breath sounds, no rales, no wheezing   Cardiovascular:  RRR, no murmurs,   No left foot edema, very trace right foot edema  GI:  Soft, nondistended, normal bowel sounds x 4, nontender, no organomegaly, no mass, no rebound, no guarding   Neurologic:  no focal deficits noted   Psychiatric:  Speech and behavior appropriate , AAO x 3   musculoskeletal, gait normal with cane, Fourth digit at the MTP  With mild ecchymosis and edema  Full range of motion and no bony tenderness     No calf tenderness bilaterally
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
## 2018-09-29 NOTE — ED NOTES
Patient asked this tech to check his tote bag for his wallet  No wallet was found but patient had 1 rescue inhaler and 1 maintenance inhaler, loose change on the bottom, a cell phone and , socks, 2 shirts, a blanket, keys, and a cuticle clippers  Patient stated that EMS had his wallet  EMS was asked about wallet to which the stated that the bag was packed prior to their arrival and that the police put patients keys in the bag  Patient was told what EMS stated to which he responded he never forgets anything but he must have left his wallet at home       [address]  09/29/18 9057

## 2018-09-29 NOTE — ED ATTENDING ATTESTATION
Mara Wright MD, saw and evaluated the patient  I have discussed the patient with the resident/non-physician practitioner and agree with the resident's/non-physician practitioner's findings, Plan of Care, and MDM as documented in the resident's/non-physician practitioner's note, except where noted  All available labs and Radiology studies were reviewed  At this point I agree with the current assessment done in the Emergency Department  I have conducted an independent evaluation of this patient a history and physical is as follows:  Chest pain constant since 4 am  Pt states pain I sl sided no radiation some pain in l lower abd as well Pt does have history of cad with angioplasty and medical management  Pt was given ntg and asa without relief   l lower abd pain also not better no nv no diaphoresis no sob  PMD cad and chronic pain diabetes    PE: alert nad heart reg + M lungs clear chest wall to tender to palpation abd soft mild tenderness to light touch neuro nonfocal  Ext nad MDM: will do cardiac dinero ct of abd bloodwork    Critical Care Time  CritCare Time    Procedures

## 2018-09-29 NOTE — PLAN OF CARE
Problem: Potential for Falls  Goal: Patient will remain free of falls  INTERVENTIONS:  - Assess patient frequently for physical needs  -  Identify cognitive and physical deficits and behaviors that affect risk of falls    -  Wheeler fall precautions as indicated by assessment   - Educate patient/family on patient safety including physical limitations  - Instruct patient to call for assistance with activity based on assessment  - Modify environment to reduce risk of injury  - Consider OT/PT consult to assist with strengthening/mobility   Outcome: Progressing

## 2018-09-30 PROBLEM — R07.9 CHEST PAIN: Status: RESOLVED | Noted: 2018-03-27 | Resolved: 2018-01-01

## 2018-09-30 NOTE — CASE MANAGEMENT
Initial Clinical Review    Admission: Date/Time/Statement: Observation 9/29 @ 1446    Orders Placed This Encounter   Procedures    Place in Observation (expected length of stay for this patient is less than two midnights)     Standing Status:   Standing     Number of Occurrences:   1     Order Specific Question:   Admitting Physician     Answer:   Luanne Barajas     Order Specific Question:   Level of Care     Answer:   Med Surg [16]       ED: Date/Time/Mode of Arrival:   ED Arrival Information     Expected Arrival Acuity Means of Arrival Escorted By Service Admission Type    - 9/29/2018 12:44 [facility] [address] [place] [address] Emergency    Arrival Complaint    chest pain          Chief Complaint:   Chief Complaint   Patient presents with    Chest Pain     Patient reports chest pain that started at about 0400 this morning  History of Illness: 76 y o  presents for evaluation of chest pain with onset at 4 in the morning  Patient states pain started and was located in the left parasternal region and of throbbing quality, tried to go back to sleep but pain awoke him still  Chest pain has been constant since and does seem to radiate down to the left lower quadrant of the abdomen  He has had chest pain like this in the past which he normally takes nitroglycerin with some relief  Per review of his medical record he has had multiple admissions over the past several months for similar chest pain with negative workup for NSTEMI  He is severely limited in activity due to his angina and baseline shortness of breath  He had mild nausea with this without vomiting  He has chronic cough productive of thick green viscous sputum, also recently treated for pneumonia    He is compliant with his medications, takes and baby aspirin daily as well as Plavix      The patient had a cardiac catheterization back in March of 2018 which did reveal left main with 60% stenosis, mid LAD with 60% stenosis, 1st diagonal with 90% stenosis at the ostium, 1st obtuse marginal with diffuse 80% stenosis and proximal right coronary artery with 85% stenosis  At this time it was recommended to him that he undergo revascularization surgery however he refused this at that time  He was recommended to follow up with CT surgery as outpatient however he states he has not yet wanted to do this  He denies wanting CT surgery evaluation at this time and refuses surgery now  He is currently smoking cigarettes intermittently, unable to tell me the exact amount but states he has cut down significantly  ED Vital Signs:   ED Triage Vitals   Temperature Pulse Respirations Blood Pressure SpO2   09/29/18 1338 09/29/18 1249 09/29/18 1249 09/29/18 1249 09/29/18 1249   97 9 °F (36 6 °C) 80 20 149/90 98 %      Temp Source Heart Rate Source Patient Position - Orthostatic VS BP Location FiO2 (%)   09/29/18 1249 09/29/18 1249 09/29/18 1249 09/29/18 1249 --   Oral Monitor Lying Right arm       Pain Score       09/29/18 1249       9        Wt Readings from Last 1 Encounters:   09/29/18 106 kg (234 lb 9 8 oz)       Vital Signs (abnormal): B/P = 174/80, 182/79    Abnormal Labs: Na = 134  Creat = 1 39  Glucose = 386/ 320  Trop = <0 02  Wbc = 10 66    Diagnostic Test Results: CTA Chest/ Abd/ Pelvis - No evidence of aortic aneurysm or dissection  Atherosclerotic changes are present  Enlarged fatty liver  Multiple gallstones no evidence of cystitis      ED Treatment:   Medication Administration from 09/29/2018 1244 to 09/29/2018 1615       Date/Time Order Dose Route Action     09/29/2018 1337 oxyCODONE-acetaminophen (PERCOCET) 5-325 mg per tablet 1 tablet 1 tablet Oral Given     09/29/2018 1405 sodium chloride 0 9 % bolus 500 mL 500 mL Intravenous New Bag     09/29/2018 1414 iohexol (OMNIPAQUE) 350 MG/ML injection (MULTI-DOSE) 100 mL 100 mL Intravenous Given     09/29/2018 1531 nitroglycerin (NITROSTAT) SL tablet 0 4 mg 0 4 mg Sublingual Given          Past Medical/Surgical History:    Active Ambulatory Problems     Diagnosis Date Noted    Type 2 diabetes mellitus with diabetic neuropathy, with long-term current use of insulin ([facility] )     Essential hypertension     GERD (gastroesophageal reflux disease)     Hyperlipidemia     Opioid dependence [facility] )     Insomnia 03/18/2017    Iron deficiency anemia 03/18/2017    Abdominal aortic aneurysm (AAA) [facility] rupture [facility] 02/14/2017    Chronic back pain 02/14/2017    Chronic diastolic CHF (congestive heart failure) ([facility] ) 11/14/2017    COPD (chronic obstructive pulmonary disease) ([facility] ) 09/13/2017    Neuropathy 11/14/2017    Closed fracture of transverse process of lumbar vertebra with routine healing 02/14/2018    Chest pain 03/27/2018    Disease of cardiovascular system 03/27/2018    Lung nodules 04/10/2018    Coronary artery disease of native artery of native heart with stable angina pectoris ([facility] ) 04/16/2018    Transition of care performed with sharing of clinical summary 04/25/2018    Leukocytosis 05/01/2018    Abnormal CT of the chest 05/10/2018    BPH associated with nocturia 05/10/2018    Therapeutic opioid induced constipation 05/30/2018     Resolved Ambulatory Problems     Diagnosis Date Noted    Chronic midline low back pain without sciatica     History of suicidal ideation     Atypical chest pain 08/15/2016    CAP (community acquired pneumonia) 08/15/2016    Chronic pain     Generalized abdominal pain 11/17/2016    History of aspiration pneumonia     Constipation 03/18/2017    Chronic bronchitis ([facility] ) 11/15/2017    Diabetes mellitus type 2 with neurological manifestations ([facility] ) 02/14/2017    Diabetic neuropathy ([facility] ) 02/14/2017    Epigastric pain 11/14/2017    Increased frequency of urination 11/21/2017    Type 2 diabetes mellitus treated with insulin ([facility] ) 10/04/2017    Abdominal pain, acute, bilateral lower quadrant 01/31/2018    Acute urinary retention 01/31/2018    Bronchitis, acute 09/25/2017    Community acquired pneumonia of left lower lobe of lung [facility] 04/10/2018    Tinea cruris 04/16/2018    Hyponatremia 05/01/2018    Abdominal pain, intactable 05/12/2018    Chest tightness, acs rule out 05/12/2018    DELLA (acute kidney injury) [facility] ) 05/30/2018    Fungal infection of the groin 08/22/2018    Dysuria 08/22/2018    UTI (urinary tract infection) 08/22/2018    Dizziness 08/22/2018    Cellulitis of right lower extremity 09/06/2018     Past Medical History:   Diagnosis Date    CAD (coronary artery disease)     Chronic pain     COPD (chronic obstructive pulmonary disease) ([place] [address] )     DM type 2 with diabetic peripheral neuropathy ([place] [address] )     Former tobacco use     GERD (gastroesophageal reflux disease)     H/O acute myocardial infarction     H/O: pneumonia     History of aspiration pneumonia     History of suicidal ideation     HLD (hyperlipidemia)     Hypertension     Lumbar transverse process fracture ([facility] ) 01/2018    MDD (major depressive disorder)     Non-alcoholic fatty liver disease     Opioid dependence ([place] [address] )     Pneumonia     PTSD (post-traumatic stress disorder)     Urinary tract infection        Admitting Diagnosis: Chest pain [R07 9]  Diverticulosis of colon [K57 30]    Age/Sex: 76 y o  male    Assessment/Plan:   70y Male to ED with Chest Pain with known CAD, R/O ACS/ Type 2 DM with Neuropathy and Hyperglycemia  Cardiac cath in March 2018 with multivessel disease and pt refusing surgical revascularization during that time  Pt refusing CT surgery eval now as well and requesting to f/u outpatient despite knowing he is very high risk of a life-threatening MI  Trend trops  Tele monitoring  Nitro SL prn for chest pain  Repeat EKG  Continue beta blocker, plavix, aspirin and statin  Hyperglycemia @ 389  Restart Lantus and meal time insulin with accu       9/30 Cardiology cons:  Chest pain is concerning for angina in the setting of known triple vessel disease  His EKG does not have any significant ischemic changes and enzymes have been negative  He has not had a recurrence of pain  Had a lengthy discussion with patient about CABG being the definitive therapy for his symptoms  He again  declines  Add Imdur 30mg daily for control of anginal symptoms  Continue ASA, Plavix, Lipitor 40mg, metoprolol 50mg BID  Antianginal therapy can be escalated as OP if his symptoms recur      Admission Orders:  Tele monitoring  Cardiology cons    Scheduled Meds:   Current Facility-Administered Medications:  aspirin 81 mg Oral Daily   atorvastatin 40 mg Oral Daily With Dinner   clopidogrel 75 mg Oral Daily   docusate sodium 100 mg Oral BID   ferrous sulfate 325 mg Oral Daily With Breakfast   fluticasone-vilanterol 1 puff Inhalation Daily   heparin (porcine) 5,000 Units Subcutaneous Q8H [address]   insulin glargine 20 Units Subcutaneous HS   insulin lispro 1-6 Units Subcutaneous TID AC   insulin lispro 9 Units Subcutaneous TID AC   metoprolol tartrate 50 mg Oral Q12H RHONDA   pantoprazole 40 mg Oral Early Morning   polyethylene glycol 17 g Oral Daily   pregabalin 100 mg Oral TID   QUEtiapine 25 mg Oral HS   senna 1 tablet Oral HS   tamsulosin 0 4 mg Oral Daily With Dinner     Continuous Infusions:    PRN Meds:     Acetaminophen po x1    nitroglycerin    ondansetron    oxyCODONE-acetaminophen    simethicone

## 2018-09-30 NOTE — DISCHARGE INSTRUCTIONS
We started you on medication called Imdur for chest pain  If your chest pain did not improve with Imdur please come to the emergency department immediately  Called your PCP office for Lyrica refill  We recommended that you stay in the hospital for co heart surgery given that you have multiple blood vessels block however your reported your other and aware of the consequences including sudden death  and you are willing to take this risk  Angina   WHAT YOU NEED TO KNOW:   Angina is pain, pressure, or tightness that is usually felt in your chest  Chest pain may come on when you are stressed or do physical activities, such as walking or exercising  Angina is caused by decreased blood flow and oxygen to your heart  These are often caused by atherosclerosis (hardening of the arteries)  If left untreated, angina may get worse, increase your risk for a heart attack, or become life-threatening  DISCHARGE INSTRUCTIONS:   Call 911 if:   · You have any of the following signs of a heart attack:      ¨ Squeezing, pressure, or pain in your chest that lasts longer than 5 minutes or returns    ¨ Discomfort or pain in your back, neck, jaw, stomach, or arm     ¨ Trouble breathing    ¨ Nausea or vomiting    ¨ Lightheadedness or a sudden cold sweat, especially with chest pain or trouble breathing    · You have chest pain that does not go away after you take medicine as directed  · You lose feeling in your face, arms, or legs, or you suddenly feel weak  Seek care immediately if:   · Your angina is happening more frequently, lasting longer, or causing worse pain  Contact your healthcare provider if:   · You are dizzy or nauseated after you take your medicine  · You have shortness of breath at rest     · You have new or worse swelling in your feet or ankles  · You have questions or concerns about your condition or care  Medicines:   You may need any of the following:  · Antiplatelets , such as aspirin, help prevent blood clots  Take your antiplatelet medicine exactly as directed  These medicines make it more likely for you to bleed or bruise  If you are told to take aspirin, do not take acetaminophen or ibuprofen instead  · Blood thinners    help prevent blood clots  Examples of blood thinners include heparin and warfarin  Clots can cause strokes, heart attacks, and death  The following are general safety guidelines to follow while you are taking a blood thinner:    ¨ Watch for bleeding and bruising while you take blood thinners  Watch for bleeding from your gums or nose  Watch for blood in your urine and bowel movements  Use a soft washcloth on your skin, and a soft toothbrush to brush your teeth  This can keep your skin and gums from bleeding  If you shave, use an electric shaver  Do not play contact sports  ¨ Tell your dentist and other healthcare providers that you take anticoagulants  Wear a bracelet or necklace that says you take this medicine  ¨ Do not start or stop any medicines unless your healthcare provider tells you to  Many medicines cannot be used with blood thinners  ¨ Tell your healthcare provider right away if you forget to take the medicine, or if you take too much  ¨ Warfarin  is a blood thinner that you may need to take  The following are things you should be aware of if you take warfarin  § Foods and medicines can affect the amount of warfarin in your blood  Do not make major changes to your diet while you take warfarin  Warfarin works best when you eat about the same amount of vitamin K every day  Vitamin K is found in green leafy vegetables and certain other foods  Ask for more information about what to eat when you are taking warfarin  § You will need to see your healthcare provider for follow-up visits when you are on warfarin  You will need regular blood tests  These tests are used to decide how much medicine you need      · Other medicines  may be given to open the arteries to your heart, slow your heartbeat, or decrease your blood pressure or cholesterol  · Do not take certain medicines without asking your healthcare provider first   These include NSAIDs, herbal or vitamin supplements, or hormones (estrogen or progestin)  · Take your medicine as directed  Contact your healthcare provider if you think your medicine is not helping or if you have side effects  Tell him or her if you are allergic to any medicine  Keep a list of the medicines, vitamins, and herbs you take  Include the amounts, and when and why you take them  Bring the list or the pill bottles to follow-up visits  Carry your medicine list with you in case of an emergency  Follow up with your healthcare provider as directed:  Keep a record or a calendar with details about your chest pain  Every time you have pain or symptoms, record what the pain is like, how long it lasts, and how severe it is  Also record what you are doing when the pain starts, and what makes it go away  Bring this with you every time you see your healthcare provider  Write down your questions so you remember to ask them during your visits  Cardiac rehabilitation:  Your healthcare provider may recommend that you attend cardiac rehabilitation (rehab)  This is a program run by specialists who will help you safely strengthen your heart and prevent more heart disease  The plan includes exercise, relaxation, stress management, and heart-healthy nutrition  Healthcare providers will also check to make sure any medicines you are taking are working  The plan may also include instructions for when you can drive, return to work, and do other normal daily activities  Manage angina:   · Do not smoke  Nicotine and other chemicals in cigarettes and cigars can cause heart and lung damage  Ask your healthcare provider for information if you currently smoke and need help to quit  E-cigarettes or smokeless tobacco still contain nicotine   Talk to your healthcare provider before you use these products  · Maintain a healthy weight  When you weigh more than is healthy for you, your heart must work harder  You are at higher risk for serious health problems if you are overweight  Ask your healthcare provider how much you should weigh  Ask him to help you create a weight loss plan if you are overweight  · Ask about activity  Your healthcare provider will tell you which activities to limit or avoid  Ask about the best exercise plan for you  · Eat heart-healthy foods  Include fresh fruits and vegetables in your meal plan  Choose low-fat foods, such as skim or 1% fat milk, low-fat cheese and yogurt, fish, chicken (without skin), and lean meats  Eat two 4-ounce servings of fish high in omega-3 fats each week, such as salmon, fresh tuna, and herring  Do not eat foods that are high in sodium, such as canned foods, potato chips, salty snacks, and cold cuts  Put less table salt on your food  · Avoid activities that cause angina  Pay attention to your symptoms and find out what seems to make your angina worse  · Ask if you should have a flu vaccine  The flu vaccine will decrease your risk for an infection  An infection can put more stress on your heart and worsen your angina  © 2017 [employer]  Information is for End User's use only and may not be sold, redistributed or otherwise used for commercial purposes  All illustrations and images included in CareNotes® are the copyrighted property of [employer]  or [employer]  The above information is an  only  It is not intended as medical advice for individual conditions or treatments  Talk to your doctor, nurse or pharmacist before following any medical regimen to see if it is safe and effective for you

## 2018-09-30 NOTE — DISCHARGE SUMMARY
[facility] CENTRAL Discharge Summary - Medical Chayo Costello  76 y o  male MRN: [id number]    [address] [facility] Room / Bed: [place] [id number] Encounter: [id number]    BRIEF OVERVIEW    Admitting Provider: Jordin Damon MD  Discharge Provider: Jordin Damon MD  Primary Care Physician at Discharge: Jordin Damon MD    [address] [facility]  Admission Date: 9/29/2018     Discharge Date: No discharge date for patient encounter  Hospital Course  This is a 76years old male with a past medical history of severe triple-vessel disease (cardiac catheterization March 2018 showed left main 60% lesion, lad 60% lesion, D1 90% lesion, O180% lesion, and RCA proximal 85% lesion), type 2 diabetes mellitus with neuropathy on insulin, chronic pain syndrome, opioid dependence, present for evaluation of atypical chest pain  Cardiac workup concluded anginal pain without NSTEMI or STEMI, Cardiology consulted who has been recommending surgery multiple times from previous admission however patient continued to refuse understanding the risk of not having the surgery including sudden death a requested to be discharged home  Therefore cardiology started Imdur to help with anginal pain and discharged home per his request   He had no significant events nor hemodynamically instability  Labs shows dyslipidemia      Presenting Problem/History of Present Illness  Principal Problem (Resolved):    Chest pain  Active Problems:    Type 2 diabetes mellitus with diabetic neuropathy, with long-term current use of insulin (HCC)    Essential hypertension    GERD (gastroesophageal reflux disease)    Hyperlipidemia    Opioid dependence (HCC)    Chronic diastolic CHF (congestive heart failure) (HCC)    COPD (chronic obstructive pulmonary disease) (HCC)    Coronary artery disease of native artery of native heart with stable angina pectoris (HCC)    BPH associated with nocturia    Therapeutic opioid induced constipation    Once a day of his discharge physical exam:  Physical Exam   Constitutional: He is oriented to person, place, and time  He appears well-developed and well-nourished  No distress  HENT:   Head: Normocephalic and atraumatic  Mouth/Throat: No oropharyngeal exudate  Eyes: EOM are normal  No scleral icterus  Neck: Neck supple  Cardiovascular: Normal rate and regular rhythm  Exam reveals no gallop  Murmur heard  Pulmonary/Chest: Effort normal and breath sounds normal  No stridor  No respiratory distress  He has no wheezes  Abdominal: Soft  Bowel sounds are normal  He exhibits distension  There is no tenderness  There is no rebound  Musculoskeletal: He exhibits no edema  Lymphadenopathy:     He has no cervical adenopathy  Neurological: He is alert and oriented to person, place, and time  Skin: He is not diaphoretic  Psychiatric: He has a normal mood and affect  His behavior is normal    Nursing note and vitals reviewed  Diagnostic Procedures Performed  Imaging Studies:    Pertinent Labs:       Therapeutic Procedures Performed  None    Test Results Pending at Discharge:  None     Medications     Medication List to be Continued at Discharge  Current Discharge Medication List      CONTINUE these medications which have NOT CHANGED    Details   albuterol (2 5 mg/3 mL) 0 083 % nebulizer solution Take 3 mL (2 5 mg total) by nebulization every 4 (four) hours as needed for wheezing (Wheezing)  Qty: 75 mL, Refills: 0    Associated Diagnoses: Chronic bronchitis, unspecified chronic bronchitis type (HCC)      albuterol (VENTOLIN HFA) 90 mcg/act inhaler Inhale 2 puffs every 6 (six) hours as needed for wheezing  Qty: 1 Inhaler, Refills: 5    Associated Diagnoses: Chronic bronchitis, unspecified chronic bronchitis type (HCC)      aspirin (ECOTRIN LOW STRENGTH) 81 mg EC tablet Take 1 tablet (81 mg total) by mouth daily  Qty: 90 tablet, Refills: 1    Associated Diagnoses: Type 2 diabetes mellitus with diabetic neuropathy, with long-term current use of insulin ([address] );  Essential hypertension      atorvastatin (LIPITOR) 40 mg tablet Take 1 tablet (40 mg total) by mouth daily with dinner for 30 days  Qty: 90 tablet, Refills: 1    Associated Diagnoses: Hyperlipidemia, unspecified hyperlipidemia type      B-D UF III MINI PEN NEEDLES 31G X 5 MM MISC Refills: 0      Blood Glucose Monitoring Suppl (ONE TOUCH ULTRA 2) w/Device KIT Test blood glucose 3 times daily  Qty: 1 each, Refills: 0    Associated Diagnoses: Type 2 diabetes mellitus with diabetic neuropathy, without long-term current use of insulin ([facility])      clopidogrel (PLAVIX) 75 mg tablet Take 1 tablet (75 mg total) by mouth daily for 30 days  Qty: 90 tablet, Refills: 1    Associated Diagnoses: Coronary artery disease involving native coronary artery of native heart with angina pectoris ([facility])      diclofenac sodium (VOLTAREN) 1 % Apply 4 g topically 4 (four) times a day  Qty: 1 Tube, Refills: 0    Associated Diagnoses: Chronic bilateral low back pain with bilateral sciatica      docusate sodium (COLACE) 100 mg capsule Take 1 capsule (100 mg total) by mouth 2 (two) times a day  Qty: 180 capsule, Refills: 1    Associated Diagnoses: Opioid dependence with opioid-induced disorder ([facility])      ferrous sulfate 325 (65 Fe) mg tablet Take 1 tablet (325 mg total) by mouth daily with breakfast  Qty: 30 tablet, Refills: 0    Associated Diagnoses: Iron deficiency anemia, unspecified iron deficiency anemia type      fluticasone-salmeterol (ADVAIR DISKUS) 250-50 mcg/dose inhaler Inhale 1 puff every 12 (twelve) hours  Qty: 1 Inhaler, Refills: 5    Associated Diagnoses: Chronic bronchitis, unspecified chronic bronchitis type ([facility])      glucose blood (ONE TOUCH ULTRA TEST) test strip Test blood glucose 3 times daily (One Touch Ultra Blue)  Qty: 100 each, Refills: 2    Associated Diagnoses: Type 2 diabetes mellitus with diabetic neuropathy, without long-term current use of insulin ([facility])      insulin glargine (LANTUS SOLOSTAR) 100 units/mL injection pen Inject 20 Units under the skin daily at bedtime  Qty: 5 pen, Refills: 1    Associated Diagnoses: Type 2 diabetes mellitus with diabetic neuropathy, with long-term current use of insulin ([facility])      insulin lispro (HumaLOG) 100 units/mL injection Inject 9 Units under the skin 3 (three) times a day before meals  Qty: 10 mL, Refills: 0    Associated Diagnoses: Type 2 diabetes mellitus with diabetic neuropathy, with long-term current use of insulin ([facility])      metoprolol tartrate (LOPRESSOR) 50 mg tablet Take 1 tablet (50 mg total) by mouth every 12 (twelve) hours  Qty: 60 tablet, Refills: 5    Associated Diagnoses: Left main coronary artery disease      nitroglycerin (NITROSTAT) 0 4 mg SL tablet Place 1 tablet (0 4 mg total) under the tongue every 5 (five) minutes as needed for chest pain for up to 30 days  Qty: 30 tablet, Refills: 0    Associated Diagnoses: Coronary artery disease involving native coronary artery of native heart with angina pectoris ([facility])      nystatin (MYCOSTATIN) ointment Apply topically 2 (two) times a day  Qty: 45 g, Refills: 1    Associated Diagnoses: Fungal skin infection      nystatin (MYCOSTATIN) powder Apply topically 3 (three) times a day  Qty: 56 7 g, Refills: 1    Associated Diagnoses: Fungal infection of the groin      ONETOUCH DELICA LANCETS 83S MISC Test blood glucose 3 times daily  Qty: 100 each, Refills: 2    Associated Diagnoses: Type 2 diabetes mellitus with diabetic neuropathy, without long-term current use of insulin ([facility])      oxyCODONE-acetaminophen (PERCOCET) 5-325 mg per tablet Take 1 tablet by mouth every 12 (twelve) hours as needed for moderate pain Max Daily Amount: 2 tablets  Qty: 60 tablet, Refills: 0    Associated Diagnoses: Closed fracture of transverse process of lumbar vertebra with routine healing      pantoprazole (PROTONIX) 40 mg tablet Take 1 tablet (40 mg total) by mouth daily  Qty: 90 tablet, Refills: 1    Associated Diagnoses: Gastroesophageal reflux disease, esophagitis presence not specified      polyethylene glycol (GLYCOLAX) powder Take 17 g by mouth daily  Qty: 225 g, Refills: 0    Associated Diagnoses: Opioid dependence with opioid-induced disorder (HCC)      pregabalin (LYRICA) 100 mg capsule Take 1 capsule (100 mg total) by mouth 3 (three) times a day  Qty: 90 capsule, Refills: 0    Associated Diagnoses: Neuropathy      QUEtiapine (SEROquel) 25 mg tablet Take 1 tablet (25 mg total) by mouth daily at bedtime  Qty: 30 tablet, Refills: 3    Associated Diagnoses: Opioid dependence with opioid-induced disorder ([address] ); Closed fracture of transverse process of lumbar vertebra with routine healing      senna (SENOKOT) 8 6 mg Take 1 tablet (8 6 mg total) by mouth daily at bedtime for 30 days  Qty: 30 each, Refills: 0    Associated Diagnoses: Opioid dependence with opioid-induced disorder (HCC)      tamsulosin (FLOMAX) 0 4 mg Take 1 capsule (0 4 mg total) by mouth daily with dinner  Qty: 90 capsule, Refills: 1    Associated Diagnoses: Coronary artery disease of native artery of native heart with stable angina pectoris ([facility] ); BPH associated with nocturia           Current Discharge Medication List        Current Discharge Medication List          Allergies  No Known Allergies  Discharge Diet: cardiac diet  Activity restrictions: As tolerated  Discharge Condition: good  Discharged With Lines: no    Discharge Disposition: [address] [place] [address] Avenue / Family Member Name:  Self  Phone Number:  As end demographics  Outpatient Follow-Up  yes      Follow up: Within 1 week with his cardiologist  Date and time:  Within 1 week  Location:  [facility]  Follow up within next:  Week      Code Status: Level 1 - Full Code  Advance Directive and Living Will: <no information>  Power of :    POLST:      Discharge  Statement   I spent 20 minutes minutes discharging the patient  This time was spent on the day of discharge  I had direct contact with the patient on the day of discharge  Additional documentation is required if more than 30 minutes were spent on discharge               Karoline Romeo MD  [facility]  Internal medicine resident
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
## 2018-10-01 NOTE — PATIENT INSTRUCTIONS
Start taking Mucinex that was sent to the pharmacy, 2 tablets twice daily to help thin secretions  Start taking Spiriva in addition to your other inhalers to help with your COPD  You were given a 2 week sample today in the office  A prescription for this was also sent to the pharmacy  Return for follow up in 2 weeks  Can fill your lyrica prescription on 10/5/18, as 10/6 is a Saturday  Make an appointment to follow up with your cardiologist, the importance of this was stressed to the patient

## 2018-10-04 PROBLEM — T14.8XXA BRUISING: Status: ACTIVE | Noted: 2018-01-01

## 2018-10-04 PROBLEM — R58 ECCHYMOSIS: Status: ACTIVE | Noted: 2018-01-01
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
## 2018-10-04 NOTE — PATIENT INSTRUCTIONS
Contusion in Adults, Ambulatory Care   GENERAL INFORMATION:   A contusion  is a bruise that appears on your skin after an injury  A bruise happens when small blood vessels tear but skin does not  When blood vessels tear, blood leaks into nearby tissue, such as soft tissue or muscle  Common symptoms include the following:   · Pain that increases when you touch the bruise, walk, or use the area around the bruise    · Swelling or a lump at the site of the bruise or near it    · Red, blue, or black skin that may change to green or yellow after a few days    · Stiffness or problems moving the bruised area of your body  Seek immediate care for the following symptoms:   · New difficulty moving your injured area    · Tingling or numbness in or near the injured area    · Hand or foot below the bruise gets cold or turns pale  Treatment for a contusion  may include any of the following:  · NSAIDs  help decrease swelling and pain or fever  This medicine is available with or without a doctor's order  NSAIDs can cause stomach bleeding or kidney problems in certain people  If you take blood thinner medicine, always ask your healthcare provider if NSAIDs are safe for you  Always read the medicine label and follow directions  · Pain medicine  to decrease or take away pain  Do not wait until the pain is severe before you take your medicine  · Aspiration  to drain pooled blood in your muscle may be done to help prevent increased pressure in the muscle  · Surgery  may be done to repair a tear in the muscle or relieve pressure in the muscle caused by swelling  Care for a contusion:   · Rest the injured area  or use it less than usual  If you bruised your leg or foot, you may need crutches or a cane to help you walk  This will help you keep weight off your injured body part  Use crutches or a cane as directed  · Use ice  to decrease swelling and pain  Ice may also help prevent tissue damage   Use an ice pack, or put crushed ice in a plastic bag  Cover it with a towel and place it on your bruise for 15 to 20 minutes every hour or as directed  · Use Compression  An elastic bandage may be wrapped around a bruised muscle to support the area and decrease swelling  Make sure the bandage is not too tight  You should be able to fit 1 finger between the bandage and your skin  · Elevate (raise) your injured body part  above the level of your heart to help decrease pain and swelling  Use pillows, blankets, or rolled towels to elevate the area as often as you can  · Do not massage or use heat  Heat and massage may slow healing of the area  · Do not drink alcohol  Alcohol may slow healing of your injury  · Do not stretch injured muscles  Ask your healthcare provider when and how you may safely stretch after your injury  Prevent a contusion:   · Stretch and warm up before you play sports or exercise  · Wear protective gear when you play sports  Examples are shin guards and padding  · If you begin a new physical activity, start slowly to give your body a chance to adjust   Follow up with your healthcare provider as directed:  Write down your questions so you remember to ask them during your visits  CARE AGREEMENT:   You have the right to help plan your care  Learn about your health condition and how it may be treated  Discuss treatment options with your caregivers to decide what care you want to receive  You always have the right to refuse treatment  The above information is an  only  It is not intended as medical advice for individual conditions or treatments  Talk to your doctor, nurse or pharmacist before following any medical regimen to see if it is safe and effective for you  © 2014 [address] is for End User's use only and may not be sold, redistributed or otherwise used for commercial purposes   All illustrations and images included in CareNotes® are the copyrighted property of [employer]  or Jamil Henson

## 2018-10-12 NOTE — TELEPHONE ENCOUNTER
Patient called looking to have the     oxyCODONE-acetaminophen (PERCOCET) 5-325 mg per tablet     Sent to [facility] in [facility]

## 2018-10-12 NOTE — TELEPHONE ENCOUNTER
Last O/V: 10/04/2017  Next O/V:  10/18/2018    PMED checked, no red flags identified; safe to proceed with prescription refills

## 2018-10-26 NOTE — TELEPHONE ENCOUNTER
Called patient to reschedule his no show appointment that he scheduled yesterday 10/25/18    No answer, LMOM to call us and reschedule if needed

## 2018-10-31 NOTE — TELEPHONE ENCOUNTER
Refill request came in from pharmacy which we don't respond to  Pt is to call when refills are needed

## 2018-11-02 NOTE — TELEPHONE ENCOUNTER
Patient is calling to request the following medications:    Patient is also scheduled for [place] appt with Helio Lung 11/6/18 in [place] and is expecting a call from Yazan Thomas to reconcile his medications       Patient is requesting:    Percocet- it is on his PDMP being prescribed by Dr Leong 10/12/18 nothing showing in his med list    Pro Air-again not showing on med list but states it is prescribed by us    Advair

## 2018-11-02 NOTE — TELEPHONE ENCOUNTER
Last appt, 10/4/18    Next appt, 11/23/18    Pt also requested refill of Lyrica, but med was refilled by another physician per the PDMP site, Dr Katerina Lepe on 11/1/18 (ER doctor) for 30 day supply

## 2018-11-02 NOTE — TELEPHONE ENCOUNTER
I discussed this patient's request with Radha Rangel, PAC  He will approve the Advair and Albuterol  Patient will need to address his pain medication request at the office visit scheduled on 11/6/18

## 2018-11-02 NOTE — TELEPHONE ENCOUNTER
I placed a (#2 attempt to contact) call to patient to complete the TCM process  He answered the phone and immediately requested a prescription for Tramadol  The percocet was changed to Tramadol during this hospitalization  Attempts to redirect the conversation to address the TCM template questions resulted in interruptions and pleading for Tramadol without being seen in the office  I explained that since his pain medication was changed while he was hospitalized,he would need to be seen in the office prior to the new medication being prescribed  He was not willing to schedule  I offered him an appt in [place] (his office of choice) same day to address his need and he refused  I assured him that I would discuss this with Dr Santa Curits and call back with her decision on his request to be guaranteed a script if he came in  Gerhardt Sep He was not willing to come in for an appt unless I promised him that he would leave with a prescription for Tramadol  [facility] website was checked and reviewed by Dr Santa Curtis  It was found that Dr Santa Curtis approved a refill for #60 Percocet on 10/12/18  She is not willing to prescribe Tramadol until 11/12/18 as he should still have enough of the remaining Percocet to use until that time when she will change him over to the Tramadol  He was not willing to schedule a TCM appt and continued to plead for a pain med refill  The call ended with him continuing to refuse an appt today or in the next 2 wks

## 2018-11-02 NOTE — TELEPHONE ENCOUNTER
I called the patient and informed him that I will place a request for refills for the albuterol, and Advair  He then asked for the tensilon perls to be refilled  I added this to the refill requests  Pain medication will be addressed at the office visit 11/6/18

## 2018-11-02 NOTE — TELEPHONE ENCOUNTER
Dr Christiana Granado addressed this patient's request for pain medication refills on 10/31/18  This is a copy of the note  I placed a (#2 attempt to contact) call to patient to complete the TCM process  He answered the phone and immediately requested a prescription for Tramadol  The percocet was changed to Tramadol during this hospitalization  Attempts to redirect the conversation to address the TCM template questions resulted in interruptions and pleading for Tramadol without being seen in the office  I explained that since his pain medication was changed while he was hospitalized,he would need to be seen in the office prior to the new medication being prescribed  He was not willing to schedule  I offered him an appt in [place] (his office of choice) same day to address his need and he refused  I assured him that I would discuss this with Dr Christiana Granado and call back with her decision on his request to be guaranteed a script if he came in  Karrie Nissen He was not willing to come in for an appt unless I promised him that he would leave with a prescription for Tramadol  [facility] Dr Christiana Granado  It was found that Dr Christiana Granado approved a refill for #60 Percocet on 10/12/18  She is not willing to prescribe Tramadol until 11/12/18 as he should still have enough of the remaining Percocet to use until that time when she will change him over to the Tramadol  He was not willing to schedule a TCM appt and continued to plead for a pain med refill    The call ended with him continuing to refuse an appt today or in the next 2 wks

## 2018-11-08 PROBLEM — E11.42 DM TYPE 2 WITH DIABETIC PERIPHERAL NEUROPATHY (HCC): Status: ACTIVE | Noted: 2018-01-01

## 2018-11-08 PROBLEM — IMO0002 DM (DIABETES MELLITUS), TYPE 2, UNCONTROLLED: Status: ACTIVE | Noted: 2018-01-01
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
## 2018-11-08 NOTE — PATIENT INSTRUCTIONS
Continue your inhalers  Use guafensin during day to help with cough and bring up phlegm  Use tessalon at night to suppress cough to allow you to sleep    It is too soon for your oxycodone refill    This was last filled on 10/12 and you were hospitalized an additional two days where you should not have been taking your home medications, thus you are not due for a refill of oxycodone until 11/14    You need to follow-up with cardiology    Pt refused urine testing in office - he will follow-up for ongoing increased urinary frequency at another time

## 2018-11-12 NOTE — TELEPHONE ENCOUNTER
Informed pt per Marley's note that he can call back on 11/14 and request his pain medication   Pt verbally acknowledged understanding  Pt was understanding and accepting

## 2018-11-12 NOTE — TELEPHONE ENCOUNTER
Per [employer], NICOLE not from 11/8/18: It is too soon for your oxycodone refill    This was last filled on 10/12 and you were hospitalized an additional two days where you should not have been taking your home medications, thus you are not due for a refill of oxycodone until 11/14      Last O/V: 11/08/18  Next O/V:  11/23/18

## 2018-11-14 PROBLEM — N30.00 ACUTE CYSTITIS WITHOUT HEMATURIA: Status: ACTIVE | Noted: 2017-11-21

## 2018-11-14 PROBLEM — R91.8 LUNG NODULES: Status: RESOLVED | Noted: 2018-04-10 | Resolved: 2018-01-01

## 2018-11-14 NOTE — ASSESSMENT & PLAN NOTE
Lab Results   Component Value Date    HGBA1C 10 7 (H) 03/28/2018    Patient needs closer monitoring of his diabetes  He has a follow-up scheduled in 10 days  Hopefully his UTI will be resolved by then    No results for input(s): POCGLU in the last 72 hours      Blood Sugar Average: Last 72 hrs:
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
## 2018-11-14 NOTE — ASSESSMENT & PLAN NOTE
Will prescribe Bactrim double strength for 7 days twice a day along with Pyridium 200 mg t i d  for 3 days

## 2018-12-10 NOTE — Clinical Note
Case was discussed with SOD and the patient's admission status was agreed to be Admission Status: inpatient status to the service of Dr Arvin Ordaz

## 2018-12-10 NOTE — TELEPHONE ENCOUNTER
Patient is called and is looking for a refill of the following medication to [facility] [address] in [place]:    oxyCODONE-acetaminophen (PERCOCET) 5-325 mg per tablet    Thank you
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
## 2018-12-11 NOTE — ED NOTES
Lyrica coming from pharmacy  Pt aware  Pt ambulatory to bathroom to freshen up        Zonia Bourgeois RN  04/25/68 [id number]
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
## 2018-12-11 NOTE — ED ATTENDING ATTESTATION
Anne Peoples MD, saw and evaluated the patient  All available labs and X-rays were ordered by me or the resident and have been reviewed by myself  I discussed the patient with the resident / non-physician and agree with the resident's / non-physician practitioner's findings and plan as documented in the resident's / non-physician practicitioner's note, except where noted  At this point, I agree with the current assessment done in the ED  Chief Complaint   Patient presents with    Chest Pain     pt reports that his chest started while laying in bed about 1900, also has Chronic pain in hips and feet  Pt took 324mg asiprin and 1 nitro before EMS arrived  EMS give pt a 2nd Nitro pill  Pt states little to no relief with the nirto  No n/v/d      This is a 75 y/o M presenting for CP, back pain, with pain radiating down the legs  Pain started 1 5 hours ago  He felt sweaty with it  Took ASA + nitro, no difference  EMS gave more nitro with no relief  Upon arrival, patient asking for opiate pain medication to help as nitro isn't helping  PMH:  - Psychiatric/Chronic pain: diabetic neuropathy, back pain  - HTN  - MDDD  - HLD  - GERD  PSH:  - Appendectomy  Former smoker  No alcohol/drugs/cocaine  FH:  - Identical twin who  at 36 of cancer  No recent travel  Was a [address]  PE:  Vitals:    12/10/18 2034 12/10/18 2209 12/10/18 2215   BP: 139/69 120/59 120/59   BP Location: Right arm Left arm    Pulse: 87 83 82   Resp: 20 20 17   Temp: 98 2 °F (36 8 °C)     TempSrc: Oral     SpO2: 97% 98% 97%   General: VSS, NAD, awake, alert  Well-nourished, well-developed  Appears stated age  Speaking normally in full sentences  Head: Normocephalic, atraumatic, nontender  Eyes: PERRL, EOM-I  No diplopia  No hyphema  No subconjunctival hemorrhages  Symmetrical lids  ENT: Atraumatic external nose and ears  MMM  No malocclusion  No stridor  Normal phonation  No drooling  Normal swallowing     Neck: Symmetric, trachea midline  No JVD  CV: RRR  +S1/S2  No murmurs or gallops  Peripheral pulses +2 throughout  No chest wall tenderness  Lungs:   Unlabored No retractions  CTAB, lungs sounds equal bilateral    No tachypnea  Abd: +BS, soft, NT/ND    MSK:   FROM   Back:   No rashes  Skin: Dry, intact  Neuro: AAOx3, GCS 15, CN II-XII grossly intact  Motor grossly intact  Psychiatric/Behavioral: Appropriate mood and affect   Exam: deferred  A:  - CP  P:  - ACS r/o  - avoid opiates if possible  - likely admit (HEART 5)  - 13 point ROS was performed and all are normal unless stated in the history above  - Nursing note reviewed  Vitals reviewed  - Orders placed by myself and/or advanced practitioner / resident     - Previous chart was reviewed  - No language barrier    - History obtained from patient  - There are no limitations to the history obtained  - Critical care time: Not applicable for this patient  Final Diagnosis:  1  Chest pain    2  Diaphoresis         Medications   aspirin chewable tablet 324 mg (324 mg Oral Given 12/10/18 2219)   oxyCODONE (ROXICODONE) IR tablet 5 mg (5 mg Oral Given 12/10/18 2219)     X-ray chest 2 views   ED Interpretation   The cxr was ordered by resident and interpreted by me independently  On my read, it appears:   - no acute pna        Orders Placed This Encounter   Procedures    ED ECG Documentation Only    X-ray chest 2 views    Comprehensive metabolic panel    CBC and differential    Troponin I    Insert peripheral IV    Continuous cardiac monitoring    Continuous pulse oximetry    ECG 12 lead    Inpatient Admission     Labs Reviewed   COMPREHENSIVE METABOLIC PANEL - Abnormal        Result Value Ref Range Status    Sodium 134 (*) 136 - 145 mmol/L Final    Potassium 3 8  3 5 - 5 3 mmol/L Final    Comment: Slightly Hemolyzed;  Results May be Affected    Chloride 104  100 - 108 mmol/L Final    CO2 23  21 - 32 mmol/L Final    ANION GAP 7  4 - 13 mmol/L Final    BUN 14  5 - 25 mg/dL Final    Creatinine 1 34 (*) 0 60 - 1 30 mg/dL Final    Comment: Standardized to IDMS reference method    Glucose 296 (*) 65 - 140 mg/dL Final    Comment:   If the patient is fasting, the ADA then defines impaired fasting glucose as > 100 mg/dL and diabetes as > or equal to 123 mg/dL  Specimen collection should occur prior to Sulfasalazine administration due to the potential for falsely depressed results  Specimen collection should occur prior to Sulfapyridine administration due to the potential for falsely elevated results  Calcium 9 0  8 3 - 10 1 mg/dL Final    AST 16  5 - 45 U/L Final    Comment: Slightly Hemolyzed; Results May be Affected  Specimen collection should occur prior to Sulfasalazine administration due to the potential for falsely depressed results  ALT 21  12 - 78 U/L Final    Comment:   Specimen collection should occur prior to Sulfasalazine and/or Sulfapyridine administration due to the potential for falsely depressed results  Alkaline Phosphatase 107  46 - 116 U/L Final    Total Protein 7 2  6 4 - 8 2 g/dL Final    Albumin 2 9 (*) 3 5 - 5 0 g/dL Final    Total Bilirubin 0 26  0 20 - 1 00 mg/dL Final    eGFR 54  ml/min/1 73sq m Final    Narrative:     National Kidney Disease Education Program recommendations are as follows:  GFR calculation is accurate only with a steady state creatinine  Chronic Kidney disease less than 60 ml/min/1 73 sq  meters  Kidney failure less than 15 ml/min/1 73 sq  meters     CBC AND DIFFERENTIAL - Abnormal     WBC 10 37 (*) 4 31 - 10 16 Thousand/uL Final    RBC 4 35  3 88 - 5 62 Million/uL Final    Hemoglobin 11 7 (*) 12 0 - 17 0 g/dL Final    Hematocrit 37 4  36 5 - 49 3 % Final    MCV 86  82 - 98 fL Final    MCH 26 9  26 8 - 34 3 pg Final    MCHC 31 3 (*) 31 4 - 37 4 g/dL Final    RDW 18 2 (*) 11 6 - 15 1 % Final    MPV 11 8  8 9 - 12 7 fL Final    Platelets 694  807 - 390 Thousands/uL Final    nRBC 0  /100 WBCs Final Neutrophils Relative 62  43 - 75 % Final    Immat GRANS % 2  0 - 2 % Final    Lymphocytes Relative 24  14 - 44 % Final    Monocytes Relative 8  4 - 12 % Final    Eosinophils Relative 3  0 - 6 % Final    Basophils Relative 1  0 - 1 % Final    Neutrophils Absolute 6 33  1 85 - 7 62 Thousands/µL Final    Immature Grans Absolute 0 22 (*) 0 00 - 0 20 Thousand/uL Final    Lymphocytes Absolute 2 48  0 60 - 4 47 Thousands/µL Final    Monocytes Absolute 0 87  0 17 - 1 22 Thousand/µL Final    Eosinophils Absolute 0 33  0 00 - 0 61 Thousand/µL Final    Basophils Absolute 0 14 (*) 0 00 - 0 10 Thousands/µL Final   TROPONIN I - Normal    Troponin I <0 02  <=0 04 ng/mL Final    Comment:   [employer] Chemistry analyzer 99% cutoff is > 0 04 ng/mL in network labs     o cTnI 99% cutoff is useful only when applied to patients in the clinical setting of myocardial ischemia   o cTnI 99% cutoff should be interpreted in the context of clinical history, ECG findings and possibly cardiac imaging to establish correct diagnosis  o cTnI 99% cutoff may be suggestive but clearly not indicative of a coronary event without the clinical setting of myocardial ischemia  LIGHT BLUE TOP     Time reflects when diagnosis was documented in both MDM as applicable and the Disposition within this note     Time User Action Codes Description Comment    12/10/2018 10:09 PM Sosa Mcgee Add [R07 9] Chest pain     12/10/2018 10:09 PM Clifford Robin6 [facility]       ED Disposition     ED Disposition Condition Comment    Admit  Case was discussed with SOD and the patient's admission status was agreed to be Admission Status: inpatient status to the service of Dr Dionicio Carey   Follow-up Information    None       Patient's Medications   Discharge Prescriptions    No medications on file     No discharge procedures on file  Prior to Admission Medications   Prescriptions Last Dose Informant Patient Reported? Taking?    B-D UF III MINI PEN NEEDLES 31G X 5 MM MISC  Self No No   Sig: Use 4 daily with insulin injections   Blood Glucose Monitoring Suppl (ONE TOUCH ULTRA 2) w/Device KIT  Self No No   Sig: Test blood glucose 3 times daily   Multiple Vitamin (MULTIVITAMIN) tablet  Self Yes No   Sig: Take 1 tablet by mouth daily   ONETOUCH DELICA LANCETS 33Q MISC  Self No No   Sig: Test blood glucose 3 times daily   QUEtiapine (SEROquel) 25 mg tablet  Self No No   Sig: Take 1 tablet (25 mg total) by mouth daily at bedtime   albuterol (2 5 mg/3 mL) 0 083 % nebulizer solution  Self Yes No   Sig: Take 2 5 mg by nebulization every 2 (two) hours as needed for wheezing   albuterol (VENTOLIN HFA) 90 mcg/act inhaler  Self No No   Sig: Inhale 2 puffs every 6 (six) hours as needed for wheezing   amLODIPine (NORVASC) 5 mg tablet  Self No No   Sig: Take 1 tablet (5 mg total) by mouth daily   aspirin 81 MG tablet  Self Yes No   Sig: Take 81 mg by mouth daily   atorvastatin (LIPITOR) 80 mg tablet  Self Yes No   Sig: Take 80 mg by mouth daily at bedtime   benzonatate (TESSALON PERLES) 100 mg capsule   No No   Sig: Take 2 capsules (200 mg total) by mouth 3 (three) times a day as needed for cough   clopidogrel (PLAVIX) 75 mg tablet  Self No No   Sig: Take 1 tablet (75 mg total) by mouth daily for 30 days   docusate sodium (COLACE) 100 mg capsule  Self No No   Sig: Take 1 capsule (100 mg total) by mouth 2 (two) times a day   ferrous sulfate 325 (65 Fe) mg tablet  Self No No   Sig: Take 1 tablet (325 mg total) by mouth daily with breakfast   fluticasone-salmeterol (ADVAIR DISKUS) 250-50 mcg/dose inhaler  Self No No   Sig: Inhale 1 puff every 12 (twelve) hours   glucose blood (ONE TOUCH ULTRA TEST) test strip  Self No No   Sig: Test blood glucose 3 times daily (One Touch Ultra Blue)   insulin glargine (LANTUS SOLOSTAR) 100 units/mL injection pen  Self No No   Sig: Inject 20 Units under the skin daily   insulin lispro (HUMALOG KWIKPEN) 100 units/mL injection pen  Self No No   Sig: Inject 8 Units under the skin 3 (three) times a day with meals   ipratropium-albuterol (DUO-NEB) 0 5-2 5 mg/3 mL nebulizer solution  Self Yes No   Sig: Inhale 3 mL 4 (four) times a day   isosorbide mononitrate (IMDUR) 30 mg 24 hr tablet  Self No No   Sig: Take 1 tablet (30 mg total) by mouth daily   levofloxacin (LEVAQUIN) 500 mg tablet   No No   Sig: Take 1 tablet (500 mg total) by mouth every 24 hours for 6 days   metoprolol tartrate (LOPRESSOR) 50 mg tablet  Self No No   Sig: Take 1 tablet (50 mg total) by mouth every 12 (twelve) hours   nitroglycerin (NITROSTAT) 0 4 mg SL tablet  Self No No   Sig: Place 1 tablet (0 4 mg total) under the tongue every 5 (five) minutes as needed for chest pain for up to 30 days   nystatin (MYCOSTATIN) ointment  Self No No   Sig: Apply topically 2 (two) times a day   nystatin (MYCOSTATIN) powder  Self No No   Sig: Apply topically 3 (three) times a day   omega-3-acid ethyl esters (LOVAZA) 1 g capsule  Self Yes No   Sig: Take 1 g by mouth daily   oxyCODONE-acetaminophen (PERCOCET) 5-325 mg per tablet   No No   Sig: Take 1 tablet by mouth every 12 (twelve) hours as needed for moderate pain Max Daily Amount: 2 tablets   pantoprazole (PROTONIX) 40 mg tablet  Self No No   Sig: Take 1 tablet (40 mg total) by mouth daily   phenazopyridine (PYRIDIUM) 200 mg tablet  Self No No   Sig: Take 1 tablet (200 mg total) by mouth 3 (three) times a day with meals   polyethylene glycol (GLYCOLAX) powder  Self No No   Sig: Take 17 g by mouth daily   polyvinyl alcohol (LIQUIFILM TEARS) 1 4 % ophthalmic solution  Self Yes No   Sig: Administer 1 drop to both eyes as needed for dry eyes   predniSONE 20 mg tablet   No No   Sig: Take 1 tablet (20 mg total) by mouth 2 (two) times a day with meals for 5 days   pregabalin (LYRICA) 100 mg capsule  Self No No   Sig: Take 1 capsule (100 mg total) by mouth 3 (three) times a day   senna (SENOKOT) 8 6 mg  Self No No   Sig: Take 1 tablet (8 6 mg total) by mouth daily at bedtime for 30 days   tamsulosin (FLOMAX) 0 4 mg  Self No No   Sig: Take 1 capsule (0 4 mg total) by mouth daily with dinner   tiotropium (SPIRIVA RESPIMAT) 2 5 MCG/ACT AERS inhaler  Self No No   Sig: Inhale 2 puffs daily      Facility-Administered Medications: None       Portions of the record may have been created with voice recognition software  Occasional wrong word or "sound a like" substitutions may have occurred due to the inherent limitations of voice recognition software  Read the chart carefully and recognize, using context, where substitutions have occurred      Electronically signed by:  Matt Moore

## 2018-12-11 NOTE — DISCHARGE INSTRUCTIONS
Angina   WHAT YOU NEED TO KNOW:   Angina is pain, pressure, or tightness that is usually felt in your chest  Chest pain may come on when you are stressed or do physical activities, such as walking or exercising  Angina is caused by decreased blood flow and oxygen to your heart  These are often caused by atherosclerosis (hardening of the arteries)  If left untreated, angina may get worse, increase your risk for a heart attack, or become life-threatening  DISCHARGE INSTRUCTIONS:   Call 911 if:   · You have any of the following signs of a heart attack:      ¨ Squeezing, pressure, or pain in your chest that lasts longer than 5 minutes or returns    ¨ Discomfort or pain in your back, neck, jaw, stomach, or arm     ¨ Trouble breathing    ¨ Nausea or vomiting    ¨ Lightheadedness or a sudden cold sweat, especially with chest pain or trouble breathing    · You have chest pain that does not go away after you take medicine as directed  · You lose feeling in your face, arms, or legs, or you suddenly feel weak  Seek care immediately if:   · Your angina is happening more frequently, lasting longer, or causing worse pain  Contact your healthcare provider if:   · You are dizzy or nauseated after you take your medicine  · You have shortness of breath at rest     · You have new or worse swelling in your feet or ankles  · You have questions or concerns about your condition or care  Medicines: You may need any of the following:  · Antiplatelets , such as aspirin, help prevent blood clots  Take your antiplatelet medicine exactly as directed  These medicines make it more likely for you to bleed or bruise  If you are told to take aspirin, do not take acetaminophen or ibuprofen instead  · Blood thinners    help prevent blood clots  Examples of blood thinners include heparin and warfarin  Clots can cause strokes, heart attacks, and death   The following are general safety guidelines to follow while you are taking a blood thinner:    ¨ Watch for bleeding and bruising while you take blood thinners  Watch for bleeding from your gums or nose  Watch for blood in your urine and bowel movements  Use a soft washcloth on your skin, and a soft toothbrush to brush your teeth  This can keep your skin and gums from bleeding  If you shave, use an electric shaver  Do not play contact sports  ¨ Tell your dentist and other healthcare providers that you take anticoagulants  Wear a bracelet or necklace that says you take this medicine  ¨ Do not start or stop any medicines unless your healthcare provider tells you to  Many medicines cannot be used with blood thinners  ¨ Tell your healthcare provider right away if you forget to take the medicine, or if you take too much  ¨ Warfarin  is a blood thinner that you may need to take  The following are things you should be aware of if you take warfarin  § Foods and medicines can affect the amount of warfarin in your blood  Do not make major changes to your diet while you take warfarin  Warfarin works best when you eat about the same amount of vitamin K every day  Vitamin K is found in green leafy vegetables and certain other foods  Ask for more information about what to eat when you are taking warfarin  § You will need to see your healthcare provider for follow-up visits when you are on warfarin  You will need regular blood tests  These tests are used to decide how much medicine you need  · Other medicines  may be given to open the arteries to your heart, slow your heartbeat, or decrease your blood pressure or cholesterol  · Do not take certain medicines without asking your healthcare provider first   These include NSAIDs, herbal or vitamin supplements, or hormones (estrogen or progestin)  · Take your medicine as directed  Contact your healthcare provider if you think your medicine is not helping or if you have side effects   Tell him or her if you are allergic to any medicine  Keep a list of the medicines, vitamins, and herbs you take  Include the amounts, and when and why you take them  Bring the list or the pill bottles to follow-up visits  Carry your medicine list with you in case of an emergency  Follow up with your healthcare provider as directed:  Keep a record or a calendar with details about your chest pain  Every time you have pain or symptoms, record what the pain is like, how long it lasts, and how severe it is  Also record what you are doing when the pain starts, and what makes it go away  Bring this with you every time you see your healthcare provider  Write down your questions so you remember to ask them during your visits  Cardiac rehabilitation:  Your healthcare provider may recommend that you attend cardiac rehabilitation (rehab)  This is a program run by specialists who will help you safely strengthen your heart and prevent more heart disease  The plan includes exercise, relaxation, stress management, and heart-healthy nutrition  Healthcare providers will also check to make sure any medicines you are taking are working  The plan may also include instructions for when you can drive, return to work, and do other normal daily activities  Manage angina:   · Do not smoke  Nicotine and other chemicals in cigarettes and cigars can cause heart and lung damage  Ask your healthcare provider for information if you currently smoke and need help to quit  E-cigarettes or smokeless tobacco still contain nicotine  Talk to your healthcare provider before you use these products  · Maintain a healthy weight  When you weigh more than is healthy for you, your heart must work harder  You are at higher risk for serious health problems if you are overweight  Ask your healthcare provider how much you should weigh  Ask him to help you create a weight loss plan if you are overweight  · Ask about activity    Your healthcare provider will tell you which activities to limit or avoid  Ask about the best exercise plan for you  · Eat heart-healthy foods  Include fresh fruits and vegetables in your meal plan  Choose low-fat foods, such as skim or 1% fat milk, low-fat cheese and yogurt, fish, chicken (without skin), and lean meats  Eat two 4-ounce servings of fish high in omega-3 fats each week, such as salmon, fresh tuna, and herring  Do not eat foods that are high in sodium, such as canned foods, potato chips, salty snacks, and cold cuts  Put less table salt on your food  · Avoid activities that cause angina  Pay attention to your symptoms and find out what seems to make your angina worse  · Ask if you should have a flu vaccine  The flu vaccine will decrease your risk for an infection  An infection can put more stress on your heart and worsen your angina  © 2017 [address] Information is for End User's use only and may not be sold, redistributed or otherwise used for commercial purposes  All illustrations and images included in CareNotes® are the copyrighted property of [employer]  or Jamil Henson  The above information is an  only  It is not intended as medical advice for individual conditions or treatments  Talk to your doctor, nurse or pharmacist before following any medical regimen to see if it is safe and effective for you  COPD Exacerbation, Ambulatory Care   GENERAL INFORMATION:   A COPD (chronic obstructive pulmonary disease ) exacerbation  is a flare up or worsening of COPD    Common symptoms include the following:   · Shortness of breath     · A dry cough     · Coughing fits that bring up mucus from your lungs     · Wheezing and chest tightness  Seek immediate care for the following symptoms:   · Confusion, dizziness, or lightheadedness    · Red, swollen, warm arm or leg    · Shortness of breath or chest pain    · Coughing up blood  Treatment for a COPD exacerbation  may include medicines to help decrease swelling and inflammation in your lungs  Medicines may also help open your airways or treat and infection  You may need pulmonary rehabilitation to help you manage your symptoms and improve your quality of life  You may need extra oxygen to help you breathe easier  Prevent another exacerbation:   · Do not smoke, and avoid others who smoke  If you smoke, it is never too late to quit  You may have fewer exacerbations  Ask for information about medicines and support programs that can help you quit  · Avoid triggers that make your symptoms worse  Cold weather and sudden temperature changes can trigger an exacerbation  Fumes from cars and chemicals, air pollution, and perfume can also increase your symptoms  · Use pursed-lip breathing when you feel short of breath  Take a deep breath in through your nose  Slowly breathe out through your mouth with your lips pursed for twice as long as you inhaled  You can also practice this breathing pattern while you bend, lift, climb stairs, or exercise  Pursed-lip breathing slows down your breathing and helps move more air in and out of your lungs  · Exercise for at least 20 minutes each day  Exercise can help increase your energy and decrease shortness of breath  Ask about the best exercise plan for you  · Prevent infections that can be dangerous when you have COPD  Get a flu vaccine every year as soon as it becomes available  Ask if you should also get other vaccines, such as those given to prevent pneumonia and tetanus  Avoid people who are sick, and wash your hands often  Follow up with your healthcare provider as directed:  Write down your questions so you remember to ask them during your visits  CARE AGREEMENT:   You have the right to help plan your care  Learn about your health condition and how it may be treated  Discuss treatment options with your caregivers to decide what care you want to receive  You always have the right to refuse treatment  The above information is an  only  It is not intended as medical advice for individual conditions or treatments  Talk to your doctor, nurse or pharmacist before following any medical regimen to see if it is safe and effective for you  © 2014 [address] is for End User's use only and may not be sold, redistributed or otherwise used for commercial purposes  All illustrations and images included in CareNotes® are the copyrighted property of [employer]  or Jamil Henson

## 2018-12-11 NOTE — DISCHARGE SUMMARY
[facility] Discharge Summary - Medical Jaquelin Velasco  76 y o  male MRN: [id number]    [address] [facility]  Room / Bed: ED 25/ED 25 Encounter: [id number]    BRIEF OVERVIEW    Admitting Provider: Dheeraj Blackman MD  Discharge Provider: No att  providers found  Primary Care Physician at Discharge: Vinita Fitzgerald PA-C    Discharge To: Home self-care      Admission Date: 12/10/2018     Discharge Date: 12/11/2018 12:57 PM    Primary Discharge Diagnosis  Principal Problem:    Chest pain  Active Problems:    Type 2 diabetes mellitus with diabetic neuropathy, with long-term current use of insulin (HCC)    Essential hypertension    GERD (gastroesophageal reflux disease)    Hyperlipidemia    Opioid dependence (HCC)    COPD (chronic obstructive pulmonary disease) ([facility])    Coronary artery disease of native artery of native heart with stable angina pectoris [facility])  Resolved Problems:    Chest pain      Consulting Providers   None        Therapeutic Operative Procedures Performed  None    Diagnostic Procedures Performed  X-ray Chest 2 Views    Result Date: 12/11/2018  Impression: No acute cardiopulmonary disease   Workstation performed: [id number]     Discharge Disposition: Home/Self Care  Discharged With Lines: no    Test Results Pending at Discharge: None    Outpatient Follow-Up  Yes, with Cardiology, CT surgery, and patient's PCP  Follow up with consulting providers  No consulting providers  Active Issues Requiring Follow-up   Yes    Code Status: Level 1 - Full Code      Medications   Your Medications     Ask your Primary Care Physician about these medications:     Ask your Primary Care Physician about these medications:     Morning Afternoon Evening Bedtime As Needed    * albuterol 90 mcg/act inhaler   Commonly known as: VENTOLIN HFA   Inhale 2 puffs every 6 (six) hours as needed for wheezing   Refills: 1                    * albuterol (2 5 mg/3 mL) 0 083 % nebulizer solution   Take 2 5 mg by nebulization every 2 (two) hours as needed for wheezing   Refills: 0                    amLODIPine 5 mg tablet   Commonly known as: NORVASC   Take 1 tablet (5 mg total) by mouth daily   Refills: 1                    aspirin 81 MG tablet   Take 81 mg by mouth daily   Refills: 0                    atorvastatin 80 mg tablet   Commonly known as: LIPITOR   Take 80 mg by mouth daily at bedtime   Refills: 0                    benzonatate 100 mg capsule   Commonly known as: TESSALON PERLES   Take 2 capsules (200 mg total) by mouth 3 (three) times a day as needed for cough   Refills: 1                    clopidogrel 75 mg tablet   Commonly known as: PLAVIX   Take 1 tablet (75 mg total) by mouth daily for 30 days   Refills: 1                    docusate sodium 100 mg capsule   Commonly known as: COLACE   Take 1 capsule (100 mg total) by mouth 2 (two) times a day   Refills: 1                    ferrous sulfate 325 (65 Fe) mg tablet   Take 1 tablet (325 mg total) by mouth daily with breakfast   Refills: 0                    fluticasone-salmeterol 250-50 mcg/dose inhaler   Commonly known as: ADVAIR DISKUS   Inhale 1 puff every 12 (twelve) hours   Refills: 1                    glucose blood test strip   Commonly known as: ONE TOUCH ULTRA TEST   Test blood glucose 3 times daily (One Touch Ultra Blue)   Refills: 2                    insulin glargine 100 units/mL injection pen   Commonly known as: LANTUS SOLOSTAR   Inject 20 Units under the skin daily   Refills: 0                    insulin lispro 100 units/mL injection pen   Commonly known as: HUMALOG KWIKPEN   Inject 8 Units under the skin 3 (three) times a day with meals   Refills: 0                    ipratropium-albuterol 0 5-2 5 mg/3 mL nebulizer solution   Commonly known as: DUO-NEB   Inhale 3 mL 4 (four) times a day   Refills: 0                    isosorbide mononitrate 30 mg 24 hr tablet   Commonly known as: IMDUR   Take 1 tablet (30 mg total) by mouth daily   Refills: 0                   levofloxacin 500 mg tablet   Commonly known as: LEVAQUIN   Take 1 tablet (500 mg total) by mouth every 24 hours for 6 days   Refills: 0                    metoprolol tartrate 50 mg tablet   Commonly known as: LOPRESSOR   Take 1 tablet (50 mg total) by mouth every 12 (twelve) hours   Refills: 5                    multivitamin tablet   Take 1 tablet by mouth daily   Refills: 0                    nitroglycerin 0 4 mg SL tablet   Commonly known as: NITROSTAT   Place 1 tablet (0 4 mg total) under the tongue every 5 (five) minutes as needed for chest pain for up to 30 days   Refills: 0                    * nystatin ointment   Commonly known as: MYCOSTATIN   Apply topically 2 (two) times a day   Refills: 1                    * nystatin powder   Commonly known as: MYCOSTATIN   Apply topically 3 (three) times a day   Refills: 1                    omega-3-acid ethyl esters 1 g capsule   Commonly known as: LOVAZA   Take 1 g by mouth daily   Refills: 0                    oxyCODONE-acetaminophen 5-325 mg per tablet   Commonly known as: PERCOCET   Take 1 tablet by mouth every 12 (twelve) hours as needed for moderate pain Max Daily Amount: 2 tablets   Refills: 0                    pantoprazole 40 mg tablet   Commonly known as: PROTONIX   Take 1 tablet (40 mg total) by mouth daily   Refills: 1                    phenazopyridine 200 mg tablet   Commonly known as: PYRIDIUM   Take 1 tablet (200 mg total) by mouth 3 (three) times a day with meals   Refills: 0                    polyethylene glycol powder   Commonly known as: GLYCOLAX   Take 17 g by mouth daily   Refills: 0                    polyvinyl alcohol 1 4 % ophthalmic solution   Commonly known as: LIQUIFILM TEARS   Administer 1 drop to both eyes as needed for dry eyes   Refills: 0                    predniSONE 20 mg tablet   Take 1 tablet (20 mg total) by mouth 2 (two) times a day with meals for 5 days   Refills: 0                    pregabalin 100 mg capsule Commonly known as: LYRICA   Take 1 capsule (100 mg total) by mouth 3 (three) times a day   Refills: 0                    QUEtiapine 25 mg tablet   Commonly known as: SEROquel   Take 1 tablet (25 mg total) by mouth daily at bedtime   Refills: 3                    senna 8 6 mg   Commonly known as: SENOKOT   Take 1 tablet (8 6 mg total) by mouth daily at bedtime for 30 days   Refills: 0                    tamsulosin 0 4 mg   Commonly known as: FLOMAX   Take 1 capsule (0 4 mg total) by mouth daily with dinner   Refills: 1                    tiotropium 2 5 MCG/ACT Aers inhaler   Commonly known as: SPIRIVA RESPIMAT   Inhale 2 puffs daily   Refills: 2                   Allergies  No Known Allergies  Discharge Diet: cardiac diet and diabetic diet  Activity restrictions: Activity as tolerated    [address] [facility]  Mr Jaxon Saenz is a 15-year-old male with a past medical history significant for tobacco use, known multi-vessel coronary artery disease status post left heart catheterization in March 2018, hypertension, hyperlipidemia, poorly controlled type 2 diabetes mellitus on insulin, GERD, COPD, opiate dependence with chronic pain in setting of diabetic neuropathy and lumbar back pain in setting of closed fracture of transverse process of lumbar vertebrae from mechanical fall  Patient was seen by his PCP yesterday in the office for chronic bronchitis  Patient was also inquiring about refill of his Percocet for his pain  Patient presented to the emergency department yesterday evening with complaints of sudden onset chest pain while watching TV  This was similar to his prior episodes of chest pain  He nitroglycerin which he states relieved the chest pain  ACS workup was negative with 3-troponins in no acute ischemic changes noted on EKG    Patient has been evaluated by Cardiology on multiple prior admissions for angina, it was recommended that patient undergo CT surgery evaluation for possible CAB G  Patient has declined historically and expressed interest in following up with CT surgery in the outpatient setting which she has not done yet  Patient was started on Imdur 30 mg daily on prior hospitalization at times to optimize his medical therapy  Patient has not followed up with cardiology since his last hospitalization  Patient again reiterated that he did not want to pursue any surgical intervention for his known coronary artery disease at this time and expresses interest in following up with them in the outpatient setting  Patient was discharged in stable condition  His Imdur was increased to 60 mg  Patient was asking for more Percocet for his back pain   was checked and patient was given Rx for Percocet 5-325 mg on November 12 for 30 day supply, every 12 hr   This medication was not refilled by his PCP on his office visit yesterday  Discussed with patient that we will not refill this medication at this time and he needs to make an appointment with his PCP in 1 week to discuss his narcotic regimen  Will continue Lyrica in the interim  Patient was also discharged on 4 more days of p o  Prednisone 40 mg without taper and azithromycin 250 mg daily for days to complete a 5 day course for COPD exacerbation  Please see below for day of discharge problem focus discharge assessment    1  Chest pain  · In setting of known multi-vessel coronary artery disease  Troponin was negative without ischemic changes noted on EKG  This is likely unstable angina  · Increased Imdur to 60 mg daily  Continue ASA, statin and beta-blocker  Nitro p r n  · Cardiology follow-up information given on discharge  · Patient was instructed follow up with CT surgery to discuss possible surgical intervention in the future    Patient states he still plans on following up with them though is not interested at this time in pursuing discussion for any planned CT surgical intervention    · Follow-up with Cardiology and PCP      2  Hypertension  · Controlled  Continue home regimen  Continue beta-blocker and amlodipine    3  Type 2 diabetes mellitus, uncontrolled  · A1c 14 in October of 2018, poorly controlled on insulin  · Continue Lantus 20 units q h s  And Humalog 8 units t i d  With meals  Continue further titration by PCP in outpatient setting  · Diabetic diet  · Continue Lyrica for diabetic neuropathy  No further prescription for narcotics was provided at discharge  Patient will follow up with his PCP in 1 week to discuss narcotic regimen and pain management plan    4  COPD exacerbation  · Continue 4 more days of azithromycin 250 mg once daily and prednisone 40 mg p o  Once daily with a taper  · Continue home Advair and nebulizer/albuterol HFA inhaler p r n  · Follow-up with PCP    5  Hyperlipidemia  · Continue statin    6  GERD  · Continue PPI    7  Chronic pain with opiate dependence  · Patient will follow up with PCP in 1 week to discuss narcotic regimen  New Rx for Percocet was not provided on discharge  Day of discharge physical examination 12/11/18  Physical Exam   Constitutional: He is oriented to person, place, and time  He appears well-developed  No distress  HENT:   Head: Normocephalic and atraumatic  Eyes: Pupils are equal, round, and reactive to light  Neck: Normal range of motion  No JVD present  Cardiovascular: Normal rate and regular rhythm  Exam reveals no gallop and no friction rub  No murmur heard  Pulmonary/Chest: Effort normal  No respiratory distress  He has wheezes  He has no rales  He exhibits no tenderness  Abdominal: Soft  He exhibits distension  Musculoskeletal: Normal range of motion  He exhibits no edema  Neurological: He is alert and oriented to person, place, and time  No cranial nerve deficit  Skin: Skin is warm  He is not diaphoretic  Vitals reviewed        Presenting Problem/History of Present Illness  Principal Problem:    Chest pain  Active Problems:    Type 2 diabetes mellitus with diabetic neuropathy, with long-term current use of insulin (HCC)    Essential hypertension    GERD (gastroesophageal reflux disease)    Hyperlipidemia    Opioid dependence (HCC)    COPD (chronic obstructive pulmonary disease) (HCC)    Coronary artery disease of native artery of native heart with stable angina pectoris ([facility] [address] )  Resolved Problems:    * No resolved hospital problems  *        Other Pertinent Test Results    Results from last 7 days  Lab Units 12/11/18  0528 12/10/18  2051   POTASSIUM mmol/L 4 4 3 8   CHLORIDE mmol/L 105 104   CO2 mmol/L 26 23   BUN mg/dL 14 14   CREATININE mg/dL 1 12 1 34*   CALCIUM mg/dL 8 6 9 0   ALK PHOS U/L  --  107   ALT U/L  --  21   AST U/L  --  16     Component      Latest Ref Rng & Units 12/10/2018 12/11/2018 12/11/2018            2:05 AM  5:28 AM   Troponin I      <=0 04 ng/mL <0 02 <0 02 <0 02         Discharge Condition: stable      Discharge  Statement   I spent 30 minutes minutes discharging the patient  This time was spent on the day of discharge  I had direct contact with the patient on the day of discharge  Additional documentation is required if more than 30 minutes were spent on discharge
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
## 2018-12-11 NOTE — ED NOTES
Patient care handoff occurred at this time  Report given by Jigar Treviño RN  Patient moved into hospital bed for comfort        Gwendolyn Merino RN  12/11/18 7972

## 2018-12-11 NOTE — SOCIAL WORK
CM met with Pt to discuss CM role at d/c  Pt reported to live alone in an apartment with 0 [address] and elevator access  Pt reported to be IPTA, uses a cane/walker and has a WC  Pt relies on [employer] for transportation, and a friend gets his groceries for him  Hx VNA  Pt denied MH dx and no drug/etoh tx  Pt [place] [employer] [place]  Pt's PCP is Dr Ahsan Dunn with [facility] [place]  Pt's PCP is brotherAntoinette [phone]  Pt adamant that he does not need any help in the home  Pt will need assistance with transportation home  CM will follow  CM reviewed d/c planning process including the following: identifying help at home, patient preference for d/c planning needs, Discharge Lounge, [facility] Meds to Bed program, availability of treatment team to discuss questions or concerns patient and/or family may have regarding understanding medications and recognizing signs and symptoms once discharged  CM also encouraged patient to follow up with all recommended appointments after discharge  Patient advised of importance for patient and family to participate in managing patients medical well being

## 2018-12-11 NOTE — ED NOTES
Pt took off tele monitor and BP cuff stating that he is not wearing it  Pt refused his ECHO stating that he is going home  He stated he wants to eat lunch and be left alone   SOD notified     Lamonte White, RN  12/11/18 0317
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
## 2018-12-14 NOTE — TELEPHONE ENCOUNTER
Patient would like protonix filled and sent to [facility] pharmacy in [place]  Has appointment on 12/24/18  Please advise

## 2018-12-18 NOTE — TELEPHONE ENCOUNTER
Pt called because his prescription for pantoprazole (PROTONIX) 40 mg tablet was sent to [facility] pharmacy and he doesn't go to [facility] pharmacy because they dont take [facility] [address]  Can it please be send to [facility] today or tomorrow      Thanks
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
## 2018-12-24 NOTE — PROGRESS NOTES
TCM Call (since 11/23/2018)     Date and time call was made  12/12/2018 10:43 AM    Hospital care reviewed  Records reviewed    Patient was hospitialized at  [facility]    Date of Admission  12/10/18    Date of discharge  12/11/18    Diagnosis  Chest pain, HTN, GERD, DM, HLD, opiod dependence, COPD, CAD    Disposition  Home    Were the patients medications reviewed and updated  Yes    Current Symptoms  Dizziness (Chronic pain in legs "I take Lyrica for that ", intermittent dysuria - on Pyridium, intermittent dizziness, c/o decreased appetite)      TCM Call (since 11/23/2018)     Should patient be enrolled in anticoag monitoring? No (On Plavix)    Scheduled for follow up? Yes    Patients specialists  Cardiologist    Did you obtain your prescribed medications  Yes (reqeusting refill of Spiriva )    Do you need help managing your prescriptions or medications  No    Is transportation to your appointment needed  Yes    Specify why  Walks to [facility] in [place]  I have advised the patient to call PCP with any new or worsening symptoms  Enrique Ybarra RN    Living Arrangements  Alone    Are you recieving any outpatient services  No    Are you recieving home care services  No    Are you using any community resources  No    Have you fallen in the last 12 months  Yes    How many times  February 2018    Interperter language line needed  No    Counseling  -- (Patient was instructed to make a list of all of the meds that he will need in the next 6 months and bring this list with him to his office visit  )    Comments  TCM scheduled on 12/24/18 @ 1130 in [place] with Dr Ros Alcantar  Pt states that he only wants to see Dr Ros Alcantar told him that he should only have 1 doctor  During conversation, patient rambled about experiences with health care  It was difficult to ask questions when patient will not take time to listen and continues to talk

## 2019-01-01 ENCOUNTER — TELEPHONE (OUTPATIENT)
Dept: INTERNAL MEDICINE CLINIC | Facility: CLINIC | Age: 69
End: 2019-01-01

## 2019-01-01 ENCOUNTER — APPOINTMENT (INPATIENT)
Dept: CT IMAGING | Facility: HOSPITAL | Age: 69
DRG: 840 | End: 2019-01-01
Payer: COMMERCIAL

## 2019-01-01 ENCOUNTER — APPOINTMENT (EMERGENCY)
Dept: RADIOLOGY | Facility: HOSPITAL | Age: 69
DRG: 690 | End: 2019-01-01
Payer: COMMERCIAL

## 2019-01-01 ENCOUNTER — APPOINTMENT (INPATIENT)
Dept: RADIOLOGY | Facility: HOSPITAL | Age: 69
DRG: 690 | End: 2019-01-01
Payer: COMMERCIAL

## 2019-01-01 ENCOUNTER — OFFICE VISIT (OUTPATIENT)
Dept: INTERNAL MEDICINE CLINIC | Facility: CLINIC | Age: 69
End: 2019-01-01
Payer: COMMERCIAL

## 2019-01-01 ENCOUNTER — APPOINTMENT (INPATIENT)
Dept: RADIOLOGY | Facility: HOSPITAL | Age: 69
DRG: 840 | End: 2019-01-01
Payer: COMMERCIAL

## 2019-01-01 ENCOUNTER — TRANSITIONAL CARE MANAGEMENT (OUTPATIENT)
Dept: INTERNAL MEDICINE CLINIC | Facility: CLINIC | Age: 69
End: 2019-01-01

## 2019-01-01 ENCOUNTER — TELEPHONE (OUTPATIENT)
Dept: INTERNAL MEDICINE CLINIC | Age: 69
End: 2019-01-01

## 2019-01-01 ENCOUNTER — DOCUMENTATION (OUTPATIENT)
Dept: OTHER | Facility: HOSPITAL | Age: 69
End: 2019-01-01

## 2019-01-01 ENCOUNTER — APPOINTMENT (INPATIENT)
Dept: ULTRASOUND IMAGING | Facility: HOSPITAL | Age: 69
DRG: 840 | End: 2019-01-01
Payer: COMMERCIAL

## 2019-01-01 ENCOUNTER — APPOINTMENT (INPATIENT)
Dept: NON INVASIVE DIAGNOSTICS | Facility: HOSPITAL | Age: 69
DRG: 840 | End: 2019-01-01
Payer: COMMERCIAL

## 2019-01-01 ENCOUNTER — TELEPHONE (OUTPATIENT)
Dept: SURGICAL ONCOLOGY | Facility: CLINIC | Age: 69
End: 2019-01-01

## 2019-01-01 ENCOUNTER — APPOINTMENT (EMERGENCY)
Dept: RADIOLOGY | Facility: HOSPITAL | Age: 69
End: 2019-01-01
Payer: COMMERCIAL

## 2019-01-01 ENCOUNTER — TELEPHONE (OUTPATIENT)
Dept: OTHER | Facility: OTHER | Age: 69
End: 2019-01-01

## 2019-01-01 ENCOUNTER — HOSPITAL ENCOUNTER (INPATIENT)
Facility: HOSPITAL | Age: 69
LOS: 19 days | DRG: 840 | End: 2019-09-10
Attending: EMERGENCY MEDICINE | Admitting: INTERNAL MEDICINE
Payer: COMMERCIAL

## 2019-01-01 ENCOUNTER — HOSPITAL ENCOUNTER (EMERGENCY)
Facility: HOSPITAL | Age: 69
Discharge: HOME | End: 2019-08-14
Attending: EMERGENCY MEDICINE
Payer: COMMERCIAL

## 2019-01-01 ENCOUNTER — HOSPITAL ENCOUNTER (INPATIENT)
Facility: HOSPITAL | Age: 69
LOS: 4 days | Discharge: HOME | DRG: 690 | End: 2019-08-01
Attending: EMERGENCY MEDICINE | Admitting: INTERNAL MEDICINE
Payer: COMMERCIAL

## 2019-01-01 ENCOUNTER — APPOINTMENT (EMERGENCY)
Dept: RADIOLOGY | Facility: HOSPITAL | Age: 69
DRG: 840 | End: 2019-01-01
Payer: COMMERCIAL

## 2019-01-01 ENCOUNTER — HOSPITAL ENCOUNTER (EMERGENCY)
Facility: HOSPITAL | Age: 69
Discharge: HOME | End: 2019-02-02
Attending: EMERGENCY MEDICINE
Payer: COMMERCIAL

## 2019-01-01 VITALS
DIASTOLIC BLOOD PRESSURE: 57 MMHG | HEART RATE: 72 BPM | TEMPERATURE: 97.9 F | RESPIRATION RATE: 18 BRPM | OXYGEN SATURATION: 94 % | SYSTOLIC BLOOD PRESSURE: 118 MMHG

## 2019-01-01 VITALS
SYSTOLIC BLOOD PRESSURE: 118 MMHG | DIASTOLIC BLOOD PRESSURE: 64 MMHG | HEART RATE: 85 BPM | OXYGEN SATURATION: 95 % | TEMPERATURE: 98.7 F

## 2019-01-01 VITALS
TEMPERATURE: 97.6 F | BODY MASS INDEX: 35.15 KG/M2 | SYSTOLIC BLOOD PRESSURE: 120 MMHG | WEIGHT: 238 LBS | HEART RATE: 80 BPM | DIASTOLIC BLOOD PRESSURE: 80 MMHG | OXYGEN SATURATION: 96 %

## 2019-01-01 VITALS
WEIGHT: 238.1 LBS | HEART RATE: 76 BPM | DIASTOLIC BLOOD PRESSURE: 82 MMHG | RESPIRATION RATE: 20 BRPM | SYSTOLIC BLOOD PRESSURE: 158 MMHG | HEIGHT: 69 IN | BODY MASS INDEX: 35.27 KG/M2 | TEMPERATURE: 98.2 F | OXYGEN SATURATION: 98 %

## 2019-01-01 VITALS
TEMPERATURE: 98.2 F | HEART RATE: 75 BPM | WEIGHT: 226 LBS | SYSTOLIC BLOOD PRESSURE: 120 MMHG | DIASTOLIC BLOOD PRESSURE: 70 MMHG | BODY MASS INDEX: 33.37 KG/M2 | OXYGEN SATURATION: 97 %

## 2019-01-01 VITALS
HEART RATE: 117 BPM | WEIGHT: 223.8 LBS | BODY MASS INDEX: 32.04 KG/M2 | OXYGEN SATURATION: 95 % | SYSTOLIC BLOOD PRESSURE: 130 MMHG | HEIGHT: 70 IN | TEMPERATURE: 99.3 F | DIASTOLIC BLOOD PRESSURE: 70 MMHG

## 2019-01-01 VITALS
TEMPERATURE: 99.7 F | HEIGHT: 72 IN | RESPIRATION RATE: 18 BRPM | WEIGHT: 211.64 LBS | SYSTOLIC BLOOD PRESSURE: 129 MMHG | BODY MASS INDEX: 28.67 KG/M2 | HEART RATE: 75 BPM | OXYGEN SATURATION: 97 % | DIASTOLIC BLOOD PRESSURE: 65 MMHG

## 2019-01-01 VITALS
SYSTOLIC BLOOD PRESSURE: 82 MMHG | TEMPERATURE: 99.7 F | BODY MASS INDEX: 26.4 KG/M2 | DIASTOLIC BLOOD PRESSURE: 50 MMHG | WEIGHT: 194.67 LBS | RESPIRATION RATE: 31 BRPM | HEART RATE: 171 BPM | OXYGEN SATURATION: 97 %

## 2019-01-01 VITALS
BODY MASS INDEX: 33.86 KG/M2 | HEIGHT: 69 IN | SYSTOLIC BLOOD PRESSURE: 116 MMHG | DIASTOLIC BLOOD PRESSURE: 68 MMHG | HEART RATE: 83 BPM | TEMPERATURE: 97.6 F | WEIGHT: 228.6 LBS | OXYGEN SATURATION: 98 %

## 2019-01-01 DIAGNOSIS — R10.2 SUPRAPUBIC PAIN: ICD-10-CM

## 2019-01-01 DIAGNOSIS — J41.1 MUCOPURULENT CHRONIC BRONCHITIS (HCC): ICD-10-CM

## 2019-01-01 DIAGNOSIS — G89.29 CHRONIC BILATERAL LOW BACK PAIN WITH BILATERAL SCIATICA: ICD-10-CM

## 2019-01-01 DIAGNOSIS — N40.1 BPH ASSOCIATED WITH NOCTURIA: ICD-10-CM

## 2019-01-01 DIAGNOSIS — M54.9 BACK PAIN: ICD-10-CM

## 2019-01-01 DIAGNOSIS — E11.40 TYPE 2 DIABETES MELLITUS WITH DIABETIC NEUROPATHY, WITHOUT LONG-TERM CURRENT USE OF INSULIN (HCC): ICD-10-CM

## 2019-01-01 DIAGNOSIS — Z00.00 MEDICARE ANNUAL WELLNESS VISIT, SUBSEQUENT: ICD-10-CM

## 2019-01-01 DIAGNOSIS — D69.6 THROMBOCYTOPENIA (HCC): ICD-10-CM

## 2019-01-01 DIAGNOSIS — Z79.4 TYPE 2 DIABETES MELLITUS TREATED WITH INSULIN (HCC): ICD-10-CM

## 2019-01-01 DIAGNOSIS — M54.41 CHRONIC BILATERAL LOW BACK PAIN WITH BILATERAL SCIATICA: Primary | ICD-10-CM

## 2019-01-01 DIAGNOSIS — G89.29 CHRONIC BILATERAL LOW BACK PAIN WITH BILATERAL SCIATICA: Primary | ICD-10-CM

## 2019-01-01 DIAGNOSIS — M54.42 CHRONIC BILATERAL LOW BACK PAIN WITH BILATERAL SCIATICA: ICD-10-CM

## 2019-01-01 DIAGNOSIS — D64.9 ANEMIA: Primary | ICD-10-CM

## 2019-01-01 DIAGNOSIS — M54.41 CHRONIC BILATERAL LOW BACK PAIN WITH BILATERAL SCIATICA: ICD-10-CM

## 2019-01-01 DIAGNOSIS — I25.10 DISEASE OF CARDIOVASCULAR SYSTEM: ICD-10-CM

## 2019-01-01 DIAGNOSIS — E11.9 TYPE 2 DIABETES MELLITUS TREATED WITH INSULIN (HCC): ICD-10-CM

## 2019-01-01 DIAGNOSIS — J81.0 ACUTE PULMONARY EDEMA (HCC): ICD-10-CM

## 2019-01-01 DIAGNOSIS — F11.29 OPIOID DEPENDENCE WITH OPIOID-INDUCED DISORDER (HCC): ICD-10-CM

## 2019-01-01 DIAGNOSIS — I25.118 CORONARY ARTERY DISEASE OF NATIVE ARTERY OF NATIVE HEART WITH STABLE ANGINA PECTORIS (HCC): Chronic | ICD-10-CM

## 2019-01-01 DIAGNOSIS — D61.818 PANCYTOPENIA (HCC): ICD-10-CM

## 2019-01-01 DIAGNOSIS — M54.9 CHRONIC BACK PAIN, UNSPECIFIED BACK LOCATION, UNSPECIFIED BACK PAIN LATERALITY: Primary | ICD-10-CM

## 2019-01-01 DIAGNOSIS — N17.9 ACUTE KIDNEY INJURY (HCC): ICD-10-CM

## 2019-01-01 DIAGNOSIS — S32.009D CLOSED FRACTURE OF TRANSVERSE PROCESS OF LUMBAR VERTEBRA WITH ROUTINE HEALING: ICD-10-CM

## 2019-01-01 DIAGNOSIS — R07.9 CHEST PAIN, UNSPECIFIED TYPE: ICD-10-CM

## 2019-01-01 DIAGNOSIS — S81.801A LEG WOUND, RIGHT, INITIAL ENCOUNTER: Primary | ICD-10-CM

## 2019-01-01 DIAGNOSIS — G93.40 ACUTE ENCEPHALOPATHY: ICD-10-CM

## 2019-01-01 DIAGNOSIS — G62.9 NEUROPATHY: Primary | ICD-10-CM

## 2019-01-01 DIAGNOSIS — D72.829 LEUKOCYTOSIS: ICD-10-CM

## 2019-01-01 DIAGNOSIS — D72.825 BANDEMIA: ICD-10-CM

## 2019-01-01 DIAGNOSIS — R65.10 SIRS (SYSTEMIC INFLAMMATORY RESPONSE SYNDROME) (HCC): ICD-10-CM

## 2019-01-01 DIAGNOSIS — R30.0 DYSURIA: Primary | ICD-10-CM

## 2019-01-01 DIAGNOSIS — M79.674 TOE PAIN, RIGHT: ICD-10-CM

## 2019-01-01 DIAGNOSIS — K21.9 GASTROESOPHAGEAL REFLUX DISEASE, ESOPHAGITIS PRESENCE NOT SPECIFIED: ICD-10-CM

## 2019-01-01 DIAGNOSIS — I73.9 PERIPHERAL VASCULAR DISEASE, UNSPECIFIED (HCC): ICD-10-CM

## 2019-01-01 DIAGNOSIS — E11.40 TYPE 2 DIABETES MELLITUS WITH DIABETIC NEUROPATHY, WITH LONG-TERM CURRENT USE OF INSULIN (HCC): ICD-10-CM

## 2019-01-01 DIAGNOSIS — I10 ESSENTIAL HYPERTENSION: ICD-10-CM

## 2019-01-01 DIAGNOSIS — J44.1 CHRONIC OBSTRUCTIVE PULMONARY DISEASE WITH ACUTE EXACERBATION (HCC): Primary | ICD-10-CM

## 2019-01-01 DIAGNOSIS — R35.1 BPH ASSOCIATED WITH NOCTURIA: ICD-10-CM

## 2019-01-01 DIAGNOSIS — D64.9 ANEMIA: ICD-10-CM

## 2019-01-01 DIAGNOSIS — Z01.89 ENCOUNTER FOR COMPETENCY EVALUATION: ICD-10-CM

## 2019-01-01 DIAGNOSIS — J42 CHRONIC BRONCHITIS, UNSPECIFIED CHRONIC BRONCHITIS TYPE (HCC): ICD-10-CM

## 2019-01-01 DIAGNOSIS — Z12.11 COLON CANCER SCREENING: ICD-10-CM

## 2019-01-01 DIAGNOSIS — I47.1 SVT (SUPRAVENTRICULAR TACHYCARDIA) (HCC): ICD-10-CM

## 2019-01-01 DIAGNOSIS — I50.32 CHRONIC DIASTOLIC CHF (CONGESTIVE HEART FAILURE) (HCC): ICD-10-CM

## 2019-01-01 DIAGNOSIS — I25.10 LEFT MAIN CORONARY ARTERY DISEASE: ICD-10-CM

## 2019-01-01 DIAGNOSIS — G89.29 CHRONIC BACK PAIN, UNSPECIFIED BACK LOCATION, UNSPECIFIED BACK PAIN LATERALITY: Primary | ICD-10-CM

## 2019-01-01 DIAGNOSIS — I25.119 CORONARY ARTERY DISEASE INVOLVING NATIVE CORONARY ARTERY OF NATIVE HEART WITH ANGINA PECTORIS (HCC): ICD-10-CM

## 2019-01-01 DIAGNOSIS — M54.50: Primary | ICD-10-CM

## 2019-01-01 DIAGNOSIS — N39.0 RECURRENT UTI: ICD-10-CM

## 2019-01-01 DIAGNOSIS — I70.223 REST PAIN OF BOTH LOWER EXTREMITIES DUE TO ATHEROSCLEROSIS (HCC): ICD-10-CM

## 2019-01-01 DIAGNOSIS — M54.50 LOW BACK PAIN: Primary | ICD-10-CM

## 2019-01-01 DIAGNOSIS — Z79.4 TYPE 2 DIABETES MELLITUS WITH DIABETIC NEUROPATHY, WITH LONG-TERM CURRENT USE OF INSULIN (HCC): ICD-10-CM

## 2019-01-01 DIAGNOSIS — M54.42 CHRONIC BILATERAL LOW BACK PAIN WITH BILATERAL SCIATICA: Primary | ICD-10-CM

## 2019-01-01 DIAGNOSIS — R77.8 ELEVATED TROPONIN: ICD-10-CM

## 2019-01-01 DIAGNOSIS — E11.65 UNCONTROLLED TYPE 2 DIABETES MELLITUS WITH HYPERGLYCEMIA (HCC): ICD-10-CM

## 2019-01-01 DIAGNOSIS — I71.4 ABDOMINAL AORTIC ANEURYSM (AAA) WITHOUT RUPTURE (HCC): ICD-10-CM

## 2019-01-01 DIAGNOSIS — G62.9 NEUROPATHY: ICD-10-CM

## 2019-01-01 DIAGNOSIS — D46.9 MYELODYSPLASIA (MYELODYSPLASTIC SYNDROME) (HCC): Primary | ICD-10-CM

## 2019-01-01 DIAGNOSIS — J96.01 ACUTE RESPIRATORY FAILURE WITH HYPOXIA (HCC): ICD-10-CM

## 2019-01-01 DIAGNOSIS — R13.19 OTHER DYSPHAGIA: ICD-10-CM

## 2019-01-01 LAB
25(OH)D3 SERPL-MCNC: 19.2 NG/ML (ref 30–100)
ABO GROUP BLD BPU: NORMAL
ABO GROUP BLD: NORMAL
ALBUMIN SERPL BCP-MCNC: 2.3 G/DL (ref 3.5–5)
ALBUMIN SERPL BCP-MCNC: 2.6 G/DL (ref 3.5–5)
ALBUMIN SERPL BCP-MCNC: 2.8 G/DL (ref 3.5–5)
ALBUMIN SERPL BCP-MCNC: 3.5 G/DL (ref 3.5–5)
ALP SERPL-CCNC: 106 U/L (ref 46–116)
ALP SERPL-CCNC: 110 U/L (ref 46–116)
ALP SERPL-CCNC: 83 U/L (ref 46–116)
ALP SERPL-CCNC: 90 U/L (ref 46–116)
ALT SERPL W P-5'-P-CCNC: 11 U/L (ref 12–78)
ALT SERPL W P-5'-P-CCNC: 12 U/L (ref 12–78)
ALT SERPL W P-5'-P-CCNC: 14 U/L (ref 12–78)
ALT SERPL W P-5'-P-CCNC: 97 U/L (ref 12–78)
ANION GAP SERPL CALCULATED.3IONS-SCNC: 10 MMOL/L (ref 4–13)
ANION GAP SERPL CALCULATED.3IONS-SCNC: 11 MMOL/L (ref 4–13)
ANION GAP SERPL CALCULATED.3IONS-SCNC: 11 MMOL/L (ref 4–13)
ANION GAP SERPL CALCULATED.3IONS-SCNC: 12 MMOL/L (ref 4–13)
ANION GAP SERPL CALCULATED.3IONS-SCNC: 13 MMOL/L (ref 4–13)
ANION GAP SERPL CALCULATED.3IONS-SCNC: 14 MMOL/L (ref 4–13)
ANION GAP SERPL CALCULATED.3IONS-SCNC: 14 MMOL/L (ref 4–13)
ANION GAP SERPL CALCULATED.3IONS-SCNC: 15 MMOL/L (ref 4–13)
ANION GAP SERPL CALCULATED.3IONS-SCNC: 16 MMOL/L (ref 4–13)
ANION GAP SERPL CALCULATED.3IONS-SCNC: 17 MMOL/L (ref 4–13)
ANION GAP SERPL CALCULATED.3IONS-SCNC: 6 MMOL/L (ref 4–13)
ANION GAP SERPL CALCULATED.3IONS-SCNC: 7 MMOL/L (ref 4–13)
ANION GAP SERPL CALCULATED.3IONS-SCNC: 7 MMOL/L (ref 4–13)
ANION GAP SERPL CALCULATED.3IONS-SCNC: 8 MMOL/L (ref 4–13)
ANION GAP SERPL CALCULATED.3IONS-SCNC: 9 MMOL/L (ref 4–13)
ANISOCYTOSIS BLD QL SMEAR: PRESENT
APTT PPP: 51 SECONDS (ref 23–37)
ARTERIAL PATENCY WRIST A: YES
ARTERIAL PATENCY WRIST A: YES
AST SERPL W P-5'-P-CCNC: 14 U/L (ref 5–45)
AST SERPL W P-5'-P-CCNC: 432 U/L (ref 5–45)
AST SERPL W P-5'-P-CCNC: 7 U/L (ref 5–45)
AST SERPL W P-5'-P-CCNC: 8 U/L (ref 5–45)
ATRIAL RATE: 100 BPM
ATRIAL RATE: 103 BPM
ATRIAL RATE: 138 BPM
ATRIAL RATE: 145 BPM
ATRIAL RATE: 148 BPM
ATRIAL RATE: 149 BPM
ATRIAL RATE: 151 BPM
ATRIAL RATE: 155 BPM
ATRIAL RATE: 166 BPM
ATRIAL RATE: 70 BPM
ATRIAL RATE: 81 BPM
ATRIAL RATE: 82 BPM
ATRIAL RATE: 84 BPM
ATRIAL RATE: 86 BPM
ATRIAL RATE: 87 BPM
ATRIAL RATE: 87 BPM
ATRIAL RATE: 90 BPM
ATRIAL RATE: 92 BPM
ATRIAL RATE: 94 BPM
ATRIAL RATE: 98 BPM
BACTERIA BLD CULT: NORMAL
BACTERIA UR CULT: NORMAL
BACTERIA UR QL AUTO: ABNORMAL /HPF
BASE EXCESS BLDA CALC-SCNC: -2.8 MMOL/L
BASE EXCESS BLDA CALC-SCNC: -3.1 MMOL/L
BASOPHILS # BLD AUTO: 0.05 THOUSANDS/ΜL (ref 0–0.1)
BASOPHILS # BLD AUTO: 0.1 THOUSANDS/ΜL (ref 0–0.1)
BASOPHILS # BLD AUTO: 0.12 THOUSANDS/ΜL (ref 0–0.1)
BASOPHILS # BLD AUTO: 0.13 THOUSANDS/ΜL (ref 0–0.1)
BASOPHILS # BLD AUTO: 0.14 THOUSANDS/ΜL (ref 0–0.1)
BASOPHILS # BLD AUTO: 0.17 THOUSANDS/ΜL (ref 0–0.1)
BASOPHILS # BLD AUTO: 0.18 THOUSANDS/ΜL (ref 0–0.1)
BASOPHILS # BLD AUTO: 0.19 THOUSANDS/ΜL (ref 0–0.1)
BASOPHILS # BLD AUTO: 0.27 THOUSANDS/ΜL (ref 0–0.1)
BASOPHILS # BLD AUTO: 0.29 THOUSANDS/ΜL (ref 0–0.1)
BASOPHILS # BLD AUTO: 0.41 THOUSANDS/ΜL (ref 0–0.1)
BASOPHILS # BLD AUTO: 0.52 THOUSANDS/ΜL (ref 0–0.1)
BASOPHILS # BLD MANUAL: 0 THOUSAND/UL (ref 0–0.1)
BASOPHILS # BLD MANUAL: 0.53 THOUSAND/UL (ref 0–0.1)
BASOPHILS NFR BLD AUTO: 0 % (ref 0–1)
BASOPHILS NFR BLD AUTO: 1 % (ref 0–1)
BASOPHILS NFR MAR MANUAL: 0 % (ref 0–1)
BASOPHILS NFR MAR MANUAL: 2 % (ref 0–1)
BILIRUB DIRECT SERPL-MCNC: 0.25 MG/DL (ref 0–0.2)
BILIRUB SERPL-MCNC: 0.54 MG/DL (ref 0.2–1)
BILIRUB SERPL-MCNC: 0.74 MG/DL (ref 0.2–1)
BILIRUB SERPL-MCNC: 0.97 MG/DL (ref 0.2–1)
BILIRUB SERPL-MCNC: 1.4 MG/DL (ref 0.2–1)
BILIRUB UR QL STRIP: ABNORMAL
BILIRUB UR QL STRIP: ABNORMAL
BILIRUB UR QL STRIP: NEGATIVE
BLD GP AB SCN SERPL QL: NEGATIVE
BLD SMEAR INTERP: NORMAL
BPU ID: NORMAL
BUN SERPL-MCNC: 111 MG/DL (ref 5–25)
BUN SERPL-MCNC: 17 MG/DL (ref 5–25)
BUN SERPL-MCNC: 17 MG/DL (ref 5–25)
BUN SERPL-MCNC: 18 MG/DL (ref 5–25)
BUN SERPL-MCNC: 18 MG/DL (ref 5–25)
BUN SERPL-MCNC: 19 MG/DL (ref 5–25)
BUN SERPL-MCNC: 19 MG/DL (ref 5–25)
BUN SERPL-MCNC: 20 MG/DL (ref 5–25)
BUN SERPL-MCNC: 20 MG/DL (ref 5–25)
BUN SERPL-MCNC: 21 MG/DL (ref 5–25)
BUN SERPL-MCNC: 23 MG/DL (ref 5–25)
BUN SERPL-MCNC: 25 MG/DL (ref 5–25)
BUN SERPL-MCNC: 29 MG/DL (ref 5–25)
BUN SERPL-MCNC: 30 MG/DL (ref 5–25)
BUN SERPL-MCNC: 33 MG/DL (ref 5–25)
BUN SERPL-MCNC: 33 MG/DL (ref 5–25)
BUN SERPL-MCNC: 39 MG/DL (ref 5–25)
BUN SERPL-MCNC: 46 MG/DL (ref 5–25)
BUN SERPL-MCNC: 47 MG/DL (ref 5–25)
BUN SERPL-MCNC: 50 MG/DL (ref 5–25)
BUN SERPL-MCNC: 55 MG/DL (ref 5–25)
BUN SERPL-MCNC: 56 MG/DL (ref 5–25)
BUN SERPL-MCNC: 59 MG/DL (ref 5–25)
BUN SERPL-MCNC: 64 MG/DL (ref 5–25)
BUN SERPL-MCNC: 83 MG/DL (ref 5–25)
BUN SERPL-MCNC: 87 MG/DL (ref 5–25)
CA-I BLD-SCNC: 0.96 MMOL/L (ref 1.12–1.32)
CALCIUM SERPL-MCNC: 7.7 MG/DL (ref 8.3–10.1)
CALCIUM SERPL-MCNC: 7.8 MG/DL (ref 8.3–10.1)
CALCIUM SERPL-MCNC: 7.9 MG/DL (ref 8.3–10.1)
CALCIUM SERPL-MCNC: 8 MG/DL (ref 8.3–10.1)
CALCIUM SERPL-MCNC: 8.2 MG/DL (ref 8.3–10.1)
CALCIUM SERPL-MCNC: 8.3 MG/DL (ref 8.3–10.1)
CALCIUM SERPL-MCNC: 8.4 MG/DL (ref 8.3–10.1)
CALCIUM SERPL-MCNC: 8.4 MG/DL (ref 8.3–10.1)
CALCIUM SERPL-MCNC: 8.5 MG/DL (ref 8.3–10.1)
CALCIUM SERPL-MCNC: 8.6 MG/DL (ref 8.3–10.1)
CALCIUM SERPL-MCNC: 8.7 MG/DL (ref 8.3–10.1)
CALCIUM SERPL-MCNC: 8.9 MG/DL (ref 8.3–10.1)
CALCIUM SERPL-MCNC: 9 MG/DL (ref 8.3–10.1)
CALCIUM SERPL-MCNC: 9 MG/DL (ref 8.3–10.1)
CALCIUM SERPL-MCNC: 9.1 MG/DL (ref 8.3–10.1)
CALCIUM SERPL-MCNC: 9.1 MG/DL (ref 8.3–10.1)
CALCIUM SERPL-MCNC: 9.2 MG/DL (ref 8.3–10.1)
CHLORIDE SERPL-SCNC: 101 MMOL/L (ref 100–108)
CHLORIDE SERPL-SCNC: 102 MMOL/L (ref 100–108)
CHLORIDE SERPL-SCNC: 103 MMOL/L (ref 100–108)
CHLORIDE SERPL-SCNC: 104 MMOL/L (ref 100–108)
CHLORIDE SERPL-SCNC: 105 MMOL/L (ref 100–108)
CHLORIDE SERPL-SCNC: 107 MMOL/L (ref 100–108)
CHLORIDE SERPL-SCNC: 109 MMOL/L (ref 100–108)
CHLORIDE SERPL-SCNC: 115 MMOL/L (ref 100–108)
CHLORIDE SERPL-SCNC: 98 MMOL/L (ref 100–108)
CHLORIDE SERPL-SCNC: 99 MMOL/L (ref 100–108)
CLARITY UR: ABNORMAL
CLARITY UR: CLEAR
CLARITY UR: CLEAR
CO2 SERPL-SCNC: 22 MMOL/L (ref 21–32)
CO2 SERPL-SCNC: 22 MMOL/L (ref 21–32)
CO2 SERPL-SCNC: 23 MMOL/L (ref 21–32)
CO2 SERPL-SCNC: 24 MMOL/L (ref 21–32)
CO2 SERPL-SCNC: 25 MMOL/L (ref 21–32)
CO2 SERPL-SCNC: 26 MMOL/L (ref 21–32)
CO2 SERPL-SCNC: 27 MMOL/L (ref 21–32)
CO2 SERPL-SCNC: 27 MMOL/L (ref 21–32)
CO2 SERPL-SCNC: 28 MMOL/L (ref 21–32)
CO2 SERPL-SCNC: 29 MMOL/L (ref 21–32)
COLOR UR: ABNORMAL
COLOR UR: YELLOW
COLOR UR: YELLOW
CREAT SERPL-MCNC: 1.17 MG/DL (ref 0.6–1.3)
CREAT SERPL-MCNC: 1.21 MG/DL (ref 0.6–1.3)
CREAT SERPL-MCNC: 1.23 MG/DL (ref 0.6–1.3)
CREAT SERPL-MCNC: 1.29 MG/DL (ref 0.6–1.3)
CREAT SERPL-MCNC: 1.38 MG/DL (ref 0.6–1.3)
CREAT SERPL-MCNC: 1.43 MG/DL (ref 0.6–1.3)
CREAT SERPL-MCNC: 1.46 MG/DL (ref 0.6–1.3)
CREAT SERPL-MCNC: 1.49 MG/DL (ref 0.6–1.3)
CREAT SERPL-MCNC: 1.54 MG/DL (ref 0.6–1.3)
CREAT SERPL-MCNC: 1.54 MG/DL (ref 0.6–1.3)
CREAT SERPL-MCNC: 1.56 MG/DL (ref 0.6–1.3)
CREAT SERPL-MCNC: 1.61 MG/DL (ref 0.6–1.3)
CREAT SERPL-MCNC: 1.7 MG/DL (ref 0.6–1.3)
CREAT SERPL-MCNC: 1.74 MG/DL (ref 0.6–1.3)
CREAT SERPL-MCNC: 1.75 MG/DL (ref 0.6–1.3)
CREAT SERPL-MCNC: 1.76 MG/DL (ref 0.6–1.3)
CREAT SERPL-MCNC: 1.77 MG/DL (ref 0.6–1.3)
CREAT SERPL-MCNC: 1.82 MG/DL (ref 0.6–1.3)
CREAT SERPL-MCNC: 1.85 MG/DL (ref 0.6–1.3)
CREAT SERPL-MCNC: 1.96 MG/DL (ref 0.6–1.3)
CREAT SERPL-MCNC: 1.98 MG/DL (ref 0.6–1.3)
CREAT SERPL-MCNC: 2.02 MG/DL (ref 0.6–1.3)
CREAT SERPL-MCNC: 2.02 MG/DL (ref 0.6–1.3)
CREAT SERPL-MCNC: 2.83 MG/DL (ref 0.6–1.3)
CREAT SERPL-MCNC: 2.92 MG/DL (ref 0.6–1.3)
CREAT SERPL-MCNC: 4.5 MG/DL (ref 0.6–1.3)
CREAT UR-MCNC: 110 MG/DL
CROSSMATCH: NORMAL
DEPRECATED D DIMER PPP: ABNORMAL NG/ML (FEU)
EOSINOPHIL # BLD AUTO: 0.01 THOUSAND/ΜL (ref 0–0.61)
EOSINOPHIL # BLD AUTO: 0.02 THOUSAND/ΜL (ref 0–0.61)
EOSINOPHIL # BLD AUTO: 0.03 THOUSAND/ΜL (ref 0–0.61)
EOSINOPHIL # BLD AUTO: 0.04 THOUSAND/ΜL (ref 0–0.61)
EOSINOPHIL # BLD AUTO: 0.04 THOUSAND/ΜL (ref 0–0.61)
EOSINOPHIL # BLD AUTO: 0.05 THOUSAND/ΜL (ref 0–0.61)
EOSINOPHIL # BLD AUTO: 0.05 THOUSAND/ΜL (ref 0–0.61)
EOSINOPHIL # BLD AUTO: 0.06 THOUSAND/ΜL (ref 0–0.61)
EOSINOPHIL # BLD AUTO: 0.07 THOUSAND/ΜL (ref 0–0.61)
EOSINOPHIL # BLD MANUAL: 0 THOUSAND/UL (ref 0–0.4)
EOSINOPHIL # BLD MANUAL: 0.17 THOUSAND/UL (ref 0–0.4)
EOSINOPHIL # BLD MANUAL: 0.27 THOUSAND/UL (ref 0–0.4)
EOSINOPHIL # BLD MANUAL: 0.28 THOUSAND/UL (ref 0–0.4)
EOSINOPHIL # BLD MANUAL: 0.57 THOUSAND/UL (ref 0–0.4)
EOSINOPHIL NFR BLD AUTO: 0 % (ref 0–6)
EOSINOPHIL NFR BLD MANUAL: 0 % (ref 0–6)
EOSINOPHIL NFR BLD MANUAL: 1 % (ref 0–6)
EOSINOPHIL NFR BLD MANUAL: 2 % (ref 0–6)
ERYTHROCYTE [DISTWIDTH] IN BLOOD BY AUTOMATED COUNT: 17.8 % (ref 11.6–15.1)
ERYTHROCYTE [DISTWIDTH] IN BLOOD BY AUTOMATED COUNT: 17.9 % (ref 11.6–15.1)
ERYTHROCYTE [DISTWIDTH] IN BLOOD BY AUTOMATED COUNT: 18 % (ref 11.6–15.1)
ERYTHROCYTE [DISTWIDTH] IN BLOOD BY AUTOMATED COUNT: 18.3 % (ref 11.6–15.1)
ERYTHROCYTE [DISTWIDTH] IN BLOOD BY AUTOMATED COUNT: 18.5 % (ref 11.6–15.1)
ERYTHROCYTE [DISTWIDTH] IN BLOOD BY AUTOMATED COUNT: 18.6 % (ref 11.6–15.1)
ERYTHROCYTE [DISTWIDTH] IN BLOOD BY AUTOMATED COUNT: 18.6 % (ref 11.6–15.1)
ERYTHROCYTE [DISTWIDTH] IN BLOOD BY AUTOMATED COUNT: 18.7 % (ref 11.6–15.1)
ERYTHROCYTE [DISTWIDTH] IN BLOOD BY AUTOMATED COUNT: 18.9 % (ref 11.6–15.1)
ERYTHROCYTE [DISTWIDTH] IN BLOOD BY AUTOMATED COUNT: 19 % (ref 11.6–15.1)
ERYTHROCYTE [DISTWIDTH] IN BLOOD BY AUTOMATED COUNT: 19.1 % (ref 11.6–15.1)
ERYTHROCYTE [DISTWIDTH] IN BLOOD BY AUTOMATED COUNT: 19.1 % (ref 11.6–15.1)
ERYTHROCYTE [DISTWIDTH] IN BLOOD BY AUTOMATED COUNT: 19.2 % (ref 11.6–15.1)
ERYTHROCYTE [DISTWIDTH] IN BLOOD BY AUTOMATED COUNT: 19.2 % (ref 11.6–15.1)
ERYTHROCYTE [DISTWIDTH] IN BLOOD BY AUTOMATED COUNT: 19.4 % (ref 11.6–15.1)
ERYTHROCYTE [DISTWIDTH] IN BLOOD BY AUTOMATED COUNT: 19.5 % (ref 11.6–15.1)
ERYTHROCYTE [DISTWIDTH] IN BLOOD BY AUTOMATED COUNT: 21.4 % (ref 11.6–15.1)
ERYTHROCYTE [DISTWIDTH] IN BLOOD BY AUTOMATED COUNT: 22.3 % (ref 11.6–15.1)
ERYTHROCYTE [DISTWIDTH] IN BLOOD BY AUTOMATED COUNT: 22.4 % (ref 11.6–15.1)
ERYTHROCYTE [DISTWIDTH] IN BLOOD BY AUTOMATED COUNT: 22.4 % (ref 11.6–15.1)
ERYTHROCYTE [DISTWIDTH] IN BLOOD BY AUTOMATED COUNT: 22.5 % (ref 11.6–15.1)
ERYTHROCYTE [DISTWIDTH] IN BLOOD BY AUTOMATED COUNT: 22.9 % (ref 11.6–15.1)
ERYTHROCYTE [SEDIMENTATION RATE] IN BLOOD: 37 MM/HOUR (ref 0–10)
FDP BLD QL AGGL: >20 <40
FDP BLD QL AGGL: >40 <80
FERRITIN SERPL-MCNC: 1381 NG/ML (ref 8–388)
FIBRINOGEN PPP-MCNC: 472 MG/DL (ref 227–495)
FIBRINOGEN PPP-MCNC: 539 MG/DL (ref 227–495)
FINE GRAN CASTS URNS QL MICRO: ABNORMAL /LPF
GFR SERPL CREATININE-BSD FRML MDRD: 12 ML/MIN/1.73SQ M
GFR SERPL CREATININE-BSD FRML MDRD: 21 ML/MIN/1.73SQ M
GFR SERPL CREATININE-BSD FRML MDRD: 22 ML/MIN/1.73SQ M
GFR SERPL CREATININE-BSD FRML MDRD: 33 ML/MIN/1.73SQ M
GFR SERPL CREATININE-BSD FRML MDRD: 33 ML/MIN/1.73SQ M
GFR SERPL CREATININE-BSD FRML MDRD: 34 ML/MIN/1.73SQ M
GFR SERPL CREATININE-BSD FRML MDRD: 34 ML/MIN/1.73SQ M
GFR SERPL CREATININE-BSD FRML MDRD: 37 ML/MIN/1.73SQ M
GFR SERPL CREATININE-BSD FRML MDRD: 37 ML/MIN/1.73SQ M
GFR SERPL CREATININE-BSD FRML MDRD: 39 ML/MIN/1.73SQ M
GFR SERPL CREATININE-BSD FRML MDRD: 41 ML/MIN/1.73SQ M
GFR SERPL CREATININE-BSD FRML MDRD: 43 ML/MIN/1.73SQ M
GFR SERPL CREATININE-BSD FRML MDRD: 45 ML/MIN/1.73SQ M
GFR SERPL CREATININE-BSD FRML MDRD: 46 ML/MIN/1.73SQ M
GFR SERPL CREATININE-BSD FRML MDRD: 46 ML/MIN/1.73SQ M
GFR SERPL CREATININE-BSD FRML MDRD: 48 ML/MIN/1.73SQ M
GFR SERPL CREATININE-BSD FRML MDRD: 49 ML/MIN/1.73SQ M
GFR SERPL CREATININE-BSD FRML MDRD: 50 ML/MIN/1.73SQ M
GFR SERPL CREATININE-BSD FRML MDRD: 52 ML/MIN/1.73SQ M
GFR SERPL CREATININE-BSD FRML MDRD: 57 ML/MIN/1.73SQ M
GFR SERPL CREATININE-BSD FRML MDRD: 60 ML/MIN/1.73SQ M
GFR SERPL CREATININE-BSD FRML MDRD: 61 ML/MIN/1.73SQ M
GFR SERPL CREATININE-BSD FRML MDRD: 64 ML/MIN/1.73SQ M
GLUCOSE SERPL-MCNC: 100 MG/DL (ref 65–140)
GLUCOSE SERPL-MCNC: 101 MG/DL (ref 65–140)
GLUCOSE SERPL-MCNC: 101 MG/DL (ref 65–140)
GLUCOSE SERPL-MCNC: 104 MG/DL (ref 65–140)
GLUCOSE SERPL-MCNC: 106 MG/DL (ref 65–140)
GLUCOSE SERPL-MCNC: 107 MG/DL (ref 65–140)
GLUCOSE SERPL-MCNC: 107 MG/DL (ref 65–140)
GLUCOSE SERPL-MCNC: 110 MG/DL (ref 65–140)
GLUCOSE SERPL-MCNC: 110 MG/DL (ref 65–140)
GLUCOSE SERPL-MCNC: 112 MG/DL (ref 65–140)
GLUCOSE SERPL-MCNC: 115 MG/DL (ref 65–140)
GLUCOSE SERPL-MCNC: 118 MG/DL (ref 65–140)
GLUCOSE SERPL-MCNC: 118 MG/DL (ref 65–140)
GLUCOSE SERPL-MCNC: 119 MG/DL (ref 65–140)
GLUCOSE SERPL-MCNC: 125 MG/DL (ref 65–140)
GLUCOSE SERPL-MCNC: 126 MG/DL (ref 65–140)
GLUCOSE SERPL-MCNC: 127 MG/DL (ref 65–140)
GLUCOSE SERPL-MCNC: 129 MG/DL (ref 65–140)
GLUCOSE SERPL-MCNC: 132 MG/DL (ref 65–140)
GLUCOSE SERPL-MCNC: 133 MG/DL (ref 65–140)
GLUCOSE SERPL-MCNC: 137 MG/DL (ref 65–140)
GLUCOSE SERPL-MCNC: 138 MG/DL (ref 65–140)
GLUCOSE SERPL-MCNC: 141 MG/DL (ref 65–140)
GLUCOSE SERPL-MCNC: 141 MG/DL (ref 65–140)
GLUCOSE SERPL-MCNC: 142 MG/DL (ref 65–140)
GLUCOSE SERPL-MCNC: 145 MG/DL (ref 65–140)
GLUCOSE SERPL-MCNC: 146 MG/DL (ref 65–140)
GLUCOSE SERPL-MCNC: 148 MG/DL (ref 65–140)
GLUCOSE SERPL-MCNC: 149 MG/DL (ref 65–140)
GLUCOSE SERPL-MCNC: 150 MG/DL (ref 65–140)
GLUCOSE SERPL-MCNC: 152 MG/DL (ref 65–140)
GLUCOSE SERPL-MCNC: 156 MG/DL (ref 65–140)
GLUCOSE SERPL-MCNC: 156 MG/DL (ref 65–140)
GLUCOSE SERPL-MCNC: 158 MG/DL (ref 65–140)
GLUCOSE SERPL-MCNC: 160 MG/DL (ref 65–140)
GLUCOSE SERPL-MCNC: 162 MG/DL (ref 65–140)
GLUCOSE SERPL-MCNC: 163 MG/DL (ref 65–140)
GLUCOSE SERPL-MCNC: 165 MG/DL (ref 65–140)
GLUCOSE SERPL-MCNC: 166 MG/DL (ref 65–140)
GLUCOSE SERPL-MCNC: 168 MG/DL (ref 65–140)
GLUCOSE SERPL-MCNC: 169 MG/DL (ref 65–140)
GLUCOSE SERPL-MCNC: 169 MG/DL (ref 65–140)
GLUCOSE SERPL-MCNC: 171 MG/DL (ref 65–140)
GLUCOSE SERPL-MCNC: 172 MG/DL (ref 65–140)
GLUCOSE SERPL-MCNC: 173 MG/DL (ref 65–140)
GLUCOSE SERPL-MCNC: 175 MG/DL (ref 65–140)
GLUCOSE SERPL-MCNC: 176 MG/DL (ref 65–140)
GLUCOSE SERPL-MCNC: 176 MG/DL (ref 65–140)
GLUCOSE SERPL-MCNC: 177 MG/DL (ref 65–140)
GLUCOSE SERPL-MCNC: 178 MG/DL (ref 65–140)
GLUCOSE SERPL-MCNC: 179 MG/DL (ref 65–140)
GLUCOSE SERPL-MCNC: 179 MG/DL (ref 65–140)
GLUCOSE SERPL-MCNC: 180 MG/DL (ref 65–140)
GLUCOSE SERPL-MCNC: 180 MG/DL (ref 65–140)
GLUCOSE SERPL-MCNC: 181 MG/DL (ref 65–140)
GLUCOSE SERPL-MCNC: 181 MG/DL (ref 65–140)
GLUCOSE SERPL-MCNC: 182 MG/DL (ref 65–140)
GLUCOSE SERPL-MCNC: 185 MG/DL (ref 65–140)
GLUCOSE SERPL-MCNC: 186 MG/DL (ref 65–140)
GLUCOSE SERPL-MCNC: 189 MG/DL (ref 65–140)
GLUCOSE SERPL-MCNC: 189 MG/DL (ref 65–140)
GLUCOSE SERPL-MCNC: 190 MG/DL (ref 65–140)
GLUCOSE SERPL-MCNC: 190 MG/DL (ref 65–140)
GLUCOSE SERPL-MCNC: 191 MG/DL (ref 65–140)
GLUCOSE SERPL-MCNC: 196 MG/DL (ref 65–140)
GLUCOSE SERPL-MCNC: 198 MG/DL (ref 65–140)
GLUCOSE SERPL-MCNC: 202 MG/DL (ref 65–140)
GLUCOSE SERPL-MCNC: 206 MG/DL (ref 65–140)
GLUCOSE SERPL-MCNC: 208 MG/DL (ref 65–140)
GLUCOSE SERPL-MCNC: 209 MG/DL (ref 65–140)
GLUCOSE SERPL-MCNC: 212 MG/DL (ref 65–140)
GLUCOSE SERPL-MCNC: 213 MG/DL (ref 65–140)
GLUCOSE SERPL-MCNC: 213 MG/DL (ref 65–140)
GLUCOSE SERPL-MCNC: 215 MG/DL (ref 65–140)
GLUCOSE SERPL-MCNC: 218 MG/DL (ref 65–140)
GLUCOSE SERPL-MCNC: 219 MG/DL (ref 65–140)
GLUCOSE SERPL-MCNC: 219 MG/DL (ref 65–140)
GLUCOSE SERPL-MCNC: 220 MG/DL (ref 65–140)
GLUCOSE SERPL-MCNC: 220 MG/DL (ref 65–140)
GLUCOSE SERPL-MCNC: 222 MG/DL (ref 65–140)
GLUCOSE SERPL-MCNC: 223 MG/DL (ref 65–140)
GLUCOSE SERPL-MCNC: 223 MG/DL (ref 65–140)
GLUCOSE SERPL-MCNC: 232 MG/DL (ref 65–140)
GLUCOSE SERPL-MCNC: 235 MG/DL (ref 65–140)
GLUCOSE SERPL-MCNC: 240 MG/DL (ref 65–140)
GLUCOSE SERPL-MCNC: 243 MG/DL (ref 65–140)
GLUCOSE SERPL-MCNC: 245 MG/DL (ref 65–140)
GLUCOSE SERPL-MCNC: 248 MG/DL (ref 65–140)
GLUCOSE SERPL-MCNC: 252 MG/DL (ref 65–140)
GLUCOSE SERPL-MCNC: 257 MG/DL (ref 65–140)
GLUCOSE SERPL-MCNC: 263 MG/DL (ref 65–140)
GLUCOSE SERPL-MCNC: 265 MG/DL (ref 65–140)
GLUCOSE SERPL-MCNC: 271 MG/DL (ref 65–140)
GLUCOSE SERPL-MCNC: 274 MG/DL (ref 65–140)
GLUCOSE SERPL-MCNC: 274 MG/DL (ref 65–140)
GLUCOSE SERPL-MCNC: 276 MG/DL (ref 65–140)
GLUCOSE SERPL-MCNC: 277 MG/DL (ref 65–140)
GLUCOSE SERPL-MCNC: 281 MG/DL (ref 65–140)
GLUCOSE SERPL-MCNC: 291 MG/DL (ref 65–140)
GLUCOSE SERPL-MCNC: 309 MG/DL (ref 65–140)
GLUCOSE SERPL-MCNC: 309 MG/DL (ref 65–140)
GLUCOSE SERPL-MCNC: 317 MG/DL (ref 65–140)
GLUCOSE SERPL-MCNC: 318 MG/DL (ref 65–140)
GLUCOSE SERPL-MCNC: 324 MG/DL (ref 65–140)
GLUCOSE SERPL-MCNC: 325 MG/DL (ref 65–140)
GLUCOSE SERPL-MCNC: 352 MG/DL (ref 65–140)
GLUCOSE SERPL-MCNC: 353 MG/DL (ref 65–140)
GLUCOSE SERPL-MCNC: 356 MG/DL (ref 65–140)
GLUCOSE SERPL-MCNC: 359 MG/DL (ref 65–140)
GLUCOSE SERPL-MCNC: 410 MG/DL (ref 65–140)
GLUCOSE SERPL-MCNC: 415 MG/DL (ref 65–140)
GLUCOSE SERPL-MCNC: 422 MG/DL (ref 65–140)
GLUCOSE SERPL-MCNC: 424 MG/DL (ref 65–140)
GLUCOSE SERPL-MCNC: 425 MG/DL (ref 65–140)
GLUCOSE SERPL-MCNC: 436 MG/DL (ref 65–140)
GLUCOSE SERPL-MCNC: 437 MG/DL (ref 65–140)
GLUCOSE SERPL-MCNC: 442 MG/DL (ref 65–140)
GLUCOSE SERPL-MCNC: 50 MG/DL (ref 65–140)
GLUCOSE SERPL-MCNC: 53 MG/DL (ref 65–140)
GLUCOSE SERPL-MCNC: 59 MG/DL (ref 65–140)
GLUCOSE SERPL-MCNC: 60 MG/DL (ref 65–140)
GLUCOSE SERPL-MCNC: 64 MG/DL (ref 65–140)
GLUCOSE SERPL-MCNC: 65 MG/DL (ref 65–140)
GLUCOSE SERPL-MCNC: 66 MG/DL (ref 65–140)
GLUCOSE SERPL-MCNC: 68 MG/DL (ref 65–140)
GLUCOSE SERPL-MCNC: 69 MG/DL (ref 65–140)
GLUCOSE SERPL-MCNC: 77 MG/DL (ref 65–140)
GLUCOSE SERPL-MCNC: 77 MG/DL (ref 65–140)
GLUCOSE SERPL-MCNC: 84 MG/DL (ref 65–140)
GLUCOSE SERPL-MCNC: 91 MG/DL (ref 65–140)
GLUCOSE SERPL-MCNC: 92 MG/DL (ref 65–140)
GLUCOSE SERPL-MCNC: 94 MG/DL (ref 65–140)
GLUCOSE SERPL-MCNC: 96 MG/DL (ref 65–140)
GLUCOSE SERPL-MCNC: 97 MG/DL (ref 65–140)
GLUCOSE SERPL-MCNC: 97 MG/DL (ref 65–140)
GLUCOSE UR STRIP-MCNC: ABNORMAL MG/DL
GLUCOSE UR STRIP-MCNC: NEGATIVE MG/DL
GLUCOSE UR STRIP-MCNC: NEGATIVE MG/DL
HAPTOGLOB SERPL-MCNC: 151 MG/DL (ref 34–200)
HCO3 BLDA-SCNC: 19.2 MMOL/L (ref 22–28)
HCO3 BLDA-SCNC: 21.3 MMOL/L (ref 22–28)
HCT VFR BLD AUTO: 22.4 % (ref 36.5–49.3)
HCT VFR BLD AUTO: 22.4 % (ref 36.5–49.3)
HCT VFR BLD AUTO: 23.5 % (ref 36.5–49.3)
HCT VFR BLD AUTO: 23.7 % (ref 36.5–49.3)
HCT VFR BLD AUTO: 24 % (ref 36.5–49.3)
HCT VFR BLD AUTO: 24.2 % (ref 36.5–49.3)
HCT VFR BLD AUTO: 24.3 % (ref 36.5–49.3)
HCT VFR BLD AUTO: 24.6 % (ref 36.5–49.3)
HCT VFR BLD AUTO: 25 % (ref 36.5–49.3)
HCT VFR BLD AUTO: 25 % (ref 36.5–49.3)
HCT VFR BLD AUTO: 25.1 % (ref 36.5–49.3)
HCT VFR BLD AUTO: 25.2 % (ref 36.5–49.3)
HCT VFR BLD AUTO: 26.1 % (ref 36.5–49.3)
HCT VFR BLD AUTO: 26.4 % (ref 36.5–49.3)
HCT VFR BLD AUTO: 26.6 % (ref 36.5–49.3)
HCT VFR BLD AUTO: 26.7 % (ref 36.5–49.3)
HCT VFR BLD AUTO: 26.9 % (ref 36.5–49.3)
HCT VFR BLD AUTO: 27 % (ref 36.5–49.3)
HCT VFR BLD AUTO: 27.1 % (ref 36.5–49.3)
HCT VFR BLD AUTO: 27.4 % (ref 36.5–49.3)
HCT VFR BLD AUTO: 27.4 % (ref 36.5–49.3)
HCT VFR BLD AUTO: 27.8 % (ref 36.5–49.3)
HCT VFR BLD AUTO: 27.8 % (ref 36.5–49.3)
HCT VFR BLD AUTO: 28.1 % (ref 36.5–49.3)
HCT VFR BLD AUTO: 28.9 % (ref 36.5–49.3)
HCT VFR BLD AUTO: 29 % (ref 36.5–49.3)
HELMET CELLS BLD QL SMEAR: PRESENT
HGB BLD-MCNC: 6.5 G/DL (ref 12–17)
HGB BLD-MCNC: 6.7 G/DL (ref 12–17)
HGB BLD-MCNC: 7 G/DL (ref 12–17)
HGB BLD-MCNC: 7.1 G/DL (ref 12–17)
HGB BLD-MCNC: 7.2 G/DL (ref 12–17)
HGB BLD-MCNC: 7.3 G/DL (ref 12–17)
HGB BLD-MCNC: 7.3 G/DL (ref 12–17)
HGB BLD-MCNC: 7.5 G/DL (ref 12–17)
HGB BLD-MCNC: 7.6 G/DL (ref 12–17)
HGB BLD-MCNC: 7.6 G/DL (ref 12–17)
HGB BLD-MCNC: 7.7 G/DL (ref 12–17)
HGB BLD-MCNC: 7.9 G/DL (ref 12–17)
HGB BLD-MCNC: 8 G/DL (ref 12–17)
HGB BLD-MCNC: 8.1 G/DL (ref 12–17)
HGB BLD-MCNC: 8.1 G/DL (ref 12–17)
HGB BLD-MCNC: 8.2 G/DL (ref 12–17)
HGB BLD-MCNC: 8.3 G/DL (ref 12–17)
HGB BLD-MCNC: 8.4 G/DL (ref 12–17)
HGB BLD-MCNC: 8.4 G/DL (ref 12–17)
HGB BLD-MCNC: 8.5 G/DL (ref 12–17)
HGB BLD-MCNC: 8.7 G/DL (ref 12–17)
HGB BLD-MCNC: 8.8 G/DL (ref 12–17)
HGB UR QL STRIP.AUTO: ABNORMAL
HGB UR QL STRIP.AUTO: ABNORMAL
HGB UR QL STRIP.AUTO: NEGATIVE
HYALINE CASTS #/AREA URNS LPF: ABNORMAL /LPF
HYALINE CASTS #/AREA URNS LPF: ABNORMAL /LPF
HYPERCHROMIA BLD QL SMEAR: PRESENT
IMM GRANULOCYTES # BLD AUTO: >0.5 THOUSAND/UL (ref 0–0.2)
IMM GRANULOCYTES NFR BLD AUTO: 14 % (ref 0–2)
IMM GRANULOCYTES NFR BLD AUTO: 15 % (ref 0–2)
IMM GRANULOCYTES NFR BLD AUTO: 16 % (ref 0–2)
IMM GRANULOCYTES NFR BLD AUTO: 17 % (ref 0–2)
IMM GRANULOCYTES NFR BLD AUTO: 18 % (ref 0–2)
IMM GRANULOCYTES NFR BLD AUTO: 18 % (ref 0–2)
IMM GRANULOCYTES NFR BLD AUTO: 21 % (ref 0–2)
IMM GRANULOCYTES NFR BLD AUTO: 21 % (ref 0–2)
IMM GRANULOCYTES NFR BLD AUTO: 22 % (ref 0–2)
IMM GRANULOCYTES NFR BLD AUTO: 26 % (ref 0–2)
INR PPP: 1.29 (ref 0.84–1.19)
INR PPP: 2.66 (ref 0.84–1.19)
IRON SATN MFR SERPL: 13 %
IRON SERPL-MCNC: 27 UG/DL (ref 65–175)
KETONES UR STRIP-MCNC: ABNORMAL MG/DL
KETONES UR STRIP-MCNC: ABNORMAL MG/DL
KETONES UR STRIP-MCNC: NEGATIVE MG/DL
LACTATE SERPL-SCNC: 0.8 MMOL/L (ref 0.5–2)
LACTATE SERPL-SCNC: 1 MMOL/L (ref 0.5–2)
LACTATE SERPL-SCNC: 1.4 MMOL/L (ref 0.5–2)
LACTATE SERPL-SCNC: 2.8 MMOL/L (ref 0.5–2)
LAP WBC-CCNC: 25 (ref 25–130)
LDH SERPL-CCNC: 420 U/L (ref 81–234)
LDH SERPL-CCNC: 723 U/L (ref 81–234)
LEUKOCYTE ESTERASE UR QL STRIP: ABNORMAL
LEUKOCYTE ESTERASE UR QL STRIP: NEGATIVE
LEUKOCYTE ESTERASE UR QL STRIP: NEGATIVE
LG PLATELETS BLD QL SMEAR: PRESENT
LIPASE SERPL-CCNC: 33 U/L (ref 73–393)
LIPASE SERPL-CCNC: 37 U/L (ref 73–393)
LYMPHOCYTES # BLD AUTO: 0.56 THOUSANDS/ΜL (ref 0.6–4.47)
LYMPHOCYTES # BLD AUTO: 0.63 THOUSANDS/ΜL (ref 0.6–4.47)
LYMPHOCYTES # BLD AUTO: 0.65 THOUSANDS/ΜL (ref 0.6–4.47)
LYMPHOCYTES # BLD AUTO: 0.69 THOUSAND/UL (ref 0.6–4.47)
LYMPHOCYTES # BLD AUTO: 0.8 THOUSAND/UL (ref 0.6–4.47)
LYMPHOCYTES # BLD AUTO: 1 % (ref 14–44)
LYMPHOCYTES # BLD AUTO: 1.44 THOUSANDS/ΜL (ref 0.6–4.47)
LYMPHOCYTES # BLD AUTO: 1.5 THOUSANDS/ΜL (ref 0.6–4.47)
LYMPHOCYTES # BLD AUTO: 1.76 THOUSAND/UL (ref 0.6–4.47)
LYMPHOCYTES # BLD AUTO: 1.78 THOUSANDS/ΜL (ref 0.6–4.47)
LYMPHOCYTES # BLD AUTO: 1.82 THOUSAND/UL (ref 0.6–4.47)
LYMPHOCYTES # BLD AUTO: 1.91 THOUSANDS/ΜL (ref 0.6–4.47)
LYMPHOCYTES # BLD AUTO: 1.94 THOUSANDS/ΜL (ref 0.6–4.47)
LYMPHOCYTES # BLD AUTO: 2.03 THOUSANDS/ΜL (ref 0.6–4.47)
LYMPHOCYTES # BLD AUTO: 2.11 THOUSAND/UL (ref 0.6–4.47)
LYMPHOCYTES # BLD AUTO: 2.12 THOUSANDS/ΜL (ref 0.6–4.47)
LYMPHOCYTES # BLD AUTO: 2.21 THOUSAND/UL (ref 0.6–4.47)
LYMPHOCYTES # BLD AUTO: 2.34 THOUSANDS/ΜL (ref 0.6–4.47)
LYMPHOCYTES # BLD AUTO: 2.35 THOUSANDS/ΜL (ref 0.6–4.47)
LYMPHOCYTES # BLD AUTO: 2.52 THOUSANDS/ΜL (ref 0.6–4.47)
LYMPHOCYTES # BLD AUTO: 2.58 THOUSAND/UL (ref 0.6–4.47)
LYMPHOCYTES # BLD AUTO: 22 % (ref 14–44)
LYMPHOCYTES # BLD AUTO: 3 % (ref 14–44)
LYMPHOCYTES # BLD AUTO: 3.72 THOUSAND/UL (ref 0.6–4.47)
LYMPHOCYTES # BLD AUTO: 4 % (ref 14–44)
LYMPHOCYTES # BLD AUTO: 4.72 THOUSANDS/ΜL (ref 0.6–4.47)
LYMPHOCYTES # BLD AUTO: 6 % (ref 14–44)
LYMPHOCYTES # BLD AUTO: 6 % (ref 14–44)
LYMPHOCYTES # BLD AUTO: 8 % (ref 14–44)
LYMPHOCYTES # BLD AUTO: 9 % (ref 14–44)
LYMPHOCYTES NFR BLD AUTO: 1 % (ref 14–44)
LYMPHOCYTES NFR BLD AUTO: 10 % (ref 14–44)
LYMPHOCYTES NFR BLD AUTO: 14 % (ref 14–44)
LYMPHOCYTES NFR BLD AUTO: 2 % (ref 14–44)
LYMPHOCYTES NFR BLD AUTO: 3 % (ref 14–44)
LYMPHOCYTES NFR BLD AUTO: 3 % (ref 14–44)
LYMPHOCYTES NFR BLD AUTO: 4 % (ref 14–44)
LYMPHOCYTES NFR BLD AUTO: 5 % (ref 14–44)
LYMPHOCYTES NFR BLD AUTO: 6 % (ref 14–44)
LYMPHOCYTES NFR BLD AUTO: 7 % (ref 14–44)
LYMPHOCYTES NFR BLD AUTO: 8 % (ref 14–44)
MAGNESIUM SERPL-MCNC: 1.1 MG/DL (ref 1.6–2.6)
MAGNESIUM SERPL-MCNC: 1.1 MG/DL (ref 1.6–2.6)
MAGNESIUM SERPL-MCNC: 1.2 MG/DL (ref 1.6–2.6)
MAGNESIUM SERPL-MCNC: 1.3 MG/DL (ref 1.6–2.6)
MAGNESIUM SERPL-MCNC: 1.4 MG/DL (ref 1.6–2.6)
MAGNESIUM SERPL-MCNC: 1.5 MG/DL (ref 1.6–2.6)
MAGNESIUM SERPL-MCNC: 1.6 MG/DL (ref 1.6–2.6)
MAGNESIUM SERPL-MCNC: 1.7 MG/DL (ref 1.6–2.6)
MCH RBC QN AUTO: 26.4 PG (ref 26.8–34.3)
MCH RBC QN AUTO: 26.6 PG (ref 26.8–34.3)
MCH RBC QN AUTO: 26.8 PG (ref 26.8–34.3)
MCH RBC QN AUTO: 26.9 PG (ref 26.8–34.3)
MCH RBC QN AUTO: 27 PG (ref 26.8–34.3)
MCH RBC QN AUTO: 27.1 PG (ref 26.8–34.3)
MCH RBC QN AUTO: 27.1 PG (ref 26.8–34.3)
MCH RBC QN AUTO: 27.5 PG (ref 26.8–34.3)
MCH RBC QN AUTO: 27.5 PG (ref 26.8–34.3)
MCH RBC QN AUTO: 27.6 PG (ref 26.8–34.3)
MCH RBC QN AUTO: 27.7 PG (ref 26.8–34.3)
MCH RBC QN AUTO: 27.8 PG (ref 26.8–34.3)
MCH RBC QN AUTO: 27.9 PG (ref 26.8–34.3)
MCH RBC QN AUTO: 28 PG (ref 26.8–34.3)
MCH RBC QN AUTO: 28.1 PG (ref 26.8–34.3)
MCH RBC QN AUTO: 28.1 PG (ref 26.8–34.3)
MCH RBC QN AUTO: 28.2 PG (ref 26.8–34.3)
MCH RBC QN AUTO: 28.3 PG (ref 26.8–34.3)
MCH RBC QN AUTO: 28.3 PG (ref 26.8–34.3)
MCH RBC QN AUTO: 28.5 PG (ref 26.8–34.3)
MCH RBC QN AUTO: 28.6 PG (ref 26.8–34.3)
MCH RBC QN AUTO: 28.7 PG (ref 26.8–34.3)
MCHC RBC AUTO-ENTMCNC: 29 G/DL (ref 31.4–37.4)
MCHC RBC AUTO-ENTMCNC: 29.2 G/DL (ref 31.4–37.4)
MCHC RBC AUTO-ENTMCNC: 29.6 G/DL (ref 31.4–37.4)
MCHC RBC AUTO-ENTMCNC: 29.8 G/DL (ref 31.4–37.4)
MCHC RBC AUTO-ENTMCNC: 29.9 G/DL (ref 31.4–37.4)
MCHC RBC AUTO-ENTMCNC: 30 G/DL (ref 31.4–37.4)
MCHC RBC AUTO-ENTMCNC: 30.1 G/DL (ref 31.4–37.4)
MCHC RBC AUTO-ENTMCNC: 30.2 G/DL (ref 31.4–37.4)
MCHC RBC AUTO-ENTMCNC: 30.3 G/DL (ref 31.4–37.4)
MCHC RBC AUTO-ENTMCNC: 30.4 G/DL (ref 31.4–37.4)
MCHC RBC AUTO-ENTMCNC: 30.4 G/DL (ref 31.4–37.4)
MCHC RBC AUTO-ENTMCNC: 30.5 G/DL (ref 31.4–37.4)
MCHC RBC AUTO-ENTMCNC: 30.7 G/DL (ref 31.4–37.4)
MCHC RBC AUTO-ENTMCNC: 30.8 G/DL (ref 31.4–37.4)
MCHC RBC AUTO-ENTMCNC: 31.8 G/DL (ref 31.4–37.4)
MCV RBC AUTO: 88 FL (ref 82–98)
MCV RBC AUTO: 88 FL (ref 82–98)
MCV RBC AUTO: 89 FL (ref 82–98)
MCV RBC AUTO: 90 FL (ref 82–98)
MCV RBC AUTO: 91 FL (ref 82–98)
MCV RBC AUTO: 92 FL (ref 82–98)
MCV RBC AUTO: 93 FL (ref 82–98)
MCV RBC AUTO: 94 FL (ref 82–98)
MCV RBC AUTO: 94 FL (ref 82–98)
METAMYELOCYTES NFR BLD MANUAL: 1 % (ref 0–1)
METAMYELOCYTES NFR BLD MANUAL: 3 % (ref 0–1)
METAMYELOCYTES NFR BLD MANUAL: 4 % (ref 0–1)
METAMYELOCYTES NFR BLD MANUAL: 4 % (ref 0–1)
METAMYELOCYTES NFR BLD MANUAL: 6 % (ref 0–1)
MONOCYTES # BLD AUTO: 0.99 THOUSAND/ΜL (ref 0.17–1.22)
MONOCYTES # BLD AUTO: 1.05 THOUSAND/UL (ref 0–1.22)
MONOCYTES # BLD AUTO: 1.05 THOUSAND/ΜL (ref 0.17–1.22)
MONOCYTES # BLD AUTO: 1.06 THOUSAND/UL (ref 0–1.22)
MONOCYTES # BLD AUTO: 1.18 THOUSAND/UL (ref 0–1.22)
MONOCYTES # BLD AUTO: 1.35 THOUSAND/ΜL (ref 0.17–1.22)
MONOCYTES # BLD AUTO: 1.38 THOUSAND/UL (ref 0–1.22)
MONOCYTES # BLD AUTO: 1.39 THOUSAND/ΜL (ref 0.17–1.22)
MONOCYTES # BLD AUTO: 1.47 THOUSAND/UL (ref 0–1.22)
MONOCYTES # BLD AUTO: 1.58 THOUSAND/ΜL (ref 0.17–1.22)
MONOCYTES # BLD AUTO: 1.6 THOUSAND/ΜL (ref 0.17–1.22)
MONOCYTES # BLD AUTO: 1.66 THOUSAND/UL (ref 0–1.22)
MONOCYTES # BLD AUTO: 1.72 THOUSAND/UL (ref 0–1.22)
MONOCYTES # BLD AUTO: 1.92 THOUSAND/ΜL (ref 0.17–1.22)
MONOCYTES # BLD AUTO: 2.27 THOUSAND/UL (ref 0–1.22)
MONOCYTES # BLD AUTO: 2.4 THOUSAND/ΜL (ref 0.17–1.22)
MONOCYTES # BLD AUTO: 2.46 THOUSAND/ΜL (ref 0.17–1.22)
MONOCYTES # BLD AUTO: 2.67 THOUSAND/ΜL (ref 0.17–1.22)
MONOCYTES # BLD AUTO: 2.75 THOUSAND/ΜL (ref 0.17–1.22)
MONOCYTES # BLD AUTO: 2.97 THOUSAND/ΜL (ref 0.17–1.22)
MONOCYTES # BLD AUTO: 3.03 THOUSAND/ΜL (ref 0.17–1.22)
MONOCYTES # BLD AUTO: 4.69 THOUSAND/ΜL (ref 0.17–1.22)
MONOCYTES NFR BLD AUTO: 2 % (ref 4–12)
MONOCYTES NFR BLD AUTO: 2 % (ref 4–12)
MONOCYTES NFR BLD AUTO: 3 % (ref 4–12)
MONOCYTES NFR BLD AUTO: 4 % (ref 4–12)
MONOCYTES NFR BLD AUTO: 5 % (ref 4–12)
MONOCYTES NFR BLD AUTO: 6 % (ref 4–12)
MONOCYTES NFR BLD AUTO: 7 % (ref 4–12)
MONOCYTES NFR BLD AUTO: 8 % (ref 4–12)
MONOCYTES NFR BLD: 2 % (ref 4–12)
MONOCYTES NFR BLD: 3 % (ref 4–12)
MONOCYTES NFR BLD: 4 % (ref 4–12)
MONOCYTES NFR BLD: 5 % (ref 4–12)
MONOCYTES NFR BLD: 5 % (ref 4–12)
MONOCYTES NFR BLD: 6 % (ref 4–12)
MONOCYTES NFR BLD: 6 % (ref 4–12)
MONOCYTES NFR BLD: 7 % (ref 4–12)
MRSA NOSE QL CULT: NORMAL
MYELOCYTES NFR BLD MANUAL: 13 % (ref 0–1)
MYELOCYTES NFR BLD MANUAL: 2 % (ref 0–1)
MYELOCYTES NFR BLD MANUAL: 3 % (ref 0–1)
MYELOCYTES NFR BLD MANUAL: 4 % (ref 0–1)
MYELOCYTES NFR BLD MANUAL: 6 % (ref 0–1)
MYELOCYTES NFR BLD MANUAL: 7 % (ref 0–1)
MYELOCYTES NFR BLD MANUAL: 7 % (ref 0–1)
NASAL CANNULA: 5
NEUTROPHILS # BLD AUTO: 11.16 THOUSANDS/ΜL (ref 1.85–7.62)
NEUTROPHILS # BLD AUTO: 13.29 THOUSANDS/ΜL (ref 1.85–7.62)
NEUTROPHILS # BLD AUTO: 13.68 THOUSANDS/ΜL (ref 1.85–7.62)
NEUTROPHILS # BLD AUTO: 13.88 THOUSANDS/ΜL (ref 1.85–7.62)
NEUTROPHILS # BLD AUTO: 22.02 THOUSANDS/ΜL (ref 1.85–7.62)
NEUTROPHILS # BLD AUTO: 24.13 THOUSANDS/ΜL (ref 1.85–7.62)
NEUTROPHILS # BLD AUTO: 32.61 THOUSANDS/ΜL (ref 1.85–7.62)
NEUTROPHILS # BLD AUTO: 36.71 THOUSANDS/ΜL (ref 1.85–7.62)
NEUTROPHILS # BLD AUTO: 40.3 THOUSANDS/ΜL (ref 1.85–7.62)
NEUTROPHILS # BLD AUTO: 41.71 THOUSANDS/ΜL (ref 1.85–7.62)
NEUTROPHILS # BLD AUTO: 42.71 THOUSANDS/ΜL (ref 1.85–7.62)
NEUTROPHILS # BLD AUTO: 46.77 THOUSANDS/ΜL (ref 1.85–7.62)
NEUTROPHILS # BLD AUTO: 59.7 THOUSANDS/ΜL (ref 1.85–7.62)
NEUTROPHILS # BLD AUTO: 60.69 THOUSANDS/ΜL (ref 1.85–7.62)
NEUTROPHILS # BLD MANUAL: 11.34 THOUSAND/UL (ref 1.85–7.62)
NEUTROPHILS # BLD MANUAL: 19.92 THOUSAND/UL (ref 1.85–7.62)
NEUTROPHILS # BLD MANUAL: 22.35 THOUSAND/UL (ref 1.85–7.62)
NEUTROPHILS # BLD MANUAL: 23.89 THOUSAND/UL (ref 1.85–7.62)
NEUTROPHILS # BLD MANUAL: 24.38 THOUSAND/UL (ref 1.85–7.62)
NEUTROPHILS # BLD MANUAL: 26 THOUSAND/UL (ref 1.85–7.62)
NEUTROPHILS # BLD MANUAL: 35.46 THOUSAND/UL (ref 1.85–7.62)
NEUTROPHILS # BLD MANUAL: 61.26 THOUSAND/UL (ref 1.85–7.62)
NEUTS BAND NFR BLD MANUAL: 1 % (ref 0–8)
NEUTS BAND NFR BLD MANUAL: 12 % (ref 0–8)
NEUTS BAND NFR BLD MANUAL: 16 % (ref 0–8)
NEUTS BAND NFR BLD MANUAL: 17 % (ref 0–8)
NEUTS BAND NFR BLD MANUAL: 2 % (ref 0–8)
NEUTS BAND NFR BLD MANUAL: 3 % (ref 0–8)
NEUTS BAND NFR BLD MANUAL: 9 % (ref 0–8)
NEUTS BAND NFR BLD MANUAL: 9 % (ref 0–8)
NEUTS SEG NFR BLD AUTO: 51 % (ref 43–75)
NEUTS SEG NFR BLD AUTO: 62 % (ref 43–75)
NEUTS SEG NFR BLD AUTO: 65 % (ref 43–75)
NEUTS SEG NFR BLD AUTO: 67 % (ref 43–75)
NEUTS SEG NFR BLD AUTO: 68 % (ref 43–75)
NEUTS SEG NFR BLD AUTO: 69 % (ref 43–75)
NEUTS SEG NFR BLD AUTO: 70 % (ref 43–75)
NEUTS SEG NFR BLD AUTO: 71 % (ref 43–75)
NEUTS SEG NFR BLD AUTO: 72 % (ref 43–75)
NEUTS SEG NFR BLD AUTO: 74 % (ref 43–75)
NEUTS SEG NFR BLD AUTO: 75 % (ref 43–75)
NEUTS SEG NFR BLD AUTO: 75 % (ref 43–75)
NEUTS SEG NFR BLD AUTO: 77 % (ref 43–75)
NEUTS SEG NFR BLD AUTO: 77 % (ref 43–75)
NEUTS SEG NFR BLD AUTO: 78 % (ref 43–75)
NEUTS SEG NFR BLD AUTO: 80 % (ref 43–75)
NEUTS SEG NFR BLD AUTO: 81 % (ref 43–75)
NITRITE UR QL STRIP: NEGATIVE
NITRITE UR QL STRIP: NEGATIVE
NITRITE UR QL STRIP: POSITIVE
NON-SQ EPI CELLS URNS QL MICRO: ABNORMAL /HPF
NRBC BLD AUTO-RTO: 0 /100 WBCS
NRBC BLD AUTO-RTO: 1 /100 WBC (ref 0–2)
NRBC BLD AUTO-RTO: 1 /100 WBC (ref 0–2)
NRBC BLD AUTO-RTO: 1 /100 WBCS
NRBC BLD AUTO-RTO: 1 /100 WBCS
NRBC BLD AUTO-RTO: 2 /100 WBCS
NT-PROBNP SERPL-MCNC: ABNORMAL PG/ML
NT-PROBNP SERPL-MCNC: ABNORMAL PG/ML
O2 CT BLDA-SCNC: 11.5 ML/DL (ref 16–23)
O2 CT BLDA-SCNC: 11.6 ML/DL (ref 16–23)
OTHER STN SPEC: ABNORMAL
OVALOCYTES BLD QL SMEAR: PRESENT
OXYHGB MFR BLDA: 90.5 % (ref 94–97)
OXYHGB MFR BLDA: 97.4 % (ref 94–97)
P AXIS: 169 DEGREES
P AXIS: 24 DEGREES
P AXIS: 27 DEGREES
P AXIS: 33 DEGREES
P AXIS: 34 DEGREES
P AXIS: 36 DEGREES
P AXIS: 37 DEGREES
P AXIS: 38 DEGREES
P AXIS: 49 DEGREES
P AXIS: 51 DEGREES
P AXIS: 54 DEGREES
P AXIS: 60 DEGREES
P AXIS: 61 DEGREES
P AXIS: 67 DEGREES
P AXIS: 68 DEGREES
P AXIS: 74 DEGREES
PCO2 BLDA: 24 MM HG (ref 36–44)
PCO2 BLDA: 35.1 MM HG (ref 36–44)
PH BLDA: 7.4 [PH] (ref 7.35–7.45)
PH BLDA: 7.52 [PH] (ref 7.35–7.45)
PH UR STRIP.AUTO: 5 [PH]
PH UR STRIP.AUTO: 5 [PH] (ref 4.5–8)
PH UR STRIP.AUTO: 5 [PH] (ref 4.5–8)
PHOSPHATE SERPL-MCNC: 2.5 MG/DL (ref 2.3–4.1)
PLATELET # BLD AUTO: 10 THOUSANDS/UL (ref 149–390)
PLATELET # BLD AUTO: 102 THOUSANDS/UL (ref 149–390)
PLATELET # BLD AUTO: 12 THOUSANDS/UL (ref 149–390)
PLATELET # BLD AUTO: 13 THOUSANDS/UL (ref 149–390)
PLATELET # BLD AUTO: 16 THOUSANDS/UL (ref 149–390)
PLATELET # BLD AUTO: 16 THOUSANDS/UL (ref 149–390)
PLATELET # BLD AUTO: 17 THOUSANDS/UL (ref 149–390)
PLATELET # BLD AUTO: 18 THOUSANDS/UL (ref 149–390)
PLATELET # BLD AUTO: 181 THOUSANDS/UL (ref 149–390)
PLATELET # BLD AUTO: 19 THOUSANDS/UL (ref 149–390)
PLATELET # BLD AUTO: 20 THOUSANDS/UL (ref 149–390)
PLATELET # BLD AUTO: 20 THOUSANDS/UL (ref 149–390)
PLATELET # BLD AUTO: 22 THOUSANDS/UL (ref 149–390)
PLATELET # BLD AUTO: 22 THOUSANDS/UL (ref 149–390)
PLATELET # BLD AUTO: 23 THOUSANDS/UL (ref 149–390)
PLATELET # BLD AUTO: 23 THOUSANDS/UL (ref 149–390)
PLATELET # BLD AUTO: 24 THOUSANDS/UL (ref 149–390)
PLATELET # BLD AUTO: 25 THOUSANDS/UL (ref 149–390)
PLATELET # BLD AUTO: 26 THOUSANDS/UL (ref 149–390)
PLATELET # BLD AUTO: 26 THOUSANDS/UL (ref 149–390)
PLATELET # BLD AUTO: 27 THOUSANDS/UL (ref 149–390)
PLATELET # BLD AUTO: 31 THOUSANDS/UL (ref 149–390)
PLATELET # BLD AUTO: 32 THOUSANDS/UL (ref 149–390)
PLATELET # BLD AUTO: 33 THOUSANDS/UL (ref 149–390)
PLATELET # BLD AUTO: 35 THOUSANDS/UL (ref 149–390)
PLATELET # BLD AUTO: 35 THOUSANDS/UL (ref 149–390)
PLATELET # BLD AUTO: 5 THOUSANDS/UL (ref 149–390)
PLATELET # BLD AUTO: 73 THOUSANDS/UL (ref 149–390)
PLATELET BLD QL SMEAR: ABNORMAL
PLATELET BLD QL SMEAR: ADEQUATE
PMV BLD AUTO: 11.4 FL (ref 8.9–12.7)
PO2 BLDA: 129.5 MM HG (ref 75–129)
PO2 BLDA: 60.6 MM HG (ref 75–129)
POIKILOCYTOSIS BLD QL SMEAR: PRESENT
POLYCHROMASIA BLD QL SMEAR: PRESENT
POTASSIUM SERPL-SCNC: 2.6 MMOL/L (ref 3.5–5.3)
POTASSIUM SERPL-SCNC: 2.9 MMOL/L (ref 3.5–5.3)
POTASSIUM SERPL-SCNC: 3 MMOL/L (ref 3.5–5.3)
POTASSIUM SERPL-SCNC: 3.1 MMOL/L (ref 3.5–5.3)
POTASSIUM SERPL-SCNC: 3.2 MMOL/L (ref 3.5–5.3)
POTASSIUM SERPL-SCNC: 3.2 MMOL/L (ref 3.5–5.3)
POTASSIUM SERPL-SCNC: 3.4 MMOL/L (ref 3.5–5.3)
POTASSIUM SERPL-SCNC: 3.5 MMOL/L (ref 3.5–5.3)
POTASSIUM SERPL-SCNC: 3.6 MMOL/L (ref 3.5–5.3)
POTASSIUM SERPL-SCNC: 3.7 MMOL/L (ref 3.5–5.3)
POTASSIUM SERPL-SCNC: 3.8 MMOL/L (ref 3.5–5.3)
POTASSIUM SERPL-SCNC: 3.9 MMOL/L (ref 3.5–5.3)
POTASSIUM SERPL-SCNC: 4 MMOL/L (ref 3.5–5.3)
POTASSIUM SERPL-SCNC: 4.2 MMOL/L (ref 3.5–5.3)
POTASSIUM SERPL-SCNC: 4.3 MMOL/L (ref 3.5–5.3)
POTASSIUM SERPL-SCNC: 4.4 MMOL/L (ref 3.5–5.3)
POTASSIUM SERPL-SCNC: 4.5 MMOL/L (ref 3.5–5.3)
POTASSIUM SERPL-SCNC: 4.6 MMOL/L (ref 3.5–5.3)
POTASSIUM SERPL-SCNC: 4.6 MMOL/L (ref 3.5–5.3)
POTASSIUM SERPL-SCNC: 4.7 MMOL/L (ref 3.5–5.3)
POTASSIUM SERPL-SCNC: 4.7 MMOL/L (ref 3.5–5.3)
POTASSIUM SERPL-SCNC: 4.8 MMOL/L (ref 3.5–5.3)
POTASSIUM SERPL-SCNC: 4.9 MMOL/L (ref 3.5–5.3)
POTASSIUM SERPL-SCNC: 5.1 MMOL/L (ref 3.5–5.3)
PR INTERVAL: 100 MS
PR INTERVAL: 124 MS
PR INTERVAL: 130 MS
PR INTERVAL: 134 MS
PR INTERVAL: 142 MS
PR INTERVAL: 142 MS
PR INTERVAL: 144 MS
PR INTERVAL: 146 MS
PR INTERVAL: 146 MS
PR INTERVAL: 150 MS
PR INTERVAL: 150 MS
PR INTERVAL: 163 MS
PR INTERVAL: 196 MS
PR INTERVAL: 92 MS
PROCALCITONIN SERPL-MCNC: 0.09 NG/ML
PROCALCITONIN SERPL-MCNC: 0.32 NG/ML
PROCALCITONIN SERPL-MCNC: 0.43 NG/ML
PROCALCITONIN SERPL-MCNC: 0.53 NG/ML
PROCALCITONIN SERPL-MCNC: 9.93 NG/ML
PROMYELOCYTES NFR BLD MANUAL: 1 % (ref 0–0)
PROT SERPL-MCNC: 6.8 G/DL (ref 6.4–8.2)
PROT SERPL-MCNC: 7.1 G/DL (ref 6.4–8.2)
PROT SERPL-MCNC: 7.1 G/DL (ref 6.4–8.2)
PROT SERPL-MCNC: 8.2 G/DL (ref 6.4–8.2)
PROT UR STRIP-MCNC: ABNORMAL MG/DL
PROTHROMBIN TIME: 15.7 SECONDS (ref 11.6–14.5)
PROTHROMBIN TIME: 28.9 SECONDS (ref 11.6–14.5)
PSA SERPL-MCNC: 2.1 NG/ML (ref 0–4)
PTH-INTACT SERPL-MCNC: 116.8 PG/ML (ref 18.4–80.1)
QRS AXIS: 13 DEGREES
QRS AXIS: 13 DEGREES
QRS AXIS: 14 DEGREES
QRS AXIS: 15 DEGREES
QRS AXIS: 17 DEGREES
QRS AXIS: 18 DEGREES
QRS AXIS: 21 DEGREES
QRS AXIS: 24 DEGREES
QRS AXIS: 24 DEGREES
QRS AXIS: 25 DEGREES
QRS AXIS: 35 DEGREES
QRS AXIS: 36 DEGREES
QRS AXIS: 43 DEGREES
QRS AXIS: 45 DEGREES
QRS AXIS: 53 DEGREES
QRS AXIS: 55 DEGREES
QRS AXIS: 59 DEGREES
QRS AXIS: 59 DEGREES
QRS AXIS: 61 DEGREES
QRS AXIS: 8 DEGREES
QRSD INTERVAL: 58 MS
QRSD INTERVAL: 63 MS
QRSD INTERVAL: 64 MS
QRSD INTERVAL: 67 MS
QRSD INTERVAL: 67 MS
QRSD INTERVAL: 68 MS
QRSD INTERVAL: 68 MS
QRSD INTERVAL: 72 MS
QRSD INTERVAL: 74 MS
QRSD INTERVAL: 75 MS
QRSD INTERVAL: 79 MS
QRSD INTERVAL: 80 MS
QRSD INTERVAL: 84 MS
QRSD INTERVAL: 84 MS
QRSD INTERVAL: 86 MS
QT INTERVAL: 283 MS
QT INTERVAL: 288 MS
QT INTERVAL: 288 MS
QT INTERVAL: 298 MS
QT INTERVAL: 304 MS
QT INTERVAL: 316 MS
QT INTERVAL: 321 MS
QT INTERVAL: 364 MS
QT INTERVAL: 366 MS
QT INTERVAL: 367 MS
QT INTERVAL: 367 MS
QT INTERVAL: 370 MS
QT INTERVAL: 383 MS
QT INTERVAL: 384 MS
QT INTERVAL: 386 MS
QT INTERVAL: 396 MS
QT INTERVAL: 398 MS
QT INTERVAL: 400 MS
QT INTERVAL: 400 MS
QT INTERVAL: 404 MS
QTC INTERVAL: 422 MS
QTC INTERVAL: 432 MS
QTC INTERVAL: 439 MS
QTC INTERVAL: 446 MS
QTC INTERVAL: 449 MS
QTC INTERVAL: 452 MS
QTC INTERVAL: 453 MS
QTC INTERVAL: 457 MS
QTC INTERVAL: 463 MS
QTC INTERVAL: 472 MS
QTC INTERVAL: 473 MS
QTC INTERVAL: 474 MS
QTC INTERVAL: 478 MS
QTC INTERVAL: 479 MS
QTC INTERVAL: 482 MS
QTC INTERVAL: 486 MS
QTC INTERVAL: 487 MS
QTC INTERVAL: 492 MS
QTC INTERVAL: 507 MS
QTC INTERVAL: 516 MS
RBC # BLD AUTO: 2.4 MILLION/UL (ref 3.88–5.62)
RBC # BLD AUTO: 2.42 MILLION/UL (ref 3.88–5.62)
RBC # BLD AUTO: 2.55 MILLION/UL (ref 3.88–5.62)
RBC # BLD AUTO: 2.65 MILLION/UL (ref 3.88–5.62)
RBC # BLD AUTO: 2.67 MILLION/UL (ref 3.88–5.62)
RBC # BLD AUTO: 2.67 MILLION/UL (ref 3.88–5.62)
RBC # BLD AUTO: 2.69 MILLION/UL (ref 3.88–5.62)
RBC # BLD AUTO: 2.7 MILLION/UL (ref 3.88–5.62)
RBC # BLD AUTO: 2.7 MILLION/UL (ref 3.88–5.62)
RBC # BLD AUTO: 2.8 MILLION/UL (ref 3.88–5.62)
RBC # BLD AUTO: 2.88 MILLION/UL (ref 3.88–5.62)
RBC # BLD AUTO: 2.93 MILLION/UL (ref 3.88–5.62)
RBC # BLD AUTO: 2.94 MILLION/UL (ref 3.88–5.62)
RBC # BLD AUTO: 2.96 MILLION/UL (ref 3.88–5.62)
RBC # BLD AUTO: 2.97 MILLION/UL (ref 3.88–5.62)
RBC # BLD AUTO: 2.98 MILLION/UL (ref 3.88–5.62)
RBC # BLD AUTO: 3.02 MILLION/UL (ref 3.88–5.62)
RBC # BLD AUTO: 3.03 MILLION/UL (ref 3.88–5.62)
RBC # BLD AUTO: 3.04 MILLION/UL (ref 3.88–5.62)
RBC # BLD AUTO: 3.06 MILLION/UL (ref 3.88–5.62)
RBC # BLD AUTO: 3.16 MILLION/UL (ref 3.88–5.62)
RBC # BLD AUTO: 3.18 MILLION/UL (ref 3.88–5.62)
RBC #/AREA URNS AUTO: ABNORMAL /HPF
RBC MORPH BLD: PRESENT
RBC MORPH BLD: PRESENT
RETICS # AUTO: ABNORMAL 10*3/UL (ref 14356–105094)
RETICS # CALC: 3.51 % (ref 0.37–1.87)
RH BLD: POSITIVE
SCHISTOCYTES BLD QL SMEAR: PRESENT
SL AMB  POCT GLUCOSE, UA: ABNORMAL
SL AMB LEUKOCYTE ESTERASE,UA: ABNORMAL
SL AMB POCT BILIRUBIN,UA: ABNORMAL
SL AMB POCT BLOOD,UA: ABNORMAL
SL AMB POCT CLARITY,UA: ABNORMAL
SL AMB POCT COLOR,UA: ABNORMAL
SL AMB POCT KETONES,UA: ABNORMAL
SL AMB POCT NITRITE,UA: POSITIVE
SL AMB POCT PH,UA: 5
SL AMB POCT SPECIFIC GRAVITY,UA: 1.01
SL AMB POCT URINE PROTEIN: ABNORMAL
SL AMB POCT UROBILINOGEN: 4
SODIUM 24H UR-SCNC: 78 MOL/L
SODIUM SERPL-SCNC: 133 MMOL/L (ref 136–145)
SODIUM SERPL-SCNC: 136 MMOL/L (ref 136–145)
SODIUM SERPL-SCNC: 137 MMOL/L (ref 136–145)
SODIUM SERPL-SCNC: 138 MMOL/L (ref 136–145)
SODIUM SERPL-SCNC: 139 MMOL/L (ref 136–145)
SODIUM SERPL-SCNC: 141 MMOL/L (ref 136–145)
SODIUM SERPL-SCNC: 142 MMOL/L (ref 136–145)
SODIUM SERPL-SCNC: 143 MMOL/L (ref 136–145)
SODIUM SERPL-SCNC: 144 MMOL/L (ref 136–145)
SODIUM SERPL-SCNC: 145 MMOL/L (ref 136–145)
SODIUM SERPL-SCNC: 149 MMOL/L (ref 136–145)
SODIUM SERPL-SCNC: 152 MMOL/L (ref 136–145)
SODIUM SERPL-SCNC: 156 MMOL/L (ref 136–145)
SP GR UR STRIP.AUTO: 1.01 (ref 1–1.03)
SP GR UR STRIP.AUTO: 1.02 (ref 1–1.03)
SP GR UR STRIP.AUTO: 1.02 (ref 1–1.03)
SPECIMEN EXPIRATION DATE: NORMAL
SPECIMEN SOURCE: ABNORMAL
SPECIMEN SOURCE: ABNORMAL
T WAVE AXIS: 223 DEGREES
T WAVE AXIS: 25 DEGREES
T WAVE AXIS: 30 DEGREES
T WAVE AXIS: 38 DEGREES
T WAVE AXIS: 38 DEGREES
T WAVE AXIS: 44 DEGREES
T WAVE AXIS: 49 DEGREES
T WAVE AXIS: 51 DEGREES
T WAVE AXIS: 53 DEGREES
T WAVE AXIS: 57 DEGREES
T WAVE AXIS: 64 DEGREES
T WAVE AXIS: 66 DEGREES
T WAVE AXIS: 67 DEGREES
T WAVE AXIS: 69 DEGREES
T WAVE AXIS: 70 DEGREES
T WAVE AXIS: 73 DEGREES
T WAVE AXIS: 76 DEGREES
T WAVE AXIS: 83 DEGREES
T WAVE AXIS: 85 DEGREES
T WAVE AXIS: 92 DEGREES
TIBC SERPL-MCNC: 211 UG/DL (ref 250–450)
TOTAL CELLS COUNTED SPEC: 100
TPA PPP QL CHRO: 90 % OF NORMAL (ref 77–138)
TROPONIN I SERPL-MCNC: 13.09 NG/ML
TROPONIN I SERPL-MCNC: 21.11 NG/ML
TROPONIN I SERPL-MCNC: 24.4 NG/ML
TROPONIN I SERPL-MCNC: 27.39 NG/ML
TROPONIN I SERPL-MCNC: <0.02 NG/ML
UNIT DISPENSE STATUS: NORMAL
UNIT PRODUCT CODE: NORMAL
UNIT RH: NORMAL
URATE CRY URNS QL MICRO: ABNORMAL /HPF
URATE SERPL-MCNC: 6.7 MG/DL (ref 4.2–8)
UROBILINOGEN UR QL STRIP.AUTO: 0.2 E.U./DL
UROBILINOGEN UR QL STRIP.AUTO: 0.2 E.U./DL
UROBILINOGEN UR QL STRIP.AUTO: 2 E.U./DL
VARIANT LYMPHS # BLD AUTO: 2 %
VENTRICULAR RATE: 100 BPM
VENTRICULAR RATE: 103 BPM
VENTRICULAR RATE: 138 BPM
VENTRICULAR RATE: 145 BPM
VENTRICULAR RATE: 148 BPM
VENTRICULAR RATE: 149 BPM
VENTRICULAR RATE: 151 BPM
VENTRICULAR RATE: 155 BPM
VENTRICULAR RATE: 164 BPM
VENTRICULAR RATE: 70 BPM
VENTRICULAR RATE: 81 BPM
VENTRICULAR RATE: 82 BPM
VENTRICULAR RATE: 84 BPM
VENTRICULAR RATE: 86 BPM
VENTRICULAR RATE: 87 BPM
VENTRICULAR RATE: 87 BPM
VENTRICULAR RATE: 90 BPM
VENTRICULAR RATE: 92 BPM
VENTRICULAR RATE: 94 BPM
VENTRICULAR RATE: 98 BPM
WBC # BLD AUTO: 16.92 THOUSAND/UL (ref 4.31–10.16)
WBC # BLD AUTO: 17.05 THOUSAND/UL (ref 4.31–10.16)
WBC # BLD AUTO: 19.81 THOUSAND/UL (ref 4.31–10.16)
WBC # BLD AUTO: 20.17 THOUSAND/UL (ref 4.31–10.16)
WBC # BLD AUTO: 20.19 THOUSAND/UL (ref 4.31–10.16)
WBC # BLD AUTO: 26.54 THOUSAND/UL (ref 4.31–10.16)
WBC # BLD AUTO: 27.67 THOUSAND/UL (ref 4.31–10.16)
WBC # BLD AUTO: 28.65 THOUSAND/UL (ref 4.31–10.16)
WBC # BLD AUTO: 29.37 THOUSAND/UL (ref 4.31–10.16)
WBC # BLD AUTO: 30.35 THOUSAND/UL (ref 4.31–10.16)
WBC # BLD AUTO: 31.76 THOUSAND/UL (ref 4.31–10.16)
WBC # BLD AUTO: 32.07 THOUSAND/UL (ref 4.31–10.16)
WBC # BLD AUTO: 34.7 THOUSAND/UL (ref 4.31–10.16)
WBC # BLD AUTO: 35.13 THOUSAND/UL (ref 4.31–10.16)
WBC # BLD AUTO: 45.46 THOUSAND/UL (ref 4.31–10.16)
WBC # BLD AUTO: 46.23 THOUSAND/UL (ref 4.31–10.16)
WBC # BLD AUTO: 49.23 THOUSAND/UL (ref 4.31–10.16)
WBC # BLD AUTO: 54.28 THOUSAND/UL (ref 4.31–10.16)
WBC # BLD AUTO: 55.19 THOUSAND/UL (ref 4.31–10.16)
WBC # BLD AUTO: 55.72 THOUSAND/UL (ref 4.31–10.16)
WBC # BLD AUTO: 59.11 THOUSAND/UL (ref 4.31–10.16)
WBC # BLD AUTO: 59.47 THOUSAND/UL (ref 4.31–10.16)
WBC # BLD AUTO: 65.28 THOUSAND/UL (ref 4.31–10.16)
WBC # BLD AUTO: 68.83 THOUSAND/UL (ref 4.31–10.16)
WBC # BLD AUTO: 84.14 THOUSAND/UL (ref 4.31–10.16)
WBC # BLD AUTO: 87.61 THOUSAND/UL (ref 4.31–10.16)
WBC # BLD AUTO: 88.55 THOUSAND/UL (ref 4.31–10.16)
WBC #/AREA URNS AUTO: ABNORMAL /HPF

## 2019-01-01 PROCEDURE — G8997 SWALLOW GOAL STATUS: HCPCS

## 2019-01-01 PROCEDURE — 82948 REAGENT STRIP/BLOOD GLUCOSE: CPT

## 2019-01-01 PROCEDURE — 30233N1 TRANSFUSION OF NONAUTOLOGOUS RED BLOOD CELLS INTO PERIPHERAL VEIN, PERCUTANEOUS APPROACH: ICD-10-PCS | Performed by: INTERNAL MEDICINE

## 2019-01-01 PROCEDURE — 99285 EMERGENCY DEPT VISIT HI MDM: CPT

## 2019-01-01 PROCEDURE — 70450 CT HEAD/BRAIN W/O DYE: CPT

## 2019-01-01 PROCEDURE — 93010 ELECTROCARDIOGRAM REPORT: CPT

## 2019-01-01 PROCEDURE — 85730 THROMBOPLASTIN TIME PARTIAL: CPT | Performed by: NURSE PRACTITIONER

## 2019-01-01 PROCEDURE — 83970 ASSAY OF PARATHORMONE: CPT | Performed by: INTERNAL MEDICINE

## 2019-01-01 PROCEDURE — 80048 BASIC METABOLIC PNL TOTAL CA: CPT | Performed by: FAMILY MEDICINE

## 2019-01-01 PROCEDURE — G8979 MOBILITY GOAL STATUS: HCPCS

## 2019-01-01 PROCEDURE — 99232 SBSQ HOSP IP/OBS MODERATE 35: CPT | Performed by: INTERNAL MEDICINE

## 2019-01-01 PROCEDURE — 84145 PROCALCITONIN (PCT): CPT | Performed by: FAMILY MEDICINE

## 2019-01-01 PROCEDURE — 80053 COMPREHEN METABOLIC PANEL: CPT | Performed by: INTERNAL MEDICINE

## 2019-01-01 PROCEDURE — 86850 RBC ANTIBODY SCREEN: CPT | Performed by: NURSE PRACTITIONER

## 2019-01-01 PROCEDURE — 5A1945Z RESPIRATORY VENTILATION, 24-96 CONSECUTIVE HOURS: ICD-10-PCS | Performed by: INTERNAL MEDICINE

## 2019-01-01 PROCEDURE — 83735 ASSAY OF MAGNESIUM: CPT | Performed by: FAMILY MEDICINE

## 2019-01-01 PROCEDURE — 99284 EMERGENCY DEPT VISIT MOD MDM: CPT

## 2019-01-01 PROCEDURE — 80053 COMPREHEN METABOLIC PANEL: CPT | Performed by: EMERGENCY MEDICINE

## 2019-01-01 PROCEDURE — 74176 CT ABD & PELVIS W/O CONTRAST: CPT

## 2019-01-01 PROCEDURE — 97116 GAIT TRAINING THERAPY: CPT

## 2019-01-01 PROCEDURE — 99233 SBSQ HOSP IP/OBS HIGH 50: CPT | Performed by: INTERNAL MEDICINE

## 2019-01-01 PROCEDURE — 93005 ELECTROCARDIOGRAM TRACING: CPT

## 2019-01-01 PROCEDURE — 93010 ELECTROCARDIOGRAM REPORT: CPT | Performed by: INTERNAL MEDICINE

## 2019-01-01 PROCEDURE — 86850 RBC ANTIBODY SCREEN: CPT | Performed by: STUDENT IN AN ORGANIZED HEALTH CARE EDUCATION/TRAINING PROGRAM

## 2019-01-01 PROCEDURE — G8987 SELF CARE CURRENT STATUS: HCPCS

## 2019-01-01 PROCEDURE — 85027 COMPLETE CBC AUTOMATED: CPT | Performed by: FAMILY MEDICINE

## 2019-01-01 PROCEDURE — 99223 1ST HOSP IP/OBS HIGH 75: CPT | Performed by: UROLOGY

## 2019-01-01 PROCEDURE — 87086 URINE CULTURE/COLONY COUNT: CPT

## 2019-01-01 PROCEDURE — 85420 FIBRINOLYTIC PLASMINOGEN: CPT | Performed by: NURSE PRACTITIONER

## 2019-01-01 PROCEDURE — 99213 OFFICE O/P EST LOW 20 MIN: CPT | Performed by: INTERNAL MEDICINE

## 2019-01-01 PROCEDURE — NC001 PR NO CHARGE: Performed by: NURSE PRACTITIONER

## 2019-01-01 PROCEDURE — 80076 HEPATIC FUNCTION PANEL: CPT | Performed by: EMERGENCY MEDICINE

## 2019-01-01 PROCEDURE — 83615 LACTATE (LD) (LDH) ENZYME: CPT | Performed by: STUDENT IN AN ORGANIZED HEALTH CARE EDUCATION/TRAINING PROGRAM

## 2019-01-01 PROCEDURE — 99232 SBSQ HOSP IP/OBS MODERATE 35: CPT | Performed by: FAMILY MEDICINE

## 2019-01-01 PROCEDURE — G0439 PPPS, SUBSEQ VISIT: HCPCS | Performed by: NURSE PRACTITIONER

## 2019-01-01 PROCEDURE — 71045 X-RAY EXAM CHEST 1 VIEW: CPT

## 2019-01-01 PROCEDURE — 83735 ASSAY OF MAGNESIUM: CPT | Performed by: HOSPITALIST

## 2019-01-01 PROCEDURE — 99223 1ST HOSP IP/OBS HIGH 75: CPT | Performed by: INTERNAL MEDICINE

## 2019-01-01 PROCEDURE — 85540 WBC ALKALINE PHOSPHATASE: CPT | Performed by: INTERNAL MEDICINE

## 2019-01-01 PROCEDURE — 85025 COMPLETE CBC W/AUTO DIFF WBC: CPT | Performed by: STUDENT IN AN ORGANIZED HEALTH CARE EDUCATION/TRAINING PROGRAM

## 2019-01-01 PROCEDURE — 99222 1ST HOSP IP/OBS MODERATE 55: CPT | Performed by: INTERNAL MEDICINE

## 2019-01-01 PROCEDURE — P9040 RBC LEUKOREDUCED IRRADIATED: HCPCS

## 2019-01-01 PROCEDURE — 88185 FLOWCYTOMETRY/TC ADD-ON: CPT

## 2019-01-01 PROCEDURE — 86901 BLOOD TYPING SEROLOGIC RH(D): CPT | Performed by: STUDENT IN AN ORGANIZED HEALTH CARE EDUCATION/TRAINING PROGRAM

## 2019-01-01 PROCEDURE — 1170F FXNL STATUS ASSESSED: CPT | Performed by: NURSE PRACTITIONER

## 2019-01-01 PROCEDURE — 83735 ASSAY OF MAGNESIUM: CPT | Performed by: INTERNAL MEDICINE

## 2019-01-01 PROCEDURE — 36415 COLL VENOUS BLD VENIPUNCTURE: CPT | Performed by: EMERGENCY MEDICINE

## 2019-01-01 PROCEDURE — 99232 SBSQ HOSP IP/OBS MODERATE 35: CPT | Performed by: HOSPITALIST

## 2019-01-01 PROCEDURE — 02HV33Z INSERTION OF INFUSION DEVICE INTO SUPERIOR VENA CAVA, PERCUTANEOUS APPROACH: ICD-10-PCS | Performed by: INTERNAL MEDICINE

## 2019-01-01 PROCEDURE — 80048 BASIC METABOLIC PNL TOTAL CA: CPT | Performed by: NURSE PRACTITIONER

## 2019-01-01 PROCEDURE — 94640 AIRWAY INHALATION TREATMENT: CPT

## 2019-01-01 PROCEDURE — C9113 INJ PANTOPRAZOLE SODIUM, VIA: HCPCS | Performed by: PHYSICIAN ASSISTANT

## 2019-01-01 PROCEDURE — 84300 ASSAY OF URINE SODIUM: CPT | Performed by: FAMILY MEDICINE

## 2019-01-01 PROCEDURE — 85027 COMPLETE CBC AUTOMATED: CPT | Performed by: EMERGENCY MEDICINE

## 2019-01-01 PROCEDURE — 92526 ORAL FUNCTION THERAPY: CPT

## 2019-01-01 PROCEDURE — 94002 VENT MGMT INPAT INIT DAY: CPT

## 2019-01-01 PROCEDURE — NC001 PR NO CHARGE: Performed by: INTERNAL MEDICINE

## 2019-01-01 PROCEDURE — 85379 FIBRIN DEGRADATION QUANT: CPT | Performed by: NURSE PRACTITIONER

## 2019-01-01 PROCEDURE — 84550 ASSAY OF BLOOD/URIC ACID: CPT | Performed by: INTERNAL MEDICINE

## 2019-01-01 PROCEDURE — 84145 PROCALCITONIN (PCT): CPT | Performed by: NURSE PRACTITIONER

## 2019-01-01 PROCEDURE — 99223 1ST HOSP IP/OBS HIGH 75: CPT | Performed by: NURSE PRACTITIONER

## 2019-01-01 PROCEDURE — 84484 ASSAY OF TROPONIN QUANT: CPT | Performed by: EMERGENCY MEDICINE

## 2019-01-01 PROCEDURE — 80048 BASIC METABOLIC PNL TOTAL CA: CPT | Performed by: STUDENT IN AN ORGANIZED HEALTH CARE EDUCATION/TRAINING PROGRAM

## 2019-01-01 PROCEDURE — P9037 PLATE PHERES LEUKOREDU IRRAD: HCPCS

## 2019-01-01 PROCEDURE — 99283 EMERGENCY DEPT VISIT LOW MDM: CPT

## 2019-01-01 PROCEDURE — 85384 FIBRINOGEN ACTIVITY: CPT | Performed by: STUDENT IN AN ORGANIZED HEALTH CARE EDUCATION/TRAINING PROGRAM

## 2019-01-01 PROCEDURE — 97163 PT EVAL HIGH COMPLEX 45 MIN: CPT

## 2019-01-01 PROCEDURE — 99232 SBSQ HOSP IP/OBS MODERATE 35: CPT | Performed by: PODIATRIST

## 2019-01-01 PROCEDURE — 86901 BLOOD TYPING SEROLOGIC RH(D): CPT | Performed by: NURSE PRACTITIONER

## 2019-01-01 PROCEDURE — 86900 BLOOD TYPING SEROLOGIC ABO: CPT | Performed by: STUDENT IN AN ORGANIZED HEALTH CARE EDUCATION/TRAINING PROGRAM

## 2019-01-01 PROCEDURE — G8988 SELF CARE GOAL STATUS: HCPCS

## 2019-01-01 PROCEDURE — 94762 N-INVAS EAR/PLS OXIMTRY CONT: CPT

## 2019-01-01 PROCEDURE — 85025 COMPLETE CBC W/AUTO DIFF WBC: CPT | Performed by: FAMILY MEDICINE

## 2019-01-01 PROCEDURE — 4A133B1 MONITORING OF ARTERIAL PRESSURE, PERIPHERAL, PERCUTANEOUS APPROACH: ICD-10-PCS | Performed by: INTERNAL MEDICINE

## 2019-01-01 PROCEDURE — 85045 AUTOMATED RETICULOCYTE COUNT: CPT | Performed by: STUDENT IN AN ORGANIZED HEALTH CARE EDUCATION/TRAINING PROGRAM

## 2019-01-01 PROCEDURE — 71275 CT ANGIOGRAPHY CHEST: CPT

## 2019-01-01 PROCEDURE — 81001 URINALYSIS AUTO W/SCOPE: CPT | Performed by: STUDENT IN AN ORGANIZED HEALTH CARE EDUCATION/TRAINING PROGRAM

## 2019-01-01 PROCEDURE — 85025 COMPLETE CBC W/AUTO DIFF WBC: CPT | Performed by: HOSPITALIST

## 2019-01-01 PROCEDURE — 99291 CRITICAL CARE FIRST HOUR: CPT | Performed by: INTERNAL MEDICINE

## 2019-01-01 PROCEDURE — 85007 BL SMEAR W/DIFF WBC COUNT: CPT | Performed by: INTERNAL MEDICINE

## 2019-01-01 PROCEDURE — 85007 BL SMEAR W/DIFF WBC COUNT: CPT | Performed by: STUDENT IN AN ORGANIZED HEALTH CARE EDUCATION/TRAINING PROGRAM

## 2019-01-01 PROCEDURE — 85362 FIBRIN DEGRADATION PRODUCTS: CPT | Performed by: INTERNAL MEDICINE

## 2019-01-01 PROCEDURE — 1160F RVW MEDS BY RX/DR IN RCRD: CPT | Performed by: INTERNAL MEDICINE

## 2019-01-01 PROCEDURE — 85018 HEMOGLOBIN: CPT | Performed by: STUDENT IN AN ORGANIZED HEALTH CARE EDUCATION/TRAINING PROGRAM

## 2019-01-01 PROCEDURE — 80048 BASIC METABOLIC PNL TOTAL CA: CPT | Performed by: HOSPITALIST

## 2019-01-01 PROCEDURE — 93306 TTE W/DOPPLER COMPLETE: CPT

## 2019-01-01 PROCEDURE — 85610 PROTHROMBIN TIME: CPT | Performed by: INTERNAL MEDICINE

## 2019-01-01 PROCEDURE — 87081 CULTURE SCREEN ONLY: CPT | Performed by: FAMILY MEDICINE

## 2019-01-01 PROCEDURE — 80053 COMPREHEN METABOLIC PANEL: CPT | Performed by: NURSE PRACTITIONER

## 2019-01-01 PROCEDURE — 84153 ASSAY OF PSA TOTAL: CPT | Performed by: STUDENT IN AN ORGANIZED HEALTH CARE EDUCATION/TRAINING PROGRAM

## 2019-01-01 PROCEDURE — 85007 BL SMEAR W/DIFF WBC COUNT: CPT | Performed by: FAMILY MEDICINE

## 2019-01-01 PROCEDURE — 84484 ASSAY OF TROPONIN QUANT: CPT | Performed by: PHYSICIAN ASSISTANT

## 2019-01-01 PROCEDURE — C9113 INJ PANTOPRAZOLE SODIUM, VIA: HCPCS | Performed by: FAMILY MEDICINE

## 2019-01-01 PROCEDURE — 93923 UPR/LXTR ART STDY 3+ LVLS: CPT

## 2019-01-01 PROCEDURE — 87040 BLOOD CULTURE FOR BACTERIA: CPT | Performed by: EMERGENCY MEDICINE

## 2019-01-01 PROCEDURE — 80048 BASIC METABOLIC PNL TOTAL CA: CPT | Performed by: EMERGENCY MEDICINE

## 2019-01-01 PROCEDURE — 99284 EMERGENCY DEPT VISIT MOD MDM: CPT | Performed by: EMERGENCY MEDICINE

## 2019-01-01 PROCEDURE — 36620 INSERTION CATHETER ARTERY: CPT

## 2019-01-01 PROCEDURE — 36600 WITHDRAWAL OF ARTERIAL BLOOD: CPT

## 2019-01-01 PROCEDURE — 97530 THERAPEUTIC ACTIVITIES: CPT

## 2019-01-01 PROCEDURE — 94003 VENT MGMT INPAT SUBQ DAY: CPT

## 2019-01-01 PROCEDURE — 86850 RBC ANTIBODY SCREEN: CPT | Performed by: PHYSICIAN ASSISTANT

## 2019-01-01 PROCEDURE — NC001 PR NO CHARGE: Performed by: FAMILY MEDICINE

## 2019-01-01 PROCEDURE — 85014 HEMATOCRIT: CPT | Performed by: STUDENT IN AN ORGANIZED HEALTH CARE EDUCATION/TRAINING PROGRAM

## 2019-01-01 PROCEDURE — 96375 TX/PRO/DX INJ NEW DRUG ADDON: CPT

## 2019-01-01 PROCEDURE — 81003 URINALYSIS AUTO W/O SCOPE: CPT | Performed by: INTERNAL MEDICINE

## 2019-01-01 PROCEDURE — 86901 BLOOD TYPING SEROLOGIC RH(D): CPT | Performed by: EMERGENCY MEDICINE

## 2019-01-01 PROCEDURE — 99285 EMERGENCY DEPT VISIT HI MDM: CPT | Performed by: EMERGENCY MEDICINE

## 2019-01-01 PROCEDURE — 86901 BLOOD TYPING SEROLOGIC RH(D): CPT | Performed by: PHYSICIAN ASSISTANT

## 2019-01-01 PROCEDURE — 86900 BLOOD TYPING SEROLOGIC ABO: CPT | Performed by: PHYSICIAN ASSISTANT

## 2019-01-01 PROCEDURE — 99232 SBSQ HOSP IP/OBS MODERATE 35: CPT | Performed by: PHYSICIAN ASSISTANT

## 2019-01-01 PROCEDURE — 96360 HYDRATION IV INFUSION INIT: CPT

## 2019-01-01 PROCEDURE — 82805 BLOOD GASES W/O2 SATURATION: CPT | Performed by: FAMILY MEDICINE

## 2019-01-01 PROCEDURE — 83690 ASSAY OF LIPASE: CPT | Performed by: EMERGENCY MEDICINE

## 2019-01-01 PROCEDURE — 86900 BLOOD TYPING SEROLOGIC ABO: CPT | Performed by: EMERGENCY MEDICINE

## 2019-01-01 PROCEDURE — 83010 ASSAY OF HAPTOGLOBIN QUANT: CPT | Performed by: STUDENT IN AN ORGANIZED HEALTH CARE EDUCATION/TRAINING PROGRAM

## 2019-01-01 PROCEDURE — 85420 FIBRINOLYTIC PLASMINOGEN: CPT | Performed by: INTERNAL MEDICINE

## 2019-01-01 PROCEDURE — 86901 BLOOD TYPING SEROLOGIC RH(D): CPT | Performed by: FAMILY MEDICINE

## 2019-01-01 PROCEDURE — 85025 COMPLETE CBC W/AUTO DIFF WBC: CPT | Performed by: PHYSICIAN ASSISTANT

## 2019-01-01 PROCEDURE — 86850 RBC ANTIBODY SCREEN: CPT | Performed by: FAMILY MEDICINE

## 2019-01-01 PROCEDURE — 83880 ASSAY OF NATRIURETIC PEPTIDE: CPT | Performed by: FAMILY MEDICINE

## 2019-01-01 PROCEDURE — 84484 ASSAY OF TROPONIN QUANT: CPT | Performed by: HOSPITALIST

## 2019-01-01 PROCEDURE — 99223 1ST HOSP IP/OBS HIGH 75: CPT | Performed by: FAMILY MEDICINE

## 2019-01-01 PROCEDURE — 82330 ASSAY OF CALCIUM: CPT | Performed by: NURSE PRACTITIONER

## 2019-01-01 PROCEDURE — 83605 ASSAY OF LACTIC ACID: CPT | Performed by: EMERGENCY MEDICINE

## 2019-01-01 PROCEDURE — 30233R1 TRANSFUSION OF NONAUTOLOGOUS PLATELETS INTO PERIPHERAL VEIN, PERCUTANEOUS APPROACH: ICD-10-PCS | Performed by: INTERNAL MEDICINE

## 2019-01-01 PROCEDURE — 85384 FIBRINOGEN ACTIVITY: CPT | Performed by: NURSE PRACTITIONER

## 2019-01-01 PROCEDURE — 83550 IRON BINDING TEST: CPT | Performed by: STUDENT IN AN ORGANIZED HEALTH CARE EDUCATION/TRAINING PROGRAM

## 2019-01-01 PROCEDURE — G8978 MOBILITY CURRENT STATUS: HCPCS

## 2019-01-01 PROCEDURE — 92610 EVALUATE SWALLOWING FUNCTION: CPT

## 2019-01-01 PROCEDURE — G8998 SWALLOW D/C STATUS: HCPCS

## 2019-01-01 PROCEDURE — 82306 VITAMIN D 25 HYDROXY: CPT | Performed by: INTERNAL MEDICINE

## 2019-01-01 PROCEDURE — 99233 SBSQ HOSP IP/OBS HIGH 50: CPT | Performed by: HOSPITALIST

## 2019-01-01 PROCEDURE — 97535 SELF CARE MNGMENT TRAINING: CPT

## 2019-01-01 PROCEDURE — 85027 COMPLETE CBC AUTOMATED: CPT | Performed by: INTERNAL MEDICINE

## 2019-01-01 PROCEDURE — 88374 M/PHMTRC ALYS ISHQUANT/SEMIQ: CPT | Performed by: INTERNAL MEDICINE

## 2019-01-01 PROCEDURE — 97110 THERAPEUTIC EXERCISES: CPT

## 2019-01-01 PROCEDURE — 99222 1ST HOSP IP/OBS MODERATE 55: CPT | Performed by: PODIATRIST

## 2019-01-01 PROCEDURE — 72100 X-RAY EXAM L-S SPINE 2/3 VWS: CPT

## 2019-01-01 PROCEDURE — 74177 CT ABD & PELVIS W/CONTRAST: CPT

## 2019-01-01 PROCEDURE — 85610 PROTHROMBIN TIME: CPT | Performed by: NURSE PRACTITIONER

## 2019-01-01 PROCEDURE — 86923 COMPATIBILITY TEST ELECTRIC: CPT

## 2019-01-01 PROCEDURE — 85652 RBC SED RATE AUTOMATED: CPT | Performed by: EMERGENCY MEDICINE

## 2019-01-01 PROCEDURE — 99214 OFFICE O/P EST MOD 30 MIN: CPT | Performed by: INTERNAL MEDICINE

## 2019-01-01 PROCEDURE — G8996 SWALLOW CURRENT STATUS: HCPCS

## 2019-01-01 PROCEDURE — 82805 BLOOD GASES W/O2 SATURATION: CPT | Performed by: NURSE PRACTITIONER

## 2019-01-01 PROCEDURE — 76770 US EXAM ABDO BACK WALL COMP: CPT

## 2019-01-01 PROCEDURE — 99222 1ST HOSP IP/OBS MODERATE 55: CPT | Performed by: PHYSICIAN ASSISTANT

## 2019-01-01 PROCEDURE — 84145 PROCALCITONIN (PCT): CPT | Performed by: STUDENT IN AN ORGANIZED HEALTH CARE EDUCATION/TRAINING PROGRAM

## 2019-01-01 PROCEDURE — 85027 COMPLETE CBC AUTOMATED: CPT | Performed by: HOSPITALIST

## 2019-01-01 PROCEDURE — 0BH18EZ INSERTION OF ENDOTRACHEAL AIRWAY INTO TRACHEA, VIA NATURAL OR ARTIFICIAL OPENING ENDOSCOPIC: ICD-10-PCS | Performed by: INTERNAL MEDICINE

## 2019-01-01 PROCEDURE — 99214 OFFICE O/P EST MOD 30 MIN: CPT | Performed by: NURSE PRACTITIONER

## 2019-01-01 PROCEDURE — 84100 ASSAY OF PHOSPHORUS: CPT | Performed by: INTERNAL MEDICINE

## 2019-01-01 PROCEDURE — 85025 COMPLETE CBC W/AUTO DIFF WBC: CPT | Performed by: INTERNAL MEDICINE

## 2019-01-01 PROCEDURE — 36556 INSERT NON-TUNNEL CV CATH: CPT | Performed by: NURSE PRACTITIONER

## 2019-01-01 PROCEDURE — 83540 ASSAY OF IRON: CPT | Performed by: STUDENT IN AN ORGANIZED HEALTH CARE EDUCATION/TRAINING PROGRAM

## 2019-01-01 PROCEDURE — 96365 THER/PROPH/DIAG IV INF INIT: CPT

## 2019-01-01 PROCEDURE — 73100 X-RAY EXAM OF WRIST: CPT

## 2019-01-01 PROCEDURE — 88184 FLOWCYTOMETRY/ TC 1 MARKER: CPT | Performed by: INTERNAL MEDICINE

## 2019-01-01 PROCEDURE — 86900 BLOOD TYPING SEROLOGIC ABO: CPT | Performed by: FAMILY MEDICINE

## 2019-01-01 PROCEDURE — 86900 BLOOD TYPING SEROLOGIC ABO: CPT | Performed by: NURSE PRACTITIONER

## 2019-01-01 PROCEDURE — 86850 RBC ANTIBODY SCREEN: CPT | Performed by: EMERGENCY MEDICINE

## 2019-01-01 PROCEDURE — 3008F BODY MASS INDEX DOCD: CPT | Performed by: NURSE PRACTITIONER

## 2019-01-01 PROCEDURE — 83615 LACTATE (LD) (LDH) ENZYME: CPT | Performed by: INTERNAL MEDICINE

## 2019-01-01 PROCEDURE — 94760 N-INVAS EAR/PLS OXIMETRY 1: CPT

## 2019-01-01 PROCEDURE — 99231 SBSQ HOSP IP/OBS SF/LOW 25: CPT | Performed by: INTERNAL MEDICINE

## 2019-01-01 PROCEDURE — 93925 LOWER EXTREMITY STUDY: CPT

## 2019-01-01 PROCEDURE — 82728 ASSAY OF FERRITIN: CPT | Performed by: STUDENT IN AN ORGANIZED HEALTH CARE EDUCATION/TRAINING PROGRAM

## 2019-01-01 PROCEDURE — 83605 ASSAY OF LACTIC ACID: CPT | Performed by: NURSE PRACTITIONER

## 2019-01-01 PROCEDURE — 85007 BL SMEAR W/DIFF WBC COUNT: CPT | Performed by: EMERGENCY MEDICINE

## 2019-01-01 PROCEDURE — 85027 COMPLETE CBC AUTOMATED: CPT | Performed by: NURSE PRACTITIONER

## 2019-01-01 PROCEDURE — 97166 OT EVAL MOD COMPLEX 45 MIN: CPT

## 2019-01-01 PROCEDURE — 81001 URINALYSIS AUTO W/SCOPE: CPT

## 2019-01-01 PROCEDURE — 85049 AUTOMATED PLATELET COUNT: CPT | Performed by: NURSE PRACTITIONER

## 2019-01-01 PROCEDURE — 83880 ASSAY OF NATRIURETIC PEPTIDE: CPT | Performed by: HOSPITALIST

## 2019-01-01 PROCEDURE — 4A133J1 MONITORING OF ARTERIAL PULSE, PERIPHERAL, PERCUTANEOUS APPROACH: ICD-10-PCS | Performed by: INTERNAL MEDICINE

## 2019-01-01 PROCEDURE — 1125F AMNT PAIN NOTED PAIN PRSNT: CPT | Performed by: NURSE PRACTITIONER

## 2019-01-01 PROCEDURE — 85362 FIBRIN DEGRADATION PRODUCTS: CPT | Performed by: NURSE PRACTITIONER

## 2019-01-01 PROCEDURE — 85007 BL SMEAR W/DIFF WBC COUNT: CPT | Performed by: HOSPITALIST

## 2019-01-01 PROCEDURE — 85027 COMPLETE CBC AUTOMATED: CPT | Performed by: STUDENT IN AN ORGANIZED HEALTH CARE EDUCATION/TRAINING PROGRAM

## 2019-01-01 PROCEDURE — 87040 BLOOD CULTURE FOR BACTERIA: CPT | Performed by: NURSE PRACTITIONER

## 2019-01-01 PROCEDURE — 73630 X-RAY EXAM OF FOOT: CPT

## 2019-01-01 PROCEDURE — 03HY32Z INSERTION OF MONITORING DEVICE INTO UPPER ARTERY, PERCUTANEOUS APPROACH: ICD-10-PCS | Performed by: INTERNAL MEDICINE

## 2019-01-01 PROCEDURE — 83735 ASSAY OF MAGNESIUM: CPT | Performed by: NURSE PRACTITIONER

## 2019-01-01 PROCEDURE — 85300 ANTITHROMBIN III ACTIVITY: CPT | Performed by: NURSE PRACTITIONER

## 2019-01-01 PROCEDURE — 82570 ASSAY OF URINE CREATININE: CPT | Performed by: FAMILY MEDICINE

## 2019-01-01 PROCEDURE — 85007 BL SMEAR W/DIFF WBC COUNT: CPT | Performed by: NURSE PRACTITIONER

## 2019-01-01 PROCEDURE — 80048 BASIC METABOLIC PNL TOTAL CA: CPT | Performed by: PHYSICIAN ASSISTANT

## 2019-01-01 PROCEDURE — 99495 TRANSJ CARE MGMT MOD F2F 14D: CPT | Performed by: NURSE PRACTITIONER

## 2019-01-01 PROCEDURE — 86920 COMPATIBILITY TEST SPIN: CPT

## 2019-01-01 PROCEDURE — 85025 COMPLETE CBC W/AUTO DIFF WBC: CPT | Performed by: EMERGENCY MEDICINE

## 2019-01-01 PROCEDURE — 31500 INSERT EMERGENCY AIRWAY: CPT | Performed by: NURSE PRACTITIONER

## 2019-01-01 RX ORDER — KETOROLAC TROMETHAMINE 30 MG/ML
15 INJECTION, SOLUTION INTRAMUSCULAR; INTRAVENOUS ONCE
Status: DISCONTINUED | OUTPATIENT
Start: 2019-01-01 | End: 2019-01-01

## 2019-01-01 RX ORDER — METRONIDAZOLE 500 MG/1
500 TABLET ORAL EVERY 8 HOURS SCHEDULED
Status: DISCONTINUED | OUTPATIENT
Start: 2019-01-01 | End: 2019-01-01

## 2019-01-01 RX ORDER — CLOPIDOGREL BISULFATE 75 MG/1
75 TABLET ORAL DAILY
Qty: 90 TABLET | Refills: 1 | Status: SHIPPED | OUTPATIENT
Start: 2019-01-01 | End: 2019-01-01 | Stop reason: HOSPADM

## 2019-01-01 RX ORDER — OXYCODONE HYDROCHLORIDE 5 MG/1
5 TABLET ORAL ONCE
Status: COMPLETED | OUTPATIENT
Start: 2019-01-01 | End: 2019-01-01

## 2019-01-01 RX ORDER — METOPROLOL TARTRATE 50 MG/1
50 TABLET, FILM COATED ORAL EVERY 12 HOURS SCHEDULED
Status: DISCONTINUED | OUTPATIENT
Start: 2019-01-01 | End: 2019-01-01

## 2019-01-01 RX ORDER — AMLODIPINE BESYLATE 5 MG/1
5 TABLET ORAL DAILY
Status: DISCONTINUED | OUTPATIENT
Start: 2019-01-01 | End: 2019-01-01 | Stop reason: HOSPADM

## 2019-01-01 RX ORDER — TRAMADOL HYDROCHLORIDE 50 MG/1
50 TABLET ORAL EVERY 8 HOURS PRN
Qty: 90 TABLET | Refills: 0 | Status: CANCELLED | OUTPATIENT
Start: 2019-01-01

## 2019-01-01 RX ORDER — ONDANSETRON 2 MG/ML
4 INJECTION INTRAMUSCULAR; INTRAVENOUS EVERY 6 HOURS PRN
Status: DISCONTINUED | OUTPATIENT
Start: 2019-01-01 | End: 2019-01-01

## 2019-01-01 RX ORDER — ACETAMINOPHEN 325 MG/1
975 TABLET ORAL EVERY 6 HOURS PRN
Status: DISCONTINUED | OUTPATIENT
Start: 2019-01-01 | End: 2019-01-01

## 2019-01-01 RX ORDER — PREGABALIN 75 MG/1
150 CAPSULE ORAL 3 TIMES DAILY
Status: DISCONTINUED | OUTPATIENT
Start: 2019-01-01 | End: 2019-01-01 | Stop reason: HOSPADM

## 2019-01-01 RX ORDER — FLUTICASONE FUROATE AND VILANTEROL 200; 25 UG/1; UG/1
1 POWDER RESPIRATORY (INHALATION)
Status: DISCONTINUED | OUTPATIENT
Start: 2019-01-01 | End: 2019-01-01

## 2019-01-01 RX ORDER — SODIUM CHLORIDE, SODIUM LACTATE, POTASSIUM CHLORIDE, CALCIUM CHLORIDE 600; 310; 30; 20 MG/100ML; MG/100ML; MG/100ML; MG/100ML
100 INJECTION, SOLUTION INTRAVENOUS CONTINUOUS
Status: DISCONTINUED | OUTPATIENT
Start: 2019-01-01 | End: 2019-01-01

## 2019-01-01 RX ORDER — OXYCODONE HYDROCHLORIDE 5 MG/1
5 TABLET ORAL EVERY 4 HOURS PRN
Qty: 30 TABLET | Refills: 0 | Status: SHIPPED | OUTPATIENT
Start: 2019-01-01

## 2019-01-01 RX ORDER — HALOPERIDOL 5 MG/ML
2 INJECTION INTRAMUSCULAR ONCE
Status: COMPLETED | OUTPATIENT
Start: 2019-01-01 | End: 2019-01-01

## 2019-01-01 RX ORDER — ACETAMINOPHEN 325 MG/1
650 TABLET ORAL ONCE
Status: COMPLETED | OUTPATIENT
Start: 2019-01-01 | End: 2019-01-01

## 2019-01-01 RX ORDER — METOPROLOL TARTRATE 50 MG/1
50 TABLET, FILM COATED ORAL EVERY 12 HOURS SCHEDULED
Qty: 180 TABLET | Refills: 1 | Status: SHIPPED | OUTPATIENT
Start: 2019-01-01

## 2019-01-01 RX ORDER — OLANZAPINE 5 MG/1
2.5 TABLET, ORALLY DISINTEGRATING ORAL
Status: DISCONTINUED | OUTPATIENT
Start: 2019-01-01 | End: 2019-01-01

## 2019-01-01 RX ORDER — AMLODIPINE BESYLATE 5 MG/1
5 TABLET ORAL DAILY
Qty: 90 TABLET | Refills: 1 | Status: SHIPPED | OUTPATIENT
Start: 2019-01-01

## 2019-01-01 RX ORDER — PEN NEEDLE, DIABETIC 30 GX5/16"
NEEDLE, DISPOSABLE MISCELLANEOUS
COMMUNITY
Start: 2016-05-30

## 2019-01-01 RX ORDER — BENZONATATE 100 MG/1
100 CAPSULE ORAL 3 TIMES DAILY PRN
Status: DISCONTINUED | OUTPATIENT
Start: 2019-01-01 | End: 2019-01-01

## 2019-01-01 RX ORDER — POTASSIUM CHLORIDE 14.9 MG/ML
20 INJECTION INTRAVENOUS ONCE
Status: COMPLETED | OUTPATIENT
Start: 2019-01-01 | End: 2019-01-01

## 2019-01-01 RX ORDER — ISOSORBIDE MONONITRATE 60 MG/1
60 TABLET, EXTENDED RELEASE ORAL DAILY
Qty: 90 TABLET | Refills: 1 | Status: SHIPPED | OUTPATIENT
Start: 2019-01-01

## 2019-01-01 RX ORDER — INSULIN GLARGINE 100 [IU]/ML
25 INJECTION, SOLUTION SUBCUTANEOUS EVERY MORNING
Status: DISCONTINUED | OUTPATIENT
Start: 2019-01-01 | End: 2019-01-01

## 2019-01-01 RX ORDER — POTASSIUM CHLORIDE 20 MEQ/1
20 TABLET, EXTENDED RELEASE ORAL 2 TIMES DAILY
Status: DISCONTINUED | OUTPATIENT
Start: 2019-01-01 | End: 2019-01-01

## 2019-01-01 RX ORDER — DEXTROSE MONOHYDRATE 25 G/50ML
INJECTION, SOLUTION INTRAVENOUS
Status: COMPLETED
Start: 2019-01-01 | End: 2019-01-01

## 2019-01-01 RX ORDER — LEVOFLOXACIN 500 MG/1
500 TABLET, FILM COATED ORAL EVERY 24 HOURS
Qty: 7 TABLET | Refills: 0 | Status: SHIPPED | OUTPATIENT
Start: 2019-01-01 | End: 2019-01-01

## 2019-01-01 RX ORDER — HYDROMORPHONE HCL/PF 1 MG/ML
0.5 SYRINGE (ML) INJECTION ONCE
Status: COMPLETED | OUTPATIENT
Start: 2019-01-01 | End: 2019-01-01

## 2019-01-01 RX ORDER — LIDOCAINE 50 MG/G
1 PATCH TOPICAL DAILY
Qty: 30 PATCH | Refills: 0 | Status: SHIPPED | OUTPATIENT
Start: 2019-01-01

## 2019-01-01 RX ORDER — OXYCODONE HYDROCHLORIDE 5 MG/1
5 TABLET ORAL EVERY 4 HOURS PRN
Status: DISCONTINUED | OUTPATIENT
Start: 2019-01-01 | End: 2019-01-01

## 2019-01-01 RX ORDER — METHYLPREDNISOLONE SODIUM SUCCINATE 125 MG/2ML
60 INJECTION, POWDER, LYOPHILIZED, FOR SOLUTION INTRAMUSCULAR; INTRAVENOUS DAILY
Status: DISCONTINUED | OUTPATIENT
Start: 2019-01-01 | End: 2019-01-01

## 2019-01-01 RX ORDER — ATORVASTATIN CALCIUM 80 MG/1
80 TABLET, FILM COATED ORAL
Status: DISCONTINUED | OUTPATIENT
Start: 2019-01-01 | End: 2019-01-01 | Stop reason: HOSPADM

## 2019-01-01 RX ORDER — OXYCODONE HYDROCHLORIDE 5 MG/1
2.5 TABLET ORAL EVERY 4 HOURS PRN
Status: DISCONTINUED | OUTPATIENT
Start: 2019-01-01 | End: 2019-01-01 | Stop reason: HOSPADM

## 2019-01-01 RX ORDER — MAGNESIUM SULFATE HEPTAHYDRATE 40 MG/ML
2 INJECTION, SOLUTION INTRAVENOUS ONCE
Status: COMPLETED | OUTPATIENT
Start: 2019-01-01 | End: 2019-01-01

## 2019-01-01 RX ORDER — TRAMADOL HYDROCHLORIDE 50 MG/1
50 TABLET ORAL EVERY 8 HOURS PRN
Status: DISCONTINUED | OUTPATIENT
Start: 2019-01-01 | End: 2019-01-01

## 2019-01-01 RX ORDER — AMIODARONE HYDROCHLORIDE 200 MG/1
200 TABLET ORAL
Status: DISCONTINUED | OUTPATIENT
Start: 2019-01-01 | End: 2019-01-01

## 2019-01-01 RX ORDER — METHYLPREDNISOLONE SODIUM SUCCINATE 40 MG/ML
20 INJECTION, POWDER, LYOPHILIZED, FOR SOLUTION INTRAMUSCULAR; INTRAVENOUS DAILY
Status: COMPLETED | OUTPATIENT
Start: 2019-01-01 | End: 2019-01-01

## 2019-01-01 RX ORDER — ADENOSINE 3 MG/ML
INJECTION, SOLUTION INTRAVENOUS
Status: DISPENSED
Start: 2019-01-01 | End: 2019-01-01

## 2019-01-01 RX ORDER — OXYCODONE HYDROCHLORIDE 10 MG/1
10 TABLET ORAL EVERY 4 HOURS PRN
Status: DISCONTINUED | OUTPATIENT
Start: 2019-01-01 | End: 2019-01-01

## 2019-01-01 RX ORDER — FLUTICASONE FUROATE AND VILANTEROL 200; 25 UG/1; UG/1
1 POWDER RESPIRATORY (INHALATION)
Qty: 1 INHALER | Refills: 0 | Status: SHIPPED | OUTPATIENT
Start: 2019-01-01

## 2019-01-01 RX ORDER — ALBUMIN (HUMAN) 12.5 G/50ML
25 SOLUTION INTRAVENOUS ONCE
Status: COMPLETED | OUTPATIENT
Start: 2019-01-01 | End: 2019-01-01

## 2019-01-01 RX ORDER — PROPOFOL 10 MG/ML
5-50 INJECTION, EMULSION INTRAVENOUS
Status: DISCONTINUED | OUTPATIENT
Start: 2019-01-01 | End: 2019-01-01

## 2019-01-01 RX ORDER — OLANZAPINE 10 MG/1
2.5 INJECTION, POWDER, LYOPHILIZED, FOR SOLUTION INTRAMUSCULAR ONCE
Status: COMPLETED | OUTPATIENT
Start: 2019-01-01 | End: 2019-01-01

## 2019-01-01 RX ORDER — FUROSEMIDE 10 MG/ML
40 INJECTION INTRAMUSCULAR; INTRAVENOUS ONCE
Status: COMPLETED | OUTPATIENT
Start: 2019-01-01 | End: 2019-01-01

## 2019-01-01 RX ORDER — POTASSIUM CHLORIDE 20 MEQ/1
20 TABLET, EXTENDED RELEASE ORAL ONCE
Status: COMPLETED | OUTPATIENT
Start: 2019-01-01 | End: 2019-01-01

## 2019-01-01 RX ORDER — DOXYCYCLINE HYCLATE 100 MG/1
100 CAPSULE ORAL EVERY 12 HOURS SCHEDULED
Qty: 14 CAPSULE | Refills: 0 | Status: SHIPPED | OUTPATIENT
Start: 2019-01-01 | End: 2019-01-01

## 2019-01-01 RX ORDER — METOPROLOL TARTRATE 5 MG/5ML
2.5 INJECTION INTRAVENOUS ONCE
Status: COMPLETED | OUTPATIENT
Start: 2019-01-01 | End: 2019-01-01

## 2019-01-01 RX ORDER — METHOCARBAMOL 500 MG/1
500 TABLET, FILM COATED ORAL EVERY 6 HOURS PRN
Status: DISCONTINUED | OUTPATIENT
Start: 2019-01-01 | End: 2019-01-01

## 2019-01-01 RX ORDER — ACETAMINOPHEN 325 MG/1
975 TABLET ORAL ONCE
Status: DISCONTINUED | OUTPATIENT
Start: 2019-01-01 | End: 2019-01-01

## 2019-01-01 RX ORDER — CHLORAL HYDRATE 500 MG
1000 CAPSULE ORAL DAILY
Status: DISCONTINUED | OUTPATIENT
Start: 2019-01-01 | End: 2019-01-01

## 2019-01-01 RX ORDER — PREGABALIN 150 MG/1
150 CAPSULE ORAL 3 TIMES DAILY
Qty: 90 CAPSULE | Refills: 0 | Status: SHIPPED | OUTPATIENT
Start: 2019-01-01

## 2019-01-01 RX ORDER — PANTOPRAZOLE SODIUM 40 MG/1
40 TABLET, DELAYED RELEASE ORAL DAILY
Qty: 90 TABLET | Refills: 0 | Status: SHIPPED | OUTPATIENT
Start: 2019-01-01 | End: 2019-01-01 | Stop reason: SDUPTHER

## 2019-01-01 RX ORDER — LIDOCAINE 50 MG/G
1 PATCH TOPICAL ONCE
Status: COMPLETED | OUTPATIENT
Start: 2019-01-01 | End: 2019-01-01

## 2019-01-01 RX ORDER — TRAMADOL HYDROCHLORIDE 50 MG/1
50 TABLET ORAL EVERY 8 HOURS PRN
Qty: 90 TABLET | Refills: 0 | Status: SHIPPED | OUTPATIENT
Start: 2019-01-01

## 2019-01-01 RX ORDER — ACETAMINOPHEN 325 MG/1
650 TABLET ORAL EVERY 6 HOURS PRN
Status: DISCONTINUED | OUTPATIENT
Start: 2019-01-01 | End: 2019-01-01

## 2019-01-01 RX ORDER — PANTOPRAZOLE SODIUM 40 MG/1
40 TABLET, DELAYED RELEASE ORAL DAILY
Qty: 90 TABLET | Refills: 1 | Status: SHIPPED | OUTPATIENT
Start: 2019-01-01 | End: 2019-01-01 | Stop reason: SDUPTHER

## 2019-01-01 RX ORDER — IPRATROPIUM BROMIDE AND ALBUTEROL SULFATE 2.5; .5 MG/3ML; MG/3ML
3 SOLUTION RESPIRATORY (INHALATION) 3 TIMES DAILY PRN
Status: DISCONTINUED | OUTPATIENT
Start: 2019-01-01 | End: 2019-09-11 | Stop reason: HOSPADM

## 2019-01-01 RX ORDER — ACETAMINOPHEN 650 MG/1
650 SUPPOSITORY RECTAL EVERY 4 HOURS PRN
Status: DISCONTINUED | OUTPATIENT
Start: 2019-01-01 | End: 2019-09-11 | Stop reason: HOSPADM

## 2019-01-01 RX ORDER — MAGNESIUM SULFATE HEPTAHYDRATE 40 MG/ML
4 INJECTION, SOLUTION INTRAVENOUS ONCE
Status: COMPLETED | OUTPATIENT
Start: 2019-01-01 | End: 2019-01-01

## 2019-01-01 RX ORDER — AMIODARONE HYDROCHLORIDE 200 MG/1
400 TABLET ORAL
Status: DISCONTINUED | OUTPATIENT
Start: 2019-01-01 | End: 2019-01-01

## 2019-01-01 RX ORDER — FUROSEMIDE 10 MG/ML
40 INJECTION INTRAMUSCULAR; INTRAVENOUS
Status: DISCONTINUED | OUTPATIENT
Start: 2019-01-01 | End: 2019-01-01

## 2019-01-01 RX ORDER — OLANZAPINE 10 MG/1
5 INJECTION, POWDER, LYOPHILIZED, FOR SOLUTION INTRAMUSCULAR ONCE
Status: DISCONTINUED | OUTPATIENT
Start: 2019-01-01 | End: 2019-01-01

## 2019-01-01 RX ORDER — LEVOFLOXACIN 750 MG/1
750 TABLET ORAL DAILY
COMMUNITY
Start: 2019-01-01 | End: 2019-01-01

## 2019-01-01 RX ORDER — ADENOSINE 3 MG/ML
6 INJECTION INTRAVENOUS ONCE
Status: COMPLETED | OUTPATIENT
Start: 2019-01-01 | End: 2019-01-01

## 2019-01-01 RX ORDER — BENZONATATE 100 MG/1
200 CAPSULE ORAL 3 TIMES DAILY PRN
Qty: 30 CAPSULE | Refills: 0 | Status: SHIPPED | OUTPATIENT
Start: 2019-01-01 | End: 2019-01-01 | Stop reason: HOSPADM

## 2019-01-01 RX ORDER — QUETIAPINE FUMARATE 25 MG/1
25 TABLET, FILM COATED ORAL
Qty: 30 TABLET | Refills: 3 | Status: SHIPPED | OUTPATIENT
Start: 2019-01-01

## 2019-01-01 RX ORDER — TRAMADOL HYDROCHLORIDE 50 MG/1
50 TABLET ORAL EVERY 8 HOURS PRN
Qty: 21 TABLET | Refills: 0 | Status: SHIPPED | OUTPATIENT
Start: 2019-01-01 | End: 2019-01-01 | Stop reason: SDUPTHER

## 2019-01-01 RX ORDER — METHYLPREDNISOLONE SODIUM SUCCINATE 40 MG/ML
30 INJECTION, POWDER, LYOPHILIZED, FOR SOLUTION INTRAMUSCULAR; INTRAVENOUS DAILY
Status: COMPLETED | OUTPATIENT
Start: 2019-01-01 | End: 2019-01-01

## 2019-01-01 RX ORDER — PANTOPRAZOLE SODIUM 40 MG/1
40 TABLET, DELAYED RELEASE ORAL DAILY
Status: DISCONTINUED | OUTPATIENT
Start: 2019-01-01 | End: 2019-01-01 | Stop reason: HOSPADM

## 2019-01-01 RX ORDER — TAMSULOSIN HYDROCHLORIDE 0.4 MG/1
0.4 CAPSULE ORAL
Status: DISCONTINUED | OUTPATIENT
Start: 2019-01-01 | End: 2019-01-01

## 2019-01-01 RX ORDER — FINASTERIDE 5 MG/1
5 TABLET, FILM COATED ORAL DAILY
Qty: 30 TABLET | Refills: 0 | Status: SHIPPED | OUTPATIENT
Start: 2019-01-01

## 2019-01-01 RX ORDER — DIAZEPAM 5 MG/1
5 TABLET ORAL
Status: DISCONTINUED | OUTPATIENT
Start: 2019-01-01 | End: 2019-01-01

## 2019-01-01 RX ORDER — QUETIAPINE FUMARATE 25 MG/1
25 TABLET, FILM COATED ORAL
Status: DISCONTINUED | OUTPATIENT
Start: 2019-01-01 | End: 2019-01-01

## 2019-01-01 RX ORDER — FUROSEMIDE 10 MG/ML
20 INJECTION INTRAMUSCULAR; INTRAVENOUS ONCE
Status: DISCONTINUED | OUTPATIENT
Start: 2019-01-01 | End: 2019-01-01

## 2019-01-01 RX ORDER — PANTOPRAZOLE SODIUM 40 MG/1
40 TABLET, DELAYED RELEASE ORAL
Status: DISCONTINUED | OUTPATIENT
Start: 2019-01-01 | End: 2019-01-01

## 2019-01-01 RX ORDER — ATORVASTATIN CALCIUM 80 MG/1
80 TABLET, FILM COATED ORAL
Status: DISCONTINUED | OUTPATIENT
Start: 2019-01-01 | End: 2019-01-01

## 2019-01-01 RX ORDER — METHYLPREDNISOLONE SODIUM SUCCINATE 40 MG/ML
40 INJECTION, POWDER, LYOPHILIZED, FOR SOLUTION INTRAMUSCULAR; INTRAVENOUS EVERY 6 HOURS SCHEDULED
Status: DISCONTINUED | OUTPATIENT
Start: 2019-01-01 | End: 2019-01-01

## 2019-01-01 RX ORDER — ALBUTEROL SULFATE 90 UG/1
2 AEROSOL, METERED RESPIRATORY (INHALATION) EVERY 6 HOURS PRN
Qty: 1 INHALER | Refills: 1 | Status: SHIPPED | OUTPATIENT
Start: 2019-01-01

## 2019-01-01 RX ORDER — PANTOPRAZOLE SODIUM 40 MG/1
40 TABLET, DELAYED RELEASE ORAL DAILY
Qty: 7 TABLET | Refills: 0 | Status: SHIPPED | OUTPATIENT
Start: 2019-01-01 | End: 2019-01-01 | Stop reason: SDUPTHER

## 2019-01-01 RX ORDER — INSULIN GLARGINE 100 [IU]/ML
30 INJECTION, SOLUTION SUBCUTANEOUS
Status: DISCONTINUED | OUTPATIENT
Start: 2019-01-01 | End: 2019-01-01 | Stop reason: HOSPADM

## 2019-01-01 RX ORDER — TRAMADOL HYDROCHLORIDE 50 MG/1
50 TABLET ORAL EVERY 8 HOURS PRN
Qty: 90 TABLET | Refills: 0 | Status: SHIPPED | OUTPATIENT
Start: 2019-01-01 | End: 2019-01-01 | Stop reason: SDUPTHER

## 2019-01-01 RX ORDER — FUROSEMIDE 10 MG/ML
80 INJECTION INTRAMUSCULAR; INTRAVENOUS ONCE
Status: COMPLETED | OUTPATIENT
Start: 2019-01-01 | End: 2019-01-01

## 2019-01-01 RX ORDER — HYDROMORPHONE HCL/PF 1 MG/ML
0.5 SYRINGE (ML) INJECTION EVERY 4 HOURS PRN
Status: DISCONTINUED | OUTPATIENT
Start: 2019-01-01 | End: 2019-01-01

## 2019-01-01 RX ORDER — BUPIVACAINE HYDROCHLORIDE 5 MG/ML
10 INJECTION, SOLUTION EPIDURAL; INTRACAUDAL ONCE
Status: COMPLETED | OUTPATIENT
Start: 2019-01-01 | End: 2019-01-01

## 2019-01-01 RX ORDER — HYDROXYUREA 500 MG/1
500 CAPSULE ORAL EVERY 24 HOURS
Status: DISCONTINUED | OUTPATIENT
Start: 2019-01-01 | End: 2019-01-01

## 2019-01-01 RX ORDER — PANTOPRAZOLE SODIUM 40 MG/1
40 TABLET, DELAYED RELEASE ORAL DAILY
Qty: 90 TABLET | Refills: 1 | Status: SHIPPED | OUTPATIENT
Start: 2019-01-01

## 2019-01-01 RX ORDER — DEXTROSE AND SODIUM CHLORIDE 5; .45 G/100ML; G/100ML
100 INJECTION, SOLUTION INTRAVENOUS CONTINUOUS
Status: DISCONTINUED | OUTPATIENT
Start: 2019-01-01 | End: 2019-09-11 | Stop reason: HOSPADM

## 2019-01-01 RX ORDER — HALOPERIDOL 5 MG/ML
2.5 INJECTION INTRAMUSCULAR EVERY 6 HOURS PRN
Status: DISCONTINUED | OUTPATIENT
Start: 2019-01-01 | End: 2019-01-01

## 2019-01-01 RX ORDER — LIDOCAINE 50 MG/G
1 PATCH TOPICAL ONCE
Status: DISCONTINUED | OUTPATIENT
Start: 2019-01-01 | End: 2019-01-01 | Stop reason: HOSPADM

## 2019-01-01 RX ORDER — FLUTICASONE FUROATE AND VILANTEROL 200; 25 UG/1; UG/1
1 POWDER RESPIRATORY (INHALATION)
Status: DISCONTINUED | OUTPATIENT
Start: 2019-01-01 | End: 2019-01-01 | Stop reason: HOSPADM

## 2019-01-01 RX ORDER — ISOSORBIDE MONONITRATE 60 MG/1
60 TABLET, EXTENDED RELEASE ORAL DAILY
Status: DISCONTINUED | OUTPATIENT
Start: 2019-01-01 | End: 2019-01-01 | Stop reason: HOSPADM

## 2019-01-01 RX ORDER — ACETAMINOPHEN 325 MG/1
975 TABLET ORAL EVERY 6 HOURS PRN
Status: DISCONTINUED | OUTPATIENT
Start: 2019-01-01 | End: 2019-01-01 | Stop reason: HOSPADM

## 2019-01-01 RX ORDER — PHENAZOPYRIDINE HYDROCHLORIDE 100 MG/1
100 TABLET, FILM COATED ORAL
Status: DISCONTINUED | OUTPATIENT
Start: 2019-01-01 | End: 2019-01-01

## 2019-01-01 RX ORDER — ONDANSETRON 2 MG/ML
4 INJECTION INTRAMUSCULAR; INTRAVENOUS EVERY 4 HOURS PRN
Status: DISCONTINUED | OUTPATIENT
Start: 2019-01-01 | End: 2019-09-11 | Stop reason: HOSPADM

## 2019-01-01 RX ORDER — METHOCARBAMOL 500 MG/1
500 TABLET, FILM COATED ORAL ONCE
Status: COMPLETED | OUTPATIENT
Start: 2019-01-01 | End: 2019-01-01

## 2019-01-01 RX ORDER — METOPROLOL TARTRATE 5 MG/5ML
5 INJECTION INTRAVENOUS EVERY 6 HOURS
Status: DISCONTINUED | OUTPATIENT
Start: 2019-01-01 | End: 2019-01-01

## 2019-01-01 RX ORDER — OLANZAPINE 10 MG/1
2.5 INJECTION, POWDER, LYOPHILIZED, FOR SOLUTION INTRAMUSCULAR EVERY 8 HOURS PRN
Status: DISCONTINUED | OUTPATIENT
Start: 2019-01-01 | End: 2019-01-01

## 2019-01-01 RX ORDER — PREDNISONE 20 MG/1
60 TABLET ORAL DAILY
Status: DISCONTINUED | OUTPATIENT
Start: 2019-01-01 | End: 2019-01-01

## 2019-01-01 RX ORDER — LANOLIN ALCOHOL/MO/W.PET/CERES
6 CREAM (GRAM) TOPICAL
Status: DISCONTINUED | OUTPATIENT
Start: 2019-01-01 | End: 2019-01-01

## 2019-01-01 RX ORDER — QUETIAPINE FUMARATE 25 MG/1
25 TABLET, FILM COATED ORAL ONCE
Status: COMPLETED | OUTPATIENT
Start: 2019-01-01 | End: 2019-01-01

## 2019-01-01 RX ORDER — NICOTINE 21 MG/24HR
1 PATCH, TRANSDERMAL 24 HOURS TRANSDERMAL DAILY
Status: DISCONTINUED | OUTPATIENT
Start: 2019-01-01 | End: 2019-01-01

## 2019-01-01 RX ORDER — POTASSIUM CHLORIDE 29.8 MG/ML
40 INJECTION INTRAVENOUS ONCE
Status: COMPLETED | OUTPATIENT
Start: 2019-01-01 | End: 2019-01-01

## 2019-01-01 RX ORDER — DOCUSATE SODIUM 100 MG/1
100 CAPSULE, LIQUID FILLED ORAL 2 TIMES DAILY
Status: DISCONTINUED | OUTPATIENT
Start: 2019-01-01 | End: 2019-01-01

## 2019-01-01 RX ORDER — LIDOCAINE 50 MG/G
1 PATCH TOPICAL DAILY
Status: DISCONTINUED | OUTPATIENT
Start: 2019-01-01 | End: 2019-01-01

## 2019-01-01 RX ORDER — TAMSULOSIN HYDROCHLORIDE 0.4 MG/1
0.4 CAPSULE ORAL
Status: DISCONTINUED | OUTPATIENT
Start: 2019-01-01 | End: 2019-01-01 | Stop reason: HOSPADM

## 2019-01-01 RX ORDER — LIDOCAINE 50 MG/G
1 PATCH TOPICAL DAILY
Status: DISCONTINUED | OUTPATIENT
Start: 2019-01-01 | End: 2019-09-11 | Stop reason: HOSPADM

## 2019-01-01 RX ORDER — POTASSIUM CHLORIDE 20MEQ/15ML
40 LIQUID (ML) ORAL ONCE
Status: COMPLETED | OUTPATIENT
Start: 2019-01-01 | End: 2019-01-01

## 2019-01-01 RX ORDER — QUETIAPINE FUMARATE 25 MG/1
25 TABLET, FILM COATED ORAL
Status: DISCONTINUED | OUTPATIENT
Start: 2019-01-01 | End: 2019-01-01 | Stop reason: HOSPADM

## 2019-01-01 RX ORDER — POTASSIUM CHLORIDE 14.9 MG/ML
20 INJECTION INTRAVENOUS
Status: COMPLETED | OUTPATIENT
Start: 2019-01-01 | End: 2019-01-01

## 2019-01-01 RX ORDER — HYDROMORPHONE HCL/PF 1 MG/ML
0.2 SYRINGE (ML) INJECTION EVERY 4 HOURS PRN
Status: DISCONTINUED | OUTPATIENT
Start: 2019-01-01 | End: 2019-01-01

## 2019-01-01 RX ORDER — INSULIN GLARGINE 100 [IU]/ML
20 INJECTION, SOLUTION SUBCUTANEOUS EVERY MORNING
Status: DISCONTINUED | OUTPATIENT
Start: 2019-01-01 | End: 2019-01-01

## 2019-01-01 RX ORDER — PREDNISONE 20 MG/1
40 TABLET ORAL DAILY
Status: DISCONTINUED | OUTPATIENT
Start: 2019-01-01 | End: 2019-01-01

## 2019-01-01 RX ORDER — CIPROFLOXACIN 250 MG/1
250 TABLET, FILM COATED ORAL EVERY 12 HOURS SCHEDULED
Qty: 14 TABLET | Refills: 0 | Status: SHIPPED | OUTPATIENT
Start: 2019-01-01 | End: 2019-01-01 | Stop reason: HOSPADM

## 2019-01-01 RX ORDER — ALBUMIN (HUMAN) 12.5 G/50ML
50 SOLUTION INTRAVENOUS ONCE
Status: COMPLETED | OUTPATIENT
Start: 2019-01-01 | End: 2019-01-01

## 2019-01-01 RX ORDER — METHYLPREDNISOLONE SODIUM SUCCINATE 40 MG/ML
20 INJECTION, POWDER, LYOPHILIZED, FOR SOLUTION INTRAMUSCULAR; INTRAVENOUS EVERY 8 HOURS SCHEDULED
Status: DISCONTINUED | OUTPATIENT
Start: 2019-01-01 | End: 2019-09-11 | Stop reason: HOSPADM

## 2019-01-01 RX ORDER — AMLODIPINE BESYLATE 5 MG/1
5 TABLET ORAL DAILY
Status: DISCONTINUED | OUTPATIENT
Start: 2019-01-01 | End: 2019-01-01

## 2019-01-01 RX ORDER — ISOSORBIDE MONONITRATE 60 MG/1
60 TABLET, EXTENDED RELEASE ORAL DAILY
Qty: 90 TABLET | Refills: 1 | Status: SHIPPED | OUTPATIENT
Start: 2019-01-01 | End: 2019-01-01 | Stop reason: SDUPTHER

## 2019-01-01 RX ORDER — POTASSIUM CHLORIDE 20 MEQ/1
40 TABLET, EXTENDED RELEASE ORAL ONCE
Status: COMPLETED | OUTPATIENT
Start: 2019-01-01 | End: 2019-01-01

## 2019-01-01 RX ORDER — SENNOSIDES 8.6 MG
1 TABLET ORAL
Status: DISCONTINUED | OUTPATIENT
Start: 2019-01-01 | End: 2019-01-01

## 2019-01-01 RX ORDER — FINASTERIDE 5 MG/1
5 TABLET, FILM COATED ORAL DAILY
Status: DISCONTINUED | OUTPATIENT
Start: 2019-01-01 | End: 2019-01-01 | Stop reason: HOSPADM

## 2019-01-01 RX ORDER — PREGABALIN 50 MG/1
150 CAPSULE ORAL 3 TIMES DAILY
Status: DISCONTINUED | OUTPATIENT
Start: 2019-01-01 | End: 2019-01-01

## 2019-01-01 RX ORDER — CEPHALEXIN 500 MG/1
CAPSULE ORAL
Refills: 0 | COMMUNITY
Start: 2019-01-01 | End: 2019-01-01 | Stop reason: ALTCHOICE

## 2019-01-01 RX ORDER — SENNOSIDES 8.6 MG
1 TABLET ORAL
Status: DISCONTINUED | OUTPATIENT
Start: 2019-01-01 | End: 2019-01-01 | Stop reason: HOSPADM

## 2019-01-01 RX ORDER — FINASTERIDE 5 MG/1
5 TABLET, FILM COATED ORAL DAILY
Status: DISCONTINUED | OUTPATIENT
Start: 2019-01-01 | End: 2019-01-01

## 2019-01-01 RX ORDER — PANTOPRAZOLE SODIUM 40 MG/1
40 INJECTION, POWDER, FOR SOLUTION INTRAVENOUS
Status: DISCONTINUED | OUTPATIENT
Start: 2019-01-01 | End: 2019-09-11 | Stop reason: HOSPADM

## 2019-01-01 RX ORDER — PNV NO.95/FERROUS FUM/FOLIC AC 28MG-0.8MG
1 TABLET ORAL DAILY
COMMUNITY
End: 2019-01-01 | Stop reason: SDUPTHER

## 2019-01-01 RX ORDER — METOPROLOL TARTRATE 5 MG/5ML
5 INJECTION INTRAVENOUS ONCE
Status: DISCONTINUED | OUTPATIENT
Start: 2019-01-01 | End: 2019-01-01

## 2019-01-01 RX ORDER — LANOLIN ALCOHOL/MO/W.PET/CERES
3 CREAM (GRAM) TOPICAL ONCE
Status: COMPLETED | OUTPATIENT
Start: 2019-01-01 | End: 2019-01-01

## 2019-01-01 RX ORDER — HYDROMORPHONE HCL/PF 1 MG/ML
0.2 SYRINGE (ML) INJECTION ONCE
Status: COMPLETED | OUTPATIENT
Start: 2019-01-01 | End: 2019-01-01

## 2019-01-01 RX ORDER — INSULIN GLARGINE 100 [IU]/ML
20 INJECTION, SOLUTION SUBCUTANEOUS
Status: DISCONTINUED | OUTPATIENT
Start: 2019-01-01 | End: 2019-01-01

## 2019-01-01 RX ORDER — ACETAMINOPHEN 650 MG/1
325 SUPPOSITORY RECTAL EVERY 4 HOURS PRN
Status: DISCONTINUED | OUTPATIENT
Start: 2019-01-01 | End: 2019-01-01

## 2019-01-01 RX ORDER — SODIUM CHLORIDE, SODIUM LACTATE, POTASSIUM CHLORIDE, CALCIUM CHLORIDE 600; 310; 30; 20 MG/100ML; MG/100ML; MG/100ML; MG/100ML
125 INJECTION, SOLUTION INTRAVENOUS CONTINUOUS
Status: DISCONTINUED | OUTPATIENT
Start: 2019-01-01 | End: 2019-01-01

## 2019-01-01 RX ORDER — CHLORHEXIDINE GLUCONATE 0.12 MG/ML
15 RINSE ORAL EVERY 12 HOURS SCHEDULED
Status: DISCONTINUED | OUTPATIENT
Start: 2019-01-01 | End: 2019-09-11 | Stop reason: HOSPADM

## 2019-01-01 RX ORDER — TRAMADOL HYDROCHLORIDE 50 MG/1
50 TABLET ORAL EVERY 6 HOURS PRN
Qty: 12 TABLET | Refills: 0 | Status: SHIPPED | OUTPATIENT
Start: 2019-01-01 | End: 2019-01-01 | Stop reason: SDUPTHER

## 2019-01-01 RX ORDER — OXYCODONE HYDROCHLORIDE AND ACETAMINOPHEN 5; 325 MG/1; MG/1
1 TABLET ORAL EVERY 12 HOURS PRN
Qty: 60 TABLET | Refills: 0 | Status: SHIPPED | OUTPATIENT
Start: 2019-01-01 | End: 2019-01-01

## 2019-01-01 RX ORDER — SODIUM CHLORIDE 9 MG/ML
125 INJECTION, SOLUTION INTRAVENOUS CONTINUOUS
Status: DISCONTINUED | OUTPATIENT
Start: 2019-01-01 | End: 2019-01-01

## 2019-01-01 RX ORDER — POLYETHYLENE GLYCOL 3350 17 G/17G
17 POWDER, FOR SOLUTION ORAL DAILY PRN
Status: DISCONTINUED | OUTPATIENT
Start: 2019-01-01 | End: 2019-01-01 | Stop reason: HOSPADM

## 2019-01-01 RX ORDER — ISOSORBIDE MONONITRATE 60 MG/1
60 TABLET, EXTENDED RELEASE ORAL DAILY
Status: DISCONTINUED | OUTPATIENT
Start: 2019-01-01 | End: 2019-01-01

## 2019-01-01 RX ADMIN — POTASSIUM CHLORIDE 20 MEQ: 1500 TABLET, EXTENDED RELEASE ORAL at 08:08

## 2019-01-01 RX ADMIN — AMIODARONE HYDROCHLORIDE 400 MG: 200 TABLET ORAL at 11:19

## 2019-01-01 RX ADMIN — METOPROLOL TARTRATE 50 MG: 25 TABLET, FILM COATED ORAL at 08:18

## 2019-01-01 RX ADMIN — METOPROLOL TARTRATE 5 MG: 5 INJECTION, SOLUTION INTRAVENOUS at 09:53

## 2019-01-01 RX ADMIN — VASOPRESSIN 0.03 UNITS/MIN: 20 INJECTION INTRAVENOUS at 07:44

## 2019-01-01 RX ADMIN — INSULIN LISPRO 10 UNITS: 100 INJECTION, SOLUTION INTRAVENOUS; SUBCUTANEOUS at 11:57

## 2019-01-01 RX ADMIN — TAMSULOSIN HYDROCHLORIDE 0.4 MG: 0.4 CAPSULE ORAL at 17:24

## 2019-01-01 RX ADMIN — TAMSULOSIN HYDROCHLORIDE 0.4 MG: 0.4 CAPSULE ORAL at 17:16

## 2019-01-01 RX ADMIN — PREGABALIN 150 MG: 75 CAPSULE ORAL at 22:00

## 2019-01-01 RX ADMIN — FUROSEMIDE 40 MG: 10 INJECTION, SOLUTION INTRAMUSCULAR; INTRAVENOUS at 18:36

## 2019-01-01 RX ADMIN — OXYCODONE HYDROCHLORIDE 10 MG: 10 TABLET ORAL at 17:04

## 2019-01-01 RX ADMIN — AMIODARONE HYDROCHLORIDE 400 MG: 200 TABLET ORAL at 11:45

## 2019-01-01 RX ADMIN — FINASTERIDE 5 MG: 5 TABLET, FILM COATED ORAL at 16:03

## 2019-01-01 RX ADMIN — POTASSIUM CHLORIDE 20 MEQ: 1500 TABLET, EXTENDED RELEASE ORAL at 17:51

## 2019-01-01 RX ADMIN — DOCUSATE SODIUM 100 MG: 100 CAPSULE, LIQUID FILLED ORAL at 08:28

## 2019-01-01 RX ADMIN — DEXTROSE AND SODIUM CHLORIDE 100 ML/HR: 5; .45 INJECTION, SOLUTION INTRAVENOUS at 19:34

## 2019-01-01 RX ADMIN — FINASTERIDE 5 MG: 5 TABLET, FILM COATED ORAL at 08:37

## 2019-01-01 RX ADMIN — PANTOPRAZOLE SODIUM 40 MG: 40 TABLET, DELAYED RELEASE ORAL at 05:39

## 2019-01-01 RX ADMIN — TRAMADOL HYDROCHLORIDE 50 MG: 50 TABLET, COATED ORAL at 00:28

## 2019-01-01 RX ADMIN — HYDROMORPHONE HYDROCHLORIDE 0.2 MG: 1 INJECTION, SOLUTION INTRAMUSCULAR; INTRAVENOUS; SUBCUTANEOUS at 19:56

## 2019-01-01 RX ADMIN — AMLODIPINE BESYLATE 5 MG: 5 TABLET ORAL at 08:49

## 2019-01-01 RX ADMIN — ACETAMINOPHEN 650 MG: 325 TABLET ORAL at 16:33

## 2019-01-01 RX ADMIN — POTASSIUM CHLORIDE 20 MEQ: 1500 TABLET, EXTENDED RELEASE ORAL at 18:01

## 2019-01-01 RX ADMIN — INSULIN GLARGINE 25 UNITS: 100 INJECTION, SOLUTION SUBCUTANEOUS at 09:02

## 2019-01-01 RX ADMIN — AMIODARONE HYDROCHLORIDE 0.5 MG/MIN: 50 INJECTION, SOLUTION INTRAVENOUS at 23:46

## 2019-01-01 RX ADMIN — FLUTICASONE FUROATE AND VILANTEROL TRIFENATATE 1 PUFF: 200; 25 POWDER RESPIRATORY (INHALATION) at 09:31

## 2019-01-01 RX ADMIN — METHOCARBAMOL 500 MG: 500 TABLET, FILM COATED ORAL at 16:33

## 2019-01-01 RX ADMIN — POTASSIUM CHLORIDE 20 MEQ: 1500 TABLET, EXTENDED RELEASE ORAL at 18:13

## 2019-01-01 RX ADMIN — INSULIN LISPRO 8 UNITS: 100 INJECTION, SOLUTION INTRAVENOUS; SUBCUTANEOUS at 17:53

## 2019-01-01 RX ADMIN — PREGABALIN 150 MG: 75 CAPSULE ORAL at 16:28

## 2019-01-01 RX ADMIN — POTASSIUM CHLORIDE 20 MEQ: 1500 TABLET, EXTENDED RELEASE ORAL at 08:47

## 2019-01-01 RX ADMIN — INSULIN GLARGINE 25 UNITS: 100 INJECTION, SOLUTION SUBCUTANEOUS at 09:32

## 2019-01-01 RX ADMIN — METOPROLOL TARTRATE 50 MG: 50 TABLET, FILM COATED ORAL at 21:54

## 2019-01-01 RX ADMIN — OXYCODONE HYDROCHLORIDE 5 MG: 5 TABLET ORAL at 21:52

## 2019-01-01 RX ADMIN — BUPIVACAINE HYDROCHLORIDE 10 ML: 5 INJECTION, SOLUTION EPIDURAL; INTRACAUDAL; PERINEURAL at 10:47

## 2019-01-01 RX ADMIN — ACETAMINOPHEN 650 MG: 325 TABLET ORAL at 02:23

## 2019-01-01 RX ADMIN — HYDROXYUREA 500 MG: 500 CAPSULE ORAL at 16:40

## 2019-01-01 RX ADMIN — MAGNESIUM SULFATE HEPTAHYDRATE 2 G: 40 INJECTION, SOLUTION INTRAVENOUS at 08:44

## 2019-01-01 RX ADMIN — FINASTERIDE 5 MG: 5 TABLET, FILM COATED ORAL at 09:42

## 2019-01-01 RX ADMIN — METOPROLOL TARTRATE 50 MG: 50 TABLET, FILM COATED ORAL at 09:31

## 2019-01-01 RX ADMIN — BENZONATATE 100 MG: 100 CAPSULE ORAL at 01:11

## 2019-01-01 RX ADMIN — DOCUSATE SODIUM 100 MG: 100 CAPSULE, LIQUID FILLED ORAL at 08:08

## 2019-01-01 RX ADMIN — PREGABALIN 150 MG: 75 CAPSULE ORAL at 09:31

## 2019-01-01 RX ADMIN — PHENAZOPYRIDINE 100 MG: 100 TABLET ORAL at 08:31

## 2019-01-01 RX ADMIN — TAMSULOSIN HYDROCHLORIDE 0.4 MG: 0.4 CAPSULE ORAL at 16:27

## 2019-01-01 RX ADMIN — PROPOFOL 10 MCG/KG/MIN: 10 INJECTION, EMULSION INTRAVENOUS at 19:59

## 2019-01-01 RX ADMIN — DOCUSATE SODIUM 100 MG: 100 CAPSULE, LIQUID FILLED ORAL at 08:30

## 2019-01-01 RX ADMIN — METOPROLOL TARTRATE 5 MG: 5 INJECTION, SOLUTION INTRAVENOUS at 19:37

## 2019-01-01 RX ADMIN — FINASTERIDE 5 MG: 5 TABLET, FILM COATED ORAL at 08:28

## 2019-01-01 RX ADMIN — METHOCARBAMOL TABLETS 500 MG: 500 TABLET, COATED ORAL at 12:03

## 2019-01-01 RX ADMIN — ACETAMINOPHEN 650 MG: 325 TABLET ORAL at 19:26

## 2019-01-01 RX ADMIN — PREGABALIN 150 MG: 75 CAPSULE ORAL at 16:03

## 2019-01-01 RX ADMIN — PREGABALIN 150 MG: 75 CAPSULE ORAL at 21:11

## 2019-01-01 RX ADMIN — INSULIN GLARGINE 25 UNITS: 100 INJECTION, SOLUTION SUBCUTANEOUS at 09:34

## 2019-01-01 RX ADMIN — INSULIN LISPRO 10 UNITS: 100 INJECTION, SOLUTION INTRAVENOUS; SUBCUTANEOUS at 09:33

## 2019-01-01 RX ADMIN — POTASSIUM CHLORIDE 20 MEQ: 1500 TABLET, EXTENDED RELEASE ORAL at 18:12

## 2019-01-01 RX ADMIN — NOREPINEPHRINE BITARTRATE 20 MCG/MIN: 1 INJECTION INTRAVENOUS at 21:09

## 2019-01-01 RX ADMIN — CEFEPIME HYDROCHLORIDE 2000 MG: 2 INJECTION, POWDER, FOR SOLUTION INTRAVENOUS at 02:38

## 2019-01-01 RX ADMIN — AMIODARONE HYDROCHLORIDE 400 MG: 200 TABLET ORAL at 12:11

## 2019-01-01 RX ADMIN — METHYLPREDNISOLONE SODIUM SUCCINATE 40 MG: 40 INJECTION, POWDER, FOR SOLUTION INTRAMUSCULAR; INTRAVENOUS at 00:55

## 2019-01-01 RX ADMIN — OXYCODONE HYDROCHLORIDE 10 MG: 10 TABLET ORAL at 23:24

## 2019-01-01 RX ADMIN — LIDOCAINE 1 PATCH: 50 PATCH TOPICAL at 09:16

## 2019-01-01 RX ADMIN — POTASSIUM CHLORIDE 20 MEQ: 1500 TABLET, EXTENDED RELEASE ORAL at 10:04

## 2019-01-01 RX ADMIN — NOREPINEPHRINE BITARTRATE 10 MCG/MIN: 1 INJECTION INTRAVENOUS at 09:49

## 2019-01-01 RX ADMIN — OXYCODONE HYDROCHLORIDE 10 MG: 10 TABLET ORAL at 10:08

## 2019-01-01 RX ADMIN — METRONIDAZOLE 500 MG: 500 INJECTION, SOLUTION INTRAVENOUS at 00:59

## 2019-01-01 RX ADMIN — VASOPRESSIN 0.03 UNITS/MIN: 20 INJECTION INTRAVENOUS at 11:57

## 2019-01-01 RX ADMIN — OXYCODONE HYDROCHLORIDE 10 MG: 10 TABLET ORAL at 16:08

## 2019-01-01 RX ADMIN — DOCUSATE SODIUM 100 MG: 100 CAPSULE, LIQUID FILLED ORAL at 17:35

## 2019-01-01 RX ADMIN — INSULIN LISPRO 8 UNITS: 100 INJECTION, SOLUTION INTRAVENOUS; SUBCUTANEOUS at 12:09

## 2019-01-01 RX ADMIN — QUETIAPINE FUMARATE 25 MG: 25 TABLET ORAL at 21:11

## 2019-01-01 RX ADMIN — INSULIN LISPRO 8 UNITS: 100 INJECTION, SOLUTION INTRAVENOUS; SUBCUTANEOUS at 08:30

## 2019-01-01 RX ADMIN — OXYCODONE HYDROCHLORIDE 5 MG: 5 TABLET ORAL at 08:29

## 2019-01-01 RX ADMIN — MAGNESIUM SULFATE HEPTAHYDRATE 2 G: 40 INJECTION, SOLUTION INTRAVENOUS at 10:18

## 2019-01-01 RX ADMIN — OXYCODONE HYDROCHLORIDE 5 MG: 5 TABLET ORAL at 08:38

## 2019-01-01 RX ADMIN — QUETIAPINE FUMARATE 25 MG: 25 TABLET ORAL at 21:51

## 2019-01-01 RX ADMIN — OXYCODONE HYDROCHLORIDE 5 MG: 5 TABLET ORAL at 10:17

## 2019-01-01 RX ADMIN — OXYCODONE HYDROCHLORIDE 10 MG: 10 TABLET ORAL at 21:38

## 2019-01-01 RX ADMIN — PREGABALIN 150 MG: 75 CAPSULE ORAL at 16:41

## 2019-01-01 RX ADMIN — DOCUSATE SODIUM 100 MG: 100 CAPSULE, LIQUID FILLED ORAL at 18:01

## 2019-01-01 RX ADMIN — OXYCODONE HYDROCHLORIDE 5 MG: 5 TABLET ORAL at 10:07

## 2019-01-01 RX ADMIN — ACETAMINOPHEN 650 MG: 325 TABLET ORAL at 03:32

## 2019-01-01 RX ADMIN — INSULIN LISPRO 8 UNITS: 100 INJECTION, SOLUTION INTRAVENOUS; SUBCUTANEOUS at 07:58

## 2019-01-01 RX ADMIN — CEFEPIME HYDROCHLORIDE 2000 MG: 2 INJECTION, POWDER, FOR SOLUTION INTRAVENOUS at 21:19

## 2019-01-01 RX ADMIN — LIDOCAINE 1 PATCH: 50 PATCH TOPICAL at 08:08

## 2019-01-01 RX ADMIN — INSULIN LISPRO 1 UNITS: 100 INJECTION, SOLUTION INTRAVENOUS; SUBCUTANEOUS at 08:39

## 2019-01-01 RX ADMIN — ACETAMINOPHEN 975 MG: 325 TABLET ORAL at 22:36

## 2019-01-01 RX ADMIN — HALOPERIDOL LACTATE 2 MG: 5 INJECTION INTRAMUSCULAR at 01:06

## 2019-01-01 RX ADMIN — FINASTERIDE 5 MG: 5 TABLET, FILM COATED ORAL at 08:35

## 2019-01-01 RX ADMIN — POTASSIUM CHLORIDE 20 MEQ: 1500 TABLET, EXTENDED RELEASE ORAL at 17:25

## 2019-01-01 RX ADMIN — OXYCODONE HYDROCHLORIDE 5 MG: 5 TABLET ORAL at 16:07

## 2019-01-01 RX ADMIN — QUETIAPINE FUMARATE 25 MG: 25 TABLET ORAL at 21:00

## 2019-01-01 RX ADMIN — METOPROLOL TARTRATE 5 MG: 5 INJECTION, SOLUTION INTRAVENOUS at 20:40

## 2019-01-01 RX ADMIN — CEFEPIME HYDROCHLORIDE 2000 MG: 2 INJECTION, POWDER, FOR SOLUTION INTRAVENOUS at 04:00

## 2019-01-01 RX ADMIN — MAGNESIUM SULFATE HEPTAHYDRATE 4 G: 40 INJECTION, SOLUTION INTRAVENOUS at 15:57

## 2019-01-01 RX ADMIN — HYDROXYUREA 500 MG: 500 CAPSULE ORAL at 16:18

## 2019-01-01 RX ADMIN — METHOCARBAMOL 500 MG: 500 TABLET, FILM COATED ORAL at 00:09

## 2019-01-01 RX ADMIN — INSULIN LISPRO 4 UNITS: 100 INJECTION, SOLUTION INTRAVENOUS; SUBCUTANEOUS at 07:58

## 2019-01-01 RX ADMIN — OXYCODONE HYDROCHLORIDE 10 MG: 10 TABLET ORAL at 05:59

## 2019-01-01 RX ADMIN — INSULIN GLARGINE 25 UNITS: 100 INJECTION, SOLUTION SUBCUTANEOUS at 08:39

## 2019-01-01 RX ADMIN — FINASTERIDE 5 MG: 5 TABLET, FILM COATED ORAL at 15:21

## 2019-01-01 RX ADMIN — INSULIN LISPRO 2 UNITS: 100 INJECTION, SOLUTION INTRAVENOUS; SUBCUTANEOUS at 12:02

## 2019-01-01 RX ADMIN — CEFEPIME HYDROCHLORIDE 2000 MG: 2 INJECTION, POWDER, FOR SOLUTION INTRAVENOUS at 12:53

## 2019-01-01 RX ADMIN — INSULIN LISPRO 8 UNITS: 100 INJECTION, SOLUTION INTRAVENOUS; SUBCUTANEOUS at 17:26

## 2019-01-01 RX ADMIN — METRONIDAZOLE 500 MG: 500 TABLET ORAL at 22:16

## 2019-01-01 RX ADMIN — POTASSIUM CHLORIDE 20 MEQ: 200 INJECTION, SOLUTION INTRAVENOUS at 10:11

## 2019-01-01 RX ADMIN — METOPROLOL TARTRATE 50 MG: 25 TABLET, FILM COATED ORAL at 08:30

## 2019-01-01 RX ADMIN — CEFEPIME HYDROCHLORIDE 2000 MG: 2 INJECTION, POWDER, FOR SOLUTION INTRAVENOUS at 00:32

## 2019-01-01 RX ADMIN — FUROSEMIDE 40 MG: 10 INJECTION, SOLUTION INTRAMUSCULAR; INTRAVENOUS at 17:10

## 2019-01-01 RX ADMIN — PANTOPRAZOLE SODIUM 40 MG: 40 TABLET, DELAYED RELEASE ORAL at 08:35

## 2019-01-01 RX ADMIN — METRONIDAZOLE 500 MG: 500 INJECTION, SOLUTION INTRAVENOUS at 09:15

## 2019-01-01 RX ADMIN — ISOSORBIDE MONONITRATE 60 MG: 60 TABLET, EXTENDED RELEASE ORAL at 08:31

## 2019-01-01 RX ADMIN — FUROSEMIDE 40 MG: 10 INJECTION, SOLUTION INTRAMUSCULAR; INTRAVENOUS at 17:08

## 2019-01-01 RX ADMIN — OLANZAPINE 2.5 MG: 5 TABLET, ORALLY DISINTEGRATING ORAL at 22:16

## 2019-01-01 RX ADMIN — Medication 1000 MG: at 08:31

## 2019-01-01 RX ADMIN — INSULIN LISPRO 6 UNITS: 100 INJECTION, SOLUTION INTRAVENOUS; SUBCUTANEOUS at 17:42

## 2019-01-01 RX ADMIN — DOCUSATE SODIUM 100 MG: 100 CAPSULE, LIQUID FILLED ORAL at 10:00

## 2019-01-01 RX ADMIN — ISOSORBIDE MONONITRATE 60 MG: 60 TABLET, EXTENDED RELEASE ORAL at 09:31

## 2019-01-01 RX ADMIN — NICOTINE 1 PATCH: 14 PATCH TRANSDERMAL at 08:10

## 2019-01-01 RX ADMIN — INSULIN LISPRO 10 UNITS: 100 INJECTION, SOLUTION INTRAVENOUS; SUBCUTANEOUS at 13:04

## 2019-01-01 RX ADMIN — INSULIN LISPRO 2 UNITS: 100 INJECTION, SOLUTION INTRAVENOUS; SUBCUTANEOUS at 21:40

## 2019-01-01 RX ADMIN — PREGABALIN 150 MG: 75 CAPSULE ORAL at 08:47

## 2019-01-01 RX ADMIN — METOPROLOL TARTRATE 50 MG: 50 TABLET, FILM COATED ORAL at 21:50

## 2019-01-01 RX ADMIN — AMIODARONE HYDROCHLORIDE 0.5 MG/MIN: 50 INJECTION, SOLUTION INTRAVENOUS at 09:06

## 2019-01-01 RX ADMIN — ISOSORBIDE MONONITRATE 60 MG: 60 TABLET, EXTENDED RELEASE ORAL at 08:30

## 2019-01-01 RX ADMIN — ADENOSINE 6 MG: 3 INJECTION, SOLUTION INTRAVENOUS at 05:42

## 2019-01-01 RX ADMIN — TAMSULOSIN HYDROCHLORIDE 0.4 MG: 0.4 CAPSULE ORAL at 16:23

## 2019-01-01 RX ADMIN — DOCUSATE SODIUM 100 MG: 100 CAPSULE, LIQUID FILLED ORAL at 17:58

## 2019-01-01 RX ADMIN — TAMSULOSIN HYDROCHLORIDE 0.4 MG: 0.4 CAPSULE ORAL at 16:01

## 2019-01-01 RX ADMIN — HYDROMORPHONE HYDROCHLORIDE 0.2 MG: 1 INJECTION, SOLUTION INTRAMUSCULAR; INTRAVENOUS; SUBCUTANEOUS at 03:52

## 2019-01-01 RX ADMIN — AMIODARONE HYDROCHLORIDE 400 MG: 200 TABLET ORAL at 08:07

## 2019-01-01 RX ADMIN — TAMSULOSIN HYDROCHLORIDE 0.4 MG: 0.4 CAPSULE ORAL at 16:28

## 2019-01-01 RX ADMIN — PREGABALIN 150 MG: 75 CAPSULE ORAL at 09:40

## 2019-01-01 RX ADMIN — INSULIN LISPRO 1 UNITS: 100 INJECTION, SOLUTION INTRAVENOUS; SUBCUTANEOUS at 09:31

## 2019-01-01 RX ADMIN — METRONIDAZOLE 500 MG: 500 INJECTION, SOLUTION INTRAVENOUS at 01:46

## 2019-01-01 RX ADMIN — METHOCARBAMOL 500 MG: 500 TABLET, FILM COATED ORAL at 16:42

## 2019-01-01 RX ADMIN — POTASSIUM CHLORIDE 20 MEQ: 1500 TABLET, EXTENDED RELEASE ORAL at 18:14

## 2019-01-01 RX ADMIN — POTASSIUM CHLORIDE 20 MEQ: 1500 TABLET, EXTENDED RELEASE ORAL at 09:42

## 2019-01-01 RX ADMIN — INSULIN LISPRO 10 UNITS: 100 INJECTION, SOLUTION INTRAVENOUS; SUBCUTANEOUS at 09:31

## 2019-01-01 RX ADMIN — FLUTICASONE FUROATE AND VILANTEROL TRIFENATATE 1 PUFF: 200; 25 POWDER RESPIRATORY (INHALATION) at 08:29

## 2019-01-01 RX ADMIN — CEFEPIME HYDROCHLORIDE 2000 MG: 2 INJECTION, POWDER, FOR SOLUTION INTRAVENOUS at 13:03

## 2019-01-01 RX ADMIN — METOPROLOL TARTRATE 5 MG: 5 INJECTION, SOLUTION INTRAVENOUS at 20:50

## 2019-01-01 RX ADMIN — METOPROLOL TARTRATE 5 MG: 5 INJECTION, SOLUTION INTRAVENOUS at 08:35

## 2019-01-01 RX ADMIN — METOPROLOL TARTRATE 5 MG: 5 INJECTION, SOLUTION INTRAVENOUS at 02:25

## 2019-01-01 RX ADMIN — PREGABALIN 150 MG: 75 CAPSULE ORAL at 17:14

## 2019-01-01 RX ADMIN — CALCIUM GLUCONATE 2 G: 98 INJECTION, SOLUTION INTRAVENOUS at 08:18

## 2019-01-01 RX ADMIN — PREGABALIN 150 MG: 75 CAPSULE ORAL at 21:28

## 2019-01-01 RX ADMIN — TAMSULOSIN HYDROCHLORIDE 0.4 MG: 0.4 CAPSULE ORAL at 17:52

## 2019-01-01 RX ADMIN — DOCUSATE SODIUM 100 MG: 100 CAPSULE, LIQUID FILLED ORAL at 17:46

## 2019-01-01 RX ADMIN — TRAMADOL HYDROCHLORIDE 50 MG: 50 TABLET, COATED ORAL at 11:45

## 2019-01-01 RX ADMIN — OXYCODONE HYDROCHLORIDE 10 MG: 10 TABLET ORAL at 11:18

## 2019-01-01 RX ADMIN — INSULIN LISPRO 8 UNITS: 100 INJECTION, SOLUTION INTRAVENOUS; SUBCUTANEOUS at 12:02

## 2019-01-01 RX ADMIN — SODIUM CHLORIDE 1000 ML: 0.9 INJECTION, SOLUTION INTRAVENOUS at 10:52

## 2019-01-01 RX ADMIN — VASOPRESSIN 0.03 UNITS/MIN: 20 INJECTION INTRAVENOUS at 19:23

## 2019-01-01 RX ADMIN — CEFEPIME HYDROCHLORIDE 2000 MG: 2 INJECTION, POWDER, FOR SOLUTION INTRAVENOUS at 13:30

## 2019-01-01 RX ADMIN — INSULIN GLARGINE 25 UNITS: 100 INJECTION, SOLUTION SUBCUTANEOUS at 08:47

## 2019-01-01 RX ADMIN — AMLODIPINE BESYLATE 5 MG: 5 TABLET ORAL at 08:35

## 2019-01-01 RX ADMIN — INSULIN GLARGINE 25 UNITS: 100 INJECTION, SOLUTION SUBCUTANEOUS at 08:38

## 2019-01-01 RX ADMIN — PREGABALIN 150 MG: 75 CAPSULE ORAL at 20:22

## 2019-01-01 RX ADMIN — METOPROLOL TARTRATE 50 MG: 50 TABLET, FILM COATED ORAL at 21:22

## 2019-01-01 RX ADMIN — INSULIN LISPRO 8 UNITS: 100 INJECTION, SOLUTION INTRAVENOUS; SUBCUTANEOUS at 08:32

## 2019-01-01 RX ADMIN — BENZONATATE 100 MG: 100 CAPSULE ORAL at 11:18

## 2019-01-01 RX ADMIN — INSULIN GLARGINE 30 UNITS: 100 INJECTION, SOLUTION SUBCUTANEOUS at 21:27

## 2019-01-01 RX ADMIN — INSULIN LISPRO 10 UNITS: 100 INJECTION, SOLUTION INTRAVENOUS; SUBCUTANEOUS at 08:38

## 2019-01-01 RX ADMIN — MELATONIN 3 MG: 3 TAB ORAL at 01:57

## 2019-01-01 RX ADMIN — ISOSORBIDE MONONITRATE 60 MG: 60 TABLET, EXTENDED RELEASE ORAL at 08:37

## 2019-01-01 RX ADMIN — PREGABALIN 150 MG: 75 CAPSULE ORAL at 21:00

## 2019-01-01 RX ADMIN — ATORVASTATIN CALCIUM 80 MG: 80 TABLET, FILM COATED ORAL at 21:09

## 2019-01-01 RX ADMIN — DOCUSATE SODIUM 100 MG: 100 CAPSULE, LIQUID FILLED ORAL at 18:16

## 2019-01-01 RX ADMIN — TAMSULOSIN HYDROCHLORIDE 0.4 MG: 0.4 CAPSULE ORAL at 18:10

## 2019-01-01 RX ADMIN — ATORVASTATIN CALCIUM 80 MG: 80 TABLET, FILM COATED ORAL at 21:00

## 2019-01-01 RX ADMIN — ISOSORBIDE MONONITRATE 60 MG: 60 TABLET, EXTENDED RELEASE ORAL at 15:22

## 2019-01-01 RX ADMIN — METRONIDAZOLE 500 MG: 500 INJECTION, SOLUTION INTRAVENOUS at 02:38

## 2019-01-01 RX ADMIN — TAMSULOSIN HYDROCHLORIDE 0.4 MG: 0.4 CAPSULE ORAL at 17:35

## 2019-01-01 RX ADMIN — INSULIN LISPRO 10 UNITS: 100 INJECTION, SOLUTION INTRAVENOUS; SUBCUTANEOUS at 08:31

## 2019-01-01 RX ADMIN — FINASTERIDE 5 MG: 5 TABLET, FILM COATED ORAL at 08:07

## 2019-01-01 RX ADMIN — ATORVASTATIN CALCIUM 80 MG: 80 TABLET, FILM COATED ORAL at 21:43

## 2019-01-01 RX ADMIN — METOPROLOL TARTRATE 50 MG: 25 TABLET, FILM COATED ORAL at 08:35

## 2019-01-01 RX ADMIN — PREGABALIN 150 MG: 75 CAPSULE ORAL at 16:38

## 2019-01-01 RX ADMIN — BENZONATATE 100 MG: 100 CAPSULE ORAL at 21:39

## 2019-01-01 RX ADMIN — PANTOPRAZOLE SODIUM 40 MG: 40 TABLET, DELAYED RELEASE ORAL at 06:34

## 2019-01-01 RX ADMIN — OXYCODONE HYDROCHLORIDE 5 MG: 5 TABLET ORAL at 05:54

## 2019-01-01 RX ADMIN — PANTOPRAZOLE SODIUM 40 MG: 40 TABLET, DELAYED RELEASE ORAL at 05:54

## 2019-01-01 RX ADMIN — SODIUM CHLORIDE 125 ML/HR: 0.9 INJECTION, SOLUTION INTRAVENOUS at 06:10

## 2019-01-01 RX ADMIN — METOPROLOL TARTRATE 5 MG: 5 INJECTION, SOLUTION INTRAVENOUS at 14:23

## 2019-01-01 RX ADMIN — TAMSULOSIN HYDROCHLORIDE 0.4 MG: 0.4 CAPSULE ORAL at 16:18

## 2019-01-01 RX ADMIN — BENZONATATE 100 MG: 100 CAPSULE ORAL at 08:48

## 2019-01-01 RX ADMIN — CEFEPIME HYDROCHLORIDE 2000 MG: 2 INJECTION, POWDER, FOR SOLUTION INTRAVENOUS at 22:34

## 2019-01-01 RX ADMIN — BENZONATATE 100 MG: 100 CAPSULE ORAL at 10:14

## 2019-01-01 RX ADMIN — ADENOSINE 6 MG: 3 INJECTION, SOLUTION INTRAVENOUS at 10:53

## 2019-01-01 RX ADMIN — ALBUMIN (HUMAN) 50 G: 0.25 INJECTION, SOLUTION INTRAVENOUS at 01:17

## 2019-01-01 RX ADMIN — ISOSORBIDE MONONITRATE 60 MG: 60 TABLET, EXTENDED RELEASE ORAL at 17:25

## 2019-01-01 RX ADMIN — OXYCODONE HYDROCHLORIDE 5 MG: 5 TABLET ORAL at 20:02

## 2019-01-01 RX ADMIN — OXYCODONE HYDROCHLORIDE 5 MG: 5 TABLET ORAL at 12:30

## 2019-01-01 RX ADMIN — INSULIN LISPRO 8 UNITS: 100 INJECTION, SOLUTION INTRAVENOUS; SUBCUTANEOUS at 12:00

## 2019-01-01 RX ADMIN — HYDROMORPHONE HYDROCHLORIDE 0.5 MG: 1 INJECTION, SOLUTION INTRAMUSCULAR; INTRAVENOUS; SUBCUTANEOUS at 10:54

## 2019-01-01 RX ADMIN — POTASSIUM CHLORIDE 20 MEQ: 1500 TABLET, EXTENDED RELEASE ORAL at 17:08

## 2019-01-01 RX ADMIN — METOPROLOL TARTRATE 5 MG: 5 INJECTION, SOLUTION INTRAVENOUS at 15:33

## 2019-01-01 RX ADMIN — INSULIN LISPRO 10 UNITS: 100 INJECTION, SOLUTION INTRAVENOUS; SUBCUTANEOUS at 11:22

## 2019-01-01 RX ADMIN — INSULIN LISPRO 3 UNITS: 100 INJECTION, SOLUTION INTRAVENOUS; SUBCUTANEOUS at 12:10

## 2019-01-01 RX ADMIN — CEFEPIME HYDROCHLORIDE 2000 MG: 2 INJECTION, POWDER, FOR SOLUTION INTRAVENOUS at 23:43

## 2019-01-01 RX ADMIN — INSULIN GLARGINE 30 UNITS: 100 INJECTION, SOLUTION SUBCUTANEOUS at 22:34

## 2019-01-01 RX ADMIN — PREDNISONE 60 MG: 20 TABLET ORAL at 08:07

## 2019-01-01 RX ADMIN — NICOTINE 1 PATCH: 14 PATCH TRANSDERMAL at 09:30

## 2019-01-01 RX ADMIN — DIAZEPAM 5 MG: 5 TABLET ORAL at 21:54

## 2019-01-01 RX ADMIN — DOCUSATE SODIUM 100 MG: 100 CAPSULE, LIQUID FILLED ORAL at 09:40

## 2019-01-01 RX ADMIN — AMIODARONE HYDROCHLORIDE 400 MG: 200 TABLET ORAL at 09:15

## 2019-01-01 RX ADMIN — CHLORHEXIDINE GLUCONATE 0.12% ORAL RINSE 15 ML: 1.2 LIQUID ORAL at 08:35

## 2019-01-01 RX ADMIN — ISOSORBIDE MONONITRATE 60 MG: 60 TABLET, EXTENDED RELEASE ORAL at 08:42

## 2019-01-01 RX ADMIN — PANTOPRAZOLE SODIUM 40 MG: 40 TABLET, DELAYED RELEASE ORAL at 08:41

## 2019-01-01 RX ADMIN — LIDOCAINE 1 PATCH: 50 PATCH TOPICAL at 12:04

## 2019-01-01 RX ADMIN — POTASSIUM CHLORIDE 20 MEQ: 1500 TABLET, EXTENDED RELEASE ORAL at 08:09

## 2019-01-01 RX ADMIN — FUROSEMIDE 40 MG: 10 INJECTION, SOLUTION INTRAMUSCULAR; INTRAVENOUS at 17:45

## 2019-01-01 RX ADMIN — POTASSIUM CHLORIDE 20 MEQ: 1500 TABLET, EXTENDED RELEASE ORAL at 10:05

## 2019-01-01 RX ADMIN — METHYLPREDNISOLONE SODIUM SUCCINATE 20 MG: 40 INJECTION, POWDER, FOR SOLUTION INTRAMUSCULAR; INTRAVENOUS at 21:12

## 2019-01-01 RX ADMIN — FINASTERIDE 5 MG: 5 TABLET, FILM COATED ORAL at 08:31

## 2019-01-01 RX ADMIN — FINASTERIDE 5 MG: 5 TABLET, FILM COATED ORAL at 08:30

## 2019-01-01 RX ADMIN — INSULIN LISPRO 5 UNITS: 100 INJECTION, SOLUTION INTRAVENOUS; SUBCUTANEOUS at 10:07

## 2019-01-01 RX ADMIN — INSULIN LISPRO 10 UNITS: 100 INJECTION, SOLUTION INTRAVENOUS; SUBCUTANEOUS at 11:30

## 2019-01-01 RX ADMIN — METHYLPREDNISOLONE SODIUM SUCCINATE 40 MG: 40 INJECTION, POWDER, FOR SOLUTION INTRAMUSCULAR; INTRAVENOUS at 05:39

## 2019-01-01 RX ADMIN — METHYLPREDNISOLONE SODIUM SUCCINATE 30 MG: 40 INJECTION, POWDER, FOR SOLUTION INTRAMUSCULAR; INTRAVENOUS at 10:05

## 2019-01-01 RX ADMIN — AMLODIPINE BESYLATE 5 MG: 5 TABLET ORAL at 08:18

## 2019-01-01 RX ADMIN — PREGABALIN 150 MG: 75 CAPSULE ORAL at 16:27

## 2019-01-01 RX ADMIN — FINASTERIDE 5 MG: 5 TABLET, FILM COATED ORAL at 08:47

## 2019-01-01 RX ADMIN — METRONIDAZOLE 500 MG: 500 INJECTION, SOLUTION INTRAVENOUS at 01:58

## 2019-01-01 RX ADMIN — METOPROLOL TARTRATE 50 MG: 25 TABLET, FILM COATED ORAL at 22:38

## 2019-01-01 RX ADMIN — SODIUM CHLORIDE, SODIUM LACTATE, POTASSIUM CHLORIDE, AND CALCIUM CHLORIDE 500 ML: .6; .31; .03; .02 INJECTION, SOLUTION INTRAVENOUS at 19:21

## 2019-01-01 RX ADMIN — PANTOPRAZOLE SODIUM 40 MG: 40 TABLET, DELAYED RELEASE ORAL at 05:18

## 2019-01-01 RX ADMIN — NICOTINE 1 PATCH: 14 PATCH TRANSDERMAL at 10:07

## 2019-01-01 RX ADMIN — METHOCARBAMOL 500 MG: 500 TABLET, FILM COATED ORAL at 05:19

## 2019-01-01 RX ADMIN — INSULIN LISPRO 6 UNITS: 100 INJECTION, SOLUTION INTRAVENOUS; SUBCUTANEOUS at 08:30

## 2019-01-01 RX ADMIN — OXYCODONE HYDROCHLORIDE 10 MG: 10 TABLET ORAL at 18:13

## 2019-01-01 RX ADMIN — METOPROLOL TARTRATE 50 MG: 50 TABLET, FILM COATED ORAL at 08:49

## 2019-01-01 RX ADMIN — DOCUSATE SODIUM 100 MG: 100 CAPSULE, LIQUID FILLED ORAL at 16:18

## 2019-01-01 RX ADMIN — OXYCODONE HYDROCHLORIDE 10 MG: 10 TABLET ORAL at 14:07

## 2019-01-01 RX ADMIN — SODIUM CHLORIDE 1000 ML: 0.9 INJECTION, SOLUTION INTRAVENOUS at 12:07

## 2019-01-01 RX ADMIN — HYDROXYUREA 500 MG: 500 CAPSULE ORAL at 17:24

## 2019-01-01 RX ADMIN — PREGABALIN 150 MG: 75 CAPSULE ORAL at 22:38

## 2019-01-01 RX ADMIN — PREGABALIN 150 MG: 75 CAPSULE ORAL at 16:24

## 2019-01-01 RX ADMIN — POTASSIUM CHLORIDE 20 MEQ: 1500 TABLET, EXTENDED RELEASE ORAL at 17:34

## 2019-01-01 RX ADMIN — Medication 1000 MG: at 09:42

## 2019-01-01 RX ADMIN — INSULIN LISPRO 10 UNITS: 100 INJECTION, SOLUTION INTRAVENOUS; SUBCUTANEOUS at 18:00

## 2019-01-01 RX ADMIN — AMLODIPINE BESYLATE 5 MG: 5 TABLET ORAL at 08:30

## 2019-01-01 RX ADMIN — INSULIN LISPRO 2 UNITS: 100 INJECTION, SOLUTION INTRAVENOUS; SUBCUTANEOUS at 10:05

## 2019-01-01 RX ADMIN — ACETAMINOPHEN 650 MG: 325 TABLET ORAL at 00:47

## 2019-01-01 RX ADMIN — NICOTINE 1 PATCH: 14 PATCH TRANSDERMAL at 08:07

## 2019-01-01 RX ADMIN — CHLORHEXIDINE GLUCONATE 0.12% ORAL RINSE 15 ML: 1.2 LIQUID ORAL at 21:09

## 2019-01-01 RX ADMIN — METRONIDAZOLE 500 MG: 500 TABLET ORAL at 14:08

## 2019-01-01 RX ADMIN — INSULIN GLARGINE 30 UNITS: 100 INJECTION, SOLUTION SUBCUTANEOUS at 22:38

## 2019-01-01 RX ADMIN — INSULIN LISPRO 10 UNITS: 100 INJECTION, SOLUTION INTRAVENOUS; SUBCUTANEOUS at 18:12

## 2019-01-01 RX ADMIN — DOCUSATE SODIUM 100 MG: 100 CAPSULE, LIQUID FILLED ORAL at 09:31

## 2019-01-01 RX ADMIN — METOPROLOL TARTRATE 50 MG: 50 TABLET, FILM COATED ORAL at 21:01

## 2019-01-01 RX ADMIN — INSULIN LISPRO 8 UNITS: 100 INJECTION, SOLUTION INTRAVENOUS; SUBCUTANEOUS at 11:47

## 2019-01-01 RX ADMIN — POTASSIUM CHLORIDE 20 MEQ: 1500 TABLET, EXTENDED RELEASE ORAL at 16:39

## 2019-01-01 RX ADMIN — NOREPINEPHRINE BITARTRATE 25 MCG/MIN: 1 INJECTION INTRAVENOUS at 09:05

## 2019-01-01 RX ADMIN — NOREPINEPHRINE BITARTRATE 12 MCG/MIN: 1 INJECTION INTRAVENOUS at 05:25

## 2019-01-01 RX ADMIN — INSULIN LISPRO 3 UNITS: 100 INJECTION, SOLUTION INTRAVENOUS; SUBCUTANEOUS at 17:27

## 2019-01-01 RX ADMIN — ISOSORBIDE MONONITRATE 60 MG: 60 TABLET, EXTENDED RELEASE ORAL at 08:18

## 2019-01-01 RX ADMIN — ATORVASTATIN CALCIUM 80 MG: 80 TABLET, FILM COATED ORAL at 22:13

## 2019-01-01 RX ADMIN — CEFEPIME HYDROCHLORIDE 2000 MG: 2 INJECTION, POWDER, FOR SOLUTION INTRAVENOUS at 11:06

## 2019-01-01 RX ADMIN — Medication 1000 MG: at 09:31

## 2019-01-01 RX ADMIN — OXYCODONE HYDROCHLORIDE 10 MG: 10 TABLET ORAL at 06:09

## 2019-01-01 RX ADMIN — METOPROLOL TARTRATE 50 MG: 25 TABLET, FILM COATED ORAL at 16:26

## 2019-01-01 RX ADMIN — Medication 1000 MG: at 08:47

## 2019-01-01 RX ADMIN — ISOSORBIDE MONONITRATE 60 MG: 60 TABLET, EXTENDED RELEASE ORAL at 08:47

## 2019-01-01 RX ADMIN — OXYCODONE HYDROCHLORIDE 5 MG: 5 TABLET ORAL at 22:40

## 2019-01-01 RX ADMIN — FUROSEMIDE 40 MG: 10 INJECTION, SOLUTION INTRAMUSCULAR; INTRAVENOUS at 03:23

## 2019-01-01 RX ADMIN — LIDOCAINE 1 PATCH: 50 PATCH CUTANEOUS at 11:03

## 2019-01-01 RX ADMIN — FLUTICASONE FUROATE AND VILANTEROL TRIFENATATE 1 PUFF: 200; 25 POWDER RESPIRATORY (INHALATION) at 08:05

## 2019-01-01 RX ADMIN — DEXTROSE 150 MG: 50 INJECTION, SOLUTION INTRAVENOUS at 07:53

## 2019-01-01 RX ADMIN — OXYCODONE HYDROCHLORIDE 10 MG: 10 TABLET ORAL at 16:23

## 2019-01-01 RX ADMIN — METHOCARBAMOL 500 MG: 500 TABLET, FILM COATED ORAL at 21:13

## 2019-01-01 RX ADMIN — HYDROMORPHONE HYDROCHLORIDE 0.5 MG: 1 INJECTION, SOLUTION INTRAMUSCULAR; INTRAVENOUS; SUBCUTANEOUS at 02:18

## 2019-01-01 RX ADMIN — METRONIDAZOLE 500 MG: 500 TABLET ORAL at 06:26

## 2019-01-01 RX ADMIN — FINASTERIDE 5 MG: 5 TABLET, FILM COATED ORAL at 10:00

## 2019-01-01 RX ADMIN — DOCUSATE SODIUM 100 MG: 100 CAPSULE, LIQUID FILLED ORAL at 08:47

## 2019-01-01 RX ADMIN — METHOCARBAMOL 500 MG: 500 TABLET, FILM COATED ORAL at 16:01

## 2019-01-01 RX ADMIN — INSULIN LISPRO 1 UNITS: 100 INJECTION, SOLUTION INTRAVENOUS; SUBCUTANEOUS at 17:53

## 2019-01-01 RX ADMIN — ONDANSETRON 4 MG: 2 INJECTION INTRAMUSCULAR; INTRAVENOUS at 08:24

## 2019-01-01 RX ADMIN — OXYCODONE HYDROCHLORIDE 5 MG: 5 TABLET ORAL at 08:19

## 2019-01-01 RX ADMIN — AMIODARONE HYDROCHLORIDE 400 MG: 200 TABLET ORAL at 18:11

## 2019-01-01 RX ADMIN — AMIODARONE HYDROCHLORIDE 1 MG/MIN: 50 INJECTION, SOLUTION INTRAVENOUS at 07:26

## 2019-01-01 RX ADMIN — OXYCODONE HYDROCHLORIDE 10 MG: 10 TABLET ORAL at 18:21

## 2019-01-01 RX ADMIN — NOREPINEPHRINE BITARTRATE 20 MCG/MIN: 1 INJECTION INTRAVENOUS at 15:59

## 2019-01-01 RX ADMIN — POTASSIUM CHLORIDE 20 MEQ: 1500 TABLET, EXTENDED RELEASE ORAL at 16:30

## 2019-01-01 RX ADMIN — FINASTERIDE 5 MG: 5 TABLET, FILM COATED ORAL at 08:18

## 2019-01-01 RX ADMIN — INSULIN LISPRO 1 UNITS: 100 INJECTION, SOLUTION INTRAVENOUS; SUBCUTANEOUS at 08:10

## 2019-01-01 RX ADMIN — METOPROLOL TARTRATE 50 MG: 50 TABLET, FILM COATED ORAL at 08:06

## 2019-01-01 RX ADMIN — INSULIN LISPRO 3 UNITS: 100 INJECTION, SOLUTION INTRAVENOUS; SUBCUTANEOUS at 12:09

## 2019-01-01 RX ADMIN — INSULIN LISPRO 8 UNITS: 100 INJECTION, SOLUTION INTRAVENOUS; SUBCUTANEOUS at 10:11

## 2019-01-01 RX ADMIN — SODIUM CHLORIDE 125 ML/HR: 0.9 INJECTION, SOLUTION INTRAVENOUS at 10:50

## 2019-01-01 RX ADMIN — ATORVASTATIN CALCIUM 80 MG: 80 TABLET, FILM COATED ORAL at 21:50

## 2019-01-01 RX ADMIN — INSULIN LISPRO 10 UNITS: 100 INJECTION, SOLUTION INTRAVENOUS; SUBCUTANEOUS at 11:43

## 2019-01-01 RX ADMIN — METOPROLOL TARTRATE 5 MG: 5 INJECTION, SOLUTION INTRAVENOUS at 13:24

## 2019-01-01 RX ADMIN — SODIUM CHLORIDE, SODIUM LACTATE, POTASSIUM CHLORIDE, AND CALCIUM CHLORIDE 100 ML/HR: .6; .31; .03; .02 INJECTION, SOLUTION INTRAVENOUS at 19:45

## 2019-01-01 RX ADMIN — METRONIDAZOLE 500 MG: 500 TABLET ORAL at 06:00

## 2019-01-01 RX ADMIN — METOPROLOL TARTRATE 50 MG: 50 TABLET, FILM COATED ORAL at 16:03

## 2019-01-01 RX ADMIN — DOCUSATE SODIUM 100 MG: 100 CAPSULE, LIQUID FILLED ORAL at 08:06

## 2019-01-01 RX ADMIN — METHYLPREDNISOLONE SODIUM SUCCINATE 20 MG: 40 INJECTION, POWDER, FOR SOLUTION INTRAMUSCULAR; INTRAVENOUS at 15:06

## 2019-01-01 RX ADMIN — Medication 1000 MG: at 08:38

## 2019-01-01 RX ADMIN — POTASSIUM CHLORIDE 40 MEQ: 20 SOLUTION ORAL at 03:24

## 2019-01-01 RX ADMIN — INSULIN LISPRO 3 UNITS: 100 INJECTION, SOLUTION INTRAVENOUS; SUBCUTANEOUS at 14:43

## 2019-01-01 RX ADMIN — INSULIN LISPRO 10 UNITS: 100 INJECTION, SOLUTION INTRAVENOUS; SUBCUTANEOUS at 18:14

## 2019-01-01 RX ADMIN — LIDOCAINE 1 PATCH: 50 PATCH TOPICAL at 08:32

## 2019-01-01 RX ADMIN — OXYCODONE HYDROCHLORIDE 5 MG: 5 TABLET ORAL at 10:16

## 2019-01-01 RX ADMIN — METRONIDAZOLE 500 MG: 500 INJECTION, SOLUTION INTRAVENOUS at 18:13

## 2019-01-01 RX ADMIN — PANTOPRAZOLE SODIUM 40 MG: 40 INJECTION, POWDER, FOR SOLUTION INTRAVENOUS at 08:06

## 2019-01-01 RX ADMIN — ATORVASTATIN CALCIUM 80 MG: 80 TABLET, FILM COATED ORAL at 21:11

## 2019-01-01 RX ADMIN — POTASSIUM CHLORIDE 20 MEQ: 200 INJECTION, SOLUTION INTRAVENOUS at 12:54

## 2019-01-01 RX ADMIN — PREGABALIN 150 MG: 75 CAPSULE ORAL at 21:22

## 2019-01-01 RX ADMIN — METOPROLOL TARTRATE 50 MG: 50 TABLET, FILM COATED ORAL at 08:28

## 2019-01-01 RX ADMIN — PREGABALIN 150 MG: 75 CAPSULE ORAL at 21:09

## 2019-01-01 RX ADMIN — PHENYLEPHRINE HYDROCHLORIDE 100 MCG/MIN: 10 INJECTION INTRAVENOUS at 18:38

## 2019-01-01 RX ADMIN — OXYCODONE HYDROCHLORIDE 10 MG: 10 TABLET ORAL at 17:09

## 2019-01-01 RX ADMIN — OXYCODONE HYDROCHLORIDE 10 MG: 10 TABLET ORAL at 15:24

## 2019-01-01 RX ADMIN — METHYLPREDNISOLONE SODIUM SUCCINATE 20 MG: 40 INJECTION, POWDER, FOR SOLUTION INTRAMUSCULAR; INTRAVENOUS at 06:08

## 2019-01-01 RX ADMIN — INSULIN LISPRO 4 UNITS: 100 INJECTION, SOLUTION INTRAVENOUS; SUBCUTANEOUS at 08:32

## 2019-01-01 RX ADMIN — LIDOCAINE 1 PATCH: 50 PATCH TOPICAL at 08:29

## 2019-01-01 RX ADMIN — CHLORHEXIDINE GLUCONATE 0.12% ORAL RINSE 15 ML: 1.2 LIQUID ORAL at 20:40

## 2019-01-01 RX ADMIN — OXYCODONE HYDROCHLORIDE 10 MG: 10 TABLET ORAL at 22:35

## 2019-01-01 RX ADMIN — HYDROXYUREA 500 MG: 500 CAPSULE ORAL at 16:38

## 2019-01-01 RX ADMIN — PHENAZOPYRIDINE 100 MG: 100 TABLET ORAL at 08:30

## 2019-01-01 RX ADMIN — PANTOPRAZOLE SODIUM 40 MG: 40 TABLET, DELAYED RELEASE ORAL at 06:00

## 2019-01-01 RX ADMIN — PANTOPRAZOLE SODIUM 40 MG: 40 TABLET, DELAYED RELEASE ORAL at 15:27

## 2019-01-01 RX ADMIN — NICOTINE 1 PATCH: 14 PATCH TRANSDERMAL at 08:33

## 2019-01-01 RX ADMIN — ATORVASTATIN CALCIUM 80 MG: 80 TABLET, FILM COATED ORAL at 21:23

## 2019-01-01 RX ADMIN — METRONIDAZOLE 500 MG: 500 INJECTION, SOLUTION INTRAVENOUS at 10:16

## 2019-01-01 RX ADMIN — INSULIN LISPRO 6 UNITS: 100 INJECTION, SOLUTION INTRAVENOUS; SUBCUTANEOUS at 09:12

## 2019-01-01 RX ADMIN — FUROSEMIDE 40 MG: 10 INJECTION, SOLUTION INTRAMUSCULAR; INTRAVENOUS at 10:07

## 2019-01-01 RX ADMIN — PREGABALIN 150 MG: 75 CAPSULE ORAL at 21:23

## 2019-01-01 RX ADMIN — Medication 1000 MG: at 08:07

## 2019-01-01 RX ADMIN — OXYCODONE HYDROCHLORIDE 10 MG: 10 TABLET ORAL at 10:28

## 2019-01-01 RX ADMIN — METRONIDAZOLE 500 MG: 500 INJECTION, SOLUTION INTRAVENOUS at 09:19

## 2019-01-01 RX ADMIN — PREGABALIN 150 MG: 75 CAPSULE ORAL at 08:29

## 2019-01-01 RX ADMIN — PREGABALIN 150 MG: 75 CAPSULE ORAL at 21:31

## 2019-01-01 RX ADMIN — PANTOPRAZOLE SODIUM 40 MG: 40 TABLET, DELAYED RELEASE ORAL at 06:01

## 2019-01-01 RX ADMIN — BENZONATATE 100 MG: 100 CAPSULE ORAL at 18:57

## 2019-01-01 RX ADMIN — PREGABALIN 150 MG: 75 CAPSULE ORAL at 16:04

## 2019-01-01 RX ADMIN — QUETIAPINE FUMARATE 25 MG: 25 TABLET ORAL at 22:38

## 2019-01-01 RX ADMIN — PREGABALIN 150 MG: 75 CAPSULE ORAL at 16:40

## 2019-01-01 RX ADMIN — INSULIN LISPRO 1 UNITS: 100 INJECTION, SOLUTION INTRAVENOUS; SUBCUTANEOUS at 17:24

## 2019-01-01 RX ADMIN — IODIXANOL 85 ML: 320 INJECTION, SOLUTION INTRAVASCULAR at 10:05

## 2019-01-01 RX ADMIN — DEXTROSE AND SODIUM CHLORIDE 100 ML/HR: 5; .45 INJECTION, SOLUTION INTRAVENOUS at 10:01

## 2019-01-01 RX ADMIN — DIAZEPAM 5 MG: 5 TABLET ORAL at 21:21

## 2019-01-01 RX ADMIN — ACETAMINOPHEN 650 MG: 325 TABLET ORAL at 11:53

## 2019-01-01 RX ADMIN — INSULIN LISPRO 1 UNITS: 100 INJECTION, SOLUTION INTRAVENOUS; SUBCUTANEOUS at 21:21

## 2019-01-01 RX ADMIN — PANTOPRAZOLE SODIUM 40 MG: 40 TABLET, DELAYED RELEASE ORAL at 06:03

## 2019-01-01 RX ADMIN — PREGABALIN 150 MG: 75 CAPSULE ORAL at 16:18

## 2019-01-01 RX ADMIN — INSULIN LISPRO 2 UNITS: 100 INJECTION, SOLUTION INTRAVENOUS; SUBCUTANEOUS at 17:37

## 2019-01-01 RX ADMIN — METRONIDAZOLE 500 MG: 500 INJECTION, SOLUTION INTRAVENOUS at 10:19

## 2019-01-01 RX ADMIN — PREGABALIN 150 MG: 75 CAPSULE ORAL at 17:24

## 2019-01-01 RX ADMIN — SODIUM CHLORIDE 125 ML/HR: 0.9 INJECTION, SOLUTION INTRAVENOUS at 21:31

## 2019-01-01 RX ADMIN — HALOPERIDOL LACTATE 2.5 MG: 5 INJECTION INTRAMUSCULAR at 00:13

## 2019-01-01 RX ADMIN — OXYCODONE HYDROCHLORIDE 10 MG: 10 TABLET ORAL at 22:17

## 2019-01-01 RX ADMIN — METOPROLOL TARTRATE 50 MG: 50 TABLET, FILM COATED ORAL at 08:31

## 2019-01-01 RX ADMIN — PANTOPRAZOLE SODIUM 40 MG: 40 INJECTION, POWDER, FOR SOLUTION INTRAVENOUS at 09:36

## 2019-01-01 RX ADMIN — ACETAMINOPHEN 650 MG: 325 TABLET ORAL at 06:30

## 2019-01-01 RX ADMIN — DIAZEPAM 5 MG: 5 TABLET ORAL at 22:21

## 2019-01-01 RX ADMIN — Medication 1000 MG: at 09:15

## 2019-01-01 RX ADMIN — FUROSEMIDE 40 MG: 10 INJECTION, SOLUTION INTRAMUSCULAR; INTRAVENOUS at 09:53

## 2019-01-01 RX ADMIN — POTASSIUM CHLORIDE 20 MEQ: 1500 TABLET, EXTENDED RELEASE ORAL at 08:28

## 2019-01-01 RX ADMIN — NOREPINEPHRINE BITARTRATE 30 MCG/MIN: 1 INJECTION INTRAVENOUS at 18:56

## 2019-01-01 RX ADMIN — HALOPERIDOL LACTATE 2 MG: 5 INJECTION INTRAMUSCULAR at 04:27

## 2019-01-01 RX ADMIN — ACETAMINOPHEN 650 MG: 650 SUPPOSITORY RECTAL at 18:18

## 2019-01-01 RX ADMIN — DOCUSATE SODIUM 100 MG: 100 CAPSULE, LIQUID FILLED ORAL at 09:15

## 2019-01-01 RX ADMIN — METOPROLOL TARTRATE 50 MG: 50 TABLET, FILM COATED ORAL at 22:15

## 2019-01-01 RX ADMIN — DIAZEPAM 5 MG: 5 TABLET ORAL at 21:40

## 2019-01-01 RX ADMIN — OXYCODONE HYDROCHLORIDE 5 MG: 5 TABLET ORAL at 16:41

## 2019-01-01 RX ADMIN — INSULIN LISPRO 1 UNITS: 100 INJECTION, SOLUTION INTRAVENOUS; SUBCUTANEOUS at 11:30

## 2019-01-01 RX ADMIN — INSULIN GLARGINE 25 UNITS: 100 INJECTION, SOLUTION SUBCUTANEOUS at 09:47

## 2019-01-01 RX ADMIN — OXYCODONE HYDROCHLORIDE 5 MG: 5 TABLET ORAL at 17:14

## 2019-01-01 RX ADMIN — INSULIN LISPRO 4 UNITS: 100 INJECTION, SOLUTION INTRAVENOUS; SUBCUTANEOUS at 07:50

## 2019-01-01 RX ADMIN — ACETAMINOPHEN 650 MG: 325 TABLET ORAL at 08:30

## 2019-01-01 RX ADMIN — PREGABALIN 150 MG: 75 CAPSULE ORAL at 08:18

## 2019-01-01 RX ADMIN — PANTOPRAZOLE SODIUM 40 MG: 40 TABLET, DELAYED RELEASE ORAL at 16:26

## 2019-01-01 RX ADMIN — METHYLPREDNISOLONE SODIUM SUCCINATE 20 MG: 40 INJECTION, POWDER, FOR SOLUTION INTRAMUSCULAR; INTRAVENOUS at 08:06

## 2019-01-01 RX ADMIN — TRAMADOL HYDROCHLORIDE 50 MG: 50 TABLET, COATED ORAL at 15:22

## 2019-01-01 RX ADMIN — OXYCODONE HYDROCHLORIDE 10 MG: 10 TABLET ORAL at 16:46

## 2019-01-01 RX ADMIN — METRONIDAZOLE 500 MG: 500 TABLET ORAL at 21:54

## 2019-01-01 RX ADMIN — NOREPINEPHRINE BITARTRATE 30 MCG/MIN: 1 INJECTION INTRAVENOUS at 12:13

## 2019-01-01 RX ADMIN — ONDANSETRON 4 MG: 2 INJECTION INTRAMUSCULAR; INTRAVENOUS at 00:39

## 2019-01-01 RX ADMIN — SODIUM CHLORIDE, SODIUM LACTATE, POTASSIUM CHLORIDE, AND CALCIUM CHLORIDE 500 ML: .6; .31; .03; .02 INJECTION, SOLUTION INTRAVENOUS at 23:28

## 2019-01-01 RX ADMIN — INSULIN LISPRO 10 UNITS: 100 INJECTION, SOLUTION INTRAVENOUS; SUBCUTANEOUS at 18:05

## 2019-01-01 RX ADMIN — DOCUSATE SODIUM 100 MG: 100 CAPSULE, LIQUID FILLED ORAL at 08:38

## 2019-01-01 RX ADMIN — INSULIN LISPRO 10 UNITS: 100 INJECTION, SOLUTION INTRAVENOUS; SUBCUTANEOUS at 11:51

## 2019-01-01 RX ADMIN — NICOTINE 1 PATCH: 14 PATCH TRANSDERMAL at 10:56

## 2019-01-01 RX ADMIN — METHYLPREDNISOLONE SODIUM SUCCINATE 40 MG: 40 INJECTION, POWDER, FOR SOLUTION INTRAMUSCULAR; INTRAVENOUS at 17:38

## 2019-01-01 RX ADMIN — POTASSIUM CHLORIDE 20 MEQ: 1500 TABLET, EXTENDED RELEASE ORAL at 08:38

## 2019-01-01 RX ADMIN — TAMSULOSIN HYDROCHLORIDE 0.4 MG: 0.4 CAPSULE ORAL at 16:37

## 2019-01-01 RX ADMIN — METHYLPREDNISOLONE SODIUM SUCCINATE 40 MG: 40 INJECTION, POWDER, FOR SOLUTION INTRAMUSCULAR; INTRAVENOUS at 00:12

## 2019-01-01 RX ADMIN — INSULIN LISPRO 2 UNITS: 100 INJECTION, SOLUTION INTRAVENOUS; SUBCUTANEOUS at 21:22

## 2019-01-01 RX ADMIN — IPRATROPIUM BROMIDE AND ALBUTEROL SULFATE 3 ML: 2.5; .5 SOLUTION RESPIRATORY (INHALATION) at 20:56

## 2019-01-01 RX ADMIN — HYDROMORPHONE HYDROCHLORIDE 0.5 MG: 1 INJECTION, SOLUTION INTRAMUSCULAR; INTRAVENOUS; SUBCUTANEOUS at 16:29

## 2019-01-01 RX ADMIN — LIDOCAINE 1 PATCH: 50 PATCH TOPICAL at 09:08

## 2019-01-01 RX ADMIN — OXYCODONE HYDROCHLORIDE 10 MG: 10 TABLET ORAL at 05:38

## 2019-01-01 RX ADMIN — POTASSIUM CHLORIDE 20 MEQ: 200 INJECTION, SOLUTION INTRAVENOUS at 12:08

## 2019-01-01 RX ADMIN — METOPROLOL TARTRATE 50 MG: 25 TABLET, FILM COATED ORAL at 21:23

## 2019-01-01 RX ADMIN — ACETAMINOPHEN 650 MG: 650 SUPPOSITORY RECTAL at 12:27

## 2019-01-01 RX ADMIN — HALOPERIDOL LACTATE 2.5 MG: 5 INJECTION INTRAMUSCULAR at 12:01

## 2019-01-01 RX ADMIN — METOPROLOL TARTRATE 50 MG: 50 TABLET, FILM COATED ORAL at 21:29

## 2019-01-01 RX ADMIN — PHENAZOPYRIDINE 100 MG: 100 TABLET ORAL at 11:47

## 2019-01-01 RX ADMIN — SODIUM CHLORIDE 10 UNITS/HR: 9 INJECTION, SOLUTION INTRAVENOUS at 22:59

## 2019-01-01 RX ADMIN — POTASSIUM CHLORIDE 40 MEQ: 1500 TABLET, EXTENDED RELEASE ORAL at 08:44

## 2019-01-01 RX ADMIN — ACETAMINOPHEN 650 MG: 325 TABLET ORAL at 10:17

## 2019-01-01 RX ADMIN — NICOTINE 1 PATCH: 14 PATCH TRANSDERMAL at 09:32

## 2019-01-01 RX ADMIN — INSULIN LISPRO 1 UNITS: 100 INJECTION, SOLUTION INTRAVENOUS; SUBCUTANEOUS at 08:19

## 2019-01-01 RX ADMIN — METHOCARBAMOL 500 MG: 500 TABLET, FILM COATED ORAL at 11:45

## 2019-01-01 RX ADMIN — HALOPERIDOL LACTATE 2 MG: 5 INJECTION, SOLUTION INTRAMUSCULAR at 19:41

## 2019-01-01 RX ADMIN — METRONIDAZOLE 500 MG: 500 TABLET ORAL at 14:37

## 2019-01-01 RX ADMIN — VASOPRESSIN 0.03 UNITS/MIN: 20 INJECTION INTRAVENOUS at 21:09

## 2019-01-01 RX ADMIN — INSULIN LISPRO 2 UNITS: 100 INJECTION, SOLUTION INTRAVENOUS; SUBCUTANEOUS at 17:14

## 2019-01-01 RX ADMIN — OXYCODONE HYDROCHLORIDE 5 MG: 5 TABLET ORAL at 17:52

## 2019-01-01 RX ADMIN — TRAMADOL HYDROCHLORIDE 50 MG: 50 TABLET, COATED ORAL at 20:08

## 2019-01-01 RX ADMIN — SODIUM CHLORIDE 3 UNITS/HR: 9 INJECTION, SOLUTION INTRAVENOUS at 02:28

## 2019-01-01 RX ADMIN — NICOTINE 1 PATCH: 14 PATCH TRANSDERMAL at 09:07

## 2019-01-01 RX ADMIN — ACETAMINOPHEN 650 MG: 325 TABLET ORAL at 00:18

## 2019-01-01 RX ADMIN — ATORVASTATIN CALCIUM 80 MG: 80 TABLET, FILM COATED ORAL at 21:31

## 2019-01-01 RX ADMIN — METRONIDAZOLE 500 MG: 500 INJECTION, SOLUTION INTRAVENOUS at 08:32

## 2019-01-01 RX ADMIN — SODIUM CHLORIDE 75 ML/HR: 0.9 INJECTION, SOLUTION INTRAVENOUS at 17:51

## 2019-01-01 RX ADMIN — DIAZEPAM 5 MG: 5 TABLET ORAL at 01:11

## 2019-01-01 RX ADMIN — INSULIN LISPRO 3 UNITS: 100 INJECTION, SOLUTION INTRAVENOUS; SUBCUTANEOUS at 17:11

## 2019-01-01 RX ADMIN — METRONIDAZOLE 500 MG: 500 INJECTION, SOLUTION INTRAVENOUS at 17:00

## 2019-01-01 RX ADMIN — METHYLPREDNISOLONE SODIUM SUCCINATE 20 MG: 40 INJECTION, POWDER, FOR SOLUTION INTRAMUSCULAR; INTRAVENOUS at 08:38

## 2019-01-01 RX ADMIN — PREGABALIN 150 MG: 75 CAPSULE ORAL at 08:36

## 2019-01-01 RX ADMIN — Medication 1000 MG: at 08:28

## 2019-01-01 RX ADMIN — OXYCODONE HYDROCHLORIDE 5 MG: 5 TABLET ORAL at 16:26

## 2019-01-01 RX ADMIN — PANTOPRAZOLE SODIUM 40 MG: 40 TABLET, DELAYED RELEASE ORAL at 06:09

## 2019-01-01 RX ADMIN — AMIODARONE HYDROCHLORIDE 400 MG: 200 TABLET ORAL at 12:52

## 2019-01-01 RX ADMIN — INSULIN LISPRO 8 UNITS: 100 INJECTION, SOLUTION INTRAVENOUS; SUBCUTANEOUS at 17:15

## 2019-01-01 RX ADMIN — METRONIDAZOLE 500 MG: 500 INJECTION, SOLUTION INTRAVENOUS at 01:16

## 2019-01-01 RX ADMIN — ACETAMINOPHEN 975 MG: 325 TABLET ORAL at 19:34

## 2019-01-01 RX ADMIN — INSULIN LISPRO 8 UNITS: 100 INJECTION, SOLUTION INTRAVENOUS; SUBCUTANEOUS at 17:16

## 2019-01-01 RX ADMIN — INSULIN LISPRO 10 UNITS: 100 INJECTION, SOLUTION INTRAVENOUS; SUBCUTANEOUS at 11:53

## 2019-01-01 RX ADMIN — NOREPINEPHRINE BITARTRATE 10 MCG/MIN: 1 INJECTION INTRAVENOUS at 02:01

## 2019-01-01 RX ADMIN — METOPROLOL TARTRATE 50 MG: 50 TABLET, FILM COATED ORAL at 22:00

## 2019-01-01 RX ADMIN — PHENAZOPYRIDINE 100 MG: 100 TABLET ORAL at 16:41

## 2019-01-01 RX ADMIN — OXYCODONE HYDROCHLORIDE 10 MG: 10 TABLET ORAL at 10:55

## 2019-01-01 RX ADMIN — HALOPERIDOL LACTATE 2.5 MG: 5 INJECTION INTRAMUSCULAR at 04:49

## 2019-01-01 RX ADMIN — FUROSEMIDE 40 MG: 10 INJECTION, SOLUTION INTRAMUSCULAR; INTRAVENOUS at 07:56

## 2019-01-01 RX ADMIN — MAGNESIUM SULFATE HEPTAHYDRATE 2 G: 40 INJECTION, SOLUTION INTRAVENOUS at 10:57

## 2019-01-01 RX ADMIN — INSULIN LISPRO 2 UNITS: 100 INJECTION, SOLUTION INTRAVENOUS; SUBCUTANEOUS at 18:02

## 2019-01-01 RX ADMIN — ACETAMINOPHEN 650 MG: 325 TABLET ORAL at 10:14

## 2019-01-01 RX ADMIN — AMLODIPINE BESYLATE 5 MG: 5 TABLET ORAL at 15:22

## 2019-01-01 RX ADMIN — QUETIAPINE FUMARATE 25 MG: 25 TABLET ORAL at 21:44

## 2019-01-01 RX ADMIN — AMIODARONE HYDROCHLORIDE 1 MG/MIN: 50 INJECTION, SOLUTION INTRAVENOUS at 06:10

## 2019-01-01 RX ADMIN — QUETIAPINE FUMARATE 25 MG: 25 TABLET ORAL at 22:13

## 2019-01-01 RX ADMIN — ACETAMINOPHEN 975 MG: 325 TABLET ORAL at 23:56

## 2019-01-01 RX ADMIN — CEFEPIME HYDROCHLORIDE 2000 MG: 2 INJECTION, POWDER, FOR SOLUTION INTRAVENOUS at 09:56

## 2019-01-01 RX ADMIN — QUETIAPINE FUMARATE 25 MG: 25 TABLET ORAL at 21:23

## 2019-01-01 RX ADMIN — CEFEPIME HYDROCHLORIDE 2000 MG: 2 INJECTION, POWDER, FOR SOLUTION INTRAVENOUS at 13:33

## 2019-01-01 RX ADMIN — INSULIN GLARGINE 25 UNITS: 100 INJECTION, SOLUTION SUBCUTANEOUS at 08:08

## 2019-01-01 RX ADMIN — CEFEPIME HYDROCHLORIDE 2000 MG: 2 INJECTION, POWDER, FOR SOLUTION INTRAVENOUS at 13:42

## 2019-01-01 RX ADMIN — INSULIN LISPRO 1 UNITS: 100 INJECTION, SOLUTION INTRAVENOUS; SUBCUTANEOUS at 11:57

## 2019-01-01 RX ADMIN — METHYLPREDNISOLONE SODIUM SUCCINATE 40 MG: 40 INJECTION, POWDER, FOR SOLUTION INTRAMUSCULAR; INTRAVENOUS at 12:15

## 2019-01-01 RX ADMIN — INSULIN LISPRO 8 UNITS: 100 INJECTION, SOLUTION INTRAVENOUS; SUBCUTANEOUS at 08:19

## 2019-01-01 RX ADMIN — METOPROLOL TARTRATE 50 MG: 25 TABLET, FILM COATED ORAL at 08:42

## 2019-01-01 RX ADMIN — Medication 1000 MG: at 10:05

## 2019-01-01 RX ADMIN — OXYCODONE HYDROCHLORIDE 10 MG: 10 TABLET ORAL at 02:02

## 2019-01-01 RX ADMIN — QUETIAPINE FUMARATE 25 MG: 25 TABLET ORAL at 22:35

## 2019-01-01 RX ADMIN — FLUTICASONE FUROATE AND VILANTEROL TRIFENATATE 1 PUFF: 200; 25 POWDER RESPIRATORY (INHALATION) at 10:16

## 2019-01-01 RX ADMIN — AMIODARONE HYDROCHLORIDE 0.5 MG/MIN: 50 INJECTION, SOLUTION INTRAVENOUS at 06:27

## 2019-01-01 RX ADMIN — SODIUM CHLORIDE 1000 ML: 0.9 INJECTION, SOLUTION INTRAVENOUS at 02:39

## 2019-01-01 RX ADMIN — ATORVASTATIN CALCIUM 80 MG: 80 TABLET, FILM COATED ORAL at 21:22

## 2019-01-01 RX ADMIN — AMIODARONE HYDROCHLORIDE 0.5 MG/MIN: 50 INJECTION, SOLUTION INTRAVENOUS at 14:20

## 2019-01-01 RX ADMIN — INSULIN LISPRO 10 UNITS: 100 INJECTION, SOLUTION INTRAVENOUS; SUBCUTANEOUS at 16:31

## 2019-01-01 RX ADMIN — OXYCODONE HYDROCHLORIDE 5 MG: 5 TABLET ORAL at 21:11

## 2019-01-01 RX ADMIN — DOCUSATE SODIUM 100 MG: 100 CAPSULE, LIQUID FILLED ORAL at 16:37

## 2019-01-01 RX ADMIN — TRAMADOL HYDROCHLORIDE 50 MG: 50 TABLET, COATED ORAL at 23:26

## 2019-01-01 RX ADMIN — INSULIN LISPRO 1 UNITS: 100 INJECTION, SOLUTION INTRAVENOUS; SUBCUTANEOUS at 18:14

## 2019-01-01 RX ADMIN — ACETAMINOPHEN 650 MG: 650 SUPPOSITORY RECTAL at 12:23

## 2019-01-01 RX ADMIN — METOPROLOL TARTRATE 5 MG: 5 INJECTION, SOLUTION INTRAVENOUS at 02:29

## 2019-01-01 RX ADMIN — BENZONATATE 100 MG: 100 CAPSULE ORAL at 16:01

## 2019-01-01 RX ADMIN — ATORVASTATIN CALCIUM 80 MG: 80 TABLET, FILM COATED ORAL at 22:41

## 2019-01-01 RX ADMIN — DEXTROSE MONOHYDRATE 50 ML: 500 INJECTION PARENTERAL at 16:28

## 2019-01-01 RX ADMIN — INSULIN LISPRO 4 UNITS: 100 INJECTION, SOLUTION INTRAVENOUS; SUBCUTANEOUS at 21:51

## 2019-01-01 RX ADMIN — AMIODARONE HYDROCHLORIDE 400 MG: 200 TABLET ORAL at 17:34

## 2019-01-01 RX ADMIN — POTASSIUM CHLORIDE 20 MEQ: 200 INJECTION, SOLUTION INTRAVENOUS at 10:25

## 2019-01-01 RX ADMIN — METOPROLOL TARTRATE 50 MG: 50 TABLET, FILM COATED ORAL at 09:16

## 2019-01-01 RX ADMIN — LIDOCAINE 1 PATCH: 50 PATCH TOPICAL at 10:12

## 2019-01-01 RX ADMIN — INSULIN LISPRO 8 UNITS: 100 INJECTION, SOLUTION INTRAVENOUS; SUBCUTANEOUS at 16:42

## 2019-01-01 RX ADMIN — AMIODARONE HYDROCHLORIDE 400 MG: 200 TABLET ORAL at 12:49

## 2019-01-01 RX ADMIN — TRAMADOL HYDROCHLORIDE 50 MG: 50 TABLET, COATED ORAL at 08:37

## 2019-01-01 RX ADMIN — POTASSIUM CHLORIDE 20 MEQ: 1500 TABLET, EXTENDED RELEASE ORAL at 18:16

## 2019-01-01 RX ADMIN — SODIUM CHLORIDE, SODIUM LACTATE, POTASSIUM CHLORIDE, AND CALCIUM CHLORIDE 125 ML/HR: .6; .31; .03; .02 INJECTION, SOLUTION INTRAVENOUS at 12:05

## 2019-01-01 RX ADMIN — INSULIN GLARGINE 25 UNITS: 100 INJECTION, SOLUTION SUBCUTANEOUS at 10:20

## 2019-01-01 RX ADMIN — METHYLPREDNISOLONE SODIUM SUCCINATE 40 MG: 40 INJECTION, POWDER, FOR SOLUTION INTRAMUSCULAR; INTRAVENOUS at 05:56

## 2019-01-01 RX ADMIN — INSULIN LISPRO 1 UNITS: 100 INJECTION, SOLUTION INTRAVENOUS; SUBCUTANEOUS at 21:50

## 2019-01-01 RX ADMIN — PREGABALIN 150 MG: 75 CAPSULE ORAL at 08:35

## 2019-01-01 RX ADMIN — ATORVASTATIN CALCIUM 80 MG: 80 TABLET, FILM COATED ORAL at 21:39

## 2019-01-01 RX ADMIN — NICOTINE 1 PATCH: 14 PATCH TRANSDERMAL at 08:31

## 2019-01-01 RX ADMIN — INSULIN LISPRO 2 UNITS: 100 INJECTION, SOLUTION INTRAVENOUS; SUBCUTANEOUS at 12:11

## 2019-01-01 RX ADMIN — ATORVASTATIN CALCIUM 80 MG: 80 TABLET, FILM COATED ORAL at 22:15

## 2019-01-01 RX ADMIN — CHLORHEXIDINE GLUCONATE 0.12% ORAL RINSE 15 ML: 1.2 LIQUID ORAL at 08:06

## 2019-01-01 RX ADMIN — ATORVASTATIN CALCIUM 80 MG: 80 TABLET, FILM COATED ORAL at 21:21

## 2019-01-01 RX ADMIN — TAMSULOSIN HYDROCHLORIDE 0.4 MG: 0.4 CAPSULE ORAL at 16:41

## 2019-01-01 RX ADMIN — METRONIDAZOLE 500 MG: 500 INJECTION, SOLUTION INTRAVENOUS at 10:06

## 2019-01-01 RX ADMIN — ACETAMINOPHEN 325 MG: 650 SUPPOSITORY RECTAL at 01:16

## 2019-01-01 RX ADMIN — AMIODARONE HYDROCHLORIDE 400 MG: 200 TABLET ORAL at 16:09

## 2019-01-01 RX ADMIN — ATORVASTATIN CALCIUM 80 MG: 80 TABLET, FILM COATED ORAL at 21:28

## 2019-01-01 RX ADMIN — METOPROLOL TARTRATE 50 MG: 25 TABLET, FILM COATED ORAL at 22:35

## 2019-01-01 RX ADMIN — OXYCODONE HYDROCHLORIDE 10 MG: 10 TABLET ORAL at 10:17

## 2019-01-01 RX ADMIN — OXYCODONE HYDROCHLORIDE 10 MG: 10 TABLET ORAL at 13:02

## 2019-01-01 RX ADMIN — METOPROLOL TARTRATE 50 MG: 50 TABLET, FILM COATED ORAL at 21:13

## 2019-01-01 RX ADMIN — METRONIDAZOLE 500 MG: 500 INJECTION, SOLUTION INTRAVENOUS at 01:56

## 2019-01-01 RX ADMIN — INSULIN LISPRO 6 UNITS: 100 INJECTION, SOLUTION INTRAVENOUS; SUBCUTANEOUS at 13:48

## 2019-01-01 RX ADMIN — OXYCODONE HYDROCHLORIDE 10 MG: 10 TABLET ORAL at 14:53

## 2019-01-01 RX ADMIN — CEFEPIME HYDROCHLORIDE 2000 MG: 2 INJECTION, POWDER, FOR SOLUTION INTRAVENOUS at 01:01

## 2019-01-01 RX ADMIN — AMIODARONE HYDROCHLORIDE 400 MG: 200 TABLET ORAL at 08:28

## 2019-01-01 RX ADMIN — OXYCODONE HYDROCHLORIDE 10 MG: 10 TABLET ORAL at 10:14

## 2019-01-01 RX ADMIN — DOCUSATE SODIUM 100 MG: 100 CAPSULE, LIQUID FILLED ORAL at 17:08

## 2019-01-01 RX ADMIN — METHYLPREDNISOLONE SODIUM SUCCINATE 20 MG: 40 INJECTION, POWDER, FOR SOLUTION INTRAMUSCULAR; INTRAVENOUS at 13:25

## 2019-01-01 RX ADMIN — INSULIN GLARGINE 20 UNITS: 100 INJECTION, SOLUTION SUBCUTANEOUS at 21:11

## 2019-01-01 RX ADMIN — METHYLPREDNISOLONE SODIUM SUCCINATE 20 MG: 40 INJECTION, POWDER, FOR SOLUTION INTRAMUSCULAR; INTRAVENOUS at 09:39

## 2019-01-01 RX ADMIN — INSULIN GLARGINE 25 UNITS: 100 INJECTION, SOLUTION SUBCUTANEOUS at 10:05

## 2019-01-01 RX ADMIN — INSULIN LISPRO 3 UNITS: 100 INJECTION, SOLUTION INTRAVENOUS; SUBCUTANEOUS at 18:36

## 2019-01-01 RX ADMIN — ATORVASTATIN CALCIUM 80 MG: 80 TABLET, FILM COATED ORAL at 22:35

## 2019-01-01 RX ADMIN — AMIODARONE HYDROCHLORIDE 400 MG: 200 TABLET ORAL at 17:08

## 2019-01-01 RX ADMIN — PREGABALIN 150 MG: 75 CAPSULE ORAL at 10:00

## 2019-01-01 RX ADMIN — ALBUMIN (HUMAN) 25 G: 12.5 SOLUTION INTRAVENOUS at 22:34

## 2019-01-01 RX ADMIN — OXYCODONE HYDROCHLORIDE 10 MG: 10 TABLET ORAL at 21:26

## 2019-01-01 RX ADMIN — PANTOPRAZOLE SODIUM 40 MG: 40 TABLET, DELAYED RELEASE ORAL at 06:26

## 2019-01-01 RX ADMIN — TAMSULOSIN HYDROCHLORIDE 0.4 MG: 0.4 CAPSULE ORAL at 16:08

## 2019-01-01 RX ADMIN — DOCUSATE SODIUM 100 MG: 100 CAPSULE, LIQUID FILLED ORAL at 16:30

## 2019-01-01 RX ADMIN — PHENAZOPYRIDINE 100 MG: 100 TABLET ORAL at 12:08

## 2019-01-01 RX ADMIN — NOREPINEPHRINE BITARTRATE 15 MCG/MIN: 1 INJECTION INTRAVENOUS at 00:37

## 2019-01-01 RX ADMIN — POTASSIUM CHLORIDE 20 MEQ: 1500 TABLET, EXTENDED RELEASE ORAL at 09:15

## 2019-01-01 RX ADMIN — PREGABALIN 150 MG: 75 CAPSULE ORAL at 10:05

## 2019-01-01 RX ADMIN — CEFEPIME HYDROCHLORIDE 2000 MG: 2 INJECTION, POWDER, FOR SOLUTION INTRAVENOUS at 14:21

## 2019-01-01 RX ADMIN — QUETIAPINE FUMARATE 25 MG: 25 TABLET ORAL at 21:09

## 2019-01-01 RX ADMIN — METRONIDAZOLE 500 MG: 500 TABLET ORAL at 21:13

## 2019-01-01 RX ADMIN — METRONIDAZOLE 500 MG: 500 TABLET ORAL at 06:34

## 2019-01-01 RX ADMIN — TRAMADOL HYDROCHLORIDE 50 MG: 50 TABLET, COATED ORAL at 21:13

## 2019-01-01 RX ADMIN — TRAMADOL HYDROCHLORIDE 50 MG: 50 TABLET, COATED ORAL at 01:57

## 2019-01-01 RX ADMIN — SODIUM CHLORIDE 1000 ML: 0.9 INJECTION, SOLUTION INTRAVENOUS at 18:35

## 2019-01-01 RX ADMIN — METOPROLOL TARTRATE 5 MG: 5 INJECTION, SOLUTION INTRAVENOUS at 10:14

## 2019-01-01 RX ADMIN — METOPROLOL TARTRATE 2.5 MG: 1 INJECTION, SOLUTION INTRAVENOUS at 04:36

## 2019-01-01 RX ADMIN — PREGABALIN 150 MG: 75 CAPSULE ORAL at 08:41

## 2019-01-01 RX ADMIN — TAMSULOSIN HYDROCHLORIDE 0.4 MG: 0.4 CAPSULE ORAL at 16:04

## 2019-01-01 RX ADMIN — FLUTICASONE FUROATE AND VILANTEROL TRIFENATATE 1 PUFF: 200; 25 POWDER RESPIRATORY (INHALATION) at 08:50

## 2019-01-01 RX ADMIN — TRAMADOL HYDROCHLORIDE 50 MG: 50 TABLET, COATED ORAL at 00:09

## 2019-01-01 RX ADMIN — METHYLPREDNISOLONE SODIUM SUCCINATE 40 MG: 40 INJECTION, POWDER, FOR SOLUTION INTRAMUSCULAR; INTRAVENOUS at 18:01

## 2019-01-01 RX ADMIN — FINASTERIDE 5 MG: 5 TABLET, FILM COATED ORAL at 09:15

## 2019-01-01 RX ADMIN — METOPROLOL TARTRATE 50 MG: 50 TABLET, FILM COATED ORAL at 09:40

## 2019-01-01 RX ADMIN — DOCUSATE SODIUM 100 MG: 100 CAPSULE, LIQUID FILLED ORAL at 18:11

## 2019-01-01 RX ADMIN — FUROSEMIDE 80 MG: 10 INJECTION, SOLUTION INTRAMUSCULAR; INTRAVENOUS at 13:18

## 2019-01-01 RX ADMIN — AMIODARONE HYDROCHLORIDE 400 MG: 200 TABLET ORAL at 08:06

## 2019-01-01 RX ADMIN — INSULIN LISPRO 8 UNITS: 100 INJECTION, SOLUTION INTRAVENOUS; SUBCUTANEOUS at 12:54

## 2019-01-01 RX ADMIN — METHOCARBAMOL 500 MG: 500 TABLET, FILM COATED ORAL at 08:30

## 2019-01-01 RX ADMIN — INSULIN LISPRO 2 UNITS: 100 INJECTION, SOLUTION INTRAVENOUS; SUBCUTANEOUS at 09:15

## 2019-01-01 RX ADMIN — TAMSULOSIN HYDROCHLORIDE 0.4 MG: 0.4 CAPSULE ORAL at 17:08

## 2019-01-01 RX ADMIN — Medication 1000 MG: at 08:37

## 2019-01-01 RX ADMIN — SODIUM CHLORIDE, SODIUM LACTATE, POTASSIUM CHLORIDE, AND CALCIUM CHLORIDE 125 ML/HR: .6; .31; .03; .02 INJECTION, SOLUTION INTRAVENOUS at 20:15

## 2019-01-01 RX ADMIN — VASOPRESSIN 0.03 UNITS/MIN: 20 INJECTION INTRAVENOUS at 08:50

## 2019-01-01 RX ADMIN — INSULIN LISPRO 1 UNITS: 100 INJECTION, SOLUTION INTRAVENOUS; SUBCUTANEOUS at 12:51

## 2019-01-01 RX ADMIN — INSULIN LISPRO 10 UNITS: 100 INJECTION, SOLUTION INTRAVENOUS; SUBCUTANEOUS at 16:37

## 2019-01-01 RX ADMIN — CEFEPIME HYDROCHLORIDE 2000 MG: 2 INJECTION, POWDER, FOR SOLUTION INTRAVENOUS at 01:10

## 2019-01-01 RX ADMIN — OXYCODONE HYDROCHLORIDE 5 MG: 5 TABLET ORAL at 12:00

## 2019-01-01 RX ADMIN — METOPROLOL TARTRATE 50 MG: 50 TABLET, FILM COATED ORAL at 21:31

## 2019-01-01 RX ADMIN — IPRATROPIUM BROMIDE AND ALBUTEROL SULFATE 3 ML: 2.5; .5 SOLUTION RESPIRATORY (INHALATION) at 03:42

## 2019-01-01 RX ADMIN — Medication 1000 MG: at 08:06

## 2019-01-01 RX ADMIN — OXYCODONE HYDROCHLORIDE 10 MG: 10 TABLET ORAL at 21:00

## 2019-01-01 RX ADMIN — FLUTICASONE FUROATE AND VILANTEROL TRIFENATATE 1 PUFF: 200; 25 POWDER RESPIRATORY (INHALATION) at 08:27

## 2019-01-01 RX ADMIN — OXYCODONE HYDROCHLORIDE 10 MG: 10 TABLET ORAL at 05:56

## 2019-01-01 RX ADMIN — OXYCODONE HYDROCHLORIDE 10 MG: 10 TABLET ORAL at 06:13

## 2019-01-01 RX ADMIN — PREDNISONE 60 MG: 20 TABLET ORAL at 09:15

## 2019-01-01 RX ADMIN — HYDROXYUREA 500 MG: 500 CAPSULE ORAL at 16:08

## 2019-01-01 RX ADMIN — ACETAMINOPHEN 650 MG: 325 TABLET ORAL at 12:49

## 2019-01-01 RX ADMIN — INSULIN LISPRO 1 UNITS: 100 INJECTION, SOLUTION INTRAVENOUS; SUBCUTANEOUS at 11:55

## 2019-01-01 RX ADMIN — INSULIN GLARGINE 20 UNITS: 100 INJECTION, SOLUTION SUBCUTANEOUS at 10:00

## 2019-01-01 RX ADMIN — FINASTERIDE 5 MG: 5 TABLET, FILM COATED ORAL at 10:05

## 2019-01-01 RX ADMIN — SODIUM CHLORIDE 75 ML/HR: 0.9 INJECTION, SOLUTION INTRAVENOUS at 07:46

## 2019-01-01 RX ADMIN — SODIUM CHLORIDE 125 ML/HR: 0.9 INJECTION, SOLUTION INTRAVENOUS at 18:28

## 2019-01-01 RX ADMIN — PHENAZOPYRIDINE 100 MG: 100 TABLET ORAL at 08:18

## 2019-01-01 RX ADMIN — METOPROLOL TARTRATE 5 MG: 5 INJECTION, SOLUTION INTRAVENOUS at 21:44

## 2019-01-01 RX ADMIN — QUETIAPINE FUMARATE 25 MG: 25 TABLET ORAL at 03:53

## 2019-01-01 RX ADMIN — METRONIDAZOLE 500 MG: 500 INJECTION, SOLUTION INTRAVENOUS at 16:52

## 2019-01-01 RX ADMIN — CEFEPIME HYDROCHLORIDE 2000 MG: 2 INJECTION, POWDER, FOR SOLUTION INTRAVENOUS at 00:55

## 2019-01-01 RX ADMIN — DIAZEPAM 5 MG: 5 TABLET ORAL at 21:13

## 2019-01-01 RX ADMIN — OXYCODONE HYDROCHLORIDE 5 MG: 5 TABLET ORAL at 06:03

## 2019-01-01 RX ADMIN — OXYCODONE HYDROCHLORIDE 5 MG: 5 TABLET ORAL at 21:28

## 2019-01-01 RX ADMIN — INSULIN LISPRO 10 UNITS: 100 INJECTION, SOLUTION INTRAVENOUS; SUBCUTANEOUS at 16:28

## 2019-01-01 RX ADMIN — LIDOCAINE 1 PATCH: 50 PATCH TOPICAL at 20:11

## 2019-01-01 RX ADMIN — INSULIN LISPRO 2 UNITS: 100 INJECTION, SOLUTION INTRAVENOUS; SUBCUTANEOUS at 21:45

## 2019-01-01 RX ADMIN — FINASTERIDE 5 MG: 5 TABLET, FILM COATED ORAL at 09:11

## 2019-01-01 RX ADMIN — PREDNISONE 60 MG: 20 TABLET ORAL at 08:28

## 2019-01-01 RX ADMIN — LIDOCAINE 1 PATCH: 50 PATCH TOPICAL at 10:07

## 2019-01-01 RX ADMIN — AMIODARONE HYDROCHLORIDE 0.5 MG/MIN: 50 INJECTION, SOLUTION INTRAVENOUS at 22:04

## 2019-01-01 RX ADMIN — INSULIN LISPRO 10 UNITS: 100 INJECTION, SOLUTION INTRAVENOUS; SUBCUTANEOUS at 08:28

## 2019-01-01 RX ADMIN — AMIODARONE HYDROCHLORIDE 400 MG: 200 TABLET ORAL at 16:01

## 2019-01-01 RX ADMIN — TAMSULOSIN HYDROCHLORIDE 0.4 MG: 0.4 CAPSULE ORAL at 16:54

## 2019-01-01 RX ADMIN — FUROSEMIDE 40 MG: 10 INJECTION, SOLUTION INTRAMUSCULAR; INTRAVENOUS at 19:02

## 2019-01-01 RX ADMIN — METOPROLOL TARTRATE 50 MG: 25 TABLET, FILM COATED ORAL at 21:10

## 2019-01-01 RX ADMIN — HYDROMORPHONE HYDROCHLORIDE 0.5 MG: 1 INJECTION, SOLUTION INTRAMUSCULAR; INTRAVENOUS; SUBCUTANEOUS at 04:36

## 2019-01-01 RX ADMIN — OLANZAPINE 2.5 MG: 10 INJECTION, POWDER, FOR SOLUTION INTRAMUSCULAR at 06:11

## 2019-01-01 RX ADMIN — IOHEXOL 85 ML: 350 INJECTION, SOLUTION INTRAVENOUS at 20:40

## 2019-01-01 RX ADMIN — PREGABALIN 150 MG: 75 CAPSULE ORAL at 21:49

## 2019-01-01 RX ADMIN — SODIUM CHLORIDE, SODIUM LACTATE, POTASSIUM CHLORIDE, AND CALCIUM CHLORIDE 125 ML/HR: .6; .31; .03; .02 INJECTION, SOLUTION INTRAVENOUS at 04:23

## 2019-01-01 RX ADMIN — INSULIN LISPRO 1 UNITS: 100 INJECTION, SOLUTION INTRAVENOUS; SUBCUTANEOUS at 11:43

## 2019-01-01 RX ADMIN — PANTOPRAZOLE SODIUM 40 MG: 40 TABLET, DELAYED RELEASE ORAL at 08:18

## 2019-01-01 RX ADMIN — CEFEPIME HYDROCHLORIDE 2000 MG: 2 INJECTION, POWDER, FOR SOLUTION INTRAVENOUS at 14:30

## 2019-01-01 RX ADMIN — FLUTICASONE FUROATE AND VILANTEROL TRIFENATATE 1 PUFF: 200; 25 POWDER RESPIRATORY (INHALATION) at 12:08

## 2019-01-01 RX ADMIN — POTASSIUM CHLORIDE 20 MEQ: 1500 TABLET, EXTENDED RELEASE ORAL at 09:31

## 2019-01-01 RX ADMIN — METRONIDAZOLE 500 MG: 500 TABLET ORAL at 16:01

## 2019-01-01 RX ADMIN — TAMSULOSIN HYDROCHLORIDE 0.4 MG: 0.4 CAPSULE ORAL at 16:03

## 2019-01-01 RX ADMIN — INSULIN LISPRO 1 UNITS: 100 INJECTION, SOLUTION INTRAVENOUS; SUBCUTANEOUS at 21:52

## 2019-01-01 RX ADMIN — METOPROLOL TARTRATE 50 MG: 50 TABLET, FILM COATED ORAL at 08:08

## 2019-01-01 RX ADMIN — AMLODIPINE BESYLATE 5 MG: 5 TABLET ORAL at 09:31

## 2019-01-01 RX ADMIN — INSULIN LISPRO 10 UNITS: 100 INJECTION, SOLUTION INTRAVENOUS; SUBCUTANEOUS at 12:50

## 2019-01-01 RX ADMIN — PREGABALIN 150 MG: 75 CAPSULE ORAL at 21:51

## 2019-01-01 RX ADMIN — INSULIN GLARGINE 25 UNITS: 100 INJECTION, SOLUTION SUBCUTANEOUS at 08:49

## 2019-01-01 RX ADMIN — PREGABALIN 150 MG: 75 CAPSULE ORAL at 15:14

## 2019-01-01 RX ADMIN — OXYCODONE HYDROCHLORIDE 10 MG: 10 TABLET ORAL at 14:36

## 2019-01-01 RX ADMIN — METRONIDAZOLE 500 MG: 500 INJECTION, SOLUTION INTRAVENOUS at 18:15

## 2019-01-01 RX ADMIN — PHENAZOPYRIDINE 100 MG: 100 TABLET ORAL at 17:14

## 2019-01-01 RX ADMIN — OXYCODONE HYDROCHLORIDE 10 MG: 10 TABLET ORAL at 09:48

## 2019-01-01 RX ADMIN — FLUTICASONE FUROATE AND VILANTEROL TRIFENATATE 1 PUFF: 200; 25 POWDER RESPIRATORY (INHALATION) at 07:59

## 2019-01-01 RX ADMIN — FUROSEMIDE 40 MG: 10 INJECTION, SOLUTION INTRAMUSCULAR; INTRAVENOUS at 10:08

## 2019-01-01 RX ADMIN — PREGABALIN 150 MG: 75 CAPSULE ORAL at 22:35

## 2019-01-01 RX ADMIN — POTASSIUM CHLORIDE 20 MEQ: 1500 TABLET, EXTENDED RELEASE ORAL at 06:39

## 2019-01-01 RX ADMIN — METHYLPREDNISOLONE SODIUM SUCCINATE 40 MG: 40 INJECTION, POWDER, FOR SOLUTION INTRAMUSCULAR; INTRAVENOUS at 11:20

## 2019-01-01 RX ADMIN — NOREPINEPHRINE BITARTRATE 20 MCG/MIN: 1 INJECTION INTRAVENOUS at 17:50

## 2019-01-01 RX ADMIN — FINASTERIDE 5 MG: 5 TABLET, FILM COATED ORAL at 09:31

## 2019-01-01 RX ADMIN — CEFEPIME HYDROCHLORIDE 2000 MG: 2 INJECTION, POWDER, FOR SOLUTION INTRAVENOUS at 01:11

## 2019-01-01 RX ADMIN — ISOSORBIDE MONONITRATE 60 MG: 60 TABLET, EXTENDED RELEASE ORAL at 09:42

## 2019-01-01 RX ADMIN — POTASSIUM CHLORIDE 40 MEQ: 400 INJECTION, SOLUTION INTRAVENOUS at 03:24

## 2019-01-01 RX ADMIN — PANTOPRAZOLE SODIUM 40 MG: 40 INJECTION, POWDER, FOR SOLUTION INTRAVENOUS at 10:12

## 2019-01-01 RX ADMIN — HYDROMORPHONE HYDROCHLORIDE 0.5 MG: 1 INJECTION, SOLUTION INTRAMUSCULAR; INTRAVENOUS; SUBCUTANEOUS at 20:19

## 2019-01-01 RX ADMIN — MELATONIN 6 MG: 3 TAB ORAL at 21:13

## 2019-01-01 RX ADMIN — NOREPINEPHRINE BITARTRATE 5 MCG/MIN: 1 INJECTION INTRAVENOUS at 17:42

## 2019-01-01 RX ADMIN — HYDROXYUREA 500 MG: 500 CAPSULE ORAL at 18:13

## 2019-01-01 RX ADMIN — CEFEPIME HYDROCHLORIDE 2000 MG: 2 INJECTION, POWDER, FOR SOLUTION INTRAVENOUS at 03:49

## 2019-01-01 RX ADMIN — ATORVASTATIN CALCIUM 80 MG: 80 TABLET, FILM COATED ORAL at 21:13

## 2019-01-01 RX ADMIN — INSULIN LISPRO 10 UNITS: 100 INJECTION, SOLUTION INTRAVENOUS; SUBCUTANEOUS at 07:47

## 2019-01-01 RX ADMIN — METHYLPREDNISOLONE SODIUM SUCCINATE 30 MG: 40 INJECTION, POWDER, FOR SOLUTION INTRAMUSCULAR; INTRAVENOUS at 10:41

## 2019-01-01 RX ADMIN — INSULIN LISPRO 1 UNITS: 100 INJECTION, SOLUTION INTRAVENOUS; SUBCUTANEOUS at 21:55

## 2019-01-01 RX ADMIN — Medication 1000 MG: at 10:00

## 2019-01-01 RX ADMIN — ACETAMINOPHEN 650 MG: 325 TABLET ORAL at 12:03

## 2019-01-01 RX ADMIN — CEFEPIME HYDROCHLORIDE 2000 MG: 2 INJECTION, POWDER, FOR SOLUTION INTRAVENOUS at 01:49

## 2019-01-01 RX ADMIN — INSULIN LISPRO 4 UNITS: 100 INJECTION, SOLUTION INTRAVENOUS; SUBCUTANEOUS at 11:45

## 2019-01-01 RX ADMIN — INSULIN LISPRO 3 UNITS: 100 INJECTION, SOLUTION INTRAVENOUS; SUBCUTANEOUS at 16:41

## 2019-01-01 RX ADMIN — INSULIN LISPRO 1 UNITS: 100 INJECTION, SOLUTION INTRAVENOUS; SUBCUTANEOUS at 16:27

## 2019-01-01 RX ADMIN — OXYCODONE HYDROCHLORIDE 5 MG: 5 TABLET ORAL at 03:36

## 2019-01-01 RX ADMIN — AMLODIPINE BESYLATE 5 MG: 5 TABLET ORAL at 09:40

## 2019-01-01 RX ADMIN — PANTOPRAZOLE SODIUM 40 MG: 40 TABLET, DELAYED RELEASE ORAL at 09:11

## 2019-01-01 RX ADMIN — FLUTICASONE FUROATE AND VILANTEROL TRIFENATATE 1 PUFF: 200; 25 POWDER RESPIRATORY (INHALATION) at 08:32

## 2019-01-01 RX ADMIN — PREGABALIN 150 MG: 75 CAPSULE ORAL at 08:38

## 2019-01-01 RX ADMIN — INSULIN LISPRO 2 UNITS: 100 INJECTION, SOLUTION INTRAVENOUS; SUBCUTANEOUS at 18:11

## 2019-01-01 RX ADMIN — HYDROMORPHONE HYDROCHLORIDE 0.2 MG: 1 INJECTION, SOLUTION INTRAMUSCULAR; INTRAVENOUS; SUBCUTANEOUS at 10:45

## 2019-01-01 RX ADMIN — HYDROXYUREA 500 MG: 500 CAPSULE ORAL at 18:26

## 2019-01-01 RX ADMIN — INSULIN LISPRO 10 UNITS: 100 INJECTION, SOLUTION INTRAVENOUS; SUBCUTANEOUS at 08:39

## 2019-01-01 RX ADMIN — INSULIN LISPRO 10 UNITS: 100 INJECTION, SOLUTION INTRAVENOUS; SUBCUTANEOUS at 17:37

## 2019-01-01 RX ADMIN — CEFEPIME HYDROCHLORIDE 2000 MG: 2 INJECTION, POWDER, FOR SOLUTION INTRAVENOUS at 10:55

## 2019-01-01 RX ADMIN — DOCUSATE SODIUM 100 MG: 100 CAPSULE, LIQUID FILLED ORAL at 18:13

## 2019-01-01 RX ADMIN — Medication 1000 MG: at 15:22

## 2019-01-01 RX ADMIN — QUETIAPINE FUMARATE 25 MG: 25 TABLET ORAL at 21:31

## 2019-01-01 RX ADMIN — DOCUSATE SODIUM 100 MG: 100 CAPSULE, LIQUID FILLED ORAL at 18:14

## 2019-01-01 RX ADMIN — CEFEPIME HYDROCHLORIDE 2000 MG: 2 INJECTION, POWDER, FOR SOLUTION INTRAVENOUS at 14:02

## 2019-01-01 RX ADMIN — TRAMADOL HYDROCHLORIDE 50 MG: 50 TABLET, COATED ORAL at 10:39

## 2019-01-01 RX ADMIN — QUETIAPINE FUMARATE 25 MG: 25 TABLET ORAL at 21:29

## 2019-01-01 RX ADMIN — PREGABALIN 150 MG: 75 CAPSULE ORAL at 15:22

## 2019-01-01 RX ADMIN — QUETIAPINE FUMARATE 25 MG: 25 TABLET ORAL at 21:22

## 2019-01-01 RX ADMIN — INSULIN LISPRO 10 UNITS: 100 INJECTION, SOLUTION INTRAVENOUS; SUBCUTANEOUS at 08:09

## 2019-01-01 RX ADMIN — INSULIN LISPRO 2 UNITS: 100 INJECTION, SOLUTION INTRAVENOUS; SUBCUTANEOUS at 11:22

## 2019-01-01 RX ADMIN — FLUTICASONE FUROATE AND VILANTEROL TRIFENATATE 1 PUFF: 200; 25 POWDER RESPIRATORY (INHALATION) at 10:07

## 2019-01-01 RX ADMIN — HYDROXYUREA 500 MG: 500 CAPSULE ORAL at 16:25

## 2019-01-01 RX ADMIN — INSULIN GLARGINE 25 UNITS: 100 INJECTION, SOLUTION SUBCUTANEOUS at 08:31

## 2019-01-01 RX ADMIN — AMLODIPINE BESYLATE 5 MG: 5 TABLET ORAL at 08:42

## 2019-01-01 RX ADMIN — TRAMADOL HYDROCHLORIDE 50 MG: 50 TABLET, COATED ORAL at 16:01

## 2019-01-01 RX ADMIN — METOPROLOL TARTRATE 5 MG: 5 INJECTION, SOLUTION INTRAVENOUS at 03:05

## 2019-01-01 RX ADMIN — TRAMADOL HYDROCHLORIDE 50 MG: 50 TABLET, COATED ORAL at 01:58

## 2019-01-01 RX ADMIN — OXYCODONE HYDROCHLORIDE 10 MG: 10 TABLET ORAL at 15:21

## 2019-01-01 RX ADMIN — INSULIN LISPRO 2 UNITS: 100 INJECTION, SOLUTION INTRAVENOUS; SUBCUTANEOUS at 17:15

## 2019-01-01 RX ADMIN — METOPROLOL TARTRATE 2.5 MG: 1 INJECTION, SOLUTION INTRAVENOUS at 04:04

## 2019-01-01 RX ADMIN — POTASSIUM CHLORIDE 20 MEQ: 1500 TABLET, EXTENDED RELEASE ORAL at 17:58

## 2019-01-01 RX ADMIN — TAMSULOSIN HYDROCHLORIDE 0.4 MG: 0.4 CAPSULE ORAL at 16:40

## 2019-01-01 RX ADMIN — ATORVASTATIN CALCIUM 80 MG: 80 TABLET, FILM COATED ORAL at 21:38

## 2019-01-01 RX ADMIN — FLUTICASONE FUROATE AND VILANTEROL TRIFENATATE 1 PUFF: 200; 25 POWDER RESPIRATORY (INHALATION) at 08:18

## 2019-01-01 RX ADMIN — PANTOPRAZOLE SODIUM 40 MG: 40 INJECTION, POWDER, FOR SOLUTION INTRAVENOUS at 08:38

## 2019-01-01 RX ADMIN — TAMSULOSIN HYDROCHLORIDE 0.4 MG: 0.4 CAPSULE ORAL at 16:26

## 2019-01-01 RX ADMIN — ATORVASTATIN CALCIUM 80 MG: 80 TABLET, FILM COATED ORAL at 21:54

## 2019-01-01 RX ADMIN — PANTOPRAZOLE SODIUM 40 MG: 40 TABLET, DELAYED RELEASE ORAL at 08:30

## 2019-01-07 NOTE — TELEPHONE ENCOUNTER
Last OV- 12/24/18  Next OV- 1/10/19    Pt called and wanted a refill on his Benzonate  He has an upcoming appt on 1/10/19  His pharmacy is [facility]  Thank you

## 2019-01-07 NOTE — TELEPHONE ENCOUNTER
01/07/19- Called and notified pt that an order for the benzonate was called into [facility] in [place]   Bridgette Reed MA

## 2019-01-08 NOTE — TELEPHONE ENCOUNTER
Last O/V: 12/24/18  Next O/V:  1/10/19  Pt requesting refill for oxycodone- acet     Refill requested early, next fill date can be 1/10/19 pt has appt this day medication can be fill at this time   Left message for pt to make aware

## 2019-01-10 PROBLEM — J06.9 VIRAL UPPER RESPIRATORY TRACT INFECTION: Status: ACTIVE | Noted: 2019-01-01

## 2019-01-10 PROBLEM — Z79.899 POLYPHARMACY: Status: ACTIVE | Noted: 2019-01-01
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
## 2019-02-02 NOTE — ED NOTES
Patient reports he is having difficulty walking at home, EMS reports that patient was walking around in his appointment when they were on scene     Evelia Mcfadden RN  02/02/19 4088

## 2019-02-02 NOTE — TELEPHONE ENCOUNTER
Michael Godfrey 1950  CONFIDENTIALTY NOTICE: This fax transmission is intended only for the addressee  It contains information that is legally privileged,  confidential or otherwise protected from use or disclosure  If you are not the intended recipient, you are strictly prohibited from reviewing,  disclosing, copying using or disseminating any of this information or taking any action in reliance on or regarding this information  If you have  received this fax in error, please notify us immediately by telephone so that we can arrange for its return to us  Page:   Call Id: [id number]  Health Call  Standard Call Report  Health Call  Patient Name: Michael Godfrey  Gender: Male  : 1950  Age: 76 Y 3 M 15 D  Return Phone  Number: [phone] (Home)  Address: [address] [place] [address]  City/State/Zip: [address] [place] [address]  Practice Name: [address] Charged:  Physician:  [facility] Name:  Relationship To  Patient: Self  Return Phone Number: [phone] (Home)  Presenting Problem: "I have severe pain in my feet, legs  and back  I do have some chest pain "  Service Type: Triage  Charged Service 1: N/A  Pharmacy Name and  Number:  Nurse Assessment  Nurse: Tavon Omalley Date/Time: 2019 8:32:35 AM  Type of assessment required:  ---General (Adult or Child)  Duration of Current S/S  ---Started 2 days ago  Location/Radiation  ---Lower back, hips, legs, and feet  Temperature (F) and route:  ---Denies fever  Symptom Specific Meds (Dose/Time):  ---Stated that he takes Lyrica but it hasn't been helping  Other S/S  ---Patient stated that he woke up on Thursday and was in pain from his lower back to  feet  Difficulty walking, Since then, pain has become severe  Pain Scale on scale of 1-10, 10 being the worst:  ---9 out of 10  Symptom progression:  ---worse  Intake and Output  ---Decreased intake No BM today, voiding normally    Michael Godfrey 1950  CONFIDENTIALTY NOTICE: This fax transmission is intended only for the addressee  It contains information that is legally privileged,  confidential or otherwise protected from use or disclosure  If you are not the intended recipient, you are strictly prohibited from reviewing,  disclosing, copying using or disseminating any of this information or taking any action in reliance on or regarding this information  If you have  received this fax in error, please notify us immediately by telephone so that we can arrange for its return to us  Page: 2 of 2  Call Id: [id number]  Nurse Assessment  Protocols  Protocol Title Nurse Date/Time  Back Pain Faye Fields 2/2/2019 8:38:54 AM  Question Caller [address] [place] [address]  Time Disposition Final User  2/2/2019 8:39:36 AM Go to ED Now (or PCP triage) Roslyn Hardy RN, Kerri Mark  2/2/2019 8:41:00 AM RN Triaged Yes Roslyn Hardy RN, [facility] Advice Given Per Protocol  GO TO ED NOW (OR PCP TRIAGE): DRIVING: Another adult should drive  CARE ADVICE given per Back Pain (Adult) guideline  Caller Understands: Yes  Caller Disagree/Comply: Comply  PreDisposition: Unsure  Comments  User: Rolando Wilder RN Date/Time: 2/2/2019 8:40:54 AM  Patient advised to call 911 since he does not drive and has no one to drive him  Patient unsure what ER

## 2019-02-02 NOTE — ED NOTES
Patient states he does not have his wallet on him, no friends to call for a ride       Luther Jimenez RN  02/02/19 0304

## 2019-02-02 NOTE — ED NOTES
Deysi arranged for patient  [facility] dispatch states patient already in system and no paperwork needs to be faxed   Walked with patient to waiting room and instructed him to wait for ride and staff will let him know when the  is here     Huong Hinds RN  02/02/19 7421

## 2019-02-02 NOTE — ED NOTES
Attempted to inform patient of medications that were ordered for his pain, patient states that Tylenol is over the counter and it doesn't work, states he also did not want the pain patch because it does not work - Dr Velez at bedside right now examining patient  Patient asked if he can have a pain pill instead - informed patient that Tylenol is a medication in pill form - patient refused   States he want to have Tramadol and would like a prescriptions for it so that it can last him long enough for him to see a pain specialist      Kylie Gibson RN  02/02/19 4964
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
## 2019-02-02 NOTE — DISCHARGE INSTRUCTIONS
Acute Low Back Pain, Ambulatory Care   GENERAL INFORMATION:   Acute low back pain  is discomfort in your lower back area that lasts for less than 12 weeks  The word acute is used to describe pain that starts suddenly, worsens quickly, and lasts for a short time  Common symptoms include the following:   · Back stiffness or spasms    · Pain down the back or side of one leg    · Holding yourself in an unusual position or posture to decrease your back pain    · Not being able to find a sitting position that is comfortable    · Slow increase in your pain for 24 to 48 hours after you stress your back    · Tenderness on your lower back or severe pain when you move your back  Seek immediate care for the following symptoms:   · Severe pain    · Sudden stiffness and heaviness in both buttocks down to both legs    · Numbness or weakness in one leg, or pain in both legs    · Numbness in your genital area or across your lower back    · Unable to control your urine or bowel movements  Treatment for acute low back pain  may include any of the following:  · Medicines:      ¨ NSAIDs  help decrease swelling and pain or fever  This medicine is available with or without a doctor's order  NSAIDs can cause stomach bleeding or kidney problems in certain people  If you take blood thinner medicine, always ask your healthcare provider if NSAIDs are safe for you  Always read the medicine label and follow directions  ¨ Muscle relaxers  help decrease muscle spasms pain  ¨ Prescription pain medicine  may be given  Ask how to take this medicine safely  · Surgery  may be needed if your pain is severe and other treatments do not work  Surgery may be needed for conditions of the lumbar spine, such as herniated disc or spinal stenosis  Manage your symptoms:   · Sleep on a firm mattress  If you do not have a firm mattress, have someone move your mattress to the floor for a few days   A piece of plywood under your mattress can also help make it firmer  · Apply ice  on your lower back for 15 to 20 minutes every hour or as directed  Use an ice pack, or put crushed ice in a plastic bag  Cover it with a towel  Ice helps prevent tissue damage and decreases swelling and pain  You can alternate ice and heat  · Apply heat  on your lower back for 20 to 30 minutes every 2 hours for as many days as directed  Heat helps decrease pain and muscle spasms  · Go to physical therapy  A physical therapist teaches you exercises to help improve movement and strength, and to decrease pain  Prevent acute low back pain:   · Use proper body mechanics  ¨ Bend at the hips and knees when you  objects  Do not bend from the waist  Use your leg muscles as you lift the load  Do not use your back  Keep the object close to your chest as you lift it  Try not to twist or lift anything above your waist     ¨ Change your position often when you stand for long periods of time  Rest one foot on a small box or footrest, and then switch to the other foot often  ¨ Try not to sit for long periods of time  When you do, sit in a straight-backed chair with your feet flat on the floor  Never reach, pull, or push while you are sitting  · Exercise regularly  Warm up before you exercise  Do exercises that strengthen your back muscles  Ask about the best exercise plan for you  · Maintain a healthy weight  Ask your healthcare provider how much you should weigh  Ask him to help you create a weight loss plan if you are overweight  Follow up with your healthcare provider as directed:  Return for a follow-up visit if you still have pain after 1 to 3 weeks of treatment  You may need to visit an orthopedist if your back pain lasts more than 6 to 12 weeks  Write down your questions so you remember to ask them during your visits  CARE AGREEMENT:   You have the right to help plan your care  Learn about your health condition and how it may be treated   Discuss treatment options with your caregivers to decide what care you want to receive  You always have the right to refuse treatment  The above information is an  only  It is not intended as medical advice for individual conditions or treatments  Talk to your doctor, nurse or pharmacist before following any medical regimen to see if it is safe and effective for you  © 2014 [address] is for End User's use only and may not be sold, redistributed or otherwise used for commercial purposes  All illustrations and images included in CareNotes® are the copyrighted property of A D A M [employer] Inc  or Jamil Henson

## 2019-02-04 NOTE — TELEPHONE ENCOUNTER
Left message for patient to call the office to follow up on the call he placed to answering service on 2/2/19  I am not able to check [facility] portal as we do not have on file the last 4 digits of his social security number

## 2019-02-04 NOTE — TELEPHONE ENCOUNTER
Telephone     2019  [address]   Triage   Reason for call    Conversation: Triage   [facility])   2019   Laurie Polnaco          8:41 AM   Note      Brian Kelly 1950  CONFIDENTIALTY NOTICE: This fax transmission is intended only for the addressee  It contains information that is legally privileged,  confidential or otherwise protected from use or disclosure  If you are not the intended recipient, you are strictly prohibited from reviewing,  disclosing, copying using or disseminating any of this information or taking any action in reliance on or regarding this information  If you have  received this fax in error, please notify us immediately by telephone so that we can arrange for its return to us  Page:   Call Id: [id number]  Health Call  Standard Call Report  Health Call  Patient Name: Brian Kelly  Gender: Male  : 1950  Age: 76 Y 3 M 15 D  Return Phone  Number: [phone] (Home)  Address: [address] [place] [address]  City/State/Zip: [address] [place] [address]  Practice Name: [address] [place] [address] Charged:  Physician:  [address] [place] [address] Name:  Relationship To  Patient: Self  Return Phone Number: [phone] (Home)  Presenting Problem: "I have severe pain in my feet, legs  and back  I do have some chest pain "  Service Type: Triage  Charged Service 1: N/A  Pharmacy Name and  Number:  Nurse Assessment  Nurse: Joceline Xavier Date/Time: 2019 8:32:35 AM  Type of assessment required:  ---General (Adult or Child)  Duration of Current S/S  ---Started 2 days ago  Location/Radiation  ---Lower back, hips, legs, and feet  Temperature (F) and route:  ---Denies fever  Symptom Specific Meds (Dose/Time):  ---Stated that he takes Lyrica but it hasn't been helping  Other S/S  ---Patient stated that he woke up on Thursday and was in pain from his lower back to  feet   Difficulty walking, Since then, pain has become severe  Pain Scale on scale of 1-10, 10 being the worst:  ---9 out of 10  Symptom progression:  ---worse  Intake and Output  ---Decreased intake No BM today, voiding normally  Arvind Contreras 1950  CONFIDENTIALTY NOTICE: This fax transmission is intended only for the addressee  It contains information that is legally privileged,  confidential or otherwise protected from use or disclosure  If you are not the intended recipient, you are strictly prohibited from reviewing,  disclosing, copying using or disseminating any of this information or taking any action in reliance on or regarding this information  If you have  received this fax in error, please notify us immediately by telephone so that we can arrange for its return to us  Page: 2 of 2  Call Id: [id number]  Nurse Assessment  Protocols  Protocol Title Nurse Date/Time  Back Pain Flora Jacobsen 2/2/2019 8:38:54 AM  Question Caller [address] [place] [address]  Time Disposition Final User  2/2/2019 8:39:36 AM Go to ED Now (or PCP triage) Mary Cheatham RN, [facility] Muller  2/2/2019 8:41:00 AM RN Triaged Yes Mary Cheatham RN, [facility] Advice Given Per Protocol  GO TO ED NOW (OR PCP TRIAGE): DRIVING: Another adult should drive  CARE ADVICE given per Back Pain (Adult) guideline  Caller Understands: Yes  Caller Disagree/Comply: Comply  PreDisposition: Unsure  Comments  User: Jose Martines RN Date/Time: 2/2/2019 8:40:54 AM  Patient advised to call 911 since he does not drive and has no one to drive him  Patient unsure what ER             8:41 AM      Arvind Contreras contacted Jose Martines              8:44 AM   Jose Martines routed this conversation to [facility]   February 4, 2019   Me          8:43 AM   Note      Left message for patient to call the office to follow up on the call he placed to answering service on 2/2/19  I am not able to check [facility] portal as we do not have on file the last 4 digits of his social security number  Additional Documentation     Encounter Info:    Billing Info,    History,    Allergies,    Detailed Report

## 2019-02-04 NOTE — TELEPHONE ENCOUNTER
This message belongs to [employer] help desk contacted and ticket placed to merge charts

## 2019-02-05 PROBLEM — J06.9 VIRAL UPPER RESPIRATORY TRACT INFECTION: Status: RESOLVED | Noted: 2019-01-01 | Resolved: 2019-01-01

## 2019-02-05 PROBLEM — R07.9 CHEST PAIN: Status: RESOLVED | Noted: 2018-03-27 | Resolved: 2019-01-01

## 2019-02-05 NOTE — ASSESSMENT & PLAN NOTE
He reports that he discarded his remaining Lyrica and Percocet by flushing down the toilet as was recommended to him by the ER physician  He states that he would no longer like to be on Lyrica or Percocet for his pain management  He would like to only be on tramadol to which he is taking 50 mg every 8 hr  He is currently in the process of scheduling appointment with spine and pain management  PDMP was reviewed today and his next refill for Percocet is due on 02/10/2019  Counseled patient on taking medications ONLY AS PRESCRIBED and that his recent actions are red flag signs for drug seeking behavior, as has been told to him in the past  Narcotic agreement was discussed with him and I advise he bring his pain medications with him to every appointment to that they may be reviewed
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
## 2019-02-05 NOTE — TELEPHONE ENCOUNTER
Patient called requesting an appt today in [place] to address his pain and need for pain medication refill  Same day appt given with Dr Dave Rocha at 1400

## 2019-06-10 PROBLEM — S81.801A LEG WOUND, RIGHT, INITIAL ENCOUNTER: Status: ACTIVE | Noted: 2019-01-01

## 2019-06-10 PROBLEM — N30.00 ACUTE CYSTITIS WITHOUT HEMATURIA: Status: RESOLVED | Noted: 2017-11-21 | Resolved: 2019-01-01

## 2019-06-10 PROBLEM — D69.6 THROMBOCYTOPENIA (HCC): Status: ACTIVE | Noted: 2019-01-01

## 2019-07-08 NOTE — TELEPHONE ENCOUNTER
MED:Tramadol (50mg)  SUPPLY: 30Day  PHARMACY: [facility]  PATIENT PHONE #: [phone]  LAST OV:6/10/2019  UPCOMING OV: LMOM for the patient to call back and schedule f/u [email or web address]

## 2019-07-09 PROBLEM — Z00.00 MEDICARE ANNUAL WELLNESS VISIT, SUBSEQUENT: Status: ACTIVE | Noted: 2019-01-01
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
## 2019-07-09 NOTE — ASSESSMENT & PLAN NOTE
Patient's most recent hemoglobin A1c on 06/02/2019 at 13 6 patient states his fasting sugars are between 120-140 fasting in the morning and before dinner  Patient is currently using 20 units of Lantus in the evening which I had given him samples for today, and 8 units of Humalog with meals, will get follow-up hemoglobin A1c 3 months from June  Did make patient aware that he is due for eye exam  Patient is to continue to work on diet and exercise  Limit sugars and carbohydrate intake  Avoid soda, juice, sweets, cookies, desserts, pasta, bread    Eat more whole grains, exercised 30 min of cardio at least 3 times a week  Also recommended daily foot exams to check for sores, and recommended yearly eye exams

## 2019-07-09 NOTE — ASSESSMENT & PLAN NOTE
Patient given referral to Cardiology and appointment was set up with Cardiology I discussed the importance with patient about keep this appointment

## 2019-07-09 NOTE — TELEPHONE ENCOUNTER
Please see note from last office visit with [facility] on 06/10/2019 and advise  She will not be in the office until next week

## 2019-07-09 NOTE — ASSESSMENT & PLAN NOTE
As discussed with Dr Jeannette Martinez and Иван Herrera, the patient's primary managing provider, the patient must schedule an appointment with Spine and Pain to start to manage his chronic pain  Our office will then only fill the tramadol until that appointment  He must be seen monthly until that appointment by our office  If he does not schedule this appointment or cancels/no shows/frequently reschedules, then we will discontinue prescribing him tramadol  Patient has appointment set up with Spine and Pain Management on July 26, 2019  The patient was given refill of tramadol while in office today

## 2019-07-25 PROBLEM — N39.0 RECURRENT UTI: Status: ACTIVE | Noted: 2019-01-01

## 2019-07-25 NOTE — ASSESSMENT & PLAN NOTE
In reviewing records, he has had treatment with Keflex to which he reports has improved his symptoms however continues to have recurrent episodes of UTI  Unfortunately, no urine culture studies are available and today he was only able to produce a minimal amount of urine, enough for a point of care dip  Will treat with ciprofloxacin 250 mg every 12 hours for 7 days and refer to Urology for recurrent urinary tract infections  He is to contact office or seek immediate medical attention if symptoms worsen or if he develops fevers or hemodynamic instability
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
## 2019-07-28 NOTE — ASSESSMENT & PLAN NOTE
Uncontrolled  Last A1c in 13 2%  Patient has chronic diabetic neuropathy of his arms and legs  · Lantus 30units q h s  · Will give one time dose of Humalog 5 U; otherwise Humalog 8 units with meals  · Insulin sliding scale  · Lyrica 150 mg t i d   For neuropathy

## 2019-07-28 NOTE — ASSESSMENT & PLAN NOTE
Present on admission  Afebrile for the past 24 hours  WBC improved at 27 6 from 31 7 yesterday  Possibly 2/2 acute bone marrow process vs acute infection (UTI vs Prostatis)   · Hematology and oncology recs appreciated  · Will require close follow-up with Hematology in the outpatient  · LAP pending  · Will most likely need bone marrow biopsy  · Continue to monitor daily with CBC w diff

## 2019-07-28 NOTE — ASSESSMENT & PLAN NOTE
Present on admission  Patient denies any signs of active bleeding  No melena, blood in stool, hematochezia  Hemoglobin 7 9-->7 3--> 7 0-->s/p 1 U pRBC -->7 6  today  On chart review, patient had anemia to 10 3 in June 2019  , reticulocyte- 3%, fibrinogen 539   Possibly 2/2 acute bone marrow process vs hemolysis/DIC vs TTP (with concurrent thrombocytopenia and h/o recent antibiotic use)  · Will require close follow-up with Hematology in the outpatient

## 2019-07-28 NOTE — ED ATTENDING ATTESTATION
Ervin New MD, saw and evaluated the patient  I have discussed the patient with the resident/non-physician practitioner and agree with the resident's/non-physician practitioner's findings, Plan of Care, and MDM as documented in the resident's/non-physician practitioner's note, except where noted  All available labs and Radiology studies were reviewed  I was present for key portions of any procedure(s) performed by the resident/non-physician practitioner and I was immediately available to provide assistance  At this point I agree with the current assessment done in the Emergency Department  I have conducted an independent evaluation of this patient a history and physical is as follows:      Critical Care Time  Procedures     75 yo male with recurrent uti, currently treated with cipro, given by pcp for positive nitrite urine, and is not improving  Pt with vomiting today, persistent dysuria  No cp, no sob, no abdominal pain  pmh htn, depression, gerd, cad, copd  Vss, febrile, lungs cta, rrr, abdomen soft nontender, right cva tenderness  labs, pain meds, zofran, ivf, cefepime for likely pyelonephritis

## 2019-07-28 NOTE — ASSESSMENT & PLAN NOTE
Wt Readings from Last 3 Encounters:   07/28/19 96 2 kg (212 lb)   07/09/19 102 kg (223 lb 12 8 oz)   06/10/19 104 kg (228 lb 9 6 oz)     · Euvolemic on exam  · Beta-blocker and Imdur as above  · Continue to monitor

## 2019-07-28 NOTE — ASSESSMENT & PLAN NOTE
Present on admission  Patient presents with lower abdominal pain and dysuria after outpatient failure of ciprofloxacin taking since Friday  UA:  Negative nitrite, 10-20 WBC, occasional bacteria and epithelial cells  CT abdomen and pelvis with contrast:  no acute intra-abdominal abnormality  Procalcitonin 0 53  Urine culture no growth  and blood cultures no growth which was suspected given antibiotic treatment prior to admission  · Pain medication p r n    · Urology follow-up in the outpatient  · PSA pending
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
## 2019-07-28 NOTE — PROGRESS NOTES
INTERNAL MEDICINE RESIDENCY SENIOR ADMISSION NOTE     Name: Js Greenberg  Age & Sex: 76 y o  male   MRN: [id number]  Unit/Bed#: ED 25   Encounter: [id number]  Primary Care Provider: Denny Adkins MD    Patient seen and examined  Reviewed H&P per Dr Vinod Sylvester   Agree with the assessment and plan with any exception/addition as noted below:    Patient is 61-year-old male With history of recurrent UTI, CAD with triple-vessel disease, chronic bronchitis, chronic back pain presents the hospital with complaints of on lower quadrant abdominal pain  Patient was seen by primary care doctor 3 days ago and started on ciprofloxacin for history of recurrent UTI  On admission, patient found to have a white blood cell count 02732 and platelets below 43,407  Patient did have history of thrombocytopenia with plan to see Hematology Oncology as an outpatient  Patient has not been evaluated this time  Does endorse easy bruising  Denies easy bleeding  No past history of thrombocytopenia  No personal history or family history of cancer  No recent medications      Principal Problem:    Leukocytosis with bandemia  Active Problems:    Type 2 diabetes mellitus with diabetic neuropathy, with long-term current use of insulin (HCC)    Essential hypertension    GERD (gastroesophageal reflux disease)    Hyperlipidemia    Iron deficiency anemia    Chronic diastolic CHF (congestive heart failure) (HCC)    Coronary artery disease of native artery of native heart with stable angina pectoris (HCC)    Thrombocytopenia (HCC)    Recurrent UTI    Leukocytosis with bandemia and thrombocytopenia  Possibly secondary to acute urinary tract infection versus mild dysplastic syndrome  Plan:  Obtain hemolysis labs  Trend hemoglobin and platelets-transfuse hemoglobin less than 7 and platelets less than 10  Consult Hematology Oncology for possible bone marrow biopsy    Acute cystitis  History of recurrent UTI  Plan:  Considering patient failed outpatient ciprofloxacin and has had recurrent UTI will continue broad-spectrum antibiotics with cefepime for now and follow up culture    Code Status: Level 2 - DNAR: but accepts endotracheal intubation  Admission Status: INPATIENT   Disposition: Patient requires Med/Surg  Expected Length of Stay:  More than 2 midnights

## 2019-07-28 NOTE — ASSESSMENT & PLAN NOTE
Present on admission  Platelets stable in the 20s  No signs of active bleeding  Fibrin split products elevated    This indicates possible DIC/hemolysis  especially in the setting of anemia and infection versus acute bone marrow process  ·  Hematology Oncology follow-up required  · Platelet transfusion for platelets less than 13,290 and active bleeding

## 2019-07-28 NOTE — ED PROVIDER NOTES
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 mg tablet Take 1 tablet (75 mg total) by mouth daily for 30 days  Qty: 90 tablet, Refills: 1    Associated Diagnoses: Coronary artery disease involving native coronary artery of native heart with angina pectoris (HCC)      fluticasone-salmeterol (ADVAIR DISKUS) 250-50 mcg/dose inhaler Inhale 1 puff every 12 (twelve) hours  Qty: 1 Inhaler, Refills: 3    Associated Diagnoses: Chronic bronchitis, unspecified chronic bronchitis type ([facility])      insulin glargine (LANTUS SOLOSTAR) 100 units/mL injection pen Inject 20 Units under the skin daily  Qty: 5 pen, Refills: 0    Associated Diagnoses: Type 2 diabetes mellitus treated with insulin ([facility])      insulin lispro (HUMALOG KWIKPEN) 100 units/mL injection pen Inject 8 Units under the skin 3 (three) times a day with meals  Qty: 5 pen, Refills: 0    Associated Diagnoses: Type 2 diabetes mellitus treated with insulin ([facility])      isosorbide mononitrate (IMDUR) 60 mg 24 hr tablet Take 1 tablet (60 mg total) by mouth daily  Qty: 90 tablet, Refills: 1    Associated Diagnoses: Coronary artery disease of native artery of native heart with stable angina pectoris (HCC)      LYRICA 150 MG capsule Take 150 mg by mouth 3 (three) times a day  Refills: 0      metoprolol tartrate (LOPRESSOR) 50 mg tablet Take 1 tablet (50 mg total) by mouth every 12 (twelve) hours  Qty: 180 tablet, Refills: 1    Associated Diagnoses: Left main coronary artery disease      pantoprazole (PROTONIX) 40 mg tablet Take 1 tablet (40 mg total) by mouth daily  Qty: 90 tablet, Refills: 1    Associated Diagnoses: Gastroesophageal reflux disease, esophagitis presence not specified      QUEtiapine (SEROquel) 25 mg tablet Take 1 tablet (25 mg total) by mouth daily at bedtime  Qty: 30 tablet, Refills: 3    Associated Diagnoses: Opioid dependence with opioid-induced disorder ([facility] ); Closed fracture of transverse process of lumbar vertebra with routine healing      tamsulosin (FLOMAX) 0 4 mg Take 1 capsule (0 4 mg total) by mouth daily with dinner  Qty: 90 capsule, Refills: 1    Associated Diagnoses: Coronary artery disease of native artery of native heart with stable angina pectoris ([facility]); BPH associated with nocturia      albuterol (2 5 mg/3 mL) 0 083 % nebulizer solution Take 2 5 mg by nebulization every 2 (two) hours as needed for wheezing      albuterol (VENTOLIN HFA) 90 mcg/act inhaler Inhale 2 puffs every 6 (six) hours as needed for wheezing  Qty: 1 Inhaler, Refills: 1    Associated Diagnoses: Chronic bronchitis, unspecified chronic bronchitis type ([facility] )      ! ! B-D UF III MINI PEN NEEDLES 31G X 5 MM MISC Use 4 daily with insulin injections  Qty: 180 each, Refills: 1    Associated Diagnoses: Type 2 diabetes mellitus treated with insulin ([facility])      benzonatate (TESSALON PERLES) 100 mg capsule Take 2 capsules (200 mg total) by mouth 3 (three) times a day as needed for cough  Qty: 30 capsule, Refills: 0    Associated Diagnoses: Mucopurulent chronic bronchitis ([facility])      Blood Glucose Monitoring Suppl (ONE TOUCH ULTRA 2) w/Device KIT Test blood glucose 3 times daily  Qty: 1 each, Refills: 0    Associated Diagnoses: Type 2 diabetes mellitus with diabetic neuropathy, without long-term current use of insulin ([facility])      docusate sodium (COLACE) 100 mg capsule Take 1 capsule (100 mg total) by mouth 2 (two) times a day  Qty: 180 capsule, Refills: 1    Associated Diagnoses: Opioid dependence with opioid-induced disorder (HCC)      ferrous sulfate 325 (65 Fe) mg tablet Take 1 tablet (325 mg total) by mouth daily with breakfast  Qty: 30 tablet, Refills: 0    Associated Diagnoses: Iron deficiency anemia, unspecified iron deficiency anemia type      glucose blood (ONE TOUCH ULTRA TEST) test strip Test blood glucose 3 times daily (One Touch Ultra Blue)  Qty: 100 each, Refills: 2    Associated Diagnoses: Type 2 diabetes mellitus with diabetic neuropathy, without long-term current use of insulin ([address] )      ! !  Insulin Pen Needle (PEN NEEDLES 3/16") 31G X 5 MM MISC Use one daily with Lantus pen      ipratropium-albuterol (DUO-NEB) 0 5-2 5 mg/3 mL nebulizer solution Inhale 3 mL 4 (four) times a day      Multiple Vitamin (MULTIVITAMIN) tablet Take 1 tablet by mouth daily      nitroglycerin (NITROSTAT) 0 4 mg SL tablet Place 1 tablet (0 4 mg total) under the tongue every 5 (five) minutes as needed for chest pain for up to 30 days  Qty: 30 tablet, Refills: 0    Associated Diagnoses: Coronary artery disease involving native coronary artery of native heart with angina pectoris ([facility])      nystatin (MYCOSTATIN) powder Apply topically 3 (three) times a day  Qty: 56 7 g, Refills: 1    Associated Diagnoses: Fungal infection of the groin      omega-3-acid ethyl esters (LOVAZA) 1 g capsule Take 1 g by mouth daily      ONETOUCH DELICA LANCETS 30P MISC Test blood glucose 3 times daily  Qty: 100 each, Refills: 2    Associated Diagnoses: Type 2 diabetes mellitus with diabetic neuropathy, without long-term current use of insulin ([facility])      polyethylene glycol (GLYCOLAX) powder Take 17 g by mouth daily  Qty: 225 g, Refills: 0    Associated Diagnoses: Opioid dependence with opioid-induced disorder (HCC)      polyvinyl alcohol (LIQUIFILM TEARS) 1 4 % ophthalmic solution Administer 1 drop to both eyes as needed for dry eyes      senna (SENOKOT) 8 6 mg Take 1 tablet (8 6 mg total) by mouth daily at bedtime for 30 days  Qty: 30 each, Refills: 0    Associated Diagnoses: Opioid dependence with opioid-induced disorder (HCC)      tiotropium (SPIRIVA RESPIMAT) 2 5 MCG/ACT AERS inhaler Inhale 2 puffs daily  Qty: 1 Inhaler, Refills: 1    Associated Diagnoses: Mucopurulent chronic bronchitis (HCC)      traMADol (ULTRAM) 50 mg tablet Take 1 tablet (50 mg total) by mouth every 8 (eight) hours as needed for moderate pain or severe pain  Qty: 90 tablet, Refills: 0    Associated Diagnoses: Chronic bilateral low back pain with bilateral sciatica       ! ! - Potential duplicate medications found  Please discuss with provider  No discharge procedures on file  ED Provider  Attending physically available and evaluated Camilla James I managed the patient along with the ED Attending      Electronically Signed by         Mikie Lucas MD  07/29/19 7612

## 2019-07-28 NOTE — ASSESSMENT & PLAN NOTE
· Holding home Plavix at this time secondary to possible acute blood loss anemia  · Imdur 60 mg daily  · Lopressor 50 mg b i d

## 2019-07-29 PROBLEM — E87.6 HYPOKALEMIA: Status: ACTIVE | Noted: 2019-01-01

## 2019-07-29 PROBLEM — I50.32 CHRONIC DIASTOLIC CHF (CONGESTIVE HEART FAILURE) (HCC): Status: ACTIVE | Noted: 2017-09-23

## 2019-07-29 NOTE — PROGRESS NOTES
INTERNAL MEDICINE RESIDENCY PROGRESS NOTE     Name: Ramonita Cleaning  Age & Sex: 76 y o  male   MRN: [id number]  Unit/Bed#: [facility] 906-01   Encounter: [id number]  Team: SOD Team C     PATIENT INFORMATION     Name: Ramonita Cleaning  Age & Sex: 76 y o  male   MRN: [id number]  Hospital Stay Days: 1    ASSESSMENT/PLAN     Principal Problem:    Leukocytosis with bandemia  Active Problems:    Iron deficiency anemia    Thrombocytopenia ([facility])    Recurrent UTI    Type 2 diabetes mellitus with diabetic neuropathy, with long-term current use of insulin ([facility])    Essential hypertension    GERD (gastroesophageal reflux disease)    Hyperlipidemia    Chronic diastolic CHF (congestive heart failure) ([facility])    COPD (chronic obstructive pulmonary disease) ([facility])    Coronary artery disease of native artery of native heart with stable angina pectoris ([facility] )    Recurrent UTI  Assessment & Plan  Present on admission  Patient presents with lower abdominal pain and dysuria after outpatient failure of ciprofloxacin taking since Friday  Patient has had recurrent UTIs; last culture growth from June 2019 negative  UA:  Negative nitrite, 10-20 WBC, occasional bacteria and epithelial cells  CT abdomen and pelvis with contrast:  no acute intra-abdominal abnormality  · Urine culture and blood cultures pending  · Cefepime 2 g q 12 hours with outpatient ciprofloxacin failure  · Follow up with culture results for antibiotic narrowing  · Pain medication p r n  Thrombocytopenia ([facility] )  Assessment & Plan  Present on admission  Platelets 19  No signs of active bleeding  · Hematology Oncology consult as above  · Platelet transfusion for platelets less than 08,286 and active bleeding  · Continue to monitor daily    Iron deficiency anemia  Assessment & Plan  Present on admission  Hemoglobin 8 3 MCV 90  On chart review, patient had anemia to 10 3 in June 2019  Patient denies any signs of active bleeding    No melena, blood in stool, hematochezia  · H&H q 12 hours next due at 2000  · Type and screen pending  · Hemolytic panel pending (blood smear, LDH, reticulocyte, fibrinogen, haptoglobin)  · Hematology consult as above  · Transfuse if hemoglobin less than 7 or symptomatic    * Leukocytosis with bandemia  Assessment & Plan  Present on admission  WBC 30 4 with 16% bandemia  On chart review, patient presented [facility] for chest pain and was subsequently found to similar bandemia with WBC 14 and 15% bandemia  · Hematology and oncology consult  · Will most likely need bone marrow biopsy  · Continue to monitor daily    Type 2 diabetes mellitus with diabetic neuropathy, with long-term current use of insulin ([facility])  Assessment & Plan  Uncontrolled  Last A1c in 13 2%  Patient has chronic diabetic neuropathy of his arms and legs  · Lantus 20 units q h s   · Humalog 8 units with meals  · Insulin sliding scale  · Lyrica 150 mg t i d  For neuropathy      Coronary artery disease of native artery of native heart with stable angina pectoris ([facility])  Assessment & Plan  · Holding home Plavix at this time secondary to possible acute blood loss anemia  · Imdur 60 mg daily  · Lopressor 50 mg b i d     COPD (chronic obstructive pulmonary disease) ([facility])  Assessment & Plan  · Breo Ellipta daily    Chronic diastolic CHF (congestive heart failure) ([facility])  Assessment & Plan  Wt Readings from Last 3 Encounters:   07/28/19 96 2 kg (212 lb)   07/09/19 102 kg (223 lb 12 8 oz)   06/10/19 104 kg (228 lb 9 6 oz)     · Euvolemic on exam  · Beta-blocker and Imdur as above  · Continue to monitor        Hyperlipidemia  Assessment & Plan  · Atorvastatin 80 mg daily    GERD (gastroesophageal reflux disease)  Assessment & Plan  · Protonix 40 mg daily    Essential hypertension  Assessment & Plan  · Amlodipine 5 mg daily    Disposition: Discharge home; will aim for 7/31 or 8/1     SUBJECTIVE     Patient seen and examined  Nursing rounds were completed   No acute events overnight  Patient was sitting comfortably in bed eating breakfast  He endorses continued burning sensation with urination and left inguinal region pain  He states that he does not want this to recur  Patient reports dizziness, chills, testicular pain, urinary frequency, and urgency and unsteadiness when standing  This pain is lessened by sitting up in bed however nothing else helps  Patient also complains of nocturia, Patient denies fevers, chills, chest pain, shortness of breath, abdominal pain, changes in bowel habits, changes in ambulation  OBJECTIVE     Vitals:    19 1430 19 1617 19 2109 19 2230   BP: 160/71 148/73 118/58 117/59   BP Location:       Pulse: 86 94 81 80   Resp:    Temp:  98 4 °F (36 9 °C)  99 °F (37 2 °C)   TempSrc:       SpO2:  98%  94%   Weight:          Temperature:   Temp (24hrs), Av 8 °F (37 1 °C), Min:98 1 °F (36 7 °C), Max:99 7 °F (37 6 °C)    Temperature: 99 °F (37 2 °C)  Intake & Output:  I/O        07 -  0700  07 -  07 -  0700    P  O   720     I V  (mL/kg)  40 (0 4)     IV Piggyback  100     Total Intake(mL/kg)  860 (8 9)     Urine (mL/kg/hr)  0     Total Output  0     Net  +860            Unmeasured Urine Occurrence  1 x         Weights:   IBW: -88 kg    Body mass index is 30 42 kg/m²  Weight (last 2 days)     Date/Time   Weight    19 0838   96 2 (212)            Physical Exam   Constitutional: He is oriented to person, place, and time  He appears well-developed and well-nourished  HENT:   Head: Normocephalic and atraumatic  Eyes: Conjunctivae are normal  No scleral icterus  Neck: No thyromegaly present  Cardiovascular: Normal rate, regular rhythm and intact distal pulses  Murmur heard  Systolic murmur is present with a grade of 3/6  Pulmonary/Chest: Effort normal and breath sounds normal    Abdominal: Soft  Bowel sounds are normal  There is no hepatosplenomegaly   There is tenderness in the left lower quadrant  There is CVA tenderness  Tenderness to palpation of the left inguinal region   Genitourinary: Testes normal and penis normal  Right testis shows no swelling  Left testis shows no swelling  Musculoskeletal: Normal range of motion  Lymphadenopathy:     He has no cervical adenopathy  Neurological: He is alert and oriented to person, place, and time  A sensory deficit (Some peripheral numbness) is present  Skin: Skin is warm, dry and intact  Bruising noted  No petechiae noted  Skin over left and right inguinal areas intact without erythema or edema   Nursing note and vitals reviewed  LABORATORY DATA     Labs: I have personally reviewed pertinent reports  Results from last 7 days   Lab Units 07/29/19  0514 07/28/19  1846 07/28/19  1050 07/28/19  0845   WBC Thousand/uL 32 07*  --  30 35* 29 37*   HEMOGLOBIN g/dL 7 9* 8 1* 8 0* 8 3*   HEMATOCRIT % 26 7* 26 7* 26 1* 27 4*   PLATELETS Thousands/uL 20*  --  19* 20*   NEUTROS PCT % 68  --  69  --    MONOS PCT % 8  --  7  --    MONO PCT %  --   --   --  5      Results from last 7 days   Lab Units 07/29/19  0514 07/28/19  0845   POTASSIUM mmol/L 3 2* 3 2*   CHLORIDE mmol/L 102 99*   CO2 mmol/L 25 27   BUN mg/dL 25 29*   CREATININE mg/dL 1 61* 1 54*   CALCIUM mg/dL 8 6 8 9   ALK PHOS U/L  --  110   ALT U/L  --  14   AST U/L  --  7              Results from last 7 days   Lab Units 07/28/19  0845   INR  1 29*     Results from last 7 days   Lab Units 07/28/19  1050   LACTIC ACID mmol/L 1 0           IMAGING & DIAGNOSTIC TESTING     Radiology Results: I have personally reviewed pertinent reports  Ct Abdomen Pelvis With Contrast    Result Date: 7/28/2019  Impression: No acute intra-abdominal abnormality  Cholelithiasis  Workstation performed: [id number]     Other Diagnostic Testing: I have personally reviewed pertinent reports      ACTIVE MEDICATIONS     Current Facility-Administered Medications   Medication Dose Route Frequency    acetaminophen (TYLENOL) tablet 975 mg  975 mg Oral Q6H PRN    amLODIPine (NORVASC) tablet 5 mg  5 mg Oral Daily    atorvastatin (LIPITOR) tablet 80 mg  80 mg Oral HS    cefepime (MAXIPIME) 2 g/50 mL dextrose IVPB  2,000 mg Intravenous Q12H    fluticasone-vilanterol (BREO ELLIPTA) 200-25 MCG/INH inhaler 1 puff  1 puff Inhalation Daily    insulin glargine (LANTUS) subcutaneous injection 20 Units 0 2 mL  20 Units Subcutaneous HS    insulin lispro (HumaLOG) 100 units/mL subcutaneous injection 1-6 Units  1-6 Units Subcutaneous TID AC    insulin lispro (HumaLOG) 100 units/mL subcutaneous injection 8 Units  8 Units Subcutaneous TID With Meals    isosorbide mononitrate (IMDUR) 24 hr tablet 60 mg  60 mg Oral Daily    metoprolol tartrate (LOPRESSOR) tablet 50 mg  50 mg Oral Q12H RHONDA    oxyCODONE (ROXICODONE) IR tablet 2 5 mg  2 5 mg Oral Q4H PRN    oxyCODONE (ROXICODONE) IR tablet 5 mg  5 mg Oral Q4H PRN    pantoprazole (PROTONIX) EC tablet 40 mg  40 mg Oral Daily    polyethylene glycol (MIRALAX) packet 17 g  17 g Oral Daily PRN    pregabalin (LYRICA) capsule 150 mg  150 mg Oral TID    QUEtiapine (SEROquel) tablet 25 mg  25 mg Oral HS    senna (SENOKOT) tablet 8 6 mg  1 tablet Oral HS PRN    tamsulosin (FLOMAX) capsule 0 4 mg  0 4 mg Oral Daily With Dinner       VTE Pharmacologic Prophylaxis: RX contraindicated due to: Potential bleed  VTE Mechanical Prophylaxis: sequential compression device    Portions of the record may have been created with voice recognition software  Occasional wrong word or "sound a like" substitutions may have occurred due to the inherent limitations of voice recognition software    Read the chart carefully and recognize, using context, where substitutions have occurred   ==  July Basilio MD  [address] [facility]  Internal Medicine Residency PGY-1

## 2019-07-29 NOTE — ASSESSMENT & PLAN NOTE
Urine culture with no growth on 7/28/2019  However, this was after several doses of Cipro at home  Current coverage with cefepime  T-max 99 7° in the emergency department
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
## 2019-07-29 NOTE — ASSESSMENT & PLAN NOTE
BPH with lower urinary tract symptoms of frequency, nocturia 2 times per night  Maintained on Flomax at home with some symptomatic improvement at the time  Patient unclear if this has had a sustained effect  Check PVR now to evaluate for any retention  Follow retention protocol  Add finasteride  Outpatient urology follow-up
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
## 2019-07-30 NOTE — PROGRESS NOTES
Patient with temperature of 101 8 - Notified SOD resident - no sepsis work[-up ordered  Tylenol administered, temperature re-check  @ 23:41 was 100 and 98 6 @ 0254  SOD resident made aware  WIll continue to monitor

## 2019-07-30 NOTE — PROGRESS NOTES
INTERNAL MEDICINE RESIDENCY PROGRESS NOTE     Name: Luzma Cheatham  Age & Sex: 76 y o  male   MRN: [id number]  Unit/Bed#: [facility] 906-01   Encounter: [id number]  Team: SOD Team C     PATIENT INFORMATION     Name: Luzma Cheatham  Age & Sex: 76 y o  male   MRN: [id number]  Hospital Stay Days: 2    ASSESSMENT/PLAN     Principal Problem:    Leukocytosis with bandemia  Active Problems:    Normocytic anemia    Thrombocytopenia (HCC)    Recurrent UTI    Type 2 diabetes mellitus with diabetic neuropathy, with long-term current use of insulin ([facility])    Hypokalemia    Essential hypertension    GERD (gastroesophageal reflux disease)    Hyperlipidemia    Chronic diastolic CHF (congestive heart failure) (HCC)    COPD (chronic obstructive pulmonary disease) ([facility])    Coronary artery disease of native artery of native heart with stable angina pectoris ([facility])    BPH associated with nocturia      Recurrent UTI  Assessment & Plan  Present on admission  Patient presents with lower abdominal pain and dysuria after outpatient failure of ciprofloxacin taking since Friday  Patient has had recurrent UTIs; last culture growth from June 2019 negative  UA:  Negative nitrite, 10-20 WBC, occasional bacteria and epithelial cells  CT abdomen and pelvis with contrast:  no acute intra-abdominal abnormality  · Urine culture and blood cultures pending  · Cefepime 2 g q 12 hours with outpatient ciprofloxacin failure  · Follow up with culture results for antibiotic narrowing  · Pain medication p r n  · Urology consult pending  · PSA pending     Thrombocytopenia [facility])  Assessment & Plan  Present on admission  Platelets 19  No signs of active bleeding  · Hematology Oncology consult as above  · Fibrin split products pending  · Platelet transfusion for platelets less than 52,219 and active bleeding  · Continue to monitor daily    Normocytic anemia  Assessment & Plan  Present on admission  Patient denies any signs of active bleeding    No melena, blood in stool, hematochezia  Hemoglobin 8 3--> 8 1-->7 9 today  On chart review, patient had anemia to 10 3 in June 2019  , reticulocyte- 3%, fibrinogen 539  Possibly 2/2 acute bone marrow process vs hemolysis/DIC vs TTP (with concurrent thrombocytopenia and h/o recent antibiotic use)  · Daily CBC with diff  · Hemolytic panel pending (blood smear, haptoglobin)  · Hematology consult as above  · Transfuse if hemoglobin less than 7 or symptomatic    * Leukocytosis with bandemia  Assessment & Plan  Present on admission  WBC 30 4 with 16% bandemia--> 32 07 with 16% bandemia today  On chart review, patient presented [facility] for chest pain and was subsequently found to similar bandemia with WBC 14 and 15% bandemia  Possibly 2/2 acute bone marrow process vs acute infection (UTI)  · Hematology and oncology consult  · LAP pending  · Will most likely need bone marrow biopsy  · Continue to monitor daily with CBC w diff    Hypokalemia  Assessment & Plan  3 2 today  Will replete with oral potassium  · Continue to monitor    Type 2 diabetes mellitus with diabetic neuropathy, with long-term current use of insulin ([facility] [address] )  Assessment & Plan  Uncontrolled  Last A1c in 13 2%  Patient has chronic diabetic neuropathy of his arms and legs  · Lantus 30units q h s  · Will give one time dose of Humalog 5 U; otherwise Humalog 8 units with meals  · Insulin sliding scale  · Lyrica 150 mg t i d   For neuropathy      Coronary artery disease of native artery of native heart with stable angina pectoris ([facility])  Assessment & Plan  · Holding home Plavix at this time secondary to possible acute blood loss anemia  · Imdur 60 mg daily  · Lopressor 50 mg b i d     COPD (chronic obstructive pulmonary disease) ([facility])  Assessment & Plan  · Breo Ellipta daily    Chronic diastolic CHF (congestive heart failure) ([facility])  Assessment & Plan  Wt Readings from Last 3 Encounters:   07/28/19 96 2 kg (212 lb)   07/09/19 102 kg (223 lb 12 8 oz)   06/10/19 104 kg (228 lb 9 6 oz)     · Euvolemic on exam  · Beta-blocker and Imdur as above  · Continue to monitor        Hyperlipidemia  Assessment & Plan  · Atorvastatin 80 mg daily    GERD (gastroesophageal reflux disease)  Assessment & Plan  · Protonix 40 mg daily    Essential hypertension  Assessment & Plan  · Amlodipine 5 mg daily      Disposition: Will require further stay to work up thrombocytopenia     SUBJECTIVE     Patient seen and examined  Team called overnight because patient told PCA that he fell  Overnight team saw no visible sign of injury and vital signs were stable  Patient endorses significant improvement in urinary symptoms  Patient feels well enough to go home  We explained to him and his platelets continued to be very low he was not for leave  He says that he will wait 1 more day otherwise will leave AMA tomorrow  OBJECTIVE     Vitals:    19 2225 19 2341 19 0254 19 0300   BP:   132/73    Pulse: 99 95 104    Resp:   20    Temp:  100 °F (37 8 °C) 98 6 °F (37 °C)    TempSrc:       SpO2: 95% 94% 98%    Weight:    96 kg (211 lb 10 3 oz)   Height:    6' (1 829 m)      Temperature:   Temp (24hrs), Av 7 °F (37 6 °C), Min:98 2 °F (36 8 °C), Max:101 8 °F (38 8 °C)    Temperature: 98 6 °F (37 °C)  Intake & Output:  I/O       701 -  0700  07 -  0700  07 -  0700    P  O  720 1080     I V  (mL/kg) 40 (0 4)      IV Piggyback 100 50     Total Intake(mL/kg) 860 (8 9) 1130 (11 8)     Urine (mL/kg/hr) 0 0 (0)     Stool  0     Total Output 0 0     Net +860 +1130            Unmeasured Urine Occurrence 1 x 7 x         Weights:   IBW: 77 6 kg    Body mass index is 28 7 kg/m²  Weight (last 2 days)     Date/Time   Weight    19 0300   96 (211 64)    19 0838   96 2 (212)            Physical Exam   Constitutional: He is oriented to person, place, and time  He appears well-developed and well-nourished     HENT:   Head: Normocephalic and atraumatic  Eyes: Conjunctivae are normal  No scleral icterus  Neck: No thyromegaly present  Cardiovascular: Normal rate and regular rhythm  Murmur heard  Pulmonary/Chest: Effort normal and breath sounds normal    Abdominal: Soft  Bowel sounds are normal  There is no tenderness  Genitourinary:   Genitourinary Comments: Deferred   Musculoskeletal: He exhibits no edema or tenderness  Lymphadenopathy:     He has no cervical adenopathy  Neurological: He is alert and oriented to person, place, and time  No sensory deficit  He exhibits normal muscle tone  LABORATORY DATA     Labs: I have personally reviewed pertinent reports  Results from last 7 days   Lab Units 07/29/19  0514 07/28/19  1846 07/28/19  1050 07/28/19  0845   WBC Thousand/uL 32 07*  --  30 35* 29 37*   HEMOGLOBIN g/dL 7 9* 8 1* 8 0* 8 3*   HEMATOCRIT % 26 7* 26 7* 26 1* 27 4*   PLATELETS Thousands/uL 20*  --  19* 20*   NEUTROS PCT % 68  --  69  --    MONOS PCT % 8  --  7  --    MONO PCT %  --   --   --  5      Results from last 7 days   Lab Units 07/30/19  0535 07/29/19  0514 07/28/19  0845   POTASSIUM mmol/L 3 4* 3 2* 3 2*   CHLORIDE mmol/L 101 102 99*   CO2 mmol/L 24 25 27   BUN mg/dL 21 25 29*   CREATININE mg/dL 1 54* 1 61* 1 54*   CALCIUM mg/dL 8 5 8 6 8 9   ALK PHOS U/L 90  --  110   ALT U/L 11*  --  14   AST U/L 8  --  7              Results from last 7 days   Lab Units 07/28/19  0845   INR  1 29*     Results from last 7 days   Lab Units 07/28/19  1050   LACTIC ACID mmol/L 1 0           IMAGING & DIAGNOSTIC TESTING     Radiology Results: I have personally reviewed pertinent reports  Ct Abdomen Pelvis With Contrast    Result Date: 7/28/2019  Impression: No acute intra-abdominal abnormality  Cholelithiasis  Workstation performed: [id number]     Other Diagnostic Testing: I have personally reviewed pertinent reports      ACTIVE MEDICATIONS     Current Facility-Administered Medications   Medication Dose Route Frequency    acetaminophen (TYLENOL) tablet 975 mg  975 mg Oral Q6H PRN    amLODIPine (NORVASC) tablet 5 mg  5 mg Oral Daily    atorvastatin (LIPITOR) tablet 80 mg  80 mg Oral HS    cefepime (MAXIPIME) 2 g/50 mL dextrose IVPB  2,000 mg Intravenous Q12H    finasteride (PROSCAR) tablet 5 mg  5 mg Oral Daily    fluticasone-vilanterol (BREO ELLIPTA) 200-25 MCG/INH inhaler 1 puff  1 puff Inhalation Daily    insulin glargine (LANTUS) subcutaneous injection 30 Units 0 3 mL  30 Units Subcutaneous HS    insulin lispro (HumaLOG) 100 units/mL subcutaneous injection 1-6 Units  1-6 Units Subcutaneous TID AC    insulin lispro (HumaLOG) 100 units/mL subcutaneous injection 8 Units  8 Units Subcutaneous TID With Meals    isosorbide mononitrate (IMDUR) 24 hr tablet 60 mg  60 mg Oral Daily    metoprolol tartrate (LOPRESSOR) tablet 50 mg  50 mg Oral Q12H RHONDA    oxyCODONE (ROXICODONE) IR tablet 2 5 mg  2 5 mg Oral Q4H PRN    oxyCODONE (ROXICODONE) IR tablet 5 mg  5 mg Oral Q4H PRN    pantoprazole (PROTONIX) EC tablet 40 mg  40 mg Oral Daily    phenazopyridine (PYRIDIUM) tablet 100 mg  100 mg Oral TID With Meals    polyethylene glycol (MIRALAX) packet 17 g  17 g Oral Daily PRN    pregabalin (LYRICA) capsule 150 mg  150 mg Oral TID    QUEtiapine (SEROquel) tablet 25 mg  25 mg Oral HS    senna (SENOKOT) tablet 8 6 mg  1 tablet Oral HS PRN    tamsulosin (FLOMAX) capsule 0 4 mg  0 4 mg Oral Daily With Dinner       VTE Pharmacologic Prophylaxis: RX contraindicated due to: possible bleed  VTE Mechanical Prophylaxis: sequential compression device    Portions of the record may have been created with voice recognition software  Occasional wrong word or "sound a like" substitutions may have occurred due to the inherent limitations of voice recognition software    Read the chart carefully and recognize, using context, where substitutions have occurred   ==  Dede Esquivel MD  [address] [place] [address]  Internal Medicine Residency PGY-1

## 2019-07-30 NOTE — PROGRESS NOTES
Received call from [address] that patient has told her he fell  Went to patient's room and he was sitting up in bed  Paged SOD resident on call to assess patient  No visible sign of injury noted  Patient indicated that he did not hit his head  VSS  Of note, patient had used the call bell approximately 3 minutes prior to ask me how to turn off the TV  He did not mention at that time that he had fallen

## 2019-07-30 NOTE — PROGRESS NOTES
UROLOGY PROGRESS NOTE   Patient Identifiers: Zach Ceja (MRN [id number])  Date of Service: 7/30/2019    Subjective:     Resting comfortably no new complaints  Voiding frequently but feels empty  Patient has  no complaints        Objective:     VITALS:    Vitals:    07/30/19 0254   BP: 132/73   Pulse: 104   Resp: 20   Temp: 98 6 °F (37 °C)   SpO2: 98%           LABS:  Lab Results   Component Value Date    HGB 7 3 (L) 07/30/2019    HCT 24 2 (L) 07/30/2019    WBC 34 70 (HH) 07/30/2019    PLT 22 (LL) 07/30/2019   ]    Lab Results   Component Value Date     12/18/2015    K 3 4 (L) 07/30/2019     07/30/2019    CO2 24 07/30/2019    BUN 21 07/30/2019    CREATININE 1 54 (H) 07/30/2019    CALCIUM 8 5 07/30/2019    GLUCOSE 126 03/23/2016   ]        INPATIENT MEDS:    Current Facility-Administered Medications:     acetaminophen (TYLENOL) tablet 975 mg, 975 mg, Oral, Q6H PRN, Shagufta Mulligan MD, 975 mg at 07/29/19 2236    amLODIPine (NORVASC) tablet 5 mg, 5 mg, Oral, Daily, Shagufta Mulligan MD, 5 mg at 07/30/19 0835    atorvastatin (LIPITOR) tablet 80 mg, 80 mg, Oral, HS, Shagufta Mulligan MD, 80 mg at 07/29/19 2235    cefepime (MAXIPIME) 2 g/50 mL dextrose IVPB, 2,000 mg, Intravenous, Q12H, Chanelle Hernandez MD, Last Rate: 100 mL/hr at 07/30/19 0956, 2,000 mg at 07/30/19 0956    finasteride (PROSCAR) tablet 5 mg, 5 mg, Oral, Daily, Eugene Soni PA-C, 5 mg at 07/30/19 0835    fluticasone-vilanterol (BREO ELLIPTA) 200-25 MCG/INH inhaler 1 puff, 1 puff, Inhalation, Daily, Shagufta Mulligan MD, 1 puff at 07/29/19 0759    insulin glargine (LANTUS) subcutaneous injection 30 Units 0 3 mL, 30 Units, Subcutaneous, HS, Chanelle Hernandez MD, 30 Units at 07/29/19 2234    insulin lispro (HumaLOG) 100 units/mL subcutaneous injection 1-6 Units, 1-6 Units, Subcutaneous, TID AC, 4 Units at 07/30/19 0832 **AND** Fingerstick Glucose (POCT), , , TID AC, Shagufta Mulligan MD    insulin lispro (HumaLOG) 100 units/mL subcutaneous injection 8 Units, 8 Units, Subcutaneous, TID With Meals, Rito Do MD, 8 Units at 07/30/19 0832    isosorbide mononitrate (IMDUR) 24 hr tablet 60 mg, 60 mg, Oral, Daily, Rito Do MD, 60 mg at 07/30/19 0831    metoprolol tartrate (LOPRESSOR) tablet 50 mg, 50 mg, Oral, Q12H [facility] HOME, Rito Do MD, 50 mg at 07/30/19 0835    oxyCODONE (ROXICODONE) IR tablet 2 5 mg, 2 5 mg, Oral, Q4H PRN, Rito Do MD    oxyCODONE (ROXICODONE) IR tablet 5 mg, 5 mg, Oral, Q4H PRN, Rito Do MD, 5 mg at 07/30/19 1884    pantoprazole (PROTONIX) EC tablet 40 mg, 40 mg, Oral, Daily, Rito Do MD, 40 mg at 07/30/19 3648    phenazopyridine (PYRIDIUM) tablet 100 mg, 100 mg, Oral, TID With Meals, Roosevelt Bella DO, 100 mg at 07/30/19 0831    polyethylene glycol (MIRALAX) packet 17 g, 17 g, Oral, Daily PRN, Rito Do MD    pregabalin (LYRICA) capsule 150 mg, 150 mg, Oral, TID, Rito Do MD, 150 mg at 07/30/19 0835    QUEtiapine (SEROquel) tablet 25 mg, 25 mg, Oral, HS, Rito Do MD, 25 mg at 07/29/19 2235    senna (SENOKOT) tablet 8 6 mg, 1 tablet, Oral, HS PRN, Oren Pisano MD    tamsulosin [facility]) capsule 0 4 mg, 0 4 mg, Oral, Daily With Rajinder Louis MD, 0 4 mg at 07/29/19 1604      Physical Exam:   /73   Pulse 104   Temp 98 6 °F (37 °C)   Resp 20   Ht 6' (1 829 m)   Wt 96 kg (211 lb 10 3 oz)   SpO2 98%   BMI 28 70 kg/m²   GEN: no acute distress    RESP: breathing comfortably with no accessory muscle use    ABD: soft, non-tender, non-distended   INCISION:    EXT: no significant peripheral edema       RADIOLOGY:     CT ABDOMEN AND PELVIS WITH IV CONTRAST   IMPRESSION:     No acute intra-abdominal abnormality  Cholelithiasis  Assessment:    1  Urinary tract infection   2   BPH with obstruction    Plan:   - Hospital  follow-up in 4-6 weeks  - will discuss TURP  -  -

## 2019-07-30 NOTE — SOCIAL WORK
Met with pt and explained CM program/CM role  Resides alone in an apartment, elevator to reach, bed/bathroom main level  Independent prior to admission for ADL's/ambulation  PCP Dr Haja Valero  Has prescription plan, uses [facility]  He would like to use [facility] for meds on discharge  He does not drive  DME: walker, cane, wheelchair  Denies utilization HHC/IP Rehab  Denies mental health illness, IP or OP psyche care, drug and/or alcohol abuse  No POA/Living Will  Primary contact is brother Bharath Pompa, [phone]  States he will need ride home, he has no one available to take him home, brother is in [place]    CM reviewed d/c planning process including the following: identifying help at home, patient preference for d/c planning needs, Discharge Lounge, [facility] Meds to Bed program, availability of treatment team to discuss questions or concerns patient and/or family may have regarding understanding medications and recognizing signs and symptoms once discharged  CM also encouraged patient to follow up with all recommended appointments after discharge  Patient advised of importance for patient and family to participate in managing patients medical well being  Patient/caregiver received discharge checklist  Content reviewed  Patient/caregiver encouraged to participate in discharge plan of care prior to discharge home

## 2019-07-30 NOTE — RESTORATIVE TECHNICIAN NOTE
Restorative Specialist Mobility Note       Activity: Ambulate in [place], Ambulate in room, Bathroom privileges, Chair, Dangle, Stand at bedside(Educated/encouraged pt to ambulate with assistance 3-4 x's/day  Bed alarm on   Pt callbell, phone/tray within reach )     Assistive Device: Front wheel walker          [employer] BS, Restorative Technician, [employer]

## 2019-07-31 PROBLEM — E87.6 HYPOKALEMIA: Status: RESOLVED | Noted: 2019-01-01 | Resolved: 2019-01-01

## 2019-07-31 PROBLEM — E66.3 OVERWEIGHT (BMI 25.0-29.9): Status: ACTIVE | Noted: 2019-01-01

## 2019-07-31 PROBLEM — M25.532 WRIST PAIN, ACUTE, LEFT: Status: ACTIVE | Noted: 2019-01-01

## 2019-07-31 NOTE — RESTORATIVE TECHNICIAN NOTE
Restorative Specialist Mobility Note       Activity: Ambulate in [place], [address] privileges, Ambulate in room, Chair, Dangle, Stand at bedside(Educated/encouraged pt to ambulate with assistance 3-4 x's/day  Bed alarm on   Pt callbell, phone/tray within reach )     Assistive Device: Front wheel walker       Anneliese VILLA, Restorative Technician, [employer] [facility]

## 2019-07-31 NOTE — PROGRESS NOTES
INTERNAL MEDICINE RESIDENCY PROGRESS NOTE     Name: Shakila Moran  Age & Sex: 76 y o  male   MRN: [id number]  Unit/Bed#: [facility] 906-01   Encounter: [id number]  Team: SOD Team C     PATIENT INFORMATION     Name: Shakila Moran  Age & Sex: 76 y o  male   MRN: [id number]  Hospital Stay Days: 3    ASSESSMENT/PLAN     Principal Problem:    Leukocytosis with bandemia  Active Problems:    Normocytic anemia    Thrombocytopenia (HCC)    Recurrent UTI    Wrist pain, acute, left    Type 2 diabetes mellitus with diabetic neuropathy, with long-term current use of insulin ([facility])    Essential hypertension    GERD (gastroesophageal reflux disease)    Hyperlipidemia    Chronic diastolic CHF (congestive heart failure) ([facility])    COPD (chronic obstructive pulmonary disease) ([facility])    Coronary artery disease of native artery of native heart with stable angina pectoris ([facility])    Hyponatremia    BPH associated with nocturia    Overweight (BMI 25 0-29  9)      Wrist pain, acute, left  Assessment & Plan  Status post fall 2 nights ago  On call team contacted for worsening pain  Area of ecchymosis over medal aspect of wrist    -Pending wrist x-ray     Recurrent UTI  Assessment & Plan  Present on admission  Patient presents with lower abdominal pain and dysuria after outpatient failure of ciprofloxacin taking since Friday  Patient has had recurrent UTIs; last culture growth from June 2019 negative  UA:  Negative nitrite, 10-20 WBC, occasional bacteria and epithelial cells  CT abdomen and pelvis with contrast:  no acute intra-abdominal abnormality  Procalcitonin 0 53  Urine culture no growth  and blood cultures no growth which was suspected given antibiotic treatment prior to admission  · Cefepime 2 g q 12 hours with outpatient ciprofloxacin failure; completed dose  · Pain medication p r n  · Urology follow-up in the outpatient  · PSA pending     Thrombocytopenia [facility])  Assessment & Plan  Present on admission    Platelets stable in the 20s  No signs of active bleeding  Fibrin split products elevated  This indicates possible DIC/hemolysis  especially in the setting of anemia and infection versus acute bone marrow process  ·  Hematology Oncology consult as above  · Platelet transfusion for platelets less than 54,906 and active bleeding  · Continue to monitor daily    Normocytic anemia  Assessment & Plan  Present on admission  Patient denies any signs of active bleeding  No melena, blood in stool, hematochezia  Hemoglobin 8 1-->7 9-->7 3--> 7 0  today  On chart review, patient had anemia to 10 3 in June 2019  , reticulocyte- 3%, fibrinogen 539  Possibly 2/2 acute bone marrow process vs hemolysis/DIC vs TTP (with concurrent thrombocytopenia and h/o recent antibiotic use)  · Will likely require 1 unit PRBC transfusion today  · Follow with Daily CBC with diff  · Hematology consult as above  · Transfuse if hemoglobin less than 7 or symptomatic    * Leukocytosis with bandemia  Assessment & Plan  Present on admission  Continues to be febrile intermittently  WBC improved at 31 7 from 34 7 yesterday  On chart review, patient presented [facility] for chest pain and was subsequently found to similar bandemia with WBC 14 and 15% bandemia  Possibly 2/2 acute bone marrow process vs acute infection (UTI vs Prostatis)   · Hematology and oncology recs appreciated  · LAP pending  · Will most likely need bone marrow biopsy  · Continue to monitor daily with CBC w diff    Type 2 diabetes mellitus with diabetic neuropathy, with long-term current use of insulin ([facility] [address] )  Assessment & Plan  Uncontrolled  Last A1c in 13 2%  Patient has chronic diabetic neuropathy of his arms and legs  · Lantus 30units q h s  · Will give one time dose of Humalog 5 U; otherwise Humalog 8 units with meals  · Insulin sliding scale  · Lyrica 150 mg t i d  For neuropathy      Hypokalemiaresolved as of 7/31/2019  Assessment & Plan  3 5 today  Resolved  · Continue to monitor    Coronary artery disease of native artery of native heart with stable angina pectoris ([facility])  Assessment & Plan  · Holding home Plavix at this time secondary to possible acute blood loss anemia  · Imdur 60 mg daily  · Lopressor 50 mg b i d     COPD (chronic obstructive pulmonary disease) ([facility])  Assessment & Plan  · Breo Ellipta daily    Chronic diastolic CHF (congestive heart failure) ([facility])  Assessment & Plan  Wt Readings from Last 3 Encounters:   07/28/19 96 2 kg (212 lb)   07/09/19 102 kg (223 lb 12 8 oz)   06/10/19 104 kg (228 lb 9 6 oz)     · Euvolemic on exam  · Beta-blocker and Imdur as above  · Continue to monitor        Hyperlipidemia  Assessment & Plan  · Atorvastatin 80 mg daily    GERD (gastroesophageal reflux disease)  Assessment & Plan  · Protonix 40 mg daily    Essential hypertension  Assessment & Plan  · Amlodipine 5 mg daily    Overweight (BMI 25 0-29  9)  Assessment & Plan  BMI 28 7    -Patient counseling and education was given    BPH associated with nocturia  Assessment & Plan  Will require outpatient follow-up with Urology  -PSA pending    Hyponatremia  Assessment & Plan  Patient is an slightly hypernatremic since admission  stable at 133  Patient is euvolemic on exam with no evidence of lower extremity edema or crackles on lung exam   Etiology may be due to SIADH secondary to concurrent infection versus hematologic malignancy   -will adjust the rate of IV fluids  -continue monitor with daily BMPs        Disposition: Requires further work-up regarding thrombocytopenia, anemia, and leukocytosis  SUBJECTIVE     Patient seen and examined  Overnight team called because patient complaining of worsening left wrist pain  X-ray of the wrist was ordered  Ice pack was applied to the wrist which has helped some symptoms  This morning he is complaining of this pain  He endorses improved urinary symptoms    He states that he would like to stay at least 1 more night in the hospital   He has no other new symptoms at this time  OBJECTIVE     Vitals:    19 2238 19 0130 19 0608 19 0705   BP: 141/81   128/58   Pulse: 104 87 76 70   Resp:    16   Temp:  (!) 101 3 °F (38 5 °C) 99 9 °F (37 7 °C) 98 4 °F (36 9 °C)   TempSrc:       SpO2:  96% 94% 97%   Weight:       Height:          Temperature:   Temp (24hrs), Av 8 °F (38 2 °C), Min:98 4 °F (36 9 °C), Max:102 6 °F (39 2 °C)    Temperature: 98 4 °F (36 9 °C)  Intake & Output:  I/O       701 -  07 07 -  07 07 -  0700    P  O  1080 820     I V  (mL/kg)       IV Piggyback 50 50     Total Intake(mL/kg) 1130 (11 8) 870 (9 1)     Urine (mL/kg/hr) 0 (0) 1050 (0 5)     Stool 0      Total Output 0 1050     Net +1130 -180            Unmeasured Urine Occurrence 7 x          Weights:   IBW: 77 6 kg    Body mass index is 28 7 kg/m²  Weight (last 2 days)     Date/Time   Weight    19 0300   96 (211 64)            Physical Exam   Constitutional: He appears well-developed and well-nourished  HENT:   Head: Normocephalic and atraumatic  Mouth/Throat: Oropharynx is clear and moist    Eyes: Conjunctivae are normal  No scleral icterus  Neck: No JVD present  No thyromegaly present  Cardiovascular: Normal rate, regular rhythm and normal heart sounds  Pulmonary/Chest: Effort normal and breath sounds normal    Abdominal: Soft  Bowel sounds are normal  He exhibits no mass  There is no tenderness  There is no guarding  Genitourinary:   Genitourinary Comments: Deferred     Musculoskeletal: He exhibits no edema  Left wrist: He exhibits tenderness  He exhibits no swelling, no effusion, no crepitus and no laceration  Normal ROM for left wrist however there is pain with motion   Nursing note and vitals reviewed  LABORATORY DATA     Labs: I have personally reviewed pertinent reports    Results from last 7 days   Lab Units 19  0511 19  0535 19  7764 07/28/19  1050   WBC Thousand/uL 31 76* 34 70* 32 07*  --  30 35*   HEMOGLOBIN g/dL 7 0* 7 3* 7 9*   < > 8 0*   HEMATOCRIT % 24 0* 24 2* 26 7*   < > 26 1*   PLATELETS Thousands/uL 22* 22* 20*  --  19*   NEUTROS PCT %  --  69 68  --  69   MONOS PCT %  --  8 8  --  7    < > = values in this interval not displayed  Results from last 7 days   Lab Units 07/31/19  0511 07/30/19  0535 07/29/19  0514 07/28/19  0845   POTASSIUM mmol/L 3 5 3 4* 3 2* 3 2*   CHLORIDE mmol/L 101 101 102 99*   CO2 mmol/L 25 24 25 27   BUN mg/dL 20 21 25 29*   CREATININE mg/dL 1 56* 1 54* 1 61* 1 54*   CALCIUM mg/dL 8 4 8 5 8 6 8 9   ALK PHOS U/L  --  90  --  110   ALT U/L  --  11*  --  14   AST U/L  --  8  --  7     Results from last 7 days   Lab Units 07/31/19  1059   MAGNESIUM mg/dL 1 7     Results from last 7 days   Lab Units 07/31/19  1059   PHOSPHORUS mg/dL 2 5      Results from last 7 days   Lab Units 07/28/19  0845   INR  1 29*     Results from last 7 days   Lab Units 07/28/19  1050   LACTIC ACID mmol/L 1 0           IMAGING & DIAGNOSTIC TESTING     Radiology Results: I have personally reviewed pertinent reports  Ct Abdomen Pelvis With Contrast    Result Date: 7/28/2019  Impression: No acute intra-abdominal abnormality  Cholelithiasis  Workstation performed: [id number]     Other Diagnostic Testing: I have personally reviewed pertinent reports      ACTIVE MEDICATIONS     Current Facility-Administered Medications   Medication Dose Route Frequency    acetaminophen (TYLENOL) tablet 975 mg  975 mg Oral Q6H PRN    amLODIPine (NORVASC) tablet 5 mg  5 mg Oral Daily    atorvastatin (LIPITOR) tablet 80 mg  80 mg Oral HS    finasteride (PROSCAR) tablet 5 mg  5 mg Oral Daily    fluticasone-vilanterol (BREO ELLIPTA) 200-25 MCG/INH inhaler 1 puff  1 puff Inhalation Daily    insulin glargine (LANTUS) subcutaneous injection 30 Units 0 3 mL  30 Units Subcutaneous HS    insulin lispro (HumaLOG) 100 units/mL subcutaneous injection 1-6 Units 1-6 Units Subcutaneous TID AC    insulin lispro (HumaLOG) 100 units/mL subcutaneous injection 8 Units  8 Units Subcutaneous TID With Meals    isosorbide mononitrate (IMDUR) 24 hr tablet 60 mg  60 mg Oral Daily    metoprolol tartrate (LOPRESSOR) tablet 50 mg  50 mg Oral Q12H [address]    oxyCODONE (ROXICODONE) IR tablet 2 5 mg  2 5 mg Oral Q4H PRN    oxyCODONE (ROXICODONE) IR tablet 5 mg  5 mg Oral Q4H PRN    pantoprazole (PROTONIX) EC tablet 40 mg  40 mg Oral Daily    phenazopyridine (PYRIDIUM) tablet 100 mg  100 mg Oral TID With Meals    polyethylene glycol (MIRALAX) packet 17 g  17 g Oral Daily PRN    pregabalin (LYRICA) capsule 150 mg  150 mg Oral TID    QUEtiapine (SEROquel) tablet 25 mg  25 mg Oral HS    senna (SENOKOT) tablet 8 6 mg  1 tablet Oral HS PRN    tamsulosin (FLOMAX) capsule 0 4 mg  0 4 mg Oral Daily With Dinner       VTE Pharmacologic Prophylaxis: RX contraindicated due to: Possible bleed  VTE Mechanical Prophylaxis: sequential compression device    Portions of the record may have been created with voice recognition software  Occasional wrong word or "sound a like" substitutions may have occurred due to the inherent limitations of voice recognition software    Read the chart carefully and recognize, using context, where substitutions have occurred   ==  Deja Honeycutt MD  [address]  Internal Medicine Residency PGY-1

## 2019-07-31 NOTE — ASSESSMENT & PLAN NOTE
Patient is an slightly hypernatremic since admission  stable at 133  Patient is euvolemic on exam with no evidence of lower extremity edema or crackles on lung exam   Etiology may be due to SIADH secondary to concurrent infection versus hematologic malignancy     -will adjust the rate of IV fluids  -continue monitoring in outpatient

## 2019-07-31 NOTE — ASSESSMENT & PLAN NOTE
Status post fall 2 nights ago  On call team contacted for worsening pain  Area of ecchymosis over medal aspect of wrist    -wrist x-ray negative

## 2019-08-01 PROBLEM — M25.532 WRIST PAIN, ACUTE, LEFT: Status: RESOLVED | Noted: 2019-01-01 | Resolved: 2019-01-01

## 2019-08-01 NOTE — TELEPHONE ENCOUNTER
Dr Tressa Francisco is in the hospital so I asked Dr Lovely Becker if it can be changed and he said he would do it  The pregabalin would be covered with no copay or prior auth  Snehal Kirkpatrick was made aware of this  Checked PDMP- filled the script on 6/30/19 for 90 tablets for 30 days

## 2019-08-01 NOTE — PROGRESS NOTES
Zo Lopez  is a 51-year-old male with past medical history of recurrent UTI, diastolic heart failure, CAD with triple-vessel disease, uncontrolled type 2 diabetes with peripheral neuropathy, COPD, hypertension, hyperlipidemia presenting with 2 days onset of lower abdominal pain due to

## 2019-08-01 NOTE — SOCIAL WORK
PT for discharge today  States he has no transportation home  Receivedf approval from Director, ROSIO for [employer] transportation home    Pt side waiver and release form    Does not want [facility] for scripts

## 2019-08-01 NOTE — PROGRESS NOTES
Reviewed patient discharge instructions with patient  No instructions to follow up with urology or hematology/oncology were included though notes from both services indicated that follow up is warranted  Paged SOD resident on call who stated she was covering for the team while they were in a meeting  Instructed me to add a hand written note to follow up with both services  Note was added to discharge sheet and phone numbers provided as well

## 2019-08-01 NOTE — PROGRESS NOTES
INTERNAL MEDICINE RESIDENCY PROGRESS NOTE     Name: Jarod Gabriel  Age & Sex: 76 y o  male   MRN: [id number]  Unit/Bed#: [facility] 906-01   Encounter: [id number]  Team: SOD Team C     PATIENT INFORMATION     Name: Jarod Gabriel  Age & Sex: 76 y o  male   MRN: [id number]  Hospital Stay Days: 4    ASSESSMENT/PLAN     Principal Problem:    Leukocytosis with bandemia  Active Problems:    Normocytic anemia    Thrombocytopenia (HCC)    Recurrent UTI    Wrist pain, acute, left    Type 2 diabetes mellitus with diabetic neuropathy, with long-term current use of insulin ([facility])    Hyponatremia    Essential hypertension    GERD (gastroesophageal reflux disease)    Hyperlipidemia    Chronic diastolic CHF (congestive heart failure) ([facility])    COPD (chronic obstructive pulmonary disease) ([facility])    Coronary artery disease of native artery of native heart with stable angina pectoris ([facility])    Overweight (BMI 25 0-29  9)    BPH associated with nocturia    Wrist pain, acute, left  Assessment & Plan  Status post fall 2 nights ago  On call team contacted for worsening pain  Area of ecchymosis over medal aspect of wrist    -Pending wrist x-ray     Recurrent UTI  Assessment & Plan  Present on admission  Patient presents with lower abdominal pain and dysuria after outpatient failure of ciprofloxacin taking since Friday  Patient has had recurrent UTIs; last culture growth from June 2019 negative  UA:  Negative nitrite, 10-20 WBC, occasional bacteria and epithelial cells  CT abdomen and pelvis with contrast:  no acute intra-abdominal abnormality  Procalcitonin 0 53  Urine culture no growth  and blood cultures no growth which was suspected given antibiotic treatment prior to admission  · Cefepime 2 g q 12 hours with outpatient ciprofloxacin failure; completed dose  · Pain medication p r n  · Urology follow-up in the outpatient  · PSA pending     Thrombocytopenia [facility])  Assessment & Plan  Present on admission    Platelets stable in the 20s  No signs of active bleeding  Fibrin split products elevated  This indicates possible DIC/hemolysis  especially in the setting of anemia and infection versus acute bone marrow process  ·  Hematology Oncology consult as above  · Platelet transfusion for platelets less than 71,841 and active bleeding  · Continue to monitor daily    Normocytic anemia  Assessment & Plan  Present on admission  Patient denies any signs of active bleeding  No melena, blood in stool, hematochezia  Hemoglobin 8 1-->7 9-->7 3--> 7 0  today  On chart review, patient had anemia to 10 3 in June 2019  , reticulocyte- 3%, fibrinogen 539  Possibly 2/2 acute bone marrow process vs hemolysis/DIC vs TTP (with concurrent thrombocytopenia and h/o recent antibiotic use)  · Will likely require 1 unit PRBC transfusion today  · Follow with Daily CBC with diff  · Hematology consult as above  · Transfuse if hemoglobin less than 7 or symptomatic    * Leukocytosis with bandemia  Assessment & Plan  Present on admission  Continues to be febrile intermittently  WBC improved at 31 7 from 34 7 yesterday  On chart review, patient presented [facility] for chest pain and was subsequently found to similar bandemia with WBC 14 and 15% bandemia  Possibly 2/2 acute bone marrow process vs acute infection (UTI vs Prostatis)   · Hematology and oncology recs appreciated  · LAP pending  · Will most likely need bone marrow biopsy  · Continue to monitor daily with CBC w diff    Hyponatremia  Assessment & Plan  Patient is an slightly hypernatremic since admission  stable at 133   Patient is euvolemic on exam with no evidence of lower extremity edema or crackles on lung exam   Etiology may be due to SIADH secondary to concurrent infection versus hematologic malignancy   -will adjust the rate of IV fluids  -continue monitor with daily BMPs      Type 2 diabetes mellitus with diabetic neuropathy, with long-term current use of insulin [facility])  Assessment & Plan  Uncontrolled  Last A1c in 13 2%  Patient has chronic diabetic neuropathy of his arms and legs  · Lantus 30units q h s  · Will give one time dose of Humalog 5 U; otherwise Humalog 8 units with meals  · Insulin sliding scale  · Lyrica 150 mg t i d  For neuropathy      Hypokalemiaresolved as of 7/31/2019  Assessment & Plan  3 5 today  Resolved  · Continue to monitor    Overweight (BMI 25 0-29  9)  Assessment & Plan  BMI 28 7    -Patient counseling and education was given    Coronary artery disease of native artery of native heart with stable angina pectoris ([facility] [address] )  Assessment & Plan  · Holding home Plavix at this time secondary to possible acute blood loss anemia  · Imdur 60 mg daily  · Lopressor 50 mg b i d     COPD (chronic obstructive pulmonary disease) ([facility])  Assessment & Plan  · Breo Ellipta daily    Chronic diastolic CHF (congestive heart failure) ([facility])  Assessment & Plan  Wt Readings from Last 3 Encounters:   07/28/19 96 2 kg (212 lb)   07/09/19 102 kg (223 lb 12 8 oz)   06/10/19 104 kg (228 lb 9 6 oz)     · Euvolemic on exam  · Beta-blocker and Imdur as above  · Continue to monitor        Hyperlipidemia  Assessment & Plan  · Atorvastatin 80 mg daily    GERD (gastroesophageal reflux disease)  Assessment & Plan  · Protonix 40 mg daily    Essential hypertension  Assessment & Plan  · Amlodipine 5 mg daily    BPH associated with nocturia  Assessment & Plan  Will require outpatient follow-up with Urology  -PSA pending      Disposition: Patient threatening to leave AMA today because he would like to pay his rent and is afraid of losing his housing  Patient stable for discharge today if close hematology follow-up    SUBJECTIVE     Patient seen and examined  No acute events overnight  This AM, patient is refusing bed alarm  Patient was made to understand that it was only to help him and keep him safe  He understood and promised me he would be more compliant   In addition, he is saying that he will leave today with our without our approval  I explained to patient that it would not likely be safe given lack of response to the transfused unit  He said that he would leave AMA if necessary because he is afraid of losing his living arrangement  OBJECTIVE     Vitals:    19 2101 19 2123 19 2218 19 0710   BP:  124/61 123/60 129/65   BP Location:   Right arm    Pulse: 85 81 83 75   Resp:   20 18   Temp: 99 9 °F (37 7 °C)  99 9 °F (37 7 °C) 99 7 °F (37 6 °C)   TempSrc:   Oral    SpO2: 97%  96% 97%   Weight:       Height:          Temperature:   Temp (24hrs), Av 3 °F (37 4 °C), Min:98 1 °F (36 7 °C), Max:99 9 °F (37 7 °C)    Temperature: 99 7 °F (37 6 °C)  Intake & Output:  I/O       701 -  0700 701 -  0700  07 -  0700    P  O  820 1440 180    Blood  350     IV Piggyback 50      Total Intake(mL/kg) 870 (9 1) 1790 (18 6) 180 (1 9)    Urine (mL/kg/hr) 1050 (0 5) 1100 (0 5)     Stool       Total Output 1050 1100     Net -180 +690 +180               Weights:   IBW: 77 6 kg    Body mass index is 28 7 kg/m²  Weight (last 2 days)     Date/Time   Weight    19 0300   96 (211 64)            Physical Exam   Constitutional: He is oriented to person, place, and time  He appears well-developed and well-nourished  HENT:   Head: Normocephalic  Mouth/Throat: Oropharynx is clear and moist    Eyes: Conjunctivae are normal  No scleral icterus  Neck: No thyromegaly present  Cardiovascular: Normal rate, regular rhythm and normal heart sounds  Pulmonary/Chest: Effort normal and breath sounds normal    Abdominal: Soft  Bowel sounds are normal  He exhibits no distension  There is no tenderness  There is no guarding  Genitourinary:   Genitourinary Comments: deferred   Musculoskeletal: Normal range of motion  He exhibits no edema or tenderness  Lymphadenopathy:     He has no cervical adenopathy     Neurological: He is alert and oriented to person, place, and time  Skin: No erythema  Area of ecchymosis on left hand    Nursing note and vitals reviewed  LABORATORY DATA     Labs: I have personally reviewed pertinent reports  Results from last 7 days   Lab Units 08/01/19 0515 07/31/19  0511 07/30/19  0535 07/29/19  0514  07/28/19  1050   WBC Thousand/uL 27 67* 31 76* 34 70* 32 07*  --  30 35*   HEMOGLOBIN g/dL 7 1* 7 0* 7 3* 7 9*   < > 8 0*   HEMATOCRIT % 23 5* 24 0* 24 2* 26 7*   < > 26 1*   PLATELETS Thousands/uL 23* 22* 22* 20*  --  19*   NEUTROS PCT %  --   --  69 68  --  69   MONOS PCT %  --   --  8 8  --  7   MONO PCT % 6  --   --   --   --   --     < > = values in this interval not displayed  Results from last 7 days   Lab Units 08/01/19 0515 07/31/19  0511 07/30/19  0535  07/28/19  0845   POTASSIUM mmol/L 3 7 3 5 3 4*   < > 3 2*   CHLORIDE mmol/L 102 101 101   < > 99*   CO2 mmol/L 25 25 24   < > 27   BUN mg/dL 23 20 21   < > 29*   CREATININE mg/dL 1 43* 1 56* 1 54*   < > 1 54*   CALCIUM mg/dL 8 2* 8 4 8 5   < > 8 9   ALK PHOS U/L  --   --  90  --  110   ALT U/L  --   --  11*  --  14   AST U/L  --   --  8  --  7    < > = values in this interval not displayed  Results from last 7 days   Lab Units 07/31/19  1059   MAGNESIUM mg/dL 1 7     Results from last 7 days   Lab Units 07/31/19  1059   PHOSPHORUS mg/dL 2 5      Results from last 7 days   Lab Units 07/28/19  0845   INR  1 29*     Results from last 7 days   Lab Units 07/28/19  1050   LACTIC ACID mmol/L 1 0           IMAGING & DIAGNOSTIC TESTING     Radiology Results: I have personally reviewed pertinent reports  Xr Wrist 2 Vw Left    Result Date: 7/31/2019  Impression: No acute osseous abnormality  Workstation performed: [id number]     Ct Abdomen Pelvis With Contrast    Result Date: 7/28/2019  Impression: No acute intra-abdominal abnormality  Cholelithiasis  Workstation performed: [id number]     Other Diagnostic Testing: I have personally reviewed pertinent reports      ACTIVE MEDICATIONS     Current Facility-Administered Medications   Medication Dose Route Frequency    acetaminophen (TYLENOL) tablet 975 mg  975 mg Oral Q6H PRN    amLODIPine (NORVASC) tablet 5 mg  5 mg Oral Daily    atorvastatin (LIPITOR) tablet 80 mg  80 mg Oral HS    finasteride (PROSCAR) tablet 5 mg  5 mg Oral Daily    fluticasone-vilanterol (BREO ELLIPTA) 200-25 MCG/INH inhaler 1 puff  1 puff Inhalation Daily    insulin glargine (LANTUS) subcutaneous injection 30 Units 0 3 mL  30 Units Subcutaneous HS    insulin lispro (HumaLOG) 100 units/mL subcutaneous injection 1-6 Units  1-6 Units Subcutaneous TID AC    insulin lispro (HumaLOG) 100 units/mL subcutaneous injection 8 Units  8 Units Subcutaneous TID With Meals    isosorbide mononitrate (IMDUR) 24 hr tablet 60 mg  60 mg Oral Daily    metoprolol tartrate (LOPRESSOR) tablet 50 mg  50 mg Oral Q12H RHONDA    oxyCODONE (ROXICODONE) IR tablet 2 5 mg  2 5 mg Oral Q4H PRN    oxyCODONE (ROXICODONE) IR tablet 5 mg  5 mg Oral Q4H PRN    pantoprazole (PROTONIX) EC tablet 40 mg  40 mg Oral Daily    phenazopyridine (PYRIDIUM) tablet 100 mg  100 mg Oral TID With Meals    polyethylene glycol (MIRALAX) packet 17 g  17 g Oral Daily PRN    pregabalin (LYRICA) capsule 150 mg  150 mg Oral TID    QUEtiapine (SEROquel) tablet 25 mg  25 mg Oral HS    senna (SENOKOT) tablet 8 6 mg  1 tablet Oral HS PRN    tamsulosin (FLOMAX) capsule 0 4 mg  0 4 mg Oral Daily With Dinner       VTE Pharmacologic Prophylaxis: RX contraindicated due to: thrombocytopenia  VTE Mechanical Prophylaxis: sequential compression device    Portions of the record may have been created with voice recognition software  Occasional wrong word or "sound a like" substitutions may have occurred due to the inherent limitations of voice recognition software    Read the chart carefully and recognize, using context, where substitutions have occurred     ==  John Alvarez MD  [address] [place] [address] [facility]  Internal Medicine Residency PGY-1

## 2019-08-01 NOTE — NURSING NOTE
Pt's urine appears to be red in color  SOD paged and made aware  Awaiting orders, will continue to monitor pt

## 2019-08-03 NOTE — DISCHARGE SUMMARY
INTERNAL MEDICINE RESIDENCY DISCHARGE SUMMARY     Fadia Joel    76 y o  male  MRN: [id number]  Room/Bed: [facility] 90/[facility] [address] [place] [address]    Encounter: [id number]    Principal Problem:    Leukocytosis with bandemia  Active Problems:    Normocytic anemia    Thrombocytopenia (HCC)    Recurrent UTI    Type 2 diabetes mellitus with diabetic neuropathy, with long-term current use of insulin (HCC)    Hyponatremia    Essential hypertension    GERD (gastroesophageal reflux disease)    Hyperlipidemia    Chronic diastolic CHF (congestive heart failure) (HCC)    COPD (chronic obstructive pulmonary disease) (HCC)    Coronary artery disease of native artery of native heart with stable angina pectoris (HCC)    Overweight (BMI 25 0-29  9)    BPH associated with nocturia      Recurrent UTI  Assessment & Plan  Present on admission  Patient presents with lower abdominal pain and dysuria after outpatient failure of ciprofloxacin taking since Friday  UA:  Negative nitrite, 10-20 WBC, occasional bacteria and epithelial cells  CT abdomen and pelvis with contrast:  no acute intra-abdominal abnormality  Procalcitonin 0 53  Urine culture no growth  and blood cultures no growth which was suspected given antibiotic treatment prior to admission  · Pain medication p r n  · Urology follow-up in the outpatient  · PSA pending     Thrombocytopenia [facility])  Assessment & Plan  Present on admission  Platelets stable in the 20s  No signs of active bleeding  Fibrin split products elevated  This indicates possible DIC/hemolysis  especially in the setting of anemia and infection versus acute bone marrow process  ·  Hematology Oncology follow-up required  · Platelet transfusion for platelets less than 46,491 and active bleeding    Normocytic anemia  Assessment & Plan  Present on admission  Patient denies any signs of active bleeding  No melena, blood in stool, hematochezia      Hemoglobin 7 9-->7 3--> 7 0-->s/p 1 U pRBC -->7 6  today  On chart review, patient had anemia to 10 3 in June 2019  , reticulocyte- 3%, fibrinogen 539  Possibly 2/2 acute bone marrow process vs hemolysis/DIC vs TTP (with concurrent thrombocytopenia and h/o recent antibiotic use)  · Will require close follow-up with Hematology in the outpatient    * Leukocytosis with bandemia  Assessment & Plan  Present on admission  Afebrile for the past 24 hours  WBC improved at 27 6 from 31 7 yesterday  Possibly 2/2 acute bone marrow process vs acute infection (UTI vs Prostatis)   · Hematology and oncology recs appreciated  · Will require close follow-up with Hematology in the outpatient  · LAP pending  · Will most likely need bone marrow biopsy  · Continue to monitor daily with CBC w diff    Wrist pain, acute, leftresolved as of 8/1/2019  Assessment & Plan  Status post fall 2 nights ago  On call team contacted for worsening pain  Area of ecchymosis over medal aspect of wrist    -wrist x-ray negative  Hyponatremia  Assessment & Plan  Patient is an slightly hypernatremic since admission  stable at 133  Patient is euvolemic on exam with no evidence of lower extremity edema or crackles on lung exam   Etiology may be due to SIADH secondary to concurrent infection versus hematologic malignancy  -will adjust the rate of IV fluids  -continue monitoring in outpatient       Type 2 diabetes mellitus with diabetic neuropathy, with long-term current use of insulin (HCC)  Assessment & Plan  Uncontrolled  Last A1c in 13 2%  Patient has chronic diabetic neuropathy of his arms and legs  · Lantus 30units q h s  · Will give one time dose of Humalog 5 U; otherwise Humalog 8 units with meals  · Insulin sliding scale  · Lyrica 150 mg t i d  For neuropathy      Hypokalemiaresolved as of 7/31/2019  Assessment & Plan  3 5 today  Resolved  · Continue to monitor    Overweight (BMI 25 0-29  9)  Assessment & Plan  BMI 28 7    -Patient counseling and nutrition education was given    Coronary artery disease of native artery of native heart with stable angina pectoris ([facility])  Assessment & Plan  · Holding home Plavix at this time secondary to possible acute blood loss anemia  · Imdur 60 mg daily  · Lopressor 50 mg b i d     COPD (chronic obstructive pulmonary disease) ([facility])  Assessment & Plan  · Breo Ellipta daily    Chronic diastolic CHF (congestive heart failure) ([facility])  Assessment & Plan  Wt Readings from Last 3 Encounters:   07/28/19 96 2 kg (212 lb)   07/09/19 102 kg (223 lb 12 8 oz)   06/10/19 104 kg (228 lb 9 6 oz)     · Euvolemic on exam  · Beta-blocker and Imdur as above  · Continue to monitor        Hyperlipidemia  Assessment & Plan  · Atorvastatin 80 mg daily    GERD (gastroesophageal reflux disease)  Assessment & Plan  · Protonix 40 mg daily    Essential hypertension  Assessment & Plan  · Amlodipine 5 mg daily    BPH associated with nocturia  Assessment & Plan  Will require outpatient follow-up with Urology  -PSA pending  -Continue finasteride         [address] COURSE     61year old male with PMHx of recurrent UTI, diastolic heart failure, CAD with triple-vessel disease, uncontrolled T2DM with peripheral neuropathy, COPD, hypertension, hyperlipidemia presented to the ED on 7/28 with 2 day onset of lower abdominal pain associated with dysuria  Patient visited PCP with similar symptoms on 7/26 and started on ciprofloxacin  No cultures were obtained at the time  On evaluation in the ED, patient was afebrile and hemodynamically stable  Laboratory results were significant for leukocytosis of 30k with bandemia, anemia of 8 3, and thrombocytopenia of 19k  Patient had no signs of active bleeding  Patient was admitted for management of UTI and thrombocytopenia  Cefepime was started for treatment of UTI  Hematology was consulted  They recommended lab studies to rule out DIC and urology consult   LAP was ordered to evaluate if leukocytosis was related to CML or leukomoid reaction  During the course of the admission, patient's thrombocytopenia remained unchanged and anemia progressed despite symptomatic resolution of UTI  He required 1 U pRBC which he responded well to  On 7/30, patient had a fall while going to the bathroom  He denied any LOC or trauma to his head  Patient began complaining of left wrist pain  X-rays were obtained and demonstrated no osseous abnormalities  After symptomatology resolved, patient threatened to leave AMA despite having anemia and thrombocytopenia  After discussion with hematology, it was decided that he was safe for discharge as long has he had close follow-up with hematology in the outpatient  DISCHARGE INFORMATION     PCP at Discharge: Ida Limb    Admitting Provider: Alix Murillo MD  Admission Date: 7/28/2019    Discharge Provider: No att  providers found  Discharge Date: 8/1/19    Discharge Disposition: Home/Self Care  Discharge Condition: good  Discharge with Lines: no    Discharge Diet: diabetic diet  Activity Restrictions: none  Test Results Pending at Discharge: LAP    Discharge Diagnoses:  Principal Problem:    Leukocytosis with bandemia  Active Problems:    Normocytic anemia    Thrombocytopenia ([address] )    Recurrent UTI    Type 2 diabetes mellitus with diabetic neuropathy, with long-term current use of insulin ([facility])    Hyponatremia    Essential hypertension    GERD (gastroesophageal reflux disease)    Hyperlipidemia    Chronic diastolic CHF (congestive heart failure) ([facility])    COPD (chronic obstructive pulmonary disease) ([address] )    Coronary artery disease of native artery of native heart with stable angina pectoris ([facility])    Overweight (BMI 25 0-29  9)    BPH associated with nocturia  Resolved Problems:    Wrist pain, acute, left    Hypokalemia      Consulting Providers:  Urology, Hematology    Diagnostic & Therapeutic Procedures Performed:  Xr Wrist 2 Vw Left    Result Date: 7/31/2019  Impression: No acute osseous abnormality  Workstation performed: [id number]     Ct Abdomen Pelvis With Contrast    Result Date: 7/28/2019  Impression: No acute intra-abdominal abnormality  Cholelithiasis   Workstation performed: [id number]       Code Status: Prior  Advance Directive & Living Will: <no information>  Power of :    POLST:      Medications:  Discharge Medication List as of 8/1/2019 12:06 PM      STOP taking these medications       ciprofloxacin (CIPRO) 250 mg tablet Comments:   Reason for Stopping:         clopidogrel (PLAVIX) 75 mg tablet Comments:   Reason for Stopping:         fluticasone-salmeterol (ADVAIR DISKUS) 250-50 mcg/dose inhaler Comments:   Reason for Stopping:         benzonatate (TESSALON PERLES) 100 mg capsule Comments:   Reason for Stopping:         ferrous sulfate 325 (65 Fe) mg tablet Comments:   Reason for Stopping:         nystatin (MYCOSTATIN) powder Comments:   Reason for Stopping:         polyethylene glycol (GLYCOLAX) powder Comments:   Reason for Stopping:         senna (SENOKOT) 8 6 mg Comments:   Reason for Stopping:             Discharge Medication List as of 8/1/2019 12:06 PM      START taking these medications    Details   finasteride (PROSCAR) 5 mg tablet Take 1 tablet (5 mg total) by mouth daily, Starting Fri 8/2/2019, Print      fluticasone-vilanterol (BREO ELLIPTA) 200-25 MCG/INH inhaler Inhale 1 puff once daily Rinse mouth after use , Starting Fri 8/2/2019, Print           Discharge Medication List as of 8/1/2019 12:06 PM      CONTINUE these medications which have NOT CHANGED    Details   amLODIPine (NORVASC) 5 mg tablet Take 1 tablet (5 mg total) by mouth daily, Starting Mon 6/10/2019, Normal      atorvastatin (LIPITOR) 80 mg tablet Take 80 mg by mouth daily at bedtime, Starting Mon 10/29/2018, Historical Med      insulin glargine (LANTUS SOLOSTAR) 100 units/mL injection pen Inject 20 Units under the skin daily, Starting Tue 7/9/2019, Normal insulin lispro (HUMALOG KWIKPEN) 100 units/mL injection pen Inject 8 Units under the skin 3 (three) times a day with meals, Starting Tue 7/9/2019, Normal      isosorbide mononitrate (IMDUR) 60 mg 24 hr tablet Take 1 tablet (60 mg total) by mouth daily, Starting Fri 6/14/2019, Normal      metoprolol tartrate (LOPRESSOR) 50 mg tablet Take 1 tablet (50 mg total) by mouth every 12 (twelve) hours, Starting Mon 6/10/2019, Normal      pantoprazole (PROTONIX) 40 mg tablet Take 1 tablet (40 mg total) by mouth daily, Starting Tue 7/9/2019, Normal      QUEtiapine (SEROquel) 25 mg tablet Take 1 tablet (25 mg total) by mouth daily at bedtime, Starting Fri 2/1/2019, Normal      tamsulosin (FLOMAX) 0 4 mg Take 1 capsule (0 4 mg total) by mouth daily with dinner, Starting Fri 6/29/2018, Normal      albuterol (2 5 mg/3 mL) 0 083 % nebulizer solution Take 2 5 mg by nebulization every 2 (two) hours as needed for wheezing, Historical Med      albuterol (VENTOLIN HFA) 90 mcg/act inhaler Inhale 2 puffs every 6 (six) hours as needed for wheezing, Starting Fri 1/11/2019, Normal      !! B-D UF III MINI PEN NEEDLES 31G X 5 MM MISC Use 4 daily with insulin injections, Normal      Blood Glucose Monitoring Suppl (ONE TOUCH ULTRA 2) w/Device KIT Test blood glucose 3 times daily, Normal      docusate sodium (COLACE) 100 mg capsule Take 1 capsule (100 mg total) by mouth 2 (two) times a day, Starting Mon 7/2/2018, Normal      glucose blood (ONE TOUCH ULTRA TEST) test strip Test blood glucose 3 times daily (One Touch Ultra Blue), Normal      !!  Insulin Pen Needle (PEN NEEDLES 3/16") 31G X 5 MM MISC Use one daily with Lantus pen, Historical Med      ipratropium-albuterol (DUO-NEB) 0 5-2 5 mg/3 mL nebulizer solution Inhale 3 mL 4 (four) times a day, Starting Mon 10/29/2018, Until Tue 10/29/2019, Historical Med      Multiple Vitamin (MULTIVITAMIN) tablet Take 1 tablet by mouth daily, Historical Med      nitroglycerin (NITROSTAT) 0 4 mg SL tablet Place 1 tablet (0 4 mg total) under the tongue every 5 (five) minutes as needed for chest pain for up to 30 days, Starting Mon 4/23/2018, Until Sun 7/28/2019, Normal      omega-3-acid ethyl esters (LOVAZA) 1 g capsule Take 1 g by mouth daily, Historical Med      ONETOUCH DELICA LANCETS 23D MISC Test blood glucose 3 times daily, Normal      oxyCODONE (ROXICODONE) 5 mg immediate release tablet Take 1 tablet (5 mg total) by mouth every 4 (four) hours as needed for moderate painMax Daily Amount: 30 mg, Starting Thu 8/1/2019, Normal      polyvinyl alcohol (LIQUIFILM TEARS) 1 4 % ophthalmic solution Administer 1 drop to both eyes as needed for dry eyes, Historical Med      tiotropium (SPIRIVA RESPIMAT) 2 5 MCG/ACT AERS inhaler Inhale 2 puffs daily, Starting Fri 1/11/2019, Normal      traMADol (ULTRAM) 50 mg tablet Take 1 tablet (50 mg total) by mouth every 8 (eight) hours as needed for moderate pain or severe pain, Starting Tue 7/9/2019, Normal      LYRICA 150 MG capsule Take 1 capsule (150 mg total) by mouth 3 (three) times a day, Starting Thu 8/1/2019, Normal       !! - Potential duplicate medications found  Please discuss with provider  Allergies:  No Known Allergies    FOLLOW-UP     PCP Outpatient Follow-up:  Yes, required follow-up by PCP to confirm resolution of possible DIC with anemia and thrombocytopenia    Consulting Providers Follow-up:  yes      Physician name: Dr Mily Wolf  Specialty: Hematology  Follow up within next: Within the next week for repeat CBC and follow-up results of LAP test    Yes,   Specialty: Urology  Follow-up within 2 weeks of discharge to discuss management of BPH   Active Issues Requiring Follow-up:   yes     Issue: Anemia  Responsible Individual: Dr Mily Wolf, Dr Belinda Haile  What is Needed: Weekly CBC   Follow-up Appointments Arranged: No        Issue: Thrombocytpenia   Responsible Individual: Dr Mily Wolf, Dr Belinda Haile  What is Needed: Weekly CBC  Follow-up Appointments Arranged:  No Issue: Enlarged prostate on imaging  Responsible: Urology   What is needed: outpatient follow-up and PSA  Follow-up apppointment not arranged  Discharge Statement:   I spent 1 hour minutes discharging the patient  This time was spent on the day of discharge  I had direct contact with the patient on the day of discharge  Additional documentation is required if more than 30 minutes were spent on discharge  Portions of the record may have been created with voice recognition software  Occasional wrong word or "sound a like" substitutions may have occurred due to the inherent limitations of voice recognition software    Read the chart carefully and recognize, using context, where substitutions have occurred     ==  Leonel Goodwin MD  [address]  Internal Medicine Resident PGY-1

## 2019-08-13 NOTE — TELEPHONE ENCOUNTER
Patient just got out of the hospital and is having terrible pain, cannot sleep  He is asking for you to give him oxycodone  He said the hospital only gave him 15 pills  He said the Lyrica is not helping  Patient was very persistent and insisting that we call in the oxys for him today, repeating his story and request multiple times

## 2019-08-13 NOTE — UTILIZATION REVIEW
Notification of Discharge  This is a Notification of Discharge from our facility [address]  Please be advised that this patient has been discharge from our facility  Below you will find the admission and discharge date and time including the patients disposition  PRESENTATION DATE: 7/28/2019  8:36 AM  OBS ADMISSION DATE:   IP ADMISSION DATE: 7/28/19 1230   DISCHARGE DATE: 8/1/2019  2:20 PM  DISPOSITION: Home/Self Care Home/Self Care   Admission Orders listed below:  Admission Orders (From admission, onward)     Ordered        07/28/19 1230  Inpatient Admission (expected length of stay for this patient Order details is greater than two midnights)  Once                   Please contact the UR Department if additional information is required to close this patient's authorization/case  [address]  Utilization Review Department  Phone: [phone]; Fax [phone]  [email or web address]  ATTENTION: Please call with any questions or concerns to [phone]  and carefully listen to the prompts so that you are directed to the right person  Send all requests for admission clinical reviews, approved or denied determinations and any other requests to fax [phone]   All voicemails are confidential

## 2019-08-14 NOTE — TELEPHONE ENCOUNTER
Thank you  LMOM for patient relaying your message advising him to call and reschedule his hospital follow-up appointment and no oxys will be ordered until he is seen in the office

## 2019-08-14 NOTE — DISCHARGE INSTRUCTIONS
Return to the emergency department, if symptoms worsen, if you develope sudden onset of numbness in the region where you wipe  if you lose control of your bowel or bladder       Schedule appointment [facility]

## 2019-08-14 NOTE — ED NOTES
working on Charuvaughn Lauder as well as Deysi marshall [place] for pt        Judy Beck, EYAL  08/14/19 [id number]
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
## 2019-08-14 NOTE — SOCIAL WORK
CM consulted to speak with Pt regarding home health care  CM met with Pt who reported he lives in a senior apartment with elevator access  Pt reported he is not able to make his bed and complete ADL's and would like assistance  CM informed Pt that aide services would be private pay  CM provided LVAIP booklet for resournces  Pt is agreeable to home PT/OT and would like referrals sent to [facility], [facility], and [facility]  Pt reported he would like to utilize the agency who is able to start the soonest      Pt also receiving script from [facility]  CM spoke with [facility]  Copay is $0  Pt received scripts  Pt also requesting assistance in transportation home  Pt is able to sit in [employer]  [employer] contact and able to transport home  [facility] able to accept Pt with SOC in 24-48 hours  Pt aware and in agreement

## 2019-08-14 NOTE — ED ATTENDING ATTESTATION
Peter Sims MD, saw and evaluated the patient  I have discussed the patient with the resident/non-physician practitioner and agree with the resident's/non-physician practitioner's findings, Plan of Care, and MDM as documented in the resident's/non-physician practitioner's note, except where noted  All available labs and Radiology studies were reviewed  I was present for key portions of any procedure(s) performed by the resident/non-physician practitioner and I was immediately available to provide assistance  At this point I agree with the current assessment done in the Emergency Department  I have conducted an independent evaluation of this patient a history and physical is as follows:      Critical Care Time  Procedures     75 yo male with recent admission for uti and fell while in hospital but no bony injuries noted and then two days ago started with low back pain  Pt having trouble walking due to pain  Pt with hx of dm, neuropathy, htn, chronic pain, copd  Pt taking lyrica, ran out of oxy  Vss, afebrile, lungs cta, rrr, abdomen soft nontender, lumbar tenderness and paraspinal tenderness  Ct lspine, pelvis, pain meds  Likely msk pain

## 2019-08-15 NOTE — TELEPHONE ENCOUNTER
Please advise and let me know if there is anything we can do       Last O/V: 7/9/2019  Next O/V: will make follow up appointment soon he stated over the phone

## 2019-08-15 NOTE — TELEPHONE ENCOUNTER
Patient called and stated that he is being prescribed the generic medication for tramadol  He expresses that the medication is not working as well as the brand name, so he would like to be switched back  He states that he is in a lot of pain  He asked if we could send the script to [facility] in [place] if we are able make the change  If you have any further questions you can reach the patient at [phone]

## 2019-08-16 NOTE — TELEPHONE ENCOUNTER
Dr Lauren Chavez is not in the office  Providers, please address and fill as soon as possible  Per Enrique Kohli, she discussed with Dr Lauren Chavez but he has no access to sign off  Pt needs his medication asap  Pt was in ER on 08/15/2019 and needs to get his Lyrica filled  He always take brand name  Generic was filled and is not helping him  He is in terrific pain       PDMP will show that Pregabalin was filled but he needs Lyrica as ordered in the past

## 2019-08-16 NOTE — TELEPHONE ENCOUNTER
Rx was signed off by Dr Enoc Gill  Called [facility] pharmacy to confirm  Spoke to pharmacist and she stated that it looks like the rx was put on hold because it was too soon to fill  I explained that what was filled was the generic and that the pt needs the brand name  She said she would have her pharmacy tech call the insurance company to see why it was on hold  She will call me back or I will call back in a few minutes

## 2019-08-16 NOTE — TELEPHONE ENCOUNTER
New Patient Encounter      New Patient Intake Form   Patient Details:  Karly Winslow  1950  [id number]    Background Information:  [address] starts by opening a telephone encounter and gathering the following information   Who is calling to schedule? If not self, relationship to patient? provider   Referring Provider Dr Frank   What is the diagnosis? Leukocytosis/thrombocytopenia   When was the diagnosis? 8/2019   Is patient aware of diagnosis? Yes   Reason for visit? NP DX   Have you had any testing done? If so: when, where? Yes   Are records in [place]? Yes   Was the patient told to bring a disk? No   Scheduling Information:   Preferred [place]:  [place]   Requesting Specific Provider? no   Are there any dates/time the patient cannot be seen? no   Counseling Pre-Screen:  If the patient answers YES to any of the below questions, please route to the appropriate location specific counselor    Have you felt anxious or worried about your diagnosis and/or the treatment you are receiving? Did Not Speak to Patient   Has your diagnosis caused physical, emotional, or financial hardship for you? Did Not Speak to Patient   Do you anticipate any transportation issues to get to your appointment? Did Not Speak to Patient   Miscellaneous: scheduled with dr Foster Shin   After completing the above information, please route to Financial Counselor and the appropriate Nurse Navigator for review

## 2019-08-16 NOTE — TELEPHONE ENCOUNTER
Per pharmacy, med needs prior auth  Spoke to patient but he states he does not want to be on Lyrica anymore and wants to have another medication prescribed instead  He has an appt on Mon w Dr Kindra Delaney but he insists that he gets another med today  Can any provider help with this?

## 2019-08-19 NOTE — TELEPHONE ENCOUNTER
Patient has appointment today with Dr Kindra Delaney at 11:30am  Will forward this to him as an FYI ahead of this appointment

## 2019-08-19 NOTE — TELEPHONE ENCOUNTER
Pt seen by Dr Mi Guadarrama on 08/16/2019    Pt admitted to [facility] on 08/16/2019

## 2019-08-22 PROBLEM — J43.8 OTHER EMPHYSEMA (HCC): Status: ACTIVE | Noted: 2017-09-13

## 2019-08-22 PROBLEM — D46.9 MYELODYSPLASIA (MYELODYSPLASTIC SYNDROME) (HCC): Status: ACTIVE | Noted: 2019-01-01

## 2019-08-22 PROBLEM — R65.10 SIRS (SYSTEMIC INFLAMMATORY RESPONSE SYNDROME) (HCC): Status: ACTIVE | Noted: 2019-01-01

## 2019-08-22 NOTE — ED PROVIDER NOTES
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 Diagnoses: Mucopurulent chronic bronchitis ([facility])      traMADol (ULTRAM) 50 mg tablet Take 1 tablet (50 mg total) by mouth every 8 (eight) hours as needed for moderate pain or severe pain  Qty: 90 tablet, Refills: 0    Associated Diagnoses: Chronic bilateral low back pain with bilateral sciatica       ! ! - Potential duplicate medications found  Please discuss with provider  No discharge procedures on file      ED Provider  Electronically Signed by           Lindie Kocher, DO  08/22/19 2059

## 2019-08-22 NOTE — ASSESSMENT & PLAN NOTE
Lab Results   Component Value Date    HGBA1C 10 7 (H) 03/28/2018       No results for input(s): POCGLU in the last 72 hours      Blood Sugar Average: Last 72 hrs:   hemoglobin A1c 10 6 on 08/17/2019  Patient clearly would benefit from improved glycemic control  For now will continue on home regimen with 20 units Lantus and 8 units Humalog t i d   Monitor Accu-Cheks, provide sliding scale

## 2019-08-22 NOTE — ASSESSMENT & PLAN NOTE
Marked leukocytosis at 28,000 and tachycardia with heart rate in the 90s  No evidence of infection, abnormal CBC is likely secondary to underlying MDS the patient was recently diagnosed - Chronically marked leukocytosis  Follow-up blood cultures  Monitor CBC and vital signs

## 2019-08-22 NOTE — ASSESSMENT & PLAN NOTE
Suspected myelodysplastic syndrome  Bone marrow biopsy completed at [facility] on 08/19/2019 reveals the following result:  "- Marrow cellularity of 100 % (hypercellular for the patient's age)  with marked myeloid hyperplasia, mild dyserythropoiesis and  dysmegakaryopoiesis, 4% blasts consistent with  myelodysplastic/myeloproliferative neoplasm, pending molecular  Testing "  Patient has required several transfusions including recent platelet transfusion  - continue to monitor CBC daily  - hematology consult

## 2019-08-22 NOTE — ASSESSMENT & PLAN NOTE
Patient with chronic back pain and presents with intractable back pain and mid back radiating to lower extremities  X-ray of the is unremarkable other than osteoarthritis  PT/OT eval

## 2019-08-22 NOTE — PLAN OF CARE
Problem: Potential for Falls  Goal: Patient will remain free of falls  Description  INTERVENTIONS:  - Assess patient frequently for physical needs  -  Identify cognitive and physical deficits and behaviors that affect risk of falls    -  [place] fall precautions as indicated by assessment   - Educate patient/family on patient safety including physical limitations  - Instruct patient to call for assistance with activity based on assessment  - Modify environment to reduce risk of injury  - Consider OT/PT consult to assist with strengthening/mobility  Outcome: Progressing     Problem: PAIN - ADULT  Goal: Verbalizes/displays adequate comfort level or baseline comfort level  Description  Interventions:  - Encourage patient to monitor pain and request assistance  - Assess pain using appropriate pain scale  - Administer analgesics based on type and severity of pain and evaluate response  - Implement non-pharmacological measures as appropriate and evaluate response  - Consider cultural and social influences on pain and pain management  - Notify physician/advanced practitioner if interventions unsuccessful or patient reports new pain  Outcome: Progressing     Problem: INFECTION - ADULT  Goal: Absence or prevention of progression during hospitalization  Description  INTERVENTIONS:  - Assess and monitor for signs and symptoms of infection  - Monitor lab/diagnostic results  - Monitor all insertion sites, i e  indwelling lines, tubes, and drains  - Monitor endotracheal if appropriate and nasal secretions for changes in amount and color  - [place] appropriate cooling/warming therapies per order  - Administer medications as ordered  - Instruct and encourage patient and family to use good hand hygiene technique  - Identify and instruct in appropriate isolation precautions for identified infection/condition  Outcome: Progressing  Goal: Absence of fever/infection during neutropenic period  Description  INTERVENTIONS:  - Monitor WBC    Outcome: Progressing     Problem: DISCHARGE PLANNING  Goal: Discharge to home or other facility with appropriate resources  Description  INTERVENTIONS:  - Identify barriers to discharge w/patient and caregiver  - Arrange for needed discharge resources and transportation as appropriate  - Identify discharge learning needs (meds, wound care, etc )  - Arrange for interpretive services to assist at discharge as needed  - Refer to Case Management Department for coordinating discharge planning if the patient needs post-hospital services based on physician/advanced practitioner order or complex needs related to functional status, cognitive ability, or social support system  Outcome: Progressing     Problem: RESPIRATORY - ADULT  Goal: Achieves optimal ventilation and oxygenation  Description  INTERVENTIONS:  - Assess for changes in respiratory status  - Assess for changes in mentation and behavior  - Position to facilitate oxygenation and minimize respiratory effort  - Oxygen administered by appropriate delivery if ordered  - Initiate smoking cessation education as indicated  - Encourage broncho-pulmonary hygiene including cough, deep breathe, Incentive Spirometry  - Assess the need for suctioning and aspirate as needed  - Assess and instruct to report SOB or any respiratory difficulty  - Respiratory Therapy support as indicated  Outcome: Progressing     Problem: SKIN/TISSUE INTEGRITY - ADULT  Goal: Skin integrity remains intact  Description  INTERVENTIONS  - Identify patients at risk for skin breakdown  - Assess and monitor skin integrity  - Assess and monitor nutrition and hydration status  - Monitor labs (i e  albumin)  - Assess for incontinence   - Turn and reposition patient  - Assist with mobility/ambulation  - Relieve pressure over bony prominences  - Avoid friction and shearing  - Provide appropriate hygiene as needed including keeping skin clean and dry  - Evaluate need for skin moisturizer/barrier cream  - Collaborate with interdisciplinary team (i e  Nutrition, Rehabilitation, etc )   - Patient/family teaching  Outcome: Progressing  Goal: Incision(s), wounds(s) or drain site(s) healing without S/S of infection  Description  INTERVENTIONS  - Assess and document risk factors for skin impairment   - Assess and document dressing, incision, wound bed, drain sites and surrounding tissue  - Consider nutrition services referral as needed  - Oral mucous membranes remain intact  - Provide patient/ family education  Outcome: Progressing

## 2019-08-23 PROBLEM — E83.42 HYPOMAGNESEMIA: Status: ACTIVE | Noted: 2019-01-01

## 2019-08-23 NOTE — ASSESSMENT & PLAN NOTE
Lab Results   Component Value Date    HGBA1C 10 7 (H) 03/28/2018       Recent Labs     08/22/19  1734 08/22/19  2107 08/23/19  0715 08/23/19  1137   POCGLU 171* 133 220* 309*       Blood Sugar Average: Last 72 hrs:  (P) 208  25 hemoglobin A1c 10 6 on 08/17/2019  Patient clearly would benefit from improved glycemic control  Will increase Lantus to 25 units daily and increase Humalog to 10 u t i d   Monitor Accu-Cheks, provide sliding scale
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
## 2019-08-23 NOTE — ASSESSMENT & PLAN NOTE
Patient continues to c/o intractable back pain  Will increase oxycodone to 10 mg every 6 hours for severe pain  Consider MRI if pain does not improve

## 2019-08-23 NOTE — ASSESSMENT & PLAN NOTE
Patient with chronic back pain and presents with intractable back pain and mid back radiating to lower extremities  X-ray of lumbar spine unremarkable other than osteoarthritis  PT/OT eval

## 2019-08-23 NOTE — PLAN OF CARE
Problem: Potential for Falls  Goal: Patient will remain free of falls  Description  INTERVENTIONS:  - Assess patient frequently for physical needs  -  Identify cognitive and physical deficits and behaviors that affect risk of falls    -  [place] fall precautions as indicated by assessment   - Educate patient/family on patient safety including physical limitations  - Instruct patient to call for assistance with activity based on assessment  - Modify environment to reduce risk of injury  - Consider OT/PT consult to assist with strengthening/mobility  Outcome: Progressing     Problem: PAIN - ADULT  Goal: Verbalizes/displays adequate comfort level or baseline comfort level  Description  Interventions:  - Encourage patient to monitor pain and request assistance  - Assess pain using appropriate pain scale  - Administer analgesics based on type and severity of pain and evaluate response  - Implement non-pharmacological measures as appropriate and evaluate response  - Consider cultural and social influences on pain and pain management  - Notify physician/advanced practitioner if interventions unsuccessful or patient reports new pain  Outcome: Progressing     Problem: INFECTION - ADULT  Goal: Absence or prevention of progression during hospitalization  Description  INTERVENTIONS:  - Assess and monitor for signs and symptoms of infection  - Monitor lab/diagnostic results  - Monitor all insertion sites, i e  indwelling lines, tubes, and drains  - Monitor endotracheal if appropriate and nasal secretions for changes in amount and color  - [place] appropriate cooling/warming therapies per order  - Administer medications as ordered  - Instruct and encourage patient and family to use good hand hygiene technique  - Identify and instruct in appropriate isolation precautions for identified infection/condition  Outcome: Progressing  Goal: Absence of fever/infection during neutropenic period  Description  INTERVENTIONS:  - Monitor WBC    Outcome: Progressing     Problem: DISCHARGE PLANNING  Goal: Discharge to home or other facility with appropriate resources  Description  INTERVENTIONS:  - Identify barriers to discharge w/patient and caregiver  - Arrange for needed discharge resources and transportation as appropriate  - Identify discharge learning needs (meds, wound care, etc )  - Arrange for interpretive services to assist at discharge as needed  - Refer to Case Management Department for coordinating discharge planning if the patient needs post-hospital services based on physician/advanced practitioner order or complex needs related to functional status, cognitive ability, or social support system  Outcome: Progressing     Problem: Knowledge Deficit  Goal: Patient/family/caregiver demonstrates understanding of disease process, treatment plan, medications, and discharge instructions  Description  Complete learning assessment and assess knowledge base    Interventions:  - Provide teaching at level of understanding  - Provide teaching via preferred learning methods  Outcome: Progressing     Problem: RESPIRATORY - ADULT  Goal: Achieves optimal ventilation and oxygenation  Description  INTERVENTIONS:  - Assess for changes in respiratory status  - Assess for changes in mentation and behavior  - Position to facilitate oxygenation and minimize respiratory effort  - Oxygen administered by appropriate delivery if ordered  - Initiate smoking cessation education as indicated  - Encourage broncho-pulmonary hygiene including cough, deep breathe, Incentive Spirometry  - Assess the need for suctioning and aspirate as needed  - Assess and instruct to report SOB or any respiratory difficulty  - Respiratory Therapy support as indicated  Outcome: Progressing     Problem: SKIN/TISSUE INTEGRITY - ADULT  Goal: Skin integrity remains intact  Description  INTERVENTIONS  - Identify patients at risk for skin breakdown  - Assess and monitor skin integrity  - Assess and monitor nutrition and hydration status  - Monitor labs (i e  albumin)  - Assess for incontinence   - Turn and reposition patient  - Assist with mobility/ambulation  - Relieve pressure over bony prominences  - Avoid friction and shearing  - Provide appropriate hygiene as needed including keeping skin clean and dry  - Evaluate need for skin moisturizer/barrier cream  - Collaborate with interdisciplinary team (i e  Nutrition, Rehabilitation, etc )   - Patient/family teaching  Outcome: Progressing  Goal: Incision(s), wounds(s) or drain site(s) healing without S/S of infection  Description  INTERVENTIONS  - Assess and document risk factors for skin impairment   - Assess and document dressing, incision, wound bed, drain sites and surrounding tissue  - Consider nutrition services referral as needed  - Oral mucous membranes remain intact  - Provide patient/ family education  Outcome: Progressing
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
## 2019-08-23 NOTE — PROGRESS NOTES
Progress Note - Carlyn Ramirez  1950, 76 y o  male MRN: [id number]    Unit/Bed#: E5 -01 Encounter: [id number]    Primary Care Provider: Rex Cottrell MD   Date and time admitted to hospital: 8/22/2019 10:59 AM        * Intractable back pain  Assessment & Plan  Patient with chronic back pain and presents with intractable back pain and mid back radiating to lower extremities  X-ray of lumbar spine unremarkable other than osteoarthritis  PT/OT eval    Myelodysplasia (myelodysplastic syndrome) ([facility])  Assessment & Plan  Suspected myelodysplastic syndrome  Bone marrow biopsy completed at [facility] on 08/19/2019 reveals the following result:  "- Marrow cellularity of 100 % (hypercellular for the patient's age)  with marked myeloid hyperplasia, mild dyserythropoiesis and  dysmegakaryopoiesis, 4% blasts consistent with  myelodysplastic/myeloproliferative neoplasm, pending molecular  Testing "  Patient has required several transfusions including recent platelet transfusion and PRBC  - continue to monitor CBC daily  - hematology consulted, appreciate input    SIRS (systemic inflammatory response syndrome) ([facility])  Assessment & Plan  Marked leukocytosis at 28,000 and tachycardia with heart rate in the 90s  No evidence of infection, abnormal CBC is likely secondary to underlying MDS the patient was recently diagnosed - Chronically marked leukocytosis  Follow-up blood cultures  Monitor CBC and vital signs      Hypomagnesemia  Assessment & Plan  Replacement of magnesium sulfate  Monitor BMP and magnesium level    Hypokalemia  Assessment & Plan  Oral and IV replacement with KCl  BMP and magnesium level in a m  DELLA (acute kidney injury) ([facility] [place] [address] )  Assessment & Plan  Baseline Cr around 1, Cr at 1 49 today  Obtain renal US  Check urine studies  IVF    Other emphysema [facility])  Assessment & Plan  Not in exacerbation  Continue Breo inhaler  DuoNeb p r n      Opioid dependence [facility])  Assessment & Plan  Patient continues to c/o intractable back pain  Will increase oxycodone to 10 mg every 6 hours for severe pain  Consider MRI if pain does not improve    Type 2 diabetes mellitus with diabetic neuropathy, with long-term current use of insulin [facility])  Assessment & Plan  Lab Results   Component Value Date    HGBA1C 10 7 (H) 2018       Recent Labs     19  1734 19  2107 19  0715 19  1137   POCGLU 171* 133 220* 309*       Blood Sugar Average: Last 72 hrs:  (P) 208  25 hemoglobin A1c 10 6 on 2019  Patient clearly would benefit from improved glycemic control  Will increase Lantus to 25 units daily and increase Humalog to 10 u t i d   Monitor Accu-Cheks, provide sliding scale    VTE Pharmacologic Prophylaxis:   Pharmacologic: none due to pancytopenia/bleeding risk  Mechanical VTE Prophylaxis in Place: Yes    Patient Centered Rounds: I have performed bedside rounds with nursing staff today  Discussions with Specialists or Other Care Team Provider: case management    Education and Discussions with Family / Patient: left message for brother    Time Spent for Care: 30 minutes  More than 50% of total time spent on counseling and coordination of care as described above  Current Length of Stay: 1 day(s)    Current Patient Status: Inpatient   Certification Statement: The patient will continue to require additional inpatient hospital stay due to need for close monitoring, symptoms control    Discharge Plan: TBD    Code Status: Level 1 - Full Code      Subjective:   Patient seen and examined  Continues to complain of back pain and states "I don't feel so good"  Otherwise, no overnight events  Remains afebrile  No evidence of bleeding      Objective:     Vitals:   Temp (24hrs), Av 4 °F (36 9 °C), Min:98 2 °F (36 8 °C), Max:98 6 °F (37 °C)    Temp:  [98 2 °F (36 8 °C)-98 6 °F (37 °C)] 98 2 °F (36 8 °C)  HR:  [] 73  Resp:  [18-20] 18  BP: ()/(51-66) 108/55  SpO2:  [96 %-97 %] 96 %  There is no height or weight on file to calculate BMI  Input and Output Summary (last 24 hours): Intake/Output Summary (Last 24 hours) at 8/23/2019 1227  Last data filed at 8/23/2019 1025  Gross per 24 hour   Intake 1240 ml   Output 200 ml   Net 1040 ml       Physical Exam:     Physical Exam   Constitutional: He is oriented to person, place, and time  No distress  HENT:   Head: Normocephalic and atraumatic  Eyes: Conjunctivae are normal    Neck: No JVD present  Cardiovascular: Normal rate and regular rhythm  No murmur heard  Pulmonary/Chest: Effort normal  No respiratory distress  He has no wheezes  He has no rales  Abdominal: He exhibits no distension  There is no guarding  Musculoskeletal:   Lumbar spinal tenderness   Neurological: He is alert and oriented to person, place, and time  Skin: Skin is warm and dry  Psychiatric: He has a normal mood and affect         Additional Data:     Labs:    Results from last 7 days   Lab Units 08/23/19  0523 08/22/19  1159   WBC Thousand/uL 20 17* 28 65*   HEMOGLOBIN g/dL 7 5* 8 2*   HEMATOCRIT % 25 1* 26 9*   PLATELETS Thousands/uL 25* 35*   BANDS PCT %  --  1   NEUTROS PCT % 68  --    LYMPHS PCT % 10*  --    LYMPHO PCT %  --  9*   MONOS PCT % 8  --    MONO PCT %  --  6   EOS PCT % 0 2     Results from last 7 days   Lab Units 08/23/19  0523 08/22/19  1159   SODIUM mmol/L 139 138   POTASSIUM mmol/L 2 9* 3 1*   CHLORIDE mmol/L 102 98*   CO2 mmol/L 26 24   BUN mg/dL 19 17   CREATININE mg/dL 1 49* 1 23   ANION GAP mmol/L 11 16*   CALCIUM mg/dL 8 0* 8 3   ALBUMIN g/dL  --  2 3*   TOTAL BILIRUBIN mg/dL  --  0 74   ALK PHOS U/L  --  106   ALT U/L  --  12   AST U/L  --  14   GLUCOSE RANDOM mg/dL 222* 218*         Results from last 7 days   Lab Units 08/23/19  1137 08/23/19  0715 08/22/19  2107 08/22/19  1734   POC GLUCOSE mg/dl 309* 220* 133 171*         Results from last 7 days   Lab Units 08/23/19  0523 08/22/19  1159   LACTIC ACID mmol/L  --  0 8 PROCALCITONIN ng/ml 0 43*  --            * I Have Reviewed All Lab Data Listed Above  * Additional Pertinent Lab Tests Reviewed:  [address] 66 Admission Reviewed    Imaging:    Imaging Reports Reviewed Today Include: will review renal US      Recent Cultures (last 7 days):           Last 24 Hours Medication List:     Current Facility-Administered Medications:  acetaminophen 650 mg Oral Q6H PRN Krystle Lopez MD    amLODIPine 5 mg Oral Daily Denise Haley MD    atorvastatin 80 mg Oral HS Denise Haley MD    dextran 70-hypromellose 1 drop Both Eyes PRN Denise Haley MD    docusate sodium 100 mg Oral BID Denise Haley MD    finasteride 5 mg Oral Daily Krystle Lopez MD    fish oil 1,000 mg Oral Daily Denise Haley MD    fluticasone-vilanterol 1 puff Inhalation Daily Denise Haley MD    [START ON 8/24/2019] insulin glargine 25 Units Subcutaneous QAM Krystle Lopez MD    insulin lispro 1-5 Units Subcutaneous TID Qasim Cantu MD    insulin lispro 1-5 Units Subcutaneous HS Denise Haley MD    insulin lispro 10 Units Subcutaneous TID With Malorie Figueroa MD    ipratropium-albuterol 3 mL Nebulization TID PRN Denise Haley MD    isosorbide mononitrate 60 mg Oral Daily Denise Haley MD    lidocaine 1 patch Topical Daily Denise Haley MD    magnesium sulfate 4 g Intravenous Once Denise Haley MD    methocarbamol 500 mg Oral Q6H PRN Denise Haley MD    metoprolol tartrate 50 mg Oral Q12H Lan Ruiz MD    nicotine 1 patch Transdermal Daily Denise Haley MD    ondansetron 4 mg Intravenous Q6H PRN Denise Haley MD    oxyCODONE 10 mg Oral Q4H PRN Denise Haley MD    pantoprazole 40 mg Oral Daily Before Breakfast Krystle Lopez MD    potassium chloride 20 mEq Oral BID Denise Haley MD    potassium chloride 20 mEq Intravenous Q2H Denise Haley MD Last Rate: 20 mEq (08/23/19 1208)   pregabalin 150 mg Oral TID Denise Haley MD    QUEtiapine 25 mg Oral HS Denise Haley MD    sodium chloride 125 mL/hr Intravenous Continuous Pranav Jiménez MD Last Rate: 125 mL/hr (08/23/19 0853)   tamsulosin 0 4 mg Oral Daily With Justo Bowen MD    traMADol 50 mg Oral Q8H PRN Pranav Jiménez MD         Today, Patient Was Seen By: Alicia Ellis MD    ** Please Note: Dictation voice to text software may have been used in the creation of this document   **

## 2019-08-23 NOTE — ASSESSMENT & PLAN NOTE
Suspected myelodysplastic syndrome  Bone marrow biopsy completed at [facility] on 08/19/2019 reveals the following result:  "- Marrow cellularity of 100 % (hypercellular for the patient's age)  with marked myeloid hyperplasia, mild dyserythropoiesis and  dysmegakaryopoiesis, 4% blasts consistent with  myelodysplastic/myeloproliferative neoplasm, pending molecular  Testing "  Patient has required several transfusions including recent platelet transfusion and PRBC  - continue to monitor CBC daily  - hematology consulted, appreciate input

## 2019-08-23 NOTE — UTILIZATION REVIEW
Initial Clinical Review    Admission: Date/Time/Statement: Inpatient Admission Orders (From admission, onward)     Ordered        08/22/19 1319  Inpatient Admission  Once                   Orders Placed This Encounter   Procedures    Inpatient Admission     Standing Status:   Standing     Number of Occurrences:   1     Order Specific Question:   Admitting Physician     Answer:   Bonnie Martinez [[id number]]     Order Specific Question:   Level of Care     Answer:   Med Surg [16]     Order Specific Question:   Estimated length of stay     Answer:   More than 2 Midnights     Order Specific Question:   Certification     Answer:   I certify that inpatient services are medically necessary for this patient for a duration of greater than two midnights  See H&P and MD Progress Notes for additional information about the patient's course of treatment  ED Arrival Information     Expected Arrival Acuity Means of Arrival Escorted By Service Admission Type    - 8/22/2019 10:59 Urgent Ambulance [facility] General Medicine Urgent    Arrival Complaint    back pain        Chief Complaint   Patient presents with    Back Pain     Hx of back pain, reports lower back pan radiates into thoracic region and into right leg  takes gabapentin for pain  forgot to take dose today  Assessment/Plan:    76  Y O male  Presents to ED f rom home  With intractable  Back pain  PMH  Is  Chronic  Back pain, unc  DM and recently  Diagnosed MDS with bone marrow  Bx  Pt  Recently  Has r eq'd  Blood transfusions  Found meeting  SIRS  Criteria  In ED  And  [facility]  Drawn  ADMIT  IP  MED SURG  LOC  With   Intraactable  Back pain, SIRS and  MDS  And plan is  Monitor labs, follow  BC, PT/OT, pain control and hematology  Consult  PE:     Musculoskeletal: He exhibits no edema     Thoracic, lumbar spinal tenderness       ED Triage Vitals [08/22/19 1103]   Temperature Pulse Respirations Blood Pressure SpO2   98 4 °F (36 9 °C) 96 16 154/69 97 %      Temp Source Heart Rate Source Patient Position - Orthostatic VS BP Location FiO2 (%)   Oral Monitor Lying Right arm --      Pain Score       9        Wt Readings from Last 1 Encounters:   07/30/19 96 kg (211 lb 10 3 oz)     Additional Vital Signs:   /Time  Temp  Pulse  Resp  BP  SpO2  O2 Device  Patient Position - Orthostatic VS   08/23/19 1025            None (Room air)     08/23/19 0711  98 2 °F (36 8 °C)  73  18  108/55  96 %  Nasal cannula  Lying   08/22/19 2303  98 6 °F (37 °C)  98  19  102/51  97 %  Nasal cannula  Lying   08/22/19 2022    100    92/52         08/22/19 1457  98 5 °F (36 9 °C)  84  20  133/62  97 %    Lying   08/22/19 1312    90  18  144/66  97 %  None (Room air)  Lying   08/22/19 1103  98 4 °F (36 9 °C)  96  16  154/69  97 %  None (Room air)  Lying         Pertinent Labs/Diagnostic Test Results:   Results from last 7 days   Lab Units 08/23/19  0523 08/22/19  1159   WBC Thousand/uL 20 17* 28 65*   HEMOGLOBIN g/dL 7 5* 8 2*   HEMATOCRIT % 25 1* 26 9*   PLATELETS Thousands/uL 25* 35*   NEUTROS ABS Thousands/µL 13 68*  --    BANDS PCT %  --  1         Results from last 7 days   Lab Units 08/23/19  0523 08/22/19  1159   SODIUM mmol/L 139 138   POTASSIUM mmol/L 2 9* 3 1*   CHLORIDE mmol/L 102 98*   CO2 mmol/L 26 24   ANION GAP mmol/L 11 16*   BUN mg/dL 19 17   CREATININE mg/dL 1 49* 1 23   EGFR ml/min/1 73sq m 48 60   CALCIUM mg/dL 8 0* 8 3   MAGNESIUM mg/dL 1 1*  --      Results from last 7 days   Lab Units 08/22/19  1159   AST U/L 14   ALT U/L 12   ALK PHOS U/L 106   TOTAL PROTEIN g/dL 7 1   ALBUMIN g/dL 2 3*   TOTAL BILIRUBIN mg/dL 0 74   BILIRUBIN DIRECT mg/dL 0 25*     Results from last 7 days   Lab Units 08/23/19  1137 08/23/19  0715 08/22/19  2107 08/22/19  1734   POC GLUCOSE mg/dl 309* 220* 133 171*     Results from last 7 days   Lab Units 08/23/19  0523 08/22/19  1159   GLUCOSE RANDOM mg/dL 222* 218*         Results from last 7 days   Lab Units 08/22/19  1159 TROPONIN I ng/mL <0 02       Results from last 7 days   Lab Units 08/22/19  1159   LACTIC ACID mmol/L 0 8       Results from last 7 days   Lab Units 08/22/19  1159   LIPASE u/L 37*     Results from last 7 days   Lab Units 08/22/19  1159   SED RATE mm/hour 37*         Results from last 7 days   Lab Units 08/22/19  1325   CLARITY UA  Clear   COLOR UA  Yellow   SPEC GRAV UA  1 025   PH UA  5 0   GLUCOSE UA mg/dl Negative   KETONES UA mg/dl 40 (2+)*   BLOOD UA  Moderate*   PROTEIN UA mg/dl 100 (2+)*   NITRITE UA  Negative   BILIRUBIN UA  Interference- unable to analyze*   UROBILINOGEN UA E U /dl 0 2   LEUKOCYTES UA  Negative   WBC UA /hpf 4-10*   RBC UA /hpf None Seen   BACTERIA UA /hpf Occasional   EPITHELIAL CELLS WET PREP /hpf Occasional           Results from last 7 days   Lab Units 08/22/19  1159   TOTAL COUNTED  100       X ray  L/S  Spine  ( 8/22)    No acute osseous   abnormality    ED Treatment:   Medication Administration from 08/22/2019 1059 to 08/22/2019 1347       Date/Time Order Dose Route Action Comments     08/22/2019 1204 lidocaine (LIDODERM) 5 % patch 1 patch 1 patch Topical Medication Applied      08/22/2019 1203 acetaminophen (TYLENOL) tablet 650 mg 650 mg Oral Given      08/22/2019 1203 methocarbamol (ROBAXIN) tablet 500 mg 500 mg Oral Given      08/22/2019 1334 sodium chloride 0 9 % bolus 1,000 mL 0 mL Intravenous Stopped      08/22/2019 1207 sodium chloride 0 9 % bolus 1,000 mL 1,000 mL Intravenous New Bag      08/22/2019 1200 oxyCODONE (ROXICODONE) IR tablet 5 mg 5 mg Oral Given           Present on Admission:   Opioid dependence ([facility] [address] )      Admitting Diagnosis: Back pain [M54 9]  Leukocytosis [D72 829]  Anemia [D64 9]  Thrombocytopenia (H[facility]) [D69 6]  Age/Sex: 76 y o  male  Admission Orders:    Current Facility-Administered Medications:  acetaminophen 650 mg Oral Q6H PRN   amLODIPine 5 mg Oral Daily   atorvastatin 80 mg Oral HS   dextran 70-hypromellose 1 drop Both Eyes PRN   docusate sodium 100 mg Oral BID   finasteride 5 mg Oral Daily   fish oil 1,000 mg Oral Daily   fluticasone-vilanterol 1 puff Inhalation Daily   insulin glargine 20 Units Subcutaneous QAM   insulin lispro 1-5 Units Subcutaneous TID AC   insulin lispro 1-5 Units Subcutaneous HS   insulin lispro 8 Units Subcutaneous TID With Meals   ipratropium-albuterol 3 mL Nebulization TID PRN   isosorbide mononitrate 60 mg Oral Daily   lidocaine 1 patch Topical Daily   methocarbamol 500 mg Oral Q6H PRN   metoprolol tartrate 50 mg Oral Q12H [address]   morphine injection 2 mg Intravenous Q4H PRN   nicotine 1 patch Transdermal Daily   ondansetron 4 mg Intravenous Q6H PRN   oxyCODONE 5 mg Oral Q4H PRN   pantoprazole 40 mg Oral Daily Before Breakfast   potassium chloride 20 mEq Oral BID   potassium chloride 20 mEq Intravenous Q2H   pregabalin 150 mg Oral TID   QUEtiapine 25 mg Oral HS   sodium chloride 125 mL/hr Intravenous Continuous   tamsulosin 0 4 mg Oral Daily With Dinner   traMADol 50 mg Oral Q8H PRN       IP CONSULT TO CASE MANAGEMENT  IP CONSULT TO HEMATOLOGY    Network Utilization Review Department  Phone: [phone]; Fax [phone]  [email or web address]  ATTENTION: Please call with any questions or concerns to [phone]  and carefully listen to the prompts so that you are directed to the right person  Send all requests for admission clinical reviews, approved or denied determinations and any other requests to fax [phone]   All voicemails are confidential

## 2019-08-23 NOTE — PLAN OF CARE
Problem: OCCUPATIONAL THERAPY ADULT  Goal: Performs self-care activities at highest level of function for planned discharge setting  See evaluation for individualized goals  Description  Treatment Interventions: ADL retraining, UE strengthening/ROM, Functional transfer training, Endurance training, Patient/family training, Cognitive reorientation, Equipment evaluation/education, Compensatory technique education, Activityengagement, Energy conservation          See flowsheet documentation for full assessment, interventions and recommendations  Note:   Limitation: Decreased ADL status, Decreased Safe judgement during ADL, Decreased UE strength, Decreased endurance, Decreased cognition, Decreased sensation, Decreased self-care trans, Decreased high-level ADLs  Prognosis: Good, Fair  Assessment: Pt is a 76 y o  male seen for OT evaluation s/p admit to [facility] on 8/22/2019 w/ Intractable back pain  X-ray lumbar spine: (-) acute  Comorbidities affecting pt's functional performance at time of assessment include:Myelodysplasia, SIRS, hypokalemia,DM II, opioid dependence  Personal factors affecting pt at time of IE include: lives alone, impaired cognition  Prior to admission, pt was living alone and reports independent w/ ADLs, independent w/ functional transfers and mobility w/ SPC, independent w/ IADLs (reports increased difficulty)  Reports has family but they work and provide limited support  Upon evaluation: Pt requires MIN assist supine<>sit bed mobility w/ log rolling techniques, MIN assist sit<>Stand w/ VCs for hand placement and positioning, setup-MIN assist UB ADLs, MOD assist LB ADLS (educated on compensatory techniques), MIN assist toileting  2* the following deficits impacting occupational performance: increased pain, impaired balance, impaired activity tolerance, decreased strength and endurance, impaired activity tolerance, impaired cognition (STM, insight and safety awareness, impulsive)    Pt to benefit from continued skilled OT tx while in the hospital to address deficits as defined above and maximize level of functional independence w ADL's and functional mobility  Occupational Performance areas to address include: grooming, bathing/shower, toilet hygiene, dressing, health maintenance, functional mobility, clothing management, cleaning and meal prep, formal cognitive assessment, home safety education, back safety education  From OT standpoint, recommendation at time of d/c would be short term rehab, however pt is refusing rehab at this time       OT Discharge Recommendation: Short Term Rehab(refusing STR )  OT - OK to Discharge: (to rehab when medically stable)

## 2019-08-24 NOTE — ASSESSMENT & PLAN NOTE
Patient continues to c/o intractable back pain  Will continue oxycodone to 10 mg every 6 hours for severe pain  Add muscle relaxant

## 2019-08-24 NOTE — ASSESSMENT & PLAN NOTE
Lab Results   Component Value Date    HGBA1C 10 7 (H) 03/28/2018       Recent Labs     08/23/19  1601 08/23/19  2052 08/24/19  0756 08/24/19  1131   POCGLU 104 107 165* 169*       Blood Sugar Average: Last 72 hrs:  (P) 172 25 hemoglobin A1c 10 6 on 08/17/2019  Patient clearly would benefit from improved glycemic control  Accuchecks in acceptable range with Lantus to 25 units daily and Humalog to 10 u t i d   Monitor Accu-Cheks, provide sliding scale

## 2019-08-24 NOTE — PROGRESS NOTES
Progress Note - Shirley Mathew  1950, 76 y o  male MRN: [id number]    Unit/Bed#: E5 -01 Encounter: [id number]    Primary Care Provider: Yesica Lee MD   Date and time admitted to hospital: 8/22/2019 10:59 AM        * Intractable back pain  Assessment & Plan  Patient with chronic back pain and presents with intractable back pain and mid back radiating to lower extremities  X-ray of lumbar spine unremarkable other than osteoarthritis  PT/OT eval  Trail of valium at bedtime    Myelodysplasia (myelodysplastic syndrome) (HCC)  Assessment & Plan  Suspected myelodysplastic syndrome  Bone marrow biopsy completed at [facility] on 08/19/2019 reveals the following result:  "- Marrow cellularity of 100 % (hypercellular for the patient's age)  with marked myeloid hyperplasia, mild dyserythropoiesis and  dysmegakaryopoiesis, 4% blasts consistent with  myelodysplastic/myeloproliferative neoplasm, pending molecular  Testing "  Patient has required several transfusions including recent platelet transfusion and PRBC  - continue to monitor CBC daily  - hematology consulted, appreciate input  - patient started hydroxyurea  - platelets continue to drop, likely will require platelet transfusion tomorrow, consent obtained    SIRS (systemic inflammatory response syndrome) (HCC)  Assessment & Plan  Marked leukocytosis at 28K and tachycardia with heart rate in the 90s  Leukocytosis improved to 19K without Abx  Procalcitonin is mildly elevated in setting of DELLA on CKD  No evidence of infection, abnormal CBC is likely secondary to underlying MDS the patient was recently diagnosed - Chronically marked leukocytosis  Follow-up blood cultures  Monitor CBC and vital signs      Hypomagnesemia  Assessment & Plan  Improved from 1 1 to 1 6  Replacement of magnesium sulfate  Monitor BMP and magnesium level    Hypokalemia  Assessment & Plan  Resolved with oral and IV replacement with KCl  BMP and magnesium level in a m     DELLA (acute kidney injury) ([facility] )  Assessment & Plan  Baseline Cr around 1, Cr at 1 49 today  Improved to 1 3 today with IVF  Renal US unremarkable  Continue gentle IVF    Other emphysema ([facility] )  Assessment & Plan  Not in exacerbation  Continue Breo inhaler  DuoNeb p r n  Opioid dependence ([facility] )  Assessment & Plan  Patient continues to c/o intractable back pain  Will continue oxycodone to 10 mg every 6 hours for severe pain  Add muscle relaxant      Type 2 diabetes mellitus with diabetic neuropathy, with long-term current use of insulin [facility])  Assessment & Plan  Lab Results   Component Value Date    HGBA1C 10 7 (H) 03/28/2018       Recent Labs     08/23/19  1601 08/23/19  2052 08/24/19  0756 08/24/19  1131   POCGLU 104 107 165* 169*       Blood Sugar Average: Last 72 hrs:  (P) 172 25 hemoglobin A1c 10 6 on 08/17/2019  Patient clearly would benefit from improved glycemic control  Accuchecks in acceptable range with Lantus to 25 units daily and Humalog to 10 u t i d   Monitor Accu-Cheks, provide sliding scale    VTE Pharmacologic Prophylaxis:   Pharmacologic: None due to thrombocytopenia, bleeding risk  Mechanical VTE Prophylaxis in Place: Yes    Patient Centered Rounds: I have performed bedside rounds with nursing staff today  Discussions with Specialists or Other Care Team Provider: nursing    Education and Discussions with Family / Patient: patient, called brother    Time Spent for Care: 30 minutes  More than 50% of total time spent on counseling and coordination of care as described above  Current Length of Stay: 2 day(s)    Current Patient Status: Inpatient   Certification Statement: The patient will continue to require additional inpatient hospital stay due to need for close monitoring    Discharge Plan: TBD    Code Status: Level 1 - Full Code      Subjective:   Patient seen and examined  He reports feeling slightly better but still severe back pain especially at bedtime  Otherwise no complaints  No evidence of bleeding  Objective:     Vitals:   Temp (24hrs), Av 2 °F (36 8 °C), Min:97 6 °F (36 4 °C), Max:98 8 °F (37 1 °C)    Temp:  [97 6 °F (36 4 °C)-98 8 °F (37 1 °C)] 98 8 °F (37 1 °C)  HR:  [] 120  Resp:  [18] 18  BP: ()/(52-64) 109/64  SpO2:  [93 %-98 %] 94 %  There is no height or weight on file to calculate BMI  Input and Output Summary (last 24 hours): Intake/Output Summary (Last 24 hours) at 2019 1406  Last data filed at 2019 1601  Gross per 24 hour   Intake    Output 200 ml   Net -200 ml       Physical Exam:     Physical Exam   Constitutional: He is oriented to person, place, and time  No distress  HENT:   Head: Normocephalic and atraumatic  Eyes: Conjunctivae are normal    Neck: No JVD present  Cardiovascular: Normal rate and regular rhythm  No murmur heard  Pulmonary/Chest: Effort normal  No respiratory distress  He has no wheezes  He has no rales  Abdominal: He exhibits no distension  There is no guarding  Obese    Musculoskeletal: He exhibits no edema  Lumbar spinal tenderness   Neurological: He is alert and oriented to person, place, and time  Skin: Skin is warm and dry  Psychiatric: He has a normal mood and affect  Additional Data:     Labs:    Results from last 7 days   Lab Units 19  0607  19  1159   WBC Thousand/uL 19 81*   < > 28 65*   HEMOGLOBIN g/dL 7 3*   < > 8 2*   HEMATOCRIT % 24 3*   < > 26 9*   PLATELETS Thousands/uL 17*   < > 35*   BANDS PCT %  --   --  1   NEUTROS PCT % 67   < >  --    LYMPHS PCT % 10*   < >  --    LYMPHO PCT %  --   --  9*   MONOS PCT % 8   < >  --    MONO PCT %  --   --  6   EOS PCT % 0   < > 2    < > = values in this interval not displayed       Results from last 7 days   Lab Units 19  0546  19  1159   SODIUM mmol/L 138   < > 138   POTASSIUM mmol/L 3 6   < > 3 1*   CHLORIDE mmol/L 105   < > 98*   CO2 mmol/L 24   < > 24   BUN mg/dL 18   < > 17   CREATININE mg/dL 1 38*   < > 1  23   ANION GAP mmol/L 9   < > 16*   CALCIUM mg/dL 7 7*   < > 8 3   ALBUMIN g/dL  --   --  2 3*   TOTAL BILIRUBIN mg/dL  --   --  0 74   ALK PHOS U/L  --   --  106   ALT U/L  --   --  12   AST U/L  --   --  14   GLUCOSE RANDOM mg/dL 132   < > 218*    < > = values in this interval not displayed  Results from last 7 days   Lab Units 08/24/19  1131 08/24/19  0756 08/23/19  2052 08/23/19  1601 08/23/19  1137 08/23/19  0715 08/22/19  2107 08/22/19  1734   POC GLUCOSE mg/dl 169* 165* 107 104 309* 220* 133 171*         Results from last 7 days   Lab Units 08/24/19  0546 08/23/19  0523 08/22/19  1159   LACTIC ACID mmol/L  --   --  0 8   PROCALCITONIN ng/ml 0 32* 0 43*  --        * I Have Reviewed All Lab Data Listed Above  * Additional Pertinent Lab Tests Reviewed: [facility] Admission Reviewed    Imaging:    Imaging Reports Reviewed Today Include: no new    Recent Cultures (last 7 days):     Results from last 7 days   Lab Units 08/22/19  1159   BLOOD CULTURE  No Growth at 24 hrs         Last 24 Hours Medication List:     Current Facility-Administered Medications:  acetaminophen 650 mg Oral Q6H PRN Soni Sanchez MD    amLODIPine 5 mg Oral Daily Soni Sanchez MD    atorvastatin 80 mg Oral HS Krystle Lopez MD    dextran 70-hypromellose 1 drop Both Eyes PRN Soni Sanchez MD    docusate sodium 100 mg Oral BID Soni Sanchez MD    finasteride 5 mg Oral Daily Krystle Lopez MD    fish oil 1,000 mg Oral Daily Soni Sanchez MD    fluticasone-vilanterol 1 puff Inhalation Daily Krystle Lopez MD    hydroxyurea 500 mg Oral Q24H Annabelle Cervantes MD    insulin glargine 25 Units Subcutaneous QAM Soni Sanchez MD    insulin lispro 1-5 Units Subcutaneous TID AC Soni Sanchez MD    insulin lispro 1-5 Units Subcutaneous HS Soni Sanchez MD    insulin lispro 10 Units Subcutaneous TID With Arabella Byrd MD    ipratropium-albuterol 3 mL Nebulization TID PRN Soni Sanchez MD    isosorbide mononitrate 60 mg Oral Daily Brant Andujar MD    lidocaine 1 patch Topical Daily Krystle Lopez MD    methocarbamol 500 mg Oral Q6H PRN Brant Andujar MD    metoprolol tartrate 50 mg Oral Q12H Bear Stanley MD    nicotine 1 patch Transdermal Daily Brant Andujar MD    ondansetron 4 mg Intravenous Q6H PRN Brant Andujar MD    oxyCODONE 10 mg Oral Q4H PRN Brant Andujar, MD    pantoprazole 40 mg Oral Daily Before Breakfast Brant Andujar MD    potassium chloride 20 mEq Oral BID Brant Andujar MD    pregabalin 150 mg Oral TID Brant Andujar MD    QUEtiapine 25 mg Oral HS Brant Andujar MD    sodium chloride 125 mL/hr Intravenous Continuous Brant Andujar MD Last Rate: 125 mL/hr (08/24/19 1050)   tamsulosin 0 4 mg Oral Daily With Radha Menjivar MD    traMADol 50 mg Oral Q8H PRN Brant Andujar MD         Today, Patient Was Seen By: Daly Wolf MD    ** Please Note: Dictation voice to text software may have been used in the creation of this document   **

## 2019-08-24 NOTE — PLAN OF CARE
Problem: Potential for Falls  Goal: Patient will remain free of falls  Description  INTERVENTIONS:  - Assess patient frequently for physical needs  -  Identify cognitive and physical deficits and behaviors that affect risk of falls    -  [place] fall precautions as indicated by assessment   - Educate patient/family on patient safety including physical limitations  - Instruct patient to call for assistance with activity based on assessment  - Modify environment to reduce risk of injury  - Consider OT/PT consult to assist with strengthening/mobility  Outcome: Progressing     Problem: PAIN - ADULT  Goal: Verbalizes/displays adequate comfort level or baseline comfort level  Description  Interventions:  - Encourage patient to monitor pain and request assistance  - Assess pain using appropriate pain scale  - Administer analgesics based on type and severity of pain and evaluate response  - Implement non-pharmacological measures as appropriate and evaluate response  - Consider cultural and social influences on pain and pain management  - Notify physician/advanced practitioner if interventions unsuccessful or patient reports new pain  Outcome: Progressing     Problem: INFECTION - ADULT  Goal: Absence or prevention of progression during hospitalization  Description  INTERVENTIONS:  - Assess and monitor for signs and symptoms of infection  - Monitor lab/diagnostic results  - Monitor all insertion sites, i e  indwelling lines, tubes, and drains  - Monitor endotracheal if appropriate and nasal secretions for changes in amount and color  - [place] appropriate cooling/warming therapies per order  - Administer medications as ordered  - Instruct and encourage patient and family to use good hand hygiene technique  - Identify and instruct in appropriate isolation precautions for identified infection/condition  Outcome: Progressing  Goal: Absence of fever/infection during neutropenic period  Description  INTERVENTIONS:  - Monitor WBC    Outcome: Progressing     Problem: DISCHARGE PLANNING  Goal: Discharge to home or other facility with appropriate resources  Description  INTERVENTIONS:  - Identify barriers to discharge w/patient and caregiver  - Arrange for needed discharge resources and transportation as appropriate  - Identify discharge learning needs (meds, wound care, etc )  - Arrange for interpretive services to assist at discharge as needed  - Refer to Case Management Department for coordinating discharge planning if the patient needs post-hospital services based on physician/advanced practitioner order or complex needs related to functional status, cognitive ability, or social support system  Outcome: Progressing     Problem: Knowledge Deficit  Goal: Patient/family/caregiver demonstrates understanding of disease process, treatment plan, medications, and discharge instructions  Description  Complete learning assessment and assess knowledge base    Interventions:  - Provide teaching at level of understanding  - Provide teaching via preferred learning methods  Outcome: Progressing     Problem: RESPIRATORY - ADULT  Goal: Achieves optimal ventilation and oxygenation  Description  INTERVENTIONS:  - Assess for changes in respiratory status  - Assess for changes in mentation and behavior  - Position to facilitate oxygenation and minimize respiratory effort  - Oxygen administered by appropriate delivery if ordered  - Initiate smoking cessation education as indicated  - Encourage broncho-pulmonary hygiene including cough, deep breathe, Incentive Spirometry  - Assess the need for suctioning and aspirate as needed  - Assess and instruct to report SOB or any respiratory difficulty  - Respiratory Therapy support as indicated  Outcome: Progressing     Problem: SKIN/TISSUE INTEGRITY - ADULT  Goal: Skin integrity remains intact  Description  INTERVENTIONS  - Identify patients at risk for skin breakdown  - Assess and monitor skin integrity  - Assess and monitor nutrition and hydration status  - Monitor labs (i e  albumin)  - Assess for incontinence   - Turn and reposition patient  - Assist with mobility/ambulation  - Relieve pressure over bony prominences  - Avoid friction and shearing  - Provide appropriate hygiene as needed including keeping skin clean and dry  - Evaluate need for skin moisturizer/barrier cream  - Collaborate with interdisciplinary team (i e  Nutrition, Rehabilitation, etc )   - Patient/family teaching  Outcome: Progressing  Goal: Incision(s), wounds(s) or drain site(s) healing without S/S of infection  Description  INTERVENTIONS  - Assess and document risk factors for skin impairment   - Assess and document dressing, incision, wound bed, drain sites and surrounding tissue  - Consider nutrition services referral as needed  - Oral mucous membranes remain intact  - Provide patient/ family education  Outcome: Progressing

## 2019-08-24 NOTE — PLAN OF CARE
Problem: PHYSICAL THERAPY ADULT  Goal: Performs mobility at highest level of function for planned discharge setting  See evaluation for individualized goals  Description  Treatment/Interventions: Functional transfer training, LE strengthening/ROM, Therapeutic exercise, Endurance training, Patient/family training, Equipment eval/education, Gait training, Spoke to nursing  Equipment Recommended: Erick Ross (Comment)(Rolling walker )       See flowsheet documentation for full assessment, interventions and recommendations  Note:   Prognosis: Fair  Problem List: Decreased strength, Decreased endurance, Impaired balance, Decreased mobility, Decreased safety awareness, Decreased cognition, Pain  Assessment:  Pt is a 76 y o  male seen for PT evaluation s/p admit to [facility] on 8/22/2019 w/ Intractable back pain  Chencho Torrez has multiple comorbidities including coronary artery disease, COPD, type 2 diabetes as well as diabetic neuropathy  In the last several months, he has progressive low back pain  Therefore, he was in [facility] at which time he was found to have leukocytosis, anemia and thrombocytopenia  He underwent bone marrow biopsy at [facility] in August 19, 2019  The results are consistent with Myeloproliferative disorder/myeloid dysplastic syndrome  The Leukocytosis, anemia and thrombocytopenia he currently has is due to the above  The back pain is felt to probably due to the myeloproliferative disorder  He has started chemo to address the MDS  Order placed for PT  Prior to admission, pt was independent w/ all functional mobility w/ SPC, ambulated household distances, lived in one floor environment with elevator access, was retired and lives alone  Upon evaluation: Pt requires supervision assistance for bed mobility and transfers and contact guard assistance for ambulation with rolling walker    Pt's clinical presentation is currently unstable/unpredictable given the functional mobility deficits above, especially weakness, decreased endurance, gait deviations, pain, decreased activity tolerance and decreased functional mobility tolerance, coupled with fall risks including decreased safety awareness, limited sensation/neuropathy and decreased cognition, and combined with medical complications of tachycardia, abnormal renal lab values, abnormal H&H, abnormal WBCs, multiple readmissions and abnormal potassium values  Pt to benefit from continued skilled PT tx while in hospital and upon DC to address deficits as defined above and maximize level of functional mobility  From PT/mobility standpoint, recommendation at time of d/c would be Home PT vs  STR, home with family support and with rolling walker pending progress in order to maximize pt's functional independence and consistency w/ mobility in order to facilitate return to PLOF  Snehal Kirkpatrick was recently seen by OT which is recommending STR  Cognition improved during this assessment which may have affected the OT evaluation  Will continue to follow for most appropriate d/c plan  Recommend trial with walker next 1-2 sessions, trial with cane next 1-2 sessions, ther ex next 1-2 sessions and case management consult  Barriers to Discharge: Decreased caregiver support  Barriers to Discharge Comments: Snehal Kirkpatrick wants to return home and does not want to go to rehab setting  Return to home with increased support will be required for success  Recommendation: Defer at this time, Other (Comment)(Await medical management outcome;STR vs Home PT with support)          See flowsheet documentation for full assessment
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
## 2019-08-24 NOTE — ASSESSMENT & PLAN NOTE
Suspected myelodysplastic syndrome  Bone marrow biopsy completed at [facility] on 08/19/2019 reveals the following result:  "- Marrow cellularity of 100 % (hypercellular for the patient's age)  with marked myeloid hyperplasia, mild dyserythropoiesis and  dysmegakaryopoiesis, 4% blasts consistent with  myelodysplastic/myeloproliferative neoplasm, pending molecular  Testing "  Patient has required several transfusions including recent platelet transfusion and PRBC  - continue to monitor CBC daily  - hematology consulted, appreciate input  - patient started hydroxyurea  - platelets continue to drop, likely will require platelet transfusion tomorrow, consent obtained

## 2019-08-24 NOTE — ASSESSMENT & PLAN NOTE
Baseline Cr around 1, Cr at 1 49 today  Improved to 1 3 today with IVF  Renal US unremarkable  Continue gentle IVF

## 2019-08-24 NOTE — ASSESSMENT & PLAN NOTE
Marked leukocytosis at 28K and tachycardia with heart rate in the 90s  Leukocytosis improved to 19K without Abx  Procalcitonin is mildly elevated in setting of DELLA on CKD  No evidence of infection, abnormal CBC is likely secondary to underlying MDS the patient was recently diagnosed - Chronically marked leukocytosis  Follow-up blood cultures  Monitor CBC and vital signs

## 2019-08-24 NOTE — ASSESSMENT & PLAN NOTE
Patient with chronic back pain and presents with intractable back pain and mid back radiating to lower extremities  X-ray of lumbar spine unremarkable other than osteoarthritis  PT/OT eval  Trail of valium at bedtime

## 2019-08-25 PROBLEM — R41.89 COGNITIVE IMPAIRMENT: Status: ACTIVE | Noted: 2019-01-01

## 2019-08-25 PROBLEM — Z01.89 ENCOUNTER FOR COMPETENCY EVALUATION: Status: ACTIVE | Noted: 2019-01-01

## 2019-08-25 PROBLEM — M79.674 TOE PAIN, RIGHT: Status: ACTIVE | Noted: 2019-01-01

## 2019-08-25 NOTE — ASSESSMENT & PLAN NOTE
Patient continues to c/o intractable back pain  Will continue oxycodone to 10 mg every 6 hours for severe pain

## 2019-08-25 NOTE — PROGRESS NOTES
Progress Note - Kartik Juarez  1950, 76 y o  male MRN: [id number]    Unit/Bed#: E5 -01 Encounter: [id number]    Primary Care Provider: Radha Mitchell MD   Date and time admitted to hospital: 8/22/2019 10:59 AM        * Intractable back pain  Assessment & Plan  Patient with chronic back pain and presents with intractable back pain and mid back radiating to lower extremities  X-ray of lumbar spine unremarkable other than osteoarthritis  MDS likely contributing to pain  PT/OT eval  Trail of valium at bedtime    Myelodysplasia (myelodysplastic syndrome) (HCC)  Assessment & Plan  Suspected myelodysplastic syndrome  Bone marrow biopsy completed at [facility] on 08/19/2019 reveals the following result:  "- Marrow cellularity of 100 % (hypercellular for the patient's age)  with marked myeloid hyperplasia, mild dyserythropoiesis and  dysmegakaryopoiesis, 4% blasts consistent with  myelodysplastic/myeloproliferative neoplasm, pending molecular  Testing "  Patient has required several transfusions including recent platelet transfusion and PRBC  - continue to monitor CBC daily  - hematology consulted, appreciate input  - patient started hydroxyurea  - platelets continue to drop, did require transfusion at [facility] earlier this month  - Hb down to 6 5 today, will transfuse 2 u PRBC (8/25)    SIRS (systemic inflammatory response syndrome) (HCC)  Assessment & Plan  Marked leukocytosis at 28K and tachycardia with heart rate in the 90s  Leukocytosis improved to 16K without Abx  Procalcitonin is mildly elevated in setting of a mild DELLA   No evidence of infection, abnormal CBC is likely secondary to underlying MDS the patient was recently diagnosed - Chronic leukocytosis on review of records  Blood culture no growth at 48 hours  Monitor CBC and vital signs      Toe pain, right  Assessment & Plan  Patient's right toe appears erythematous and discolored with purplish hue  Patient unsure of prior trauma  Will obtain an xray  Request Podiatry evaluation    Hypomagnesemia  Assessment & Plan  Improved from 1 1 to 1 6  Monitor BMP and magnesium level    Hypokalemia  Assessment & Plan  Resolved with oral and IV replacement with KCl  BMP and magnesium level in a m  Encounter for competency evaluation  Assessment & Plan  Patient appears to have limited understanding/medical literacy of his current situation  Unable to get useful information from family, patient's contact is listed as his brother but per brother Keanu Forth) the patient and Eriberto House have been estranged for many years  - Will obtain Neuropsychology evaluation    DELLA (acute kidney injury) ([facility] )  Assessment & Plan  Resolved  Baseline Cr around 1, Cr down from 1 49 to 1 2 today  Renal US unremarkable  Discontinue IVF    Other emphysema ([facility] )  Assessment & Plan  Not in exacerbation  Continue Breo inhaler  DuoNeb p r n  Opioid dependence ([facility] )  Assessment & Plan  Patient continues to c/o intractable back pain  Will continue oxycodone to 10 mg every 6 hours for severe pain        Type 2 diabetes mellitus with diabetic neuropathy, with long-term current use of insulin [facility])  Assessment & Plan  Lab Results   Component Value Date    HGBA1C 10 7 (H) 03/28/2018       Recent Labs     08/24/19  1131 08/24/19  1535 08/24/19  2024 08/25/19  0801   POCGLU 169* 92 101 119       Blood Sugar Average: Last 72 hrs:  (P) 352 [id number] hemoglobin A1c 10 6 on 08/17/2019  Patient clearly would benefit from improved glycemic control  Accuchecks currently in normal range with Lantus to 25 units daily and Humalog to 10 u t i d   Monitor Accu-Cheks, provide sliding scale        VTE Pharmacologic Prophylaxis:   Pharmacologic: none, anemia, thrombocytopenia  Mechanical VTE Prophylaxis in Place: Yes    Patient Centered Rounds: I have performed bedside rounds with nursing staff today      Discussions with Specialists or Other Care Team Provider: nursing    Education and Discussions with Family / Patient: patient's estranged brother Leny [facility]    Time Spent for Care: 30 minutes  More than 50% of total time spent on counseling and coordination of care as described above  Current Length of Stay: 3 day(s)    Current Patient Status: Inpatient   Certification Statement: The patient will continue to require additional inpatient hospital stay due to need for blood transfusion, close monitoring    Discharge Plan: TBD    Code Status: Level 1 - Full Code      Subjective:   Patient seen and examined  He is complaining of right toe erythema and pain, does recall whether not he had trauma to his toe  He otherwise voices no complaints  No overnight events  Objective:     Vitals:   Temp (24hrs), Av 5 °F (36 9 °C), Min:98 2 °F (36 8 °C), Max:98 8 °F (37 1 °C)    Temp:  [98 2 °F (36 8 °C)-98 8 °F (37 1 °C)] 98 8 °F (37 1 °C)  HR:  [67-89] 67  Resp:  [16-18] 18  BP: (115-135)/(53-73) 115/53  SpO2:  [95 %-100 %] 95 %  There is no height or weight on file to calculate BMI  Input and Output Summary (last 24 hours):     No intake or output data in the 24 hours ending 19 5859    Physical Exam:     Physical Exam   Constitutional: He is oriented to person, place, and time  No distress  HENT:   Head: Normocephalic and atraumatic  Eyes: Conjunctivae are normal    Neck: No JVD present  Cardiovascular: Normal rate and regular rhythm  Murmur heard  Pulmonary/Chest: Effort normal and breath sounds normal  He has no rales  Abdominal: Soft  He exhibits no distension  There is no guarding  Musculoskeletal: He exhibits no edema  Neurological: He is alert and oriented to person, place, and time  Skin: There is erythema (right toe)  Psychiatric: Cognition and memory are impaired         Additional Data:     Labs:    Results from last 7 days   Lab Units 19  0435 19  0607   WBC Thousand/uL 16 92* 19 81*   HEMOGLOBIN g/dL 6 5* 7 3*   HEMATOCRIT % 22 4* 24 3*   PLATELETS Thousands/uL 26* 17*   BANDS PCT % 16*  --    NEUTROS PCT %  --  67   LYMPHS PCT %  --  10*   LYMPHO PCT % 22  --    MONOS PCT %  --  8   MONO PCT % 7  --    EOS PCT % 1 0     Results from last 7 days   Lab Units 08/25/19  0435  08/22/19  1159   SODIUM mmol/L 139   < > 138   POTASSIUM mmol/L 4 3   < > 3 1*   CHLORIDE mmol/L 107   < > 98*   CO2 mmol/L 26   < > 24   BUN mg/dL 20   < > 17   CREATININE mg/dL 1 29   < > 1 23   ANION GAP mmol/L 6   < > 16*   CALCIUM mg/dL 8 0*   < > 8 3   ALBUMIN g/dL  --   --  2 3*   TOTAL BILIRUBIN mg/dL  --   --  0 74   ALK PHOS U/L  --   --  106   ALT U/L  --   --  12   AST U/L  --   --  14   GLUCOSE RANDOM mg/dL 106   < > 218*    < > = values in this interval not displayed  Results from last 7 days   Lab Units 08/25/19  0801 08/24/19  2024 08/24/19  1535 08/24/19  1131 08/24/19  0756 08/23/19  2052 08/23/19  1601 08/23/19  1137 08/23/19  0715 08/22/19  2107 08/22/19  1734   POC GLUCOSE mg/dl 119 101 92 169* 165* 107 104 309* 220* 133 171*         Results from last 7 days   Lab Units 08/24/19  0546 08/23/19  0523 08/22/19  1159   LACTIC ACID mmol/L  --   --  0 8   PROCALCITONIN ng/ml 0 32* 0 43*  --          * I Have Reviewed All Lab Data Listed Above  * Additional Pertinent Lab Tests Reviewed: Oleg 66 Admission Reviewed    Imaging:    Imaging Reports Reviewed Today Include: will review xray right toe       Recent Cultures (last 7 days):     Results from last 7 days   Lab Units 08/22/19  1159   BLOOD CULTURE  No Growth at 48 hrs         Last 24 Hours Medication List:     Current Facility-Administered Medications:  acetaminophen 650 mg Oral Q6H PRN Umesh De Guzman MD   amLODIPine 5 mg Oral Daily Umesh De Guzman MD   atorvastatin 80 mg Oral HS Krystle Lopez MD   dextran 70-hypromellose 1 drop Both Eyes PRN Umesh De Guzman MD   diazepam 5 mg Oral HS PRN Umesh De Guzman MD   docusate sodium 100 mg Oral BID Umesh De Guzman MD   finasteride 5 mg Oral Daily Umesh De Guzman MD   fish oil 1,000 mg Oral Daily Sharon Su MD   fluticasone-vilanterol 1 puff Inhalation Daily Sharon Su MD   furosemide 20 mg Intravenous Once Sharon Su MD   hydroxyurea 500 mg Oral Q24H Cira Cunha MD   insulin glargine 25 Units Subcutaneous QAM Sharon Su MD   insulin lispro 1-5 Units Subcutaneous TID AC Sharon Su MD   insulin lispro 1-5 Units Subcutaneous HS Sharon Su MD   insulin lispro 10 Units Subcutaneous TID With Hung Garcia MD   ipratropium-albuterol 3 mL Nebulization TID PRN Sharon Su MD   isosorbide mononitrate 60 mg Oral Daily Sharon Su MD   lidocaine 1 patch Topical Daily Krystle Lopez MD   methocarbamol 500 mg Oral Q6H PRN Sharon Su MD   metoprolol tartrate 50 mg Oral Q12H Alexandra Eisenberg MD   nicotine 1 patch Transdermal Daily Krystle Lopez MD   ondansetron 4 mg Intravenous Q6H PRN Sharon Su MD   oxyCODONE 10 mg Oral Q4H PRN Sharon Su MD   pantoprazole 40 mg Oral Daily Before Breakfast Sharon Su MD   potassium chloride 20 mEq Oral BID Sharon Su MD   pregabalin 150 mg Oral TID Sharon Su MD   QUEtiapine 25 mg Oral HS Sharon Su MD   tamsulosin 0 4 mg Oral Daily With Brenna Hoyos MD   traMADol 50 mg Oral Q8H PRN Sharon Su MD        Today, Patient Was Seen By: Edwina Robles MD    ** Please Note: Dictation voice to text software may have been used in the creation of this document   **

## 2019-08-25 NOTE — PLAN OF CARE
Problem: Potential for Falls  Goal: Patient will remain free of falls  Description  INTERVENTIONS:  - Assess patient frequently for physical needs  -  Identify cognitive and physical deficits and behaviors that affect risk of falls    -  [place] fall precautions as indicated by assessment   - Educate patient/family on patient safety including physical limitations  - Instruct patient to call for assistance with activity based on assessment  - Modify environment to reduce risk of injury  - Consider OT/PT consult to assist with strengthening/mobility  Outcome: Progressing     Problem: PAIN - ADULT  Goal: Verbalizes/displays adequate comfort level or baseline comfort level  Description  Interventions:  - Encourage patient to monitor pain and request assistance  - Assess pain using appropriate pain scale  - Administer analgesics based on type and severity of pain and evaluate response  - Implement non-pharmacological measures as appropriate and evaluate response  - Consider cultural and social influences on pain and pain management  - Notify physician/advanced practitioner if interventions unsuccessful or patient reports new pain  Outcome: Progressing     Problem: INFECTION - ADULT  Goal: Absence or prevention of progression during hospitalization  Description  INTERVENTIONS:  - Assess and monitor for signs and symptoms of infection  - Monitor lab/diagnostic results  - Monitor all insertion sites, i e  indwelling lines, tubes, and drains  - Monitor endotracheal if appropriate and nasal secretions for changes in amount and color  - [place] appropriate cooling/warming therapies per order  - Administer medications as ordered  - Instruct and encourage patient and family to use good hand hygiene technique  - Identify and instruct in appropriate isolation precautions for identified infection/condition  Outcome: Progressing  Goal: Absence of fever/infection during neutropenic period  Description  INTERVENTIONS:  - Monitor WBC    Outcome: Progressing     Problem: DISCHARGE PLANNING  Goal: Discharge to home or other facility with appropriate resources  Description  INTERVENTIONS:  - Identify barriers to discharge w/patient and caregiver  - Arrange for needed discharge resources and transportation as appropriate  - Identify discharge learning needs (meds, wound care, etc )  - Arrange for interpretive services to assist at discharge as needed  - Refer to Case Management Department for coordinating discharge planning if the patient needs post-hospital services based on physician/advanced practitioner order or complex needs related to functional status, cognitive ability, or social support system  Outcome: Progressing     Problem: Knowledge Deficit  Goal: Patient/family/caregiver demonstrates understanding of disease process, treatment plan, medications, and discharge instructions  Description  Complete learning assessment and assess knowledge base    Interventions:  - Provide teaching at level of understanding  - Provide teaching via preferred learning methods  Outcome: Progressing     Problem: RESPIRATORY - ADULT  Goal: Achieves optimal ventilation and oxygenation  Description  INTERVENTIONS:  - Assess for changes in respiratory status  - Assess for changes in mentation and behavior  - Position to facilitate oxygenation and minimize respiratory effort  - Oxygen administered by appropriate delivery if ordered  - Initiate smoking cessation education as indicated  - Encourage broncho-pulmonary hygiene including cough, deep breathe, Incentive Spirometry  - Assess the need for suctioning and aspirate as needed  - Assess and instruct to report SOB or any respiratory difficulty  - Respiratory Therapy support as indicated  Outcome: Progressing     Problem: SKIN/TISSUE INTEGRITY - ADULT  Goal: Skin integrity remains intact  Description  INTERVENTIONS  - Identify patients at risk for skin breakdown  - Assess and monitor skin integrity  - Assess and monitor nutrition and hydration status  - Monitor labs (i e  albumin)  - Assess for incontinence   - Turn and reposition patient  - Assist with mobility/ambulation  - Relieve pressure over bony prominences  - Avoid friction and shearing  - Provide appropriate hygiene as needed including keeping skin clean and dry  - Evaluate need for skin moisturizer/barrier cream  - Collaborate with interdisciplinary team (i e  Nutrition, Rehabilitation, etc )   - Patient/family teaching  Outcome: Progressing  Goal: Incision(s), wounds(s) or drain site(s) healing without S/S of infection  Description  INTERVENTIONS  - Assess and document risk factors for skin impairment   - Assess and document dressing, incision, wound bed, drain sites and surrounding tissue  - Consider nutrition services referral as needed  - Oral mucous membranes remain intact  - Provide patient/ family education  Outcome: Progressing     Problem: NEUROSENSORY - ADULT  Goal: Achieves stable or improved neurological status  Description  INTERVENTIONS  - Monitor and report changes in neurological status  - Monitor vital signs such as temperature, blood pressure, glucose, and any other labs ordered   - Initiate measures to prevent increased intracranial pressure  - Monitor for seizure activity and implement precautions if appropriate      Outcome: Progressing  Goal: Remains free of injury related to seizures activity  Description  INTERVENTIONS  - Maintain airway, patient safety  and administer oxygen as ordered  - Monitor patient for seizure activity, document and report duration and description of seizure to physician/advanced practitioner  - If seizure occurs,  ensure patient safety during seizure  - Reorient patient post seizure  - Seizure pads on all 4 side rails  - Instruct patient/family to notify RN of any seizure activity including if an aura is experienced  - Instruct patient/family to call for assistance with activity based on nursing assessment  - Administer anti-seizure medications if ordered    Outcome: Progressing  Goal: Achieves maximal functionality and self care  Description  INTERVENTIONS  - Monitor swallowing and airway patency with patient fatigue and changes in neurological status  - Encourage and assist patient to increase activity and self care     - Encourage visually impaired, hearing impaired and aphasic patients to use assistive/communication devices  Outcome: Progressing     Problem: GENITOURINARY - ADULT  Goal: Maintains or returns to baseline urinary function  Description  INTERVENTIONS:  - Assess urinary function  - Encourage oral fluids to ensure adequate hydration if ordered  - Administer IV fluids as ordered to ensure adequate hydration  - Administer ordered medications as needed  - Offer frequent toileting  - Follow urinary retention protocol if ordered  Outcome: Progressing  Goal: Absence of urinary retention  Description  INTERVENTIONS:  - Assess patients ability to void and empty bladder  - Monitor I/O  - Bladder scan as needed  - Discuss with physician/AP medications to alleviate retention as needed  - Discuss catheterization for long term situations as appropriate  Outcome: Progressing     Problem: METABOLIC, FLUID AND ELECTROLYTES - ADULT  Goal: Electrolytes maintained within normal limits  Description  INTERVENTIONS:  - Monitor labs and assess patient for signs and symptoms of electrolyte imbalances  - Administer electrolyte replacement as ordered  - Monitor response to electrolyte replacements, including repeat lab results as appropriate  - Instruct patient on fluid and nutrition as appropriate  Outcome: Progressing  Goal: Fluid balance maintained  Description  INTERVENTIONS:  - Monitor labs   - Monitor I/O and WT  - Instruct patient on fluid and nutrition as appropriate  - Assess for signs & symptoms of volume excess or deficit  Outcome: Progressing  Goal: Glucose maintained within target range  Description  INTERVENTIONS:  - Monitor Blood Glucose as ordered  - Assess for signs and symptoms of hyperglycemia and hypoglycemia  - Administer ordered medications to maintain glucose within target range  - Assess nutritional intake and initiate nutrition service referral as needed  Outcome: Progressing     Problem: HEMATOLOGIC - ADULT  Goal: Maintains hematologic stability  Description  INTERVENTIONS  - Assess for signs and symptoms of bleeding or hemorrhage  - Monitor labs  - Administer supportive blood products/factors as ordered and appropriate  Outcome: Progressing     Problem: MUSCULOSKELETAL - ADULT  Goal: Maintain or return mobility to safest level of function  Description  INTERVENTIONS:  - Assess patient's ability to carry out ADLs; assess patient's baseline for ADL function and identify physical deficits which impact ability to perform ADLs (bathing, care of mouth/teeth, toileting, grooming, dressing, etc )  - Assess/evaluate cause of self-care deficits   - Assess range of motion  - Assess patient's mobility  - Assess patient's need for assistive devices and provide as appropriate  - Encourage maximum independence but intervene and supervise when necessary  - Involve family in performance of ADLs  - Assess for home care needs following discharge   - Consider OT consult to assist with ADL evaluation and planning for discharge  - Provide patient education as appropriate  Outcome: Progressing  Goal: Maintain proper alignment of affected body part  Description  INTERVENTIONS:  - Support, maintain and protect limb and body alignment  - Provide patient/ family with appropriate education  Outcome: Progressing

## 2019-08-25 NOTE — QUICK NOTE
Patient started to complain of lightheadedness and fatigue  Initial vital signs obtained were stable  Patient was being monitored closely by his nurse  He was noted for transient tachycardia with heart rate of 120  The patient is being transfused 2 units PRBC in setting of acute on chronic anemia with MDS  - place on telemetry  - obtain an EKG  - obtain a chest x-ray with history of CHF notable murmur  - will obtain an echocardiogram tomorrow  - continue close monitoring     Please see study below copied from "Care Everywhere"    [address] [facility]  Result Narrative     CT scan of the chest     3 leukocytosis  Comparison 10/28/18  Technique    Oval axial sections were obtained from the thoracic inlet to the diaphragm  without contrast     Findings    There is some patchy nodular infiltrate in the right midlung with densities that  measure up to 2 cm  There is small right pleural effusion  Heart size is normal   There is no pericardial effusion  There is no aortic aneurysm  Ascending aorta  measures 3 5 cm  There is no mediastinal adenopathy  Thoracic aorta is  atheromatous  There are no hilar masses  There is multilevel spondylotic change  in the thoracic spine  There is no compression fracture  Impression    There is a patchy nodular right middle lobe infiltrate that is new compared to  old exam and consistent with inflammatory disease  There is new small right  pleural effusion compared to old exam         CT examination performed with dose lowering protocol in accordance with AMANDA
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
## 2019-08-25 NOTE — ASSESSMENT & PLAN NOTE
Suspected myelodysplastic syndrome  Bone marrow biopsy completed at [facility] on 08/19/2019 reveals the following result:  "- Marrow cellularity of 100 % (hypercellular for the patient's age)  with marked myeloid hyperplasia, mild dyserythropoiesis and  dysmegakaryopoiesis, 4% blasts consistent with  myelodysplastic/myeloproliferative neoplasm, pending molecular  Testing "  Patient has required several transfusions including recent platelet transfusion and PRBC  - continue to monitor CBC daily  - hematology consulted, appreciate input  - patient started hydroxyurea  - platelets continue to drop, did require transfusion at [facility] earlier this month  - Hb down to 6 5 today, will transfuse 2 u PRBC (8/25)

## 2019-08-25 NOTE — ASSESSMENT & PLAN NOTE
Patient's right toe appears erythematous and discolored with purplish hue  Patient unsure of prior trauma  Will obtain an xray  Request Podiatry evaluation

## 2019-08-25 NOTE — ASSESSMENT & PLAN NOTE
Patient appears to have limited understanding/medical literacy of his current situation  Unable to get useful information from family, patient's contact is listed as his brother but per brother Azra Morgan) the patient and Milana Harris have been estranged for many years  - Will obtain Neuropsychology evaluation

## 2019-08-25 NOTE — ASSESSMENT & PLAN NOTE
Patient with chronic back pain and presents with intractable back pain and mid back radiating to lower extremities  X-ray of lumbar spine unremarkable other than osteoarthritis  MDS likely contributing to pain  PT/OT eval  Trail of valium at bedtime

## 2019-08-25 NOTE — ASSESSMENT & PLAN NOTE
Lab Results   Component Value Date    HGBA1C 10 7 (H) 03/28/2018       Recent Labs     08/24/19  1131 08/24/19  1535 08/24/19 2024 08/25/19  0801   POCGLU 169* 92 101 119       Blood Sugar Average: Last 72 hrs:  (P) 253 [id number] hemoglobin A1c 10 6 on 08/17/2019  Patient clearly would benefit from improved glycemic control  Accuchecks currently in normal range with Lantus to 25 units daily and Humalog to 10 u t i d   Monitor Accu-Cheks, provide sliding scale

## 2019-08-25 NOTE — ASSESSMENT & PLAN NOTE
Marked leukocytosis at 28K and tachycardia with heart rate in the 90s  Leukocytosis improved to 16K without Abx  Procalcitonin is mildly elevated in setting of a mild DELLA   No evidence of infection, abnormal CBC is likely secondary to underlying MDS the patient was recently diagnosed - Chronic leukocytosis on review of records  Blood culture no growth at 48 hours  Monitor CBC and vital signs

## 2019-08-25 NOTE — ASSESSMENT & PLAN NOTE
Resolved  Baseline Cr around 1, Cr down from 1 49 to 1 2 today  Renal US unremarkable  Discontinue IVF

## 2019-08-26 NOTE — PROGRESS NOTES
Progress Note - [facility] 1950, 76 y o  male MRN: [id number]    Unit/Bed#: E5 -01 Encounter: [id number]    Primary Care Provider: Silvia Gallardo MD   Date and time admitted to hospital: 2019 10:59 AM      * Intractable back pain  Assessment & Plan  Due to myelodysplatic syndrome  No acute finding on x ray of spine  Continue opiods for pain control  Hydroxyurea started    Myelodysplasia (myelodysplastic syndrome) (HCC)  Assessment & Plan  Appreciate heme/onc help  Started on Hydroxyurea  Still waiting on some final results from bone marrow biopsy done at [facility]  Encounter for competency evaluation  Assessment & Plan  Neuropsych to see  Subjective:   Still having back pain  He says that he has had it for weeks  No loss of bowel or bladder control  Objective:     Vitals:   Temp (24hrs), Av 2 °F (36 8 °C), Min:97 4 °F (36 3 °C), Max:99 3 °F (37 4 °C)    Temp:  [97 4 °F (36 3 °C)-99 3 °F (37 4 °C)] 98 1 °F (36 7 °C)  HR:  [48-90] 79  Resp:  [18-22] 18  BP: (104-146)/(53-81) 122/81  SpO2:  [96 %-99 %] 96 %  Body mass index is 31 74 kg/m²  Input and Output Summary (last 24 hours): Intake/Output Summary (Last 24 hours) at 2019 0901  Last data filed at 2019 0701  Gross per 24 hour   Intake 923 33 ml   Output 925 ml   Net -1 67 ml       Physical Exam:     Physical Exam   HENT:   Head: Normocephalic and atraumatic  Eyes: Pupils are equal, round, and reactive to light  EOM are normal    Cardiovascular: Normal rate and regular rhythm  Exam reveals no gallop and no friction rub  No murmur heard  Pulmonary/Chest: Effort normal and breath sounds normal  He has no wheezes  He has no rales  Abdominal: Soft  Bowel sounds are normal  There is no tenderness  Musculoskeletal: He exhibits no edema  Lumbar and thoracic spine was tender to percussion   Nursing note and vitals reviewed              Additional Data:     Labs:    Results from last 7 days   Lab Units 08/26/19  0611   WBC Thousand/uL 20 19*   HEMOGLOBIN g/dL 8 4*   HEMATOCRIT % 26 4*   PLATELETS Thousands/uL 10*   NEUTROS PCT % 68   LYMPHS PCT % 10*   MONOS PCT % 7   EOS PCT % 0     Results from last 7 days   Lab Units 08/26/19  0611  08/22/19  1159   POTASSIUM mmol/L 5 1   < > 3 1*   CHLORIDE mmol/L 105   < > 98*   CO2 mmol/L 23   < > 24   BUN mg/dL 19   < > 17   CREATININE mg/dL 1 17   < > 1 23   CALCIUM mg/dL 8 6   < > 8 3   ALK PHOS U/L  --   --  106   ALT U/L  --   --  12   AST U/L  --   --  14    < > = values in this interval not displayed  Results from last 7 days   Lab Units 08/26/19  0716 08/25/19  2024 08/25/19  1641 08/25/19  1316 08/25/19  1056 08/25/19  0801 08/24/19  2024 08/24/19  1535 08/24/19  1131 08/24/19  0756 08/23/19  2052 08/23/19  1601   POC GLUCOSE mg/dl 141* 133 127 112 146* 119 101 92 169* 165* 107 104               * I Have Reviewed All Lab Data     Recent Cultures (last 7 days):     Results from last 7 days   Lab Units 08/22/19  1159   BLOOD CULTURE  No Growth at 72 hrs           Last 24 Hours Medication List:     Current Facility-Administered Medications:  acetaminophen 650 mg Oral Q6H PRN Jes Ohara MD   amLODIPine 5 mg Oral Daily Krystle Lopez MD   atorvastatin 80 mg Oral HS Krystle Lopez MD   benzonatate 100 mg Oral TID PRN Cherrie Soto PA-C   dextran 70-hypromellose 1 drop Both Eyes PRN Krystle Lopez MD   diazepam 5 mg Oral HS PRN Jes Ohara MD   docusate sodium 100 mg Oral BID Jes Ohara MD   finasteride 5 mg Oral Daily Krystle Lopez MD   fish oil 1,000 mg Oral Daily Jes Ohara MD   fluticasone-vilanterol 1 puff Inhalation Daily Jes Ohara MD   furosemide 20 mg Intravenous Once Jes Ohara MD   hydroxyurea 500 mg Oral Q24H Adrien Samayoa MD   insulin glargine 25 Units Subcutaneous QAM Jes Ohara MD   insulin lispro 1-5 Units Subcutaneous TID AC Krystle Lopez MD   insulin lispro 1-5 Units Subcutaneous HS Jes Ohara MD   insulin lispro 10 Units Subcutaneous TID With Meals Yohana Sanchez MD   ipratropium-albuterol 3 mL Nebulization TID PRN Yohana Sanchez MD   isosorbide mononitrate 60 mg Oral Daily Yohana Sanchez MD   lidocaine 1 patch Topical Daily Yohana Sanchez MD   methocarbamol 500 mg Oral Q6H PRN Yohana Sanchez MD   metoprolol tartrate 50 mg Oral Q12H Santa Kruse MD   nicotine 1 patch Transdermal Daily Yohana Sanchez MD   ondansetron 4 mg Intravenous Q6H PRN Yohana Sanchez MD   oxyCODONE 10 mg Oral Q4H PRN Yohana Sanchez MD   pantoprazole 40 mg Oral Daily Before Breakfast Yohana Sanchez MD   potassium chloride 20 mEq Oral BID Yohana Sanchez MD   pregabalin 150 mg Oral TID Yohana Sanchez MD   QUEtiapine 25 mg Oral HS Yohana Sanchez MD   tamsulosin 0 4 mg Oral Daily With Cynthia Peoples MD   traMADol 50 mg Oral Q8H PRN Yohana Sanchez MD             Current Length of Stay: 4 day(s)    Current Patient Status: Inpatient       Discharge Plan: need to determine competency and what we have to offer  Will speak with case management    Code Status: Level 1 - Full Code           Today, Patient Was Seen By: Conan Boxer, DO    ** Please Note: Dictation voice to text software may have been used in the creation of this document   **

## 2019-08-26 NOTE — PLAN OF CARE
Problem: OCCUPATIONAL THERAPY ADULT  Goal: Performs self-care activities at highest level of function for planned discharge setting  See evaluation for individualized goals  Description  Treatment Interventions: ADL retraining, UE strengthening/ROM, Functional transfer training, Endurance training, Patient/family training, Cognitive reorientation, Equipment evaluation/education, Compensatory technique education, Activityengagement, Energy conservation          See flowsheet documentation for full assessment, interventions and recommendations  Outcome: Progressing  Note:   Limitation: Decreased ADL status, Decreased Safe judgement during ADL, Decreased UE strength, Decreased endurance, Decreased cognition, Decreased sensation, Decreased self-care trans, Decreased high-level ADLs  Prognosis: Good, Fair  Assessment: Pt was seen for skilled OT with review of [place] Cognitive Assessment Blind Version, sit to stand transfer, functional mobility and review of current plan of care  Pt seated at EOB upon start of tx session  Pt completed MOCA-Blind and scored a 7/22 overall indicating severe cognitive impairment  Deficits noted in the following: attention section with score of 2/6, language section with score of 1/3, delayed recall section with score of 0/5 and orientation with score of 3/6  Pt completed sit to stand transfer with Min A x1 with RW  Pt reported of previous fall today with nursing staff and stated, "my socks slipped out from under me"  Pt reported living alone and use of SPC in apartment  Pt completed functional mobility activity with Min A with left sided neglect noted and physical assistance to redirect RW to avoid hitting walls  Pt would benefit from further vision screening  Pt returned to EOB with bed alarm activated and call bell/phone in reach  Pt reported 10/10 pain with flat facial affect  All findings reported to [employer]  Recommendation for continued OT at [facility] due to noted deficits  (Simultaneous filing   User may not have seen previous data )     OT Discharge Recommendation: Short Term Rehab  OT - OK to Discharge: (to rehab when medically stable)
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
## 2019-08-26 NOTE — ASSESSMENT & PLAN NOTE
Due to myelodysplatic syndrome  No acute finding on MRI of spine  Continue opiods for pain control  Hydroxyurea started

## 2019-08-26 NOTE — ASSESSMENT & PLAN NOTE
Appreciate heme/onc help  Started on Hydroxyurea  Still waiting on some final results from bone marrow biopsy done at [facility]

## 2019-08-26 NOTE — PROGRESS NOTES
After pt ripped telemetry leads off several times this shift, pt refused telemetry  When this nurse went in to replace leads d/t monitor alarming, pt states, " I don't want it, get out of my room"  Pt assisted back in to bed and telemetry on hold at this time

## 2019-08-26 NOTE — ASSESSMENT & PLAN NOTE
Due to myelodysplatic syndrome  No acute finding on x ray of spine  Continue opiods for pain control  Hydroxyurea started and seems to be helping  He is starting to get up and walk today which he has not done

## 2019-08-26 NOTE — PROGRESS NOTES
Pt noted to be oob trying to change his pants in a squatting position due to incontinence  Pt began to lose his balance with RN behind him  RN assisted pt in lowering to the ground onto his bottom  No injuries noted  Pt has no complaints  Pt assisted back into bed with assistance of another RN  Pt educated on the use of the call bell and bed alarm in place

## 2019-08-26 NOTE — ASSESSMENT & PLAN NOTE
Neuropsych to see  I resent a consult in case they did not get one  He is borderline able to take care of himself  He is estranged from his family  He tried to leave the hospital last night, but decided to stay    I would like to see whether neuropsych thinks that he is competent and what we can do to help him

## 2019-08-26 NOTE — SOCIAL WORK
CM attempted PC to pt brother and emergency contact, Hawk Mendoza  No answer  CM left voicemail requesting return PC

## 2019-08-26 NOTE — OCCUPATIONAL THERAPY NOTE
Occupational Therapy Treatment Note:         08/26/19 1543   Restrictions/Precautions   Weight Bearing Precautions Per Order No   Other Precautions Chair Alarm; Fall Risk;Pain;Hard of hearing;Spinal precautions; Visual impairment  (no longer contact/isolation per nursing staff and pt chart)   Pain Assessment   Pain Assessment 0-10   Pain Score Worst Possible Pain  (flat facial affect)   Pain Location Generalized   ADL   Where Assessed Edge of bed   Functional Standing Tolerance   Time 2-3 mins    Activity dynamic standing activity    Comments left sided vision neglect noted   Bed Mobility   Additional Comments unable to assess, pt at EOB upon greeting and end of session   Transfers   Sit to Stand 4  Minimal assistance   Additional items Assist x 1;Bedrails; Increased time required;Verbal cues   Stand to Sit 5  Supervision   Additional items Assist x 1; Increased time required;Verbal cues   Stand pivot 4  Minimal assistance   Additional items Assist x 1; Increased time required;Verbal cues   Additional Comments cues for safe hand placement and use of RW   Functional Mobility   Functional Mobility 4  Minimal assistance   Additional Comments assist x1 w/ increased assistance for direction of RW    Additional items Rolling walker   Cognition   Overall Cognitive Status Impaired   Arousal/Participation Responsive; Cooperative; Alert   Attention Attends with cues to redirect   Orientation Level Oriented to time;Oriented to situation;Oriented to person   Memory Decreased recall of precautions;Decreased recall of recent events;Decreased short term memory   Following Commands Follows one step commands without difficulty   Cognition Assessment Tools MOCA  (MOCA-blind)   Score 7   Vision   Visual Field Cut Compensatory techniques   Visual Field Cut Comments noted left sided neglect with dynamic stand balance activity warranting need for [facility] to avoid obstacles   Vision Comments pt would benefit from further vision screening Additional Activities   Additional Activities Other (Comment)  (call bell orientation)   Additional Activities Comments due to high fall risk,   Activity Tolerance   Activity Tolerance Patient limited by pain   Medical Staff Made Aware bed alarm activated and all findings reported to RN Bianca Pyle   Assessment Pt was seen for skilled OT with review of [place] Cognitive Assessment/MoCA Blind Version 7 1, RW safety with functional mobility, fall prevention techniques and review of current plan of care  Pt seated at EOB upon start of tx session  Pt completed MOCA-Blind scoring 7/22 overall indicating severe cognitive impairment  Deficits noted in the following sections: attention scoring 2/6, language scoring 1/3, delayed recall scoring 0/5, and orientation scoring 3/6  Pt completed sit to stand transfer with Min A for poor safety awareness  Pt reported previous fall today with nursing staff stating, "my socks slipped out from under me"  Nursing staff confirmed Pt had to be lowered to the floor  Pt reported living alone and use of SPC in apartment  Pt completed dynamic stand balance activity with RW  Left sided neglect noted and physical assistance to redirect RW to avoid hitting obstacles  Pt would benefit from further vision screening due to deficits noted  Pt returned to EOB with bed alarm activated and call bell/phone in reach  Pt reported 10/10 pain with flat facial affect  Pt was able to tolerate all aspects of treatment session  All findings reported to Retora Black  Recommendation for continued OT at [facility] due to noted deficits  Plan   Treatment Interventions ADL retraining;Functional transfer training;UE strengthening/ROM; Endurance training;Cognitive reorientation;Equipment evaluation/education; Compensatory technique education   Goal Expiration Date 09/06/19   Treatment Day 1   OT Frequency 3-5x/wk   Recommendation   OT Discharge Recommendation Short Term Rehab   Barthel Index   Feeding 10   Bathing 0 Grooming Score 5   Dressing Score 5   Bladder Score 10   Bowels Score 10   Toilet Use Score 5   Transfers (Bed/Chair) Score 10   Mobility (Level Surface) Score 0   Stairs Score 0   Barthel Index Score 55   Modified Hiram Scale   Modified Screven Scale 4   [place], [address]

## 2019-08-26 NOTE — OCCUPATIONAL THERAPY NOTE
Occupational Therapy Treatment Note:         08/26/19 1543   Restrictions/Precautions   Weight Bearing Precautions Per Order No   Other Precautions Chair Alarm; Fall Risk;Pain;Hard of hearing;Spinal precautions; Visual impairment  (no longer contact/isolation per nursing staff and pt chart)   Pain Assessment   Pain Assessment 0-10   Pain Score Worst Possible Pain  (flat facial affect)   Pain Location Generalized   ADL   Where Assessed Edge of bed   Functional Standing Tolerance   Time 2-3 mins    Activity dynamic standing activity    Comments left sided vision neglect noted   Bed Mobility   Additional Comments unable to assess, pt at EOB upon greeting and end of session   Transfers   Sit to Stand 4  Minimal assistance   Additional items Assist x 1;Bedrails; Increased time required;Verbal cues   Stand to Sit 5  Supervision   Additional items Assist x 1; Increased time required;Verbal cues   Stand pivot 4  Minimal assistance   Additional items Assist x 1; Increased time required;Verbal cues   Additional Comments cues for safe hand placement and use of RW   Functional Mobility   Functional Mobility 4  Minimal assistance   Additional Comments assist x1 w/ increased assistance for direction of RW    Additional items Rolling walker   Cognition   Overall Cognitive Status Impaired   Arousal/Participation Responsive; Cooperative; Alert   Attention Attends with cues to redirect   Orientation Level Oriented to time;Oriented to situation;Oriented to person   Memory Decreased recall of precautions;Decreased recall of recent events;Decreased short term memory   Following Commands Follows one step commands without difficulty   Cognition Assessment Tools MOCA  (MOCA-blind)   Score 7   Vision   Visual Field Cut Compensatory techniques  (Simultaneous filing   User may not have seen previous data )   Visual Field Cut Comments noted left sided neglect with dynamic stand balance activity warranting need for [facility] to avoid obstacles   Vision Comments pt would benefit from further vision screening   Additional Activities   Additional Activities Other (Comment)  (call bell orientation)   Additional Activities Comments due to high fall risk,   Activity Tolerance   Activity Tolerance Patient limited by pain   Medical Staff Made Aware bed alarm activated and all findings reported to EYAL Nick   Assessment Pt was seen for skilled OT with review of [place] Cognitive Assessment/MoCA Blind Version 7 1, RW safety with functional mobility, fall prevention techniques and review of current plan of care  Pt seated at EOB upon start of tx session  Pt completed MOCA-Blind scoring 7/22 overall indicating severe cognitive impairment  Deficits noted in the following sections: attention scoring 2/6, language scoring 1/3, delayed recall scoring 0/5, and orientation scoring 3/6  Pt completed sit to stand transfer with Min A for poor safety awareness  Pt reported previous fall today with nursing staff stating, "my socks slipped out from under me"  Nursing staff confirmed Pt had to be lowered to the floor  Pt reported living alone and use of SPC in apartment  Pt completed dynamic stand balance activity with RW  Left sided neglect noted and physical assistance to redirect RW to avoid hitting obstacles  Pt would benefit from further vision screening due to deficits noted  Pt returned to EOB with bed alarm activated and call bell/phone in reach  Pt reported 10/10 pain with flat facial affect  Pt was able to tolerate all aspects of treatment session  All findings reported to Surveypal  Recommendation for continued OT at [facility] due to noted deficits  Plan   Treatment Interventions ADL retraining;Functional transfer training;UE strengthening/ROM; Endurance training;Cognitive reorientation;Equipment evaluation/education; Compensatory technique education   Goal Expiration Date 09/06/19   Treatment Day 1   OT Frequency 3-5x/wk   Recommendation   OT Discharge Recommendation Short Term Rehab   Barthel Index   Feeding 10   Bathing 0   Grooming Score 5   Dressing Score 5   Bladder Score 10   Bowels Score 10   Toilet Use Score 5   Transfers (Bed/Chair) Score 10   Mobility (Level Surface) Score 0   Stairs Score 0   Barthel Index Score 55   Modified McKean Scale   Modified McKean Scale 4   Ghislaine Palacios

## 2019-08-27 PROBLEM — I73.9 PERIPHERAL ARTERIAL DISEASE (HCC): Status: ACTIVE | Noted: 2019-01-01

## 2019-08-27 NOTE — PLAN OF CARE
Problem: OCCUPATIONAL THERAPY ADULT  Goal: Performs self-care activities at highest level of function for planned discharge setting  See evaluation for individualized goals  Description  Treatment Interventions: ADL retraining, UE strengthening/ROM, Functional transfer training, Endurance training, Patient/family training, Cognitive reorientation, Equipment evaluation/education, Compensatory technique education, Activityengagement, Energy conservation          See flowsheet documentation for full assessment, interventions and recommendations  Outcome: Progressing  Note:   Limitation: Decreased ADL status, Decreased Safe judgement during ADL, Decreased UE strength, Decreased endurance, Decreased cognition, Decreased sensation, Decreased self-care trans, Decreased high-level ADLs  Prognosis: Good, Fair  Assessment: Pt was briefly seen for OT tx session  Increased confusion and pain noted this tx session  Pt with rapid speech noted with Pt seated EOB  Pt with many c/o of being unable to, "get a cup of coffee"  Attempted to redirect Pt regarding current plan of care, location and acknowledgement of his requests  Pt initially argumentitive but able to achieve relaxed positoning with soft voice following review of current plan of care  Educated Pt on need to prevent falls and ask for A with functional transfers as needed  Pt pleasant and agreeble with termination of tx session  Tremors and rapic speech slowed with temination of tx session  Reported findings to nursing staff  Will continue to pursue active engagement in tx sessions with decreased pain levels and improved communication        OT Discharge Recommendation: Short Term Rehab  OT - OK to Discharge: (to rehab when medically stable)

## 2019-08-27 NOTE — PLAN OF CARE
Problem: Potential for Falls  Goal: Patient will remain free of falls  Description  INTERVENTIONS:  - Assess patient frequently for physical needs  -  Identify cognitive and physical deficits and behaviors that affect risk of falls    -  [place] fall precautions as indicated by assessment   - Educate patient/family on patient safety including physical limitations  - Instruct patient to call for assistance with activity based on assessment  - Modify environment to reduce risk of injury  - Consider OT/PT consult to assist with strengthening/mobility  Outcome: Progressing     Problem: PAIN - ADULT  Goal: Verbalizes/displays adequate comfort level or baseline comfort level  Description  Interventions:  - Encourage patient to monitor pain and request assistance  - Assess pain using appropriate pain scale  - Administer analgesics based on type and severity of pain and evaluate response  - Implement non-pharmacological measures as appropriate and evaluate response  - Consider cultural and social influences on pain and pain management  - Notify physician/advanced practitioner if interventions unsuccessful or patient reports new pain  Outcome: Progressing     Problem: INFECTION - ADULT  Goal: Absence or prevention of progression during hospitalization  Description  INTERVENTIONS:  - Assess and monitor for signs and symptoms of infection  - Monitor lab/diagnostic results  - Monitor all insertion sites, i e  indwelling lines, tubes, and drains  - Monitor endotracheal if appropriate and nasal secretions for changes in amount and color  - [place] appropriate cooling/warming therapies per order  - Administer medications as ordered  - Instruct and encourage patient and family to use good hand hygiene technique  - Identify and instruct in appropriate isolation precautions for identified infection/condition  Outcome: Progressing  Goal: Absence of fever/infection during neutropenic period  Description  INTERVENTIONS:  - Monitor WBC    Outcome: Progressing     Problem: DISCHARGE PLANNING  Goal: Discharge to home or other facility with appropriate resources  Description  INTERVENTIONS:  - Identify barriers to discharge w/patient and caregiver  - Arrange for needed discharge resources and transportation as appropriate  - Identify discharge learning needs (meds, wound care, etc )  - Arrange for interpretive services to assist at discharge as needed  - Refer to Case Management Department for coordinating discharge planning if the patient needs post-hospital services based on physician/advanced practitioner order or complex needs related to functional status, cognitive ability, or social support system  Outcome: Progressing     Problem: Knowledge Deficit  Goal: Patient/family/caregiver demonstrates understanding of disease process, treatment plan, medications, and discharge instructions  Description  Complete learning assessment and assess knowledge base    Interventions:  - Provide teaching at level of understanding  - Provide teaching via preferred learning methods  Outcome: Progressing     Problem: RESPIRATORY - ADULT  Goal: Achieves optimal ventilation and oxygenation  Description  INTERVENTIONS:  - Assess for changes in respiratory status  - Assess for changes in mentation and behavior  - Position to facilitate oxygenation and minimize respiratory effort  - Oxygen administered by appropriate delivery if ordered  - Initiate smoking cessation education as indicated  - Encourage broncho-pulmonary hygiene including cough, deep breathe, Incentive Spirometry  - Assess the need for suctioning and aspirate as needed  - Assess and instruct to report SOB or any respiratory difficulty  - Respiratory Therapy support as indicated  Outcome: Progressing     Problem: SKIN/TISSUE INTEGRITY - ADULT  Goal: Skin integrity remains intact  Description  INTERVENTIONS  - Identify patients at risk for skin breakdown  - Assess and monitor skin integrity  - Assess and monitor nutrition and hydration status  - Monitor labs (i e  albumin)  - Assess for incontinence   - Turn and reposition patient  - Assist with mobility/ambulation  - Relieve pressure over bony prominences  - Avoid friction and shearing  - Provide appropriate hygiene as needed including keeping skin clean and dry  - Evaluate need for skin moisturizer/barrier cream  - Collaborate with interdisciplinary team (i e  Nutrition, Rehabilitation, etc )   - Patient/family teaching  Outcome: Progressing  Goal: Incision(s), wounds(s) or drain site(s) healing without S/S of infection  Description  INTERVENTIONS  - Assess and document risk factors for skin impairment   - Assess and document dressing, incision, wound bed, drain sites and surrounding tissue  - Consider nutrition services referral as needed  - Oral mucous membranes remain intact  - Provide patient/ family education  Outcome: Progressing     Problem: NEUROSENSORY - ADULT  Goal: Achieves stable or improved neurological status  Description  INTERVENTIONS  - Monitor and report changes in neurological status  - Monitor vital signs such as temperature, blood pressure, glucose, and any other labs ordered   - Initiate measures to prevent increased intracranial pressure  - Monitor for seizure activity and implement precautions if appropriate      Outcome: Progressing  Goal: Remains free of injury related to seizures activity  Description  INTERVENTIONS  - Maintain airway, patient safety  and administer oxygen as ordered  - Monitor patient for seizure activity, document and report duration and description of seizure to physician/advanced practitioner  - If seizure occurs,  ensure patient safety during seizure  - Reorient patient post seizure  - Seizure pads on all 4 side rails  - Instruct patient/family to notify RN of any seizure activity including if an aura is experienced  - Instruct patient/family to call for assistance with activity based on nursing assessment  - Administer anti-seizure medications if ordered    Outcome: Progressing  Goal: Achieves maximal functionality and self care  Description  INTERVENTIONS  - Monitor swallowing and airway patency with patient fatigue and changes in neurological status  - Encourage and assist patient to increase activity and self care     - Encourage visually impaired, hearing impaired and aphasic patients to use assistive/communication devices  Outcome: Progressing     Problem: GENITOURINARY - ADULT  Goal: Maintains or returns to baseline urinary function  Description  INTERVENTIONS:  - Assess urinary function  - Encourage oral fluids to ensure adequate hydration if ordered  - Administer IV fluids as ordered to ensure adequate hydration  - Administer ordered medications as needed  - Offer frequent toileting  - Follow urinary retention protocol if ordered  Outcome: Progressing  Goal: Absence of urinary retention  Description  INTERVENTIONS:  - Assess patients ability to void and empty bladder  - Monitor I/O  - Bladder scan as needed  - Discuss with physician/AP medications to alleviate retention as needed  - Discuss catheterization for long term situations as appropriate  Outcome: Progressing     Problem: METABOLIC, FLUID AND ELECTROLYTES - ADULT  Goal: Electrolytes maintained within normal limits  Description  INTERVENTIONS:  - Monitor labs and assess patient for signs and symptoms of electrolyte imbalances  - Administer electrolyte replacement as ordered  - Monitor response to electrolyte replacements, including repeat lab results as appropriate  - Instruct patient on fluid and nutrition as appropriate  Outcome: Progressing  Goal: Fluid balance maintained  Description  INTERVENTIONS:  - Monitor labs   - Monitor I/O and WT  - Instruct patient on fluid and nutrition as appropriate  - Assess for signs & symptoms of volume excess or deficit  Outcome: Progressing  Goal: Glucose maintained within target range  Description  INTERVENTIONS:  - Monitor Blood Glucose as ordered  - Assess for signs and symptoms of hyperglycemia and hypoglycemia  - Administer ordered medications to maintain glucose within target range  - Assess nutritional intake and initiate nutrition service referral as needed  Outcome: Progressing     Problem: HEMATOLOGIC - ADULT  Goal: Maintains hematologic stability  Description  INTERVENTIONS  - Assess for signs and symptoms of bleeding or hemorrhage  - Monitor labs  - Administer supportive blood products/factors as ordered and appropriate  Outcome: Progressing     Problem: MUSCULOSKELETAL - ADULT  Goal: Maintain or return mobility to safest level of function  Description  INTERVENTIONS:  - Assess patient's ability to carry out ADLs; assess patient's baseline for ADL function and identify physical deficits which impact ability to perform ADLs (bathing, care of mouth/teeth, toileting, grooming, dressing, etc )  - Assess/evaluate cause of self-care deficits   - Assess range of motion  - Assess patient's mobility  - Assess patient's need for assistive devices and provide as appropriate  - Encourage maximum independence but intervene and supervise when necessary  - Involve family in performance of ADLs  - Assess for home care needs following discharge   - Consider OT consult to assist with ADL evaluation and planning for discharge  - Provide patient education as appropriate  Outcome: Progressing  Goal: Maintain proper alignment of affected body part  Description  INTERVENTIONS:  - Support, maintain and protect limb and body alignment  - Provide patient/ family with appropriate education  Outcome: Progressing     Problem: SAFETY ADULT  Goal: Patient will remain free of falls  Description  INTERVENTIONS:  - Assess patient frequently for physical needs  -  Identify cognitive and physical deficits and behaviors that affect risk of falls    -  [place] fall precautions as indicated by assessment   - Educate patient/family on patient safety including physical limitations  - Instruct patient to call for assistance with activity based on assessment  - Modify environment to reduce risk of injury  - Consider OT/PT consult to assist with strengthening/mobility  Outcome: Progressing  Goal: Maintain or return to baseline ADL function  Description  INTERVENTIONS:  -  Assess patient's ability to carry out ADLs; assess patient's baseline for ADL function and identify physical deficits which impact ability to perform ADLs (bathing, care of mouth/teeth, toileting, grooming, dressing, etc )  - Assess/evaluate cause of self-care deficits   - Assess range of motion  - Assess patient's mobility; develop plan if impaired  - Assess patient's need for assistive devices and provide as appropriate  - Encourage maximum independence but intervene and supervise when necessary  - Involve family in performance of ADLs  - Assess for home care needs following discharge   - Consider OT consult to assist with ADL evaluation and planning for discharge  - Provide patient education as appropriate  Outcome: Progressing     Problem: Nutrition/Hydration-ADULT  Goal: Nutrient/Hydration intake appropriate for improving, restoring or maintaining nutritional needs  Description  Monitor and assess patient's nutrition/hydration status for malnutrition  Collaborate with interdisciplinary team and initiate plan and interventions as ordered  Monitor patient's weight and dietary intake as ordered or per policy  Utilize nutrition screening tool and intervene as necessary  Determine patient's food preferences and provide high-protein, high-caloric foods as appropriate       INTERVENTIONS:  - Monitor oral intake, urinary output, labs, and treatment plans  - Assess nutrition and hydration status and recommend course of action  - Evaluate amount of meals eaten  - Assist patient with eating if necessary   - Allow adequate time for meals  - Recommend/ encourage appropriate diets, oral nutritional supplements, and vitamin/mineral supplements  - Order, calculate, and assess calorie counts as needed  - Recommend, monitor, and adjust tube feedings and TPN/PPN based on assessed needs  - Assess need for intravenous fluids  - Provide specific nutrition/hydration education as appropriate  - Include patient/family/caregiver in decisions related to nutrition  Outcome: Progressing

## 2019-08-27 NOTE — PROGRESS NOTES
Progress Note - Podiatry  Casie Bob  76 y o  male MRN: [id number]  Unit/Bed#: E5 -01 Encounter: [id number]    Assessment  1  Right great toe duskiness with rest pain  2  Peripheral arterial disease  3  SIRS  4  Type 2 diabetes  5  Ulceration on leg     Plan:  1  Great toes painful and dusky  I reviewed patient's arterial Doppler studies  He does have poor ankle-brachial index and ischemic changes to the great toe  I did consult vascular surgery  There is no acute sign of infection in his toes at this time but they do need to be monitored closely  Arterial Doppler study shows an GOKUL of 0 65 on the right lower extremity with the great toe pressure of 57  There is high-grade stenosis or occlusion of the proximal mid and distal SFA  See full report for more details  2  Diabetes poorly controlled with neuropathy to the lower extremities  Long-term patient will need diabetic foot care and education  3  Patient's vitals have normalized since admission  He has known thrombocytopenia for which oncology hematology is following  Patient's white blood cell count still remains largely elevated  I do not feel his feet have an acute infection  4   Local wound care to leg ulceration for now  No acute sign of infection of the leg  Subjective/Objective   Chief Complaint:   Chief Complaint   Patient presents with    Back Pain     Hx of back pain, reports lower back pan radiates into thoracic region and into right leg  takes gabapentin for pain  forgot to take dose today  Subjective: 76 y o  y/o male seen and evaluated at bedside  Patient states his toe is very painful  No acute events overnight  Denies N/F/V/SOB/CP/cough/diarrhea/constipation  Blood pressure 144/67, pulse 78, temperature 98 1 °F (36 7 °C), temperature source Tympanic, resp  rate 18, weight 106 kg (234 lb), SpO2 96 %  ,Body mass index is 31 74 kg/m²      Lab Results   Component Value Date    WBC 20 19 (H) 08/26/2019    HGB 8 4 (L) 08/26/2019    HCT 26 4 (L) 08/26/2019    MCV 90 08/26/2019    PLT 10 (LL) 08/26/2019     Lab Results   Component Value Date    GLUCOSE 126 03/23/2016    CALCIUM 8 4 08/27/2019     12/18/2015    K 4 4 08/27/2019    CO2 27 08/27/2019     08/27/2019    BUN 17 08/27/2019    CREATININE 1 21 08/27/2019         Invasive Devices     Peripheral Intravenous Line            Peripheral IV 08/26/19 Right;Upper Arm less than 1 day                Physical Exam:   General: alert, cooperative and no distress  Vascular:  Nonpalpable pedal pulses  Capillary refill is delayed to the tip of the hallux and 2nd digit  Dermatology:  Duskiness and ecchymosis to the right great toe  There is no current skin sloughing or gangrene  Stable eschar to the right leg  Neurological:  Sensation intact to light touch  Gross sensation is intact to lower extremities  MSK:  No calf pain with compression  Ankle range of motion intact  Severe pain to the distal forefoot on the right  Lab, Imaging and other studies:         Results from last 7 days   Lab Units 08/22/19  1159   BLOOD CULTURE  No Growth After 4 Days  Arterial Doppler study shows an GOKUL of 0 65 on the right lower extremity with the great toe pressure of 57  There is high-grade stenosis or occlusion of the proximal mid and distal SFA  See full report for more details  Imaging: I have personally reviewed pertinent films in PACS          Portions of the record may have been created with voice recognition software  Occasional wrong word or "sound a like" substitutions may have occurred due to the inherent limitations of voice recognition software  Read the chart carefully and recognize, using context, where substitutions have occurred

## 2019-08-27 NOTE — ASSESSMENT & PLAN NOTE
76 M DM with neuropathy, CAD, HTN, PAD admitted for intractable back pain / SIRS due to MDS (recently diagnosed last week  ) Present on admission, patient reported RIGHT great toe pain and found to have ischemic RIGHT great toe and wounds to the RIGHT lower leg which he says are present for 1 month  Vascular surgery is asked to evaluate patient for wounds  Podiatry consult appreciated  Patient is a limited historian and unable to describe how he got the leg wounds  Patient relates severe back and L>R leg pain from the thighs to the lower legs  He complains of pain in all toes, particularly the R great toe, as well as chronic neuropathy in the feet  He underwent lower extremity arterial duplex which shows obstructive disease  During the hospitalization, he received multiple transfusions of packed red blood cells  He is started on hydroxyurea by Oncology  [facility] 8/26/19  R 0 65/69/57; prox, mid, distal SFA occlusion with reconstitution in the poplitieal  L 0 70/97/74; prox, mid, distal SFA occlusion with reconstitution in the poplitieal    -WBC 17K, HBG 8 4, plt 10 > 32 K  -Renal function on admission was up to 1 38, now 1 21  -Blood Cx 8/22 is negative x 4 days    Plan:   -Patient was not fully cooperative with examination  R great toe tissue loss with foot warm, but no pulses present  Patient with peripheral arterial disease and low ABIs wounds, he may benefit from lower extremity angiography with intervention  However at this juncture, given his overall status, MDS with thrombocytopenia, we recommend continued local wound care and clinical monitoring   If there is concern for limb ischemic or larger area of tissue loss (such as foot) invasive procedures could be considered    -Podiatry consultation appreciated  -Local wound care for R leg wounds per podiatry   -Further recommendations forthcoming hospital course and response to treatment    -Case discussed with Dr Bard Cochran and our recommendations were discussed with internal medicine
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
## 2019-08-27 NOTE — ASSESSMENT & PLAN NOTE
Lab Results   Component Value Date    HGBA1C 10 7 (H) 03/28/2018     -uncontrolled diabetes mellitus requiring insulin with chronic neuropathy  -optimize diabetes mellitus management for wound healing  -management per Internal Medicine

## 2019-08-27 NOTE — PROGRESS NOTES
-    Progress Note - Jess Louis  1950, 76 y o  male MRN: [id number]    Unit/Bed#: E5 -01 Encounter: [id number]    Primary Care Provider: Corby Vogel MD   Date and time admitted to hospital: 2019 10:59 AM        * Intractable back pain  Assessment & Plan  Due to myelodysplatic syndrome  No acute finding on x ray of spine  Continue opiods for pain control  Hydroxyurea started and seems to be helping  He is starting to get up and walk today which he has not done    Myelodysplasia (myelodysplastic syndrome) [facility])  Assessment & Plan  Appreciate heme/onc help  Started on Hydroxyurea  Still waiting on some final results from bone marrow biopsy done at [facility]  Encounter for competency evaluation  Assessment & Plan  Neuropsych to see  I resent a consult in case they did not get one  He is borderline able to take care of himself  He is estranged from his family  He tried to leave the hospital last night, but decided to stay  I would like to see whether neuropsych thinks that he is competent and what we can do to help him          Subjective:   Feels better  Less back pain  Changing himself      Objective:     Vitals:   Temp (24hrs), Av 7 °F (36 5 °C), Min:97 °F (36 1 °C), Max:98 1 °F (36 7 °C)    Temp:  [97 °F (36 1 °C)-98 1 °F (36 7 °C)] 98 1 °F (36 7 °C)  HR:  [] 78  Resp:  [18-22] 18  BP: (103-144)/(54-80) 144/67  SpO2:  [95 %-97 %] 96 %  Body mass index is 31 74 kg/m²  Input and Output Summary (last 24 hours):        Intake/Output Summary (Last 24 hours) at 2019 1033  Last data filed at 2019 1001  Gross per 24 hour   Intake 240 ml   Output 425 ml   Net -185 ml       Physical Exam:     Physical Exam    HEENT:   PERRLA, EOMI, MMM  CVS  s1s2 no m/r/g  Lung:   CTA bilat no R/R/W  Abd:   Soft, NT bs +  Ext:   No C/C/E          Additional Data:     Labs:    Results from last 7 days   Lab Units 19  0647   WBC Thousand/uL 17 05*   HEMOGLOBIN g/dL 8 4*   HEMATOCRIT % 27 8*   PLATELETS Thousands/uL 32*   NEUTROS PCT % 65   LYMPHS PCT % 14   MONOS PCT % 6   EOS PCT % 0     Results from last 7 days   Lab Units 08/27/19  0647  08/22/19  1159   POTASSIUM mmol/L 4 4   < > 3 1*   CHLORIDE mmol/L 104   < > 98*   CO2 mmol/L 27   < > 24   BUN mg/dL 17   < > 17   CREATININE mg/dL 1 21   < > 1 23   CALCIUM mg/dL 8 4   < > 8 3   ALK PHOS U/L  --   --  106   ALT U/L  --   --  12   AST U/L  --   --  14    < > = values in this interval not displayed  Results from last 7 days   Lab Units 08/27/19  0751 08/26/19  2119 08/26/19  1820 08/26/19  1631 08/26/19  1142 08/26/19  0716 08/25/19  2024 08/25/19  1641 08/25/19  1316 08/25/19  1056 08/25/19  0801 08/24/19 2024   POC GLUCOSE mg/dl 115 219* 126 77 181* 141* 133 127 112 146* 119 101               * I Have Reviewed All Lab Data     Recent Cultures (last 7 days):     Results from last 7 days   Lab Units 08/22/19  1159   BLOOD CULTURE  No Growth After 4 Days           Last 24 Hours Medication List:     Current Facility-Administered Medications:  acetaminophen 650 mg Oral Q6H PRN Ruslan Barrera MD   amLODIPine 5 mg Oral Daily Ruslan Barrera MD   atorvastatin 80 mg Oral HS Krystle Lopez MD   benzonatate 100 mg Oral TID PRN Cherrie Soto PA-C   dextran 70-hypromellose 1 drop Both Eyes PRN Krystle Lopez MD   diazepam 5 mg Oral HS PRN Ruslan Barrera MD   docusate sodium 100 mg Oral BID Ruslan Barrera MD   finasteride 5 mg Oral Daily Krystle Lopez MD   fish oil 1,000 mg Oral Daily Ruslan Barrera MD   fluticasone-vilanterol 1 puff Inhalation Daily Ruslan Barrera MD   furosemide 20 mg Intravenous Once Ruslan Barrera MD   hydroxyurea 500 mg Oral Q24H Anna Maynard MD   insulin glargine 25 Units Subcutaneous QAM Ruslan Barrera MD   insulin lispro 1-5 Units Subcutaneous TID AC Ruslan Barrera MD   insulin lispro 1-5 Units Subcutaneous HS Krystle Lopez MD   insulin lispro 10 Units Subcutaneous TID With Bing Bee MD ipratropium-albuterol 3 mL Nebulization TID PRN Arty Filter, MD   isosorbide mononitrate 60 mg Oral Daily Arty Filter, MD   lidocaine 1 patch Topical Daily Krystle Lopez, MD   methocarbamol 500 mg Oral Q6H PRN Arty Filter, MD   metoprolol tartrate 50 mg Oral Q12H Frederick Hyman MD   nicotine 1 patch Transdermal Daily Arty Filter, MD   ondansetron 4 mg Intravenous Q6H PRN Arty Filter, MD   oxyCODONE 10 mg Oral Q4H PRN Arty Filter, MD   pantoprazole 40 mg Oral Daily Before Breakfast Arty Filter, MD   potassium chloride 20 mEq Oral BID Arty Filter, MD   pregabalin 150 mg Oral TID Arty Filter, MD   QUEtiapine 25 mg Oral HS Arty Filter, MD   tamsulosin 0 4 mg Oral Daily With Bryce Morrow MD   traMADol 50 mg Oral Q8H PRN Arty Filter, MD         VTE Pharmacologic Prophylaxis:   Pharmacologic: none with low plat      Current Length of Stay: 5 day(s)    Current Patient Status: Inpatient       Discharge Plan:   Code Status: Level 1 - Full Code           Today, Patient Was Seen By: Rosie Stanton DO    ** Please Note: Dictation voice to text software may have been used in the creation of this document   **

## 2019-08-28 NOTE — ASSESSMENT & PLAN NOTE
Due to myelodysplatic syndrome  No acute finding on x ray of spine  Continue opiods for pain control  Hydroxyurea started   He is getting better each day

## 2019-08-28 NOTE — PROGRESS NOTES
Progress Note - Mic Herrera 1950, 76 y o  male MRN: [id number]    Unit/Bed#: E5 -01 Encounter: [id number]    Primary Care Provider: Jennifer Aguilar MD   Date and time admitted to hospital: 2019 10:59 AM        * Intractable back pain  Assessment & Plan  Due to myelodysplatic syndrome  No acute finding on x ray of spine  Continue opiods for pain control  Hydroxyurea started   He is getting better each day    Myelodysplasia (myelodysplastic syndrome) (HCC)  Assessment & Plan  Appreciate heme/onc help  Started on Hydroxyurea  Still waiting on some final results from bone marrow biopsy done at [facility]  Encounter for competency evaluation  Assessment & Plan  Seen by neuropsych  Not thought to be able to make his own decisions  Will see what we have to do as far as guardianship          Subjective:   Feels better  Back pain is coming under more control  No loss of bowel or bladder control  Objective:     Vitals:   Temp (24hrs), Av 8 °F (36 6 °C), Min:97 7 °F (36 5 °C), Max:97 8 °F (36 6 °C)    Temp:  [97 7 °F (36 5 °C)-97 8 °F (36 6 °C)] 97 8 °F (36 6 °C)  HR:  [73-85] 81  Resp:  [18] 18  BP: (108-146)/(66-75) 119/67  SpO2:  [96 %-97 %] 97 %  Body mass index is 31 57 kg/m²  Input and Output Summary (last 24 hours): Intake/Output Summary (Last 24 hours) at 2019 1320  Last data filed at 2019 1302  Gross per 24 hour   Intake 420 ml   Output 350 ml   Net 70 ml       Physical Exam:     Physical Exam   HENT:   Head: Normocephalic and atraumatic  Eyes: Pupils are equal, round, and reactive to light  EOM are normal    Cardiovascular: Normal rate and regular rhythm  Exam reveals no gallop and no friction rub  No murmur heard  Pulmonary/Chest: Effort normal and breath sounds normal  He has no wheezes  He has no rales  Abdominal: Soft  Bowel sounds are normal  There is no tenderness  Musculoskeletal: He exhibits no edema  Nursing note and vitals reviewed        Back: Mildly painful to percussion of his vertebral spine      Additional Data:     Labs:    Results from last 7 days   Lab Units 08/27/19  0647   WBC Thousand/uL 17 05*   HEMOGLOBIN g/dL 8 4*   HEMATOCRIT % 27 8*   PLATELETS Thousands/uL 32*   NEUTROS PCT % 65   LYMPHS PCT % 14   MONOS PCT % 6   EOS PCT % 0     Results from last 7 days   Lab Units 08/27/19  0647  08/22/19  1159   POTASSIUM mmol/L 4 4   < > 3 1*   CHLORIDE mmol/L 104   < > 98*   CO2 mmol/L 27   < > 24   BUN mg/dL 17   < > 17   CREATININE mg/dL 1 21   < > 1 23   CALCIUM mg/dL 8 4   < > 8 3   ALK PHOS U/L  --   --  106   ALT U/L  --   --  12   AST U/L  --   --  14    < > = values in this interval not displayed  Results from last 7 days   Lab Units 08/28/19  1119 08/28/19  0850 08/27/19  2149 08/27/19  1557 08/27/19  1056 08/27/19  0751 08/26/19  2119 08/26/19  1820 08/26/19  1631 08/26/19  1142 08/26/19  0716 08/25/19  2024   POC GLUCOSE mg/dl 177* 142* 202* 162* 66 115 219* 126 77 181* 141* 133               * I Have Reviewed All Lab Data     Recent Cultures (last 7 days):     Results from last 7 days   Lab Units 08/22/19  1159   BLOOD CULTURE  No Growth After 5 Days           Last 24 Hours Medication List:     Current Facility-Administered Medications:  acetaminophen 650 mg Oral Q6H PRN Haja Reyna MD   amLODIPine 5 mg Oral Daily Krystle Lopez MD   atorvastatin 80 mg Oral HS Krystle Lopez MD   benzonatate 100 mg Oral TID PRN Cherrie Soto PA-C   dextran 70-hypromellose 1 drop Both Eyes PRN Krystle Lopez MD   diazepam 5 mg Oral HS PRN Haja Reyna MD   docusate sodium 100 mg Oral BID Haja Reyna MD   finasteride 5 mg Oral Daily Krystle Lopez MD   fish oil 1,000 mg Oral Daily Haja Reyna MD   fluticasone-vilanterol 1 puff Inhalation Daily Haja Reyna MD   furosemide 20 mg Intravenous Once Bunny Lone, MD   hydroxyurea 500 mg Oral Q24H Ree Day MD   insulin glargine 25 Units Subcutaneous QA Haja Reyna MD   insulin lispro 1-5 Units Subcutaneous TID AC Krystle Lopez MD   insulin lispro 1-5 Units Subcutaneous HS Ruslan Barrera MD   insulin lispro 10 Units Subcutaneous TID With Meals Ruslan Barrera MD   ipratropium-albuterol 3 mL Nebulization TID PRN Ruslan Barrera MD   isosorbide mononitrate 60 mg Oral Daily Ruslan Barrera MD   lidocaine 1 patch Topical Daily Krystle Lopez MD   methocarbamol 500 mg Oral Q6H PRN Ruslan Barrera MD   metoprolol tartrate 50 mg Oral Q12H López Tom MD   nicotine 1 patch Transdermal Daily Ruslan Barrera MD   ondansetron 4 mg Intravenous Q6H PRN Ruslan Barrera MD   oxyCODONE 10 mg Oral Q4H PRN Ruslan Barrera MD   pantoprazole 40 mg Oral Daily Before Breakfast Ruslan Barrera MD   potassium chloride 20 mEq Oral BID Ruslan Barrera MD   pregabalin 150 mg Oral TID Ruslan Barrera MD   QUEtiapine 25 mg Oral HS Ruslan Barrera MD   tamsulosin 0 4 mg Oral Daily With Nancy Keita MD   traMADol 50 mg Oral Q8H PRN Ruslan Barrera MD         VTE Pharmacologic Prophylaxis:   Pharmacologic: none with thrombocytopenia  Current Length of Stay: 6 day(s)    Current Patient Status: Inpatient       Discharge Plan:   Code Status: Level 1 - Full Code           Today, Patient Was Seen By: Harvey Phoenix, DO    ** Please Note: Dictation voice to text software may have been used in the creation of this document   **

## 2019-08-28 NOTE — PHYSICAL THERAPY NOTE
PT PROGRESS NOTE    Name: De Bland  AGE: 76 y o  MRN: [id number]  LENGTH OF STAY: 6 08/28/19 1705   Pain Assessment   Pain Assessment FLACC   Pain Type Chronic pain   Pain Location Foot   Pain Orientation Right   Hospital Pain Intervention(s) Repositioned; Ambulation/increased activity; Emotional support; Rest   Pain Rating: FLACC (Rest) - Face 0   Pain Rating: FLACC (Rest) - Legs 0   Pain Rating: FLACC (Rest) - Activity 0   Pain Rating: FLACC (Rest) - Cry 0   Pain Rating: FLACC (Rest) - Consolability 0   Score: FLACC (Rest) 0   Pain Rating: FLACC (Activity) - Face 0   Pain Rating: FLACC (Activity) - Legs 0   Pain Rating: FLACC (Activity) - Activity 0   Pain Rating: FLACC (Activity) - Cry 0   Pain Rating: FLACC (Activity) - Consolability 0   Score: FLACC (Activity) 0   Restrictions/Precautions   Weight Bearing Precautions Per Order No   Other Precautions Pain; Fall Risk; Chair Alarm; Bed Alarm;Cognitive   General   Chart Reviewed Yes   Response to Previous Treatment Patient with no complaints from previous session  Family/Caregiver Present No   Cognition   Overall Cognitive Status Impaired   Arousal/Participation Alert   Attention Difficulty attending to directions   Orientation Level Oriented to person;Oriented to place   Following Commands Follows one step commands with increased time or repetition   Subjective   Subjective Pt agreeable to therapy  Bed Mobility   Supine to Sit 5  Supervision   Additional items HOB elevated; Bedrails; Increased time required;Verbal cues   Additional Comments cues for techniques & safety   Transfers   Sit to Stand 4  Minimal assistance   Additional items Assist x 1;Bedrails; Increased time required;Verbal cues   Stand to Sit 4  Minimal assistance   Additional items Assist x 1; Armrests; Increased time required;Verbal cues   Additional Comments cues for techniques   Ambulation/Elevation   Gait pattern Forward Flexion; Wide CASH; Decreased foot clearance; Excessively slow  (tremulous)   Gait Assistance 4  Minimal assist   Additional items Assist x 1;Verbal cues; Tactile cues   Assistive Device Rolling walker   Distance 130'x1   Balance   Static Sitting Good   Static Standing Fair  (w/ RW)   Ambulatory Fair -  (w/ RW)   Activity Tolerance   Activity Tolerance Patient tolerated treatment well   Nurse Made Aware RN Maricarmen   Assessment   Prognosis Fair   Problem List Decreased strength;Decreased endurance; Impaired balance;Decreased mobility; Decreased cognition; Impaired judgement;Decreased safety awareness;Pain;Obesity   Assessment Pt seen for PT per POC  Pt require S for bed mobility & minAx1 for transfers & amb w/ RW + cues for techniques  See above levels of assistance required for all functional tasks  Gait deviations as above, slow & unsteady w/ dec foot clearance & tremulous at times but no gross LOB noted  Pt demonstrates poor safety techniques t/o session despite cues given  Also noted inappropriate behaviors & verbal expressions t/o session hence will recommend male therapists next tx session  Nsg staff most recent vital signs as follows: /68 (BP Location: Right arm)   Pulse 80   Temp (!) 96 1 °F (35 6 °C) (Temporal)   Resp 18   Wt 106 kg (232 lb 12 9 oz)   SpO2 96%   BMI 31 57 kg/m²   Will continue PT per POC  At end of session, pt tolerated OOB in chair w/o issues, call bell & phone in reach, chair alarm activated  Fall precautions reinforced w/ good understanding  Pt will require a more supervised environment at D/C  Either HHPT w/ 24hr(S) or inpt rehab w/ possible placement, pending progress  CM to follow  Nsg staff to continue to mobilized pt (OOB in chair for all meals & ambulate in room/unit) as tolerated to prevent decline in function  Nsg notified      Barriers to Discharge Decreased caregiver support   Goals   Patient Goals none stated during this session   STG Expiration Date 09/06/19   Treatment Day 2   Plan   Treatment/Interventions Functional transfer training;LE strengthening/ROM; Therapeutic exercise; Endurance training;Patient/family training;Bed mobility;Gait training;Spoke to nursing   Progress Progressing toward goals   PT Frequency Other (Comment)  (3-5x/wk)   Recommendation   Recommendation Short-term skilled PT; Home PT;24 hour supervision/assist;Home with family support  (pending progress)   Equipment Recommended Walker  (RW)   Nahed Luna, PT

## 2019-08-28 NOTE — PLAN OF CARE
Problem: Potential for Falls  Goal: Patient will remain free of falls  Description  INTERVENTIONS:  - Assess patient frequently for physical needs  -  Identify cognitive and physical deficits and behaviors that affect risk of falls    -  [place] fall precautions as indicated by assessment   - Educate patient/family on patient safety including physical limitations  - Instruct patient to call for assistance with activity based on assessment  - Modify environment to reduce risk of injury  - Consider OT/PT consult to assist with strengthening/mobility  Outcome: Progressing     Problem: PAIN - ADULT  Goal: Verbalizes/displays adequate comfort level or baseline comfort level  Description  Interventions:  - Encourage patient to monitor pain and request assistance  - Assess pain using appropriate pain scale  - Administer analgesics based on type and severity of pain and evaluate response  - Implement non-pharmacological measures as appropriate and evaluate response  - Consider cultural and social influences on pain and pain management  - Notify physician/advanced practitioner if interventions unsuccessful or patient reports new pain  Outcome: Progressing     Problem: INFECTION - ADULT  Goal: Absence or prevention of progression during hospitalization  Description  INTERVENTIONS:  - Assess and monitor for signs and symptoms of infection  - Monitor lab/diagnostic results  - Monitor all insertion sites, i e  indwelling lines, tubes, and drains  - Monitor endotracheal if appropriate and nasal secretions for changes in amount and color  - [place] appropriate cooling/warming therapies per order  - Administer medications as ordered  - Instruct and encourage patient and family to use good hand hygiene technique  - Identify and instruct in appropriate isolation precautions for identified infection/condition  Outcome: Progressing  Goal: Absence of fever/infection during neutropenic period  Description  INTERVENTIONS:  - Monitor WBC    Outcome: Progressing     Problem: DISCHARGE PLANNING  Goal: Discharge to home or other facility with appropriate resources  Description  INTERVENTIONS:  - Identify barriers to discharge w/patient and caregiver  - Arrange for needed discharge resources and transportation as appropriate  - Identify discharge learning needs (meds, wound care, etc )  - Arrange for interpretive services to assist at discharge as needed  - Refer to Case Management Department for coordinating discharge planning if the patient needs post-hospital services based on physician/advanced practitioner order or complex needs related to functional status, cognitive ability, or social support system  Outcome: Progressing     Problem: Knowledge Deficit  Goal: Patient/family/caregiver demonstrates understanding of disease process, treatment plan, medications, and discharge instructions  Description  Complete learning assessment and assess knowledge base    Interventions:  - Provide teaching at level of understanding  - Provide teaching via preferred learning methods  Outcome: Progressing     Problem: RESPIRATORY - ADULT  Goal: Achieves optimal ventilation and oxygenation  Description  INTERVENTIONS:  - Assess for changes in respiratory status  - Assess for changes in mentation and behavior  - Position to facilitate oxygenation and minimize respiratory effort  - Oxygen administered by appropriate delivery if ordered  - Initiate smoking cessation education as indicated  - Encourage broncho-pulmonary hygiene including cough, deep breathe, Incentive Spirometry  - Assess the need for suctioning and aspirate as needed  - Assess and instruct to report SOB or any respiratory difficulty  - Respiratory Therapy support as indicated  Outcome: Progressing     Problem: SKIN/TISSUE INTEGRITY - ADULT  Goal: Skin integrity remains intact  Description  INTERVENTIONS  - Identify patients at risk for skin breakdown  - Assess and monitor skin integrity  - Assess and monitor nutrition and hydration status  - Monitor labs (i e  albumin)  - Assess for incontinence   - Turn and reposition patient  - Assist with mobility/ambulation  - Relieve pressure over bony prominences  - Avoid friction and shearing  - Provide appropriate hygiene as needed including keeping skin clean and dry  - Evaluate need for skin moisturizer/barrier cream  - Collaborate with interdisciplinary team (i e  Nutrition, Rehabilitation, etc )   - Patient/family teaching  Outcome: Progressing  Goal: Incision(s), wounds(s) or drain site(s) healing without S/S of infection  Description  INTERVENTIONS  - Assess and document risk factors for skin impairment   - Assess and document dressing, incision, wound bed, drain sites and surrounding tissue  - Consider nutrition services referral as needed  - Oral mucous membranes remain intact  - Provide patient/ family education  Outcome: Progressing     Problem: NEUROSENSORY - ADULT  Goal: Achieves stable or improved neurological status  Description  INTERVENTIONS  - Monitor and report changes in neurological status  - Monitor vital signs such as temperature, blood pressure, glucose, and any other labs ordered   - Initiate measures to prevent increased intracranial pressure  - Monitor for seizure activity and implement precautions if appropriate      Outcome: Progressing  Goal: Remains free of injury related to seizures activity  Description  INTERVENTIONS  - Maintain airway, patient safety  and administer oxygen as ordered  - Monitor patient for seizure activity, document and report duration and description of seizure to physician/advanced practitioner  - If seizure occurs,  ensure patient safety during seizure  - Reorient patient post seizure  - Seizure pads on all 4 side rails  - Instruct patient/family to notify RN of any seizure activity including if an aura is experienced  - Instruct patient/family to call for assistance with activity based on nursing assessment  - Administer anti-seizure medications if ordered    Outcome: Progressing  Goal: Achieves maximal functionality and self care  Description  INTERVENTIONS  - Monitor swallowing and airway patency with patient fatigue and changes in neurological status  - Encourage and assist patient to increase activity and self care     - Encourage visually impaired, hearing impaired and aphasic patients to use assistive/communication devices  Outcome: Progressing     Problem: GENITOURINARY - ADULT  Goal: Maintains or returns to baseline urinary function  Description  INTERVENTIONS:  - Assess urinary function  - Encourage oral fluids to ensure adequate hydration if ordered  - Administer IV fluids as ordered to ensure adequate hydration  - Administer ordered medications as needed  - Offer frequent toileting  - Follow urinary retention protocol if ordered  Outcome: Progressing  Goal: Absence of urinary retention  Description  INTERVENTIONS:  - Assess patients ability to void and empty bladder  - Monitor I/O  - Bladder scan as needed  - Discuss with physician/AP medications to alleviate retention as needed  - Discuss catheterization for long term situations as appropriate  Outcome: Progressing     Problem: METABOLIC, FLUID AND ELECTROLYTES - ADULT  Goal: Electrolytes maintained within normal limits  Description  INTERVENTIONS:  - Monitor labs and assess patient for signs and symptoms of electrolyte imbalances  - Administer electrolyte replacement as ordered  - Monitor response to electrolyte replacements, including repeat lab results as appropriate  - Instruct patient on fluid and nutrition as appropriate  Outcome: Progressing  Goal: Fluid balance maintained  Description  INTERVENTIONS:  - Monitor labs   - Monitor I/O and WT  - Instruct patient on fluid and nutrition as appropriate  - Assess for signs & symptoms of volume excess or deficit  Outcome: Progressing  Goal: Glucose maintained within target range  Description  INTERVENTIONS:  - Monitor Blood Glucose as ordered  - Assess for signs and symptoms of hyperglycemia and hypoglycemia  - Administer ordered medications to maintain glucose within target range  - Assess nutritional intake and initiate nutrition service referral as needed  Outcome: Progressing     Problem: HEMATOLOGIC - ADULT  Goal: Maintains hematologic stability  Description  INTERVENTIONS  - Assess for signs and symptoms of bleeding or hemorrhage  - Monitor labs  - Administer supportive blood products/factors as ordered and appropriate  Outcome: Progressing     Problem: MUSCULOSKELETAL - ADULT  Goal: Maintain or return mobility to safest level of function  Description  INTERVENTIONS:  - Assess patient's ability to carry out ADLs; assess patient's baseline for ADL function and identify physical deficits which impact ability to perform ADLs (bathing, care of mouth/teeth, toileting, grooming, dressing, etc )  - Assess/evaluate cause of self-care deficits   - Assess range of motion  - Assess patient's mobility  - Assess patient's need for assistive devices and provide as appropriate  - Encourage maximum independence but intervene and supervise when necessary  - Involve family in performance of ADLs  - Assess for home care needs following discharge   - Consider OT consult to assist with ADL evaluation and planning for discharge  - Provide patient education as appropriate  Outcome: Progressing  Goal: Maintain proper alignment of affected body part  Description  INTERVENTIONS:  - Support, maintain and protect limb and body alignment  - Provide patient/ family with appropriate education  Outcome: Progressing     Problem: SAFETY ADULT  Goal: Patient will remain free of falls  Description  INTERVENTIONS:  - Assess patient frequently for physical needs  -  Identify cognitive and physical deficits and behaviors that affect risk of falls    -  [place] fall precautions as indicated by assessment   - Educate patient/family on patient safety including physical limitations  - Instruct patient to call for assistance with activity based on assessment  - Modify environment to reduce risk of injury  - Consider OT/PT consult to assist with strengthening/mobility  Outcome: Progressing  Goal: Maintain or return to baseline ADL function  Description  INTERVENTIONS:  -  Assess patient's ability to carry out ADLs; assess patient's baseline for ADL function and identify physical deficits which impact ability to perform ADLs (bathing, care of mouth/teeth, toileting, grooming, dressing, etc )  - Assess/evaluate cause of self-care deficits   - Assess range of motion  - Assess patient's mobility; develop plan if impaired  - Assess patient's need for assistive devices and provide as appropriate  - Encourage maximum independence but intervene and supervise when necessary  - Involve family in performance of ADLs  - Assess for home care needs following discharge   - Consider OT consult to assist with ADL evaluation and planning for discharge  - Provide patient education as appropriate  Outcome: Progressing     Problem: Nutrition/Hydration-ADULT  Goal: Nutrient/Hydration intake appropriate for improving, restoring or maintaining nutritional needs  Description  Monitor and assess patient's nutrition/hydration status for malnutrition  Collaborate with interdisciplinary team and initiate plan and interventions as ordered  Monitor patient's weight and dietary intake as ordered or per policy  Utilize nutrition screening tool and intervene as necessary  Determine patient's food preferences and provide high-protein, high-caloric foods as appropriate       INTERVENTIONS:  - Monitor oral intake, urinary output, labs, and treatment plans  - Assess nutrition and hydration status and recommend course of action  - Evaluate amount of meals eaten  - Assist patient with eating if necessary   - Allow adequate time for meals  - Recommend/ encourage appropriate diets, oral nutritional supplements, and vitamin/mineral supplements  - Order, calculate, and assess calorie counts as needed  - Recommend, monitor, and adjust tube feedings and TPN/PPN based on assessed needs  - Assess need for intravenous fluids  - Provide specific nutrition/hydration education as appropriate  - Include patient/family/caregiver in decisions related to nutrition  Outcome: Progressing     Problem: Prexisting or High Potential for Compromised Skin Integrity  Goal: Skin integrity is maintained or improved  Description  INTERVENTIONS:  - Identify patients at risk for skin breakdown  - Assess and monitor skin integrity  - Assess and monitor nutrition and hydration status  - Monitor labs   - Assess for incontinence   - Turn and reposition patient  - Assist with mobility/ambulation  - Relieve pressure over bony prominences  - Avoid friction and shearing  - Provide appropriate hygiene as needed including keeping skin clean and dry  - Evaluate need for skin moisturizer/barrier cream  - Collaborate with interdisciplinary team   - Patient/family teaching  - Consider wound care consult   Outcome: Progressing

## 2019-08-28 NOTE — ASSESSMENT & PLAN NOTE
Seen by neuropsych  Not thought to be able to make his own decisions  Will see what we have to do as far as guardianship

## 2019-08-28 NOTE — PLAN OF CARE
Problem: Potential for Falls  Goal: Patient will remain free of falls  Description  INTERVENTIONS:  - Assess patient frequently for physical needs  -  Identify cognitive and physical deficits and behaviors that affect risk of falls    -  [place] fall precautions as indicated by assessment   - Educate patient/family on patient safety including physical limitations  - Instruct patient to call for assistance with activity based on assessment  - Modify environment to reduce risk of injury  - Consider OT/PT consult to assist with strengthening/mobility  Outcome: Progressing     Problem: PAIN - ADULT  Goal: Verbalizes/displays adequate comfort level or baseline comfort level  Description  Interventions:  - Encourage patient to monitor pain and request assistance  - Assess pain using appropriate pain scale  - Administer analgesics based on type and severity of pain and evaluate response  - Implement non-pharmacological measures as appropriate and evaluate response  - Consider cultural and social influences on pain and pain management  - Notify physician/advanced practitioner if interventions unsuccessful or patient reports new pain  Outcome: Progressing     Problem: INFECTION - ADULT  Goal: Absence or prevention of progression during hospitalization  Description  INTERVENTIONS:  - Assess and monitor for signs and symptoms of infection  - Monitor lab/diagnostic results  - Monitor all insertion sites, i e  indwelling lines, tubes, and drains  - Monitor endotracheal if appropriate and nasal secretions for changes in amount and color  - [place] appropriate cooling/warming therapies per order  - Administer medications as ordered  - Instruct and encourage patient and family to use good hand hygiene technique  - Identify and instruct in appropriate isolation precautions for identified infection/condition  Outcome: Progressing  Goal: Absence of fever/infection during neutropenic period  Description  INTERVENTIONS:  - Monitor WBC    Outcome: Progressing     Problem: DISCHARGE PLANNING  Goal: Discharge to home or other facility with appropriate resources  Description  INTERVENTIONS:  - Identify barriers to discharge w/patient and caregiver  - Arrange for needed discharge resources and transportation as appropriate  - Identify discharge learning needs (meds, wound care, etc )  - Arrange for interpretive services to assist at discharge as needed  - Refer to Case Management Department for coordinating discharge planning if the patient needs post-hospital services based on physician/advanced practitioner order or complex needs related to functional status, cognitive ability, or social support system  Outcome: Progressing     Problem: Knowledge Deficit  Goal: Patient/family/caregiver demonstrates understanding of disease process, treatment plan, medications, and discharge instructions  Description  Complete learning assessment and assess knowledge base    Interventions:  - Provide teaching at level of understanding  - Provide teaching via preferred learning methods  Outcome: Progressing     Problem: RESPIRATORY - ADULT  Goal: Achieves optimal ventilation and oxygenation  Description  INTERVENTIONS:  - Assess for changes in respiratory status  - Assess for changes in mentation and behavior  - Position to facilitate oxygenation and minimize respiratory effort  - Oxygen administered by appropriate delivery if ordered  - Initiate smoking cessation education as indicated  - Encourage broncho-pulmonary hygiene including cough, deep breathe, Incentive Spirometry  - Assess the need for suctioning and aspirate as needed  - Assess and instruct to report SOB or any respiratory difficulty  - Respiratory Therapy support as indicated  Outcome: Progressing     Problem: SKIN/TISSUE INTEGRITY - ADULT  Goal: Skin integrity remains intact  Description  INTERVENTIONS  - Identify patients at risk for skin breakdown  - Assess and monitor skin integrity  - Assess and monitor nutrition and hydration status  - Monitor labs (i e  albumin)  - Assess for incontinence   - Turn and reposition patient  - Assist with mobility/ambulation  - Relieve pressure over bony prominences  - Avoid friction and shearing  - Provide appropriate hygiene as needed including keeping skin clean and dry  - Evaluate need for skin moisturizer/barrier cream  - Collaborate with interdisciplinary team (i e  Nutrition, Rehabilitation, etc )   - Patient/family teaching  Outcome: Progressing  Goal: Incision(s), wounds(s) or drain site(s) healing without S/S of infection  Description  INTERVENTIONS  - Assess and document risk factors for skin impairment   - Assess and document dressing, incision, wound bed, drain sites and surrounding tissue  - Consider nutrition services referral as needed  - Oral mucous membranes remain intact  - Provide patient/ family education  Outcome: Progressing     Problem: NEUROSENSORY - ADULT  Goal: Achieves stable or improved neurological status  Description  INTERVENTIONS  - Monitor and report changes in neurological status  - Monitor vital signs such as temperature, blood pressure, glucose, and any other labs ordered   - Initiate measures to prevent increased intracranial pressure  - Monitor for seizure activity and implement precautions if appropriate      Outcome: Progressing  Goal: Remains free of injury related to seizures activity  Description  INTERVENTIONS  - Maintain airway, patient safety  and administer oxygen as ordered  - Monitor patient for seizure activity, document and report duration and description of seizure to physician/advanced practitioner  - If seizure occurs,  ensure patient safety during seizure  - Reorient patient post seizure  - Seizure pads on all 4 side rails  - Instruct patient/family to notify RN of any seizure activity including if an aura is experienced  - Instruct patient/family to call for assistance with activity based on nursing assessment  - Administer anti-seizure medications if ordered    Outcome: Progressing  Goal: Achieves maximal functionality and self care  Description  INTERVENTIONS  - Monitor swallowing and airway patency with patient fatigue and changes in neurological status  - Encourage and assist patient to increase activity and self care     - Encourage visually impaired, hearing impaired and aphasic patients to use assistive/communication devices  Outcome: Progressing     Problem: GENITOURINARY - ADULT  Goal: Maintains or returns to baseline urinary function  Description  INTERVENTIONS:  - Assess urinary function  - Encourage oral fluids to ensure adequate hydration if ordered  - Administer IV fluids as ordered to ensure adequate hydration  - Administer ordered medications as needed  - Offer frequent toileting  - Follow urinary retention protocol if ordered  Outcome: Progressing  Goal: Absence of urinary retention  Description  INTERVENTIONS:  - Assess patients ability to void and empty bladder  - Monitor I/O  - Bladder scan as needed  - Discuss with physician/AP medications to alleviate retention as needed  - Discuss catheterization for long term situations as appropriate  Outcome: Progressing     Problem: METABOLIC, FLUID AND ELECTROLYTES - ADULT  Goal: Electrolytes maintained within normal limits  Description  INTERVENTIONS:  - Monitor labs and assess patient for signs and symptoms of electrolyte imbalances  - Administer electrolyte replacement as ordered  - Monitor response to electrolyte replacements, including repeat lab results as appropriate  - Instruct patient on fluid and nutrition as appropriate  Outcome: Progressing  Goal: Fluid balance maintained  Description  INTERVENTIONS:  - Monitor labs   - Monitor I/O and WT  - Instruct patient on fluid and nutrition as appropriate  - Assess for signs & symptoms of volume excess or deficit  Outcome: Progressing  Goal: Glucose maintained within target range  Description  INTERVENTIONS:  - Monitor Blood Glucose as ordered  - Assess for signs and symptoms of hyperglycemia and hypoglycemia  - Administer ordered medications to maintain glucose within target range  - Assess nutritional intake and initiate nutrition service referral as needed  Outcome: Progressing     Problem: HEMATOLOGIC - ADULT  Goal: Maintains hematologic stability  Description  INTERVENTIONS  - Assess for signs and symptoms of bleeding or hemorrhage  - Monitor labs  - Administer supportive blood products/factors as ordered and appropriate  Outcome: Progressing     Problem: MUSCULOSKELETAL - ADULT  Goal: Maintain or return mobility to safest level of function  Description  INTERVENTIONS:  - Assess patient's ability to carry out ADLs; assess patient's baseline for ADL function and identify physical deficits which impact ability to perform ADLs (bathing, care of mouth/teeth, toileting, grooming, dressing, etc )  - Assess/evaluate cause of self-care deficits   - Assess range of motion  - Assess patient's mobility  - Assess patient's need for assistive devices and provide as appropriate  - Encourage maximum independence but intervene and supervise when necessary  - Involve family in performance of ADLs  - Assess for home care needs following discharge   - Consider OT consult to assist with ADL evaluation and planning for discharge  - Provide patient education as appropriate  Outcome: Progressing  Goal: Maintain proper alignment of affected body part  Description  INTERVENTIONS:  - Support, maintain and protect limb and body alignment  - Provide patient/ family with appropriate education  Outcome: Progressing     Problem: SAFETY ADULT  Goal: Patient will remain free of falls  Description  INTERVENTIONS:  - Assess patient frequently for physical needs  -  Identify cognitive and physical deficits and behaviors that affect risk of falls    -  [place] fall precautions as indicated by assessment   - Educate patient/family on patient safety including physical limitations  - Instruct patient to call for assistance with activity based on assessment  - Modify environment to reduce risk of injury  - Consider OT/PT consult to assist with strengthening/mobility  Outcome: Progressing  Goal: Maintain or return to baseline ADL function  Description  INTERVENTIONS:  -  Assess patient's ability to carry out ADLs; assess patient's baseline for ADL function and identify physical deficits which impact ability to perform ADLs (bathing, care of mouth/teeth, toileting, grooming, dressing, etc )  - Assess/evaluate cause of self-care deficits   - Assess range of motion  - Assess patient's mobility; develop plan if impaired  - Assess patient's need for assistive devices and provide as appropriate  - Encourage maximum independence but intervene and supervise when necessary  - Involve family in performance of ADLs  - Assess for home care needs following discharge   - Consider OT consult to assist with ADL evaluation and planning for discharge  - Provide patient education as appropriate  Outcome: Progressing     Problem: Nutrition/Hydration-ADULT  Goal: Nutrient/Hydration intake appropriate for improving, restoring or maintaining nutritional needs  Description  Monitor and assess patient's nutrition/hydration status for malnutrition  Collaborate with interdisciplinary team and initiate plan and interventions as ordered  Monitor patient's weight and dietary intake as ordered or per policy  Utilize nutrition screening tool and intervene as necessary  Determine patient's food preferences and provide high-protein, high-caloric foods as appropriate       INTERVENTIONS:  - Monitor oral intake, urinary output, labs, and treatment plans  - Assess nutrition and hydration status and recommend course of action  - Evaluate amount of meals eaten  - Assist patient with eating if necessary   - Allow adequate time for meals  - Recommend/ encourage appropriate diets, oral nutritional supplements, and vitamin/mineral supplements  - Order, calculate, and assess calorie counts as needed  - Recommend, monitor, and adjust tube feedings and TPN/PPN based on assessed needs  - Assess need for intravenous fluids  - Provide specific nutrition/hydration education as appropriate  - Include patient/family/caregiver in decisions related to nutrition  Outcome: Progressing     Problem: Prexisting or High Potential for Compromised Skin Integrity  Goal: Skin integrity is maintained or improved  Description  INTERVENTIONS:  - Identify patients at risk for skin breakdown  - Assess and monitor skin integrity  - Assess and monitor nutrition and hydration status  - Monitor labs   - Assess for incontinence   - Turn and reposition patient  - Assist with mobility/ambulation  - Relieve pressure over bony prominences  - Avoid friction and shearing  - Provide appropriate hygiene as needed including keeping skin clean and dry  - Evaluate need for skin moisturizer/barrier cream  - Collaborate with interdisciplinary team   - Patient/family teaching  - Consider wound care consult   Outcome: Progressing
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
## 2019-08-28 NOTE — OCCUPATIONAL THERAPY NOTE
[address] Progress Note     Patient Name: Ina Burkitt  Today's Date: 8/28/2019  Problem List  Patient Active Problem List   Diagnosis    Type 2 diabetes mellitus with diabetic neuropathy, with long-term current use of insulin ([address] )    Essential hypertension    GERD (gastroesophageal reflux disease)    Hyperlipidemia    Opioid dependence ([address] )    Insomnia    Normocytic anemia    Abdominal aortic aneurysm (AAA) without rupture ([facility])    Intractable back pain    Chronic diastolic CHF (congestive heart failure) ([address] )    Other emphysema ([facility])    Neuropathy    Closed fracture of transverse process of lumbar vertebra with routine healing    Disease of cardiovascular system    Coronary artery disease of native artery of native heart with stable angina pectoris ([address] )    Transition of care performed with sharing of clinical summary    Leukocytosis with bandemia    Hyponatremia    Abnormal CT of the chest    BPH associated with nocturia    DELLA (acute kidney injury) ([address] )    Therapeutic opioid induced constipation    Bruising    Ecchymosis    DM (diabetes mellitus), type 2, uncontrolled ([address] )    DM type 2 with diabetic peripheral neuropathy ([address] )    Polypharmacy    Thrombocytopenia ([address]    Leg wound, right, initial encounter    Encounter for competency evaluation    Recurrent UTI    Hypokalemia    Overweight (BMI 25 0-29  9)    Myelodysplasia (myelodysplastic syndrome) ([facility])    SIRS (systemic inflammatory response syndrome) ([facility])    Hypomagnesemia    Toe pain, right    Peripheral arterial disease ([address] )           08/28/19 1702   Restrictions/Precautions   Weight Bearing Precautions Per Order No   Other Precautions Pain; Fall Risk;Bed Alarm; Chair Alarm;Cognitive;Spinal precautions   Pain Assessment   Pain Assessment FLACC   Pain Frequency Constant/continuous   Hospital Pain Intervention(s) Ambulation/increased activity; Emotional support;Repositioned   Pain Rating: FLACC (Rest) - Face 0   Pain Rating: FLACC (Rest) - Legs 0   Pain Rating: FLACC (Rest) - Activity 0   Pain Rating: FLACC (Rest) - Cry 0   Pain Rating: FLACC (Rest) - Consolability 0   Score: FLACC (Rest) 0   Pain Rating: FLACC (Activity) - Face 0   Pain Rating: FLACC (Activity) - Legs 0   Pain Rating: FLACC (Activity) - Activity 0   Pain Rating: FLACC (Activity) - Cry 0   Pain Rating: FLACC (Activity) - Consolability 0   Score: FLACC (Activity) 0   ADL   Where Assessed Edge of bed   Grooming Assistance 5  Supervision/Setup   Grooming Deficit Setup;Verbal cueing;Supervision/safety; Increased time to complete   LB Dressing Assistance 3  Moderate Assistance   LB Dressing Deficit Setup;Supervision/safety;Verbal cueing; Don/doff R sock; Don/doff L sock   LB Dressing Comments impaired functional reach, increased back pain   Functional Standing Tolerance   Time 4 minutes   Activity dynamic standing balance w/ min assist steadying support   Comments w/ cues for safety   Bed Mobility   Supine to Sit 5  Supervision   Additional items Assist x 1; Increased time required;LE management;Verbal cues   Additional Comments cues for safety   Transfers   Sit to Stand 4  Minimal assistance   Additional items Assist x 1; Increased time required;Verbal cues;Armrests   Stand to Sit 4  Minimal assistance   Additional items Assist x 1; Increased time required;Verbal cues;Armrests   Additional Comments VCs for hand placment and positioning   Functional Mobility   Functional Mobility 4  Minimal assistance   Additional Comments assist x1 w/ increased time to complete, SBA of 2nd for safety   Additional items Rolling walker   Therapeutic Exercise - ROM   UE-ROM Yes   ROM- Right Upper Extremities   R Elbow AROM;Elbow extension;Elbow flexion   R Weight/Reps/Sets 2 sets x 10 reps   RUE ROM Comment tolerated well   ROM - Left Upper Extremities    L Elbow AROM;Elbow flexion   L Weight/Reps/Sets 2 sets x 10 reps   LUE ROM Comment tolerated well   Cognition Overall Cognitive Status Impaired   Arousal/Participation Responsive   Attention Difficulty attending to directions   Orientation Level Oriented to person;Oriented to place; Disoriented to time;Disoriented to situation   Memory Decreased short term memory;Decreased recall of recent events;Decreased recall of precautions   Following Commands Follows one step commands with increased time or repetition   Comments pt w/ tremors noted t/o session, pressured speech; pt w/ inappropriate comments to therapists w/ frequent redirection to task needed   Additional Activities   Additional Activities   (pt educated on safety)   Additional Activities Comments pt requires continued education   Activity Tolerance   Activity Tolerance Patient limited by fatigue;Patient limited by pain   Medical Staff Made Aware appropriate to see per Maricarmen BIRCH   Assessment   Assessment Pt seen for skilled OT session focused on ADLs, functional transfers and mobility, safety education  Pt w/ supervision supine>sit bed mobility w/ increased time to complete  Pt w/ MOD assist to don socks while seated EOB w/ impaired functional reach, increased back pain and unable to perform compensatory techniques  Pt w/ MIN assist sit>stand from bed w/ VCs for hand placement and positioning  Pt w/ MIN assist x1 w/ SBA of 2nd for safety for functional mobility w/ RW (pt w/ cues for safety and to keep hands on RW at all times and redirection to tasks,, tremors noted throughout)  Pt w/ MIN assist x1 stand>sit transfer to recliner  Pt w/ all needs met and LEs elevated and alarm intact at end of session  Pt w/ inappropriate behaviors and comments to therapists t/o session requiring frequent redirection and recommend male therapist next session  Pt continues to be limited due to decreased insight and safety awareness, tremors, impaired balance, impaired activity tolerance, frequent redirection to task, increased pain, impaired STM   Recommend STR w/ potential placement vs  24/7 support/supervision  Will continue to follow to address OT POC  Plan   Treatment Interventions ADL retraining;Functional transfer training;UE strengthening/ROM; Endurance training;Cognitive reorientation;Patient/family training;Equipment evaluation/education; Compensatory technique education; Energy conservation; Activityengagement   Goal Expiration Date 09/06/19   Treatment Day 3   OT Frequency 3-5x/wk   Recommendation   OT Discharge Recommendation Short Term Rehab  (vs  24/7 support/supervision)   OT - OK to Discharge   (when medically stable)   Barthel Index   Feeding 10   Bathing 0   Grooming Score 5   Dressing Score 5   Bladder Score 5   Bowels Score 10   Toilet Use Score 5   Transfers (Bed/Chair) Score 10   Mobility (Level Surface) Score 0   Stairs Score 0   Barthel Index Score 50   Modified [place] Scale   Modified [place] Scale 4     Documentation completed by: Loob Palomares MS, OTR/L

## 2019-08-28 NOTE — PLAN OF CARE
Problem: PHYSICAL THERAPY ADULT  Goal: Performs mobility at highest level of function for planned discharge setting  See evaluation for individualized goals  Description  Treatment/Interventions: Functional transfer training, LE strengthening/ROM, Therapeutic exercise, Endurance training, Patient/family training, Equipment eval/education, Gait training, Spoke to nursing  Equipment Recommended: Conan Boxer, Erick (Comment)(Rolling walker )       See flowsheet documentation for full assessment, interventions and recommendations  Outcome: Progressing  Note:   Prognosis: Fair  Problem List: Decreased strength, Decreased endurance, Impaired balance, Decreased mobility, Decreased cognition, Impaired judgement, Decreased safety awareness, Pain, Obesity  Assessment: Pt seen for PT per POC  Pt require S for bed mobility & minAx1 for transfers & amb w/ RW + cues for techniques  See above levels of assistance required for all functional tasks  Gait deviations as above, slow & unsteady w/ dec foot clearance & tremulous at times but no gross LOB noted  Pt demonstrates poor safety techniques t/o session despite cues given  Also noted inappropriate behaviors & verbal expressions t/o session hence will recommend male therapists next tx session  Nsg staff most recent vital signs as follows: /68 (BP Location: Right arm)   Pulse 80   Temp (!) 96 1 °F (35 6 °C) (Temporal)   Resp 18   Wt 106 kg (232 lb 12 9 oz)   SpO2 96%   BMI 31 57 kg/m²   Will continue PT per POC  At end of session, pt tolerated OOB in chair w/o issues, call bell & phone in reach, chair alarm activated  Fall precautions reinforced w/ good understanding  Pt will require a more supervised environment at D/C  Either HHPT w/ 24hr(S) or inpt rehab w/ possible placement, pending progress  CM to follow  Nsg staff to continue to mobilized pt (OOB in chair for all meals & ambulate in room/unit) as tolerated to prevent decline in function  Nsg notified     Barriers to Discharge: Decreased caregiver support  Barriers to Discharge Comments: Anthony Russell wants to return home and does not want to go to rehab setting  Return to home with increased support will be required for success  Recommendation: Short-term skilled PT, Home PT, 24 hour supervision/assist, Home with family support(pending progress)          See flowsheet documentation for full assessment

## 2019-08-28 NOTE — PLAN OF CARE
Problem: OCCUPATIONAL THERAPY ADULT  Goal: Performs self-care activities at highest level of function for planned discharge setting  See evaluation for individualized goals  Description  Treatment Interventions: ADL retraining, UE strengthening/ROM, Functional transfer training, Endurance training, Patient/family training, Cognitive reorientation, Equipment evaluation/education, Compensatory technique education, Activityengagement, Energy conservation          See flowsheet documentation for full assessment, interventions and recommendations  Outcome: Progressing  Note:   Limitation: Decreased ADL status, Decreased Safe judgement during ADL, Decreased UE strength, Decreased endurance, Decreased cognition, Decreased sensation, Decreased self-care trans, Decreased high-level ADLs  Prognosis: Good, Fair  Assessment: Pt seen for skilled OT session focused on ADLs, functional transfers and mobility, safety education  Pt w/ supervision supine>sit bed mobility w/ increased time to complete  Pt w/ MOD assist to don socks while seated EOB w/ impaired functional reach, increased back pain and unable to perform compensatory techniques  Pt w/ MIN assist sit>stand from bed w/ VCs for hand placement and positioning  Pt w/ MIN assist x1 w/ SBA of 2nd for safety for functional mobility w/ RW (pt w/ cues for safety and to keep hands on RW at all times and redirection to tasks,, tremors noted throughout)  Pt w/ MIN assist x1 stand>sit transfer to recliner  Pt w/ all needs met and LEs elevated and alarm intact at end of session  Pt w/ inappropriate behaviors and comments to therapists t/o session requiring frequent redirection and recommend male therapist next session  Pt continues to be limited due to decreased insight and safety awareness, tremors, impaired balance, impaired activity tolerance, frequent redirection to task, increased pain, impaired STM  Recommend STR w/ potential placement vs  24/7 support/supervision   Will continue to follow to address OT POC        OT Discharge Recommendation: Short Term Rehab(vs  24/7 support/supervision)  OT - OK to Discharge: (when medically stable)

## 2019-08-29 NOTE — NURSING NOTE
Pt bed alarm sounded  This RN entered the room and found patient to be sitting on side of bed, vomiting, shaking, short of breath, and diaphoretic  Pt  said he needed to get up and immediately walked to bathroom  Pt's vitals and blood sugar were taken  Pt had a bowel movement and was then taken to his bed safely where he had an EKG  Pt continued to shake and did not show signs of improvement with vs and a rapid response was called  Pt was ordered septic/pneumonia work-up and labs and further vitals were taken (in chart) Results pending  Will continue to monitor patient

## 2019-08-29 NOTE — PROGRESS NOTES
Progress Note - Bryce Her 1950, 76 y o  male MRN: [id number]    Unit/Bed#: E5 -01 Encounter: [id number]    Primary Care Provider: Isaias Pallas, MD   Date and time admitted to hospital: 2019 10:59 AM        * Intractable back pain  Assessment & Plan  Due to myelodysplatic syndrome  No acute finding on x ray of spine  Continue opiods for pain control  Hydroxyurea started   He is getting better each day    Myelodysplasia (myelodysplastic syndrome) ([facility]CC)  Assessment & Plan  Appreciate heme/onc help  Started on Hydroxyurea  Still waiting on some final results from bone marrow biopsy done at [facility]  Subjective:   Back pain is improving  Much less pain  Objective:     Vitals:   Temp (24hrs), Av 2 °F (37 3 °C), Min:96 1 °F (35 6 °C), Max:102 7 °F (39 3 °C)    Temp:  [96 1 °F (35 6 °C)-102 7 °F (39 3 °C)] 98 °F (36 7 °C)  HR:  [] 89  Resp:  [12-19] 18  BP: (102-149)/(55-68) 107/55  SpO2:  [90 %-98 %] 98 %  Body mass index is 31 39 kg/m²  Input and Output Summary (last 24 hours): Intake/Output Summary (Last 24 hours) at 2019 1435  Last data filed at 2019 1844  Gross per 24 hour   Intake    Output 525 ml   Net -525 ml       Physical Exam:     Physical Exam   HENT:   Head: Normocephalic and atraumatic  Eyes: Pupils are equal, round, and reactive to light  EOM are normal    Cardiovascular: Normal rate and regular rhythm  Exam reveals no gallop and no friction rub  No murmur heard  Pulmonary/Chest: Effort normal and breath sounds normal  He has no wheezes  He has no rales  Abdominal: Soft  Bowel sounds are normal  There is no tenderness  Musculoskeletal: He exhibits no edema  Back:  Mild pain to palpation of lumbar spine   Nursing note and vitals reviewed              Additional Data:     Labs:    Results from last 7 days   Lab Units 19  0220 19  0647   WBC Thousand/uL 26 54* 17 05*   HEMOGLOBIN g/dL 8 8* 8 4*   HEMATOCRIT % 29 0* 27 8*   PLATELETS Thousands/uL 24* 32*   NEUTROS PCT %  --  65   LYMPHS PCT %  --  14   LYMPHO PCT % 3*  --    MONOS PCT %  --  6   MONO PCT % 4  --    EOS PCT % 1 0     Results from last 7 days   Lab Units 08/29/19  0056   POTASSIUM mmol/L 4 9   CHLORIDE mmol/L 101   CO2 mmol/L 28   BUN mg/dL 18   CREATININE mg/dL 1 46*   CALCIUM mg/dL 8 7         Results from last 7 days   Lab Units 08/29/19  1104 08/29/19  0807 08/29/19  0011 08/28/19  2211 08/28/19  2107 08/28/19  1558 08/28/19  1119 08/28/19  0850 08/27/19  2149 08/27/19  1557 08/27/19  1056 08/27/19  0751   POC GLUCOSE mg/dl 97 110 91 107 68 189* 177* 142* 202* 162* 66 115               * I Have Reviewed All Lab Data     Recent Cultures (last 7 days):             Last 24 Hours Medication List:     Current Facility-Administered Medications:  acetaminophen 650 mg Rectal Q4H PRN Marrion Spanish Spatzer, CRNP    acetaminophen 650 mg Oral Q6H PRN Marrion Spanish Spatzer, CRNP    amLODIPine 5 mg Oral Daily Zia Akbar MD    atorvastatin 80 mg Oral HS Krystle Lopez MD    benzonatate 100 mg Oral TID PRN Aditya Soto PA-C    cefepime 2,000 mg Intravenous Q12H Marrion Spanish Spatzer, CRNP Last Rate: 2,000 mg (08/29/19 0349)   dextran 70-hypromellose 1 drop Both Eyes PRN Krystle Lopez MD    diazepam 5 mg Oral HS PRN Zia Akbar MD    docusate sodium 100 mg Oral BID Zia Akbar MD    finasteride 5 mg Oral Daily Krystle Lopez MD    fish oil 1,000 mg Oral Daily Zia Akbar MD    fluticasone-vilanterol 1 puff Inhalation Daily Krystle Lopez MD    furosemide 20 mg Intravenous Once Zia Akbar MD    hydroxyurea 500 mg Oral Q24H Adriana Boxer, MD    insulin glargine 25 Units Subcutaneous QAM Krystle Lopez MD    insulin lispro 1-5 Units Subcutaneous TID AC Krystle Lopez MD    insulin lispro 1-5 Units Subcutaneous HS Krystle Lopez MD    insulin lispro 10 Units Subcutaneous TID With Meals Zia Akbar MD    ipratropium-albuterol 3 mL Nebulization TID PRN Zia Akbar MD isosorbide mononitrate 60 mg Oral Daily Ed Ok, MD    lidocaine 1 patch Topical Daily Krystle Lopez MD    methocarbamol 500 mg Oral Q6H PRN Ed Ok, MD    metoprolol tartrate 50 mg Oral Q12H [facility] Ed Ok, MD    metroNIDAZOLE 500 mg Intravenous Q8H Simonne Bolder Spatzer, CRNP Last Rate: 500 mg (08/29/19 1006)   nicotine 1 patch Transdermal Daily Krystle Lopez MD    ondansetron 4 mg Intravenous Q6H PRN Ed Ok, MD    oxyCODONE 10 mg Oral Q4H PRN Ed Ok, MD    pantoprazole 40 mg Oral Daily Before Breakfast Ed Ok, MD    potassium chloride 20 mEq Oral BID Ed Ok, MD    pregabalin 150 mg Oral TID Ed Ok, MD    QUEtiapine 25 mg Oral HS Ed Ok, MD    tamsulosin 0 4 mg Oral Daily With Umesh Monte MD    traMADol 50 mg Oral Q8H PRN Ed Ok, MD          VTE Pharmacologic Prophylaxis:   Pharmacologic:   Current Length of Stay: 7 day(s)    Current Patient Status: Inpatient       Discharge Plan: in case mgt hands    Code Status: Level 1 - Full Code           Today, Patient Was Seen By: Hassan Shone, DO    ** Please Note: Dictation voice to text software may have been used in the creation of this document   **
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
## 2019-08-29 NOTE — PLAN OF CARE
Problem: Potential for Falls  Goal: Patient will remain free of falls  Description  INTERVENTIONS:  - Assess patient frequently for physical needs  -  Identify cognitive and physical deficits and behaviors that affect risk of falls    -  [place] fall precautions as indicated by assessment   - Educate patient/family on patient safety including physical limitations  - Instruct patient to call for assistance with activity based on assessment  - Modify environment to reduce risk of injury  - Consider OT/PT consult to assist with strengthening/mobility  Outcome: Progressing     Problem: PAIN - ADULT  Goal: Verbalizes/displays adequate comfort level or baseline comfort level  Description  Interventions:  - Encourage patient to monitor pain and request assistance  - Assess pain using appropriate pain scale  - Administer analgesics based on type and severity of pain and evaluate response  - Implement non-pharmacological measures as appropriate and evaluate response  - Consider cultural and social influences on pain and pain management  - Notify physician/advanced practitioner if interventions unsuccessful or patient reports new pain  Outcome: Progressing     Problem: INFECTION - ADULT  Goal: Absence or prevention of progression during hospitalization  Description  INTERVENTIONS:  - Assess and monitor for signs and symptoms of infection  - Monitor lab/diagnostic results  - Monitor all insertion sites, i e  indwelling lines, tubes, and drains  - Monitor endotracheal if appropriate and nasal secretions for changes in amount and color  - [place] appropriate cooling/warming therapies per order  - Administer medications as ordered  - Instruct and encourage patient and family to use good hand hygiene technique  - Identify and instruct in appropriate isolation precautions for identified infection/condition  Outcome: Progressing  Goal: Absence of fever/infection during neutropenic period  Description  INTERVENTIONS:  - Monitor WBC    Outcome: Progressing     Problem: DISCHARGE PLANNING  Goal: Discharge to home or other facility with appropriate resources  Description  INTERVENTIONS:  - Identify barriers to discharge w/patient and caregiver  - Arrange for needed discharge resources and transportation as appropriate  - Identify discharge learning needs (meds, wound care, etc )  - Arrange for interpretive services to assist at discharge as needed  - Refer to Case Management Department for coordinating discharge planning if the patient needs post-hospital services based on physician/advanced practitioner order or complex needs related to functional status, cognitive ability, or social support system  Outcome: Progressing     Problem: Knowledge Deficit  Goal: Patient/family/caregiver demonstrates understanding of disease process, treatment plan, medications, and discharge instructions  Description  Complete learning assessment and assess knowledge base    Interventions:  - Provide teaching at level of understanding  - Provide teaching via preferred learning methods  Outcome: Progressing     Problem: RESPIRATORY - ADULT  Goal: Achieves optimal ventilation and oxygenation  Description  INTERVENTIONS:  - Assess for changes in respiratory status  - Assess for changes in mentation and behavior  - Position to facilitate oxygenation and minimize respiratory effort  - Oxygen administered by appropriate delivery if ordered  - Initiate smoking cessation education as indicated  - Encourage broncho-pulmonary hygiene including cough, deep breathe, Incentive Spirometry  - Assess the need for suctioning and aspirate as needed  - Assess and instruct to report SOB or any respiratory difficulty  - Respiratory Therapy support as indicated  Outcome: Progressing     Problem: SKIN/TISSUE INTEGRITY - ADULT  Goal: Skin integrity remains intact  Description  INTERVENTIONS  - Identify patients at risk for skin breakdown  - Assess and monitor skin integrity  - Assess and monitor nutrition and hydration status  - Monitor labs (i e  albumin)  - Assess for incontinence   - Turn and reposition patient  - Assist with mobility/ambulation  - Relieve pressure over bony prominences  - Avoid friction and shearing  - Provide appropriate hygiene as needed including keeping skin clean and dry  - Evaluate need for skin moisturizer/barrier cream  - Collaborate with interdisciplinary team (i e  Nutrition, Rehabilitation, etc )   - Patient/family teaching  Outcome: Progressing  Goal: Incision(s), wounds(s) or drain site(s) healing without S/S of infection  Description  INTERVENTIONS  - Assess and document risk factors for skin impairment   - Assess and document dressing, incision, wound bed, drain sites and surrounding tissue  - Consider nutrition services referral as needed  - Oral mucous membranes remain intact  - Provide patient/ family education  Outcome: Progressing     Problem: NEUROSENSORY - ADULT  Goal: Achieves stable or improved neurological status  Description  INTERVENTIONS  - Monitor and report changes in neurological status  - Monitor vital signs such as temperature, blood pressure, glucose, and any other labs ordered   - Initiate measures to prevent increased intracranial pressure  - Monitor for seizure activity and implement precautions if appropriate      Outcome: Progressing  Goal: Remains free of injury related to seizures activity  Description  INTERVENTIONS  - Maintain airway, patient safety  and administer oxygen as ordered  - Monitor patient for seizure activity, document and report duration and description of seizure to physician/advanced practitioner  - If seizure occurs,  ensure patient safety during seizure  - Reorient patient post seizure  - Seizure pads on all 4 side rails  - Instruct patient/family to notify RN of any seizure activity including if an aura is experienced  - Instruct patient/family to call for assistance with activity based on nursing assessment  - Administer anti-seizure medications if ordered    Outcome: Progressing  Goal: Achieves maximal functionality and self care  Description  INTERVENTIONS  - Monitor swallowing and airway patency with patient fatigue and changes in neurological status  - Encourage and assist patient to increase activity and self care     - Encourage visually impaired, hearing impaired and aphasic patients to use assistive/communication devices  Outcome: Progressing     Problem: GENITOURINARY - ADULT  Goal: Maintains or returns to baseline urinary function  Description  INTERVENTIONS:  - Assess urinary function  - Encourage oral fluids to ensure adequate hydration if ordered  - Administer IV fluids as ordered to ensure adequate hydration  - Administer ordered medications as needed  - Offer frequent toileting  - Follow urinary retention protocol if ordered  Outcome: Progressing  Goal: Absence of urinary retention  Description  INTERVENTIONS:  - Assess patients ability to void and empty bladder  - Monitor I/O  - Bladder scan as needed  - Discuss with physician/AP medications to alleviate retention as needed  - Discuss catheterization for long term situations as appropriate  Outcome: Progressing     Problem: METABOLIC, FLUID AND ELECTROLYTES - ADULT  Goal: Electrolytes maintained within normal limits  Description  INTERVENTIONS:  - Monitor labs and assess patient for signs and symptoms of electrolyte imbalances  - Administer electrolyte replacement as ordered  - Monitor response to electrolyte replacements, including repeat lab results as appropriate  - Instruct patient on fluid and nutrition as appropriate  Outcome: Progressing  Goal: Fluid balance maintained  Description  INTERVENTIONS:  - Monitor labs   - Monitor I/O and WT  - Instruct patient on fluid and nutrition as appropriate  - Assess for signs & symptoms of volume excess or deficit  Outcome: Progressing  Goal: Glucose maintained within target range  Description  INTERVENTIONS:  - Monitor Blood Glucose as ordered  - Assess for signs and symptoms of hyperglycemia and hypoglycemia  - Administer ordered medications to maintain glucose within target range  - Assess nutritional intake and initiate nutrition service referral as needed  Outcome: Progressing     Problem: HEMATOLOGIC - ADULT  Goal: Maintains hematologic stability  Description  INTERVENTIONS  - Assess for signs and symptoms of bleeding or hemorrhage  - Monitor labs  - Administer supportive blood products/factors as ordered and appropriate  Outcome: Progressing     Problem: MUSCULOSKELETAL - ADULT  Goal: Maintain or return mobility to safest level of function  Description  INTERVENTIONS:  - Assess patient's ability to carry out ADLs; assess patient's baseline for ADL function and identify physical deficits which impact ability to perform ADLs (bathing, care of mouth/teeth, toileting, grooming, dressing, etc )  - Assess/evaluate cause of self-care deficits   - Assess range of motion  - Assess patient's mobility  - Assess patient's need for assistive devices and provide as appropriate  - Encourage maximum independence but intervene and supervise when necessary  - Involve family in performance of ADLs  - Assess for home care needs following discharge   - Consider OT consult to assist with ADL evaluation and planning for discharge  - Provide patient education as appropriate  Outcome: Progressing  Goal: Maintain proper alignment of affected body part  Description  INTERVENTIONS:  - Support, maintain and protect limb and body alignment  - Provide patient/ family with appropriate education  Outcome: Progressing     Problem: SAFETY ADULT  Goal: Patient will remain free of falls  Description  INTERVENTIONS:  - Assess patient frequently for physical needs  -  Identify cognitive and physical deficits and behaviors that affect risk of falls    -  [place] fall precautions as indicated by assessment   - Educate patient/family on patient safety including physical limitations  - Instruct patient to call for assistance with activity based on assessment  - Modify environment to reduce risk of injury  - Consider OT/PT consult to assist with strengthening/mobility  Outcome: Progressing  Goal: Maintain or return to baseline ADL function  Description  INTERVENTIONS:  -  Assess patient's ability to carry out ADLs; assess patient's baseline for ADL function and identify physical deficits which impact ability to perform ADLs (bathing, care of mouth/teeth, toileting, grooming, dressing, etc )  - Assess/evaluate cause of self-care deficits   - Assess range of motion  - Assess patient's mobility; develop plan if impaired  - Assess patient's need for assistive devices and provide as appropriate  - Encourage maximum independence but intervene and supervise when necessary  - Involve family in performance of ADLs  - Assess for home care needs following discharge   - Consider OT consult to assist with ADL evaluation and planning for discharge  - Provide patient education as appropriate  Outcome: Progressing     Problem: Nutrition/Hydration-ADULT  Goal: Nutrient/Hydration intake appropriate for improving, restoring or maintaining nutritional needs  Description  Monitor and assess patient's nutrition/hydration status for malnutrition  Collaborate with interdisciplinary team and initiate plan and interventions as ordered  Monitor patient's weight and dietary intake as ordered or per policy  Utilize nutrition screening tool and intervene as necessary  Determine patient's food preferences and provide high-protein, high-caloric foods as appropriate       INTERVENTIONS:  - Monitor oral intake, urinary output, labs, and treatment plans  - Assess nutrition and hydration status and recommend course of action  - Evaluate amount of meals eaten  - Assist patient with eating if necessary   - Allow adequate time for meals  - Recommend/ encourage appropriate diets, oral nutritional supplements, and vitamin/mineral supplements  - Order, calculate, and assess calorie counts as needed  - Recommend, monitor, and adjust tube feedings and TPN/PPN based on assessed needs  - Assess need for intravenous fluids  - Provide specific nutrition/hydration education as appropriate  - Include patient/family/caregiver in decisions related to nutrition  Outcome: Progressing     Problem: Prexisting or High Potential for Compromised Skin Integrity  Goal: Skin integrity is maintained or improved  Description  INTERVENTIONS:  - Identify patients at risk for skin breakdown  - Assess and monitor skin integrity  - Assess and monitor nutrition and hydration status  - Monitor labs   - Assess for incontinence   - Turn and reposition patient  - Assist with mobility/ambulation  - Relieve pressure over bony prominences  - Avoid friction and shearing  - Provide appropriate hygiene as needed including keeping skin clean and dry  - Evaluate need for skin moisturizer/barrier cream  - Collaborate with interdisciplinary team   - Patient/family teaching  - Consider wound care consult   Outcome: Progressing
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
## 2019-08-29 NOTE — PLAN OF CARE
Problem: Potential for Falls  Goal: Patient will remain free of falls  Description  INTERVENTIONS:  - Assess patient frequently for physical needs  -  Identify cognitive and physical deficits and behaviors that affect risk of falls    -  [place] fall precautions as indicated by assessment   - Educate patient/family on patient safety including physical limitations  - Instruct patient to call for assistance with activity based on assessment  - Modify environment to reduce risk of injury  - Consider OT/PT consult to assist with strengthening/mobility  Outcome: Progressing     Problem: PAIN - ADULT  Goal: Verbalizes/displays adequate comfort level or baseline comfort level  Description  Interventions:  - Encourage patient to monitor pain and request assistance  - Assess pain using appropriate pain scale  - Administer analgesics based on type and severity of pain and evaluate response  - Implement non-pharmacological measures as appropriate and evaluate response  - Consider cultural and social influences on pain and pain management  - Notify physician/advanced practitioner if interventions unsuccessful or patient reports new pain  Outcome: Progressing     Problem: INFECTION - ADULT  Goal: Absence or prevention of progression during hospitalization  Description  INTERVENTIONS:  - Assess and monitor for signs and symptoms of infection  - Monitor lab/diagnostic results  - Monitor all insertion sites, i e  indwelling lines, tubes, and drains  - Monitor endotracheal if appropriate and nasal secretions for changes in amount and color  - [place] appropriate cooling/warming therapies per order  - Administer medications as ordered  - Instruct and encourage patient and family to use good hand hygiene technique  - Identify and instruct in appropriate isolation precautions for identified infection/condition  Outcome: Progressing  Goal: Absence of fever/infection during neutropenic period  Description  INTERVENTIONS:  - Monitor WBC    Outcome: Progressing     Problem: DISCHARGE PLANNING  Goal: Discharge to home or other facility with appropriate resources  Description  INTERVENTIONS:  - Identify barriers to discharge w/patient and caregiver  - Arrange for needed discharge resources and transportation as appropriate  - Identify discharge learning needs (meds, wound care, etc )  - Arrange for interpretive services to assist at discharge as needed  - Refer to Case Management Department for coordinating discharge planning if the patient needs post-hospital services based on physician/advanced practitioner order or complex needs related to functional status, cognitive ability, or social support system  Outcome: Progressing     Problem: Knowledge Deficit  Goal: Patient/family/caregiver demonstrates understanding of disease process, treatment plan, medications, and discharge instructions  Description  Complete learning assessment and assess knowledge base    Interventions:  - Provide teaching at level of understanding  - Provide teaching via preferred learning methods  Outcome: Progressing     Problem: RESPIRATORY - ADULT  Goal: Achieves optimal ventilation and oxygenation  Description  INTERVENTIONS:  - Assess for changes in respiratory status  - Assess for changes in mentation and behavior  - Position to facilitate oxygenation and minimize respiratory effort  - Oxygen administered by appropriate delivery if ordered  - Initiate smoking cessation education as indicated  - Encourage broncho-pulmonary hygiene including cough, deep breathe, Incentive Spirometry  - Assess the need for suctioning and aspirate as needed  - Assess and instruct to report SOB or any respiratory difficulty  - Respiratory Therapy support as indicated  Outcome: Progressing     Problem: SKIN/TISSUE INTEGRITY - ADULT  Goal: Skin integrity remains intact  Description  INTERVENTIONS  - Identify patients at risk for skin breakdown  - Assess and monitor skin integrity  - Assess and monitor nutrition and hydration status  - Monitor labs (i e  albumin)  - Assess for incontinence   - Turn and reposition patient  - Assist with mobility/ambulation  - Relieve pressure over bony prominences  - Avoid friction and shearing  - Provide appropriate hygiene as needed including keeping skin clean and dry  - Evaluate need for skin moisturizer/barrier cream  - Collaborate with interdisciplinary team (i e  Nutrition, Rehabilitation, etc )   - Patient/family teaching  Outcome: Progressing  Goal: Incision(s), wounds(s) or drain site(s) healing without S/S of infection  Description  INTERVENTIONS  - Assess and document risk factors for skin impairment   - Assess and document dressing, incision, wound bed, drain sites and surrounding tissue  - Consider nutrition services referral as needed  - Oral mucous membranes remain intact  - Provide patient/ family education  Outcome: Progressing     Problem: NEUROSENSORY - ADULT  Goal: Achieves stable or improved neurological status  Description  INTERVENTIONS  - Monitor and report changes in neurological status  - Monitor vital signs such as temperature, blood pressure, glucose, and any other labs ordered   - Initiate measures to prevent increased intracranial pressure  - Monitor for seizure activity and implement precautions if appropriate      Outcome: Progressing  Goal: Remains free of injury related to seizures activity  Description  INTERVENTIONS  - Maintain airway, patient safety  and administer oxygen as ordered  - Monitor patient for seizure activity, document and report duration and description of seizure to physician/advanced practitioner  - If seizure occurs,  ensure patient safety during seizure  - Reorient patient post seizure  - Seizure pads on all 4 side rails  - Instruct patient/family to notify RN of any seizure activity including if an aura is experienced  - Instruct patient/family to call for assistance with activity based on nursing assessment  - Administer anti-seizure medications if ordered    Outcome: Progressing  Goal: Achieves maximal functionality and self care  Description  INTERVENTIONS  - Monitor swallowing and airway patency with patient fatigue and changes in neurological status  - Encourage and assist patient to increase activity and self care     - Encourage visually impaired, hearing impaired and aphasic patients to use assistive/communication devices  Outcome: Progressing     Problem: GENITOURINARY - ADULT  Goal: Maintains or returns to baseline urinary function  Description  INTERVENTIONS:  - Assess urinary function  - Encourage oral fluids to ensure adequate hydration if ordered  - Administer IV fluids as ordered to ensure adequate hydration  - Administer ordered medications as needed  - Offer frequent toileting  - Follow urinary retention protocol if ordered  Outcome: Progressing  Goal: Absence of urinary retention  Description  INTERVENTIONS:  - Assess patients ability to void and empty bladder  - Monitor I/O  - Bladder scan as needed  - Discuss with physician/AP medications to alleviate retention as needed  - Discuss catheterization for long term situations as appropriate  Outcome: Progressing     Problem: METABOLIC, FLUID AND ELECTROLYTES - ADULT  Goal: Electrolytes maintained within normal limits  Description  INTERVENTIONS:  - Monitor labs and assess patient for signs and symptoms of electrolyte imbalances  - Administer electrolyte replacement as ordered  - Monitor response to electrolyte replacements, including repeat lab results as appropriate  - Instruct patient on fluid and nutrition as appropriate  Outcome: Progressing  Goal: Fluid balance maintained  Description  INTERVENTIONS:  - Monitor labs   - Monitor I/O and WT  - Instruct patient on fluid and nutrition as appropriate  - Assess for signs & symptoms of volume excess or deficit  Outcome: Progressing  Goal: Glucose maintained within target range  Description  INTERVENTIONS:  - Monitor Blood Glucose as ordered  - Assess for signs and symptoms of hyperglycemia and hypoglycemia  - Administer ordered medications to maintain glucose within target range  - Assess nutritional intake and initiate nutrition service referral as needed  Outcome: Progressing     Problem: HEMATOLOGIC - ADULT  Goal: Maintains hematologic stability  Description  INTERVENTIONS  - Assess for signs and symptoms of bleeding or hemorrhage  - Monitor labs  - Administer supportive blood products/factors as ordered and appropriate  Outcome: Progressing     Problem: MUSCULOSKELETAL - ADULT  Goal: Maintain or return mobility to safest level of function  Description  INTERVENTIONS:  - Assess patient's ability to carry out ADLs; assess patient's baseline for ADL function and identify physical deficits which impact ability to perform ADLs (bathing, care of mouth/teeth, toileting, grooming, dressing, etc )  - Assess/evaluate cause of self-care deficits   - Assess range of motion  - Assess patient's mobility  - Assess patient's need for assistive devices and provide as appropriate  - Encourage maximum independence but intervene and supervise when necessary  - Involve family in performance of ADLs  - Assess for home care needs following discharge   - Consider OT consult to assist with ADL evaluation and planning for discharge  - Provide patient education as appropriate  Outcome: Progressing  Goal: Maintain proper alignment of affected body part  Description  INTERVENTIONS:  - Support, maintain and protect limb and body alignment  - Provide patient/ family with appropriate education  Outcome: Progressing     Problem: SAFETY ADULT  Goal: Patient will remain free of falls  Description  INTERVENTIONS:  - Assess patient frequently for physical needs  -  Identify cognitive and physical deficits and behaviors that affect risk of falls    -  [place] fall precautions as indicated by assessment   - Educate patient/family on patient safety including physical limitations  - Instruct patient to call for assistance with activity based on assessment  - Modify environment to reduce risk of injury  - Consider OT/PT consult to assist with strengthening/mobility  Outcome: Progressing  Goal: Maintain or return to baseline ADL function  Description  INTERVENTIONS:  -  Assess patient's ability to carry out ADLs; assess patient's baseline for ADL function and identify physical deficits which impact ability to perform ADLs (bathing, care of mouth/teeth, toileting, grooming, dressing, etc )  - Assess/evaluate cause of self-care deficits   - Assess range of motion  - Assess patient's mobility; develop plan if impaired  - Assess patient's need for assistive devices and provide as appropriate  - Encourage maximum independence but intervene and supervise when necessary  - Involve family in performance of ADLs  - Assess for home care needs following discharge   - Consider OT consult to assist with ADL evaluation and planning for discharge  - Provide patient education as appropriate  Outcome: Progressing     Problem: Nutrition/Hydration-ADULT  Goal: Nutrient/Hydration intake appropriate for improving, restoring or maintaining nutritional needs  Description  Monitor and assess patient's nutrition/hydration status for malnutrition  Collaborate with interdisciplinary team and initiate plan and interventions as ordered  Monitor patient's weight and dietary intake as ordered or per policy  Utilize nutrition screening tool and intervene as necessary  Determine patient's food preferences and provide high-protein, high-caloric foods as appropriate       INTERVENTIONS:  - Monitor oral intake, urinary output, labs, and treatment plans  - Assess nutrition and hydration status and recommend course of action  - Evaluate amount of meals eaten  - Assist patient with eating if necessary   - Allow adequate time for meals  - Recommend/ encourage appropriate diets, oral nutritional supplements, and vitamin/mineral supplements  - Order, calculate, and assess calorie counts as needed  - Recommend, monitor, and adjust tube feedings and TPN/PPN based on assessed needs  - Assess need for intravenous fluids  - Provide specific nutrition/hydration education as appropriate  - Include patient/family/caregiver in decisions related to nutrition  Outcome: Progressing     Problem: Prexisting or High Potential for Compromised Skin Integrity  Goal: Skin integrity is maintained or improved  Description  INTERVENTIONS:  - Identify patients at risk for skin breakdown  - Assess and monitor skin integrity  - Assess and monitor nutrition and hydration status  - Monitor labs   - Assess for incontinence   - Turn and reposition patient  - Assist with mobility/ambulation  - Relieve pressure over bony prominences  - Avoid friction and shearing  - Provide appropriate hygiene as needed including keeping skin clean and dry  - Evaluate need for skin moisturizer/barrier cream  - Collaborate with interdisciplinary team   - Patient/family teaching  - Consider wound care consult   Outcome: Progressing

## 2019-08-30 NOTE — PLAN OF CARE
Problem: PHYSICAL THERAPY ADULT  Goal: Performs mobility at highest level of function for planned discharge setting  See evaluation for individualized goals  Description  Treatment/Interventions: Functional transfer training, LE strengthening/ROM, Therapeutic exercise, Endurance training, Patient/family training, Equipment eval/education, Gait training, Spoke to nursing  Equipment Recommended: Jewel Lindo, Erick (Comment)(Rolling walker )       See flowsheet documentation for full assessment, interventions and recommendations  Outcome: Progressing  Note:   Prognosis: Fair  Problem List: Decreased strength, Decreased range of motion, Impaired balance, Decreased endurance, Decreased mobility, Decreased cognition, Impaired judgement, Decreased safety awareness, Pain, Decreased skin integrity  Assessment: Pt  supine in bed upon my arrival  Report from nursing staff, pt  with increased agitation throughout the AM  However, okay to see  Pt  agreeable to therapeutic intervention  Performance of HEP supine in bed with cues provided for proper completion  Pt  attempted bedlevel transfers, however noted once positioned at EOB  Pt  became increasingly upset, requesting to return to bed  Pt  repositioned supine in bed with alarm active at end of treatment session  PT will continue to recommend d/c to appropriate facility when medically stable  Barriers to Discharge: Decreased caregiver support  Barriers to Discharge Comments: Anthony Russell wants to return home and does not want to go to rehab setting  Return to home with increased support will be required for success  Recommendation: Short-term skilled PT, Home PT, 24 hour supervision/assist, Home with family support     PT - OK to Discharge: Yes(if d/c when medically stable )    See flowsheet documentation for full assessment

## 2019-08-30 NOTE — PHYSICAL THERAPY NOTE
Physical Therapy Progress Note        08/30/19 1212   Pain Assessment   Pain Assessment 0-10   Pain Score 9   Pain Type Chronic pain   Pain Location Back   Pain Orientation Lower   Hospital Pain Intervention(s) Ambulation/increased activity;Repositioned   Response to Interventions Poorly Tolerated  Restrictions/Precautions   Weight Bearing Precautions Per Order No   Other Precautions Pain; Fall Risk;Cognitive; Bed Alarm   General   Chart Reviewed Yes   Response to Previous Treatment Patient reporting fatigue but able to participate  Family/Caregiver Present No   Subjective   Subjective "I'm better alone "   Bed Mobility   Rolling R 5  Supervision   Additional items Assist x 1;HOB elevated; Bedrails;Leg ; Increased time required;Verbal cues;LE management   Rolling L 5  Supervision   Additional items Assist x 1;Bedrails;Leg ; Increased time required;Verbal cues;LE management   Supine to Sit 5  Supervision   Additional items Assist x 1;Bedrails;Leg ; Increased time required;Verbal cues;LE management   Sit to Supine 5  Supervision   Additional items Assist x 1;Bedrails;Leg ; Increased time required;Verbal cues;LE management   Balance   Static Sitting Good   Dynamic Sitting Good   Activity Tolerance   Activity Tolerance Patient limited by fatigue   Nurse Made Aware Yes   Exercises   THR Supine;10 reps;AAROM; Bilateral   Assessment   Prognosis Fair   Problem List Decreased strength;Decreased range of motion; Impaired balance;Decreased endurance;Decreased mobility; Decreased cognition; Impaired judgement;Decreased safety awareness;Pain;Decreased skin integrity   Assessment Pt  supine in bed upon my arrival  Report from nursing staff, pt  with increased agitation throughout the AM  However, okay to see  Pt  agreeable to therapeutic intervention  Performance of HEP supine in bed with cues provided for proper completion  Pt  attempted bedlevel transfers, however noted once positioned at EOB   Pt  became increasingly upset, requesting to return to bed  Pt  repositioned supine in bed with alarm active at end of treatment session  PT will continue to recommend d/c to appropriate facility when medically stable  Barriers to Discharge Decreased caregiver support   Goals   Patient Goals To be left alone  STG Expiration Date 09/06/19   Treatment Day 3   Plan   Treatment/Interventions LE strengthening/ROM; Functional transfer training; Endurance training; Therapeutic exercise; Bed mobility;Spoke to nursing;Spoke to case management   Progress Slow progress, decreased activity tolerance   PT Frequency Other (Comment)  (3-5x/wk)   Recommendation   Recommendation Short-term skilled PT; Home PT;24 hour supervision/assist;Home with family support   PT - OK to Discharge Yes  (if d/c when medically stable )     German Lamb, PTA

## 2019-08-30 NOTE — PLAN OF CARE
Problem: Potential for Falls  Goal: Patient will remain free of falls  Description  INTERVENTIONS:  - Assess patient frequently for physical needs  -  Identify cognitive and physical deficits and behaviors that affect risk of falls    -  [place] fall precautions as indicated by assessment   - Educate patient/family on patient safety including physical limitations  - Instruct patient to call for assistance with activity based on assessment  - Modify environment to reduce risk of injury  - Consider OT/PT consult to assist with strengthening/mobility  Outcome: Progressing     Problem: PAIN - ADULT  Goal: Verbalizes/displays adequate comfort level or baseline comfort level  Description  Interventions:  - Encourage patient to monitor pain and request assistance  - Assess pain using appropriate pain scale  - Administer analgesics based on type and severity of pain and evaluate response  - Implement non-pharmacological measures as appropriate and evaluate response  - Consider cultural and social influences on pain and pain management  - Notify physician/advanced practitioner if interventions unsuccessful or patient reports new pain  Outcome: Progressing     Problem: INFECTION - ADULT  Goal: Absence or prevention of progression during hospitalization  Description  INTERVENTIONS:  - Assess and monitor for signs and symptoms of infection  - Monitor lab/diagnostic results  - Monitor all insertion sites, i e  indwelling lines, tubes, and drains  - Monitor endotracheal if appropriate and nasal secretions for changes in amount and color  - [place] appropriate cooling/warming therapies per order  - Administer medications as ordered  - Instruct and encourage patient and family to use good hand hygiene technique  - Identify and instruct in appropriate isolation precautions for identified infection/condition  Outcome: Progressing  Goal: Absence of fever/infection during neutropenic period  Description  INTERVENTIONS:  - Monitor WBC    Outcome: Progressing     Problem: DISCHARGE PLANNING  Goal: Discharge to home or other facility with appropriate resources  Description  INTERVENTIONS:  - Identify barriers to discharge w/patient and caregiver  - Arrange for needed discharge resources and transportation as appropriate  - Identify discharge learning needs (meds, wound care, etc )  - Arrange for interpretive services to assist at discharge as needed  - Refer to Case Management Department for coordinating discharge planning if the patient needs post-hospital services based on physician/advanced practitioner order or complex needs related to functional status, cognitive ability, or social support system  Outcome: Progressing     Problem: Knowledge Deficit  Goal: Patient/family/caregiver demonstrates understanding of disease process, treatment plan, medications, and discharge instructions  Description  Complete learning assessment and assess knowledge base    Interventions:  - Provide teaching at level of understanding  - Provide teaching via preferred learning methods  Outcome: Progressing     Problem: RESPIRATORY - ADULT  Goal: Achieves optimal ventilation and oxygenation  Description  INTERVENTIONS:  - Assess for changes in respiratory status  - Assess for changes in mentation and behavior  - Position to facilitate oxygenation and minimize respiratory effort  - Oxygen administered by appropriate delivery if ordered  - Initiate smoking cessation education as indicated  - Encourage broncho-pulmonary hygiene including cough, deep breathe, Incentive Spirometry  - Assess the need for suctioning and aspirate as needed  - Assess and instruct to report SOB or any respiratory difficulty  - Respiratory Therapy support as indicated  Outcome: Progressing     Problem: SKIN/TISSUE INTEGRITY - ADULT  Goal: Skin integrity remains intact  Description  INTERVENTIONS  - Identify patients at risk for skin breakdown  - Assess and monitor skin integrity  - Assess and monitor nutrition and hydration status  - Monitor labs (i e  albumin)  - Assess for incontinence   - Turn and reposition patient  - Assist with mobility/ambulation  - Relieve pressure over bony prominences  - Avoid friction and shearing  - Provide appropriate hygiene as needed including keeping skin clean and dry  - Evaluate need for skin moisturizer/barrier cream  - Collaborate with interdisciplinary team (i e  Nutrition, Rehabilitation, etc )   - Patient/family teaching  Outcome: Progressing  Goal: Incision(s), wounds(s) or drain site(s) healing without S/S of infection  Description  INTERVENTIONS  - Assess and document risk factors for skin impairment   - Assess and document dressing, incision, wound bed, drain sites and surrounding tissue  - Consider nutrition services referral as needed  - Oral mucous membranes remain intact  - Provide patient/ family education  Outcome: Progressing     Problem: NEUROSENSORY - ADULT  Goal: Achieves stable or improved neurological status  Description  INTERVENTIONS  - Monitor and report changes in neurological status  - Monitor vital signs such as temperature, blood pressure, glucose, and any other labs ordered   - Initiate measures to prevent increased intracranial pressure  - Monitor for seizure activity and implement precautions if appropriate      Outcome: Progressing  Goal: Remains free of injury related to seizures activity  Description  INTERVENTIONS  - Maintain airway, patient safety  and administer oxygen as ordered  - Monitor patient for seizure activity, document and report duration and description of seizure to physician/advanced practitioner  - If seizure occurs,  ensure patient safety during seizure  - Reorient patient post seizure  - Seizure pads on all 4 side rails  - Instruct patient/family to notify RN of any seizure activity including if an aura is experienced  - Instruct patient/family to call for assistance with activity based on nursing assessment  - Administer anti-seizure medications if ordered    Outcome: Progressing  Goal: Achieves maximal functionality and self care  Description  INTERVENTIONS  - Monitor swallowing and airway patency with patient fatigue and changes in neurological status  - Encourage and assist patient to increase activity and self care     - Encourage visually impaired, hearing impaired and aphasic patients to use assistive/communication devices  Outcome: Progressing     Problem: GENITOURINARY - ADULT  Goal: Maintains or returns to baseline urinary function  Description  INTERVENTIONS:  - Assess urinary function  - Encourage oral fluids to ensure adequate hydration if ordered  - Administer IV fluids as ordered to ensure adequate hydration  - Administer ordered medications as needed  - Offer frequent toileting  - Follow urinary retention protocol if ordered  Outcome: Progressing  Goal: Absence of urinary retention  Description  INTERVENTIONS:  - Assess patients ability to void and empty bladder  - Monitor I/O  - Bladder scan as needed  - Discuss with physician/AP medications to alleviate retention as needed  - Discuss catheterization for long term situations as appropriate  Outcome: Progressing     Problem: METABOLIC, FLUID AND ELECTROLYTES - ADULT  Goal: Electrolytes maintained within normal limits  Description  INTERVENTIONS:  - Monitor labs and assess patient for signs and symptoms of electrolyte imbalances  - Administer electrolyte replacement as ordered  - Monitor response to electrolyte replacements, including repeat lab results as appropriate  - Instruct patient on fluid and nutrition as appropriate  Outcome: Progressing  Goal: Fluid balance maintained  Description  INTERVENTIONS:  - Monitor labs   - Monitor I/O and WT  - Instruct patient on fluid and nutrition as appropriate  - Assess for signs & symptoms of volume excess or deficit  Outcome: Progressing  Goal: Glucose maintained within target range  Description  INTERVENTIONS:  - Monitor Blood Glucose as ordered  - Assess for signs and symptoms of hyperglycemia and hypoglycemia  - Administer ordered medications to maintain glucose within target range  - Assess nutritional intake and initiate nutrition service referral as needed  Outcome: Progressing     Problem: HEMATOLOGIC - ADULT  Goal: Maintains hematologic stability  Description  INTERVENTIONS  - Assess for signs and symptoms of bleeding or hemorrhage  - Monitor labs  - Administer supportive blood products/factors as ordered and appropriate  Outcome: Progressing     Problem: MUSCULOSKELETAL - ADULT  Goal: Maintain or return mobility to safest level of function  Description  INTERVENTIONS:  - Assess patient's ability to carry out ADLs; assess patient's baseline for ADL function and identify physical deficits which impact ability to perform ADLs (bathing, care of mouth/teeth, toileting, grooming, dressing, etc )  - Assess/evaluate cause of self-care deficits   - Assess range of motion  - Assess patient's mobility  - Assess patient's need for assistive devices and provide as appropriate  - Encourage maximum independence but intervene and supervise when necessary  - Involve family in performance of ADLs  - Assess for home care needs following discharge   - Consider OT consult to assist with ADL evaluation and planning for discharge  - Provide patient education as appropriate  Outcome: Progressing  Goal: Maintain proper alignment of affected body part  Description  INTERVENTIONS:  - Support, maintain and protect limb and body alignment  - Provide patient/ family with appropriate education  Outcome: Progressing     Problem: SAFETY ADULT  Goal: Patient will remain free of falls  Description  INTERVENTIONS:  - Assess patient frequently for physical needs  -  Identify cognitive and physical deficits and behaviors that affect risk of falls    -  [place] fall precautions as indicated by assessment   - Educate patient/family on patient safety including physical limitations  - Instruct patient to call for assistance with activity based on assessment  - Modify environment to reduce risk of injury  - Consider OT/PT consult to assist with strengthening/mobility  Outcome: Progressing  Goal: Maintain or return to baseline ADL function  Description  INTERVENTIONS:  -  Assess patient's ability to carry out ADLs; assess patient's baseline for ADL function and identify physical deficits which impact ability to perform ADLs (bathing, care of mouth/teeth, toileting, grooming, dressing, etc )  - Assess/evaluate cause of self-care deficits   - Assess range of motion  - Assess patient's mobility; develop plan if impaired  - Assess patient's need for assistive devices and provide as appropriate  - Encourage maximum independence but intervene and supervise when necessary  - Involve family in performance of ADLs  - Assess for home care needs following discharge   - Consider OT consult to assist with ADL evaluation and planning for discharge  - Provide patient education as appropriate  Outcome: Progressing     Problem: Nutrition/Hydration-ADULT  Goal: Nutrient/Hydration intake appropriate for improving, restoring or maintaining nutritional needs  Description  Monitor and assess patient's nutrition/hydration status for malnutrition  Collaborate with interdisciplinary team and initiate plan and interventions as ordered  Monitor patient's weight and dietary intake as ordered or per policy  Utilize nutrition screening tool and intervene as necessary  Determine patient's food preferences and provide high-protein, high-caloric foods as appropriate       INTERVENTIONS:  - Monitor oral intake, urinary output, labs, and treatment plans  - Assess nutrition and hydration status and recommend course of action  - Evaluate amount of meals eaten  - Assist patient with eating if necessary   - Allow adequate time for meals  - Recommend/ encourage appropriate diets, oral nutritional supplements, and vitamin/mineral supplements  - Order, calculate, and assess calorie counts as needed  - Recommend, monitor, and adjust tube feedings and TPN/PPN based on assessed needs  - Assess need for intravenous fluids  - Provide specific nutrition/hydration education as appropriate  - Include patient/family/caregiver in decisions related to nutrition  Outcome: Progressing     Problem: Prexisting or High Potential for Compromised Skin Integrity  Goal: Skin integrity is maintained or improved  Description  INTERVENTIONS:  - Identify patients at risk for skin breakdown  - Assess and monitor skin integrity  - Assess and monitor nutrition and hydration status  - Monitor labs   - Assess for incontinence   - Turn and reposition patient  - Assist with mobility/ambulation  - Relieve pressure over bony prominences  - Avoid friction and shearing  - Provide appropriate hygiene as needed including keeping skin clean and dry  - Evaluate need for skin moisturizer/barrier cream  - Collaborate with interdisciplinary team   - Patient/family teaching  - Consider wound care consult   Outcome: Progressing

## 2019-08-30 NOTE — QUICK NOTE
QUICK NOTE - Deterioration Index  Jigna Sandoval  76 y o  male MRN: [id number]  Unit/Bed#: E5 -01 Encounter: [id number]    Date Paged: 19  Time Paged: 16:41  Room #: 161  Primary RN: Elvi Castillo  Arrival Time: 17:00  Deterioration index score at time of page: 76 2  %  Spoke with Prechtel from primary team  Need to escalate level of care: Telemetry     PROBLEMS resulting in high DI score:    SinusTachycardia    GCS 13 (chronic)   Neuro exam lethargy    PLAN:     1L NS bolus   Start maintenance fluids 100cc/hr LR   EKG   Start telemetry for 24 hours   Continue lopressor once BP responds to fluids   Continue other antihypertensive hold parameters   Frequent vital signs per DI protocol    Please call [phone] with any questions  Update: Patient converted back to HR 80's  At this time patient BP noted to be 70/54  Stable mentation  SpO2 94% ORA  Patient has had decreased PO intake recently and not currently on maintenance fluids  Run 1L NS at 1L/hr  Will frequently check vitals  Will follow up with primary service, Dr Jeff Salcedo who I made aware of current situation       Vitals:   Vitals:    19 1016 19 1530 19 1603 19 1644   BP: 125/60 108/64     BP Location:       Pulse: (!) 110 (!) 175 (!) 170 (!) 152   Resp:  (!) 24     Temp:  98 4 °F (36 9 °C)     TempSrc:       SpO2:  95%     Weight:           Respiratory:   ORA  O2 Flow Rate (L/min): 4 L/min    Temperature: Temp (24hrs), Av 6 °F (37 °C), Min:98 4 °F (36 9 °C), Max:99 1 °F (37 3 °C)  Current: Temperature: 98 4 °F (36 9 °C)    Labs:   Results from last 7 days   Lab Units 19  0220 19  0647 19  0611  19  0607   WBC Thousand/uL 26 54* 17 05* 20 19*   < > 19 81*   HEMOGLOBIN g/dL 8 8* 8 4* 8 4*   < > 7 3*   HEMATOCRIT % 29 0* 27 8* 26 4*   < > 24 3*   PLATELETS Thousands/uL 24* 32* 10*   < > 17*   NEUTROS PCT %  --  65 68  --  67   MONOS PCT %  --  6 7  --  8   MONO PCT % 4  --   --    < >  --     < > = values in this interval not displayed       Results from last 7 days   Lab Units 08/29/19  0056 08/27/19  0647 08/26/19  0611   SODIUM mmol/L 139 137 137   POTASSIUM mmol/L 4 9 4 4 5 1   CHLORIDE mmol/L 101 104 105   CO2 mmol/L 28 27 23   BUN mg/dL 18 17 19   CREATININE mg/dL 1 46* 1 21 1 17   CALCIUM mg/dL 8 7 8 4 8 6     Results from last 7 days   Lab Units 08/26/19  0611 08/24/19  0546   MAGNESIUM mg/dL 1 4* 1 6     Results from last 7 days   Lab Units 08/29/19  0248 08/29/19  0055   LACTIC ACID mmol/L 1 4 2 8*         Results from last 7 days   Lab Units 08/26/19  0611 08/24/19  0546   PROCALCITONIN ng/ml 0 09 0 32*       Code Status: Level 1 - Full Code

## 2019-08-30 NOTE — PROGRESS NOTES
Progress Note - Melony Bennett 1950, 76 y o  male MRN: [id number]    Unit/Bed#: E5 -01 Encounter: [id number]    Primary Care Provider: Hilary Lopez MD   Date and time admitted to hospital: 2019 10:59 AM        * Intractable back pain  Assessment & Plan  Due to myelodysplatic syndrome  No acute finding on x ray of spine  Continue opiods for pain control  Hydroxyurea started   He is getting better each day    Myelodysplasia (myelodysplastic syndrome) (HCC)  Assessment & Plan  Appreciate heme/onc help  Started on Hydroxyurea  Still waiting on some final results from bone marrow biopsy done at [facility]  Subjective:   Had some nausea this am  Better with Zofran      Objective:     Vitals:   Temp (24hrs), Av 4 °F (37 4 °C), Min:98 4 °F (36 9 °C), Max:100 7 °F (38 2 °C)    Temp:  [98 4 °F (36 9 °C)-100 7 °F (38 2 °C)] 98 4 °F (36 9 °C)  HR:  [] 110  Resp:  [16-18] 16  BP: (101-130)/(51-69) 125/60  SpO2:  [93 %-98 %] 95 %  Body mass index is 29 9 kg/m²  Input and Output Summary (last 24 hours): Intake/Output Summary (Last 24 hours) at 2019 1505  Last data filed at 2019 1049  Gross per 24 hour   Intake 60 ml   Output    Net 60 ml       Physical Exam:     Physical Exam   HENT:   Head: Normocephalic and atraumatic  Eyes: Pupils are equal, round, and reactive to light  EOM are normal    Cardiovascular: Normal rate and regular rhythm  Exam reveals no gallop and no friction rub  No murmur heard  Pulmonary/Chest: Effort normal and breath sounds normal  He has no wheezes  He has no rales  Abdominal: Soft  Bowel sounds are normal  There is no tenderness  Musculoskeletal: He exhibits no edema  Nursing note and vitals reviewed              Additional Data:     Labs:    Results from last 7 days   Lab Units 19  0220 19  0647   WBC Thousand/uL 26 54* 17 05*   HEMOGLOBIN g/dL 8 8* 8 4*   HEMATOCRIT % 29 0* 27 8*   PLATELETS Thousands/uL 24* 32*   NEUTROS PCT %  --  65   LYMPHS PCT %  --  14   LYMPHO PCT % 3*  --    MONOS PCT %  --  6   MONO PCT % 4  --    EOS PCT % 1 0     Results from last 7 days   Lab Units 08/29/19  0056   POTASSIUM mmol/L 4 9   CHLORIDE mmol/L 101   CO2 mmol/L 28   BUN mg/dL 18   CREATININE mg/dL 1 46*   CALCIUM mg/dL 8 7         Results from last 7 days   Lab Units 08/30/19  1051 08/30/19  0721 08/29/19  2337 08/29/19  2245 08/29/19  2218 08/29/19  2055 08/29/19  1610 08/29/19  1104 08/29/19  0807 08/29/19  0011 08/28/19  2211 08/28/19  2107   POC GLUCOSE mg/dl 100 84 65 64* 60* 50* 150* 97 110 91 107 68               * I Have Reviewed All Lab Data     Recent Cultures (last 7 days):     Results from last 7 days   Lab Units 08/29/19  0056   BLOOD CULTURE  No Growth at 24 hrs  No Growth at 24 hrs           Last 24 Hours Medication List:     Current Facility-Administered Medications:  acetaminophen 650 mg Rectal Q4H PRN Melvern Hedger Spatzer, CRNP    acetaminophen 650 mg Oral Q6H PRN Melvern Hedger Spatzer, CRNP    amLODIPine 5 mg Oral Daily Megan Dean MD    atorvastatin 80 mg Oral HS Mgean Dean MD    benzonatate 100 mg Oral TID PRN Abby Soto PA-C    cefepime 2,000 mg Intravenous Q12H Melvern Hedger Spatzer, CRNP Last Rate: 2,000 mg (08/30/19 1402)   dextran 70-hypromellose 1 drop Both Eyes PRN Krystle Lopez MD    diazepam 5 mg Oral HS PRN Megan Dean MD    docusate sodium 100 mg Oral BID Megan Dean MD    finasteride 5 mg Oral Daily Krystle Lopez MD    fish oil 1,000 mg Oral Daily Megan Dean MD    fluticasone-vilanterol 1 puff Inhalation Daily Megan Dean MD    furosemide 20 mg Intravenous Once Megan Dean MD    hydroxyurea 500 mg Oral Q24H Nic Marshall MD    insulin glargine 25 Units Subcutaneous QAM Megan Dean MD    insulin lispro 1-5 Units Subcutaneous TID AC Krystle Lopez MD    insulin lispro 1-5 Units Subcutaneous HS Megan Dean MD    insulin lispro 10 Units Subcutaneous TID With Clarissa Alvarado MD ipratropium-albuterol 3 mL Nebulization TID PRN Derick Pickering MD    isosorbide mononitrate 60 mg Oral Daily Krystle Lopez MD    lidocaine 1 patch Topical Daily Krystle Lopez MD    methocarbamol 500 mg Oral Q6H PRN Derick Pickering MD    metoprolol tartrate 50 mg Oral Q12H [facility] Derick Pickering MD    metroNIDAZOLE 500 mg Intravenous Q8H Madolyn Grade Spatzer, CRNP Last Rate: 500 mg (08/30/19 1019)   nicotine 1 patch Transdermal Daily Krystle Lopez MD    ondansetron 4 mg Intravenous Q4H PRN Vincent Blackwood DO    oxyCODONE 10 mg Oral Q4H PRN Derick Pickering MD    pantoprazole 40 mg Oral Daily Before Breakfast Krystle Lopez MD    potassium chloride 20 mEq Oral BID Derick Pickering MD    pregabalin 150 mg Oral TID Derick Pickering MD    QUEtiapine 25 mg Oral HS Bulverdekristina Pickering MD    tamsulosin 0 4 mg Oral Daily With Claudio Cornelius MD    traMADol 50 mg Oral Q8H PRN Derick Pickering MD          VTE Pharmacologic Prophylaxis:   Pharmacologic:     Current Length of Stay: 8 day(s)    Current Patient Status: Inpatient       Discharge Plan:   Code Status: Level 1 - Full Code           Today, Patient Was Seen By: Matthias Segundo DO    ** Please Note: Dictation voice to text software may have been used in the creation of this document   **

## 2019-08-30 NOTE — UTILIZATION REVIEW
Continued Stay Review    Date:    8/30/2019                          Current Patient Class:    IP  Current Level of Care:   Med  SUrg    HPI:68 y o  male initially admitted on    8/22/2019     Assessment/Plan:    Pt  Initially  Admitted  8/22  With intractable  Back pain,   Appears to lack  Capacity  Per  [facility]  RAPID  RESPONSE  TEAM  CALL  8/29  @      0018  Pt  Found in  Rigors, temp  >  102,  Tachycardia  and  Nursing concerned  About aspiration  Vomited  X1  8/29  Sepsis  W/u initiated   8/29  Started  HARDIK  D/T  Concern  For aspiration and  7  Day  Hospital stay at that  [facility]  CXR  ( 8/29)     Mild interstitial and central vascular congestion        PROGRESS NOTE   8/30:    HR  Sustained   >  100  sats     95  %  4L   CBC  Pending at  Time of review  BC  NG  Thus far    Pertinent Labs/Diagnostic Results:   Results from last 7 days   Lab Units 08/29/19  0220 08/27/19  0647 08/26/19  0611 08/25/19  0435 08/24/19  0607   WBC Thousand/uL 26 54* 17 05* 20 19* 16 92* 19 81*   HEMOGLOBIN g/dL 8 8* 8 4* 8 4* 6 5* 7 3*   HEMATOCRIT % 29 0* 27 8* 26 4* 22 4* 24 3*   PLATELETS Thousands/uL 24* 32* 10* 26* 17*   NEUTROS ABS Thousands/µL  --  11 16* 13 88*  --  13 29*   BANDS PCT % 9*  --   --  16*  --          Results from last 7 days   Lab Units 08/29/19  0056 08/27/19  0647 08/26/19  0611 08/25/19  0435 08/24/19  0546   SODIUM mmol/L 139 137 137 139 138   POTASSIUM mmol/L 4 9 4 4 5 1 4 3 3 6   CHLORIDE mmol/L 101 104 105 107 105   CO2 mmol/L 28 27 23 26 24   ANION GAP mmol/L 10 6 9 6 9   BUN mg/dL 18 17 19 20 18   CREATININE mg/dL 1 46* 1 21 1 17 1 29 1 38*   EGFR ml/min/1 73sq m 49 61 64 57 52   CALCIUM mg/dL 8 7 8 4 8 6 8 0* 7 7*   MAGNESIUM mg/dL  --   --  1 4*  --  1 6         Results from last 7 days   Lab Units 08/30/19  1051 08/30/19  0721 08/29/19  2337 08/29/19  2245 08/29/19  2218 08/29/19  2055 08/29/19  1610 08/29/19  1104 08/29/19  0807 08/29/19  0011   POC GLUCOSE mg/dl 100 84 65 64* 60* 50* 150* 97 110 91     Results from last 7 days   Lab Units 08/29/19  0056 08/27/19  0647 08/26/19  0611 08/25/19  0435 08/24/19  0546   GLUCOSE RANDOM mg/dL 94 118 149* 106 132       Results from last 7 days   Lab Units 08/26/19  0611 08/24/19  0546   PROCALCITONIN ng/ml 0 09 0 32*     Results from last 7 days   Lab Units 08/29/19  0248 08/29/19  0055   LACTIC ACID mmol/L 1 4 2 8*     Results from last 7 days   Lab Units 08/29/19  0056   BLOOD CULTURE  No Growth at 24 hrs  No Growth at 24 hrs       Results from last 7 days   Lab Units 08/29/19  0220 08/25/19  0435   TOTAL COUNTED  100 100           Vital Signs:   08/30/19 1016    110Abnormal     125/60         08/30/19 0720  98 4 °F (36 9 °C)  100  16  130/69  95 %  Nasal cannula  Lying   08/29/19 2313  99 1 °F (37 3 °C)  106Abnormal   18  101/51  93 %  Nasal cannula  Lying   08/29/19 2131    73    119/58         08/29/19 1508  100 7 °F (38 2 °C)Abnormal   92  18  112/56  98 %    Lying   08/29/19 0739  98 °F (36 7 °C)  89  18  107/55  98 %  Nasal cannula  Lying   08/29/19 0629    88  12  106/68  98 %  Nasal cannula  Lying   08/29/19 0522  99 8 °F (37 7 °C)               08/29/19 0241  101 4 °F (38 6 °C)Abnormal                08/29/19 0030  102 7 °F (39 3 °C)Abnormal   164Abnormal                    Medications:   Scheduled Meds:   Current Facility-Administered Medications:  acetaminophen 650 mg Rectal Q4H PRN   acetaminophen 650 mg Oral Q6H PRN   amLODIPine 5 mg Oral Daily   atorvastatin 80 mg Oral HS   benzonatate 100 mg Oral TID PRN   cefepime 2,000 mg Intravenous Q12H   dextran 70-hypromellose 1 drop Both Eyes PRN   diazepam 5 mg Oral HS PRN   docusate sodium 100 mg Oral BID   finasteride 5 mg Oral Daily   fish oil 1,000 mg Oral Daily   fluticasone-vilanterol 1 puff Inhalation Daily   furosemide 20 mg Intravenous Once   hydroxyurea 500 mg Oral Q24H   insulin glargine 25 Units Subcutaneous QAM   insulin lispro 1-5 Units Subcutaneous TID AC   insulin lispro 1-5 Units Subcutaneous HS   insulin lispro 10 Units Subcutaneous TID With Meals   ipratropium-albuterol 3 mL Nebulization TID PRN   isosorbide mononitrate 60 mg Oral Daily   lidocaine 1 patch Topical Daily   methocarbamol 500 mg Oral Q6H PRN   metoprolol tartrate 50 mg Oral Q12H RHONDA   metroNIDAZOLE 500 mg Intravenous Q8H   nicotine 1 patch Transdermal Daily   ondansetron 4 mg Intravenous Q4H PRN      X 1  8/29 and  X1  8/30  Thus far   oxyCODONE 10 mg Oral Q4H PRN   pantoprazole 40 mg Oral Daily Before Breakfast   potassium chloride 20 mEq Oral BID   pregabalin 150 mg Oral TID   QUEtiapine 25 mg Oral HS   tamsulosin 0 4 mg Oral Daily With Dinner   traMADol 50 mg Oral Q8H PRN       Discharge Plan:    TBD    Network Utilization Review Department  Phone: [phone]; Fax [phone]  [email or web address]  ATTENTION: Please call with any questions or concerns to [phone]  and carefully listen to the prompts so that you are directed to the right person  Send all requests for admission clinical reviews, approved or denied determinations and any other requests to fax [phone]   All voicemails are confidential

## 2019-08-30 NOTE — SOCIAL WORK
CM called [facility] to determine if they have any knowledge of this patient  However they do not have in their records  CM explained that pt may need guardianship and CM was transferred to a voicemail line for the person who handles guardianship cases in [facility])  CM also called [facility] Care Coordination line -- left VM asking if [facility] has another emergency contact for patient or if a CM there has more knowledge of patient  Later, Paola Agudelo called back and said the only emergency contact they had for patient was his brother but they have documented that he said "do not call me anymore "    CM called Whitley Singh -- although pt's building is physically in [facility]), Brandie Maharaj learned that it is through [employer] and is managed by Whitley Singh  CM called there and was told that CM needs to talk with the Constantin Her [phone]  CM called and phone was answered by Benitez Oscar  CM explained that pt is in the hospital and will not d/c in time to pay his rent  Benitez Oscar said that is ok  CM offered to write a letter stating this fact but Benitez Oscar said that it not needed  CM asked for any sort of emergency contact info for patient and he responded that they do not have one on file  He thought CM should speak with the Constantin and took CM's number and said Constantin Piper will call later  CM following

## 2019-08-30 NOTE — NURSING NOTE
Patient refusing most of morning medications  Patient educated on risks and benefits to taking medications  Dr Minoo Bennett notified  No new orders at this time

## 2019-08-30 NOTE — PROGRESS NOTES
Called to see patient  Noted to have sinus tachycardia    s  Feels mildly SOB  He is tired    o  Afebrile  HR   160s  Lung:   Relatively CTA bilat no R/R/W    Labs  EKG:  Sinus tachycardia  No ST changes  Reviewed personally    A/p  1   Sinus tachycardia  Unclear cause  He is not on DVT prophylaxis due to severe thrombocytopenia, so it could be a PE  Check stat CTA chest  Rule out for MI  He is on antibiotics for pneumonia already

## 2019-08-30 NOTE — PROGRESS NOTES
Progress Note - Podiatry  [facility]  76 y [facility]  male MRN: [id number]  Unit/Bed#: E5 -01 Encounter: [id number]    Assessment/Plan:  1  Right hallux duskiness and toe pain  2  RLE wound  3  Myelodysplasia  4  Dm type 2  5  AKA   6  PAD    Plan:  · Right hallux appears less erythematous today  Right lower extremity has stable eschars  No acute signs of infection noted  Will continue with local wound care  No intervention planned per vascular surgery given overall status of patient  No podiatric surgical intervention planned at this time  · Rest of care per primary service  · WBAT    Subjective/Objective   Chief Complaint:   Chief Complaint   Patient presents with    Back Pain     Hx of back pain, reports lower back pan radiates into thoracic region and into right leg  takes gabapentin for pain  forgot to take dose today  Subjective: 76 y o  y/o male was seen and evaluated at bedside  In no acute distress, nontoxic in appearance  Patient denies any recent nausea, vomiting, fever, chills, shortness of breath, chest pains  Blood pressure 130/69, pulse 100, temperature 98 4 °F (36 9 °C), temperature source Temporal, resp  rate 16, weight 100 kg (220 lb 7 4 oz), SpO2 95 %  ,Body mass index is 29 9 kg/m²  Invasive Devices     Peripheral Intravenous Line            Peripheral IV 08/28/19 Left Antecubital 1 day                Physical Exam:   General: Alert, cooperative and no distress  Lungs: Non labored breathing  Heart: Positive S1, S2  Abdomen: Soft, non-tender  Extremity:       RLE:  Dressing clean, dry, and intact on the lower extremity  Right hallux continues to appear dusky, but pain is decreasing as well as the erythema  Dry, stable eschars noted on the lower extremity  No acute signs of infection on either the lower extremity or hallux  Lab, Imaging and other studies:   I have personally reviewed pertinent lab results    , CBC: No results found for: WBC, HGB, HCT, MCV, PLT, ADJUSTEDWBC, MCH, MCHC, RDW, MPV, NRBC, CMP: No results found for: SODIUM, K, CL, CO2, ANIONGAP, BUN, CREATININE, GLUCOSE, CALCIUM, AST, ALT, ALKPHOS, PROT, BILITOT, EGFR    Imaging: I have personally reviewed pertinent films in PACS  EKG, Pathology, and Other Studies: I have personally reviewed pertinent reports

## 2019-08-30 NOTE — SOCIAL WORK
CM contacted the [place] benefit line ([phone]) to inquire about the pt's eligibility  The representative stated that the  is not in receipt of benefits  No further information was provided  CM will continue to f/u with DC planning needs

## 2019-08-30 NOTE — SOCIAL WORK
Spoke with pt's landlord Ikerkarl Francis  She is aware that pt is in the hospital and that his rent will be late and she said that is fine  CM explained to her that pt asked if we can get in contact with Gee Rell and that the patient said he thought he might be able to help him for d/c planning  She responded that she knows pt's neighbor Gee Zcaarias and that he has dementia and should not make decisions for anyone  Jeffry Blanco states pt was placed at their building through a program though [facility]  -- she suggested CM call them

## 2019-08-31 PROBLEM — R77.8 ELEVATED TROPONIN: Status: ACTIVE | Noted: 2019-01-01

## 2019-08-31 PROBLEM — J96.00 ACUTE RESPIRATORY FAILURE (HCC): Status: ACTIVE | Noted: 2019-01-01

## 2019-08-31 NOTE — QUICK NOTE
Discussed with critical care followed by cardiology regarding sustained 's  Patient asymptomatic  Did receive second dose 2 5 mg IV lopressor without any effect  Concern for SVT  Instructed per cardiology to administer 6 mg IV adenosine  Discussed with critical care  HR reduced to 90's  Per cardiology initiate on amiodarone gtt for 1 0 mg/hr x 6 hours then 0 5 mg/hr thereafter  Addendum 0645:  HR back up to 160's  Updated cardiology  Amiodarone bolus ordered by critical care

## 2019-08-31 NOTE — PLAN OF CARE
Problem: Potential for Falls  Goal: Patient will remain free of falls  Description  INTERVENTIONS:  - Assess patient frequently for physical needs  -  Identify cognitive and physical deficits and behaviors that affect risk of falls    -  [place] fall precautions as indicated by assessment   - Educate patient/family on patient safety including physical limitations  - Instruct patient to call for assistance with activity based on assessment  - Modify environment to reduce risk of injury  - Consider OT/PT consult to assist with strengthening/mobility  Outcome: Progressing     Problem: PAIN - ADULT  Goal: Verbalizes/displays adequate comfort level or baseline comfort level  Description  Interventions:  - Encourage patient to monitor pain and request assistance  - Assess pain using appropriate pain scale  - Administer analgesics based on type and severity of pain and evaluate response  - Implement non-pharmacological measures as appropriate and evaluate response  - Consider cultural and social influences on pain and pain management  - Notify physician/advanced practitioner if interventions unsuccessful or patient reports new pain  Outcome: Progressing     Problem: INFECTION - ADULT  Goal: Absence or prevention of progression during hospitalization  Description  INTERVENTIONS:  - Assess and monitor for signs and symptoms of infection  - Monitor lab/diagnostic results  - Monitor all insertion sites, i e  indwelling lines, tubes, and drains  - Monitor endotracheal if appropriate and nasal secretions for changes in amount and color  - [place] appropriate cooling/warming therapies per order  - Administer medications as ordered  - Instruct and encourage patient and family to use good hand hygiene technique  - Identify and instruct in appropriate isolation precautions for identified infection/condition  Outcome: Progressing  Goal: Absence of fever/infection during neutropenic period  Description  INTERVENTIONS:  - Monitor WBC    Outcome: Progressing     Problem: DISCHARGE PLANNING  Goal: Discharge to home or other facility with appropriate resources  Description  INTERVENTIONS:  - Identify barriers to discharge w/patient and caregiver  - Arrange for needed discharge resources and transportation as appropriate  - Identify discharge learning needs (meds, wound care, etc )  - Arrange for interpretive services to assist at discharge as needed  - Refer to Case Management Department for coordinating discharge planning if the patient needs post-hospital services based on physician/advanced practitioner order or complex needs related to functional status, cognitive ability, or social support system  Outcome: Progressing     Problem: Knowledge Deficit  Goal: Patient/family/caregiver demonstrates understanding of disease process, treatment plan, medications, and discharge instructions  Description  Complete learning assessment and assess knowledge base    Interventions:  - Provide teaching at level of understanding  - Provide teaching via preferred learning methods  Outcome: Progressing     Problem: RESPIRATORY - ADULT  Goal: Achieves optimal ventilation and oxygenation  Description  INTERVENTIONS:  - Assess for changes in respiratory status  - Assess for changes in mentation and behavior  - Position to facilitate oxygenation and minimize respiratory effort  - Oxygen administered by appropriate delivery if ordered  - Initiate smoking cessation education as indicated  - Encourage broncho-pulmonary hygiene including cough, deep breathe, Incentive Spirometry  - Assess the need for suctioning and aspirate as needed  - Assess and instruct to report SOB or any respiratory difficulty  - Respiratory Therapy support as indicated  Outcome: Progressing     Problem: SKIN/TISSUE INTEGRITY - ADULT  Goal: Skin integrity remains intact  Description  INTERVENTIONS  - Identify patients at risk for skin breakdown  - Assess and monitor skin integrity  - Assess and monitor nutrition and hydration status  - Monitor labs (i e  albumin)  - Assess for incontinence   - Turn and reposition patient  - Assist with mobility/ambulation  - Relieve pressure over bony prominences  - Avoid friction and shearing  - Provide appropriate hygiene as needed including keeping skin clean and dry  - Evaluate need for skin moisturizer/barrier cream  - Collaborate with interdisciplinary team (i e  Nutrition, Rehabilitation, etc )   - Patient/family teaching  Outcome: Progressing  Goal: Incision(s), wounds(s) or drain site(s) healing without S/S of infection  Description  INTERVENTIONS  - Assess and document risk factors for skin impairment   - Assess and document dressing, incision, wound bed, drain sites and surrounding tissue  - Consider nutrition services referral as needed  - Oral mucous membranes remain intact  - Provide patient/ family education  Outcome: Progressing     Problem: NEUROSENSORY - ADULT  Goal: Achieves stable or improved neurological status  Description  INTERVENTIONS  - Monitor and report changes in neurological status  - Monitor vital signs such as temperature, blood pressure, glucose, and any other labs ordered   - Initiate measures to prevent increased intracranial pressure  - Monitor for seizure activity and implement precautions if appropriate      Outcome: Progressing  Goal: Remains free of injury related to seizures activity  Description  INTERVENTIONS  - Maintain airway, patient safety  and administer oxygen as ordered  - Monitor patient for seizure activity, document and report duration and description of seizure to physician/advanced practitioner  - If seizure occurs,  ensure patient safety during seizure  - Reorient patient post seizure  - Seizure pads on all 4 side rails  - Instruct patient/family to notify RN of any seizure activity including if an aura is experienced  - Instruct patient/family to call for assistance with activity based on nursing assessment  - Administer anti-seizure medications if ordered    Outcome: Progressing  Goal: Achieves maximal functionality and self care  Description  INTERVENTIONS  - Monitor swallowing and airway patency with patient fatigue and changes in neurological status  - Encourage and assist patient to increase activity and self care     - Encourage visually impaired, hearing impaired and aphasic patients to use assistive/communication devices  Outcome: Progressing     Problem: GENITOURINARY - ADULT  Goal: Maintains or returns to baseline urinary function  Description  INTERVENTIONS:  - Assess urinary function  - Encourage oral fluids to ensure adequate hydration if ordered  - Administer IV fluids as ordered to ensure adequate hydration  - Administer ordered medications as needed  - Offer frequent toileting  - Follow urinary retention protocol if ordered  Outcome: Progressing  Goal: Absence of urinary retention  Description  INTERVENTIONS:  - Assess patients ability to void and empty bladder  - Monitor I/O  - Bladder scan as needed  - Discuss with physician/AP medications to alleviate retention as needed  - Discuss catheterization for long term situations as appropriate  Outcome: Progressing     Problem: METABOLIC, FLUID AND ELECTROLYTES - ADULT  Goal: Electrolytes maintained within normal limits  Description  INTERVENTIONS:  - Monitor labs and assess patient for signs and symptoms of electrolyte imbalances  - Administer electrolyte replacement as ordered  - Monitor response to electrolyte replacements, including repeat lab results as appropriate  - Instruct patient on fluid and nutrition as appropriate  Outcome: Progressing  Goal: Fluid balance maintained  Description  INTERVENTIONS:  - Monitor labs   - Monitor I/O and WT  - Instruct patient on fluid and nutrition as appropriate  - Assess for signs & symptoms of volume excess or deficit  Outcome: Progressing  Goal: Glucose maintained within target range  Description  INTERVENTIONS:  - Monitor Blood Glucose as ordered  - Assess for signs and symptoms of hyperglycemia and hypoglycemia  - Administer ordered medications to maintain glucose within target range  - Assess nutritional intake and initiate nutrition service referral as needed  Outcome: Progressing     Problem: HEMATOLOGIC - ADULT  Goal: Maintains hematologic stability  Description  INTERVENTIONS  - Assess for signs and symptoms of bleeding or hemorrhage  - Monitor labs  - Administer supportive blood products/factors as ordered and appropriate  Outcome: Progressing     Problem: MUSCULOSKELETAL - ADULT  Goal: Maintain or return mobility to safest level of function  Description  INTERVENTIONS:  - Assess patient's ability to carry out ADLs; assess patient's baseline for ADL function and identify physical deficits which impact ability to perform ADLs (bathing, care of mouth/teeth, toileting, grooming, dressing, etc )  - Assess/evaluate cause of self-care deficits   - Assess range of motion  - Assess patient's mobility  - Assess patient's need for assistive devices and provide as appropriate  - Encourage maximum independence but intervene and supervise when necessary  - Involve family in performance of ADLs  - Assess for home care needs following discharge   - Consider OT consult to assist with ADL evaluation and planning for discharge  - Provide patient education as appropriate  Outcome: Progressing  Goal: Maintain proper alignment of affected body part  Description  INTERVENTIONS:  - Support, maintain and protect limb and body alignment  - Provide patient/ family with appropriate education  Outcome: Progressing     Problem: SAFETY ADULT  Goal: Patient will remain free of falls  Description  INTERVENTIONS:  - Assess patient frequently for physical needs  -  Identify cognitive and physical deficits and behaviors that affect risk of falls    -  [place] fall precautions as indicated by assessment   - Educate patient/family on patient safety including physical limitations  - Instruct patient to call for assistance with activity based on assessment  - Modify environment to reduce risk of injury  - Consider OT/PT consult to assist with strengthening/mobility  Outcome: Progressing  Goal: Maintain or return to baseline ADL function  Description  INTERVENTIONS:  -  Assess patient's ability to carry out ADLs; assess patient's baseline for ADL function and identify physical deficits which impact ability to perform ADLs (bathing, care of mouth/teeth, toileting, grooming, dressing, etc )  - Assess/evaluate cause of self-care deficits   - Assess range of motion  - Assess patient's mobility; develop plan if impaired  - Assess patient's need for assistive devices and provide as appropriate  - Encourage maximum independence but intervene and supervise when necessary  - Involve family in performance of ADLs  - Assess for home care needs following discharge   - Consider OT consult to assist with ADL evaluation and planning for discharge  - Provide patient education as appropriate  Outcome: Progressing     Problem: Nutrition/Hydration-ADULT  Goal: Nutrient/Hydration intake appropriate for improving, restoring or maintaining nutritional needs  Description  Monitor and assess patient's nutrition/hydration status for malnutrition  Collaborate with interdisciplinary team and initiate plan and interventions as ordered  Monitor patient's weight and dietary intake as ordered or per policy  Utilize nutrition screening tool and intervene as necessary  Determine patient's food preferences and provide high-protein, high-caloric foods as appropriate       INTERVENTIONS:  - Monitor oral intake, urinary output, labs, and treatment plans  - Assess nutrition and hydration status and recommend course of action  - Evaluate amount of meals eaten  - Assist patient with eating if necessary   - Allow adequate time for meals  - Recommend/ encourage appropriate diets, oral nutritional supplements, and vitamin/mineral supplements  - Order, calculate, and assess calorie counts as needed  - Recommend, monitor, and adjust tube feedings and TPN/PPN based on assessed needs  - Assess need for intravenous fluids  - Provide specific nutrition/hydration education as appropriate  - Include patient/family/caregiver in decisions related to nutrition  Outcome: Progressing     Problem: Prexisting or High Potential for Compromised Skin Integrity  Goal: Skin integrity is maintained or improved  Description  INTERVENTIONS:  - Identify patients at risk for skin breakdown  - Assess and monitor skin integrity  - Assess and monitor nutrition and hydration status  - Monitor labs   - Assess for incontinence   - Turn and reposition patient  - Assist with mobility/ambulation  - Relieve pressure over bony prominences  - Avoid friction and shearing  - Provide appropriate hygiene as needed including keeping skin clean and dry  - Evaluate need for skin moisturizer/barrier cream  - Collaborate with interdisciplinary team   - Patient/family teaching  - Consider wound care consult   Outcome: Progressing

## 2019-08-31 NOTE — NURSING NOTE
Pt hypotensive, BP 80/48, 's, asymptomatic  Denies CP or SOB  Denies back pain  Dr Jes Solis made aware of same

## 2019-08-31 NOTE — ASSESSMENT & PLAN NOTE
Due to myelodysplatic syndrome  No acute finding on x ray of spine  Continue opiods for pain control  Having to stop hydroxyurea as he may be having a reaction to it with acute respiratory failure

## 2019-08-31 NOTE — NURSING NOTE
Transferred patient from  into [facility]  Patient Heart rate 160's  SLIM at bedside, speaking with cardiology  Adenosine 6mg ordered  Given at bedside with SLIM  RRNP and ICU nurse assessed patient  Patient heart rate decreased 90-100s  10 Minutes after HR increased to 160s  Per cardiology start Amiodorone drip  RRNP ordered Calcium and amiodarone bolus to control heart rate  Patient resting at this time  Patient oxygen increased from 2L/Min to 6L/Min to maintain O2 saturation of >90%

## 2019-08-31 NOTE — PROGRESS NOTES
Patient had a witnessed and assisted fall by 3 RNs  Patient seen climbing over bed rail to get out of bed and RNs ran to help patient back in bed  Patient helped with knees lowered to the ground and then assisted into a wheelchair  Patient then assisted back into bed  Vitals taken and were stable  Mindy Mann PA-C notified

## 2019-08-31 NOTE — QUICK NOTE
Notified per nursing of initial troponins 13 09 --> 24 40  Recent events reviewed  Patient became acutely tachycardic 's yesterday afternoon, subsequently requiring IV lopressor with transient hypotension  Also CT PE study negative for PE however with concern for aspiration pneumonia for which patient is already receiving treatment  Remains confused at times and combative/impulsive  However has denied any chest pain, shortness of breath  Unfortunately given patient's myelodysplastic disorder and thrombocytopenia he is high risk for bleeding, therefore it is not an option at this time to anticoagulate  Discussed with on-call cardiology  He is already maintained on high intensity statin and beta blocker  Elevated troponin is likely a type 2 NSTEMI due to demand  Continue to trend troponins to peak, formal cardiology consult  Discussed with nursing  Addendum 0230:  CBC notable for WBC 55k, platelets 75U  Patient had received platelet transfusion earlier in admission  Will transfuse 1 unit of platelets, no signs of active bleeding  BNP also notably elevated at 54k, will discontinue further IVF for now  BMP with elevated creatinine, likely due to recent contrast administration/episode of hypotension  Avoid nephrotoxic agents  Vital signs remain stable  Addendum 0400:  Notified per nursing patient with 's  Afebrile  /62  Asymptomatic, no complaints  Will administer 2 5 mg IV lopressor  Upgrade to level 2 stepdown  Discussed with critical care  Addendum 0430:  Patient re-assessed at bedside  Only complaint is chronic back pain  Denies chest pain/palpitations, SOB  Cough is coarse and wet  Will trial 0 5 mg IV dilaudid for breakthrough pain, did receive oxycodone at 0200

## 2019-08-31 NOTE — PROGRESS NOTES
Pt's HR currently 93, appears to be resting comfortably, moaning at times  Medicated with Roxicodone for c/o low back pain  Denies further complaints  VSS
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
## 2019-08-31 NOTE — PROGRESS NOTES
Progress Note - Marisol Sun  1950, 76 y o  male MRN: [id number]    Unit/Bed#: E4 -01 Encounter: [id number]    Primary Care Provider: Rach Longo MD   Date and time admitted to hospital: 2019 10:59 AM        * Intractable back pain  Assessment & Plan  Due to myelodysplatic syndrome  No acute finding on x ray of spine  Continue opiods for pain control  Having to stop hydroxyurea as he may be having a reaction to it with acute respiratory failure    Elevated troponin  Assessment & Plan  Patient has significantly elevated Trop and BNP  Despite relatively normal Echocardiogram  This is thought to be rate related  Cannot treat as an MI with Plat count of 20    Myelodysplasia (myelodysplastic syndrome) (HCC)  Assessment & Plan  Stopping hydroxyurea as above  I let heme / onc know    Acute respiratory failure (HCC)  Assessment & Plan  The last two nights the patient has decompensated with acute respiratory failure and supraventricular tachycardia  Unclear exactly what is causing this  Our current leading diagnosis is that it is a side effect of hydroxyurea  That will be stopped  Case discussed with Dr Tay Leon from cardiology and we gave 2 doses of Adenosine to break the SVT  Case discussed with Dr Deepthi Villegas, will sop hydroxyurea and possibly add steroids      DELLA (acute kidney injury) ([facility] [address] )  Assessment & Plan  Worse over the last few days  Continue IV fluids          Subjective:   Called to see patient  Worsening SOB  Tachycardic with HR in 170s  He had received adenosine and was an amiodarone drip    He is moaning  But he can't tell me what is wrong  He is more lethargic  No chest pain  He is SOB  No nausea or vomiting  He is not as talkative as he has been          Objective:     Vitals:   Temp (24hrs), Av 9 °F (37 2 °C), Min:97 8 °F (36 6 °C), Max:100 °F (37 8 °C)    Temp:  [97 8 °F (36 6 °C)-100 °F (37 8 °C)] 100 °F (37 8 °C)  HR:  [] 105  Resp:  [18-24] 18  BP: ()/(48-77) 105/52  SpO2:  [91 %-98 %] 98 %  Body mass index is 29 9 kg/m²  Input and Output Summary (last 24 hours): Intake/Output Summary (Last 24 hours) at 8/31/2019 1142  Last data filed at 8/31/2019 0445  Gross per 24 hour   Intake 365 ml   Output    Net 365 ml       Physical Exam:     Physical Exam   HENT:   Head: Normocephalic and atraumatic  Eyes: Pupils are equal, round, and reactive to light  EOM are normal    Cardiovascular: Normal rate and regular rhythm  Exam reveals no gallop and no friction rub  No murmur heard  Pulmonary/Chest: He has no wheezes  He has no rales  Coarse BS bilat  No rhonchi  No wheeze   Abdominal: Soft  Bowel sounds are normal  There is no tenderness  Musculoskeletal: He exhibits no edema  Nursing note and vitals reviewed          Additional Data:     Labs:    Results from last 7 days   Lab Units 08/31/19  0845   WBC Thousand/uL 55 19*   HEMOGLOBIN g/dL 7 5*   HEMATOCRIT % 25 2*   PLATELETS Thousands/uL 31*   NEUTROS PCT % 75   LYMPHS PCT % 3*   MONOS PCT % 5   EOS PCT % 0     Results from last 7 days   Lab Units 08/31/19  0845   POTASSIUM mmol/L 4 0   CHLORIDE mmol/L 102   CO2 mmol/L 26   BUN mg/dL 33*   CREATININE mg/dL 1 82*   CALCIUM mg/dL 7 8*         Results from last 7 days   Lab Units 08/31/19  0708 08/30/19  2121 08/30/19  1648 08/30/19  1051 08/30/19  0721 08/29/19  2337 08/29/19  2245 08/29/19  2218 08/29/19  2055 08/29/19  1610 08/29/19  1104 08/29/19  0807   POC GLUCOSE mg/dl 148* 156* 101 100 84 65 64* 60* 50* 150* 97 110               * I Have Reviewed All Lab Data     Recent Cultures (last 7 days):     Results from last 7 days   Lab Units 08/29/19  0056   BLOOD CULTURE  No Growth at 48 hrs  No Growth at 48 hrs           Last 24 Hours Medication List:     Current Facility-Administered Medications:  acetaminophen 650 mg Rectal Q4H PRN Vinita Orange Spatzer, CRNP    acetaminophen 650 mg Oral Q6H PRN [place]a Orange Spatzer, CRNP    amiodarone 1 mg/min Intravenous Continuous Paralee NICOLE Mishra Last Rate: 1 mg/min (08/31/19 0610)   atorvastatin 80 mg Oral HS Krystle Lopez MD    benzonatate 100 mg Oral TID PRN Maryam Powers PA-C    cefepime 2,000 mg Intravenous Q12H Charon [place] Spatzer, CRNP Last Rate: 2,000 mg (08/31/19 0111)   dextran 70-hypromellose 1 drop Both Eyes PRN Krystle Lopez MD    diazepam 5 mg Oral HS PRN Ruslan Barrera MD    docusate sodium 100 mg Oral BID Ruslan Barrera MD    finasteride 5 mg Oral Daily Krystle Lopez MD    fish oil 1,000 mg Oral Daily Ruslan Barrera MD    fluticasone-vilanterol 1 puff Inhalation Daily Ruslan Barrera MD    furosemide 20 mg Intravenous Once Ruslan Barrera MD    insulin glargine 25 Units Subcutaneous QAM Krystle Lopez MD    insulin lispro 1-5 Units Subcutaneous TID AC Krystle Lopez MD    insulin lispro 1-5 Units Subcutaneous HS Ruslan Barrera MD    insulin lispro 10 Units Subcutaneous TID With Meals Ruslan Barrera MD    ipratropium-albuterol 3 mL Nebulization TID PRN Ruslan Barrera MD    lidocaine 1 patch Topical Daily Krystle Lopez MD    methocarbamol 500 mg Oral Q6H PRN Ruslan Barrera MD    methylPREDNISolone sodium succinate 40 mg Intravenous Q6H [address] NICOLE Cooney    metoprolol tartrate 50 mg Oral Q12H [address] Ruslan Barrera MD    metroNIDAZOLE 500 mg Intravenous Q8H Charon [place] Spatzer, CRNP Last Rate: 500 mg (08/31/19 1016)   nicotine 1 patch Transdermal Daily Krystle Lopez MD    ondansetron 4 mg Intravenous Q4H PRN Vincent Blackwood DO    oxyCODONE 10 mg Oral Q4H PRN Ruslan Barrera MD    pantoprazole 40 mg Oral Daily Before Breakfast Ruslan Barrera MD    potassium chloride 20 mEq Oral BID Ruslan Barrera MD    tamsulosin 0 4 mg Oral Daily With Nancy Keita MD    traMADol 50 mg Oral Q8H PRN Ruslan Barrera MD          VTE Pharmacologic Prophylaxis:   Pharmacologic: SCDs      Current Length of Stay: 9 day(s)    Current Patient Status: Inpatient       Discharge Plan:       Code Status: Level 1 - Full Code    Greater than 55 minutes spent  Greater than 50% of time spent coordinating care  Case discussed with Dr Anh Menezes and Dr Tarik Hannah  Today, Patient Was Seen By: Govind King DO    ** Please Note: Dictation voice to text software may have been used in the creation of this document   **

## 2019-08-31 NOTE — ASSESSMENT & PLAN NOTE
The last two nights the patient has decompensated with acute respiratory failure and supraventricular tachycardia  Unclear exactly what is causing this  Our current leading diagnosis is that it is a side effect of hydroxyurea  That will be stopped  Case discussed with Dr Louie Gibson from cardiology and we gave 2 doses of Adenosine to break the SVT  Case discussed with Dr Zenaida Perry, will sop hydroxyurea and possibly add steroids

## 2019-08-31 NOTE — PROGRESS NOTES
Pt had another episode of tachycardia  's, took pressure 112/76, but patient said he didn't feel well  Pt felt diaphoretic and clammy  Ran 12 lead ekg, called attending, paged cardiology  Before Cardiology called back, HR broke, pt went back into a nsr  Instructed by cardiology to give 6mg of adenosine if this happens again, and page them  Pt now comfortable and feeling better

## 2019-08-31 NOTE — ASSESSMENT & PLAN NOTE
Patient has significantly elevated Trop and BNP  Despite relatively normal Echocardiogram  This is thought to be rate related  Cannot treat as an MI with Plat count of 20
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
## 2019-09-01 NOTE — PROGRESS NOTES
Progress Note - Pulmonary   De Bland  76 y o  male MRN: [id number]  Unit/Bed#: ICU 08 Encounter: [id number]    Assessment:  1  Acute hypoxic respiratory failure, improving  2  Abnormal chest CT scan, Currently treating as aspiration pneumonia but suspect most likely lung toxicity from hydroxyurea  3  SVT with elevated troponin  4  Mwelodysplastic syndrome  5  Acute encephalopathy, unknown baseline    Plan:  · Continue oxygen as needed to maintain pulse ox more than 88%  · Keep off hydroxyurea  · Continue Solu-Medrol 40 q 6 hours for today, to consider switching to prednisone 60 mg p o  Tomorrow  · Ideally he would benefit from bronchoscopy blood at this time he is disoriented and no family member or guardian to give consent and he started to show some improvement, also I would not suspect infectious process  · Continue antibiotics for possible aspiration pneumonia for total 7 days  · Out of bed, PT/OT  · Follow with Cardiology for SVT/elevated troponin  · Follow with Heme-Onc for myelodysplastic syndrome    ----------------------------------------------------------------------------------------------------------------------    HPI/Interval History:   Patient denies any pain or shortness of breath, he remains on oxygen 2 L and sometimes tolerating room air    Vitals:   Blood pressure (!) 85/54, pulse 74, temperature (!) 96 6 °F (35 9 °C), temperature source Rectal, resp  rate 14, weight 101 kg (222 lb 10 6 oz), SpO2 98 %  ,Body mass index is 30 2 kg/m²  SpO2: 98 %  SpO2 Activity: At Rest  O2 Device: None (Room air)    Physical Exam:   Gen: Alert and without respiratory distress  Chest:  Equal breath sounds with bibasilar crackles left more than right  Cardiac:  S1-S2 regular  Abdomen: Soft and nontender  Bowel sounds are present    Extremities:  No edema      Labs:    CBC:  Results from last 7 days   Lab Units 09/01/19  0547 08/31/19  0845 08/31/19  0606   WBC Thousand/uL 68 83* 55 19* 54 28*   HEMOGLOBIN g/dL 7 7* 7 5* 8 5*   HEMATOCRIT % 25 0* 25 2* 28 1*   PLATELETS Thousands/uL 33* 31* 35*       CMP:   Results from last 7 days   Lab Units 09/01/19  0547 08/31/19  0845 08/31/19  0606   POTASSIUM mmol/L 4 6 4 0 4 5   CHLORIDE mmol/L 102 102 102   CO2 mmol/L 22 26 23   BUN mg/dL 39* 33* 33*   CREATININE mg/dL 1 75* 1 82* 1 76*   CALCIUM mg/dL 8 5 7 8* 8 0*         Vonnie Puente MD    Portions of the record may have been created with voice recognition software  Occasional wrong word or "sound a like" substitutions may have occurred due to the inherent limitations of voice recognition software  Read the chart carefully and recognize, using context, where substitutions have occurred

## 2019-09-01 NOTE — PROGRESS NOTES
Progress Note - Electrophysiology-Cardiology (EP)   Jacqueline Rose  76 y o  male MRN: [id number]  Unit/Bed#: ICU 08 Encounter: [id number]    Assessment and Plan:    1  SVT  - intermittent episode of SVT with EKG and tele suggestive of atrial tachycardia  -likely being induced due to underlying elevated catecholamine state from his malignancy  -terminated with adenosine on multiple different occasions  -start amiodarone drip to reduce atrial arrhythmias and prevent heart rate from going to 170  -would also start metoprolol to 25 mg Q 8 if the blood pressure can tolerate   -if he has SVT and blood pressure stable would give adenosine to terminate it   -last episode of SVT around 4:00 p m  On 08/31/2019  Spontaneously terminated   -switch amiodarone drip to oral amiodarone 400 t i d  For 5 days then 200 mg daily  -and decrease metoprolol to 12 5 mg q 8 hours if blood pressure can tolerate    2  Type 2 demand ischemia  -troponin elevated after he has been in prolonged SVT rhythm  -he probably has underlying coronary disease however at this time not a candidate for heparin products due to his thrombocytopenia and malignancy  -continue to treat with metoprolol if possible and statin  -discussed with the palliative care to determine goals of care    Subjective/Objective   Yesterday afternoon had episode of tachycardia with heart rate of 140 beats per minute  It terminated spontaneously to sinus rhythm  No arrhythmias since yesterday afternoon  He denies any chest pain, dizziness, shortness of breath  Review of Systems:   Patient's only symptom is back pain  Otherwise review of system is negative        Vitals:    08/30/19 0600 09/01/19 0556   Weight: 100 kg (220 lb 7 4 oz) 101 kg (222 lb 10 6 oz)       Orthostatic Blood Pressures      Most Recent Value   Blood Pressure  97/60 filed at 09/01/2019 8231   Patient Position - Orthostatic VS  Lying filed at 08/31/2019 1150            Intake/Output Summary (Last 24 hours) at 9/1/2019 0904  Last data filed at 9/1/2019 4792  Gross per 24 hour   Intake 1700 54 ml   Output 240 ml   Net 1460 54 ml       Invasive Devices     Peripheral Intravenous Line            Peripheral IV 08/31/19 Left;Upper;Ventral (anterior) Arm 1 day    Peripheral IV 08/31/19 Left;Ventral (anterior) Forearm 1 day                            Objective:   Vitals: BP 97/60   Pulse 76   Temp (!) 96 6 °F (35 9 °C) (Rectal)   Resp 12   Wt 101 kg (222 lb 10 6 oz)   SpO2 96%   BMI 30 20 kg/m²   Physical Exam:  GEN: NAD, Alert and oriented, well appearing  HEENT:Head, neck, ears, oral pharynx: Mucus membranes moist, oral pharynx clear, nares clear  External ears normal  EYES: Pupils equal, sclera anicteric  NECK: No JVD  CARDIOVASCULAR: RRR, No murmur, rub, gallops S1,S2  LUNGS: Clear To auscultation bilaterally  ABDOMEN: Soft, nondistended  EXTREMITIES/VASCULAR: No edema    PSYCH: Normal Affect  NEURO: Grossly intact, moving all extremiteis equal, face symetric  HEME: No bleeding, bruising, petechia  SKIN: No significant rashes     Medications:   Medication Administration - last 24 hours from 08/31/2019 0904 to 09/01/2019 0488       Date/Time Order Dose Route Action Action by     09/01/2019 0830 methocarbamol (ROBAXIN) tablet 500 mg 500 mg Oral Given Toma Christy RN     08/31/2019 1642 methocarbamol (ROBAXIN) tablet 500 mg 500 mg Oral Given Moon Jo RN     08/31/2019 1447 methocarbamol (ROBAXIN) tablet 500 mg   [facility]     08/31/2019 1411 methocarbamol (ROBAXIN) tablet 500 mg   Oral MAR Hold Automatic Transfer Provider     08/31/2019 2138 atorvastatin (LIPITOR) tablet 80 mg 80 mg Oral Given Gustavo Mcardle [facility]     08/31/2019 1447 atorvastatin (LIPITOR) tablet 80 mg   Oral MAR Unhold Vincent Prechtel, DO     08/31/2019 1411 atorvastatin (LIPITOR) tablet 80 mg   Oral MAR Hold Automatic Transfer Provider     09/01/2019 0830 docusate sodium (COLACE) capsule 100 mg 100 mg Oral Given Brien Yun, EYAL     08/31/2019 1801 docusate sodium (COLACE) capsule 100 mg 100 mg Oral Given Ly Roach, RN     08/31/2019 1447 docusate sodium (COLACE) capsule 100 mg   Oral MAR Unhold Vincent Precel, DO     08/31/2019 1411 docusate sodium (COLACE) capsule 100 mg   Oral MAR Hold Automatic Transfer Provider     09/01/2019 0831 finasteride (PROSCAR) tablet 5 mg 5 mg Oral Given Brien Yun, RN     08/31/2019 1447 finasteride (PROSCAR) tablet 5 mg   Oral MAR Unhold Vincent Precel, DO     08/31/2019 1411 finasteride (PROSCAR) tablet 5 mg   Oral MAR Hold Automatic Transfer Provider     08/31/2019 0911 finasteride (PROSCAR) tablet 5 mg 5 mg Oral Given Raymond Blandon RN     09/01/2019 0832 fluticasone-vilanterol (BREO ELLIPTA) 200-25 MCG/INH inhaler 1 puff 1 puff Inhalation Given Brien Yun RN     08/31/2019 1447 fluticasone-vilanterol (BREO ELLIPTA) 200-25 MCG/INH inhaler 1 puff   Inhalation MAR Unhold [facility], DO     08/31/2019 1411 fluticasone-vilanterol (BREO ELLIPTA) 200-25 MCG/INH inhaler 1 puff   Inhalation MAR Hold Automatic Transfer Provider     08/31/2019 1016 fluticasone-vilanterol (BREO ELLIPTA) 200-25 MCG/INH inhaler 1 puff 1 puff Inhalation Given Raymond Blandon RN     08/31/2019 1447 ipratropium-albuterol (DUO-NEB) 0 5-2 5 mg/3 mL inhalation solution 3 mL   Nebulization MAR Unhold [facility], DO     08/31/2019 1411 ipratropium-albuterol (DUO-NEB) 0 5-2 5 mg/3 mL inhalation solution 3 mL   Nebulization MAR Hold Automatic Transfer Provider     09/01/2019 0832 lidocaine (LIDODERM) 5 % patch 1 patch 1 patch Topical Medication Applied Brien Yun RN     08/31/2019 1447 lidocaine (LIDODERM) 5 % patch 1 patch   Topical MAR Unhold [facility], DO     08/31/2019 1411 lidocaine (LIDODERM) 5 % patch 1 patch   Topical MAR Hold Automatic Transfer Provider     08/31/2019 0906 lidocaine (LIDODERM) 5 % patch 1 patch 0 patch Topical Hold Raymond Blandon RN     09/01/2019 0863 metoprolol tartrate (LOPRESSOR) tablet 50 mg 50 mg Oral Given Donnita Coast, RN     08/31/2019 2139 metoprolol tartrate (LOPRESSOR) tablet 50 mg 50 mg Oral Not Given [facility]     08/31/2019 1447 metoprolol tartrate (LOPRESSOR) tablet 50 mg   Oral MAR Unhold [facility], DO     08/31/2019 1411 metoprolol tartrate (LOPRESSOR) tablet 50 mg   Oral MAR Hold Automatic Transfer Provider     09/01/2019 0831 fish oil capsule 1,000 mg 1,000 mg Oral Given Donnita Coast, RN     08/31/2019 1447 fish oil capsule 1,000 mg   Oral MAR Unhold [facility], DO     08/31/2019 1411 fish oil capsule 1,000 mg   Oral MAR Hold Automatic Transfer Provider     08/31/2019 0905 fish oil capsule 1,000 mg 0 mg Oral Hold Ermelinda Vergara, RN     09/01/2019 0700 pantoprazole (PROTONIX) EC tablet 40 mg 0 mg Oral Hold [facility]     09/01/2019 0539 pantoprazole (PROTONIX) EC tablet 40 mg 40 mg Oral Given [facility]     08/31/2019 1447 pantoprazole (PROTONIX) EC tablet 40 mg   Oral MAR Unhold [facility], DO     08/31/2019 1411 pantoprazole (PROTONIX) EC tablet 40 mg   Oral MAR Hold Automatic Transfer Provider     08/31/2019 0911 pantoprazole (PROTONIX) EC tablet 40 mg 40 mg Oral Given Ermelinda Vergara, RN     08/31/2019 1654 tamsulosin (FLOMAX) capsule 0 4 mg 0 4 mg Oral Given Suha García, RN     08/31/2019 1447 tamsulosin (FLOMAX) capsule 0 4 mg   Oral MAR Unhold [facility], DO     08/31/2019 1411 tamsulosin (FLOMAX) capsule 0 4 mg   Oral MAR Hold Automatic Transfer Provider     08/31/2019 1447 dextran 70-hypromellose (GENTEAL TEARS) 0 1-0 3 % ophthalmic solution 1 drop   Both Eyes MAR Unhold Vincent Prechtel, DO     08/31/2019 1411 dextran 70-hypromellose (GENTEAL TEARS) 0 1-0 3 % ophthalmic solution 1 drop   Both Eyes MAR Hold Automatic Transfer Provider     09/01/2019 0158 traMADol (ULTRAM) tablet 50 mg 50 mg Oral Given Luis EATON DEL [facility]     08/31/2019 1447 traMADol (ULTRAM) tablet 50 mg   Oral GHAZAL Blackwood, DO 08/31/2019 1411 traMADol (ULTRAM) tablet 50 mg   Oral MAR Hold Automatic Transfer Provider     09/01/2019 1077 nicotine (NICODERM CQ) 14 mg/24hr TD 24 hr patch 1 patch 1 patch Transdermal Medication Applied Genelamont Henna, RN     08/31/2019 1447 nicotine (NICODERM CQ) 14 mg/24hr TD 24 hr patch 1 patch   Transdermal MAR Unhold [facility], DO     08/31/2019 1411 nicotine (NICODERM CQ) 14 mg/24hr TD 24 hr patch 1 patch   Transdermal MAR Hold Automatic Transfer Provider     08/31/2019 0906 nicotine (NICODERM CQ) 14 mg/24hr TD 24 hr patch 1 patch 0 patch Transdermal Hold Liudmila Juarez, RN     09/01/2019 0750 insulin lispro (HumaLOG) 100 units/mL subcutaneous injection 1-5 Units 4 Units Subcutaneous Given Genelamont Aguillon, RN     08/31/2019 1802 insulin lispro (HumaLOG) 100 units/mL subcutaneous injection 1-5 Units 2 Units Subcutaneous Given Laquita Rivas, RN     08/31/2019 1447 insulin lispro (HumaLOG) 100 units/mL subcutaneous injection 1-5 Units   Subcutaneous MAR Unhold [facility], DO     08/31/2019 1411 insulin lispro (HumaLOG) 100 units/mL subcutaneous injection 1-5 Units   Subcutaneous MAR Hold Automatic Transfer Provider     08/31/2019 1155 insulin lispro (HumaLOG) 100 units/mL subcutaneous injection 1-5 Units 1 Units Subcutaneous Given Liudmila Juarez, RN     08/31/2019 2140 insulin lispro (HumaLOG) 100 units/mL subcutaneous injection 1-5 Units 2 Units Subcutaneous Given [facility] DEL [facility]     08/31/2019 1447 insulin lispro (HumaLOG) 100 units/mL subcutaneous injection 1-5 Units   Subcutaneous MAR Unhold [facility], DO     08/31/2019 1411 insulin lispro (HumaLOG) 100 units/mL subcutaneous injection 1-5 Units   Subcutaneous MAR Hold Automatic Transfer Provider     09/01/2019 0849 potassium chloride (K-DUR,KLOR-CON) CR tablet 20 mEq 20 mEq Oral Not Given Genelamont Aguillon, RN     08/31/2019 1801 potassium chloride (K-DUR,KLOR-CON) CR tablet 20 mEq 20 mEq Oral Given Agapito Santos, RN     08/31/2019 1447 potassium chloride (K-DUR,KLOR-CON) CR tablet 20 mEq   Oral MAR Unhold [facility], DO     08/31/2019 1411 potassium chloride (K-DUR,KLOR-CON) CR tablet 20 mEq   Oral MAR Hold Automatic Transfer Provider     08/31/2019 0911 potassium chloride (K-DUR,KLOR-CON) CR tablet 20 mEq 20 mEq Oral Refused Ning Mcfadden, EYAL     09/01/2019 9878 oxyCODONE (ROXICODONE) immediate release tablet 10 mg 10 mg Oral Given [facility]     09/01/2019 0157 oxyCODONE (ROXICODONE) immediate release tablet 10 mg 0 mg Oral Return to [facility]     08/31/2019 2138 oxyCODONE (ROXICODONE) immediate release tablet 10 mg 10 mg Oral Given [facility]     08/31/2019 1709 oxyCODONE (ROXICODONE) immediate release tablet 10 mg 10 mg Oral Given Smith Bentley RN     08/31/2019 1447 oxyCODONE (ROXICODONE) immediate release tablet 10 mg   Oral MAR Unhold [facility], DO     08/31/2019 1411 oxyCODONE (ROXICODONE) immediate release tablet 10 mg   Oral MAR Hold Automatic Transfer Provider     08/31/2019 1302 oxyCODONE (ROXICODONE) immediate release tablet 10 mg 10 mg Oral Given Ning Mcfadden, RN     09/01/2019 0831 insulin glargine (LANTUS) subcutaneous injection 25 Units 0 25 mL 25 Units Subcutaneous Given Wilfred Jiménez, RN     08/31/2019 1447 insulin glargine (LANTUS) subcutaneous injection 25 Units 0 25 mL   [facility]     08/31/2019 1411 insulin glargine (LANTUS) subcutaneous injection 25 Units 0 25 mL   Subcutaneous MAR Hold Automatic Transfer Provider     08/31/2019 0906 insulin glargine (LANTUS) subcutaneous injection 25 Units 0 25 mL 0 Units Subcutaneous Hold Ning Mcfadden, RN     09/01/2019 0831 insulin lispro (HumaLOG) 100 units/mL subcutaneous injection 10 Units 10 Units Subcutaneous Given Wilfred Jiménez, EYAL     08/31/2019 1805 insulin lispro (HumaLOG) 100 units/mL subcutaneous injection 10 Units 10 Units Subcutaneous Given Smith Bentley RN     08/31/2019 5198 insulin lispro (HumaLOG) 100 units/mL subcutaneous injection 10 Units   Subcutaneous MAR Unhold Vincent Prechtel, DO     08/31/2019 1411 insulin lispro (HumaLOG) 100 units/mL subcutaneous injection 10 Units   Subcutaneous MAR Hold Automatic Transfer Provider     08/31/2019 1155 insulin lispro (HumaLOG) 100 units/mL subcutaneous injection 10 Units 0 Units Subcutaneous Hold Cyndie Munroe RN     08/31/2019 2221 diazepam (VALIUM) tablet 5 mg 5 mg Oral Given [facility] Pillow [facility] DEL [facility]     08/31/2019 1447 diazepam (VALIUM) tablet 5 mg   Oral MAR Unhold Vincent Prechtel, DO     08/31/2019 1411 diazepam (VALIUM) tablet 5 mg   Oral MAR Hold Automatic Transfer Provider     08/31/2019 1447 furosemide (LASIX) injection 20 mg   Intravenous MAR Unhold Vincent Prechtel, DO     08/31/2019 1411 furosemide (LASIX) injection 20 mg   Intravenous MAR Hold Automatic Transfer Provider     08/31/2019 1447 benzonatate (TESSALON PERLES) capsule 100 mg   Oral MAR Unhold Vincent Precel, DO     08/31/2019 1411 benzonatate (TESSALON PERLES) capsule 100 mg   Oral MAR Hold Automatic Transfer Provider     09/01/2019 0830 acetaminophen (TYLENOL) tablet 650 mg 650 mg Oral Given Amaury Ashby RN     08/31/2019 1447 acetaminophen (TYLENOL) tablet 650 mg   Oral MAR Unhold Vincent Precel, DO     08/31/2019 1411 acetaminophen (TYLENOL) tablet 650 mg   Oral MAR Hold Automatic Transfer Provider     08/31/2019 1153 acetaminophen (TYLENOL) tablet 650 mg 650 mg Oral Given Cyndie Munroe RN     09/01/2019 0125 cefepime (MAXIPIME) 2,000 mg in dextrose 5 % 50 mL IVPB 0 mg Intravenous [facility]     09/01/2019 0055 cefepime (MAXIPIME) 2,000 mg in dextrose 5 % 50 mL IVPB 2,000 mg Intravenous [place] [facility]     08/31/2019 1447 cefepime (MAXIPIME) 2,000 mg in dextrose 5 % 50 mL IVPB   Intravenous MAR Unhold Vincent Precel, DO     08/31/2019 1411 cefepime (MAXIPIME) 2,000 mg in dextrose 5 % 50 mL IVPB   Intravenous [facility] Hold Automatic Transfer Provider     08/31/2019 1303 cefepime (MAXIPIME) 2,000 mg in dextrose 5 % 50 mL IVPB 2,000 mg Intravenous [address] Cloyde Fairly, [facility]     09/01/2019 5684 metroNIDAZOLE (FLAGYL) IVPB (premix) 500 mg 500 mg Intravenous New Bag Kareen Lanes, RN     09/01/2019 0229 metroNIDAZOLE (FLAGYL) IVPB (premix) 500 mg 0 mg Intravenous Leidyer Geovany [facility]     09/01/2019 0059 metroNIDAZOLE (FLAGYL) IVPB (premix) 500 mg 500 mg Intravenous New Luis Armando Bradley [facility]     08/31/2019 1730 metroNIDAZOLE (FLAGYL) IVPB (premix) 500 mg 0 mg Intravenous Stopped Laquita Rivas, RN     08/31/2019 1700 metroNIDAZOLE (FLAGYL) IVPB (premix) 500 mg 500 mg Intravenous New Bag Laquita Rivas, RN     08/31/2019 1447 metroNIDAZOLE (FLAGYL) IVPB (premix) 500 mg   Intravenous MAR Unhold Vincent Prechtel, DO     08/31/2019 1411 metroNIDAZOLE (FLAGYL) IVPB (premix) 500 mg   Intravenous MAR Hold Automatic Transfer Provider     08/31/2019 1050 metroNIDAZOLE (FLAGYL) IVPB (premix) 500 mg 0 mg Intravenous Stopped Cloyde Fairly, RN     08/31/2019 1016 metroNIDAZOLE (FLAGYL) IVPB (premix) 500 mg 500 mg Intravenous New [address] [facility] Cloyde Fairly, RN     08/31/2019 1447 acetaminophen (TYLENOL) rectal suppository 650 mg   Rectal MAR Unhold Vincent Prechtel, DO     08/31/2019 1411 acetaminophen (TYLENOL) rectal suppository 650 mg   Rectal MAR Hold Automatic Transfer Provider     08/31/2019 1447 ondansetron (ZOFRAN) injection 4 mg   Intravenous MAR Unhold Vincent Prechtel, DO     08/31/2019 1411 ondansetron (ZOFRAN) injection 4 mg   Intravenous MAR Hold Automatic Transfer Provider     08/31/2019 1000 calcium gluconate 2 g in sodium chloride 0 9 % 100 mL IVPB 0 g Intravenous Stopped Cloyde Fairly, RN     08/31/2019 1053 adenosine (ADENOCARD) injection 6 mg 6 mg Intravenous Given Cloyde Fairly, RN     09/01/2019 0539 methylPREDNISolone sodium succinate (Solu-MEDROL) injection 40 mg 40 mg Intravenous Given Trudy Buchanan [facility]     09/01/2019 0055 methylPREDNISolone sodium succinate (Solu-MEDROL) injection 40 mg 40 mg Intravenous Given [facility]     08/31/2019 1801 methylPREDNISolone sodium succinate (Solu-MEDROL) injection 40 mg 40 mg Intravenous Given [employer], RN     08/31/2019 1447 methylPREDNISolone sodium succinate (Solu-MEDROL) injection 40 mg   Intravenous MAR Unhold Vincent Prechtel, DO     08/31/2019 1411 methylPREDNISolone sodium succinate (Solu-MEDROL) injection 40 mg   Intravenous MAR Hold Automatic Transfer Provider     08/31/2019 1215 methylPREDNISolone sodium succinate (Solu-MEDROL) injection 40 mg 40 mg Intravenous Given [place] Ankit, RN     09/01/2019 7656 amiodarone (CORDARONE) 900 mg in dextrose 5 % 500 mL infusion 0 5 mg/min Intravenous New Bag [facility]     08/31/2019 1420 amiodarone (CORDARONE) 900 mg in dextrose 5 % 500 mL infusion 0 5 mg/min Intravenous [employer], RN     08/31/2019 2121 lactated ringers bolus 500 mL 0 mL Intravenous Chrisandra Haim [facility]     08/31/2019 1921 lactated ringers bolus 500 mL 500 mL Intravenous [place] Ha [facility]     09/01/2019 0128 lactated ringers bolus 500 mL 0 mL [facility]venous [facility]     08/31/2019 2328 lactated ringers bolus 500 mL 500 mL Intravenous New Bag [facility]     09/01/2019 0157 melatonin tablet 3 mg 3 mg Oral Given [facility]     09/01/2019 2079 QUEtiapine (SEROquel) tablet 25 mg 25 mg Oral Given [facility]     09/01/2019 7111 haloperidol lactate (HALDOL) injection 2 mg 2 mg Intramuscular Given [facility]            Current Facility-Administered Medications:     acetaminophen (TYLENOL) rectal suppository 650 mg, 650 mg, Rectal, Q4H PRN, Bennie Robeson, DO    acetaminophen (TYLENOL) tablet 650 mg, 650 mg, Oral, Q6H PRN, Vincent Prechtel, DO, 650 mg at 09/01/19 0830    amiodarone (CORDARONE) 900 mg in dextrose 5 % 500 mL infusion, 0 5 mg/min, Intravenous, Continuous, Vincent Prechtel, DO, Last Rate: 16 7 mL/hr at 09/01/19 0627, 0 5 mg/min at 09/01/19 0627    atorvastatin (LIPITOR) tablet 80 mg, 80 mg, Oral, HS, Vincent Prechtel, DO, 80 mg at 08/31/19 2138    benzonatate (TESSALON PERLES) capsule 100 mg, 100 mg, Oral, TID PRN, Vincent Prechtel, DO, 100 mg at 08/29/19 1014    cefepime (MAXIPIME) 2,000 mg in dextrose 5 % 50 mL IVPB, 2,000 mg, Intravenous, Q12H, Vincent Prechtel, DO, Stopped at 09/01/19 0125    dextran 70-hypromellose (GENTEAL TEARS) 0 1-0 3 % ophthalmic solution 1 drop, 1 drop, Both Eyes, PRN, Vincent Prechtel, DO    diazepam (VALIUM) tablet 5 mg, 5 mg, Oral, HS PRN, Vincent Prechtel, DO, 5 mg at 08/31/19 2221    docusate sodium (COLACE) capsule 100 mg, 100 mg, Oral, BID, Vincent Prechtel, DO, 100 mg at 09/01/19 0830    finasteride (PROSCAR) tablet 5 mg, 5 mg, Oral, Daily, Vincent Prechtel, DO, 5 mg at 09/01/19 0831    fish oil capsule 1,000 mg, 1,000 mg, Oral, Daily, Vincent Prechtel, DO, 1,000 mg at 09/01/19 0831    fluticasone-vilanterol (BREO ELLIPTA) 200-25 MCG/INH inhaler 1 puff, 1 puff, Inhalation, Daily, Vincent Prechtel, DO, 1 puff at 09/01/19 0832    furosemide (LASIX) injection 20 mg, 20 mg, Intravenous, Once, [employer], DO, Stopped at 08/25/19 0900    insulin glargine (LANTUS) subcutaneous injection 25 Units 0 25 mL, 25 Units, Subcutaneous, QAM, Vincent Prechtel, DO, 25 Units at 09/01/19 0831    insulin lispro (HumaLOG) 100 units/mL subcutaneous injection 1-5 Units, 1-5 Units, Subcutaneous, TID AC, 4 Units at 09/01/19 0750 **AND** Fingerstick Glucose (POCT), , , TID AC, Vincent Prechtel, DO    insulin lispro (HumaLOG) 100 units/mL subcutaneous injection 1-5 Units, 1-5 Units, Subcutaneous, HS, Vincent Prechtel, DO, 2 Units at 08/31/19 2140    insulin lispro (HumaLOG) 100 units/mL subcutaneous injection 10 Units, 10 Units, Subcutaneous, TID With Meals, Vincent Prechtel, DO, 10 Units at 09/01/19 0831    ipratropium-albuterol (DUO-NEB) 0 5-2 5 mg/3 mL inhalation solution 3 mL, 3 mL, Nebulization, TID PRN, Vincent Prechtel, DO    lidocaine (LIDODERM) 5 % patch 1 patch, 1 patch, Topical, Daily, Vincent Prechtel, DO, 1 patch at 09/01/19 0832    methocarbamol (ROBAXIN) tablet 500 mg, 500 mg, Oral, Q6H PRN, Vincent Prechtel, DO, 500 mg at 09/01/19 0830    methylPREDNISolone sodium succinate (Solu-MEDROL) injection 40 mg, 40 mg, Intravenous, Q6H [facility], Vincent Prechtel, DO, 40 mg at 09/01/19 0539    metoprolol tartrate (LOPRESSOR) tablet 50 mg, 50 mg, Oral, Q12H [facility], Vincent Prechtel, DO, 50 mg at 09/01/19 0831    metroNIDAZOLE (FLAGYL) IVPB (premix) 500 mg, 500 mg, Intravenous, Q8H, Vincent Prechtel, DO, Last Rate: 200 mL/hr at 09/01/19 0832, 500 mg at 09/01/19 0832    nicotine (NICODERM CQ) 14 mg/24hr TD 24 hr patch 1 patch, 1 patch, Transdermal, Daily, Vincent Prechtel, DO, 1 patch at 09/01/19 0833    ondansetron (ZOFRAN) injection 4 mg, 4 mg, Intravenous, Q4H PRN, Vincent Prechtel, DO, 4 mg at 08/30/19 0824    oxyCODONE (ROXICODONE) immediate release tablet 10 mg, 10 mg, Oral, Q4H PRN, Vincent Prechtel, DO, 10 mg at 09/01/19 0538    pantoprazole (PROTONIX) EC tablet 40 mg, 40 mg, Oral, Daily Before Breakfast, Vincent Prechtel, DO, 40 mg at 09/01/19 0539    potassium chloride (K-DUR,KLOR-CON) CR tablet 20 mEq, 20 mEq, Oral, BID, Vincent Prechtel, DO, 20 mEq at 08/31/19 1801    tamsulosin (FLOMAX) capsule 0 4 mg, 0 4 mg, Oral, Daily With Oswaldo Fan Prechtel, DO, 0 4 mg at 08/31/19 1654    traMADol (ULTRAM) tablet 50 mg, 50 mg, Oral, Q8H PRN, Vincent Precpanfiloel, DO, 50 mg at 09/01/19 0158    Lab Results:   CBC with diff:   Lab Results   Component Value Date    WBC 68 83 (HH) 09/01/2019    HGB 7 7 (L) 09/01/2019    HCT 25 0 (L) 09/01/2019    MCV 93 09/01/2019    PLT 33 (LL) 09/01/2019    MCH 28 6 09/01/2019    MCHC 30 8 (L) 09/01/2019    RDW 18 5 (H) 09/01/2019    MPV 11 7 12/11/2018    NRBC 0 09/01/2019       CMP:  Lab Results   Component Value Date     12/18/2015    K 4 6 09/01/2019    K 3 5 12/18/2015  09/01/2019     12/18/2015    CO2 22 09/01/2019    CO2 23 03/23/2016    ANIONGAP 11 12/18/2015    BUN 39 (H) 09/01/2019    BUN 22 12/18/2015    CREATININE 1 75 (H) 09/01/2019    CREATININE 1 15 12/18/2015    GLUCOSE 126 03/23/2016    GLUCOSE 126 12/18/2015    CALCIUM 8 5 09/01/2019    CALCIUM 9 0 12/18/2015    AST 14 08/22/2019    AST 23 12/18/2015    ALT 12 08/22/2019    ALT 20 12/18/2015    ALKPHOS 106 08/22/2019    ALKPHOS 119 (H) 12/18/2015    PROT 8 5 (H) 12/18/2015    BILITOT 0 49 12/18/2015    EGFR 39 09/01/2019    EGFR 84 7 03/23/2016       BMP:  Results from last 7 days   Lab Units 09/01/19  0547 08/31/19  0845 08/31/19  0606   POTASSIUM mmol/L 4 6 4 0 4 5   CHLORIDE mmol/L 102 102 102   CO2 mmol/L 22 26 23   BUN mg/dL 39* 33* 33*   CREATININE mg/dL 1 75* 1 82* 1 76*   CALCIUM mg/dL 8 5 7 8* 8 0*       Magnesium:   Lab Results   Component Value Date    MG 1 2 (L) 09/01/2019    MG 1 7 09/17/2014       CPK:       Troponin:   Lab Results   Component Value Date    TROPONINI 21 11 (H) 08/31/2019    TROPONINI 0 12 (H) 01/12/2015       BNP: No results found for: BNP    Coags:   Lab Results   Component Value Date    PTT 26 05/12/2018    PTT 28 12/18/2015    INR 1 29 (H) 07/28/2019    INR 1 02 12/18/2015       TSH: No results found for: TSH    Lipid Profile: No results found for: LABLIPI      Imaging:   Ct Abdomen Pelvis Wo Contrast    Addendum Date: 8/14/2019 Addendum:   ADDENDUM: Please note that the impression should read that the lung findings are new since the prior exam     Result Date: 8/14/2019  Narrative: CT ABDOMEN AND PELVIS WITHOUT IV CONTRAST INDICATION:   Abdomen-pelvis trauma, minor, blunt  COMPARISON:  CT from 7/28/2019 TECHNIQUE:  CT examination of the abdomen and pelvis was performed without intravenous contrast   Axial, sagittal, and coronal 2D reformatted images were created from the source data and submitted for interpretation   Radiation dose length product (DLP) for this visit: 727 58 mGy-cm   This examination, like all CT scans performed in the [facility], was performed utilizing techniques to minimize radiation dose exposure, including the use of iterative  reconstruction and automated exposure control  Enteric contrast was not administered  FINDINGS: ABDOMEN LOWER CHEST:  There is a trace right pleural effusion  There are a cluster of nodular opacities within the right middle and lower lobes, which may be infectious or inflammatory nature and appear new since the prior exam  LIVER/BILIARY TREE:  Unremarkable  GALLBLADDER:  There are gallstone(s) within the gallbladder, without pericholecystic inflammatory changes  SPLEEN:  Unremarkable  PANCREAS:  Unremarkable  ADRENAL GLANDS:  Unremarkable  KIDNEYS/URETERS:  Unremarkable  No hydronephrosis  STOMACH AND BOWEL:  Unremarkable  APPENDIX:  No findings to suggest appendicitis  ABDOMINOPELVIC CAVITY:  No ascites or free intraperitoneal air  No lymphadenopathy  VESSELS:  There are atherosclerotic changes of the aorta  There is mild ectasia of the infrarenal abdominal aorta, stable  PELVIS REPRODUCTIVE ORGANS:  Unremarkable for patient's age  URINARY BLADDER:  Unremarkable  ABDOMINAL WALL/INGUINAL REGIONS:  There are fat-containing inguinal hernias bilaterally  OSSEOUS STRUCTURES:  There are multilevel degenerative changes of the spine  Impression: No significant interval change since prior examination  No evidence for obstructive uropathy  Atherosclerotic changes of the aorta with mild ectasia, stable since the prior exam  Workstation performed: [id number]     Xr Chest Portable    Result Date: 8/31/2019  Narrative: CHEST INDICATION:   sob  COMPARISON:  8/29/2019 chest x-ray EXAM PERFORMED/VIEWS:  XR CHEST PORTABLE Single view FINDINGS: Groundglass appearance both lung fields suspicious for vascular congestion, superimposed infiltrates, consistent with previous Mild cardiomegaly No pneumothorax or pleural effusion  Osseous structures appear within normal limits for patient age  Impression: Bilateral groundglass opacities suspicious for vascular congestion/infiltrates, essentially unchanged Workstation performed: [id number]     Xr Chest Portable    Result Date: 8/29/2019  Narrative: CHEST INDICATION:   ? aspiration  COMPARISON:  8/25/2019 EXAM PERFORMED/VIEWS:  XR CHEST PORTABLE FINDINGS: Cardiomediastinal silhouette appears unremarkable  Mild interstitial and central vascular congestion  No pneumothorax or pleural effusion  Osseous structures appear within normal limits for patient age  Impression: Mild interstitial and central vascular congestion  Workstation performed: [id number]     Xr Chest Portable    Result Date: 8/26/2019  Narrative: CHEST INDICATION:   CHF  COMPARISON:  12/10/18 EXAM PERFORMED/VIEWS:  XR CHEST PORTABLE FINDINGS: Cardiomediastinal silhouette appears unremarkable  Trace pulmonary interstitial opacities  No pneumothorax or pleural effusion  Osseous structures appear within normal limits for patient age  Impression: Likely trace pulmonary edema  The study was marked in [facility] for immediate notification  Workstation performed: [id number]     Xr Spine Lumbar 2 Or 3 Views Injury    Result Date: 8/22/2019  Narrative: LUMBAR SPINE INDICATION:   back pain  COMPARISON:  3/27/2018 VIEWS:  XR SPINE LUMBAR 2 OR 3 VIEWS INJURY FINDINGS: There is no evidence of acute fracture or destructive osseous lesion  Alignment is unremarkable  Age-appropriate lumbar degenerative changes are seen with mild spondylosis and disc space narrowing at L5-S1  The pedicles appear intact  A right-sided calcification likely corresponds to a pancreatic head calcification on prior CT  Impression: No acute osseous abnormality  Degenerative changes as described  Workstation performed: [id number]     Xr Foot 3+ Vw Right    Result Date: 8/26/2019  Narrative: RIGHT FOOT INDICATION:   great toe trauma   COMPARISON:  None VIEWS: XR FOOT 3+ VW RIGHT FINDINGS: There is no acute fracture or dislocation  No significant degenerative changes  No lytic or blastic lesions seen  Small plantar calcaneal spur  Mild dorsal metatarsal soft tissue swelling  Impression: No acute osseous abnormality  Workstation performed: [id number]     Vas Lower Limb Arterial Duplex, Complete Bilateral    Result Date: 8/26/2019  Narrative:  [facility] REPORT CLINICAL: Indications:  Right Atherosclerosis with Ulceration [I70 25]  Right lower extremity ulceration at the right ankle  Pt  complaining of right foot pain at rest Risk Factors The patient has history of HTN, Diabetes (Yes) and HLD  Clinical Right Pressure:  130/ mm Hg, Left Pressure:  128/ mm Hg  FINDINGS:  Segment                Right                 Left                                          Impression  PSV  EDV  Impression  PSV  EDV  Common Femoral Artery              116    0              116    0  Prox Profunda                      201    0              147    0  Prox SFA               Occluded    114   17  Occluded    159    0  Mid SFA                Occluded              Occluded              Dist SFA               Occluded              Occluded              Proximal Pop                        83   27               80    3  Distal Pop                          78   15               45    0  Dist Post Tibial                    28    9               55    0  Dist  Ant  Tibial                   35    9               32    0     CONCLUSION: Impression: RIGHT LOWER LIMB: High Grade stenosis versus occlusion in the Proximal, mid and distal superficial femoral artery with reconstitution of flow in the popliteal artery  Ankle/Brachial index: 0 65 (Moderate Range) PVR/ PPG tracings are dampened   Metatarsal pressure of 69mmHg Great toe pressure of 57mmHg, within the healing range  LEFT LOWER LIMB: High Grade stenosis versus occlusion in the Proximal, mid and distal superficial femoral artery with reconstitution of flow in the popliteal artery  Ankle/Brachial index: 0 70 (Moderate Range) PVR/ PPG tracings are dampened  Metatarsal pressure of 97mmHg Great toe pressure of 74mmHg, within the healing range  SIGNATURE: Electronically Signed by: Suzette Miller MD on 2019-08-26 01:53:40 PM    Cta Chest Pe Study    Result Date: 8/30/2019  Narrative: CTA - CHEST WITH IV CONTRAST - PULMONARY ANGIOGRAM INDICATION:   Shortness of breath, nausea and tachycardia  COMPARISON: 8/29/2019 and 9/29/2018  TECHNIQUE: CTA examination of the chest was performed using angiographic technique according to a protocol specifically tailored to evaluate for pulmonary embolism  Axial, sagittal, and coronal 2D reformatted images were created from the source data and  submitted for interpretation  In addition, coronal 3D MIP postprocessing was performed on the acquisition scanner  Radiation dose length product (DLP) for this visit:  482 mGy-cm   This examination, like all CT scans performed in the [facility], was performed utilizing techniques to minimize radiation dose exposure, including the use of iterative reconstruction and automated exposure control  IV Contrast:  85 mL of iohexol (OMNIPAQUE)  FINDINGS: PULMONARY ARTERIAL TREE:  Respiratory motion artifact limits evaluation of segmental and distal order vessels  No filling defect in the proximal pulmonary arteries to suggest acute embolus LUNGS:  Groundglass opacities in the right upper and bilateral lower lobes  Scattered nodular opacities in the right middle and lower lobes and left lower lobe  No evidence of pulmonary interstitial edema  There is no tracheal or endobronchial lesion  PLEURA:  Very small bilateral pleural effusions  HEART/GREAT VESSELS:  Mild cardiomegaly  No thoracic aortic aneurysm  MEDIASTINUM AND BHAKTI:  No lymphadenopathy  CHEST WALL AND LOWER NECK:   Unremarkable   VISUALIZED STRUCTURES IN THE UPPER ABDOMEN:  Calculi partially visualized in the gallbladder fundus and neck without findings to suggest acute cholecystitis  Small hiatal hernia  Stool throughout the visualized portions of the colon  OSSEOUS STRUCTURES:  Right 10th and 11th rib fractures at the costovertebral junction, likely acute to subacute, not present on the prior CT from 8/14/2019  Impression: 1  Groundglass and nodular opacities throughout both lungs, most prominent in the right upper and bilateral lower lobes, suggesting aspiration and/or pneumonia  No evidence of pulmonary interstitial edema  2   Small bilateral pleural effusions  3   No evidence of acute pulmonary embolus  Limited evaluation of distal order branches due to respiratory motion artifact  4   Right 10th and 11th rib fractures at the costovertebral junctions, likely acute to subacute  The study was marked in [facility] for immediate notification  Workstation performed: [id number]     Us Kidney And Bladder    Result Date: 8/23/2019  Narrative: RENAL ULTRASOUND INDICATION:   Urinary tract infection, acute kidney injury  COMPARISON: None TECHNIQUE:   Ultrasound of the complete retroperitoneum was performed with a curvilinear transducer utilizing volumetric sweeps and still imaging techniques  FINDINGS: KIDNEYS: Symmetric and normal size  Right kidney:  10 4 cm  Left kidney:  10 8 cm  Right kidney Normal echogenicity and contour  No suspicious masses detected  No hydronephrosis  No shadowing calculi  No perinephric fluid collections  Left kidney Normal echogenicity and contour  No suspicious masses detected  No hydronephrosis  No shadowing calculi  No perinephric fluid collections  URETERS: Nonvisualized  BLADDER: Normally distended  No focal thickening or mass lesions  Bilateral ureteral jets detected  Impression: Normal  Workstation performed: [id number]         Telemetry:  Episode of atrial tachycardia around 4:00 p m  On 08/31/2019          Counseling / Coordination of Marko Mir MD

## 2019-09-01 NOTE — ASSESSMENT & PLAN NOTE
the patient has decompensated with acute respiratory failure and supraventricular tachycardia  Unclear exactly what is causing this  Our current leading diagnosis is that it is a side effect of hydroxyurea    Hydroxyurea has been stopped and steroids were started  He is doing much better today

## 2019-09-01 NOTE — PLAN OF CARE
Problem: Potential for Falls  Goal: Patient will remain free of falls  Description  INTERVENTIONS:  - Assess patient frequently for physical needs  -  Identify cognitive and physical deficits and behaviors that affect risk of falls    -  [place] fall precautions as indicated by assessment   - Educate patient/family on patient safety including physical limitations  - Instruct patient to call for assistance with activity based on assessment  - Modify environment to reduce risk of injury  - Consider OT/PT consult to assist with strengthening/mobility  Outcome: Progressing     Problem: PAIN - ADULT  Goal: Verbalizes/displays adequate comfort level or baseline comfort level  Description  Interventions:  - Encourage patient to monitor pain and request assistance  - Assess pain using appropriate pain scale  - Administer analgesics based on type and severity of pain and evaluate response  - Implement non-pharmacological measures as appropriate and evaluate response  - Consider cultural and social influences on pain and pain management  - Notify physician/advanced practitioner if interventions unsuccessful or patient reports new pain  Outcome: Progressing     Problem: INFECTION - ADULT  Goal: Absence or prevention of progression during hospitalization  Description  INTERVENTIONS:  - Assess and monitor for signs and symptoms of infection  - Monitor lab/diagnostic results  - Monitor all insertion sites, i e  indwelling lines, tubes, and drains  - Monitor endotracheal if appropriate and nasal secretions for changes in amount and color  - [place] appropriate cooling/warming therapies per order  - Administer medications as ordered  - Instruct and encourage patient and family to use good hand hygiene technique  - Identify and instruct in appropriate isolation precautions for identified infection/condition  Outcome: Progressing  Goal: Absence of fever/infection during neutropenic period  Description  INTERVENTIONS:  - Monitor WBC    Outcome: Progressing     Problem: DISCHARGE PLANNING  Goal: Discharge to home or other facility with appropriate resources  Description  INTERVENTIONS:  - Identify barriers to discharge w/patient and caregiver  - Arrange for needed discharge resources and transportation as appropriate  - Identify discharge learning needs (meds, wound care, etc )  - Arrange for interpretive services to assist at discharge as needed  - Refer to Case Management Department for coordinating discharge planning if the patient needs post-hospital services based on physician/advanced practitioner order or complex needs related to functional status, cognitive ability, or social support system  Outcome: Progressing     Problem: Knowledge Deficit  Goal: Patient/family/caregiver demonstrates understanding of disease process, treatment plan, medications, and discharge instructions  Description  Complete learning assessment and assess knowledge base    Interventions:  - Provide teaching at level of understanding  - Provide teaching via preferred learning methods  Outcome: Progressing     Problem: RESPIRATORY - ADULT  Goal: Achieves optimal ventilation and oxygenation  Description  INTERVENTIONS:  - Assess for changes in respiratory status  - Assess for changes in mentation and behavior  - Position to facilitate oxygenation and minimize respiratory effort  - Oxygen administered by appropriate delivery if ordered  - Initiate smoking cessation education as indicated  - Encourage broncho-pulmonary hygiene including cough, deep breathe, Incentive Spirometry  - Assess the need for suctioning and aspirate as needed  - Assess and instruct to report SOB or any respiratory difficulty  - Respiratory Therapy support as indicated  Outcome: Progressing     Problem: SKIN/TISSUE INTEGRITY - ADULT  Goal: Skin integrity remains intact  Description  INTERVENTIONS  - Identify patients at risk for skin breakdown  - Assess and monitor skin integrity  - Assess and monitor nutrition and hydration status  - Monitor labs (i e  albumin)  - Assess for incontinence   - Turn and reposition patient  - Assist with mobility/ambulation  - Relieve pressure over bony prominences  - Avoid friction and shearing  - Provide appropriate hygiene as needed including keeping skin clean and dry  - Evaluate need for skin moisturizer/barrier cream  - Collaborate with interdisciplinary team (i e  Nutrition, Rehabilitation, etc )   - Patient/family teaching  Outcome: Progressing  Goal: Incision(s), wounds(s) or drain site(s) healing without S/S of infection  Description  INTERVENTIONS  - Assess and document risk factors for skin impairment   - Assess and document dressing, incision, wound bed, drain sites and surrounding tissue  - Consider nutrition services referral as needed  - Oral mucous membranes remain intact  - Provide patient/ family education  Outcome: Progressing     Problem: NEUROSENSORY - ADULT  Goal: Achieves stable or improved neurological status  Description  INTERVENTIONS  - Monitor and report changes in neurological status  - Monitor vital signs such as temperature, blood pressure, glucose, and any other labs ordered   - Initiate measures to prevent increased intracranial pressure  - Monitor for seizure activity and implement precautions if appropriate      Outcome: Progressing  Goal: Remains free of injury related to seizures activity  Description  INTERVENTIONS  - Maintain airway, patient safety  and administer oxygen as ordered  - Monitor patient for seizure activity, document and report duration and description of seizure to physician/advanced practitioner  - If seizure occurs,  ensure patient safety during seizure  - Reorient patient post seizure  - Seizure pads on all 4 side rails  - Instruct patient/family to notify RN of any seizure activity including if an aura is experienced  - Instruct patient/family to call for assistance with activity based on nursing assessment  - Administer anti-seizure medications if ordered    Outcome: Progressing  Goal: Achieves maximal functionality and self care  Description  INTERVENTIONS  - Monitor swallowing and airway patency with patient fatigue and changes in neurological status  - Encourage and assist patient to increase activity and self care     - Encourage visually impaired, hearing impaired and aphasic patients to use assistive/communication devices  Outcome: Progressing     Problem: GENITOURINARY - ADULT  Goal: Maintains or returns to baseline urinary function  Description  INTERVENTIONS:  - Assess urinary function  - Encourage oral fluids to ensure adequate hydration if ordered  - Administer IV fluids as ordered to ensure adequate hydration  - Administer ordered medications as needed  - Offer frequent toileting  - Follow urinary retention protocol if ordered  Outcome: Progressing  Goal: Absence of urinary retention  Description  INTERVENTIONS:  - Assess patients ability to void and empty bladder  - Monitor I/O  - Bladder scan as needed  - Discuss with physician/AP medications to alleviate retention as needed  - Discuss catheterization for long term situations as appropriate  Outcome: Progressing     Problem: METABOLIC, FLUID AND ELECTROLYTES - ADULT  Goal: Electrolytes maintained within normal limits  Description  INTERVENTIONS:  - Monitor labs and assess patient for signs and symptoms of electrolyte imbalances  - Administer electrolyte replacement as ordered  - Monitor response to electrolyte replacements, including repeat lab results as appropriate  - Instruct patient on fluid and nutrition as appropriate  Outcome: Progressing  Goal: Fluid balance maintained  Description  INTERVENTIONS:  - Monitor labs   - Monitor I/O and WT  - Instruct patient on fluid and nutrition as appropriate  - Assess for signs & symptoms of volume excess or deficit  Outcome: Progressing  Goal: Glucose maintained within target range  Description  INTERVENTIONS:  - Monitor Blood Glucose as ordered  - Assess for signs and symptoms of hyperglycemia and hypoglycemia  - Administer ordered medications to maintain glucose within target range  - Assess nutritional intake and initiate nutrition service referral as needed  Outcome: Progressing     Problem: HEMATOLOGIC - ADULT  Goal: Maintains hematologic stability  Description  INTERVENTIONS  - Assess for signs and symptoms of bleeding or hemorrhage  - Monitor labs  - Administer supportive blood products/factors as ordered and appropriate  Outcome: Progressing     Problem: MUSCULOSKELETAL - ADULT  Goal: Maintain or return mobility to safest level of function  Description  INTERVENTIONS:  - Assess patient's ability to carry out ADLs; assess patient's baseline for ADL function and identify physical deficits which impact ability to perform ADLs (bathing, care of mouth/teeth, toileting, grooming, dressing, etc )  - Assess/evaluate cause of self-care deficits   - Assess range of motion  - Assess patient's mobility  - Assess patient's need for assistive devices and provide as appropriate  - Encourage maximum independence but intervene and supervise when necessary  - Involve family in performance of ADLs  - Assess for home care needs following discharge   - Consider OT consult to assist with ADL evaluation and planning for discharge  - Provide patient education as appropriate  Outcome: Progressing  Goal: Maintain proper alignment of affected body part  Description  INTERVENTIONS:  - Support, maintain and protect limb and body alignment  - Provide patient/ family with appropriate education  Outcome: Progressing     Problem: SAFETY ADULT  Goal: Patient will remain free of falls  Description  INTERVENTIONS:  - Assess patient frequently for physical needs  -  Identify cognitive and physical deficits and behaviors that affect risk of falls    -  [place] fall precautions as indicated by assessment   - Educate patient/family on patient safety including physical limitations  - Instruct patient to call for assistance with activity based on assessment  - Modify environment to reduce risk of injury  - Consider OT/PT consult to assist with strengthening/mobility  Outcome: Progressing  Goal: Maintain or return to baseline ADL function  Description  INTERVENTIONS:  -  Assess patient's ability to carry out ADLs; assess patient's baseline for ADL function and identify physical deficits which impact ability to perform ADLs (bathing, care of mouth/teeth, toileting, grooming, dressing, etc )  - Assess/evaluate cause of self-care deficits   - Assess range of motion  - Assess patient's mobility; develop plan if impaired  - Assess patient's need for assistive devices and provide as appropriate  - Encourage maximum independence but intervene and supervise when necessary  - Involve family in performance of ADLs  - Assess for home care needs following discharge   - Consider OT consult to assist with ADL evaluation and planning for discharge  - Provide patient education as appropriate  Outcome: Progressing     Problem: Nutrition/Hydration-ADULT  Goal: Nutrient/Hydration intake appropriate for improving, restoring or maintaining nutritional needs  Description  Monitor and assess patient's nutrition/hydration status for malnutrition  Collaborate with interdisciplinary team and initiate plan and interventions as ordered  Monitor patient's weight and dietary intake as ordered or per policy  Utilize nutrition screening tool and intervene as necessary  Determine patient's food preferences and provide high-protein, high-caloric foods as appropriate       INTERVENTIONS:  - Monitor oral intake, urinary output, labs, and treatment plans  - Assess nutrition and hydration status and recommend course of action  - Evaluate amount of meals eaten  - Assist patient with eating if necessary   - Allow adequate time for meals  - Recommend/ encourage appropriate diets, oral nutritional supplements, and vitamin/mineral supplements  - Order, calculate, and assess calorie counts as needed  - Recommend, monitor, and adjust tube feedings and TPN/PPN based on assessed needs  - Assess need for intravenous fluids  - Provide specific nutrition/hydration education as appropriate  - Include patient/family/caregiver in decisions related to nutrition  Outcome: Progressing     Problem: Prexisting or High Potential for Compromised Skin Integrity  Goal: Skin integrity is maintained or improved  Description  INTERVENTIONS:  - Identify patients at risk for skin breakdown  - Assess and monitor skin integrity  - Assess and monitor nutrition and hydration status  - Monitor labs   - Assess for incontinence   - Turn and reposition patient  - Assist with mobility/ambulation  - Relieve pressure over bony prominences  - Avoid friction and shearing  - Provide appropriate hygiene as needed including keeping skin clean and dry  - Evaluate need for skin moisturizer/barrier cream  - Collaborate with interdisciplinary team   - Patient/family teaching  - Consider wound care consult   Outcome: Progressing

## 2019-09-01 NOTE — PROGRESS NOTES
Progress Note - Yusuf Mcneil 1950, 76 y o  male MRN: [id number]    Unit/Bed#: ICU 08 Encounter: [id number]    Primary Care Provider: Chepe Farris MD   Date and time admitted to hospital: 2019 10:59 AM        * Intractable back pain  Assessment & Plan  Due to myelodysplatic syndrome  No acute finding on x ray of spine  Continue opiods for pain control  Having to stop hydroxyurea as he may be having a reaction to it with acute respiratory failure    Myelodysplasia (myelodysplastic syndrome) (HCC)  Assessment & Plan  Stopping hydroxyurea as above  I let heme / onc know    Acute respiratory failure ([facility] [address] )  Assessment & Plan  the patient has decompensated with acute respiratory failure and supraventricular tachycardia  Unclear exactly what is causing this  Our current leading diagnosis is that it is a side effect of hydroxyurea  Hydroxyurea has been stopped and steroids were started  He is doing much better today    Encounter for competency evaluation  Assessment & Plan  Seen by neuropsych  Not thought to be able to make his own decisions  Will see what we have to do as far as guardianship          Subjective:   Feels much better  Still complaining that he needs to leave to pay his rent  Less sob  Much more awake  Objective:     Vitals:   Temp (24hrs), Av 5 °F (35 8 °C), Min:95 5 °F (35 3 °C), Max:97 2 °F (36 2 °C)    Temp:  [95 5 °F (35 3 °C)-97 2 °F (36 2 °C)] 96 7 °F (35 9 °C)  HR:  [] 78  Resp:  [10-25] 16  BP: ()/(44-68) 93/53  SpO2:  [92 %-98 %] 96 %  Body mass index is 30 2 kg/m²  Input and Output Summary (last 24 hours): Intake/Output Summary (Last 24 hours) at 2019 1719  Last data filed at 2019 0625  Gross per 24 hour   Intake 1700 54 ml   Output 240 ml   Net 1460 54 ml       Physical Exam:     Physical Exam   HENT:   Head: Normocephalic and atraumatic  Eyes: Pupils are equal, round, and reactive to light   EOM are normal    Cardiovascular: Normal rate and regular rhythm  Exam reveals no gallop and no friction rub  No murmur heard  Pulmonary/Chest: Effort normal and breath sounds normal  He has no wheezes  He has no rales  Abdominal: Soft  Bowel sounds are normal  There is no tenderness  Musculoskeletal: He exhibits no edema  Nursing note and vitals reviewed          Additional Data:     Labs:    Results from last 7 days   Lab Units 09/01/19  0547 08/31/19  0845   WBC Thousand/uL 68 83* 55 19*   HEMOGLOBIN g/dL 7 7* 7 5*   HEMATOCRIT % 25 0* 25 2*   PLATELETS Thousands/uL 33* 31*   NEUTROS PCT %  --  75   LYMPHS PCT %  --  3*   LYMPHO PCT % 1*  --    MONOS PCT %  --  5   MONO PCT % 2*  --    EOS PCT % 0 0     Results from last 7 days   Lab Units 09/01/19  0547   POTASSIUM mmol/L 4 6   CHLORIDE mmol/L 102   CO2 mmol/L 22   BUN mg/dL 39*   CREATININE mg/dL 1 75*   CALCIUM mg/dL 8 5         Results from last 7 days   Lab Units 09/01/19  1121 09/01/19  0748 09/01/19  0001 08/31/19  2137 08/31/19  1645 08/31/19  1550 08/31/19  1152 08/31/19  0708 08/30/19  2121 08/30/19  1648 08/30/19  1051 08/30/19  0721   POC GLUCOSE mg/dl 248* 353* 277* 274* 243* 219* 185* 148* 156* 101 100 84               * I Have Reviewed All Lab Data     Recent Cultures (last 7 days):     Results from last 7 days   Lab Units 08/29/19  0056   BLOOD CULTURE  No Growth at 72 hrs  No Growth at 72 hrs           Last 24 Hours Medication List:     Current Facility-Administered Medications:  acetaminophen 650 mg Rectal Q4H PRN Saqib , DO    acetaminophen 650 mg Oral Q6H PRN Vincent Prechtel, DO    amiodarone 400 mg Oral TID With Meals RICHARDSON Joseph    atorvastatin 80 mg Oral HS Vincent Prechtel, DO    benzonatate 100 mg Oral TID PRN Vincent Prechtel, DO    cefepime 2,000 mg Intravenous Q12H Vincent Prechtel, DO Last Rate: 2,000 mg (09/01/19 1333)   dextran 70-hypromellose 1 drop Both Eyes PRN Vincent Prechtel, DO    diazepam 5 mg Oral HS PRN Saqib Stanley,  docusate sodium 100 mg Oral BID Vincent Prechtel, DO    finasteride 5 mg Oral Daily Vincent Prechtel, DO    fish oil 1,000 mg Oral Daily Vincent Prechtel, DO    fluticasone-vilanterol 1 puff Inhalation Daily Vincent Prechtel, DO    furosemide 20 mg Intravenous Once [employer], DO    haloperidol lactate 2 5 mg Intramuscular Q6H PRN Claressa , CRNP    insulin glargine 25 Units Subcutaneous QAM Vincent Prechtel, DO    insulin lispro 1-5 Units Subcutaneous TID AC Vincent Prechtel, DO    insulin lispro 1-5 Units Subcutaneous HS Vincent Prechtel, DO    insulin lispro 10 Units Subcutaneous TID With Meals Vincent Prechtel, DO    ipratropium-albuterol 3 mL Nebulization TID PRN [employer], DO    lidocaine 1 patch Topical Daily Vincent Prechtel, DO    methocarbamol 500 mg Oral Q6H PRN Vincent Prechtel, DO    methylPREDNISolone sodium succinate 40 mg Intravenous Q6H [facility] Vincent Precht, DO    metoprolol tartrate 50 mg Oral Q12H [facility] Vincent Precht, DO    metroNIDAZOLE 500 mg Intravenous Q8H Vincent Prechtel, DO Last Rate: 500 mg (09/01/19 1652)   nicotine 1 patch Transdermal Daily Vincent Prechtel, DO    ondansetron 4 mg Intravenous Q4H PRN Vincent Prechtel, DO    oxyCODONE 10 mg Oral Q4H PRN Vincent Prechtel, DO    pantoprazole 40 mg Oral Daily Before Breakfast [facility], DO    potassium chloride 20 mEq Oral BID Vincent Prechtel, DO    tamsulosin 0 4 mg Oral Daily With [employer], DO    traMADol 50 mg Oral Q8H PRN Vincent Prechtel, DO          VTE Pharmacologic Prophylaxis:   Pharmacologic: none with thrombocytopenia      Current Length of Stay: 10 day(s)    Current Patient Status: Inpatient       Discharge Plan: needs a guardian    Code Status: Level 1 - Full Code           Today, Patient Was Seen By: Lizeth Ramachandran DO    ** Please Note: Dictation voice to text software may have been used in the creation of this document   **

## 2019-09-02 PROBLEM — G93.40 ACUTE ENCEPHALOPATHY: Status: ACTIVE | Noted: 2019-01-01

## 2019-09-02 PROBLEM — J96.01 ACUTE RESPIRATORY FAILURE WITH HYPOXIA (HCC): Status: ACTIVE | Noted: 2019-01-01

## 2019-09-02 PROBLEM — Z72.0 TOBACCO ABUSE: Status: ACTIVE | Noted: 2019-01-01

## 2019-09-02 PROBLEM — I47.1 SVT (SUPRAVENTRICULAR TACHYCARDIA) (HCC): Status: ACTIVE | Noted: 2019-01-01

## 2019-09-02 PROBLEM — N18.30 ACUTE RENAL FAILURE WITH ACUTE TUBULAR NECROSIS SUPERIMPOSED ON STAGE 3 CHRONIC KIDNEY DISEASE (HCC): Status: ACTIVE | Noted: 2019-01-01

## 2019-09-02 PROBLEM — N17.0 ACUTE RENAL FAILURE WITH ACUTE TUBULAR NECROSIS SUPERIMPOSED ON STAGE 3 CHRONIC KIDNEY DISEASE (HCC): Status: ACTIVE | Noted: 2019-01-01

## 2019-09-02 NOTE — PLAN OF CARE
Problem: Potential for Falls  Goal: Patient will remain free of falls  Description  INTERVENTIONS:  - Assess patient frequently for physical needs  -  Identify cognitive and physical deficits and behaviors that affect risk of falls    -  [place] fall precautions as indicated by assessment   - Educate patient/family on patient safety including physical limitations  - Instruct patient to call for assistance with activity based on assessment  - Modify environment to reduce risk of injury  - Consider OT/PT consult to assist with strengthening/mobility  Outcome: Progressing     Problem: PAIN - ADULT  Goal: Verbalizes/displays adequate comfort level or baseline comfort level  Description  Interventions:  - Encourage patient to monitor pain and request assistance  - Assess pain using appropriate pain scale  - Administer analgesics based on type and severity of pain and evaluate response  - Implement non-pharmacological measures as appropriate and evaluate response  - Consider cultural and social influences on pain and pain management  - Notify physician/advanced practitioner if interventions unsuccessful or patient reports new pain  Outcome: Progressing     Problem: INFECTION - ADULT  Goal: Absence or prevention of progression during hospitalization  Description  INTERVENTIONS:  - Assess and monitor for signs and symptoms of infection  - Monitor lab/diagnostic results  - Monitor all insertion sites, i e  indwelling lines, tubes, and drains  - Monitor endotracheal if appropriate and nasal secretions for changes in amount and color  - [place] appropriate cooling/warming therapies per order  - Administer medications as ordered  - Instruct and encourage patient and family to use good hand hygiene technique  - Identify and instruct in appropriate isolation precautions for identified infection/condition  Outcome: Progressing  Goal: Absence of fever/infection during neutropenic period  Description  INTERVENTIONS:  - Monitor WBC    Outcome: Progressing     Problem: DISCHARGE PLANNING  Goal: Discharge to home or other facility with appropriate resources  Description  INTERVENTIONS:  - Identify barriers to discharge w/patient and caregiver  - Arrange for needed discharge resources and transportation as appropriate  - Identify discharge learning needs (meds, wound care, etc )  - Arrange for interpretive services to assist at discharge as needed  - Refer to Case Management Department for coordinating discharge planning if the patient needs post-hospital services based on physician/advanced practitioner order or complex needs related to functional status, cognitive ability, or social support system  Outcome: Progressing     Problem: Knowledge Deficit  Goal: Patient/family/caregiver demonstrates understanding of disease process, treatment plan, medications, and discharge instructions  Description  Complete learning assessment and assess knowledge base    Interventions:  - Provide teaching at level of understanding  - Provide teaching via preferred learning methods  Outcome: Progressing     Problem: RESPIRATORY - ADULT  Goal: Achieves optimal ventilation and oxygenation  Description  INTERVENTIONS:  - Assess for changes in respiratory status  - Assess for changes in mentation and behavior  - Position to facilitate oxygenation and minimize respiratory effort  - Oxygen administered by appropriate delivery if ordered  - Initiate smoking cessation education as indicated  - Encourage broncho-pulmonary hygiene including cough, deep breathe, Incentive Spirometry  - Assess the need for suctioning and aspirate as needed  - Assess and instruct to report SOB or any respiratory difficulty  - Respiratory Therapy support as indicated  Outcome: Progressing     Problem: SKIN/TISSUE INTEGRITY - ADULT  Goal: Skin integrity remains intact  Description  INTERVENTIONS  - Identify patients at risk for skin breakdown  - Assess and monitor skin integrity  - Assess and monitor nutrition and hydration status  - Monitor labs (i e  albumin)  - Assess for incontinence   - Turn and reposition patient  - Assist with mobility/ambulation  - Relieve pressure over bony prominences  - Avoid friction and shearing  - Provide appropriate hygiene as needed including keeping skin clean and dry  - Evaluate need for skin moisturizer/barrier cream  - Collaborate with interdisciplinary team (i e  Nutrition, Rehabilitation, etc )   - Patient/family teaching  Outcome: Progressing  Goal: Incision(s), wounds(s) or drain site(s) healing without S/S of infection  Description  INTERVENTIONS  - Assess and document risk factors for skin impairment   - Assess and document dressing, incision, wound bed, drain sites and surrounding tissue  - Consider nutrition services referral as needed  - Oral mucous membranes remain intact  - Provide patient/ family education  Outcome: Progressing     Problem: NEUROSENSORY - ADULT  Goal: Achieves stable or improved neurological status  Description  INTERVENTIONS  - Monitor and report changes in neurological status  - Monitor vital signs such as temperature, blood pressure, glucose, and any other labs ordered   - Initiate measures to prevent increased intracranial pressure  - Monitor for seizure activity and implement precautions if appropriate      Outcome: Progressing  Goal: Remains free of injury related to seizures activity  Description  INTERVENTIONS  - Maintain airway, patient safety  and administer oxygen as ordered  - Monitor patient for seizure activity, document and report duration and description of seizure to physician/advanced practitioner  - If seizure occurs,  ensure patient safety during seizure  - Reorient patient post seizure  - Seizure pads on all 4 side rails  - Instruct patient/family to notify RN of any seizure activity including if an aura is experienced  - Instruct patient/family to call for assistance with activity based on nursing assessment  - Administer anti-seizure medications if ordered    Outcome: Progressing  Goal: Achieves maximal functionality and self care  Description  INTERVENTIONS  - Monitor swallowing and airway patency with patient fatigue and changes in neurological status  - Encourage and assist patient to increase activity and self care     - Encourage visually impaired, hearing impaired and aphasic patients to use assistive/communication devices  Outcome: Progressing     Problem: GENITOURINARY - ADULT  Goal: Maintains or returns to baseline urinary function  Description  INTERVENTIONS:  - Assess urinary function  - Encourage oral fluids to ensure adequate hydration if ordered  - Administer IV fluids as ordered to ensure adequate hydration  - Administer ordered medications as needed  - Offer frequent toileting  - Follow urinary retention protocol if ordered  Outcome: Progressing  Goal: Absence of urinary retention  Description  INTERVENTIONS:  - Assess patients ability to void and empty bladder  - Monitor I/O  - Bladder scan as needed  - Discuss with physician/AP medications to alleviate retention as needed  - Discuss catheterization for long term situations as appropriate  Outcome: Progressing     Problem: METABOLIC, FLUID AND ELECTROLYTES - ADULT  Goal: Electrolytes maintained within normal limits  Description  INTERVENTIONS:  - Monitor labs and assess patient for signs and symptoms of electrolyte imbalances  - Administer electrolyte replacement as ordered  - Monitor response to electrolyte replacements, including repeat lab results as appropriate  - Instruct patient on fluid and nutrition as appropriate  Outcome: Progressing  Goal: Fluid balance maintained  Description  INTERVENTIONS:  - Monitor labs   - Monitor I/O and WT  - Instruct patient on fluid and nutrition as appropriate  - Assess for signs & symptoms of volume excess or deficit  Outcome: Progressing  Goal: Glucose maintained within target range  Description  INTERVENTIONS:  - Monitor Blood Glucose as ordered  - Assess for signs and symptoms of hyperglycemia and hypoglycemia  - Administer ordered medications to maintain glucose within target range  - Assess nutritional intake and initiate nutrition service referral as needed  Outcome: Progressing     Problem: HEMATOLOGIC - ADULT  Goal: Maintains hematologic stability  Description  INTERVENTIONS  - Assess for signs and symptoms of bleeding or hemorrhage  - Monitor labs  - Administer supportive blood products/factors as ordered and appropriate  Outcome: Progressing     Problem: MUSCULOSKELETAL - ADULT  Goal: Maintain or return mobility to safest level of function  Description  INTERVENTIONS:  - Assess patient's ability to carry out ADLs; assess patient's baseline for ADL function and identify physical deficits which impact ability to perform ADLs (bathing, care of mouth/teeth, toileting, grooming, dressing, etc )  - Assess/evaluate cause of self-care deficits   - Assess range of motion  - Assess patient's mobility  - Assess patient's need for assistive devices and provide as appropriate  - Encourage maximum independence but intervene and supervise when necessary  - Involve family in performance of ADLs  - Assess for home care needs following discharge   - Consider OT consult to assist with ADL evaluation and planning for discharge  - Provide patient education as appropriate  Outcome: Progressing  Goal: Maintain proper alignment of affected body part  Description  INTERVENTIONS:  - Support, maintain and protect limb and body alignment  - Provide patient/ family with appropriate education  Outcome: Progressing     Problem: SAFETY ADULT  Goal: Patient will remain free of falls  Description  INTERVENTIONS:  - Assess patient frequently for physical needs  -  Identify cognitive and physical deficits and behaviors that affect risk of falls    -  [place] fall precautions as indicated by assessment   - Educate patient/family on patient safety including physical limitations  - Instruct patient to call for assistance with activity based on assessment  - Modify environment to reduce risk of injury  - Consider OT/PT consult to assist with strengthening/mobility  Outcome: Progressing  Goal: Maintain or return to baseline ADL function  Description  INTERVENTIONS:  -  Assess patient's ability to carry out ADLs; assess patient's baseline for ADL function and identify physical deficits which impact ability to perform ADLs (bathing, care of mouth/teeth, toileting, grooming, dressing, etc )  - Assess/evaluate cause of self-care deficits   - Assess range of motion  - Assess patient's mobility; develop plan if impaired  - Assess patient's need for assistive devices and provide as appropriate  - Encourage maximum independence but intervene and supervise when necessary  - Involve family in performance of ADLs  - Assess for home care needs following discharge   - Consider OT consult to assist with ADL evaluation and planning for discharge  - Provide patient education as appropriate  Outcome: Progressing     Problem: Nutrition/Hydration-ADULT  Goal: Nutrient/Hydration intake appropriate for improving, restoring or maintaining nutritional needs  Description  Monitor and assess patient's nutrition/hydration status for malnutrition  Collaborate with interdisciplinary team and initiate plan and interventions as ordered  Monitor patient's weight and dietary intake as ordered or per policy  Utilize nutrition screening tool and intervene as necessary  Determine patient's food preferences and provide high-protein, high-caloric foods as appropriate       INTERVENTIONS:  - Monitor oral intake, urinary output, labs, and treatment plans  - Assess nutrition and hydration status and recommend course of action  - Evaluate amount of meals eaten  - Assist patient with eating if necessary   - Allow adequate time for meals  - Recommend/ encourage appropriate diets, oral nutritional supplements, and vitamin/mineral supplements  - Order, calculate, and assess calorie counts as needed  - Recommend, monitor, and adjust tube feedings and TPN/PPN based on assessed needs  - Assess need for intravenous fluids  - Provide specific nutrition/hydration education as appropriate  - Include patient/family/caregiver in decisions related to nutrition  Outcome: Progressing     Problem: Prexisting or High Potential for Compromised Skin Integrity  Goal: Skin integrity is maintained or improved  Description  INTERVENTIONS:  - Identify patients at risk for skin breakdown  - Assess and monitor skin integrity  - Assess and monitor nutrition and hydration status  - Monitor labs   - Assess for incontinence   - Turn and reposition patient  - Assist with mobility/ambulation  - Relieve pressure over bony prominences  - Avoid friction and shearing  - Provide appropriate hygiene as needed including keeping skin clean and dry  - Evaluate need for skin moisturizer/barrier cream  - Collaborate with interdisciplinary team   - Patient/family teaching  - Consider wound care consult   Outcome: Progressing

## 2019-09-02 NOTE — ASSESSMENT & PLAN NOTE
Patient initially presented with intractable back pain secondary to myelodysplastic syndrome  No acute finding on x ray of spine  Continue opiods for pain control

## 2019-09-02 NOTE — ASSESSMENT & PLAN NOTE
Possibly secondary to aspiration versus likely lung toxicity secondary to hydroxyurea  Per pulmonology, changed from Solu-Medrol to prednisone 60 mg daily with slow taper by 10 mg once weekly  Continue IV cefepime and Flagyl and continue for at least 7 days  (day 5/7)  Start Bactrim after completion of antibiotics for aspiration pneumonia  As per pulmonology, would consider bronchoscopy but given patient's mental status and lack of competent to make his own medical decisions and absence of a guardian to act on his behalf, there will hold off on bronchoscopy and continue with steroids and follow-up imaging with chest x-ray as well as outpatient pulmonology follow-up in 3 weeks

## 2019-09-02 NOTE — ASSESSMENT & PLAN NOTE
Patient was on hydroxyurea for myelodysplasia which has since been discontinued secondary to possible side effects causing acute respiratory failure with hypoxia  Hematology/oncology is aware of discontinuation of hydroxyurea

## 2019-09-02 NOTE — ASSESSMENT & PLAN NOTE
Patient's acute decompensation with acute respiratory failure with hypoxia thought to be secondary to side effect of hydroxyurea  Hydroxyurea since been discontinued  Patient improving on steroids-Solu-Medrol 40 mg IV q 6 hours  As per pulmonology, patient has been transitioned to prednisone 60 mg once daily with a slow taper by 10 mg every week  Patient is currently oxygenating well on room air 93%  Continue supplemental O2 to maintain O2 sats >88%  Continue pulmonary toilet as able, incentive spirometry, increased time out of bed  To consider home O2 eval prior to discharge

## 2019-09-02 NOTE — ASSESSMENT & PLAN NOTE
Thought to be due to non MI troponin elevation secondary to demand ischemia from prolonged SVT  Patient is thought to have underlying CAD but is not thought to be a candidate for heparin anticoagulation secondary to thrombocytopenia and malignancy  Continue beta-blocker, statin

## 2019-09-02 NOTE — PROGRESS NOTES
Progress Note - Odell Herndon 1950, 76 y o  male MRN: [id number]    Unit/Bed#: E4 -01 Encounter: [id number]    Primary Care Provider: Rosi Ibarra MD   Date and time admitted to hospital: 8/22/2019 10:59 AM        * Intractable back pain  Assessment & Plan  Patient initially presented with intractable back pain secondary to myelodysplastic syndrome  No acute finding on x ray of spine  Continue opiods for pain control  Acute respiratory failure with hypoxia (HCC)  Assessment & Plan  Patient's acute decompensation with acute respiratory failure with hypoxia thought to be secondary to side effect of hydroxyurea  Hydroxyurea since been discontinued  Patient improving on steroids-Solu-Medrol 40 mg IV q 6 hours  As per pulmonology, patient has been transitioned to prednisone 60 mg once daily with a slow taper by 10 mg every week  Patient is currently oxygenating well on room air 93%  Continue supplemental O2 to maintain O2 sats >88%  Continue pulmonary toilet as able, incentive spirometry, increased time out of bed  To consider home O2 eval prior to discharge  Abnormal CT of the chest  Assessment & Plan  Possibly secondary to aspiration versus likely lung toxicity secondary to hydroxyurea  Per pulmonology, changed from Solu-Medrol to prednisone 60 mg daily with slow taper by 10 mg once weekly  Continue IV cefepime and Flagyl and continue for at least 7 days  (day 5/7)  Start Bactrim after completion of antibiotics for aspiration pneumonia  As per pulmonology, would consider bronchoscopy but given patient's mental status and lack of competent to make his own medical decisions and absence of a guardian to act on his behalf, there will hold off on bronchoscopy and continue with steroids and follow-up imaging with chest x-ray as well as outpatient pulmonology follow-up in 3 weeks          Acute renal failure with acute tubular necrosis superimposed on stage 3 chronic kidney disease [facility])  Assessment & Plan  Patient's current creatinine is around his baseline  Current creatinine 2 02   IV fluids discontinued due to risk of worsening acute hypoxic respiratory failure in the setting of elevated BNP  Continue to monitor renal function and BMP daily  Avoid nephrotoxins, NSAIDs, anemia, hypotension  SVT (supraventricular tachycardia) (HCC)  Assessment & Plan  Likely induced due to underlying catecholamine state from his malignancy  Has been terminated with adenosine on multiple different occasions  Continue to use adenosine to terminate SVT as long as blood pressure stable as per Cardiology  Continue oral amiodarone 400 mg PO TID for next 5 days and then 200 mg daily afterwards  Continue Lopressor 50 mg PO BID  Elevated troponin  Assessment & Plan  Thought to be due to non MI troponin elevation secondary to demand ischemia from prolonged SVT  Patient is thought to have underlying CAD but is not thought to be a candidate for heparin anticoagulation secondary to thrombocytopenia and malignancy  Continue beta-blocker, statin  Myelodysplasia (myelodysplastic syndrome) [facility])  Assessment & Plan  Patient was on hydroxyurea for myelodysplasia which has since been discontinued secondary to possible side effects causing acute respiratory failure with hypoxia  Hematology/oncology is aware of discontinuation of hydroxyurea  Acute encephalopathy  Assessment & Plan  Possibly secondary to delirium from metabolic disturbances versus respiratory failure versus side effects of hydroxyurea  Patient's baseline mental status is currently unknown  Patient has been evaluated by neuropsychology and has been deemed incompetent to make his own medical decisions  Awaiting guardianship for patient  Tobacco abuse  Assessment & Plan  Continue nicotine patches          VTE Pharmacologic Prophylaxis:   Pharmacologic: Pharmacologic VTE Prophylaxis contraindicated due to Thrombocytopenia  Mechanical VTE Prophylaxis in Place: Yes    Patient Centered Rounds: I have performed bedside rounds with nursing staff today  Discussions with Specialists or Other Care Team Provider:  Yes    Education and Discussions with Family / Patient:  Yes    Time Spent for Care: 15 minutes  More than 50% of total time spent on counseling and coordination of care as described above  Current Length of Stay: 11 day(s)    Current Patient Status: Inpatient   Certification Statement: The patient will continue to require additional inpatient hospital stay due to Needs placement and guardianship    Discharge Plan: To be determined  Code Status: Level 1 - Full Code      Subjective:   Patient denies any complaints currently  Objective:     Vitals:   Temp (24hrs), Av 5 °F (36 4 °C), Min:97 1 °F (36 2 °C), Max:97 9 °F (36 6 °C)    Temp:  [97 1 °F (36 2 °C)-97 9 °F (36 6 °C)] 97 6 °F (36 4 °C)  HR:  [62-92] 76  Resp:  [10-18] 15  BP: ()/(53-68) 101/67  SpO2:  [92 %-98 %] 95 %  Body mass index is 29 27 kg/m²  Input and Output Summary (last 24 hours): Intake/Output Summary (Last 24 hours) at 2019 1254  Last data filed at 2019 1054  Gross per 24 hour   Intake 370 ml   Output 675 ml   Net -305 ml       Physical Exam:     Physical Exam   Constitutional: He is oriented to person, place, and time  He appears well-developed and well-nourished  No distress  HENT:   Head: Normocephalic and atraumatic  Eyes: Pupils are equal, round, and reactive to light  EOM are normal    Neck: Normal range of motion  Neck supple  No JVD present  Cardiovascular: Normal rate, regular rhythm and normal heart sounds  No murmur heard  Pulmonary/Chest: Effort normal  He has wheezes  Abdominal: Soft  Bowel sounds are normal  There is no tenderness  There is no guarding  Musculoskeletal: He exhibits no edema or tenderness  Neurological: He is alert and oriented to person, place, and time     Skin: Skin is warm and dry  He is not diaphoretic  Additional Data:     Labs:    Results from last 7 days   Lab Units 09/02/19  0552 09/01/19  0547   WBC Thousand/uL 59 47* 68 83*   HEMOGLOBIN g/dL 7 6* 7 7*   HEMATOCRIT % 25 0* 25 0*   PLATELETS Thousands/uL 27* 33*   BANDS PCT %  --  17*   NEUTROS PCT % 78*  --    LYMPHS PCT % 1*  --    LYMPHO PCT %  --  1*   MONOS PCT % 2*  --    MONO PCT %  --  2*   EOS PCT % 0 0     Results from last 7 days   Lab Units 09/02/19  0446   SODIUM mmol/L 137   POTASSIUM mmol/L 4 8   CHLORIDE mmol/L 103   CO2 mmol/L 22   BUN mg/dL 56*   CREATININE mg/dL 2 02*   ANION GAP mmol/L 12   CALCIUM mg/dL 8 6   GLUCOSE RANDOM mg/dL 208*         Results from last 7 days   Lab Units 09/02/19  1248 09/02/19  1133 09/02/19  0944 09/02/19  0756 09/01/19  2148 09/01/19  1650 09/01/19  1121 09/01/19  0748 09/01/19  0001 08/31/19  2137 08/31/19  1645 08/31/19  1550   POC GLUCOSE mg/dl 223* 213* 243* 223* 198* 232* 248* 353* 277* 274* 243* 219*         Results from last 7 days   Lab Units 08/31/19  0845 08/29/19  0248 08/29/19  0055   LACTIC ACID mmol/L  --  1 4 2 8*   PROCALCITONIN ng/ml 9 93*  --   --            * I Have Reviewed All Lab Data Listed Above  * Additional Pertinent Lab Tests Reviewed: [facility]    Imaging:    Imaging Reports Reviewed Today Include:  None available  Imaging Personally Reviewed by Myself Includes:  None    Recent Cultures (last 7 days):     Results from last 7 days   Lab Units 08/29/19  0056   BLOOD CULTURE  No Growth After 4 Days  No Growth After 4 Days         Last 24 Hours Medication List:     Current Facility-Administered Medications:  acetaminophen 650 mg Rectal Q4H PRN Maco Jacques MD    acetaminophen 650 mg Oral Q6H PRN Maco Jacques MD    amiodarone 400 mg Oral TID With Meals Maco Jacques MD    atorvastatin 80 mg Oral HS Okwaranza Pinto, MD    benzonatate 100 mg Oral TID PRN Maco Jacques MD    cefepime 2,000 mg Intravenous Q12H Karena Winters MD Last Rate: Stopped (09/02/19 0308)   dextran 70-hypromellose 1 drop Both Eyes PRN Gustavo Pinto MD    diazepam 5 mg Oral HS PRN Karena Winters MD    docusate sodium 100 mg Oral BID Karena Winters MD    finasteride 5 mg Oral Daily Gustavo Pinto MD    fish oil 1,000 mg Oral Daily Karena Winters MD    fluticasone-vilanterol 1 puff Inhalation Daily Karena Winters MD    haloperidol lactate 2 5 mg Intramuscular Q6H PRN Karena Winters MD    insulin glargine 25 Units Subcutaneous QAM Karena Winters MD    insulin lispro 1-5 Units Subcutaneous TID AC Karena Winters MD    insulin lispro 1-5 Units Subcutaneous HS Karena Winters MD    insulin lispro 10 Units Subcutaneous TID With Meals Karena Witners MD    ipratropium-albuterol 3 mL Nebulization TID PRN Karena Winters MD    lidocaine 1 patch Topical Daily Karena iWnters MD    methocarbamol 500 mg Oral Q6H PRN Karena Winters MD    metoprolol tartrate 50 mg Oral Q12H [facility] Gustavo Pinto MD    metroNIDAZOLE 500 mg Intravenous Q8H Karena Winters MD Last Rate: Stopped (09/02/19 1054)   nicotine 1 patch Transdermal Daily Gustavo Pinto MD    ondansetron 4 mg Intravenous Q4H PRN Karena Winters MD    oxyCODONE 10 mg Oral Q4H PRN Karena Winters MD    pantoprazole 40 mg Oral Daily Before Breakfast Gustavo Pinto MD    potassium chloride 20 mEq Oral BID Karena Winters MD    [START ON 9/3/2019] predniSONE 60 mg Oral Daily Gustavo Pinto MD    tamsulosin 0 4 mg Oral Daily With Opal Sepulveda MD    traMADol 50 mg Oral Q8H PRN Karena Winters MD         Today, Patient Was Seen By: Karena Winters MD    ** Please Note: Dictation voice to text software may have been used in the creation of this document   **

## 2019-09-02 NOTE — ASSESSMENT & PLAN NOTE
Possibly secondary to delirium from metabolic disturbances versus respiratory failure versus side effects of hydroxyurea  Patient's baseline mental status is currently unknown  Patient has been evaluated by neuropsychology and has been deemed incompetent to make his own medical decisions  Awaiting guardianship for patient

## 2019-09-02 NOTE — ASSESSMENT & PLAN NOTE
Likely induced due to underlying catecholamine state from his malignancy  Has been terminated with adenosine on multiple different occasions  Continue to use adenosine to terminate SVT as long as blood pressure stable as per Cardiology  Continue oral amiodarone 400 mg PO TID for next 5 days and then 200 mg daily afterwards  Continue Lopressor 50 mg PO BID

## 2019-09-02 NOTE — PROGRESS NOTES
Progress Note - Pulmonary   Denice Leonardo  76 y o  male MRN: [id number]  Unit/Bed#: ICU 08 Encounter: [id number]      Assessment/Plan:  1  Acute hypoxic respiratory failure-improving  1  Currently oxygenating well on RA, continue to titrate oxygen to maintain SpO2>/=88%  2  Pulmonary toilet as able: IS, increase time OOB  3  May need to check home O2 evaluation prior to discharge  2  Abnormal chest CT with possible aspiration vs likely lung toxicity secondary to hydroxyurea  1  Change from solumedrol to prednisone 60mg daily with prolonged taper, reduce by 10 my every week  2  Start bactrim after completion of ABX for aspiration pneumonia  3  Continue to treat for possible aspiration pneumonia 6/7 days-although low suspicion for acute infection  4  Aspiration precautions  5  Would benefit from bronchoscopy although due to mental status and no additional guardians or family involvement at this time will not pursue bronchoscopy and will continue to treat with steroids and follow up imaging with CXR and outpatient pulmonary follow in 3 weeks  6  Due to possible component of pulmonary edema with significantly elevated BNP would recommend reduction in IV fluids-will defer to cardiology and IM  3  DELLA superimposed on CKD  1  Would recommend reduction in fluids with concern for volume overload  4  SVT with elevated troponin  1  Cardiology following for management  5  Myelodysplastic syndrome  1  Hematology/oncology  6  Tobacco abuse without known lung dx  1  Continue NRT  2  Continue Breo daily  7  Acute encephalopathy   1  Possible hospital delirium vs effects of hydroxyurea although baseline mental status is unknown        *appropriate for transfer to medical surgical unit from a pulmonary standpoitn    Subjective: Alina Jha was seen resting comfortably in bed upon entering the room  Denies acute complaints  Alert to person and place only    Denies: chest pain, pain with inspiration, fevers, chills, bronchospasm, sputum production or hemoptysi    Objective:         Vitals: Blood pressure 109/61, pulse 72, temperature (!) 97 2 °F (36 2 °C), temperature source Temporal, resp  rate (!) 10, weight 97 9 kg (215 lb 13 3 oz), SpO2 92 % , RA , Body mass index is 29 27 kg/m²  Intake/Output Summary (Last 24 hours) at 9/2/2019 1001  Last data filed at 9/2/2019 0308  Gross per 24 hour   Intake 270 ml   Output 425 ml   Net -155 ml         Physical Exam  Gen: Awake, oriented to person and place only, no acute distress  HEENT: Mucous membranes moist, no oral lesions, no thrush  NECK: no accessory muscle use, JVP not elevated  Cardiac: Regular, single S1, single S2, no murmurs, no rubs, no gallops  Lungs: bilateral rales half way up lung   Abdomen: obese, normoactive bowel sounds, soft nontender, nondistended, no rebound or rigidity, no guarding  Extremities: no cyanosis, no clubbing, no edema    Labs: I have personally reviewed pertinent lab results  , CBC:   Lab Results   Component Value Date    WBC 59 47 (HH) 09/02/2019    HGB 7 6 (L) 09/02/2019    HCT 25 0 (L) 09/02/2019    MCV 94 09/02/2019    PLT 27 (LL) 09/02/2019    MCH 28 5 09/02/2019    MCHC 30 4 (L) 09/02/2019    RDW 18 5 (H) 09/02/2019    NRBC 0 09/02/2019   , CMP:   Lab Results   Component Value Date    SODIUM 137 09/02/2019    K 4 8 09/02/2019     09/02/2019    CO2 22 09/02/2019    BUN 56 (H) 09/02/2019    CREATININE 2 02 (H) 09/02/2019    CALCIUM 8 6 09/02/2019    EGFR 33 09/02/2019     Imaging and other studies: no new imgaing      Trey Butler

## 2019-09-02 NOTE — ASSESSMENT & PLAN NOTE
Patient's current creatinine is around his baseline  Current creatinine 2 02   IV fluids discontinued due to risk of worsening acute hypoxic respiratory failure in the setting of elevated BNP  Continue to monitor renal function and BMP daily  Avoid nephrotoxins, NSAIDs, anemia, hypotension

## 2019-09-02 NOTE — PROGRESS NOTES
Progress Note - Podiatry  Shirley Mathew  76 y o  male MRN: [id number]  Unit/Bed#: ICU 08 Encounter: [id number]    Assessment:  1  Right hallux duskiness and toe pain  2  RLE wound  3  Myelodysplasia  4  Dm type 2  5  AKA   6  PAD    Plan:  · Continue local wound care to stable right lower extremity eschars (Betadine paint and dry sterile dressing)  No acute signs of infection are noted today  Wounds appear stable at this point  · Right hallux is erythematous, cool to touch, and mildly painful  · Patient is vasculopath with no current intervention planned for vascular surgery  No podiatric surgical intervention is planned as well at this time  · Patient to be weight-bearing as tolerated  · Continue rest of care per primary team     Subjective/Objective   Chief Complaint:   Chief Complaint   Patient presents with    Back Pain     Hx of back pain, reports lower back pan radiates into thoracic region and into right leg  takes gabapentin for pain  forgot to take dose today  Subjective: 76 y o  y/o male was seen and evaluated at bedside  He is no longer in the ICU  He was resting comfortably with no acute signs of distress  He reports no acute events overnight  Blood pressure 111/63, pulse 78, temperature 97 6 °F (36 4 °C), temperature source Temporal, resp  rate 17, weight 97 9 kg (215 lb 13 3 oz), SpO2 96 %  ,Body mass index is 29 27 kg/m²  Invasive Devices     Peripheral Intravenous Line            Peripheral IV 08/31/19 Left;Upper;Ventral (anterior) Arm 2 days                Physical Exam:   General: Alert, cooperative and no distress  Lungs: Non labored breathing  Abdomen: Soft, non-tender  Extremity:     NVS at baseline B/l  MSK function at baseline B/l  No calf tenderness noted B/l   RLE:  Multiple dry stable eschars noted on distal 3rd of anterior tibia  And edges are well demarcated with no undermining or deep sinus tracts noted  Minimal periwound erythema noted    No purulence or gris pus appreciated at this time  No drainage of any kind appreciated this time  Wounds appear stable  Hallux continues to appear dusky but pain is manageable and erythema is improving  Right leg anterior tibia    Right hallux      Lab, Imaging and other studies:   I have personally reviewed pertinent lab results  , CBC:   Lab Results   Component Value Date    WBC 59 47 (HH) 09/02/2019    HGB 7 6 (L) 09/02/2019    HCT 25 0 (L) 09/02/2019    MCV 94 09/02/2019    PLT 27 (LL) 09/02/2019    MCH 28 5 09/02/2019    MCHC 30 4 (L) 09/02/2019    RDW 18 5 (H) 09/02/2019    NRBC 0 09/02/2019   , CMP:   Lab Results   Component Value Date    SODIUM 137 09/02/2019    K 4 8 09/02/2019     09/02/2019    CO2 22 09/02/2019    BUN 56 (H) 09/02/2019    CREATININE 2 02 (H) 09/02/2019    CALCIUM 8 6 09/02/2019    EGFR 33 09/02/2019       Imaging: I have personally reviewed pertinent films in PACS  EKG, Pathology, and Other Studies: I have personally reviewed pertinent reports  Portions of the record may have been created with voice recognition software  Occasional wrong word or "sound a like" substitutions may have occurred due to the inherent limitations of voice recognition software  Read the chart carefully and recognize, using context, where substitutions have occurred

## 2019-09-03 NOTE — ASSESSMENT & PLAN NOTE
Likely induced due to underlying catecholamine state from his malignancy  Has been terminated with adenosine on multiple different occasions  Continue to use adenosine to terminate SVT as long as blood pressure stable as per Cardiology  Continue oral amiodarone 400 mg PO TID for total of 5 days and then 200 mg daily afterwards beginning 9/6  Continue Lopressor 50 mg PO BID

## 2019-09-03 NOTE — PLAN OF CARE
Problem: PHYSICAL THERAPY ADULT  Goal: Performs mobility at highest level of function for planned discharge setting  See evaluation for individualized goals  Description  Treatment/Interventions: Functional transfer training, LE strengthening/ROM, Therapeutic exercise, Endurance training, Patient/family training, Equipment eval/education, Gait training, Spoke to nursing  Equipment Recommended: Yahaira Holloway, Other (Comment)(Rolling walker )       See flowsheet documentation for full assessment, interventions and recommendations  Outcome: Progressing  Note:   Prognosis: Fair  Problem List: Decreased strength, Decreased range of motion, Decreased endurance, Impaired balance, Decreased mobility, Decreased cognition, Impaired judgement, Decreased safety awareness, Decreased skin integrity, Obesity  Assessment: Pt  observed amb  in hallway with PCA 1:1 upon my arrival  Pt  noted to continue A of therapist with cues for LE sequencing  Pt  limited by fatigue requiring a seated resting period in between gait trials  Noted pt  with increased agitation this PM, stating "I'm going home today " With attempts at discussion pt  would attempt to be combative with this therapist, thus Dina applied  After additional time pt  agreeable to return to room  Progressed with a second amb  trial with eventual return to room  Pt  repositioned seated at EOB at end of treatment session  PCA 1:1 present at end of treatment session  PT will continue to recommend d/c to rehab when medically stable for continued improvement of noted impairments above  Barriers to Discharge: Decreased caregiver support, Inaccessible home environment  Barriers to Discharge Comments: Layne King wants to return home and does not want to go to rehab setting  Return to home with increased support will be required for success    Recommendation: Short-term skilled PT     PT - OK to Discharge: Yes(if d/c to rehab when medically stable )    See flowsheet documentation for full assessment

## 2019-09-03 NOTE — PROGRESS NOTES
Patient was ambulating with PCA on unit, rounded corner by elevators and refused to be redirected  Patient entered the elevator  Was verbally and physically aggressive with PCA  Security was called and 2 physicians  Nurse was able to redirect patient to room  Reclining chair placed in hallway for patient

## 2019-09-03 NOTE — PROGRESS NOTES
CARDIOLOGY INPATIENT FOLLOW-UP PROGRESS NOTE    ENCOUNTER DATE: 09/03/19 9:16 AM  PATIENT NAME: Marisol Sun    1950    [id number]  Age: 76 y o  Sex: male  AUTHOR: Augusto Fernandes MD  PRIMARYCARE PHYSICIAN: Rach Longo MD  PRIMARY INPATIENT PHYSICIAN: Alfonso Schulz MD  *-*-*-*-*-*-*-*-*-*-*-*-*-*-*-*-*-*-*-*-*-*-*-*-*-*-*-*-*-*-*-*-*-*-*-*-*-*-*-*-*-*-*-*-*-*-*-*-*-*-*-*-*-*-  FOLLOW-UP FOR:  Paroxysmal SVT  NSTEMI- Type 2 secondary to tachycardia  Hypoxemic respiratory failure  Myelodysplastic syndrome  Suspected aspiration vs lung toxicity sec to hydroxyurea   Chronic renal insufficiency  Prior CVA? CARDIOLOGY PLAN:  - continue amiodarone 3 times daily for 2 more days and transition to 200 mg p o  Once daily beginning September 6, 2019   - continued treatment for comorbidities  - cardiology will follow as needed  *-*-*-*-*-*-*-*-*-*-*-*-*-*-*-*-*-*-*-*-*-*-*-*-*-*-*-*-*-*-*-*-*-*-*-*-*-*-*-*-*-*-*-*-*-*-*-*-*-*-*-*-*-*-  INTERVAL CHANGES / HISTORY OF PRESENT ILLNESS:  No acute events  Denies any chest pain, or any palpitations  Has not had any tachycardia events in last over 48 hours  Currently not on telemetry  Is being statin on antibiotic therapy  REVIEW OF SYMPTOMS:    Positive for:  Resolved tachycardia  Negative for: All remaining as reviewed below and in HPI      SYSTEM SYMPTOMS REVIEWED:  Generalweight change, fever, night sweats  Respiratoryl Wheezing, shortness of breath, cough, URI symptoms, sputum, blood  Cardiovascularchest pain, syncope, dyspnea on exertion, edema, decline in exercise tolerance, claudication   Gastrointestinalpersistent vomiting, diarrhea, abdominal distention, blood in stool   Muscular or skeletaljoint pain or swelling   Neurologicheadaches, syncope, abnormal movement  Hematologichistory of easy bruising and bleeding   Endocrinethyroid enlargement, heat or cold intolerance, polyuria   Psychiatricanxiety, depression *-*-*-*-*-*-*-*-*-*-*-*-*-*-*-*-*-*-*-*-*-*-*-*-*-*-*-*-*-*-*-*-*-*-*-*-*-*-*-*-*-*-*-*-*-*-*-*-*-*-*-*-*-*-    VITAL SIGNS:  Vitals:    19 2320 19 0300 19 0539 19 0733   BP: 94/67 103/67  120/72   BP Location: Right arm Left arm  Right arm   Pulse: 71 81  82   Resp: 18 18  18   Temp: (!) 97 1 °F (36 2 °C)   (!) 97 4 °F (36 3 °C)   TempSrc: Tympanic   Tympanic   SpO2: 95% 96%  98%   Weight:   100 kg (220 lb 14 4 oz)       Temp (24hrs), Av 1 °F (36 2 °C), Min:96 4 °F (35 8 °C), Max:97 6 °F (36 4 °C)  Current: Temperature: (!) 97 4 °F (36 3 °C)      Intake/Output Summary (Last 24 hours) at 9/3/2019 0916  Last data filed at 9/3/2019 0146  Gross per 24 hour   Intake 330 ml   Output 550 ml   Net -220 ml      Weight (last 2 days)     Date/Time   Weight    19 0539   100 (220 9)    19 0557   97 9 (215 83)    19 0556   101 (222 66)               *-*-*-*-*-*-*-*-*-*-*-*-*-*-*-*-*-*-*-*-*-*-*-*-*-*-*-*-*-*-*-*-*-*-*-*-*-*-*-*-*-*-*-*-*-*-*-*-*-*-*-*-*-*-  PHYSICAL EXAM:  General Appearance:    Alert, cooperative, no distress, appears stated age   Head, Eyes, ENT:     positive conjunctival pallor, right facial droop   Neck:   Supple, no carotid bruit or JVD   Back:     Symmetric, no curvature  Lungs:     Respirations unlabored  Clear to auscultation bilaterally,    Chest wall:    No tenderness or deformity   Heart:    Regular rate and rhythm, S1 and S2 normal, no murmur, rub  or gallop  Abdomen:     Soft, non-tender, No obvious masses, or organomegaly   Extremities:   Extremities normal, no cyanosis or edema    Skin:   Skin color, texture, turgor normal, no rashes or lesions   *-*-*-*-*-*-*-*-*-*-*-*-*-*-*-*-*-*-*-*-*-*-*-*-*-*-*-*-*-*-*-*-*-*-*-*-*-*-*-*-*-*-*-*-*-*-*-*-*-*-*-*-*-*-    Telemetry reviewed    Currently not on telemetry    Last ECG        Results for orders placed or performed during the hospital encounter of 19   ECG 12 lead   Result Value    Ventricular Rate 81    Atrial Rate 81    ND Interval 142    QRSD Interval 68    QT Interval 364    QTC Interval 422    P Axis 61    QRS Axis 24    T Wave Axis 38    Narrative    Poor data quality, interpretation may be adversely affected  Normal sinus rhythm  Normal ECG  When compared with ECG of 22-AUG-2019 18:38,  QT has shortened  Confirmed by Augusto Fernandes ([id number]) on 8/27/2019 6:12:39 PM         Medications reviewed         Current Facility-Administered Medications:     acetaminophen (TYLENOL) rectal suppository 650 mg, 650 mg, Rectal, Q4H PRN, Faye Eng MD    acetaminophen (TYLENOL) tablet 650 mg, 650 mg, Oral, Q6H PRN, Faye Eng MD, 650 mg at 09/03/19 1548    amiodarone tablet 400 mg, 400 mg, Oral, TID With Meals, Faye Eng MD, 400 mg at 09/02/19 1811    atorvastatin (LIPITOR) tablet 80 mg, 80 mg, Oral, HS, Gustavo Pinto MD, 80 mg at 09/02/19 2122    benzonatate (TESSALON PERLES) capsule 100 mg, 100 mg, Oral, TID PRN, Faye Eng MD, 100 mg at 09/01/19 2139    cefepime (MAXIPIME) 2,000 mg in dextrose 5 % 50 mL IVPB, 2,000 mg, Intravenous, Q12H, Faye Eng MD, Stopped at 09/03/19 0102    dextran 70-hypromellose (GENTEAL TEARS) 0 1-0 3 % ophthalmic solution 1 drop, 1 drop, Both Eyes, PRN, Faye Eng MD    diazepam (VALIUM) tablet 5 mg, 5 mg, Oral, HS PRN, Faye Eng MD, 5 mg at 09/02/19 2121    docusate sodium (COLACE) capsule 100 mg, 100 mg, Oral, BID, Gustavo Pinto MD, 100 mg at 09/02/19 1811    finasteride (PROSCAR) tablet 5 mg, 5 mg, Oral, Daily, Gustavo Pinto MD, 5 mg at 09/02/19 0807    fish oil capsule 1,000 mg, 1,000 mg, Oral, Daily, Gustavo Pinto MD, 1,000 mg at 09/02/19 0806    fluticasone-vilanterol (BREO ELLIPTA) 200-25 MCG/INH inhaler 1 puff, 1 puff, Inhalation, Daily, Gustavo Pinto MD, 1 puff at 09/02/19 0805    haloperidol lactate (HALDOL) injection 2 5 mg, 2 5 mg, Intramuscular, Q6H PRN, Faye Eng MD, 2 5 mg at 09/02/19 0013    insulin glargine (LANTUS) subcutaneous injection 25 Units 0 25 mL, 25 Units, Subcutaneous, QAM, Mitali Puentes MD, 25 Units at 09/02/19 0808    insulin lispro (HumaLOG) 100 units/mL subcutaneous injection 1-5 Units, 1-5 Units, Subcutaneous, TID AC, 2 Units at 09/02/19 1811 **AND** Fingerstick Glucose (POCT), , , TID AC, Mitali Puentes MD    insulin lispro (HumaLOG) 100 units/mL subcutaneous injection 1-5 Units, 1-5 Units, Subcutaneous, HS, Gustavo Pinto MD, 2 Units at 09/02/19 2122    insulin lispro (HumaLOG) 100 units/mL subcutaneous injection 10 Units, 10 Units, Subcutaneous, TID With Meals, Mitali Puentes MD, 10 Units at 09/02/19 1812    ipratropium-albuterol (DUO-NEB) 0 5-2 5 mg/3 mL inhalation solution 3 mL, 3 mL, Nebulization, TID PRN, Mitali Puentes MD    lidocaine (LIDODERM) 5 % patch 1 patch, 1 patch, Topical, Daily, Mitali Puentes MD, 1 patch at 09/01/19 0832    magnesium sulfate 2 g/50 mL IVPB (premix) 2 g, 2 g, Intravenous, Once, Mitali Puentes MD    methocarbamol (ROBAXIN) tablet 500 mg, 500 mg, Oral, Q6H PRN, Mitali Puentes MD, 500 mg at 09/03/19 0519    metoprolol tartrate (LOPRESSOR) tablet 50 mg, 50 mg, Oral, Q12H [address], Gustavo Pinto MD, 50 mg at 09/02/19 2122    metroNIDAZOLE (FLAGYL) IVPB (premix) 500 mg, 500 mg, Intravenous, Q8H, Mitali Puentes MD, Stopped at 09/03/19 0146    nicotine (NICODERM CQ) 14 mg/24hr TD 24 hr patch 1 patch, 1 patch, Transdermal, Daily, Mitali Puentes MD, 1 patch at 09/02/19 0807    ondansetron (ZOFRAN) injection 4 mg, 4 mg, Intravenous, Q4H PRN, Mitali Puentes MD, 4 mg at 08/30/19 0824    oxyCODONE (ROXICODONE) immediate release tablet 10 mg, 10 mg, Oral, Q4H PRN, Mitali Puentes MD, 10 mg at 09/02/19 1521    pantoprazole (PROTONIX) EC tablet 40 mg, 40 mg, Oral, Daily Before Breakfast, Gustavo Pinto MD, 40 mg at 09/03/19 0518    potassium chloride (K-DUR,KLOR-CON) CR tablet 20 mEq, 20 mEq, Oral, BID, Gustavo Pinto MD, 20 mEq at 09/02/19 1812    predniSONE tablet 60 mg, 60 mg, Oral, Daily, Dariel Rivero MD    tamsulosin [facility]) capsule 0 4 mg, 0 4 mg, Oral, Daily With Dinner, Dariel Rivero MD, 0 4 mg at 09/02/19 1810    traMADol (ULTRAM) tablet 50 mg, 50 mg, Oral, Q8H PRN, Dariel Rivero MD, 50 mg at 09/03/19 0028    Imaging studies results reviewed    No procedure found  *-*-*-*-*-*-*-*-*-*-*-*-*-*-*-*-*-*-*-*-*-*-*-*-*-*-*-*-*-*-*-*-*-*-*-*-*-*-*-*-*-*-*-*-*-*-*-*-*-*-*-*-*-*-    LABORATORY DATA:  I have personally reviewed pertinent labs  CMP:   Results from last 7 days   Lab Units 09/03/19  0451 09/02/19  0446 09/01/19  0547   POTASSIUM mmol/L 4 6 4 8 4 6   CHLORIDE mmol/L 105 103 102   CO2 mmol/L 24 22 22   BUN mg/dL 59* 56* 39*   CREATININE mg/dL 2 02* 2 02* 1 75*   CALCIUM mg/dL 8 5 8 6 8 5       Cardiac Profile:   Troponin I   Date Value Ref Range Status   08/31/2019 21 11 (H) <=0 04 ng/mL Final     Comment:       [employer] Chemistry analyzer 99% cutoff is > 0 04 ng/mL in network labs     o cTnI 99% cutoff is useful only when applied to patients in the clinical setting of myocardial ischemia   o cTnI 99% cutoff should be interpreted in the context of clinical history, ECG findings and possibly cardiac imaging to establish correct diagnosis  o cTnI 99% cutoff may be suggestive but clearly not indicative of a coronary event without the clinical setting of myocardial ischemia      08/31/2019 27 39 (H) <=0 04 ng/mL Final     Comment:       [employer] Chemistry analyzer 99% cutoff is > 0 04 ng/mL in network labs     o cTnI 99% cutoff is useful only when applied to patients in the clinical setting of myocardial ischemia   o cTnI 99% cutoff should be interpreted in the context of clinical history, ECG findings and possibly cardiac imaging to establish correct diagnosis  o cTnI 99% cutoff may be suggestive but clearly not indicative of a coronary event without the clinical setting of myocardial ischemia       08/30/2019 24 40 (H) <=0 04 ng/mL Final     Comment:       [employer] Chemistry analyzer 99% cutoff is > 0 04 ng/mL in network labs     o cTnI 99% cutoff is useful only when applied to patients in the clinical setting of myocardial ischemia   o cTnI 99% cutoff should be interpreted in the context of clinical history, ECG findings and possibly cardiac imaging to establish correct diagnosis     o cTnI 99% cutoff may be suggestive but clearly not indicative of a coronary event without the clinical setting of myocardial ischemia      01/12/2015 0 12 (H) 0 00 - 0 04 ng/mL Final     Comment:     Troponin I values > 0 04 indicate myocardial necrosis and risk of  adverse clinical cardiac event stratified according to [facility] Biochemistry as follows:  0 05 -  0 09 ng/ml     Cardiac risk not established  0 1- 0 6 ng/ml       Increased risk for adverse clinical event within the  next 48 hours to 14 days  0 6 - 1 5 ng/ml      Possible AMI (consistent with WHO criteria for AMI)  >1 5 ng/ml       Probable AMI (consistent with WHO criteria for AMI)  The above 1 analytes were performed by Chris Samaniego [id number]     09/15/2014 0 08 (H) 0 00 - 0 04 ng/mL Final     Comment:     Troponin I values > 0 04 indicate myocardial necrosis and risk of  adverse clinical cardiac event stratified according to [facility] Biochemistry as follows:  0 05 -  0 09 ng/ml     Cardiac risk not established  0 1- 0 6 ng/ml       Increased risk for adverse clinical event within the  next 48 hours to 14 days  0 6 - 1 5 ng/ml      Possible AMI (consistent with WHO criteria for AMI)  >1 5 ng/ml       Probable AMI (consistent with WHO criteria for AMI)  The above 1 analytes were performed by Chris Samaniego [id number]     09/14/2014 0 21 (H) 0 00 - 0 04 ng/mL Final     Comment:     Troponin I values > 0 04 indicate myocardial necrosis and risk of  adverse clinical cardiac event stratified according to [facility] Biochemistry as follows:  0 05 -  0 09 ng/ml     Cardiac risk not established  0 1- 0 6 ng/ml       Increased risk for adverse clinical event within the  next 48 hours to 14 days  0 6 - 1 5 ng/ml      Possible AMI (consistent with WHO criteria for AMI)  >1 5 ng/ml       Probable AMI (consistent with WHO criteria for AMI)  The above 1 analytes were performed by Chris  [address] [place] [id number]       NT-proBNP   Date Value Ref Range Status   2019 54,708 (H) <125 pg/mL Final     Comment:     3   2018 331 (H) <125 pg/mL Final   2017 923 (H) <125 pg/mL Final       CBC:   Results from last 7 days   Lab Units 19  0451 19  0552 19  0547   WBC Thousand/uL 49 23* 59 47* 68 83*   HEMOGLOBIN g/dL 7 2* 7 6* 7 7*   HEMATOCRIT % 23 7* 25 0* 25 0*   PLATELETS Thousands/uL 16* 27* 33*       PT/INR: No results found for: PT, INR,     Magnesium:   Results from last 7 days   Lab Units 19  0451 19  0547 19  0608   MAGNESIUM mg/dL 1 5* 1 2* 1 1*       Phosphorous:       Microbiology:  Results from last 7 days   Lab Units 19  0056   BLOOD CULTURE  No Growth After 4 Days  No Growth After 4 Days  *-*-*-*-*-*-*-*-*-*-*-*-*-*-*-*-*-*-*-*-*-*-*-*-*-*-*-*-*-*-*-*-*-*-*-*-*-*-*-*-*-*-*-*-*-*-*-*-*-*-*-*-*-*-  ECHOCARDIOGRAM AND OTHER CARDIOLOGY RESULTS:  Results for orders placed during the hospital encounter of 19   Echo complete with contrast if indicated    Narrative [address]    [place], [address]  [phone]    Transthoracic Echocardiogram  2D, M-mode, Doppler, and Color Doppler    Study date:  26-Aug-2019    Patient: Emerald Bales  MR number: [id number]  Account number: [de-identified]  : 1950  Age: 76 years  Gender: Male  Status: Inpatient  Location:  Height: 72 in  Weight: 234 lb  BP: 119/ 56 mmHg    Indications: Heart failure    Diagnoses: I50 9 - Heart failure, unspecified    Sonographer:  Lillie Gilliland [facility]  Primary Physician:  Nba Alvarado MD  Referring Physician:  Soni Sanchez MD  Group:  [facility]  Interpreting Physician:  JERMAIN Martin    LEFT VENTRICLE:  Systolic function was normal by visual assessment  Ejection fraction was estimated in the range of 60 % to 65 %  There were no regional wall motion abnormalities  Wall thickness was mildly increased  There was mild concentric hypertrophy  Doppler parameters were consistent with abnormal left ventricular relaxation (grade 1 diastolic dysfunction)  LEFT ATRIUM:  The atrium was moderately dilated  RIGHT ATRIUM:  The atrium was mildly dilated  MITRAL VALVE:  There was mild regurgitation  AORTIC VALVE:  The valve was trileaflet  Leaflets exhibited moderately increased thickness, moderate calcification, and mildly reduced cuspal separation  Patient refused to complete exam and became violent  Unable to assess aortic stenosis thoroughly as  a result  Test aborted before protocol could be completed due to patient refusal     TRICUSPID VALVE:  There was mild regurgitation  HISTORY: PRIOR HISTORY: CAD, HTN, COPD, IDDM, opioid dependence, DELLA    PROCEDURE: The transthoracic approach was used  The study included complete 2D imaging, M-mode, complete spectral Doppler, and color Doppler  LEFT VENTRICLE: Size was normal  Systolic function was normal by visual assessment  Ejection fraction was estimated in the range of 60 % to 65 %  There were no regional wall motion abnormalities  Wall thickness was mildly increased  There  was mild concentric hypertrophy  DOPPLER: Doppler parameters were consistent with abnormal left ventricular relaxation (grade 1 diastolic dysfunction)  RIGHT VENTRICLE: The size was normal  Systolic function was normal  Wall thickness was normal     LEFT ATRIUM: The atrium was moderately dilated  RIGHT ATRIUM: The atrium was mildly dilated      MITRAL VALVE: Valve structure was normal  There was mild calcification of the mitral annulus  There was normal leaflet separation  DOPPLER: The transmitral velocity was within the normal range  There was no evidence for stenosis  There was  mild regurgitation  AORTIC VALVE: The valve was trileaflet  Leaflets exhibited moderately increased thickness, moderate calcification, and mildly reduced cuspal separation  Patient refused to complete exam and became violent  Unable to assess aortic stenosis  thoroughly as a result  Test aborted before protocol could be completed due to patient refusal     TRICUSPID VALVE: The valve structure was normal  There was normal leaflet separation  DOPPLER: The transtricuspid velocity was within the normal range  There was no evidence for stenosis  There was mild regurgitation  Estimated peak PA  pressure was 38 mmHg, assuming a right atrial pressure of 8 mmHg, however the IVC was not visualized  PULMONIC VALVE: Not well visualized  DOPPLER: There was no evidence for stenosis  There was no regurgitation  PERICARDIUM: There was no pericardial effusion  The pericardium was normal in appearance  AORTA: The root exhibited normal size  SYSTEM MEASUREMENT TABLES    2D  %FS: 22 9 %  ESV(Teich): 63 7 ml  IVSd: 1 2 cm  LA Diam: 4 2 cm  LVEF MOD A4C: 55 2 %  LVIDd: 5 cm  LVIDs: 3 8 cm  LVPWd: 1 3 cm  SV(Teich): 53 8 ml    CW  TR Vmax: 2 8 m/s  TR maxP 3 mmHg    MM  TAPSE: 3 1 cm    [facility] Accredited [facility]chocardi[facility]    Prepared and electronically signed by    JERMAIN Matthews  Signed 26-Aug-2019 17:12:12       No results found for this or any previous visit  No results found for this or any previous visit  No results found for this or any previous visit        *-*-*-*-*-*-*-*-*-*-*-*-*-*-*-*-*-*-*-*-*-*-*-*-*-*-*-*-*-*-*-*-*-*-*-*-*-*-*-*-*-*-*-*-*-*-*-*-*-*-*-*-*-*-  ALLERGIES:  No Known Allergies    *-*-*-*-*-*-*-*-*-*-*-*-*-*-*-*-*-*-*-*-*-*-*-*-*-*-*-*-*-*-*-*-*-*-*-*-*-*-*-*-*-*-*-*-*-*-*-*-*-*-*-*-*-*-    SIGNATURES:   [unfilled]   [facility] MD [place]

## 2019-09-03 NOTE — ASSESSMENT & PLAN NOTE
Patient initially presented with intractable back pain secondary to myelodysplastic syndrome  No acute finding on x ray of spine  Continue opiods for pain control  PT/OT eval recommended-short-term skilled PT, home PT, 24 hours supervision with home family support

## 2019-09-03 NOTE — PROGRESS NOTES
Progress Note - Pulmonary   Chance Loera  76 y o  male MRN: [id number]  Unit/Bed#: E4 -01 Encounter: [id number]      Assessment/Plan:    1  Acute hypoxic respiratory failure-resolved  1  Currently oxygenating well on room air continue titrate oxygen as needed to maintain SpO2 greater than equal to 88%  2  Pulmonary toilet:  Incentive spirometry, out of bed with increasing activity as tolerated  2  Abnormal chest CT with possible aspiration versus likely lung toxicity secondary to hydroxyurea  1  Continue hold hydroxyurea  2  Prednisone at 60 mg daily with taper by 10 mg every 7 days he is currently on day 2 at 60 mg  3  Outpatient chest x-ray in 3 weeks  4  Will hold at this time on Bactrim prophylaxis as he will likely be on prednisone for only 6 weeks  3  AK I with superimposed CKD  1  Continue monitor closely Management per Internal Medicine  4  SVT with elevated troponin  1  Cardiology following  5  Myelodysplastic syndrome  1  Hematology/oncology managing  2  Recommend continuing to hold hydroxyurea  6  Tobacco abuse without known lung disease  1  NRT  2  continue Breo daily  7  Acute encephalopathy   1  Possible component of hospital delirium versus affects of hydroxyurea  2  Continue one-to-one per Internal Medicine  3  Unfortunately baseline mental status is unknown        Subjective: Brooks Conti was seen sitting in a chair upon entering the room  Denies acute events  He is oriented the place and person, although does not answer additional questions  Does not appear to be in any acute acute distress    Objective:         Vitals: Blood pressure 120/72, pulse 82, temperature (!) 97 4 °F (36 3 °C), temperature source Tympanic, resp  rate 18, weight 100 kg (220 lb 14 4 oz), SpO2 98 % , RA, Body mass index is 29 96 kg/m²        Intake/Output Summary (Last 24 hours) at 9/3/2019 1511  Last data filed at 9/3/2019 0900  Gross per 24 hour   Intake 350 ml   Output 500 ml   Net -150 ml         Physical Exam  Gen: Awake, alert, oriented x 3, no acute distress  HEENT: Mucous membranes moist, no oral lesions, no thrush  NECK: no accessory muscle use, JVP not elevated  Cardiac: Regular, single S1, single S2, no murmurs, no rubs, no gallops  Lungs: bibasilar rales  Abdomen: normoactive bowel sounds, soft nontender, nondistended, no rebound or rigidity, no guarding  Extremities: no cyanosis, no clubbing, no edema    Labs: I have personally reviewed pertinent lab results  , CBC:   Lab Results   Component Value Date    WBC 49 23 (HH) 09/03/2019    HGB 7 2 (L) 09/03/2019    HCT 23 7 (L) 09/03/2019    MCV 93 09/03/2019    PLT 16 (LL) 09/03/2019    MCH 28 2 09/03/2019    MCHC 30 4 (L) 09/03/2019    RDW 18 3 (H) 09/03/2019    NRBC 0 09/03/2019   , CMP:   Lab Results   Component Value Date    SODIUM 139 09/03/2019    K 4 6 09/03/2019     09/03/2019    CO2 24 09/03/2019    BUN 59 (H) 09/03/2019    CREATININE 2 02 (H) 09/03/2019    CALCIUM 8 5 09/03/2019    EGFR 33 09/03/2019     Imaging and other studies: no new imaging to review      NICOLE Singer

## 2019-09-03 NOTE — SOCIAL WORK
GREGG called pt's brother again  This time pt's brother Trevor Alonzo answered the phone  CM explained that pt is in the hospital and needs family to make medical decision on his behalf and that is a health care agent does not step forward, guardianship will be pursued  Trevor Alonzo declined to be involved  He asked to not be contacted again  CM informed him that correspondence may come from the courts once guardianship is being pursued; He states he understands that  GREGG left another voicemail for [facility] Grant HERNANDEZ requesting next steps for guardianship process  CM contacted [facility] Legal Department  GREGG following

## 2019-09-03 NOTE — UTILIZATION REVIEW
Additional clinical for re-review:        Date: 9/3/19                       Current Patient Class: Inpatient Current Level of Care: Med Surg    HPI:65 y o  male with history of CAD, COPD,  DM2 and recently diagnosed MDS initially admitted to Med Surg on 8/22/19 for evaluation and treatment of intractable back pain  Patient takes pain medication at home and sees Pain Management  Patient had recently been admitted at [facility], at which time he was found to have leukocytosis, anemia and thrombocytopenia  Underwent bone marrow biopsy on 8/19  On 8/28 a Neuropsychological exam was completed and [facility] determined patient did not appear to have capacity to make fully informed medical decisions  Internal Medicine noted patient remains on hydroxyurea and opioids with improvement of  back pain  Case Management  notes patient has no family except a brother he does not speak to  On 8/29 @ 1230 AM Nursing found patient sitting on the side of his bed, vomiting, SOB and diaphoretic  Patient developed rigors and tachycardia with a fever > 102  Septic work up initiated  CXR with mild interstitial and central vascular congestion  Concern for aspiration, patient started on IV cefepime and Flagyl  Physical exam: crackles in B/L bases, mild expiratory wheeze on the left  Speech Therapy performed bedside swallowing evaluation later in the day  No overt s/s aspiration  On 8/30, patient developed tachycardia, with heart rate in the 160's  Sinus tachycardia on EKG  Placed on Telemetry  Given 1L NS bolus, placed on maintenance IV fluids  Given IV Lopressor, but developed hypotension  Patient converted back to heartrate in the 80's, but BP was noted to be 70/54  CTA chest revealed no PE, but groundglass and nodular opacities throughout both lungs, suggesting aspiration and/or pneumonia  Troponin found to be elevated 13 09 >24 40    Case Management continues to pursue possible sources for guardianship  On 8/31 @ 0400  patient again tachycardic with 's, afebrile  Subsequently given 2 5 mg IV Lopressor x2  without effect  Adenosine 6 mg administered, heart rate reduced to 90's, but increased to 160's  Placed on amiodarone gtt  O2 increased from 2L NC to 6L NC  Platelets at 92,707  One unit of platelets transfused  Transferred to Level 2 Stepdown  8/31 Cardiology consult:  1  SVT:  intermittent episode of SVT with EKG and tele suggestive of atrial tachycardia, likely being induced due to underlying elevated catecholamine state from his malignancy, terminated with adenosine on multiple different occasions  Start amiodarone drip to reduce atrial arrhythmias and prevent heart rate from going to 170, would also start metoprolol to 25 mg Q 8 if the blood pressure can tolerate  If he has SVT and blood pressure stable would give adenosine to terminate it  2  Type 2 demand ischemia: Troponin elevated after he has been in prolonged SVT rhythm, he probably has underlying coronary disease however at this time not a candidate for heparin products due to his thrombocytopenia and malignancy  Continue to treat with metoprolol if possible and statin, discussed with the palliative care to determine goals of care      8/31 Pulmonology  Consult: Acute hypoxic respiratory failure with abnormal chest CT scan  Although aspiration is a possibility for some of the findings of the bases but I cannot explain the right apical infiltrates  One thing to consider is to be pneumotoxicity from hydroxyurea which was started recently and that can include acute pneumonitis versus organizing pneumonia  I doubt bacterial pneumonia but currently patient is on aspiration coverage with cefepime and Flagyl which I agree to continue  I wanted to do bronchoscopy for the patient but he does not have capacity and no other family members or guardians to get consent from and the situation is not life threatening    We decided to stop hydroxyurea  Continue antibiotics  Start on Solu-Medrol 40 mg q 6 hours and monitor improvement over the next few days  On 9/1 Pulmonology recommended continuation of antibiotics for possible aspiration pneumonia for total of 7 days and noted bibasilar crackles, left more than right on physical exam  Patient currently on 2L O2 NC, tolerating room air at times   Internal Medicine notes patient more awake today  Cardiology transitioned patient from amiodarone gtt to  TID for 5 days  9/2 Internal Medicine notes patient is oxygenating well on room air today, wheezes on physical exam   Remains on IV cefepime and Flagyl; completed IV Solu-Medrol today, will be transitioned to oral prednisone  Transferred back to to [facility] level of care  9/3 Patient remains on IV antibiotic  Lashae Houser Case Management spoke with patient's brother today who declined to be involved in guardianship  Ref   Range 8/27/2019 06:47 8/29/2019 02:20 8/31/2019 01:06 8/31/2019 06:06 8/31/2019 08:45 9/1/2019 05:47 9/2/2019 05:52 9/3/2019 04:51   WBC Latest Ref Range: 4 31 - 10 16 Thousand/uL 17 05 (H) 26 54 (H) 55 72 (HH) 54 28 (HH) 55 19 (HH) 68 83 (HH) 59 47 (HH) 49 23 (HH)   Red Blood Cell Count Latest Ref Range: 3 88 - 5 62 Million/uL 3 04 (L) 3 18 (L) 2 97 (L) 3 02 (L) 2 70 (L) 2 69 (L) 2 67 (L) 2 55 (L)   Hemoglobin Latest Ref Range: 12 0 - 17 0 g/dL 8 4 (L) 8 8 (L) 8 3 (L) 8 5 (L) 7 5 (L) 7 7 (L) 7 6 (L) 7 2 (L)   HCT Latest Ref Range: 36 5 - 49 3 % 27 8 (L) 29 0 (L) 27 8 (L) 28 1 (L) 25 2 (L) 25 0 (L) 25 0 (L) 23 7 (L)   MCV Latest Ref Range: 82 - 98 fL 91 91 94 93 93 93 94 93   MCH Latest Ref Range: 26 8 - 34 3 pg 27 6 27 7 27 9 28 1 27 8 28 6 28 5 28 2   MCHC Latest Ref Range: 31 4 - 37 4 g/dL 30 2 (L) 30 3 (L) 29 9 (L) 30 2 (L) 29 8 (L) 30 8 (L) 30 4 (L) 30 4 (L)   RDW Latest Ref Range: 11 6 - 15 1 % 18 0 (H) 17 9 (H) 18 5 (H) 18 5 (H) 18 5 (H) 18 5 (H) 18 5 (H) 18 3 (H)   Platelet Count Latest Ref Range: 149 - 390 Thousands/uL 32 (LL) 24 (LL) 16 (LL) 35 (LL) 31 (LL) 33 (LL) 27 (LL) 16 (LL)   Platelet Estimate Latest Ref Range: Adequate   Decreased (A)    Decreased (A)             Results from last 7 days   Lab Units 09/03/19  0451 09/02/19  0446 09/01/19  0547 08/31/19  0845 08/31/19  0608 08/31/19  0606   SODIUM mmol/L 139 137 137 138  --  138   POTASSIUM mmol/L 4 6 4 8 4 6 4 0  --  4 5   CHLORIDE mmol/L 105 103 102 102  --  102   CO2 mmol/L 24 22 22 26  --  23   ANION GAP mmol/L 10 12 13 10  --  13   BUN mg/dL 59* 56* 39* 33*  --  33*   CREATININE mg/dL 2 02* 2 02* 1 75* 1 82*  --  1 76*   EGFR ml/min/1 73sq m 33 33 39 37  --  39   CALCIUM mg/dL 8 5 8 6 8 5 7 8*  --  8 0*   CALCIUM, IONIZED mmol/L  --   --   --   --  0 96*  --    MAGNESIUM mg/dL 1 5*  --  1 2*  --  1 1*  --          Results from last 7 days   Lab Units 09/03/19  1118 09/03/19  0736 09/02/19  2056 09/02/19  1523 09/02/19  1248 09/02/19  1133 09/02/19  0944 09/02/19  0756 09/01/19  2148 09/01/19  1650   POC GLUCOSE mg/dl 179* 206* 257* 263* 223* 213* 243* 223* 198* 232*     Results from last 7 days   Lab Units 09/03/19  0451 09/02/19  0446 09/01/19  0547 08/31/19  0845 08/31/19  0606 08/31/19  0106 08/29/19  0056   GLUCOSE RANDOM mg/dL 186* 208* 309* 173* 149* 160* 94       Results from last 7 days   Lab Units 08/31/19  0606 08/31/19  0106 08/30/19  2252 08/30/19  1934   TROPONIN I ng/mL 21 11* 27 39* 24 40* 13 09*       Results from last 7 days   Lab Units 08/31/19  0845   PROCALCITONIN ng/ml 9 93*     Results from last 7 days   Lab Units 08/29/19  0248 08/29/19  0055   LACTIC ACID mmol/L 1 4 2 8*             Results from last 7 days   Lab Units 08/31/19  0106   NT-PRO BNP pg/mL 54,708*         Results from last 7 days   Lab Units 08/29/19  0056   BLOOD CULTURE  No Growth After 5 Days  No Growth After 5 Days       Results from last 7 days   Lab Units 09/01/19  0547 08/29/19  0220   TOTAL COUNTED  100 100             Ref Range & Units 8/31/19 5374 Ventricular Rate     Atrial Rate     NC Interval ms 92    QRSD Interval ms 72    QT Interval ms 316    QTC Interval ms 507    P [place] degrees 24    QRS Axis degrees 18    T Wave Axis degrees 53      Atrial tachycardia VS  Sinus tachycardia with short NC  When compared with ECG of 31-AUG-2019 01:25,  Premature atrial complexes are no longer Present  Vent   rate has increased BY  55 BPM       Vital Signs:     Date and Time Temp Pulse SpO2        Resp BP   09/03/19 0733 97 4 °F (36 3 °C) 82 98 %    Room Air 18 120/72   09/02/19 2320 97 1 °F (36 2 °C) 71 95 %  18 94/67   09/02/19 1525 96 4 °F (35 8 °C) -- 97 %  16 113/76   09/01/19 2145 -- -- --  -- 97/64   09/01/19 1820 -- 92 --  18 107/59   09/01/19 1540 -- 78 --  16 93/53   09/01/19 1215 96 7 °F (35 9 °C) 78 96 %  15 91/51   09/01/19 1020 -- 74 98 %  14 85/54   09/01/19 0810 -- 82 93 %  -- 98/59   09/01/19 0640 -- 76 96 %  12 97/60   09/01/19 0440 -- 84 94 %  14 86/66   09/01/19 0340 -- 80 93 %  10 96/62   09/01/19 0240 -- 90 92 %  20 96/60   09/01/19 0140 -- 86 95 %  17 97/63   09/01/19 0050 -- 84 94 %  18 95/57   09/01/19 0000 95 5 °F (35 3 °C) 82 96 %  25 107/60   08/31/19 2320 -- 74 96 %  12 82/61   08/31/19 2240 -- 82 96 %  12 86/62   08/31/19 2140 -- 86 96 %  17 96/52   08/31/19 2110 -- 87 96 %  -- 98/44   08/31/19 2040 -- 140 97 %  12 99/68   08/31/19 1900 -- 148 96 %  14 92/59   08/31/19 1808 -- 92 --  16 106/68   08/31/19 1708 -- 88 97 %  18 106/64   08/31/19 1538 -- 142 97 %  21 112/76   08/31/19 1533 -- 144 97 %  13 106/69   08/31/19 1500 98 1 °F (36 7 °C) -- --  -- --   08/31/19 1438 -- 92 97 %  16 107/66   08/31/19 1351 96 7 °F (35 9 °C) -- --  -- --   08/31/19 1153 -- -- 91 %  Room Air -- --   08/31/19 1150 100 3 °F  -- --  -- --   08/31/19 1150 -- 107 95 %   3L O2 NC -- 113/57   08/31/19 1100 -- 105 --  -- 105/52   08/31/19 1022 -- 164 --  -- 81/48   08/31/19 0839 -- 95 --  -- --   08/31/19 0816 -- -- 98 %    4L O2 NC -- --   08/31/19 0716 100 °F  165  97 %    5L O2 NC 18 105/65   08/31/19 0552 -- 162 --  -- --   08/31/19 0544 -- 104 95 %    6L O2 NC -- 108/57   08/31/19 0537 99 4 °F (37 4 °C) 164 91 %  22 104/61   08/31/19 0445 98 6 °F (37 °C) 160 --  24 120/70   08/31/19 0309 98 7 °F (37 1 °C) 96 --  18 122/59   08/31/19 0241 99 4 °F (37 4 °C) 108 --  18 126/63   08/30/19 2244 98 1 °F (36 7 °C) 98 93 %  20 111/59   08/30/19 2149 -- 96 --  -- 97/55   08/30/19 1920 97 8 °F (36 6 °C) 102 --  22 106/77   08/30/19 1800 -- -- --  -- 82/58     08/30/19 1644 -- 152 --  -- --   08/30/19 1603 -- 170 --  -- --   08/30/19 1530 98 4 °F (36 9 °C) 175 95 %  24 108/64   08/30/19 1016 -- 110 --  -- 125/60   08/30/19 0720 98 4 °F (36 9 °C) 100 95 %  16 130/69   08/29/19 2313 99 1 °F (37 3 °C) 106 93 %  18 101/51   08/29/19 2131 -- 73 --  -- 119/58   08/29/19 2045 -- -- --  -- --   08/29/19 1813 -- -- --  -- --   08/29/19 1633 -- -- --  -- --   08/29/19 1508 100 7 °F (38 2 °C) 92 98 %  18 112/56   08/29/19 1436 -- -- --  -- --   08/29/19 1014 -- -- --  -- --   08/29/19 0739 98 °F (36 7 °C) 89 98 %  18 107/55   08/29/19 0710 -- -- --  -- --   08/29/19 0629 -- 88 98 %  4L O2 NC 12 106/68   08/29/19 0522 99 8 °F (37 7 °C) -- --  -- --   08/29/19 0241 101 4 °F (38 6 °C) -- --  -- --   08/29/19 0030 102 7 °F (39 3 °C) 164 --  -- --       Medications:   Scheduled Meds:   Current Facility-Administered Medications:  acetaminophen 650 mg Rectal Q4H PRN   acetaminophen 650 mg Oral Q6H PRN   amiodarone 400 mg Oral TID With Meals   atorvastatin 80 mg Oral HS   benzonatate 100 mg Oral TID PRN   cefepime 2,000 mg Intravenous Q12H   dextran 70-hypromellose 1 drop Both Eyes PRN   diazepam 5 mg Oral HS PRN   docusate sodium 100 mg Oral BID   finasteride 5 mg Oral Daily   fish oil 1,000 mg Oral Daily   fluticasone-vilanterol 1 puff Inhalation Daily   haloperidol lactate 2 5 mg Intramuscular Q6H PRN   insulin glargine 25 Units Subcutaneous QAM   insulin lispro 1-5 Units Subcutaneous TID AC   insulin lispro 1-5 Units Subcutaneous HS   insulin lispro 10 Units Subcutaneous TID With Meals   ipratropium-albuterol 3 mL Nebulization TID PRN   lidocaine 1 patch Topical Daily   methocarbamol 500 mg Oral Q6H PRN   metoprolol tartrate 50 mg Oral Q12H [address]   metroNIDAZOLE 500 mg Oral Q8H [address]   nicotine 1 patch Transdermal Daily   ondansetron 4 mg Intravenous Q4H PRN   oxyCODONE 10 mg Oral Q4H PRN   pantoprazole 40 mg Oral Daily Before Breakfast   potassium chloride 20 mEq Oral BID   predniSONE 60 mg Oral Daily   tamsulosin 0 4 mg Oral Daily With Dinner   traMADol 50 mg Oral Q8H PRN     Infusion history:   Completed transfusions     Ordered      08/31/19 0151  Transfuse pheresed platelets adult[de-identified] 1 Units Transfusion     Released Time Blood Unit Number Status   08/31/19 0222   19  [id number]  [id number] Completed 08/31/19 0502              methylPREDNISolone sodium succinate (Solu-MEDROL) injection 40 mg   Dose: 40 mg  Freq: Every 6 hours scheduled Route: IV  Start: 08/31/19 1200 End: 09/02/19 1018      amiodarone (CORDARONE) 900 mg in dextrose 5 % 500 mL infusion   Rate: 16 7 mL/hr Dose: 0 5 mg/min  Freq: Continuous Route: IV  Last Dose: Stopped (09/01/19 1119)  Start: 08/31/19 1500       amiodarone (CORDARONE) 900 mg in dextrose 5 % 500 mL infusion   Rate: 33 3 mL/hr Dose: 1 mg/min  Freq: Continuous Route: IV  Start: 08/31/19 0600 End: 08/31/19 1209      metroNIDAZOLE (FLAGYL) IVPB (premix) 500 mg   Dose: 500 mg  Freq: Every 8 hours Route: IV  Last Dose: 500 mg (09/03/19 0915)  Start: 08/29/19 0130 End: 09/03/19 1130      hydroxyurea (HYDREA) capsule 500 mg   Dose: 500 mg  Freq: Every 24 hours Route: PO  Start: 08/23/19 1645 End: 08/31/19 1121        Network Utilization Review Department  Phone: [phone]; Fax [phone]  [email or web address]  ATTENTION: Please call with any questions or concerns to [phone]  and carefully listen to the prompts so that you are directed to the right person  Send all requests for admission clinical reviews, approved or denied determinations and any other requests to fax [phone]   All voicemails are confidential

## 2019-09-03 NOTE — PROGRESS NOTES
The metronidazole has been converted to Oral per [facility] IV-to-PO Auto-Conversion Protocol for Adults as approved by the Pharmacy and Therapeutics Committee  The patient met all eligible criteria:  3 Age = 25years old   2) Received at least one dose of the IV form   3) Receiving at least one other scheduled oral/enteral medication   4) Tolerating an oral/enteral diet   and did not have any exclusions:   1) Critical care patient   2) Active GI bleed (IF assessing H2RAs or PPIs)   3) Continuous tube feeding (IF assessing cipro, doxycycline, levofloxacin, minocycline, rifampin, or voriconazole)   4) Receiving PO vancomycin (IF assessing metronidazole)   5) Persistent nausea and/or vomiting   6) Ileus or gastrointestinal obstruction   7) Madelyn/nasogastric tube set for continuous suction   8) Specific order not to automatically convert to PO (in the order's comments or if discussed in the most recent Infectious Disease or primary team's progress notes)       Neymar Urbano, PharmD, [address] [facility] and Internal Medicine Clinical Pharmacist

## 2019-09-03 NOTE — ASSESSMENT & PLAN NOTE
Possibly secondary to aspiration versus likely lung toxicity secondary to hydroxyurea  Per pulmonology, continue prednisone 60 mg daily with slow taper by 10 mg once weekly  Continue IV cefepime and Flagyl and continue for at least 7 days  (day 5/7)  Start Bactrim after completion of antibiotics for aspiration pneumonia  As per pulmonology, would consider bronchoscopy but given patient's mental status and lack of competent to make his own medical decisions and absence of a guardian to act on his behalf, there will hold off on bronchoscopy and continue with steroids and follow-up imaging with chest x-ray as well as outpatient pulmonology follow-up in 3 weeks

## 2019-09-03 NOTE — PROGRESS NOTES
Progress Note - Luther Webber 1950, 76 y o  male MRN: [id number]    Unit/Bed#: E4 -01 Encounter: [id number]    Primary Care Provider: Cecily Loyd MD   Date and time admitted to hospital: 8/22/2019 10:59 AM        * Intractable back pain  Assessment & Plan  Patient initially presented with intractable back pain secondary to myelodysplastic syndrome  No acute finding on x ray of spine  Continue opiods for pain control  PT/OT eval recommended-short-term skilled PT, home PT, 24 hours supervision with home family support  Acute respiratory failure with hypoxia (HCC)  Assessment & Plan  Patient's acute decompensation with acute respiratory failure with hypoxia thought to be secondary to side effect of hydroxyurea  Hydroxyurea since been discontinued  Patient has been improving on steroids  Will continue prednisone 60 mg daily with slow taper by 10 mg every week  Patient is currently oxygenating well on room air 98%  Continue supplemental O2 to maintain O2 sats >88%  Continue pulmonary toilet as able, incentive spirometry, increased time out of bed  To consider home O2 eval prior to discharge  Abnormal CT of the chest  Assessment & Plan  Possibly secondary to aspiration versus likely lung toxicity secondary to hydroxyurea  Per pulmonology, continue prednisone 60 mg daily with slow taper by 10 mg once weekly  Continue IV cefepime and Flagyl and continue for at least 7 days  (day 5/7)  Start Bactrim after completion of antibiotics for aspiration pneumonia  As per pulmonology, would consider bronchoscopy but given patient's mental status and lack of competent to make his own medical decisions and absence of a guardian to act on his behalf, there will hold off on bronchoscopy and continue with steroids and follow-up imaging with chest x-ray as well as outpatient pulmonology follow-up in 3 weeks          Acute renal failure with acute tubular necrosis superimposed on stage 3 chronic kidney disease [facility])  Assessment & Plan  Patient's current creatinine is around his baseline  Current creatinine 2 02   IV fluids discontinued due to risk of worsening acute hypoxic respiratory failure in the setting of elevated BNP  Continue to monitor renal function and BMP daily  Avoid nephrotoxins, NSAIDs, anemia, hypotension  SVT (supraventricular tachycardia) (HCC)  Assessment & Plan  Likely induced due to underlying catecholamine state from his malignancy  Has been terminated with adenosine on multiple different occasions  Continue to use adenosine to terminate SVT as long as blood pressure stable as per Cardiology  Continue oral amiodarone 400 mg PO TID for total of 5 days and then 200 mg daily afterwards beginning 9/6  Continue Lopressor 50 mg PO BID  Elevated troponin  Assessment & Plan  Thought to be due to non MI troponin elevation secondary to demand ischemia from prolonged SVT  Patient is thought to have underlying CAD but is not thought to be a candidate for heparin anticoagulation secondary to thrombocytopenia and malignancy  Continue beta-blocker, statin  Myelodysplasia (myelodysplastic syndrome) [facility])  Assessment & Plan  Patient was on hydroxyurea for myelodysplasia which has since been discontinued secondary to possible side effects causing acute respiratory failure with hypoxia  Hematology/oncology is aware of discontinuation of hydroxyurea  Acute encephalopathy  Assessment & Plan  Possibly secondary to delirium from metabolic disturbances versus respiratory failure versus side effects of hydroxyurea  Patient's baseline mental status is currently unknown  Patient has been evaluated by neuropsychology and has been deemed incompetent to make his own medical decisions  Awaiting guardianship for patient  Tobacco abuse  Assessment & Plan  Continue nicotine patches          VTE Pharmacologic Prophylaxis:   Pharmacologic: Pharmacologic VTE Prophylaxis contraindicated due to Thrombocytopenia  Mechanical VTE Prophylaxis in Place: Yes    Patient Centered Rounds: I have performed bedside rounds with nursing staff today  Discussions with Specialists or Other Care Team Provider:  Yes    Education and Discussions with Family / Patient:  Yes    Time Spent for Care: 15 minutes  More than 50% of total time spent on counseling and coordination of care as described above  Current Length of Stay: 12 day(s)    Current Patient Status: Inpatient   Certification Statement: The patient will continue to require additional inpatient hospital stay due to Treatment with IV antibiotics    Discharge Plan: To home with home PT or short-term skilled PT when medically stable  Code Status: Level 1 - Full Code      Subjective:   Patient denies any complaints currently  Objective:     Vitals:   Temp (24hrs), Av 1 °F (36 2 °C), Min:96 4 °F (35 8 °C), Max:97 6 °F (36 4 °C)    Temp:  [96 4 °F (35 8 °C)-97 6 °F (36 4 °C)] 97 4 °F (36 3 °C)  HR:  [71-82] 82  Resp:  [13-18] 18  BP: ()/(55-76) 120/72  SpO2:  [93 %-98 %] 98 %  Body mass index is 29 96 kg/m²  Input and Output Summary (last 24 hours): Intake/Output Summary (Last 24 hours) at 9/3/2019 1124  Last data filed at 9/3/2019 0900  Gross per 24 hour   Intake 350 ml   Output 500 ml   Net -150 ml       Physical Exam:     Physical Exam   Constitutional: He appears well-developed and well-nourished  No distress  HENT:   Head: Normocephalic and atraumatic  Eyes: Pupils are equal, round, and reactive to light  EOM are normal  No scleral icterus  Neck: Normal range of motion  Neck supple  No JVD present  Cardiovascular: Normal rate, regular rhythm and normal heart sounds  No murmur heard  Pulmonary/Chest: Effort normal and breath sounds normal  He has no wheezes  He has no rales  Abdominal: Soft  Bowel sounds are normal  He exhibits no distension  There is no tenderness     Musculoskeletal: He exhibits no edema or tenderness  Neurological: He is alert  Skin: Skin is warm and dry  He is not diaphoretic  Additional Data:     Labs:    Results from last 7 days   Lab Units 09/03/19  0451  09/01/19  0547   WBC Thousand/uL 49 23*   < > 68 83*   HEMOGLOBIN g/dL 7 2*   < > 7 7*   HEMATOCRIT % 23 7*   < > 25 0*   PLATELETS Thousands/uL 16*   < > 33*   BANDS PCT %  --   --  17*   NEUTROS PCT % 75   < >  --    LYMPHS PCT % 1*   < >  --    LYMPHO PCT %  --   --  1*   MONOS PCT % 2*   < >  --    MONO PCT %  --   --  2*   EOS PCT % 0   < > 0    < > = values in this interval not displayed  Results from last 7 days   Lab Units 09/03/19  0451   SODIUM mmol/L 139   POTASSIUM mmol/L 4 6   CHLORIDE mmol/L 105   CO2 mmol/L 24   BUN mg/dL 59*   CREATININE mg/dL 2 02*   ANION GAP mmol/L 10   CALCIUM mg/dL 8 5   GLUCOSE RANDOM mg/dL 186*         Results from last 7 days   Lab Units 09/03/19  1118 09/03/19  0736 09/02/19  2056 09/02/19  1523 09/02/19  1248 09/02/19  1133 09/02/19  0944 09/02/19  0756 09/01/19  2148 09/01/19  1650 09/01/19  1121 09/01/19  0748   POC GLUCOSE mg/dl 179* 206* 257* 263* 223* 213* 243* 223* 198* 232* 248* 353*         Results from last 7 days   Lab Units 08/31/19  0845 08/29/19  0248 08/29/19  0055   LACTIC ACID mmol/L  --  1 4 2 8*   PROCALCITONIN ng/ml 9 93*  --   --            * I Have Reviewed All Lab Data Listed Above  * Additional Pertinent Lab Tests Reviewed: Oleg 66 Admission Reviewed    Imaging:    Imaging Reports Reviewed Today Include:  None available  Imaging Personally Reviewed by Myself Includes:  None    Recent Cultures (last 7 days):     Results from last 7 days   Lab Units 08/29/19  0056   BLOOD CULTURE  No Growth After 5 Days  No Growth After 5 Days         Last 24 Hours Medication List:     Current Facility-Administered Medications:  acetaminophen 650 mg Rectal Q4H PRN Aliza Fields MD    acetaminophen 650 mg Oral Q6H PRN Aliza Fields MD    amiodarone 400 mg Oral TID With Meals Mitali Puentes MD    atorvastatin 80 mg Oral HS Mitali Puentes MD    benzonatate 100 mg Oral TID PRN Mitali Puentes MD    cefepime 2,000 mg Intravenous Q12H Mitali Puentes MD Last Rate: Stopped (09/03/19 0102)   dextran 70-hypromellose 1 drop Both Eyes PRN Mitali Puentes, MD    diazepam 5 mg Oral HS PRN Mitali Puentes, MD    docusate sodium 100 mg Oral BID Mitali Puentes, MD    finasteride 5 mg Oral Daily Mitali Puentes MD    fish oil 1,000 mg Oral Daily Mitali Puentes MD    fluticasone-vilanterol 1 puff Inhalation Daily Mitali Puentes MD    haloperidol lactate 2 5 mg Intramuscular Q6H PRN Mitali Puentes MD    insulin glargine 25 Units Subcutaneous QAM Mitali Puentes MD    insulin lispro 1-5 Units Subcutaneous TID AC Mitali Puentes MD    insulin lispro 1-5 Units Subcutaneous HS Mitali Puentes MD    insulin lispro 10 Units Subcutaneous TID With Meals Mitali Puentes MD    ipratropium-albuterol 3 mL Nebulization TID PRN Mitali Puentes MD    lidocaine 1 patch Topical Daily Gustavo Pinto MD    magnesium sulfate 2 g Intravenous Once Mitali Puentes MD    methocarbamol 500 mg Oral Q6H PRN Mitali Puentes MD    metoprolol tartrate 50 mg Oral Q12H [address] Gustavo Pinto MD    metroNIDAZOLE 500 mg Intravenous Q8H Mitali Puentes MD Last Rate: 500 mg (09/03/19 0915)   nicotine 1 patch Transdermal Daily Gustavo Pinto MD    ondansetron 4 mg Intravenous Q4H PRN Mitali Puentes MD    oxyCODONE 10 mg Oral Q4H PRN Mitali Puentes MD    pantoprazole 40 mg Oral Daily Before Breakfast Gustavo Pinto MD    potassium chloride 20 mEq Oral BID Mitali Puentes MD    predniSONE 60 mg Oral Daily Mitali Puentes MD    tamsulosin 0 4 mg Oral Daily With Zeynep Ferrell MD    traMADol 50 mg Oral Q8H PRN Mitali Puentes MD         Today, Patient Was Seen By: Mitali Puentes MD    ** Please Note: Dictation voice to text software may have been used in the creation of this document   **

## 2019-09-03 NOTE — ASSESSMENT & PLAN NOTE
Patient's acute decompensation with acute respiratory failure with hypoxia thought to be secondary to side effect of hydroxyurea  Hydroxyurea since been discontinued  Patient has been improving on steroids  Will continue prednisone 60 mg daily with slow taper by 10 mg every week  Patient is currently oxygenating well on room air 98%  Continue supplemental O2 to maintain O2 sats >88%  Continue pulmonary toilet as able, incentive spirometry, increased time out of bed  To consider home O2 eval prior to discharge

## 2019-09-03 NOTE — PHYSICAL THERAPY NOTE
Physical Therapy Progress Note     09/03/19 1443   Pain Assessment   Pain Assessment No/denies pain   Pain Score No Pain   Restrictions/Precautions   Weight Bearing Precautions Per Order No   Other Precautions Fall Risk;1:1;Combative;Cognitive; Impulsive   General   Chart Reviewed Yes   Response to Previous Treatment Patient unable to report, no changes reported from family or staff   Family/Caregiver Present No   Subjective   Subjective "I'm going home today "   Transfers   Sit to Stand 4  Minimal assistance   Additional items Assist x 1; Armrests; Increased time required;Verbal cues   Stand to Sit 4  Minimal assistance   Additional items Assist x 1;Bedrails; Increased time required;Verbal cues   Ambulation/Elevation   Gait pattern Decreased foot clearance; Forward Flexion; Short stride; Excessively slow; Inconsistent grant; Shuffling   Gait Assistance 4  Minimal assist   Additional items Assist x 1;Verbal cues; Tactile cues   Assistive Device Rolling walker   Distance 50' x 1, 70' x 1   Balance   Static Sitting Good   Dynamic Sitting Good   Static Standing Fair -   Dynamic Standing Poor +   Ambulatory Poor +   Endurance Deficit   Endurance Deficit Yes   Endurance Deficit Description fatigue   Activity Tolerance   Activity Tolerance Treatment limited secondary to agitation   Nurse Made Aware Yes   Assessment   Prognosis Fair   Problem List Decreased strength;Decreased range of motion;Decreased endurance; Impaired balance;Decreased mobility; Decreased cognition; Impaired judgement;Decreased safety awareness;Decreased skin integrity;Obesity   Assessment Pt  observed amb  in hallway with PCA 1:1 upon my arrival  Pt  noted to continue A of therapist with cues for LE sequencing  Pt  limited by fatigue requiring a seated resting period in between gait trials   Noted pt  with increased agitation this PM, stating "I'm going home today " With attempts at discussion pt  would attempt to be combative with this therapist, thus Dina applied  After additional time pt  agreeable to return to room  Progressed with a second amb  trial with eventual return to room  Pt  repositioned seated at EOB at end of treatment session  PCA 1:1 present at end of treatment session  PT will continue to recommend d/c to rehab when medically stable for continued improvement of noted impairments above  Barriers to Discharge Decreased caregiver support; Inaccessible home environment   Goals   Patient Goals To go home  STG Expiration Date 09/06/19   Treatment Day 4   Plan   Treatment/Interventions Functional transfer training;LE strengthening/ROM; Endurance training;Gait training;Spoke to nursing;Spoke to case management   Progress Slow progress, cognitive deficits   PT Frequency Other (Comment)  (3-5x/wk)   Recommendation   Recommendation Short-term skilled PT   Equipment Recommended Walker  (RW)   PT - OK to Discharge Yes  (if d/c to rehab when medically stable )     Driss Gibson, ALEX no

## 2019-09-04 NOTE — ASSESSMENT & PLAN NOTE
Possibly secondary to aspiration versus likely lung toxicity secondary to hydroxyurea  Per pulmonology, continue prednisone 60 mg daily( day 3/7) with slow taper by 10 mg once weekly  Continue IV cefepime and Flagyl and continue for at least 7 days  (day 6/7)  Start Bactrim after completion of antibiotics for aspiration pneumonia  As per pulmonology, would consider bronchoscopy but given patient's mental status and lack of competent to make his own medical decisions and absence of a guardian to act on his behalf, there will hold off on bronchoscopy and continue with steroids and follow-up imaging with chest x-ray as well as outpatient pulmonology follow-up in 3 weeks

## 2019-09-04 NOTE — ASSESSMENT & PLAN NOTE
Possibly secondary to delirium from metabolic disturbances versus respiratory failure versus side effects of hydroxyurea  Patient's baseline mental status is currently unknown  Patient has been evaluated by neuropsychology and has been deemed incompetent to make his own medical decisions  Awaiting guardianship for patient-case management working on guardianship

## 2019-09-04 NOTE — PROGRESS NOTES
Called by nursing for increasing agitation despite PRN Haldol and one-to-one supervision  Patient seen and examined at bedside  Patient ripping off clothing and attempting to remove IVs  Patient attempting to get out of bed and attempting to hit staff  Patient reportedly was unable and unwilling to sleep throughout the night  Patient with increasing LE edema as he refused to allow leg elevation  Patient continues to continuously groan and yell about his back pain  Will trial one time dose of Zyprexa

## 2019-09-04 NOTE — ASSESSMENT & PLAN NOTE
Likely induced due to underlying catecholamine state from his malignancy  Has been terminated with adenosine on multiple different occasions  Continue to use adenosine to terminate SVT as long as blood pressure stable as per Cardiology  Continue oral amiodarone 400 mg PO TID( day 3/5) for total of 5 days and then 200 mg daily afterwards beginning 9/6  Continue Lopressor 50 mg PO BID

## 2019-09-04 NOTE — ASSESSMENT & PLAN NOTE
Patient's acute decompensation with acute respiratory failure with hypoxia thought to be secondary to side effect of hydroxyurea  Hydroxyurea since been discontinued  Patient has been improving on steroids  Will continue prednisone 60 mg daily( day 3/7) with slow taper by 10 mg every week  Patient is currently oxygenating well on room air 99%  Continue supplemental O2 to maintain O2 sats >88%  Continue pulmonary toilet as able, incentive spirometry, increased time out of bed  To consider home O2 eval prior to discharge

## 2019-09-04 NOTE — PROGRESS NOTES
Progress Note - Pulmonary   Shirley Mathew  76 y o  male MRN: [id number]  Unit/Bed#: E4 -01 Encounter: [id number]      Assessment/Plan:  1  Acute hypoxic respiratory failure-resolved  1  Currently oxygenating well on room air titrate oxygen to maintain SpO2 greater than equal to 88%  2  Pulmonary toilet:  Incentive spirometry q 1 hour, out of bed with increasing activity as tolerated  2  Abnormal chest CT with possible aspiration versus likely lung toxicity secondary to hydroxyurea  1  Continue to hold hydroxyurea  2  Continue with prednisone at 60 mg daily with taper by 10 mg every 7 days currently on day 3 at 60 mg  3  Outpatient chest x-ray in 3 weeks  4  Outpatient Pulmonary follow-up  5  Hold Bactrim prophylaxis as he will likely only require prednisone for 6 weeks  3  AK with superimposed CKD  1  Continue monitor closely  2  Internal Medicine management  4  SVT with elevated troponin  1  Cardiology following  5  Myelodysplastic syndrome  1  hematology following  6  Tobacco abuse without no lung disease  1  NRT  2  Cessation  3  Continue breo  7  Acute enphalopathy  1  Hospital delirium vs hydroxyurea toxicity, although baseline mental status unknown  2  Deemed non competent, guardianship pending  *hospital follow up per discharge instructions      Subjective: Leopold Mallet was seen sitting in chair in the hallway  He denies acute events  He is only oriented to himself  He otherwise is minimally interactive  Denies sputum production, or pain  Objective:         Vitals: Blood pressure 139/72, pulse 95, temperature 97 8 °F (36 6 °C), temperature source Tympanic, resp  rate 18, weight 102 kg (225 lb 15 5 oz), SpO2 99 % , RA, Body mass index is 30 65 kg/m²        Intake/Output Summary (Last 24 hours) at 9/4/2019 1104  Last data filed at 9/4/2019 0801  Gross per 24 hour   Intake 90 ml   Output 1010 ml   Net -920 ml         Physical Exam  Gen: Awake, oriented to self only, no acute distress  HEENT: Mucous membranes moist, no oral lesions, no thrush  NECK: no accessory muscle use, JVP not elevated  Cardiac: Regular, single S1, single S2, no murmurs, no rubs, no gallops  Lungs: faint bibasilar rales  Abdomen: normoactive bowel sounds, soft nontender, nondistended, no rebound or rigidity, no guarding  Extremities: no cyanosis, no clubbing, bilateral lower extremity edema    Labs: I have personally reviewed pertinent lab results  ,    none      Imaging and other studies: none      Trey Crowder

## 2019-09-04 NOTE — PROGRESS NOTES
Patient lethargic and oriented to self only today, which is change from baseline yesterday  Has very poor appetite and continue to monitor blood sugar  Legs are +3 pitting edema also up from yesterday nd patient remains restless in bed or out in chair  Is very slow to move and is high fall risk  Labs drawn  Physician notified for re-assessment/chart review  Daisy pumps placed back on patient

## 2019-09-04 NOTE — ASSESSMENT & PLAN NOTE
Patient's current creatinine is around his baseline  Current creatinine 2 02 from 9/3  Refused a m  Labs  IV fluids discontinued due to risk of worsening acute hypoxic respiratory failure in the setting of elevated BNP  Continue to monitor renal function and BMP daily  Avoid nephrotoxins, NSAIDs, anemia, hypotension

## 2019-09-04 NOTE — PROGRESS NOTES
Progress Note - Rosie Dry 1950, 76 y o  male MRN: [id number]    Unit/Bed#: E4 -01 Encounter: [id number]    Primary Care Provider: Jessica Alicia MD   Date and time admitted to hospital: 8/22/2019 10:59 AM        * Intractable back pain  Assessment & Plan  Patient initially presented with intractable back pain secondary to myelodysplastic syndrome  No acute finding on x ray of spine  Continue opiods for pain control  PT/OT eval recommended-short-term skilled PT, home PT, 24 hours supervision with home family support  Acute respiratory failure with hypoxia (HCC)  Assessment & Plan  Patient's acute decompensation with acute respiratory failure with hypoxia thought to be secondary to side effect of hydroxyurea  Hydroxyurea since been discontinued  Patient has been improving on steroids  Will continue prednisone 60 mg daily( day 3/7) with slow taper by 10 mg every week  Patient is currently oxygenating well on room air 99%  Continue supplemental O2 to maintain O2 sats >88%  Continue pulmonary toilet as able, incentive spirometry, increased time out of bed  To consider home O2 eval prior to discharge  Abnormal CT of the chest  Assessment & Plan  Possibly secondary to aspiration versus likely lung toxicity secondary to hydroxyurea  Per pulmonology, continue prednisone 60 mg daily( day 3/7) with slow taper by 10 mg once weekly  Continue IV cefepime and Flagyl and continue for at least 7 days  (day 6/7)  Start Bactrim after completion of antibiotics for aspiration pneumonia  As per pulmonology, would consider bronchoscopy but given patient's mental status and lack of competent to make his own medical decisions and absence of a guardian to act on his behalf, there will hold off on bronchoscopy and continue with steroids and follow-up imaging with chest x-ray as well as outpatient pulmonology follow-up in 3 weeks          Acute renal failure with acute tubular necrosis superimposed on stage 3 chronic kidney disease ([facility] [address] )  Assessment & Plan  Patient's current creatinine is around his baseline  Current creatinine 2 02 from 9/3  Refused a m  Labs  IV fluids discontinued due to risk of worsening acute hypoxic respiratory failure in the setting of elevated BNP  Continue to monitor renal function and BMP daily  Avoid nephrotoxins, NSAIDs, anemia, hypotension  SVT (supraventricular tachycardia) (HCC)  Assessment & Plan  Likely induced due to underlying catecholamine state from his malignancy  Has been terminated with adenosine on multiple different occasions  Continue to use adenosine to terminate SVT as long as blood pressure stable as per Cardiology  Continue oral amiodarone 400 mg PO TID( day 3/5) for total of 5 days and then 200 mg daily afterwards beginning 9/6  Continue Lopressor 50 mg PO BID  Elevated troponin  Assessment & Plan  Thought to be due to non MI troponin elevation secondary to demand ischemia from prolonged SVT  Patient is thought to have underlying CAD but is not thought to be a candidate for heparin anticoagulation secondary to thrombocytopenia and malignancy  Continue beta-blocker, statin  Myelodysplasia (myelodysplastic syndrome) [facility])  Assessment & Plan  Patient was on hydroxyurea for myelodysplasia which has since been discontinued secondary to possible side effects causing acute respiratory failure with hypoxia  Hematology/oncology is aware of discontinuation of hydroxyurea  Acute encephalopathy  Assessment & Plan  Possibly secondary to delirium from metabolic disturbances versus respiratory failure versus side effects of hydroxyurea  Patient's baseline mental status is currently unknown  Patient has been evaluated by neuropsychology and has been deemed incompetent to make his own medical decisions  Awaiting guardianship for patient-case management working on guardianship      Tobacco abuse  Assessment & Plan  Continue nicotine patches  VTE Pharmacologic Prophylaxis:   Pharmacologic: Pharmacologic VTE Prophylaxis contraindicated due to Thrombocytopenia  Mechanical VTE Prophylaxis in Place: Yes    Patient Centered Rounds: I have performed bedside rounds with nursing staff today  Discussions with Specialists or Other Care Team Provider:  Yes    Education and Discussions with Family / Patient:  Yes    Time Spent for Care: 15 minutes  More than 50% of total time spent on counseling and coordination of care as described above  Current Length of Stay: 13 day(s)    Current Patient Status: Inpatient   Certification Statement: The patient will continue to require additional inpatient hospital stay due to Awaiting guardianship for placement    Discharge Plan: To home with home PT or skilled PT once guardianship obtained  Code Status: Level 1 - Full Code      Subjective:   Patient was agitated yesterday evening and overnight requesting to go home  Was given Haldol and then subsequently given Zyprexa  Currently patient has no complaints  Objective:     Vitals:   Temp (24hrs), Av 3 °F (36 3 °C), Min:96 7 °F (35 9 °C), Max:97 8 °F (36 6 °C)    Temp:  [96 7 °F (35 9 °C)-97 8 °F (36 6 °C)] 97 8 °F (36 6 °C)  HR:  [80-95] 95  Resp:  [18] 18  BP: (117-139)/(72-76) 139/72  SpO2:  [98 %-99 %] 99 %  Body mass index is 30 65 kg/m²  Input and Output Summary (last 24 hours): Intake/Output Summary (Last 24 hours) at 2019 1124  Last data filed at 2019 0801  Gross per 24 hour   Intake 90 ml   Output 1010 ml   Net -920 ml       Physical Exam:     Physical Exam   Constitutional: He appears well-developed and well-nourished  No distress  HENT:   Head: Normocephalic and atraumatic  Eyes: Pupils are equal, round, and reactive to light  EOM are normal    Neck: Normal range of motion  Neck supple  No JVD present  Cardiovascular: Normal rate, regular rhythm and normal heart sounds  No murmur heard    Pulmonary/Chest: Effort normal  He has rales  Scattered rales  Abdominal: Soft  Bowel sounds are normal    Musculoskeletal: He exhibits edema  Neurological: He is alert  Skin: Skin is warm and dry  He is not diaphoretic  Additional Data:     Labs:    Results from last 7 days   Lab Units 09/03/19  0451  09/01/19  0547   WBC Thousand/uL 49 23*   < > 68 83*   HEMOGLOBIN g/dL 7 2*   < > 7 7*   HEMATOCRIT % 23 7*   < > 25 0*   PLATELETS Thousands/uL 16*   < > 33*   BANDS PCT %  --   --  17*   NEUTROS PCT % 75   < >  --    LYMPHS PCT % 1*   < >  --    LYMPHO PCT %  --   --  1*   MONOS PCT % 2*   < >  --    MONO PCT %  --   --  2*   EOS PCT % 0   < > 0    < > = values in this interval not displayed  Results from last 7 days   Lab Units 09/03/19  0451   SODIUM mmol/L 139   POTASSIUM mmol/L 4 6   CHLORIDE mmol/L 105   CO2 mmol/L 24   BUN mg/dL 59*   CREATININE mg/dL 2 02*   ANION GAP mmol/L 10   CALCIUM mg/dL 8 5   GLUCOSE RANDOM mg/dL 186*         Results from last 7 days   Lab Units 09/04/19  0839 09/03/19  2110 09/03/19  1548 09/03/19  1118 09/03/19  0736 09/02/19  2056 09/02/19  1523 09/02/19  1248 09/02/19  1133 09/02/19  0944 09/02/19  0756 09/01/19  2148   POC GLUCOSE mg/dl 182* 145* 69 179* 206* 257* 263* 223* 213* 243* 223* 198*         Results from last 7 days   Lab Units 08/31/19  0845 08/29/19  0248 08/29/19  0055   LACTIC ACID mmol/L  --  1 4 2 8*   PROCALCITONIN ng/ml 9 93*  --   --            * I Have Reviewed All Lab Data Listed Above  * Additional Pertinent Lab Tests Reviewed: Oleg Monroe Admission Reviewed    Imaging:    Imaging Reports Reviewed Today Include:  None available  Imaging Personally Reviewed by Myself Includes:  None    Recent Cultures (last 7 days):     Results from last 7 days   Lab Units 08/29/19  0056   BLOOD CULTURE  No Growth After 5 Days  No Growth After 5 Days         Last 24 Hours Medication List:     Current Facility-Administered Medications:  acetaminophen 650 mg Rectal Q4H PRN May MD Maricel    acetaminophen 650 mg Oral Q6H PRN Kimberly Connelly MD    amiodarone 400 mg Oral TID With Meals Kimberly Connelly MD    atorvastatin 80 mg Oral HS Kimberly Connelly MD    benzonatate 100 mg Oral TID PRN Kimberly Connelly MD    cefepime 2,000 mg Intravenous Q12H Kimberly Connelly MD Last Rate: Stopped (09/04/19 0219)   dextran 70-hypromellose 1 drop Both Eyes PRN Gustavo Pinto MD    diazepam 5 mg Oral HS PRN May MD Maricel    docusate sodium 100 mg Oral BID May MD Maricel    finasteride 5 mg Oral Daily Gustavo Pinto MD    fish oil 1,000 mg Oral Daily Kimberly Connelly MD    fluticasone-vilanterol 1 puff Inhalation Daily Kimberly Connelly MD    haloperidol lactate 2 5 mg Intramuscular Q6H PRN Kimberly Connelly MD    insulin glargine 25 Units Subcutaneous QAM May MD Maricel    insulin lispro 1-5 Units Subcutaneous TID AC May MD Maricel    insulin lispro 1-5 Units Subcutaneous HS Kimberly Connelly MD    insulin lispro 10 Units Subcutaneous TID With Meals May MD Maricel    ipratropium-albuterol 3 mL Nebulization TID PRN May MD Maricel    lidocaine 1 patch Topical Daily May MD Maricel    methocarbamol 500 mg Oral Q6H PRN May MD Maricel    metoprolol tartrate 50 mg Oral Q12H [address] Gustavo Pinto MD    metroNIDAZOLE 500 mg Oral Q8H [address] May MD Maricel    nicotine 1 patch Transdermal Daily Gustavo Pinto MD    ondansetron 4 mg Intravenous Q4H PRN May MD Maricel    oxyCODONE 10 mg Oral Q4H PRN May MD Maricel    pantoprazole 40 mg Oral Daily Before Breakfast Gustavo Pinto MD    potassium chloride 20 mEq Oral BID Kimberly Connelly MD    predniSONE 60 mg Oral Daily Kimberly Connelyl MD    tamsulosin 0 4 mg Oral Daily With Bonifacio Monsalve MD    traMADol 50 mg Oral Q8H PRN Kimberly Connelly MD         Today, Patient Was Seen By: Kimberly Connelly MD    ** Please Note: Dictation voice to text software may have been used in the creation of this document   **

## 2019-09-04 NOTE — SOCIAL WORK
CM called [facility] again regarding guardianship  Left another VM for Marilynn Page  CM was contacted by RN GREGG from [facility], [place] [phone]  She can assist with d/c planning  CM provided her with update  Later, GREGG was contacted by Elena Yost from [facility], at this time, [facility] is not able to take on any more guardianships and they suggest that the hospital pursues this case  [place] [phone] Department again  CM following

## 2019-09-05 PROBLEM — J81.0 ACUTE PULMONARY EDEMA (HCC): Status: ACTIVE | Noted: 2019-01-01

## 2019-09-05 PROBLEM — R13.19 OTHER DYSPHAGIA: Status: ACTIVE | Noted: 2019-01-01

## 2019-09-05 NOTE — CONSULTS
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 middle and lower lobes and left lower lobe  No evidence of pulmonary interstitial edema  There is no tracheal or endobronchial lesion  PLEURA:  Very small bilateral pleural effusions  HEART/GREAT VESSELS:  Mild cardiomegaly  No thoracic aortic aneurysm  MEDIASTINUM AND BHAKTI:  No lymphadenopathy  CHEST WALL AND LOWER NECK:   Unremarkable  VISUALIZED STRUCTURES IN THE UPPER ABDOMEN:  Calculi partially visualized in the gallbladder fundus and neck without findings to suggest acute cholecystitis  Small hiatal hernia  Stool throughout the visualized portions of the colon  OSSEOUS STRUCTURES:  Right 10th and 11th rib fractures at the costovertebral junction, likely acute to subacute, not present on the prior CT from 8/14/2019  Impression: 1  Groundglass and nodular opacities throughout both lungs, most prominent in the right upper and bilateral lower lobes, suggesting aspiration and/or pneumonia  No evidence of pulmonary interstitial edema  2   Small bilateral pleural effusions  3   No evidence of acute pulmonary embolus  Limited evaluation of distal order branches due to respiratory motion artifact   4   Right 10th and 11th rib fractures at the costovertebral junctions, likely acute to subacute  The study was marked in [facility] for immediate notification  Workstation performed: [id number]     [facility]    Result Date: 8/23/2019  Narrative: RENAL ULTRASOUND INDICATION:   Urinary tract infection, acute kidney injury  COMPARISON: None TECHNIQUE:   Ultrasound of the complete retroperitoneum was performed with a curvilinear transducer utilizing volumetric sweeps and still imaging techniques  FINDINGS: KIDNEYS: Symmetric and normal size  Right kidney:  10 4 cm  Left kidney:  10 8 cm  Right kidney Normal echogenicity and contour  No suspicious masses detected  No hydronephrosis  No shadowing calculi  No perinephric fluid collections  Left kidney Normal echogenicity and contour  No suspicious masses detected  No hydronephrosis  No shadowing calculi  No perinephric fluid collections  URETERS: Nonvisualized  BLADDER: Normally distended  No focal thickening or mass lesions  Bilateral ureteral jets detected  Impression: Normal  Workstation performed: [id number]   Recent Imaging Studies: Procedure: Xr Chest Portable    Result Date: 9/5/2019  Narrative: CHEST INDICATION:   hypoxia; diffuse rhonchi  COMPARISON:  Chest radiographs August 31, 2019 and CT pulmonary angiogram August 30, 2019  EXAM PERFORMED/VIEWS:  XR CHEST PORTABLE  AP semierect Images: 2 FINDINGS: The heart is enlarged  Atherosclerotic changes in the aorta  The lung volumes are diminished  There has been slight progression of bilateral infiltrates  No evidence of a pleural effusion or pneumothorax  Osseous structures appear within normal limits for patient age  Impression: Slight progression of bilateral infiltrates  Workstation performed: [id number]     Code Status: Level 1 - Full Code    CASE MANAGEMENT INFORMATION (for CM use only):    1  Living arrangements:  Lives by self  2  Can patient return home:  No  3  Access to firearms:  No  4   Type of work:  Unemployed, retired    5  Full time/part time:  Not applicable  6  Highest level of education:  High school  7  Status of admission:  Not applicable  8  [facility]  [place]  9  Presenting problem:  Altered mental status, increased confusion  10  Treatment history:  Previous inpatient in 2015  11  Currently in treatment:  No one  12  Name of ICM/Agency:  Not applicable  13  Legal issues:  No  14  Substance Abuse:  Opioid dependence  15  Orientation level:  Alert to self only  16  Affect:  Flat  17  Speech:  Slurred speech, difficult to understand  18  Mood:  Anxious  19  Thought content:  Unable to assess due to patient factors  20  A/Vs:  No  21  Judgement:  Unable to assess  22  Impulse control:  Poor  23  Attention span:  Unable to assess  24  Memory:  Unable to assess  25  Appetite:  Decreased  26  Wt changes:  No  27  Sleep:  Decreased  28  Appearance:  Appears stated age  34  Strengths:  30  Limitations:  Current medical condition, poor support system, isolated and alone  31  SI/His:  No  32  Previous self harm:  No  33  Hx of violence:  No  34  Sexually acting out:  No  35  Do you feel safe:  Yes  36  Hx of abuse:  Denies  40  Hx of PTSD:  Denies  45  Provisional Dx: Unspecified neurocognitive disorder (R41 9)  39  Patient Plan:  Refer patient to skilled nursing rehab facility      Portions of the record may have been created with voice recognition software  Occasional wrong word or "sound a like" substitutions may have occurred due to the inherent limitations of voice recognition software  Read the chart carefully and recognize, using context, where substitutions have occurred

## 2019-09-05 NOTE — ASSESSMENT & PLAN NOTE
Likely induced due to underlying catecholamine state from his malignancy  Has been terminated with adenosine on multiple different occasions  Continue to use adenosine to terminate SVT as long as blood pressure stable as per Cardiology  Continue oral amiodarone 400 mg PO TID( day 4/5) for total of 5 days and then 200 mg daily afterwards beginning 9/6  Continue Lopressor 50 mg PO BID

## 2019-09-05 NOTE — PROGRESS NOTES
RN reports diaphoresis and O2 sat 82%, crackles, sounds wet    Patient in bed, HOB elevated to high Zaldivar's position, has 4+ B/LLE edema; diffuse crackles; gurgling   S/p 4 units platelets; 5 lb wt gain overnight (9/3 to 9/4)  Afebrile, HR 94, 147/85    Deep suctioning provided; on 5LNC; O2 sat improved to 97%  Suspect CHF exacerbation; BNP >54,000   Will obtain a limited echo,  pt had NSTEMI during hospitalization on 8/31; last ECHO done 8/26 prior to MI  G1DD LVEF 60-65%  ABG, stat CXR, IV Lasix x1  Continue supplemental oxygen to maintain sat at or above 92%  Stat DuoNeb x1  Decreased UO, will start urinary retention protocol  Check PVR  Daily wt, I&O
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
## 2019-09-05 NOTE — PROGRESS NOTES
Progress Note - Pulmonary   Minor Willard  76 y o  male MRN: [id number]  Unit/Bed#: E4 -01 Encounter: [id number]      Assessment/Plan:  1  Acute hypoxic respiratory failure-resolved  1  Currently oxygenating well on room air, continue titrate oxygen as needed maintain SpO2 greater than equal to 88%  2  Pulmonary toilet as tolerated:  Incentive spirometry q 1 hour, out of bed with increasing activity  2  Abnormal chest CT with possible aspiration versus likely lung toxicity secondary to hydroxyurea  1  Recommend continue to hold hydroxyurea  2  Due to worsening mental status and possible contributing component of steroid therapy as well as improvement in pulmonary symptoms will plan for a more rapid taper prednisone will decrease his 40 mg tomorrow and taper by 10 mg every 4 days  3  Repeat outpatient chest x-ray in 3 weeks  4  Outpatient Pulmonary follow-up  3  AK I with superimposed CKD  1  Continue monitor closely  2  Internal Medicine following  4  SVT with elevated troponin  1  Improving, continue management per Cardiology  5  Myelodysplastic syndrome  1  Management per Internal Medicine and Hematology  6  Tobacco abuse without known lung disease  1  Nicotine replacement therapy  2  Cessation  3  Continue Breo  7  Acute encephalopathy  1  Hospital delirium versus Hydrea toxicity versus steroid induced mental status change  2  Currently waiting for guardianship  3  Behavior Health following    * Pulmonary follow-up per discharge instructions        Subjective: Yane Marquez was seen lying in bed upon entering the room  Very minimal interaction today  He is alert to self otherwise does not answer questions  Appears to be resting comfortably in bed    Objective:         Vitals: Blood pressure (!) 173/94, pulse 99, temperature (!) 97 3 °F (36 3 °C), temperature source Temporal, resp  rate 18, weight 96 8 kg (213 lb 6 5 oz), SpO2 92 % , RA, Body mass index is 28 94 kg/m²        Intake/Output Summary (Last 24 hours) at 9/5/2019 1424  Last data filed at 9/5/2019 1354  Gross per 24 hour   Intake 1160 ml   Output 2236 8 ml   Net -1076 8 ml         Physical Exam  Gen: Awake, alert, oriented person only,  no acute distress  HEENT: Mucous membranes moist, no oral lesions, no thrush  NECK: no accessory muscle use, JVP not elevated  Cardiac: Regular, single S1, single S2, no murmurs, no rubs, no gallops  Lungs: bilateral rales  Abdomen: normoactive bowel sounds, soft nontender, nondistended, no rebound or rigidity, no guarding  Extremities: no cyanosis, no clubbing, bilateral lower extremity edema    Labs: I have personally reviewed pertinent lab results  , CBC:   Lab Results   Component Value Date    WBC 35 13 (HH) 09/05/2019    HGB 7 5 (L) 09/05/2019    HCT 24 6 (L) 09/05/2019    MCV 93 09/05/2019     09/05/2019    MCH 28 3 09/05/2019    MCHC 30 5 (L) 09/05/2019    RDW 18 9 (H) 09/05/2019    MPV 11 4 09/05/2019    NRBC 0 09/05/2019    NRBC 1 09/05/2019   , CMP:   Lab Results   Component Value Date    SODIUM 141 09/05/2019    K 4 7 09/05/2019     09/05/2019    CO2 23 09/05/2019    BUN 50 (H) 09/05/2019    CREATININE 1 70 (H) 09/05/2019    CALCIUM 9 1 09/05/2019    EGFR 41 09/05/2019     Imaging and other studies: none      Trey Hernández
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
## 2019-09-05 NOTE — NURSING NOTE
Patient moan began to sound wet  Assessed patient: lungs b/l crackels, diaphoretic, , /81, , O2 82% on room air  SLIM at bedside  Nasal cannula 5L, O2 now 96%  SLIM deep suctioned patient  40mg of IV lasix given  Respiratory provided patient with breathing treatment  ABG obtained  Patient respiratory status improved

## 2019-09-05 NOTE — ASSESSMENT & PLAN NOTE
Possibly secondary to aspiration versus likely lung toxicity secondary to hydroxyurea  Per pulmonology, continue prednisone 60 mg daily( day 4/7) with slow taper by 10 mg once weekly  Continue IV cefepime and Flagyl and continue for at least 7 days  (day 7/7)  IV antibiotics will be completed today  Will start patient on oral Bactrim tomorrow 9/6  As per pulmonology, would consider bronchoscopy but given patient's mental status and lack of competent to make his own medical decisions and absence of a guardian to act on his behalf, there will hold off on bronchoscopy and continue with steroids and follow-up imaging with chest x-ray as well as outpatient pulmonology follow-up in 3 weeks

## 2019-09-05 NOTE — ASSESSMENT & PLAN NOTE
Chest x-ray showing progression of bilateral infiltrates  Patient noted to have markedly elevated BNP of 54,708 on 08/31  Patient started on Lasix 40 mg IV BID x6 doses to deal with rales on exam and increased O2 requirements    Recheck BNP

## 2019-09-05 NOTE — ASSESSMENT & PLAN NOTE
Possibly secondary to delirium from metabolic disturbances versus respiratory failure versus side effects of hydroxyurea  Patient's baseline mental status is currently unknown  Patient has been evaluated by neuropsychology and has been deemed incompetent to make his own medical decisions  Awaiting guardianship for patient-case management working on guardianship-this could take up to 3-4 weeks  Gerontology, behavior Health consulted

## 2019-09-05 NOTE — PROGRESS NOTES
Progress Note - Luther Webber 1950, 76 y o  male MRN: [id number]    Unit/Bed#: E4 -01 Encounter: [id number]    Primary Care Provider: Cecily Loyd MD   Date and time admitted to hospital: 8/22/2019 10:59 AM        * Intractable back pain  Assessment & Plan  Patient initially presented with intractable back pain secondary to myelodysplastic syndrome  No acute finding on x ray of spine  Continue opiods for pain control  PT/OT eval recommended-short-term skilled PT, home PT, 24 hours supervision with home family support  Acute respiratory failure with hypoxia (HCC)  Assessment & Plan  Patient's acute decompensation with acute respiratory failure with hypoxia on admission thought to be secondary to side effect of hydroxyurea  Hydroxyurea since been discontinued  Patient has been improving on steroids  Will continue prednisone 60 mg daily( day 4/7) with slow taper by 10 mg every week  Patient however developed repeat acute respiratory failure with hypoxia thought to be secondary to acute pulmonary edema from platelet transfusion in addition to IV antibiotics  He received 1 dose of IV Lasix x1  Currently on 2 L of O2 by nasal cannula  Continue supplemental O2 to keep O2 sats > 88%  Continue aggressive pulmonary toilet, incentive spirometry, increased OOB as able  Acute pulmonary edema [facility])  Assessment & Plan  Chest x-ray showing progression of bilateral infiltrates  Patient noted to have markedly elevated BNP of 54,708 on 08/31  Patient started on Lasix 40 mg IV BID x6 doses to deal with rales on exam and increased O2 requirements  Recheck BNP    Abnormal CT of the chest  Assessment & Plan  Possibly secondary to aspiration versus likely lung toxicity secondary to hydroxyurea  Per pulmonology, continue prednisone 60 mg daily( day 4/7) with slow taper by 10 mg once weekly  Continue IV cefepime and Flagyl and continue for at least 7 days  (day 7/7)    IV antibiotics will be completed today  Will start patient on oral Bactrim tomorrow 9/6  As per pulmonology, would consider bronchoscopy but given patient's mental status and lack of competent to make his own medical decisions and absence of a guardian to act on his behalf, there will hold off on bronchoscopy and continue with steroids and follow-up imaging with chest x-ray as well as outpatient pulmonology follow-up in 3 weeks  Acute renal failure with acute tubular necrosis superimposed on stage 3 chronic kidney disease ([address] )  Assessment & Plan  Patient's current creatinine is around his baseline  Current creatinine trended down to 1 70 from 1 77  Continue to monitor renal function and BMP daily  Avoid nephrotoxins, NSAIDs, anemia, hypotension  SVT (supraventricular tachycardia) (HCC)  Assessment & Plan  Likely induced due to underlying catecholamine state from his malignancy  Has been terminated with adenosine on multiple different occasions  Continue to use adenosine to terminate SVT as long as blood pressure stable as per Cardiology  Continue oral amiodarone 400 mg PO TID( day 4/5) for total of 5 days and then 200 mg daily afterwards beginning 9/6  Continue Lopressor 50 mg PO BID  Elevated troponin  Assessment & Plan  Thought to be due to non MI troponin elevation secondary to demand ischemia from prolonged SVT  Patient is thought to have underlying CAD but is not thought to be a candidate for heparin anticoagulation secondary to thrombocytopenia and malignancy  Continue beta-blocker, statin  Myelodysplasia (myelodysplastic syndrome) [facility])  Assessment & Plan  Patient was on hydroxyurea for myelodysplasia which has since been discontinued secondary to possible side effects causing acute respiratory failure with hypoxia  Hematology/oncology is aware of discontinuation of hydroxyurea      Acute encephalopathy  Assessment & Plan  Possibly secondary to delirium from metabolic disturbances versus respiratory failure versus side effects of hydroxyurea  Patient's baseline mental status is currently unknown  Patient has been evaluated by neuropsychology and has been deemed incompetent to make his own medical decisions  Awaiting guardianship for patient-case management working on guardianship-this could take up to 3-4 weeks  Gerontology, behavior Health consulted  Tobacco abuse  Assessment & Plan  Continue nicotine patches  Other dysphagia  Assessment & Plan  Speech therapy consulted for bedside swallow and eval         VTE Pharmacologic Prophylaxis:   Pharmacologic: Pharmacologic VTE Prophylaxis contraindicated due to Thrombocytopenia  Mechanical VTE Prophylaxis in Place: Yes    Patient Centered Rounds: I have performed bedside rounds with nursing staff today  Discussions with Specialists or Other Care Team Provider:  Yes    Education and Discussions with Family / Patient:  Yes    Time Spent for Care: 15 minutes  More than 50% of total time spent on counseling and coordination of care as described above  Current Length of Stay: 14 day(s)    Current Patient Status: Inpatient   Certification Statement: The patient will continue to require additional inpatient hospital stay due to Awaiting guardianship  Discharge Plan: To be determined    Code Status: Level 1 - Full Code      Subjective:   Patient has no complaints  Overnight patient developed shortness of breath with diaphoresis and crackles/rales on lung exam   He also developed increased O2 requirement  Patient currently feels much better  Objective:     Vitals:   Temp (24hrs), Av 8 °F (36 °C), Min:95 7 °F (35 4 °C), Max:97 9 °F (36 6 °C)    Temp:  [95 7 °F (35 4 °C)-97 9 °F (36 6 °C)] 97 3 °F (36 3 °C)  HR:  [] 99  Resp:  [18] 18  BP: (123-173)/(62-94) 173/94  SpO2:  [92 %-98 %] 92 %  Body mass index is 28 94 kg/m²  Input and Output Summary (last 24 hours):        Intake/Output Summary (Last 24 hours) at 2019 1537  Last data filed at 9/5/2019 1354  Gross per 24 hour   Intake 1160 ml   Output 2236 8 ml   Net -1076 8 ml       Physical Exam:     Physical Exam   Constitutional: He appears well-developed and well-nourished  No distress  HENT:   Head: Normocephalic and atraumatic  Eyes: No scleral icterus  Neck: JVD present  Cardiovascular: Normal rate, regular rhythm and normal heart sounds  No murmur heard  Pulmonary/Chest: Effort normal  He has no wheezes  He has no rales  Crackles bilaterally on lung exam    Abdominal: Soft  Bowel sounds are normal  He exhibits no distension  There is no tenderness  There is no guarding  Musculoskeletal: He exhibits edema  He exhibits no tenderness  Neurological: He is alert  Skin: Skin is warm and dry  He is not diaphoretic  Additional Data:     Labs:    Results from last 7 days   Lab Units 09/05/19  0641  09/03/19  0451   WBC Thousand/uL 35 13*   < > 49 23*   HEMOGLOBIN g/dL 7 5*   < > 7 2*   HEMATOCRIT % 24 6*   < > 23 7*   PLATELETS Thousands/uL 181   < > 16*   BANDS PCT % 12*   < >  --    NEUTROS PCT %  --   --  75   LYMPHS PCT %  --   --  1*   LYMPHO PCT % 6*   < >  --    MONOS PCT %  --   --  2*   MONO PCT % 3*   < >  --    EOS PCT % 0   < > 0    < > = values in this interval not displayed  Results from last 7 days   Lab Units 09/05/19  0641   SODIUM mmol/L 141   POTASSIUM mmol/L 4 7   CHLORIDE mmol/L 105   CO2 mmol/L 23   BUN mg/dL 50*   CREATININE mg/dL 1 70*   ANION GAP mmol/L 13   CALCIUM mg/dL 9 1   GLUCOSE RANDOM mg/dL 156*         Results from last 7 days   Lab Units 09/05/19  1124 09/05/19  0739 09/05/19  0303 09/05/19  0055 09/04/19  1504 09/04/19  1115 09/04/19  0839 09/03/19  2110 09/03/19  1548 09/03/19  1118 09/03/19  0736 09/02/19  2056   POC GLUCOSE mg/dl 97 166* 158* 110 77 118 182* 145* 69 179* 206* 257*         Results from last 7 days   Lab Units 08/31/19  0845   PROCALCITONIN ng/ml 9 93*           * I Have Reviewed All Lab Data Listed Above    * Additional Pertinent Lab Tests Reviewed:  [address] Admission Reviewed    Imaging:    Imaging Reports Reviewed Today Include:  Portable chest x-ray  Imaging Personally Reviewed by Myself Includes:  None    Recent Cultures (last 7 days):           Last 24 Hours Medication List:     Current Facility-Administered Medications:  acetaminophen 650 mg Rectal Q4H PRN Jennifer Gallegos MD    acetaminophen 650 mg Oral Q6H PRN Jennifer Gallegos MD    amiodarone 400 mg Oral TID With Meals Jennifer Gallegos MD    atorvastatin 80 mg Oral HS Gustavo Pinto MD    benzonatate 100 mg Oral TID PRN Jennifer Gallegos MD    cefepime 2,000 mg Intravenous Q12H Jennifer Gallegos MD Last Rate: Stopped (09/05/19 1410)   dextran 70-hypromellose 1 drop Both Eyes PRN Gustavo Pinto MD    diazepam 5 mg Oral HS PRN Jennifer Gallegos MD    docusate sodium 100 mg Oral BID Gustavo Pinto MD    finasteride 5 mg Oral Daily Gustavo Pinto MD    fish oil 1,000 mg Oral Daily Jennifer Gallegos MD    fluticasone-vilanterol 1 puff Inhalation Daily Gustavo Pinto MD    furosemide 40 mg Intravenous BID (diuretic) Jennifer Gallegos MD    haloperidol lactate 2 5 mg Intramuscular Q6H PRN Jennifer Gallegos MD    insulin glargine 25 Units Subcutaneous QAM Jennifer Gallegos MD    insulin lispro 1-5 Units Subcutaneous TID AC Jennifer Gallegos MD    insulin lispro 1-5 Units Subcutaneous HS Jennifer Gallegos MD    insulin lispro 10 Units Subcutaneous TID With Meals Jennifer Gallegos MD    ipratropium-albuterol 3 mL Nebulization TID PRN Jennifer Gallegos MD    lidocaine 1 patch Topical Daily Gustavo Pinto MD    melatonin 6 mg Oral HS Oxana Sharp PA-C    methocarbamol 500 mg Oral Q6H PRN Jennifer Gallegos MD    metoprolol tartrate 50 mg Oral Q12H [facility] Jennifer Gallegos MD    metroNIDAZOLE 500 mg Oral Q8H [facility] Jennifer Gallegos MD    nicotine 1 patch Transdermal Daily Gustavo Pinto MD    ondansetron 4 mg Intravenous Q4H PRN Jennifer Gallegos MD    oxyCODONE 10 mg Oral Q4H PRN Jennifer Gallegos MD pantoprazole 40 mg Oral Daily Before Breakfast Carla Avila MD    potassium chloride 20 mEq Oral BID Carla Avila MD    [START ON 9/6/2019] predniSONE 40 mg Oral Daily NICOLE Brown    tamsulosin 0 4 mg Oral Daily With Min Palacios MD    traMADol 50 mg Oral Q8H PRN Carla Avila MD         Today, Patient Was Seen By: Carla Avila MD    ** Please Note: Dictation voice to text software may have been used in the creation of this document   **

## 2019-09-05 NOTE — PROGRESS NOTES
Progress Note - Podiatry  Mariia Vazquez  76 y o  male MRN: [id number]  Unit/Bed#: E4 -01 Encounter: [id number]    Assessment/Plan:  1  R hallux and distal 2nd and 3rd toe duskiness and pain  2  RLE anterior leg escharsx2  3  Myelodysplasia  4  DM type 2  5  PAD    -continue local wound care, Betadine paint  -no acute signs of infection RLE  -patient has vascular disease however no vascular intervention planned given his overall status  -weight-bearing as tolerated  -rest of care per primary team    Subjective/Objective   Chief Complaint:   Chief Complaint   Patient presents with    Back Pain     Hx of back pain, reports lower back pan radiates into thoracic region and into right leg  takes gabapentin for pain  forgot to take dose today  Subjective: 76 y o  y/o male was seen and evaluated at bedside  Blood pressure 146/83, pulse 99, temperature (!) 97 3 °F (36 3 °C), temperature source Temporal, resp  rate 18, weight 96 8 kg (213 lb 6 5 oz), SpO2 97 %  ,Body mass index is 28 94 kg/m²  Invasive Devices     Peripheral Intravenous Line            Peripheral IV 09/04/19 Left Forearm less than 1 day                Physical Exam:   General: Alert, cooperative and no distress  Lungs: Non labored breathing  Heart: Positive S1, S2  Abdomen: Soft, non-tender  Extremity:       Neurovascular status gross motor function at baseline  Duskiness of the distal hallux and dorsal hallux, minimal duskiness to the distal right 2nd and 3rd toes no acute signs infection    Two dry stable eschars to the anterior right leg

## 2019-09-05 NOTE — ASSESSMENT & PLAN NOTE
Patient's current creatinine is around his baseline  Current creatinine trended down to 1 70 from 1 77  Continue to monitor renal function and BMP daily  Avoid nephrotoxins, NSAIDs, anemia, hypotension

## 2019-09-05 NOTE — ASSESSMENT & PLAN NOTE
Patient's acute decompensation with acute respiratory failure with hypoxia on admission thought to be secondary to side effect of hydroxyurea  Hydroxyurea since been discontinued  Patient has been improving on steroids  Will continue prednisone 60 mg daily( day 4/7) with slow taper by 10 mg every week  Patient however developed repeat acute respiratory failure with hypoxia thought to be secondary to acute pulmonary edema from platelet transfusion in addition to IV antibiotics  He received 1 dose of IV Lasix x1  Currently on 2 L of O2 by nasal cannula  Continue supplemental O2 to keep O2 sats > 88%  Continue aggressive pulmonary toilet, incentive spirometry, increased OOB as able

## 2019-09-06 NOTE — ASSESSMENT & PLAN NOTE
Possibly secondary to delirium from metabolic disturbances versus respiratory failure versus side effects of hydroxyurea  Patient's baseline mental status is currently unknown  Patient has been evaluated by neuropsychology and has been deemed incompetent to make his own medical decisions  However currently patient's mental status is deteriorating and he is unwilling to eat  Will consult palliative Care to contact [facility] ethics committee regarding reveals sitting code status on this patient given his multiple complex problems  Awaiting guardianship for patient-case management working on guardianship-this could take up to 3-4 weeks  Appreciated Gerontology and [facility] consult and recommendations  CT head without contrast ordered for today due to deteriorating mental status

## 2019-09-06 NOTE — ASSESSMENT & PLAN NOTE
Patient's current creatinine is around his baseline  Current creatinine stable at 1 74  Continue to monitor renal function and BMP daily  Avoid nephrotoxins, NSAIDs, anemia, hypotension

## 2019-09-06 NOTE — CONSULTS
We will defer Palliative and Supportive Care consult, as an Ethics consult was requested instead  Active and available physician members of the Ethics Committee include Kelly Dueñas and Shi Maurice  Either of them may be available to visit the patient on Monday  We would defer to pain management and symptom control to our excellent Gerontology colleague Dr Taty Meza, whose consult was entered earlier today  As re: goals, we appreciate that there is no clear decision maker at this time  Per [facility], full cares must be provided as medically indicated  Please note: the ultimate authority to continue or stop a code rests with the medical provider in charge of the resuscitation effort  It is ethically sound, medically appropriate, and legally defensible for the physician to make a code decision unilaterally, at the time the resuscitation effort is to begin  In the case of this patient, a code would likely constitute non-beneficial therapy, because of his dementia, his myelodysplastic syndrome, and his advanced cardiopulmonary illnesses  We do attest that this patient has an active terminal diagnosis of MDS that seems to be transforming into an acute leukemic phase  In case of an active terminal diagnosis, [place] law allows providers and health care representatives more protection for providing comfort care  Hospice care is indicated for this patient at this time  From the Act:  "§ 0566  Liability   (a)  General rule  --A health care provider or another person may not be subject to criminal or civil liability, discipline for unprofessional conduct or administrative sanctions and may not be found to have committed an act of unprofessional conduct as a result of any of the following:  (6)  Refusing to comply with a direction or decision of an individual based on a good estee belief that compliance with the direction or decision would be unethical or, to a reasonable degree of medical certainty, would result in medical care having no medical basis in addressing any medical need or condition of the individual, provided that the health care provider complies in good estee with sections 5424 (relating to compliance) and 5462(c) (relating to duties of attending physician and health care provider)  "  -- Vick harper/[email or web address]/legis/leandra/Catherine [email or web address]

## 2019-09-06 NOTE — PROGRESS NOTES
Progress Note - Geriatric Medicine   [place]  76 y o  male MRN: [id number]  Unit/Bed#: E4 -01 Encounter: [id number]      Assessment/Plan:    Acute metabolic encephalopathy  -declining, multifactorial including MDS with suspected transformation to acute leukemia, suspected aspiration pneumonia and underlying dementia with frailty   -continue treatment of underlying metabolic derangements  -continue to ensure pain adequately controlled, given eGFR of 39 and inability to take oral medications at this time due to encephalopathy and dysphagia with aspiration risk would consider low dose dilaudid 0 2mg IV Q4H PRN, avoid morphine due to renal fxn  -CTH completed earlier today, no acute intracranial abn noted     Acute respiratory failure with hypoxia  -multifactorial including aspiration related to impaired mentation  -pulmonary following, appreciate their input  -continue aggressive pulmonary toilet     Acute renal failure  -continue to monitor creatinine, IVF on hold for acute pulmonary edema  -renally dose all medications and avoid nephrotoxic agents    Deconditioning/debility/frailty  -concern for overall decompensation, unfortunately given current clinical status and no metabolic reserve patient is at high risk of continued decompensation    Inability to make own medical decisions  -due to acute encephalopathy and overall clinical condition, no family who wishes to actively be involved in patients care  -Ethics consult is likely next step for emergency guardianship, patient is suitable for comfort/hopice related goals, appreciate palliative care input, will follow-up with ethics consult on Monday  -in the meantime continue to ensure patients comfort and dignity are respected and that his pain/discomfort/anxiety are adequately treated    Subjective:   Sae Patel was seen at the bedside where he is lying, he appears restless and uncomfortable   His respirations are labored and he continues to have accessory muscle use  He does not speak but he does  hands when asked and show one or two fingers when asked to communicate this way to ensure symptoms controlled especially given his current respiratory distress  He is unable to fully participate in ROS due to acute respiratory failure and encephalopathy  Review of Systems   Reason unable to perform ROS: Acute metabolic encephalopathy and acute respiratory failurem, patent non-communicative  Objective:     Vitals: Blood pressure 150/86, pulse 101, temperature 97 6 °F (36 4 °C), temperature source Tympanic, resp  rate (!) 30, weight 95 2 kg (209 lb 14 1 oz), SpO2 96 %  ,Body mass index is 28 46 kg/m²  Intake/Output Summary (Last 24 hours) at 9/6/2019 1858  Last data filed at 9/6/2019 1801  Gross per 24 hour   Intake    Output 1814 1 ml   Net -1814 1 ml       Current Medications: Reviewed    Physical Exam:   Physical Exam   Constitutional: He appears well-developed  He appears distressed  HENT:   Mouth breathing, mucous membranes dry   Eyes: Conjunctivae are normal  No scleral icterus  Neck: Neck supple  No tracheal deviation present  Cardiovascular: Normal rate  Exam reveals no friction rub  No murmur heard  Pulmonary/Chest: Accessory muscle usage present  Tachypnea noted  He is in respiratory distress  He has rales  Musculoskeletal: He exhibits edema (2+ to mid shin bilaterally)  Neurological:   Awake with fluctuating alertness, non-verbal at this time due to acuity of illness   Skin: He is diaphoretic  There is pallor  Psychiatric:   Unable to determine due to encephalopathy   Nursing note and vitals reviewed       Invasive Devices     Peripheral Intravenous Line            Peripheral IV 09/04/19 Left Forearm 1 day          Drain            Urethral Catheter Latex 16 Fr  less than 1 day              Lab, Imaging and other studies:   CTH without contrast obtained earlier in day, PACS reviewed, no acute intracranial abn  -Na 143, K 3 8, AG 14, Cr 1 74, eGFR 39, Hb 8 3, WBC 46 23,

## 2019-09-06 NOTE — ASSESSMENT & PLAN NOTE
Chest x-ray showing progression of bilateral infiltrates  Patient noted to have markedly elevated BNP of 54,708 on 08/31 which has increased to--> 74,040 on 9/6  Continue on Lasix 40 mg IV BID x6 doses to deal with rales on exam and increased O2 requirements  Consider suctioning of upper respiratory tract to remove secretions related to possible aspiration and rhonchi
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
## 2019-09-06 NOTE — ASSESSMENT & PLAN NOTE
Speech therapy consulted for bedside swallow and eval-after completion of their evaluation they recommend patient be placed NPO including meds

## 2019-09-06 NOTE — PROGRESS NOTES
Progress Note - Pulmonary   Minor Willard  76 y o  male MRN: [id number]  Unit/Bed#: E4 -01 Encounter: [id number]      Assessment/Plan:    1  Acute hypoxic respiratory failure-resolved  1  SpO2 greater than 90% on room air continue to monitor titrate oxygen as needed to maintain SpO2 greater than equal 88%  2  Pulmonary toilet as tolerated  3  abg obtained due to mental status changes: compensated respiratory alkalosis  2  Abnormal chest CT with possible aspiration versus lung toxicity secondary to hydroxyurea  1  Hold hydroxyurea  2  Continue with prednisone taper-have made taper more rapid due to declining mental status and possible side effects from prednisone  Taper on solumedrol as he is unable to tolerate PO  3  Repeat CXR in 3 weeks  4  Outpatient pulmonary follow up  5  Currently concerned for aspiration as observed to be gargling with poor clearance of secretions and decreased mental status  Speech evaluated and made NPO  3  AK I with superimposed CKD  1  Improving, continue to monitor closely  4  History of SVT with elevated troponin  1  Will be placed on telemetry and step down today  5  myelodysplastic syndrome  1  Hematology following  6  Tobacco abuse without known lung disease  1  NRT  7  Acute encephalopathy  1  Initially believed to be secondary to hydroxyurea-although mental status continues to decline   2  Currently awaiting guardianship  3  Followed by behavior health and geriatric medicine  4  With mental status decline will discuss the benefit of CTH with IM and attending        Subjective: Yane Marquez was seen in bed upon entering the room  Minimal interactions with garbled speech    Objective:         Vitals: Blood pressure 136/76, pulse 96, temperature 97 6 °F (36 4 °C), temperature source Temporal, resp  rate 20, weight 95 2 kg (209 lb 14 1 oz), SpO2 91 % , RA, Body mass index is 28 46 kg/m²        Intake/Output Summary (Last 24 hours) at 9/6/2019 1012  Last data filed at 9/6/2019 0429  Gross per 24 hour   Intake    Output -63 6 ml   Net 63 6 ml         Physical Exam  Gen: lethargic, minimal interactions, garbled speech,   HEENT: Mucous membranes moist, no oral lesions, no thrush  NECK: no accessory muscle use, JVP not elevated  Cardiac: Regular, single S1, single S2, no murmurs, no rubs, no gallops  Lungs: scattered coarse rhonchi and bibasilar rales  Abdomen: obese, normoactive bowel sounds, soft nontender, nondistended, no rebound or rigidity, no guarding  Extremities: no cyanosis, no clubbing, +1 pitting bilateral lower extremity edema, ulcer right toe    Labs: I have personally reviewed pertinent lab results  , ABG:   Lab Results   Component Value Date    PHART 7 520 (H) 09/06/2019    HZL6NFE 24 0 (LL) 09/06/2019    PO2ART 60 6 (L) 09/06/2019    YVQ7BMM 19 2 (L) 09/06/2019    BEART -2 8 09/06/2019    SOURCE Brachial, Right 09/06/2019   , CBC:   Lab Results   Component Value Date    WBC 46 23 (HH) 09/06/2019    HGB 8 3 (L) 09/06/2019    HCT 27 4 (L) 09/06/2019    MCV 92 09/06/2019     (L) 09/06/2019    MCH 27 9 09/06/2019    MCHC 30 3 (L) 09/06/2019    RDW 18 5 (H) 09/06/2019    NRBC 0 09/06/2019   , CMP:   Lab Results   Component Value Date    SODIUM 143 09/06/2019    K 3 8 09/06/2019     09/06/2019    CO2 24 09/06/2019    BUN 47 (H) 09/06/2019    CREATININE 1 74 (H) 09/06/2019    CALCIUM 9 2 09/06/2019    EGFR 39 09/06/2019     Imaging and other studies: none      Trey Ramirez

## 2019-09-06 NOTE — PROGRESS NOTES
Progress Note - Fer Lamar 1950, 76 y o  male MRN: [id number]    Unit/Bed#: E4 -01 Encounter: [id number]    Primary Care Provider: Nba Alvarado MD   Date and time admitted to hospital: 8/22/2019 10:59 AM        * Intractable back pain  Assessment & Plan  Patient initially presented with intractable back pain secondary to myelodysplastic syndrome  No acute finding on x ray of spine  Continue opiods for pain control  PT/OT eval recommended-short-term skilled PT, home PT, 24 hours supervision with home family support  Acute respiratory failure with hypoxia (HCC)  Assessment & Plan  Patient's acute decompensation with acute respiratory failure with hypoxia on admission thought to be secondary to side effect of hydroxyurea  Patient had been improving on IV Solu-Medrol and then was switched to prednisone taper  However patient has developed increased work of breathing and did have episode of acute pulmonary edema which required administration of IV diuretics  Patient still has increased rhonchi and increased work of breathing requiring accessory muscle use  Patient is most likely aspirating due to failing swallow study  Patient is currently NPO including meds  Will convert all meds to IV  ABG showed pH of 7 5, pCO2 24, PO2 60, bicarbonate 19 3  Continue supplemental O2 to keep O2 sats > 88%  Continue aggressive pulmonary toilet, incentive spirometry, increased OOB as able  Acute pulmonary edema [facility])  Assessment & Plan  Chest x-ray showing progression of bilateral infiltrates  Patient noted to have markedly elevated BNP of 54,708 on 08/31 which has increased to--> 74,040 on 9/6  Continue on Lasix 40 mg IV BID x6 doses to deal with rales on exam and increased O2 requirements  Consider suctioning of upper respiratory tract to remove secretions related to possible aspiration and rhonchi      Abnormal CT of the chest  Assessment & Plan  Possibly secondary to aspiration versus likely lung toxicity secondary to hydroxyurea  Cannot continue patient on prednisone due to failing swallow study and aspiration  Started on IV Solu-Medrol taper  Can consider switch back to prednisone when tolerating oral intake  Completed antibiotic therapy yesterday 9/5  As per pulmonology, would consider bronchoscopy but given patient's mental status and lack of competent to make his own medical decisions and absence of a guardian to act on his behalf, there will hold off on bronchoscopy and continue with steroids and follow-up imaging with chest x-ray as well as outpatient pulmonology follow-up in 3 weeks  Acute renal failure with acute tubular necrosis superimposed on stage 3 chronic kidney disease ([facility] [address] )  Assessment & Plan  Patient's current creatinine is around his baseline  Current creatinine stable at 1 74  Continue to monitor renal function and BMP daily  Avoid nephrotoxins, NSAIDs, anemia, hypotension  SVT (supraventricular tachycardia) (HCC)  Assessment & Plan  Likely induced due to underlying catecholamine state from his malignancy  Has been terminated with adenosine on multiple different occasions  Continue to use adenosine to terminate SVT as long as blood pressure stable as per Cardiology  Patient already completed amiodarone loading  Was start amiodarone 200 mg daily today, but currently on hold due to dysphagia/aspiration  Is also on Lopressor 50 mg PO BID but since unable to tolerate oral intake due to aspiration, will switch to metoprolol 5 mg IV q 6 hours  Elevated troponin  Assessment & Plan  Thought to be due to non MI troponin elevation secondary to demand ischemia from prolonged SVT  Patient is thought to have underlying CAD but is not thought to be a candidate for heparin anticoagulation secondary to thrombocytopenia and malignancy  Continue beta-blocker, statin      Myelodysplasia (myelodysplastic syndrome) [facility])  Assessment & Plan  Patient was on hydroxyurea for myelodysplasia which has since been discontinued secondary to possible side effects causing acute respiratory failure with hypoxia  Hematology/oncology is aware of discontinuation of hydroxyurea  Acute encephalopathy  Assessment & Plan  Possibly secondary to delirium from metabolic disturbances versus respiratory failure versus side effects of hydroxyurea  Patient's baseline mental status is currently unknown  Patient has been evaluated by neuropsychology and has been deemed incompetent to make his own medical decisions  However currently patient's mental status is deteriorating and he is unwilling to eat  Will consult palliative Care to contact [facility] ethics committee regarding reveals sitting code status on this patient given his multiple complex problems  Awaiting guardianship for patient-case management working on guardianship-this could take up to 3-4 weeks  Appreciated Gerontology and Sherrie Adams consult and recommendations  CT head without contrast ordered for today due to deteriorating mental status  Tobacco abuse  Assessment & Plan  Continue nicotine patches  Other dysphagia  Assessment & Plan  Speech therapy consulted for bedside swallow and eval-after completion of their evaluation they recommend patient be placed NPO including meds  VTE Pharmacologic Prophylaxis:   Pharmacologic: Pharmacologic VTE Prophylaxis contraindicated due to Thrombocytopenia  Mechanical VTE Prophylaxis in Place: No    Patient Centered Rounds: I have performed bedside rounds with nursing staff today  Discussions with Specialists or Other Care Team Provider:  Yes    Education and Discussions with Family / Patient:  Yes    Time Spent for Care: 20 minutes  More than 50% of total time spent on counseling and coordination of care as described above      Current Length of Stay: 15 day(s)    Current Patient Status: Inpatient   Certification Statement: The patient will continue to require additional inpatient hospital stay due to Worsening mental status and respiratory status    Discharge Plan: To be determined, awaiting guardianship  Code Status: Level 1 - Full Code      Subjective:   Patient with increased work of breathing, deteriorating mentation, unwilling to eat  Objective:     Vitals:   Temp (24hrs), Av 4 °F (36 3 °C), Min:96 °F (35 6 °C), Max:97 9 °F (36 6 °C)    Temp:  [96 °F (35 6 °C)-97 9 °F (36 6 °C)] 97 5 °F (36 4 °C)  HR:  [96-97] 96  Resp:  [18-30] 30  BP: (128-173)/(56-94) 157/79  SpO2:  [91 %-97 %] 95 %  Body mass index is 28 46 kg/m²  Input and Output Summary (last 24 hours): Intake/Output Summary (Last 24 hours) at 2019 1345  Last data filed at 2019 0429  Gross per 24 hour   Intake    Output -63 6 ml   Net 63 6 ml       Physical Exam:     Physical Exam   Constitutional: He appears well-developed and well-nourished  He appears distressed  HENT:   Head: Normocephalic and atraumatic  Eyes: Pupils are equal, round, and reactive to light  EOM are normal    Neck: Normal range of motion  Neck supple  No JVD present  Cardiovascular: Normal rate, regular rhythm and normal heart sounds  Pulmonary/Chest: No stridor  He is in respiratory distress  He has no wheezes  He has no rales  Increased work of breathing with accessory muscle use  Rhonchorous breath sounds with gurgling  Abdominal: Soft  Bowel sounds are normal  He exhibits no distension  There is no tenderness  Musculoskeletal: He exhibits edema  Lymphadenopathy:     He has no cervical adenopathy  Neurological: He is alert  Skin: Skin is warm and dry  He is not diaphoretic         Additional Data:     Labs:    Results from last 7 days   Lab Units 19  0543 19  0641   WBC Thousand/uL 46 23* 35 13*   HEMOGLOBIN g/dL 8 3* 7 5*   HEMATOCRIT % 27 4* 24 6*   PLATELETS Thousands/uL 102* 181   BANDS PCT %  --  12*   NEUTROS PCT % 71  --    LYMPHS PCT % 1*  --    LYMPHO PCT %  --  6* MONOS PCT % 7  --    MONO PCT %  --  3*   EOS PCT % 0 0     Results from last 7 days   Lab Units 09/06/19  0543   SODIUM mmol/L 143   POTASSIUM mmol/L 3 8   CHLORIDE mmol/L 105   CO2 mmol/L 24   BUN mg/dL 47*   CREATININE mg/dL 1 74*   ANION GAP mmol/L 14*   CALCIUM mg/dL 9 2   GLUCOSE RANDOM mg/dL 179*         Results from last 7 days   Lab Units 09/05/19  2047 09/05/19  1704 09/05/19  1556 09/05/19  1124 09/05/19  0739 09/05/19  0303 09/05/19  0055 09/04/19  1504 09/04/19  1115 09/04/19  0839 09/03/19  2110 09/03/19  1548   POC GLUCOSE mg/dl 125 138 53* 97 166* 158* 110 77 118 182* 145* 69         Results from last 7 days   Lab Units 08/31/19  0845   PROCALCITONIN ng/ml 9 93*           * I Have Reviewed All Lab Data Listed Above  * Additional Pertinent Lab Tests Reviewed:  Oleg Monroe Admission Reviewed    Imaging:    Imaging Reports Reviewed Today Include:  None available  Imaging Personally Reviewed by Myself Includes:  None    Recent Cultures (last 7 days):           Last 24 Hours Medication List:     Current Facility-Administered Medications:  acetaminophen 650 mg Rectal Q4H PRN Luis Homans, MD   acetaminophen 650 mg Oral Q6H PRN Luis Homans, MD   amiodarone 200 mg Oral Daily With Lunch Luis Homans, MD   atorvastatin 80 mg Oral HS Gustavo Pinto MD   benzonatate 100 mg Oral TID PRN Luis Homans, MD   dextran 70-hypromellose 1 drop Both Eyes PRN Gustavo Pinto MD   diazepam 5 mg Oral HS PRN Luis Homans, MD   docusate sodium 100 mg Oral BID Luis Homans, MD   finasteride 5 mg Oral Daily Gustavo Pinto MD   fish oil 1,000 mg Oral Daily Luis Homans, MD   fluticasone-vilanterol 1 puff Inhalation Daily Gustavo Pinto MD   furosemide 40 mg Intravenous BID (diuretic) Luis Homans, MD   haloperidol lactate 2 5 mg Intramuscular Q6H PRN Luis Homans, MD   insulin glargine 25 Units Subcutaneous QAM Gustavo Pinto MD   insulin lispro 1-5 Units Subcutaneous TID AC Luis Homans, MD insulin lispro 1-5 Units Subcutaneous HS Gustavo Pinto MD   insulin lispro 10 Units Subcutaneous TID With Meals Matthias Sheldon MD   ipratropium-albuterol 3 mL Nebulization TID PRN Matthias Sheldon MD   lidocaine 1 patch Topical Daily Matthias Sheldon MD   melatonin 6 mg Oral HS Matthias Sheldon MD   methocarbamol 500 mg Oral Q6H PRN Matthias Sheldon MD   [START ON 9/8/2019] methylPREDNISolone sodium succinate 20 mg Intravenous Daily Gustavo Pinto MD   methylPREDNISolone sodium succinate 30 mg Intravenous Daily NICOLE Pardo   metoprolol tartrate 50 mg Oral Q12H [facility] Matthias Sheldon MD   metroNIDAZOLE 500 mg Oral Q8H [facility] Matthias Sheldon MD   nicotine 1 patch Transdermal Daily Matthias Sheldon MD   OLANZapine 2 5 mg Oral HS Gustavo Pinto MD   ondansetron 4 mg Intravenous Q4H PRN Matthias Sheldon MD   oxyCODONE 10 mg Oral Q4H PRN Matthias Sheldon MD   pantoprazole 40 mg Oral Daily Before Breakfast Gustavo Pinto MD   potassium chloride 20 mEq Oral BID Matthias Sheldon MD   tamsulosin 0 4 mg Oral Daily With Wai Jolley MD   traMADol 50 mg Oral Q8H PRN Matthias Sheldon MD        Today, Patient Was Seen By: Matthias Sheldon MD    ** Please Note: Dictation voice to text software may have been used in the creation of this document   **

## 2019-09-06 NOTE — PLAN OF CARE
Problem: Potential for Falls  Goal: Patient will remain free of falls  Description  INTERVENTIONS:  - Assess patient frequently for physical needs  -  Identify cognitive and physical deficits and behaviors that affect risk of falls    -  [place] fall precautions as indicated by assessment   - Educate patient/family on patient safety including physical limitations  - Instruct patient to call for assistance with activity based on assessment  - Modify environment to reduce risk of injury  - Consider OT/PT consult to assist with strengthening/mobility  Outcome: Progressing     Problem: PAIN - ADULT  Goal: Verbalizes/displays adequate comfort level or baseline comfort level  Description  Interventions:  - Encourage patient to monitor pain and request assistance  - Assess pain using appropriate pain scale  - Administer analgesics based on type and severity of pain and evaluate response  - Implement non-pharmacological measures as appropriate and evaluate response  - Consider cultural and social influences on pain and pain management  - Notify physician/advanced practitioner if interventions unsuccessful or patient reports new pain  Outcome: Progressing     Problem: INFECTION - ADULT  Goal: Absence or prevention of progression during hospitalization  Description  INTERVENTIONS:  - Assess and monitor for signs and symptoms of infection  - Monitor lab/diagnostic results  - Monitor all insertion sites, i e  indwelling lines, tubes, and drains  - Monitor endotracheal if appropriate and nasal secretions for changes in amount and color  - [place] appropriate cooling/warming therapies per order  - Administer medications as ordered  - Instruct and encourage patient and family to use good hand hygiene technique  - Identify and instruct in appropriate isolation precautions for identified infection/condition  Outcome: Progressing  Goal: Absence of fever/infection during neutropenic period  Description  INTERVENTIONS:  - Monitor WBC    Outcome: Progressing     Problem: DISCHARGE PLANNING  Goal: Discharge to home or other facility with appropriate resources  Description  INTERVENTIONS:  - Identify barriers to discharge w/patient and caregiver  - Arrange for needed discharge resources and transportation as appropriate  - Identify discharge learning needs (meds, wound care, etc )  - Arrange for interpretive services to assist at discharge as needed  - Refer to Case Management Department for coordinating discharge planning if the patient needs post-hospital services based on physician/advanced practitioner order or complex needs related to functional status, cognitive ability, or social support system  Outcome: Progressing     Problem: Knowledge Deficit  Goal: Patient/family/caregiver demonstrates understanding of disease process, treatment plan, medications, and discharge instructions  Description  Complete learning assessment and assess knowledge base    Interventions:  - Provide teaching at level of understanding  - Provide teaching via preferred learning methods  Outcome: Progressing     Problem: RESPIRATORY - ADULT  Goal: Achieves optimal ventilation and oxygenation  Description  INTERVENTIONS:  - Assess for changes in respiratory status  - Assess for changes in mentation and behavior  - Position to facilitate oxygenation and minimize respiratory effort  - Oxygen administered by appropriate delivery if ordered  - Initiate smoking cessation education as indicated  - Encourage broncho-pulmonary hygiene including cough, deep breathe, Incentive Spirometry  - Assess the need for suctioning and aspirate as needed  - Assess and instruct to report SOB or any respiratory difficulty  - Respiratory Therapy support as indicated  Outcome: Progressing     Problem: SKIN/TISSUE INTEGRITY - ADULT  Goal: Skin integrity remains intact  Description  INTERVENTIONS  - Identify patients at risk for skin breakdown  - Assess and monitor skin integrity  - Assess and monitor nutrition and hydration status  - Monitor labs (i e  albumin)  - Assess for incontinence   - Turn and reposition patient  - Assist with mobility/ambulation  - Relieve pressure over bony prominences  - Avoid friction and shearing  - Provide appropriate hygiene as needed including keeping skin clean and dry  - Evaluate need for skin moisturizer/barrier cream  - Collaborate with interdisciplinary team (i e  Nutrition, Rehabilitation, etc )   - Patient/family teaching  Outcome: Progressing  Goal: Incision(s), wounds(s) or drain site(s) healing without S/S of infection  Description  INTERVENTIONS  - Assess and document risk factors for skin impairment   - Assess and document dressing, incision, wound bed, drain sites and surrounding tissue  - Consider nutrition services referral as needed  - Oral mucous membranes remain intact  - Provide patient/ family education  Outcome: Progressing     Problem: NEUROSENSORY - ADULT  Goal: Achieves stable or improved neurological status  Description  INTERVENTIONS  - Monitor and report changes in neurological status  - Monitor vital signs such as temperature, blood pressure, glucose, and any other labs ordered   - Initiate measures to prevent increased intracranial pressure  - Monitor for seizure activity and implement precautions if appropriate      Outcome: Progressing  Goal: Remains free of injury related to seizures activity  Description  INTERVENTIONS  - Maintain airway, patient safety  and administer oxygen as ordered  - Monitor patient for seizure activity, document and report duration and description of seizure to physician/advanced practitioner  - If seizure occurs,  ensure patient safety during seizure  - Reorient patient post seizure  - Seizure pads on all 4 side rails  - Instruct patient/family to notify RN of any seizure activity including if an aura is experienced  - Instruct patient/family to call for assistance with activity based on nursing assessment  - Administer anti-seizure medications if ordered    Outcome: Progressing  Goal: Achieves maximal functionality and self care  Description  INTERVENTIONS  - Monitor swallowing and airway patency with patient fatigue and changes in neurological status  - Encourage and assist patient to increase activity and self care     - Encourage visually impaired, hearing impaired and aphasic patients to use assistive/communication devices  Outcome: Progressing     Problem: GENITOURINARY - ADULT  Goal: Maintains or returns to baseline urinary function  Description  INTERVENTIONS:  - Assess urinary function  - Encourage oral fluids to ensure adequate hydration if ordered  - Administer IV fluids as ordered to ensure adequate hydration  - Administer ordered medications as needed  - Offer frequent toileting  - Follow urinary retention protocol if ordered  Outcome: Progressing  Goal: Absence of urinary retention  Description  INTERVENTIONS:  - Assess patients ability to void and empty bladder  - Monitor I/O  - Bladder scan as needed  - Discuss with physician/AP medications to alleviate retention as needed  - Discuss catheterization for long term situations as appropriate  Outcome: Progressing     Problem: METABOLIC, FLUID AND ELECTROLYTES - ADULT  Goal: Electrolytes maintained within normal limits  Description  INTERVENTIONS:  - Monitor labs and assess patient for signs and symptoms of electrolyte imbalances  - Administer electrolyte replacement as ordered  - Monitor response to electrolyte replacements, including repeat lab results as appropriate  - Instruct patient on fluid and nutrition as appropriate  Outcome: Progressing  Goal: Fluid balance maintained  Description  INTERVENTIONS:  - Monitor labs   - Monitor I/O and WT  - Instruct patient on fluid and nutrition as appropriate  - Assess for signs & symptoms of volume excess or deficit  Outcome: Progressing  Goal: Glucose maintained within target range  Description  INTERVENTIONS:  - Monitor Blood Glucose as ordered  - Assess for signs and symptoms of hyperglycemia and hypoglycemia  - Administer ordered medications to maintain glucose within target range  - Assess nutritional intake and initiate nutrition service referral as needed  Outcome: Progressing     Problem: HEMATOLOGIC - ADULT  Goal: Maintains hematologic stability  Description  INTERVENTIONS  - Assess for signs and symptoms of bleeding or hemorrhage  - Monitor labs  - Administer supportive blood products/factors as ordered and appropriate  Outcome: Progressing     Problem: MUSCULOSKELETAL - ADULT  Goal: Maintain or return mobility to safest level of function  Description  INTERVENTIONS:  - Assess patient's ability to carry out ADLs; assess patient's baseline for ADL function and identify physical deficits which impact ability to perform ADLs (bathing, care of mouth/teeth, toileting, grooming, dressing, etc )  - Assess/evaluate cause of self-care deficits   - Assess range of motion  - Assess patient's mobility  - Assess patient's need for assistive devices and provide as appropriate  - Encourage maximum independence but intervene and supervise when necessary  - Involve family in performance of ADLs  - Assess for home care needs following discharge   - Consider OT consult to assist with ADL evaluation and planning for discharge  - Provide patient education as appropriate  Outcome: Progressing  Goal: Maintain proper alignment of affected body part  Description  INTERVENTIONS:  - Support, maintain and protect limb and body alignment  - Provide patient/ family with appropriate education  Outcome: Progressing     Problem: SAFETY ADULT  Goal: Patient will remain free of falls  Description  INTERVENTIONS:  - Assess patient frequently for physical needs  -  Identify cognitive and physical deficits and behaviors that affect risk of falls    -  [place] fall precautions as indicated by assessment   - Educate patient/family on patient safety including physical limitations  - Instruct patient to call for assistance with activity based on assessment  - Modify environment to reduce risk of injury  - Consider OT/PT consult to assist with strengthening/mobility  Outcome: Progressing  Goal: Maintain or return to baseline ADL function  Description  INTERVENTIONS:  -  Assess patient's ability to carry out ADLs; assess patient's baseline for ADL function and identify physical deficits which impact ability to perform ADLs (bathing, care of mouth/teeth, toileting, grooming, dressing, etc )  - Assess/evaluate cause of self-care deficits   - Assess range of motion  - Assess patient's mobility; develop plan if impaired  - Assess patient's need for assistive devices and provide as appropriate  - Encourage maximum independence but intervene and supervise when necessary  - Involve family in performance of ADLs  - Assess for home care needs following discharge   - Consider OT consult to assist with ADL evaluation and planning for discharge  - Provide patient education as appropriate  Outcome: Progressing     Problem: Nutrition/Hydration-ADULT  Goal: Nutrient/Hydration intake appropriate for improving, restoring or maintaining nutritional needs  Description  Monitor and assess patient's nutrition/hydration status for malnutrition  Collaborate with interdisciplinary team and initiate plan and interventions as ordered  Monitor patient's weight and dietary intake as ordered or per policy  Utilize nutrition screening tool and intervene as necessary  Determine patient's food preferences and provide high-protein, high-caloric foods as appropriate       INTERVENTIONS:  - Monitor oral intake, urinary output, labs, and treatment plans  - Assess nutrition and hydration status and recommend course of action  - Evaluate amount of meals eaten  - Assist patient with eating if necessary   - Allow adequate time for meals  - Recommend/ encourage appropriate diets, oral nutritional supplements, and vitamin/mineral supplements  - Order, calculate, and assess calorie counts as needed  - Assess need for intravenous fluids  - Provide specific nutrition/hydration education as appropriate  - Include patient/family/caregiver in decisions related to nutrition   Outcome: Progressing     Problem: Prexisting or High Potential for Compromised Skin Integrity  Goal: Skin integrity is maintained or improved  Description  INTERVENTIONS:  - Identify patients at risk for skin breakdown  - Assess and monitor skin integrity  - Assess and monitor nutrition and hydration status  - Monitor labs   - Assess for incontinence   - Turn and reposition patient  - Assist with mobility/ambulation  - Relieve pressure over bony prominences  - Avoid friction and shearing  - Provide appropriate hygiene as needed including keeping skin clean and dry  - Evaluate need for skin moisturizer/barrier cream  - Collaborate with interdisciplinary team   - Patient/family teaching  - Consider wound care consult   Outcome: Progressing     Problem: SAFETY,RESTRAINT: NV/NON-SELF DESTRUCTIVE BEHAVIOR  Goal: Remains free of harm/injury (restraint for non violent/non self-detsructive behavior)  Description  INTERVENTIONS:  - Instruct patient/family regarding restraint use   - Assess and monitor physiologic and psychological status   - Provide interventions and comfort measures to meet assessed patient needs   - Identify and implement measures to help patient regain control  - Assess readiness for release of restraint   Outcome: Progressing  Goal: Returns to optimal restraint-free functioning  Description  INTERVENTIONS:  - Assess the patient's behavior and symptoms that indicate continued need for restraint  - Identify and implement measures to help patient regain control  - Assess readiness for release of restraint   Outcome: Progressing     Problem: BEHAVIOR  Goal: Pt/Family maintain appropriate behavior and adhere to behavioral management agreement, if implemented  Description  INTERVENTIONS:  - Assess the family dynamic   - Encourage verbalization of thoughts and concerns in a socially appropriate manner  - Assess patient/family's coping skills and non-compliant behavior (including use of illegal substances)  - Utilize positive, consistent limit setting strategies supporting safety of patient, staff and others  - Initiate consult with Case Management, Spiritual Care or other ancillary services as appropriate  - If a patient's/visitor's behavior jeopardizes the safety of the patient, staff, or others, refer to organization procedure  - Notify Security of behavior or suspected illegal substances which indicate the need for search of the patient and/or belongings  - Encourage participation in the decision making process about a behavioral management agreement; implement if patient meets criteria  Outcome: Progressing     Problem: SELF HARM  Goal: Effect of psychiatric condition will be minimized and patient will be protected from self harm  Description  INTERVENTIONS:  - Assess impact of patient's symptoms on level of functioning, self-care needs and offer support as indicated  - Assess patient/family knowledge of depression, impact on illness and need for teaching  - Provide emotional support, presence and reassurance  - Assess for possible suicidal thoughts, ideation or self-harm  If patient expresses suicidal thoughts or statements do not leave alone, notify physician/AP immediately, initiate suicide precautions, and determine need for continual observation    - initiate consults and referrals as appropriate (a mental health professional, Spiritual Care  Outcome: Progressing

## 2019-09-06 NOTE — ASSESSMENT & PLAN NOTE
Patient's acute decompensation with acute respiratory failure with hypoxia on admission thought to be secondary to side effect of hydroxyurea  Patient had been improving on IV Solu-Medrol and then was switched to prednisone taper  However patient has developed increased work of breathing and did have episode of acute pulmonary edema which required administration of IV diuretics  Patient still has increased rhonchi and increased work of breathing requiring accessory muscle use  Patient is most likely aspirating due to failing swallow study  Patient is currently NPO including meds  Will convert all meds to IV  ABG showed pH of 7 5, pCO2 24, PO2 60, bicarbonate 19 3  Continue supplemental O2 to keep O2 sats > 88%  Continue aggressive pulmonary toilet, incentive spirometry, increased OOB as able

## 2019-09-06 NOTE — ASSESSMENT & PLAN NOTE
Possibly secondary to aspiration versus likely lung toxicity secondary to hydroxyurea  Cannot continue patient on prednisone due to failing swallow study and aspiration  Started on IV Solu-Medrol taper  Can consider switch back to prednisone when tolerating oral intake  Completed antibiotic therapy yesterday 9/5  As per pulmonology, would consider bronchoscopy but given patient's mental status and lack of competent to make his own medical decisions and absence of a guardian to act on his behalf, there will hold off on bronchoscopy and continue with steroids and follow-up imaging with chest x-ray as well as outpatient pulmonology follow-up in 3 weeks

## 2019-09-06 NOTE — SOCIAL WORK
CM informed [facility] [employer] of pt's pool cardiopulmonary status and that at this time emergent guardianship hearing my be needed to address code status  CM continuing to follow

## 2019-09-06 NOTE — ASSESSMENT & PLAN NOTE
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

## 2019-09-07 NOTE — ASSESSMENT & PLAN NOTE
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

## 2019-09-07 NOTE — PROGRESS NOTES
Progress Note - Pulmonary   Zo Found  76 y o  male MRN: [id number]  Unit/Bed#: E4 -01 Encounter: [id number]    Assessment/Plan:  1  Acute hypoxemic respiratory failure  · Continue to wean oxygen as tolerated  2  Acute lung injury  · Continue steroid taper  · On nasal cannula oxygen  3  Acute encephalopathy  · Prognosis is likely a very poor  · Awaiting guardianship  · FX evaluation is apparently and Sunday    ----------------------------------------------------------------------------------------------------------------------    HPI/Interval History:   Not interactive  Requiring full assistance  Essentially bed-bound currently  Vitals:   Blood pressure 151/81, pulse 97, temperature 97 7 °F (36 5 °C), temperature source Tympanic, resp  rate 19, weight 87 2 kg (192 lb 3 9 oz), SpO2 93 %  ,Body mass index is 26 07 kg/m²    SpO2: 93 %  SpO2 Activity: At Rest  O2 Device: Nasal cannula    Physical Exam:   Gen:  Alert but unable to respond appropriately  HEENT:  Pupils are reactive oropharynx is moist  Neck:  No JVD or adenopathy  Chest:  Breath sounds are clear bilaterally  Cardiac:  Regular  Abdomen:  Soft  Extremities:  No edema      Labs:    CBC:  Results from last 7 days   Lab Units 09/07/19  0449   WBC Thousand/uL 59 11*   HEMOGLOBIN g/dL 8 1*   HEMATOCRIT % 26 6*   PLATELETS Thousands/uL 73*         BMP:   Lab Results   Component Value Date    SODIUM 145 09/07/2019    K 3 0 (L) 09/07/2019    CO2 29 09/07/2019     09/07/2019    BUN 46 (H) 09/07/2019    CREATININE 1 85 (H) 09/07/2019    GLUCOSE 126 03/23/2016    CALCIUM 9 0 09/07/2019       Imaging studies:  No new pertinent studies      Luc Swann MD

## 2019-09-07 NOTE — ASSESSMENT & PLAN NOTE
Patient's acute decompensation with acute respiratory failure with hypoxia on admission thought to be secondary to side effect of hydroxyurea  Patient had been improving on IV Solu-Medrol and then was switched to prednisone taper  However patient has developed increased work of breathing and did have episode of acute pulmonary edema which required administration of IV diuretics  Patient still has increased rhonchi and increased work of breathing requiring accessory muscle use  Patient is most likely aspirating due to failing swallow study  Patient is currently NPO including meds  Will convert all meds to IV  Continue IV Solu-Medrol 20 mg daily on a taper  ABG showed pH of 7 5, pCO2 24, PO2 60, bicarbonate 19 3  Continue supplemental O2 to keep O2 sats > 88%  Continue aggressive pulmonary toilet, incentive spirometry, increased OOB as able

## 2019-09-07 NOTE — NURSING NOTE
When patient was alert and oriented he signed the [facility] form for refusal  Patient's legs began to swell last week and kedall pumps were re-applied causing the swelling to go down in his legs along with administration of lasix

## 2019-09-07 NOTE — PROGRESS NOTES
Call from nurse  Patient is NPO including meds for failing dysphagia eval  Needs PO meds changed to IV  All necessary meds converted   Non-necessary meds like cholesterol and BPH meds discontinued completely for now

## 2019-09-07 NOTE — ASSESSMENT & PLAN NOTE
Possibly secondary to delirium from metabolic disturbances versus respiratory failure versus side effects of hydroxyurea  Patient's baseline mental status is currently unknown  Patient has been evaluated by neuropsychology and has been deemed incompetent to make his own medical decisions  However currently patient's mental status is deteriorating and he is unwilling to eat  Will consult palliative Care to contact hospital ethics committee regarding reveals sitting code status on this patient given his multiple complex problems  Awaiting guardianship for patient-case management working on guardianship-this could take up to 3-4 weeks  Appreciated Gerontology and Sherrie Adams consult and recommendations  CT head without contrast 9/6-no acute intracranial abnormality  Palliative Care saw and evaluated patient, will get ethics committee involved to see patient on Monday 9/9

## 2019-09-07 NOTE — PLAN OF CARE
Problem: Potential for Falls  Goal: Patient will remain free of falls  Description  INTERVENTIONS:  - Assess patient frequently for physical needs  -  Identify cognitive and physical deficits and behaviors that affect risk of falls    -  [place] fall precautions as indicated by assessment   - Educate patient/family on patient safety including physical limitations  - Instruct patient to call for assistance with activity based on assessment  - Modify environment to reduce risk of injury  - Consider OT/PT consult to assist with strengthening/mobility  Outcome: Progressing     Problem: INFECTION - ADULT  Goal: Absence or prevention of progression during hospitalization  Description  INTERVENTIONS:  - Assess and monitor for signs and symptoms of infection  - Monitor lab/diagnostic results  - Monitor all insertion sites, i e  indwelling lines, tubes, and drains  - Monitor endotracheal if appropriate and nasal secretions for changes in amount and color  - [place] appropriate cooling/warming therapies per order  - Administer medications as ordered  - Instruct and encourage patient and family to use good hand hygiene technique  - Identify and instruct in appropriate isolation precautions for identified infection/condition  Outcome: Progressing  Goal: Absence of fever/infection during neutropenic period  Description  INTERVENTIONS:  - Monitor WBC    Outcome: Progressing     Problem: SKIN/TISSUE INTEGRITY - ADULT  Goal: Skin integrity remains intact  Description  INTERVENTIONS  - Identify patients at risk for skin breakdown  - Assess and monitor skin integrity  - Assess and monitor nutrition and hydration status  - Monitor labs (i e  albumin)  - Assess for incontinence   - Turn and reposition patient  - Assist with mobility/ambulation  - Relieve pressure over bony prominences  - Avoid friction and shearing  - Provide appropriate hygiene as needed including keeping skin clean and dry  - Evaluate need for skin moisturizer/barrier cream  - Collaborate with interdisciplinary team (i e  Nutrition, Rehabilitation, etc )   - Patient/family teaching  Outcome: Progressing  Goal: Incision(s), wounds(s) or drain site(s) healing without S/S of infection  Description  INTERVENTIONS  - Assess and document risk factors for skin impairment   - Assess and document dressing, incision, wound bed, drain sites and surrounding tissue  - Consider nutrition services referral as needed  - Oral mucous membranes remain intact  - Provide patient/ family education  Outcome: Progressing     Problem: GENITOURINARY - ADULT  Goal: Maintains or returns to baseline urinary function  Description  INTERVENTIONS:  - Assess urinary function  - Encourage oral fluids to ensure adequate hydration if ordered  - Administer IV fluids as ordered to ensure adequate hydration  - Administer ordered medications as needed  - Offer frequent toileting  - Follow urinary retention protocol if ordered  Outcome: Progressing     Problem: METABOLIC, FLUID AND ELECTROLYTES - ADULT  Goal: Electrolytes maintained within normal limits  Description  INTERVENTIONS:  - Monitor labs and assess patient for signs and symptoms of electrolyte imbalances  - Administer electrolyte replacement as ordered  - Monitor response to electrolyte replacements, including repeat lab results as appropriate  - Instruct patient on fluid and nutrition as appropriate  Outcome: Progressing  Goal: Fluid balance maintained  Description  INTERVENTIONS:  - Monitor labs   - Monitor I/O and WT  - Instruct patient on fluid and nutrition as appropriate  - Assess for signs & symptoms of volume excess or deficit  Outcome: Progressing  Goal: Glucose maintained within target range  Description  INTERVENTIONS:  - Monitor Blood Glucose as ordered  - Assess for signs and symptoms of hyperglycemia and hypoglycemia  - Administer ordered medications to maintain glucose within target range  - Assess nutritional intake and initiate nutrition service referral as needed  Outcome: Progressing     Problem: MUSCULOSKELETAL - ADULT  Goal: Maintain proper alignment of affected body part  Description  INTERVENTIONS:  - Support, maintain and protect limb and body alignment  - Provide patient/ family with appropriate education  Outcome: Progressing     Problem: SAFETY ADULT  Goal: Patient will remain free of falls  Description  INTERVENTIONS:  - Assess patient frequently for physical needs  -  Identify cognitive and physical deficits and behaviors that affect risk of falls  -  [place] fall precautions as indicated by assessment   - Educate patient/family on patient safety including physical limitations  - Instruct patient to call for assistance with activity based on assessment  - Modify environment to reduce risk of injury  - Consider OT/PT consult to assist with strengthening/mobility  Outcome: Progressing     Problem: Nutrition/Hydration-ADULT  Goal: Nutrient/Hydration intake appropriate for improving, restoring or maintaining nutritional needs  Description  Monitor and assess patient's nutrition/hydration status for malnutrition  Collaborate with interdisciplinary team and initiate plan and interventions as ordered  Monitor patient's weight and dietary intake as ordered or per policy  Utilize nutrition screening tool and intervene as necessary  Determine patient's food preferences and provide high-protein, high-caloric foods as appropriate       INTERVENTIONS:  - Monitor oral intake, urinary output, labs, and treatment plans  - Assess nutrition and hydration status and recommend course of action  - Evaluate amount of meals eaten  - Assist patient with eating if necessary   - Allow adequate time for meals  - Recommend/ encourage appropriate diets, oral nutritional supplements, and vitamin/mineral supplements  - Order, calculate, and assess calorie counts as needed  - Assess need for intravenous fluids  - Provide specific nutrition/hydration education as appropriate  - Include patient/family/caregiver in decisions related to nutrition   Outcome: Progressing     Problem: Prexisting or High Potential for Compromised Skin Integrity  Goal: Skin integrity is maintained or improved  Description  INTERVENTIONS:  - Identify patients at risk for skin breakdown  - Assess and monitor skin integrity  - Assess and monitor nutrition and hydration status  - Monitor labs   - Assess for incontinence   - Turn and reposition patient  - Assist with mobility/ambulation  - Relieve pressure over bony prominences  - Avoid friction and shearing  - Provide appropriate hygiene as needed including keeping skin clean and dry  - Evaluate need for skin moisturizer/barrier cream  - Collaborate with interdisciplinary team   - Patient/family teaching  - Consider wound care consult   Outcome: Progressing

## 2019-09-07 NOTE — ASSESSMENT & PLAN NOTE
Possibly secondary to aspiration versus likely lung toxicity secondary to hydroxyurea  Cannot continue patient on prednisone due to failing swallow study and aspiration  Continue on IV Solu-Medrol taper  Can consider switch back to prednisone when tolerating oral intake  Completed antibiotic therapy on 09/05  As per pulmonology, would consider bronchoscopy but given patient's mental status and lack of competent to make his own medical decisions and absence of a guardian to act on his behalf, there will hold off on bronchoscopy and continue with steroids and follow-up imaging with chest x-ray as well as outpatient pulmonology follow-up in 3 weeks
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
## 2019-09-07 NOTE — PROGRESS NOTES
Progress Note - Marisa Mckenzie 1950, 76 y o  male MRN: [id number]    Unit/Bed#: E4 -01 Encounter: [id number]    Primary Care Provider: Samir Montalvo MD   Date and time admitted to hospital: 8/22/2019 10:59 AM        * Intractable back pain  Assessment & Plan  Patient initially presented with intractable back pain secondary to myelodysplastic syndrome  No acute finding on x ray of spine  Continue opiods for pain control  Acute respiratory failure with hypoxia (HCC)  Assessment & Plan  Patient's acute decompensation with acute respiratory failure with hypoxia on admission thought to be secondary to side effect of hydroxyurea  Patient had been improving on IV Solu-Medrol and then was switched to prednisone taper  However patient has developed increased work of breathing and did have episode of acute pulmonary edema which required administration of IV diuretics  Patient still has increased rhonchi and increased work of breathing requiring accessory muscle use  Patient is most likely aspirating due to failing swallow study  Patient is currently NPO including meds  Will convert all meds to IV  Continue IV Solu-Medrol 20 mg daily on a taper  ABG showed pH of 7 5, pCO2 24, PO2 60, bicarbonate 19 3  Continue supplemental O2 to keep O2 sats > 88%  Continue aggressive pulmonary toilet, incentive spirometry, increased OOB as able  Acute pulmonary edema [facility])  Assessment & Plan  Chest x-ray showing progression of bilateral infiltrates  Patient noted to have markedly elevated BNP of 54,708 on 08/31 which has increased to--> 74,040 on 9/6  Continue on Lasix 40 mg IV BID x6 doses to deal with rales on exam and increased O2 requirements  Consider suctioning of upper respiratory tract to remove secretions related to possible aspiration and rhonchi      Abnormal CT of the chest  Assessment & Plan  Possibly secondary to aspiration versus likely lung toxicity secondary to hydroxyurea  Cannot continue patient on prednisone due to failing swallow study and aspiration  Continue on IV Solu-Medrol taper  Can consider switch back to prednisone when tolerating oral intake  Completed antibiotic therapy on 09/05  As per pulmonology, would consider bronchoscopy but given patient's mental status and lack of competent to make his own medical decisions and absence of a guardian to act on his behalf, there will hold off on bronchoscopy and continue with steroids and follow-up imaging with chest x-ray as well as outpatient pulmonology follow-up in 3 weeks  Acute renal failure with acute tubular necrosis superimposed on stage 3 chronic kidney disease ([facility] [address] )  Assessment & Plan  Patient's current creatinine is around his baseline  Current creatinine stable at 1 85  Continue to monitor renal function and BMP daily  Avoid nephrotoxins, NSAIDs, anemia, hypotension  SVT (supraventricular tachycardia) (HCC)  Assessment & Plan  Likely induced due to underlying catecholamine state from his malignancy  Has been terminated with adenosine on multiple different occasions  Continue to use adenosine to terminate SVT as long as blood pressure stable as per Cardiology  Patient already completed amiodarone loading  Continue amiodarone infusion due to inability to tolerate oral amiodarone  Continue metoprolol 5 mg IV q 6 hours due to inability to tolerate oral Lopressor  Elevated troponin  Assessment & Plan  Thought to be due to non MI troponin elevation secondary to demand ischemia from prolonged SVT  Patient is thought to have underlying CAD but is not thought to be a candidate for heparin anticoagulation secondary to thrombocytopenia and malignancy  Continue beta-blocker, statin      Myelodysplasia (myelodysplastic syndrome) [facility])  Assessment & Plan  Patient was on hydroxyurea for myelodysplasia which has since been discontinued secondary to possible side effects causing acute respiratory failure with hypoxia  Hematology/oncology is aware of discontinuation of hydroxyurea  Heme Onc re-consulted for increasing WBC  They recommend flow cytometry to characterize the immature cells from progressive leukocytosis as well as monitoring CBC with diff daily  They are of the opinion that there is now evidence of progressive leukocytosis and immature cells in the peripheral blood suggestive of transformation to acute leukemia  Acute encephalopathy  Assessment & Plan  Possibly secondary to delirium from metabolic disturbances versus respiratory failure versus side effects of hydroxyurea  Patient's baseline mental status is currently unknown  Patient has been evaluated by neuropsychology and has been deemed incompetent to make his own medical decisions  However currently patient's mental status is deteriorating and he is unwilling to eat  Will consult palliative Care to contact [facility] ethics committee regarding reveals sitting code status on this patient given his multiple complex problems  Awaiting guardianship for patient-case management working on guardianship-this could take up to 3-4 weeks  Appreciated Gerontology and [facility] consult and recommendations  CT head without contrast 9/6-no acute intracranial abnormality  Palliative Care saw and evaluated patient, will get ethics committee involved to see patient on Monday 9/9  Tobacco abuse  Assessment & Plan  Continue nicotine patches  Other dysphagia  Assessment & Plan  Continue to keep patient NPO including meds as per speech therapy  Will need to consider other options for feeding as patient cannot receive IV fluids due to worsening acute pulmonary edema and hypoxic respiratory failure  Will need to consider PEG tube versus TPN      VTE Pharmacologic Prophylaxis:   Pharmacologic: Pharmacologic VTE Prophylaxis contraindicated due to Thrombocytopenia  Mechanical VTE Prophylaxis in Place: Yes    Patient Centered Rounds: I have performed bedside rounds with nursing staff today  Discussions with Specialists or Other Care Team Provider:  Yes    Education and Discussions with Family / Patient:  Yes    Time Spent for Care: 15 minutes  More than 50% of total time spent on counseling and coordination of care as described above  Current Length of Stay: 16 day(s)    Current Patient Status: Inpatient   Certification Statement: The patient will continue to require additional inpatient hospital stay due to Awaiting guardianship    Discharge Plan: To be determined    Code Status: Level 1 - Full Code      Subjective:   Patient has no complaints  Objective:     Vitals:   Temp (24hrs), Av 6 °F (36 4 °C), Min:97 2 °F (36 2 °C), Max:98 °F (36 7 °C)    Temp:  [97 2 °F (36 2 °C)-98 °F (36 7 °C)] 97 6 °F (36 4 °C)  HR:  [] 94  Resp:  [18-22] 19  BP: (132-155)/(63-87) 132/69  SpO2:  [92 %-98 %] 92 %  Body mass index is 26 07 kg/m²  Input and Output Summary (last 24 hours): Intake/Output Summary (Last 24 hours) at 2019 1507  Last data filed at 2019 0300  Gross per 24 hour   Intake    Output 1800 ml   Net -1800 ml       Physical Exam:     Physical Exam   Constitutional: He appears well-developed and well-nourished  He appears distressed  HENT:   Head: Normocephalic and atraumatic  Neck: No JVD present  Cardiovascular: Normal rate, regular rhythm and normal heart sounds  No murmur heard  Pulmonary/Chest: Effort normal  He has no wheezes  He has no rales  Gurgling breath sounds with rhonchi bilaterally   Abdominal: Soft  Bowel sounds are normal  He exhibits no distension  There is no tenderness  Musculoskeletal: He exhibits no edema or tenderness  Neurological: He is alert  Skin: Skin is warm and dry  He is not diaphoretic         Additional Data:     Labs:    Results from last 7 days   Lab Units 19  0449 19  0543 19  0641   WBC Thousand/uL 59 11* 46 23* 35 13*   HEMOGLOBIN g/dL 8 1* 8 3* 7 5*   HEMATOCRIT % 26 6* 27 4* 24 6*   PLATELETS Thousands/uL 73* 102* 181   BANDS PCT %  --   --  12*   NEUTROS PCT %  --  71  --    LYMPHS PCT %  --  1*  --    LYMPHO PCT %  --   --  6*   MONOS PCT %  --  7  --    MONO PCT %  --   --  3*   EOS PCT %  --  0 0     Results from last 7 days   Lab Units 09/07/19  0449   SODIUM mmol/L 145   POTASSIUM mmol/L 3 0*   CHLORIDE mmol/L 103   CO2 mmol/L 29   BUN mg/dL 46*   CREATININE mg/dL 1 85*   ANION GAP mmol/L 13   CALCIUM mg/dL 9 0   GLUCOSE RANDOM mg/dL 274*         Results from last 7 days   Lab Units 09/07/19  1209 09/06/19  2140 09/06/19  1437 09/05/19  2047 09/05/19  1704 09/05/19  1556 09/05/19  1124 09/05/19  0739 09/05/19  0303 09/05/19  0055 09/04/19  1504 09/04/19  1115   POC GLUCOSE mg/dl 325* 240* 281* 125 138 53* 97 166* 158* 110 77 118                   * I Have Reviewed All Lab Data Listed Above  * Additional Pertinent Lab Tests Reviewed:  [address] 66 Admission Reviewed    Imaging:    Imaging Reports Reviewed Today Include:  CT head without contrast  Imaging Personally Reviewed by Myself Includes:  None    Recent Cultures (last 7 days):           Last 24 Hours Medication List:     Current Facility-Administered Medications:  acetaminophen 650 mg Rectal Q4H PRN Luis Homans, MD    acetaminophen 650 mg Oral Q6H PRN Luis Homans, MD    amiodarone 0 5 mg/min Intravenous Continuous Ranjana Marion PA-C Last Rate: 0 5 mg/min (09/06/19 2204)   benzonatate 100 mg Oral TID PRN Luis Homans, MD    dextran 70-hypromellose 1 drop Both Eyes PRN Gustavo Pinto MD    diazepam 5 mg Oral HS PRN Luis Homans, MD    fluticasone-vilanterol 1 puff Inhalation Daily Gustavo Pinto MD    furosemide 40 mg Intravenous BID (diuretic) Luis Homans, MD    haloperidol lactate 2 5 mg Intramuscular Q6H PRN Gustavo Pinto MD    HYDROmorphone 0 2 mg Intravenous Q4H PRN Luis Homans, MD    insulin lispro 1-5 Units Subcutaneous TID AC Luis Homans, MD insulin lispro 1-5 Units Subcutaneous HS Gustavo Pinto MD    ipratropium-albuterol 3 mL Nebulization TID PRN Will De La Cruz MD    lidocaine 1 patch Topical Daily Will De La Cruz MD    methocarbamol 500 mg Oral Q6H PRN Will De La Cruz MD    [START ON 9/8/2019] methylPREDNISolone sodium succinate 20 mg Intravenous Daily Gustavo Pinto MD    metoprolol 5 mg Intravenous Q6H Deya Noguera PA-C    nicotine 1 patch Transdermal Daily Gustavo Pinto MD    OLANZapine 2 5 mg Intramuscular Q8H PRN Deya Noguera PA-C    ondansetron 4 mg Intravenous Q4H PRN Will De La Cruz MD    oxyCODONE 10 mg Oral Q4H PRN Will De La Cruz MD    pantoprazole 40 mg Intravenous Q24H [address] Deya Noguera PA-C    traMADol 50 mg Oral Q8H PRN Will De La Cruz MD         Today, Patient Was Seen By: Will De La Cruz MD    ** Please Note: Dictation voice to text software may have been used in the creation of this document   **
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
## 2019-09-07 NOTE — ASSESSMENT & PLAN NOTE
Patient's current creatinine is around his baseline  Current creatinine stable at 1 85  Continue to monitor renal function and BMP daily  Avoid nephrotoxins, NSAIDs, anemia, hypotension

## 2019-09-07 NOTE — ASSESSMENT & PLAN NOTE
Likely induced due to underlying catecholamine state from his malignancy  Has been terminated with adenosine on multiple different occasions  Continue to use adenosine to terminate SVT as long as blood pressure stable as per Cardiology  Patient already completed amiodarone loading  Continue amiodarone infusion due to inability to tolerate oral amiodarone  Continue metoprolol 5 mg IV q 6 hours due to inability to tolerate oral Lopressor

## 2019-09-07 NOTE — PLAN OF CARE
Problem: Potential for Falls  Goal: Patient will remain free of falls  Description  INTERVENTIONS:  - Assess patient frequently for physical needs  -  Identify cognitive and physical deficits and behaviors that affect risk of falls    -  [place] fall precautions as indicated by assessment   - Educate patient/family on patient safety including physical limitations  - Instruct patient to call for assistance with activity based on assessment  - Modify environment to reduce risk of injury  - Consider OT/PT consult to assist with strengthening/mobility  Outcome: Progressing     Problem: INFECTION - ADULT  Goal: Absence or prevention of progression during hospitalization  Description  INTERVENTIONS:  - Assess and monitor for signs and symptoms of infection  - Monitor lab/diagnostic results  - Monitor all insertion sites, i e  indwelling lines, tubes, and drains  - Monitor endotracheal if appropriate and nasal secretions for changes in amount and color  - [place] appropriate cooling/warming therapies per order  - Administer medications as ordered  - Instruct and encourage patient and family to use good hand hygiene technique  - Identify and instruct in appropriate isolation precautions for identified infection/condition  Outcome: Progressing  Goal: Absence of fever/infection during neutropenic period  Description  INTERVENTIONS:  - Monitor WBC    Outcome: Progressing     Problem: SKIN/TISSUE INTEGRITY - ADULT  Goal: Skin integrity remains intact  Description  INTERVENTIONS  - Identify patients at risk for skin breakdown  - Assess and monitor skin integrity  - Assess and monitor nutrition and hydration status  - Monitor labs (i e  albumin)  - Assess for incontinence   - Turn and reposition patient  - Assist with mobility/ambulation  - Relieve pressure over bony prominences  - Avoid friction and shearing  - Provide appropriate hygiene as needed including keeping skin clean and dry  - Evaluate need for skin moisturizer/barrier cream  - Collaborate with interdisciplinary team (i e  Nutrition, Rehabilitation, etc )   - Patient/family teaching  Outcome: Progressing  Goal: Incision(s), wounds(s) or drain site(s) healing without S/S of infection  Description  INTERVENTIONS  - Assess and document risk factors for skin impairment   - Assess and document dressing, incision, wound bed, drain sites and surrounding tissue  - Consider nutrition services referral as needed  - Oral mucous membranes remain intact  - Provide patient/ family education  Outcome: Progressing     Problem: GENITOURINARY - ADULT  Goal: Maintains or returns to baseline urinary function  Description  INTERVENTIONS:  - Assess urinary function  - Encourage oral fluids to ensure adequate hydration if ordered  - Administer IV fluids as ordered to ensure adequate hydration  - Administer ordered medications as needed  - Offer frequent toileting  - Follow urinary retention protocol if ordered  Outcome: Progressing     Problem: METABOLIC, FLUID AND ELECTROLYTES - ADULT  Goal: Electrolytes maintained within normal limits  Description  INTERVENTIONS:  - Monitor labs and assess patient for signs and symptoms of electrolyte imbalances  - Administer electrolyte replacement as ordered  - Monitor response to electrolyte replacements, including repeat lab results as appropriate  - Instruct patient on fluid and nutrition as appropriate  Outcome: Progressing  Goal: Fluid balance maintained  Description  INTERVENTIONS:  - Monitor labs   - Monitor I/O and WT  - Instruct patient on fluid and nutrition as appropriate  - Assess for signs & symptoms of volume excess or deficit  Outcome: Progressing  Goal: Glucose maintained within target range  Description  INTERVENTIONS:  - Monitor Blood Glucose as ordered  - Assess for signs and symptoms of hyperglycemia and hypoglycemia  - Administer ordered medications to maintain glucose within target range  - Assess nutritional intake and initiate nutrition service referral as needed  Outcome: Progressing     Problem: MUSCULOSKELETAL - ADULT  Goal: Maintain proper alignment of affected body part  Description  INTERVENTIONS:  - Support, maintain and protect limb and body alignment  - Provide patient/ family with appropriate education  Outcome: Progressing     Problem: SAFETY ADULT  Goal: Patient will remain free of falls  Description  INTERVENTIONS:  - Assess patient frequently for physical needs  -  Identify cognitive and physical deficits and behaviors that affect risk of falls  -  [place] fall precautions as indicated by assessment   - Educate patient/family on patient safety including physical limitations  - Instruct patient to call for assistance with activity based on assessment  - Modify environment to reduce risk of injury  - Consider OT/PT consult to assist with strengthening/mobility  Outcome: Progressing     Problem: Nutrition/Hydration-ADULT  Goal: Nutrient/Hydration intake appropriate for improving, restoring or maintaining nutritional needs  Description  Monitor and assess patient's nutrition/hydration status for malnutrition  Collaborate with interdisciplinary team and initiate plan and interventions as ordered  Monitor patient's weight and dietary intake as ordered or per policy  Utilize nutrition screening tool and intervene as necessary  Determine patient's food preferences and provide high-protein, high-caloric foods as appropriate       INTERVENTIONS:  - Monitor oral intake, urinary output, labs, and treatment plans  - Assess nutrition and hydration status and recommend course of action  - Evaluate amount of meals eaten  - Assist patient with eating if necessary   - Allow adequate time for meals  - Recommend/ encourage appropriate diets, oral nutritional supplements, and vitamin/mineral supplements  - Order, calculate, and assess calorie counts as needed  - Assess need for intravenous fluids  - Provide specific nutrition/hydration education as appropriate  - Include patient/family/caregiver in decisions related to nutrition   Outcome: Progressing     Problem: Prexisting or High Potential for Compromised Skin Integrity  Goal: Skin integrity is maintained or improved  Description  INTERVENTIONS:  - Identify patients at risk for skin breakdown  - Assess and monitor skin integrity  - Assess and monitor nutrition and hydration status  - Monitor labs   - Assess for incontinence   - Turn and reposition patient  - Assist with mobility/ambulation  - Relieve pressure over bony prominences  - Avoid friction and shearing  - Provide appropriate hygiene as needed including keeping skin clean and dry  - Evaluate need for skin moisturizer/barrier cream  - Collaborate with interdisciplinary team   - Patient/family teaching  - Consider wound care consult   Outcome: Progressing

## 2019-09-08 NOTE — PROGRESS NOTES
Progress Note - Mic Herrera 1950, 76 y o  male MRN: [id number]    Unit/Bed#: E4 -01 Encounter: [id number]    Primary Care Provider: Jennifer Aguilar MD   Date and time admitted to hospital: 8/22/2019 10:59 AM        * Intractable back pain  Assessment & Plan  Patient initially presented with intractable back pain secondary to myelodysplastic syndrome  No acute finding on x ray of spine  Continue opiods for pain control  Acute respiratory failure with hypoxia (HCC)  Assessment & Plan  Patient's acute decompensation with acute respiratory failure with hypoxia on admission thought to be secondary to side effect of hydroxyurea  Patient had been improving on IV Solu-Medrol and then was switched to prednisone taper  However patient has developed increased work of breathing and did have episode of acute pulmonary edema which required administration of IV diuretics  Patient still has increased rhonchi and increased work of breathing requiring accessory muscle use  Patient is most likely aspirating due to failing swallow study  Patient is currently NPO including meds  Will convert all meds to IV  Continue deep suctioning to remove respiratory secretions and mucus plug  Continue IV Solu-Medrol 20 mg daily on a taper  ABG showed pH of 7 5, pCO2 24, PO2 60, bicarbonate 19 3  Continue supplemental O2 to keep O2 sats > 88%  Continue aggressive pulmonary toilet, incentive spirometry, increased OOB as able  Acute pulmonary edema [facility])  Assessment & Plan  Chest x-ray showing progression of bilateral infiltrates  Patient noted to have markedly elevated BNP of 54,708 on 08/31 which has increased to--> 74,040 on 9/6  Already completed 6 doses of Lasix to deal with pulmonary edema  Consider suctioning of upper respiratory tract to remove secretions related to possible aspiration and rhonchi      Abnormal CT of the chest  Assessment & Plan  Possibly secondary to aspiration versus likely lung toxicity secondary to hydroxyurea  Cannot continue patient on prednisone due to failing swallow study and aspiration  Continue on IV Solu-Medrol taper  Can consider switch back to prednisone when tolerating oral intake  Completed antibiotic therapy on 09/05  As per pulmonology, would consider bronchoscopy but given patient's mental status and lack of competent to make his own medical decisions and absence of a guardian to act on his behalf, there will hold off on bronchoscopy and continue with steroids and follow-up imaging with chest x-ray as well as outpatient pulmonology follow-up in 3 weeks  Acute renal failure with acute tubular necrosis superimposed on stage 3 chronic kidney disease ([facility] [address] )  Assessment & Plan  Patient's current creatinine is around his baseline  Current creatinine stable at 1 98  Continue to monitor renal function and BMP daily  Avoid nephrotoxins, NSAIDs, anemia, hypotension  SVT (supraventricular tachycardia) (HCC)  Assessment & Plan  Likely induced due to underlying catecholamine state from his malignancy  Has been terminated with adenosine on multiple different occasions  Continue to use adenosine to terminate SVT as long as blood pressure stable as per Cardiology  Patient already completed amiodarone loading  Continue amiodarone infusion due to inability to tolerate oral amiodarone  Continue metoprolol 5 mg IV q 6 hours due to inability to tolerate oral Lopressor  Elevated troponin  Assessment & Plan  Thought to be due to non MI troponin elevation secondary to demand ischemia from prolonged SVT  Patient is thought to have underlying CAD but is not thought to be a candidate for heparin anticoagulation secondary to thrombocytopenia and malignancy  Continue beta-blocker, statin      Myelodysplasia (myelodysplastic syndrome) [facility])  Assessment & Plan  Patient was on hydroxyurea for myelodysplasia which has since been discontinued secondary to possible side effects causing acute respiratory failure with hypoxia  Hematology/oncology is aware of discontinuation of hydroxyurea  Heme Onc re-consulted for increasing WBC  They recommend flow cytometry to characterize the immature cells from progressive leukocytosis as well as monitoring CBC with diff daily  They are of the opinion that there is now evidence of progressive leukocytosis and immature cells in the peripheral blood suggestive of transformation to acute leukemia  Acute encephalopathy  Assessment & Plan  Possibly secondary to delirium from metabolic disturbances versus respiratory failure versus side effects of hydroxyurea  Patient's baseline mental status is currently unknown  Patient has been evaluated by neuropsychology and has been deemed incompetent to make his own medical decisions  However currently patient's mental status is deteriorating and he is unwilling to eat  Will consult palliative Care to contact [facility] [place] ethics committee regarding reveals sitting code status on this patient given his multiple complex problems  Awaiting guardianship for patient-case management working on guardianship-this could take up to 3-4 weeks  Appreciated Gerontology and [facility] consult and recommendations  CT head without contrast 9/6-no acute intracranial abnormality  Palliative Care saw and evaluated patient, will get Ethics Committee involved to see patient on Monday 9/9  Tobacco abuse  Assessment & Plan  Continue nicotine patches  Other dysphagia  Assessment & Plan  Continue to keep patient NPO including meds as per speech therapy  Will need to consider alternate forms of nutrition such as TPN, PEG tube placement  VTE Pharmacologic Prophylaxis:   Pharmacologic: Pharmacologic VTE Prophylaxis contraindicated due to Thrombocytopenia  Mechanical VTE Prophylaxis in Place: Yes    Patient Centered Rounds: I have performed bedside rounds with nursing staff today      Discussions with Specialists or Other Care Team Provider:  Yes    Education and Discussions with Family / Patient:  Yes    Time Spent for Care: 15 minutes  More than 50% of total time spent on counseling and coordination of care as described above  Current Length of Stay: 17 day(s)    Current Patient Status: Inpatient   Certification Statement: The patient will continue to require additional inpatient hospital stay due to Awaiting guardianship    Discharge Plan: To be determined    Code Status: Level 1 - Full Code      Subjective:   Patient has no complaints  Objective:     Vitals:   Temp (24hrs), Av 8 °F (36 6 °C), Min:97 6 °F (36 4 °C), Max:98 1 °F (36 7 °C)    Temp:  [97 6 °F (36 4 °C)-98 1 °F (36 7 °C)] 97 6 °F (36 4 °C)  HR:  [] 97  Resp:  [19-22] 22  BP: (132-146)/(69-79) 133/73  SpO2:  [92 %-96 %] 94 %  Body mass index is 26 19 kg/m²  Input and Output Summary (last 24 hours): Intake/Output Summary (Last 24 hours) at 2019 1241  Last data filed at 2019 0900  Gross per 24 hour   Intake 614 84 ml   Output 2275 ml   Net -1660 16 ml       Physical Exam:     Physical Exam   Constitutional: He appears well-developed and well-nourished  No distress  HENT:   Head: Normocephalic and atraumatic  Eyes: Pupils are equal, round, and reactive to light  EOM are normal    Neck: No JVD present  Cardiovascular: Normal rate, regular rhythm and normal heart sounds  No murmur heard  Pulmonary/Chest: Effort normal  He has no wheezes  He has no rales  Bilateral basilar crackles   Abdominal: Soft  Bowel sounds are normal    Musculoskeletal: He exhibits no edema  Lymphadenopathy:     He has no cervical adenopathy  Neurological: He is alert  Skin: Skin is warm and dry  He is not diaphoretic         Additional Data:     Labs:    Results from last 7 days   Lab Units 19  0929  19  0641   WBC Thousand/uL 88 55*   < > 35 13*   HEMOGLOBIN g/dL 8 2*   < > 7 5*   HEMATOCRIT % 27 1*   < > 24 6* PLATELETS Thousands/uL 26*   < > 181   BANDS PCT %  --   --  12*   NEUTROS PCT % 68   < >  --    LYMPHS PCT % 2*   < >  --    LYMPHO PCT %  --   --  6*   MONOS PCT % 3*   < >  --    MONO PCT %  --   --  3*   EOS PCT % 0   < > 0    < > = values in this interval not displayed  Results from last 7 days   Lab Units 09/08/19  0738   SODIUM mmol/L 144   POTASSIUM mmol/L 4 4   CHLORIDE mmol/L 103   CO2 mmol/L 28   BUN mg/dL 64*   CREATININE mg/dL 1 98*   ANION GAP mmol/L 13   CALCIUM mg/dL 9 2   GLUCOSE RANDOM mg/dL 424*         Results from last 7 days   Lab Units 09/08/19  1152 09/08/19  0744 09/07/19  2135 09/07/19  1901 09/07/19  1758 09/07/19  1209 09/06/19  2140 09/06/19  1437 09/05/19  2047 09/05/19  1704 09/05/19  1556 09/05/19  1124   POC GLUCOSE mg/dl 425* 422* 436* 415* 410* 325* 240* 281* 125 138 53* 97                   * I Have Reviewed All Lab Data Listed Above  * Additional Pertinent Lab Tests Reviewed:  Oleg 66 Admission Reviewed    Imaging:    Imaging Reports Reviewed Today Include:  None available  Imaging Personally Reviewed by Myself Includes:  None    Recent Cultures (last 7 days):           Last 24 Hours Medication List:     Current Facility-Administered Medications:  acetaminophen 650 mg Rectal Q4H PRN Aliza Fields MD    acetaminophen 650 mg Oral Q6H PRN Aliza Fields MD    amiodarone 0 5 mg/min Intravenous Continuous Delwin Single, PA-C Last Rate: 0 5 mg/min (09/07/19 2346)   benzonatate 100 mg Oral TID PRN Aliza Fields MD    dextran 70-hypromellose 1 drop Both Eyes PRN Gustavo Pinto MD    diazepam 5 mg Oral HS PRN Aliza Fields MD    fluticasone-vilanterol 1 puff Inhalation Daily Gustavo Pinto MD    furosemide 40 mg Intravenous BID (diuretic) Aliza Fields MD    haloperidol lactate 2 5 mg Intramuscular Q6H PRN Gustavo Pinto MD    HYDROmorphone 0 2 mg Intravenous Q4H PRN Aliza Fields MD    insulin lispro 1-6 Units Subcutaneous Q6H [facility] Aliza Fields MD ipratropium-albuterol 3 mL Nebulization TID PRN Isela Luarent MD    lidocaine 1 patch Topical Daily Isela Laurent MD    methocarbamol 500 mg Oral Q6H PRN Isela Laurent MD    methylPREDNISolone sodium succinate 20 mg Intravenous Daily Gustavo Pinto MD    metoprolol 5 mg Intravenous Q6H Ebony Espino PA-C    nicotine 1 patch Transdermal Daily Gustavo Pinto MD    OLANZapine 2 5 mg Intramuscular Q8H PRN Ebony Espino PA-C    ondansetron 4 mg Intravenous Q4H PRN Isela Laurent MD    oxyCODONE 10 mg Oral Q4H PRN Isela Laurent MD    pantoprazole 40 mg Intravenous Q24H [facility] Ebony Espino PA-C    traMADol 50 mg Oral Q8H PRN Isela Laurent MD         Today, Patient Was Seen By: Isela Laurent MD    ** Please Note: Dictation voice to text software may have been used in the creation of this document   **

## 2019-09-08 NOTE — PROCEDURES
Intubation  Date/Time: 9/8/2019 5:14 PM  Performed by: NICOLE Perez  Authorized by: NICOLE Perez     Patient location:  Bedside  Consent:     Consent obtained:  Emergent situation  Universal protocol:     Procedure explained and questions answered to patient or proxy's satisfaction: no      Relevant documents present and verified: yes      Test results available and properly labeled: yes      Radiology Images displayed and confirmed  If images not available, report reviewed: yes      Required blood products, implants, devices, and special equipment available: yes      Site/side marked: yes      Immediately prior to procedure, a time out was called: yes      Patient identity confirmed:  Arm band and hospital-assigned identification number  Pre-procedure details:     Patient status:  Unresponsive    Mallampati score:  3    Pretreatment medications:  Etomidate    Paralytics:  None  Indications:     Indications for intubation: respiratory failure and hypoxemia    Procedure details:     Preoxygenation:  Bag valve mask    CPR in progress: no      Intubation method:  Oral    Oral intubation technique:  Glidescope    Laryngoscope blade: Mac 4    Tube size (mm):  8 0    Tube type:  Cuffed    Number of attempts:  2    Ventilation between attempts: yes      Cricoid pressure: no      Tube visualized through cords: yes    Placement assessment:     ETT to lip:  24    Tube secured with:  ETT narvaez    Breath sounds:  Equal    Placement verification: chest rise, condensation, CXR verification, equal breath sounds and ETCO2 detector      CXR findings:  ETT in proper place  Post-procedure details:     Patient tolerance of procedure:   Tolerated well, no immediate complications

## 2019-09-08 NOTE — ASSESSMENT & PLAN NOTE
Patient's acute decompensation with acute respiratory failure with hypoxia on admission thought to be secondary to side effect of hydroxyurea  Patient had been improving on IV Solu-Medrol and then was switched to prednisone taper  However patient has developed increased work of breathing and did have episode of acute pulmonary edema which required administration of IV diuretics  Patient still has increased rhonchi and increased work of breathing requiring accessory muscle use  Patient is most likely aspirating due to failing swallow study  Patient is currently NPO including meds  Will convert all meds to IV  Continue deep suctioning to remove respiratory secretions and mucus plug  Continue IV Solu-Medrol 20 mg daily on a taper  ABG showed pH of 7 5, pCO2 24, PO2 60, bicarbonate 19 3  Continue supplemental O2 to keep O2 sats > 88%  Continue aggressive pulmonary toilet, incentive spirometry, increased OOB as able

## 2019-09-08 NOTE — NURSING NOTE
Patient had increased work of breathing, and desaturated to 83-83 % on the continuous pulse ox  An attempted was made to suction out the patients mouth with little relief to the patient  Respiratory was called and they suctioned the patient  A small amount of mucus was removed  The patient continues to saturate between 83 and 90  Patient was placed on HiFlow NC after evaluation by SLIM and recommendation of respiratory  Patient is sitting up right in bed  Saturations are 88-90% and patient appears to be in less distress than before, but still uncomfortable  Will continue to closely monitor patient

## 2019-09-08 NOTE — ASSESSMENT & PLAN NOTE
Chest x-ray showing progression of bilateral infiltrates  Patient noted to have markedly elevated BNP of 54,708 on 08/31 which has increased to--> 74,040 on 9/6  Already completed 6 doses of Lasix to deal with pulmonary edema  Consider suctioning of upper respiratory tract to remove secretions related to possible aspiration and rhonchi

## 2019-09-08 NOTE — NURSING NOTE
Patient kept desaturating on his continuous pulse ox to 85-86%  Appears in mild respiratory distress  Respiratory rate elevated at 32  HR = 88  Gurgling respirations noted  Coarse rhonchi through out both lungs with crackles noted to right lung  Base  Suctioned patient by Marely Edge with little effectiveness  Deep suctioned patient for minimal amount of thick secretions  Nasal cannula oxygen bumped up to 4 lpm   Pulse ox 93% on 4 lpm   RR=26  Patient still appears to be using accessory muscles to breathe  When asked how he feels, he either doesn't reply or responds, "Help me "  Respiratory therapist called and was able to suction a mucus plug orally with the Marely LÓPEZ PA-C, Mohan Florez pagemalena and also came to bedside to examine patient who appears in less distress  No further orders noted at this time  Will continue to closely monitor patient  1:1 staff remain at bedside  Call light in reach

## 2019-09-08 NOTE — PROGRESS NOTES
ICU Accept note - [address]  76 y o  male MRN: [id number]  Unit/Bed#: ICU 11 Encounter: [id number]    Assessment/Plan:  1  Acute respiratory failure with hypoxia likely secondary to mucous plugging  · Transfer to ICU  · Intubation  · Pulmonary toilet  · Continue mechanical ventilation  2  Hypotension  · Central line placement  · Pressors  3  Acute renal failure  · Continues monitor kidney indices  4  Intermittent episodes of SVT  · Cardiology follow  · Continue amiodarone  5  Myelodysplastic syndrome:  Him a follow-up  6  Progressive leukocytosis secondary to 5    7  Metabolic Encephalopathy      Critical Care Time: minutes  Documented critical care time excludes any procedures documented elsewhere  It also excludes any family updates    _____________________________________________________________________    HPI/24hr events:   Patient was admitted to the medical-surgical unit with the above diagnoses  He has been slowly decompensating over the past week  Today I was asked to see the patient secondary to hypoxia and confusion  Upon my arrival to the unit the patient is struggling to breathe  He is on high-flow oxygen saturating in the high 80%  He is unresponsive and appears to be encephalopathic  Patient was moved to the intensive care unit relatively urgently  He was intubated in the intensive care unit and placed on mechanical ventilation  He central line was placed at that time as well secondary to pressor requirement  A chest x-ray was done to confirm placement of the central line and the ET tube  X-ray demonstrated naya out left lung secondary to mucus plugging  Pulmonary toilet was performed by respiratory  A large quantity of mucus was suctioned  Repeat x-ray demonstrates near complete resolution of the left lung white out      Medications:    Current Facility-Administered Medications:  acetaminophen 650 mg Rectal Q4H PRN Solomon Cabrera MD    acetaminophen 650 mg Oral Q6H PRN Dariel Rivero MD    amiodarone 0 5 mg/min Intravenous Continuous Dariel Rivero MD Last Rate: Stopped (09/08/19 1613)   benzonatate 100 mg Oral TID PRN Dariel Rivero MD    chlorhexidine 15 mL Swish & Spit Q12H [address] Ambreen [place], CRNP    dextran 70-hypromellose 1 drop Both Eyes PRN Dariel Rivero MD    diazepam 5 mg Oral HS PRN Dariel Rivero MD    fluticasone-vilanterol 1 puff Inhalation Daily Dariel Rivero MD    furosemide 40 mg Intravenous BID (diuretic) Dariel Rivero MD    haloperidol lactate 2 5 mg Intramuscular Q6H PRN Gustavo Pinto MD    HYDROmorphone 0 2 mg Intravenous Q4H PRN Gustavo Pinto MD    insulin lispro 1-6 Units Subcutaneous Q6H [address] Gustavo Pinto MD    ipratropium-albuterol 3 mL Nebulization TID PRN Dariel Rivero MD    lidocaine 1 patch Topical Daily Gustavo Pinto MD    methocarbamol 500 mg Oral Q6H PRN Dariel Rivero MD    methylPREDNISolone sodium succinate 20 mg Intravenous Daily Gustavo Pinto MD    metoprolol 5 mg Intravenous Q6H Gustavo Pinto MD    nicotine 1 patch Transdermal Daily Dariel Rivero MD    norepinephrine 1-30 mcg/min Intravenous Titrated Ambreen [place], CRNP Last Rate: 15 mcg/min (09/08/19 1758)   OLANZapine 2 5 mg Intramuscular Q8H PRN Gustavo Pinto MD    ondansetron 4 mg Intravenous Q4H PRN Gustavo Pinto MD    oxyCODONE 10 mg Oral Q4H PRN Dariel Rivero MD    pantoprazole 40 mg Intravenous Q24H [address] Gustavo Pinto MD    traMADol 50 mg Oral Q8H PRN Dariel Rivero MD          amiodarone 0 5 mg/min Last Rate: Stopped (09/08/19 1613)   norepinephrine 1-30 mcg/min Last Rate: 15 mcg/min (09/08/19 1758)         Physical exam:  Vitals: Body mass index is 26 19 kg/m²  Blood pressure 119/70, pulse 92, temperature (!) 97 1 °F (36 2 °C), resp  rate 21, weight 87 6 kg (193 lb 2 oz), SpO2 99 %  ,  Temp  Min: 95 5 °F (35 3 °C)  Max: 102 7 °F (39 3 °C)  IBW: -88 kg    SpO2: 99 %  SpO2 Activity: At Rest  O2 Device: Other (comment)(Vent)      Intake/Output Summary (Last 24 hours) at 9/8/2019 1845  Last data filed at 9/8/2019 1507  Gross per 24 hour   Intake 614 84 ml   Output 1825 ml   Net -1210 16 ml       Invasive/non-invasive ventilation settings:   Respiratory    Lab Data (Last 4 hours)    None         O2/Vent Data (Last 4 hours)      09/08 1629 09/08 1756 09/08 1800       Vent Mode AC/VC       Resp Rate (BPM) (BPM) 14       Vt (mL) (mL) 450       FIO2 (%) (%) 100       PEEP (cmH2O) (cmH2O) 5 10 5     MV 6 3                 Invasive Devices     Central Venous Catheter Line            CVC Central Lines 09/08/19 Triple 16cm less than 1 day          Peripheral Intravenous Line            Peripheral IV 09/08/19 Dorsal (posterior); Right Hand less than 1 day          Drain            Urethral Catheter Latex 16 Fr  2 days    NG/OG/Enteral Tube Orogastric 18 Fr Left mouth less than 1 day          Airway            ETT  Hi-Lo 8 mm less than 1 day                  Physical Exam:  Gen:  Unresponsive, hypoxic  HEENT:  Atraumatic normocephalic pupils equal and reactive  Oral mucosa is dry with poor dentition  Neck:  Supple no JVD no lymphadenopathy  Chest:  Clear to auscultation the right diminished in left  Cor:  Regular rate and rhythm no murmurs rubs or gallops appreciated  Abd:  Soft nontender with positive bowel sounds  Ext:  No clubbing cyanosis or edema  Neuro:  Unresponsive  Skin:  Warm dry and intact no rash      Diagnostic Data:  Lab: I have personally reviewed pertinent lab results     CBC:   Results from last 7 days   Lab Units 09/08/19  0929 09/07/19 0449 09/06/19  0543   WBC Thousand/uL 88 55* 59 11* 46 23*   HEMOGLOBIN g/dL 8 2* 8 1* 8 3*   HEMATOCRIT % 27 1* 26 6* 27 4*   PLATELETS Thousands/uL 26* 73* 102*       CMP:   Results from last 7 days   Lab Units 09/08/19  0738 09/07/19 0449 09/06/19  0543   SODIUM mmol/L 144 145 143   POTASSIUM mmol/L 4 4 3 0* 3 8   CHLORIDE mmol/L 103 103 105   CO2 mmol/L 28 29 24   BUN mg/dL 64* 46* 47*   CREATININE mg/dL 1 98* 1 85* 1 74*   CALCIUM mg/dL 9 2 9 0 9 2     PT/INR:   No results found for: PT, INR,   Magnesium:   Results from last 7 days   Lab Units 09/08/19  0738 09/07/19  0449 09/04/19  1417   MAGNESIUM mg/dL 1 7 1 3* 1 7     Phosphorous:       Microbiology:  No pending microbiology    Imaging:  Most recent chest x-ray demonstrates endotracheal tube in proper position, right internal jugular vein central line in proper position  Resolved mucus plugging on left lung    Cardiac lab/EKG/telemetry/ECHO:   Sinus rhythm    VTE Prophylaxis:  Sequential compression device    Code Status: Level 1 - Full Code    NICOLE Goss    Portions of the record may have been created with voice recognition software  Occasional wrong word or "sound a like" substitutions may have occurred due to the inherent limitations of voice recognition software  Read the chart carefully and recognize, using context, where substitutions have occurred

## 2019-09-08 NOTE — PLAN OF CARE
Problem: Potential for Falls  Goal: Patient will remain free of falls  Description  INTERVENTIONS:  - Assess patient frequently for physical needs  -  Identify cognitive and physical deficits and behaviors that affect risk of falls    -  [place] fall precautions as indicated by assessment   - Educate patient/family on patient safety including physical limitations  - Instruct patient to call for assistance with activity based on assessment  - Modify environment to reduce risk of injury  - Consider OT/PT consult to assist with strengthening/mobility  Outcome: Progressing     Problem: PAIN - ADULT  Goal: Verbalizes/displays adequate comfort level or baseline comfort level  Description  Interventions:  - Encourage patient to monitor pain and request assistance  - Assess pain using appropriate pain scale  - Administer analgesics based on type and severity of pain and evaluate response  - Implement non-pharmacological measures as appropriate and evaluate response  - Consider cultural and social influences on pain and pain management  - Notify physician/advanced practitioner if interventions unsuccessful or patient reports new pain  Outcome: Progressing     Problem: INFECTION - ADULT  Goal: Absence or prevention of progression during hospitalization  Description  INTERVENTIONS:  - Assess and monitor for signs and symptoms of infection  - Monitor lab/diagnostic results  - Monitor all insertion sites, i e  indwelling lines, tubes, and drains  - Monitor endotracheal if appropriate and nasal secretions for changes in amount and color  - [place] appropriate cooling/warming therapies per order  - Administer medications as ordered  - Instruct and encourage patient and family to use good hand hygiene technique  - Identify and instruct in appropriate isolation precautions for identified infection/condition  Outcome: Progressing  Goal: Absence of fever/infection during neutropenic period  Description  INTERVENTIONS:  - Monitor WBC    Outcome: Progressing     Problem: DISCHARGE PLANNING  Goal: Discharge to home or other facility with appropriate resources  Description  INTERVENTIONS:  - Identify barriers to discharge w/patient and caregiver  - Arrange for needed discharge resources and transportation as appropriate  - Identify discharge learning needs (meds, wound care, etc )  - Arrange for interpretive services to assist at discharge as needed  - Refer to Case Management Department for coordinating discharge planning if the patient needs post-hospital services based on physician/advanced practitioner order or complex needs related to functional status, cognitive ability, or social support system  Outcome: Progressing     Problem: Knowledge Deficit  Goal: Patient/family/caregiver demonstrates understanding of disease process, treatment plan, medications, and discharge instructions  Description  Complete learning assessment and assess knowledge base    Interventions:  - Provide teaching at level of understanding  - Provide teaching via preferred learning methods  Outcome: Progressing     Problem: RESPIRATORY - ADULT  Goal: Achieves optimal ventilation and oxygenation  Description  INTERVENTIONS:  - Assess for changes in respiratory status  - Assess for changes in mentation and behavior  - Position to facilitate oxygenation and minimize respiratory effort  - Oxygen administered by appropriate delivery if ordered  - Initiate smoking cessation education as indicated  - Encourage broncho-pulmonary hygiene including cough, deep breathe, Incentive Spirometry  - Assess the need for suctioning and aspirate as needed  - Assess and instruct to report SOB or any respiratory difficulty  - Respiratory Therapy support as indicated  Outcome: Progressing     Problem: SKIN/TISSUE INTEGRITY - ADULT  Goal: Skin integrity remains intact  Description  INTERVENTIONS  - Identify patients at risk for skin breakdown  - Assess and monitor skin integrity  - Assess and monitor nutrition and hydration status  - Monitor labs (i e  albumin)  - Assess for incontinence   - Turn and reposition patient  - Assist with mobility/ambulation  - Relieve pressure over bony prominences  - Avoid friction and shearing  - Provide appropriate hygiene as needed including keeping skin clean and dry  - Evaluate need for skin moisturizer/barrier cream  - Collaborate with interdisciplinary team (i e  Nutrition, Rehabilitation, etc )   - Patient/family teaching  Outcome: Progressing  Goal: Incision(s), wounds(s) or drain site(s) healing without S/S of infection  Description  INTERVENTIONS  - Assess and document risk factors for skin impairment   - Assess and document dressing, incision, wound bed, drain sites and surrounding tissue  - Consider nutrition services referral as needed  - Oral mucous membranes remain intact  - Provide patient/ family education  Outcome: Progressing     Problem: NEUROSENSORY - ADULT  Goal: Achieves stable or improved neurological status  Description  INTERVENTIONS  - Monitor and report changes in neurological status  - Monitor vital signs such as temperature, blood pressure, glucose, and any other labs ordered   - Initiate measures to prevent increased intracranial pressure  - Monitor for seizure activity and implement precautions if appropriate      Outcome: Progressing  Goal: Remains free of injury related to seizures activity  Description  INTERVENTIONS  - Maintain airway, patient safety  and administer oxygen as ordered  - Monitor patient for seizure activity, document and report duration and description of seizure to physician/advanced practitioner  - If seizure occurs,  ensure patient safety during seizure  - Reorient patient post seizure  - Seizure pads on all 4 side rails  - Instruct patient/family to notify RN of any seizure activity including if an aura is experienced  - Instruct patient/family to call for assistance with activity based on nursing assessment  - Administer anti-seizure medications if ordered    Outcome: Progressing  Goal: Achieves maximal functionality and self care  Description  INTERVENTIONS  - Monitor swallowing and airway patency with patient fatigue and changes in neurological status  - Encourage and assist patient to increase activity and self care     - Encourage visually impaired, hearing impaired and aphasic patients to use assistive/communication devices  Outcome: Progressing     Problem: GENITOURINARY - ADULT  Goal: Maintains or returns to baseline urinary function  Description  INTERVENTIONS:  - Assess urinary function  - Encourage oral fluids to ensure adequate hydration if ordered  - Administer IV fluids as ordered to ensure adequate hydration  - Administer ordered medications as needed  - Offer frequent toileting  - Follow urinary retention protocol if ordered  Outcome: Progressing  Goal: Absence of urinary retention  Description  INTERVENTIONS:  - Assess patients ability to void and empty bladder  - Monitor I/O  - Bladder scan as needed  - Discuss with physician/AP medications to alleviate retention as needed  - Discuss catheterization for long term situations as appropriate  Outcome: Progressing     Problem: METABOLIC, FLUID AND ELECTROLYTES - ADULT  Goal: Electrolytes maintained within normal limits  Description  INTERVENTIONS:  - Monitor labs and assess patient for signs and symptoms of electrolyte imbalances  - Administer electrolyte replacement as ordered  - Monitor response to electrolyte replacements, including repeat lab results as appropriate  - Instruct patient on fluid and nutrition as appropriate  Outcome: Progressing  Goal: Fluid balance maintained  Description  INTERVENTIONS:  - Monitor labs   - Monitor I/O and WT  - Instruct patient on fluid and nutrition as appropriate  - Assess for signs & symptoms of volume excess or deficit  Outcome: Progressing  Goal: Glucose maintained within target range  Description  INTERVENTIONS:  - Monitor Blood Glucose as ordered  - Assess for signs and symptoms of hyperglycemia and hypoglycemia  - Administer ordered medications to maintain glucose within target range  - Assess nutritional intake and initiate nutrition service referral as needed  Outcome: Progressing     Problem: HEMATOLOGIC - ADULT  Goal: Maintains hematologic stability  Description  INTERVENTIONS  - Assess for signs and symptoms of bleeding or hemorrhage  - Monitor labs  - Administer supportive blood products/factors as ordered and appropriate  Outcome: Progressing     Problem: MUSCULOSKELETAL - ADULT  Goal: Maintain or return mobility to safest level of function  Description  INTERVENTIONS:  - Assess patient's ability to carry out ADLs; assess patient's baseline for ADL function and identify physical deficits which impact ability to perform ADLs (bathing, care of mouth/teeth, toileting, grooming, dressing, etc )  - Assess/evaluate cause of self-care deficits   - Assess range of motion  - Assess patient's mobility  - Assess patient's need for assistive devices and provide as appropriate  - Encourage maximum independence but intervene and supervise when necessary  - Involve family in performance of ADLs  - Assess for home care needs following discharge   - Consider OT consult to assist with ADL evaluation and planning for discharge  - Provide patient education as appropriate  Outcome: Progressing  Goal: Maintain proper alignment of affected body part  Description  INTERVENTIONS:  - Support, maintain and protect limb and body alignment  - Provide patient/ family with appropriate education  Outcome: Progressing     Problem: SAFETY ADULT  Goal: Patient will remain free of falls  Description  INTERVENTIONS:  - Assess patient frequently for physical needs  -  Identify cognitive and physical deficits and behaviors that affect risk of falls    -  [place] fall precautions as indicated by assessment   - Educate patient/family on patient safety including physical limitations  - Instruct patient to call for assistance with activity based on assessment  - Modify environment to reduce risk of injury  - Consider OT/PT consult to assist with strengthening/mobility  Outcome: Progressing  Goal: Maintain or return to baseline ADL function  Description  INTERVENTIONS:  -  Assess patient's ability to carry out ADLs; assess patient's baseline for ADL function and identify physical deficits which impact ability to perform ADLs (bathing, care of mouth/teeth, toileting, grooming, dressing, etc )  - Assess/evaluate cause of self-care deficits   - Assess range of motion  - Assess patient's mobility; develop plan if impaired  - Assess patient's need for assistive devices and provide as appropriate  - Encourage maximum independence but intervene and supervise when necessary  - Involve family in performance of ADLs  - Assess for home care needs following discharge   - Consider OT consult to assist with ADL evaluation and planning for discharge  - Provide patient education as appropriate  Outcome: Progressing     Problem: Nutrition/Hydration-ADULT  Goal: Nutrient/Hydration intake appropriate for improving, restoring or maintaining nutritional needs  Description  Monitor and assess patient's nutrition/hydration status for malnutrition  Collaborate with interdisciplinary team and initiate plan and interventions as ordered  Monitor patient's weight and dietary intake as ordered or per policy  Utilize nutrition screening tool and intervene as necessary  Determine patient's food preferences and provide high-protein, high-caloric foods as appropriate       INTERVENTIONS:  - Monitor oral intake, urinary output, labs, and treatment plans  - Assess nutrition and hydration status and recommend course of action  - Evaluate amount of meals eaten  - Assist patient with eating if necessary   - Allow adequate time for meals  - Recommend/ encourage appropriate diets, oral nutritional supplements, and vitamin/mineral supplements  - Order, calculate, and assess calorie counts as needed  - Assess need for intravenous fluids  - Provide specific nutrition/hydration education as appropriate  - Include patient/family/caregiver in decisions related to nutrition   Outcome: Progressing     Problem: Prexisting or High Potential for Compromised Skin Integrity  Goal: Skin integrity is maintained or improved  Description  INTERVENTIONS:  - Identify patients at risk for skin breakdown  - Assess and monitor skin integrity  - Assess and monitor nutrition and hydration status  - Monitor labs   - Assess for incontinence   - Turn and reposition patient  - Assist with mobility/ambulation  - Relieve pressure over bony prominences  - Avoid friction and shearing  - Provide appropriate hygiene as needed including keeping skin clean and dry  - Evaluate need for skin moisturizer/barrier cream  - Collaborate with interdisciplinary team   - Patient/family teaching  - Consider wound care consult   Outcome: Progressing     Problem: SAFETY,RESTRAINT: NV/NON-SELF DESTRUCTIVE BEHAVIOR  Goal: Remains free of harm/injury (restraint for non violent/non self-detsructive behavior)  Description  INTERVENTIONS:  - Instruct patient/family regarding restraint use   - Assess and monitor physiologic and psychological status   - Provide interventions and comfort measures to meet assessed patient needs   - Identify and implement measures to help patient regain control  - Assess readiness for release of restraint   Outcome: Progressing  Goal: Returns to optimal restraint-free functioning  Description  INTERVENTIONS:  - Assess the patient's behavior and symptoms that indicate continued need for restraint  - Identify and implement measures to help patient regain control  - Assess readiness for release of restraint   Outcome: Progressing     Problem: BEHAVIOR  Goal: Pt/Family maintain appropriate behavior and adhere to behavioral management agreement, if implemented  Description  INTERVENTIONS:  - Assess the family dynamic   - Encourage verbalization of thoughts and concerns in a socially appropriate manner  - Assess patient/family's coping skills and non-compliant behavior (including use of illegal substances)  - Utilize positive, consistent limit setting strategies supporting safety of patient, staff and others  - Initiate consult with Case Management, Spiritual Care or other ancillary services as appropriate  - If a patient's/visitor's behavior jeopardizes the safety of the patient, staff, or others, refer to organization procedure  - Notify Security of behavior or suspected illegal substances which indicate the need for search of the patient and/or belongings  - Encourage participation in the decision making process about a behavioral management agreement; implement if patient meets criteria  Outcome: Progressing     Problem: SELF HARM  Goal: Effect of psychiatric condition will be minimized and patient will be protected from self harm  Description  INTERVENTIONS:  - Assess impact of patient's symptoms on level of functioning, self-care needs and offer support as indicated  - Assess patient/family knowledge of depression, impact on illness and need for teaching  - Provide emotional support, presence and reassurance  - Assess for possible suicidal thoughts, ideation or self-harm  If patient expresses suicidal thoughts or statements do not leave alone, notify physician/AP immediately, initiate suicide precautions, and determine need for continual observation    - initiate consults and referrals as appropriate (a mental health professional, Spiritual Care  Outcome: Progressing

## 2019-09-08 NOTE — ASSESSMENT & PLAN NOTE
Continue to keep patient NPO including meds as per speech therapy  Will need to consider alternate forms of nutrition such as TPN, PEG tube placement

## 2019-09-08 NOTE — PROCEDURES
Central Line Insertion  Date/Time: 9/8/2019 5:18 PM  Performed by: NICOLE Teran  Authorized by: NICOLE Teran     Patient location:  Bedside  Other Assisting Provider: No    Consent:     Consent obtained:  Emergent situation  Universal protocol:     Relevant documents present and verified: yes      Test results available and properly labeled: yes      Radiology Images displayed and confirmed  If images not available, report reviewed: yes      Required blood products, implants, devices, and special equipment available: yes      Site/side marked: yes      Immediately prior to procedure, a time out was called: yes      Patient identity confirmed:  Hospital-assigned identification number and arm band  Pre-procedure details:     Skin preparation:  ChloraPrep  Indications:     Central line indications: medications requiring central line and no peripheral vascular access    Anesthesia (see MAR for exact dosages): Anesthesia method:  Local infiltration    Local anesthetic:  Lidocaine 1% w/o epi  Procedure details:     Location:  Right internal jugular    Vessel type: vein      Laterality:  Right    Approach: percutaneous technique used      Patient position:  Trendelenburg    Catheter type:  Triple lumen 16cm    Catheter size:  7 5 Fr    Landmarks identified: yes      Number of attempts:  1    Successful placement: yes    Post-procedure details:     Post-procedure:  Dressing applied and line sutured    Assessment:  Blood return through all ports, no pneumothorax on x-ray, placement verified by x-ray and free fluid flow    Patient tolerance of procedure:   Tolerated well, no immediate complications

## 2019-09-08 NOTE — ASSESSMENT & PLAN NOTE
Patient's current creatinine is around his baseline  Current creatinine stable at 1 98  Continue to monitor renal function and BMP daily  Avoid nephrotoxins, NSAIDs, anemia, hypotension

## 2019-09-09 NOTE — SOCIAL WORK
Spoke with Ino Jackson from [facility]; he is aware of pt's continued decline this past weekend  He is contacting [address] to [employer] for emergency guardianship  GREGG left  for pt's landlord at [facility] to update her on pt's situation and again discuss if she know of anyone who may be willing to act as health care agent under these new circumstances  CM following

## 2019-09-09 NOTE — TREATMENT PLAN
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  His dramatic escalation of WBCs from 40Ks to 80Ks has occurred in the setting of DECREASED steroid dosing  A WBC count elevated to this level is not consistent with infection, but rather malignancy  Concurrent with this dangerous development is an acute change in hemodynamic stability and decreased mental status  Per our Oncology colleague, we do not have flow cytometry results sufficient to consider chemotherapeutics  Prior to losing consciousness, the patient was deemed a poor decision-maker, and his only living relative so known to us deferred any decision making responsibilities  At this time, Dr Gail Rodriguez and I do feel that proceeding with emergency guardianship is the most appropriate legal and ethical step, while maintaining current therapies to prolong life  In my own professional judgment, the patient is actively dying from his leukemic process, and -- regardless of the legal decisional apparatus or implications -- escalations in his cares are medically non-beneficial and futile    Namely, the following cares would not serve to reduce his WBC counts nor change his shock phenomenon, and so we suggest that these cares not be offered nor undertaken:   - TPN/PPN   - artificial enteral feeding with an invasive device (PEG/OG)   - chest compressions, electrocardioversion   - prolonged intubation/trach  (current ETT should be maintained for now, but discontinued no later than 9/18/19)   - escalation in pressor therapy (current pressors should be maintained for now)   - blood product transfusion   - antibiotics        In my professional judgment, this pt is more appropriate for hospice care, and it seems most ethically appropriate and medically indicated to transition to a DNAR 2 code status, with use of opioids for cancer pain, and to maintain pressor support until death, or an emergency guardian allows for use of comfort cares explicitly  Bebeto Mitchell MD  Palliative and Supportive Care  Clinic/Answering Service: [phone]  You can find me on [employer]!

## 2019-09-09 NOTE — OCCUPATIONAL THERAPY NOTE
Occupational Therapy Cancellation Note        Patient Name: Madie Bertrand  Today's Date: 9/9/2019    Pt is currently intubated and not medically appropriate  Will continue to follow to address OT POC as appropriate      Rodolfo Bennett MS, OTR/L

## 2019-09-09 NOTE — SPEECH THERAPY NOTE
Speech Language/Pathology  Pt intubated, will place on hold and cancel order  Reconsult when/if appropriate

## 2019-09-09 NOTE — PHYSICAL THERAPY NOTE
PHYSICAL THERAPY NOTE          Patient Name: Chio Coffman  Today's Date: 9/9/2019     Pt currently intubated  Will continue to follow as medically appropriate   Gerry Lopez PT

## 2019-09-09 NOTE — SOCIAL WORK
Spoke with  Catie Bernard who has accepted the assignment for emergency guardianship  Terrell Neigh will be Wednesday September 18 at [facility]  Dr Masood Lazaro completed Expert Report for the courts and it was forward to Catie Bernard CM following

## 2019-09-09 NOTE — PROGRESS NOTES
Progress Note - Critical Care   Jorge Luis Koch  76 y o  male MRN: [id number]  Unit/Bed#: ICU 11 Encounter: [id number]    Assessment/Plan:  1  Acute hypoxic respiratory failure secondary to mucus plugging  · Will continue with aggressive pulmonary toileting for now  · He may require bronchoscopy  2  Acute metabolic encephalopathy likely multifactorial secondary to drug effect plus hypoxemia  · Concerned that this was related to Hydrea toxicity  · We will monitor his mental status closely  · Will get a CT scan of the head given his progressive decline in his mental status associated with fluctuations in his blood pressure  3  Persistent hypotension in the setting of 1    · We will continue him on Levophed for now  · May benefit from some additional fluid administration  · Our goal will be to maintain his mean arterial pressure greater than 65  4  Acute kidney injury on chronic kidney disease stage 3  · We will continue to monitor his renal indices and urine output closely  5  Myelodysplastic syndrome, questionable conversion to AML  · Management per oncology is recommendations  6  Intermittent episodes of SVT-improved  · Currently on amiodarone  7  Hypokalemia-this was repleted    Critical Care Time:   Documented critical care time excludes any procedures documented elsewhere  It also excludes any family updates    _____________________________________________________________________    HPI/24hr events: The patient has had labile blood pressures and his mental status has declined overnight      Medications:    Current Facility-Administered Medications:  acetaminophen 650 mg Rectal Q4H PRN Kevyn Menchaca MD    acetaminophen 650 mg Oral Q6H PRN Kevyn Menchaca MD    amiodarone 0 5 mg/min Intravenous Continuous Kevyn Menchaca MD Last Rate: 0 5 mg/min (09/08/19 2000)   benzonatate 100 mg Oral TID PRN Kevyn Menchaca MD    chlorhexidine 15 mL Swish & Spit Q12H [facility] NICOLE Murray    dextran 70-hypromellose 1 drop Both Eyes PRN Jude Barcenas MD    diazepam 5 mg Oral HS PRN Jude Barcenas MD    furosemide 40 mg Intravenous BID (diuretic) Jude Barcenas MD    haloperidol lactate 2 5 mg Intramuscular Q6H PRN Jude Barcenas MD    HYDROmorphone 0 2 mg Intravenous Q4H PRN Jude Barcenas MD    insulin lispro 1-6 Units Subcutaneous Q6H [facility] Gustavo Pinto MD    insulin regular (HumuLIN R,NovoLIN R) infusion 0 3-21 Units/hr Intravenous Titrated Chelita Zena, CRNP Last Rate: 3 Units/hr (09/09/19 0555)   ipratropium-albuterol 3 mL Nebulization TID PRN Jude Barcenas MD    lidocaine 1 patch Topical Daily Gustavo Pinto MD    methocarbamol 500 mg Oral Q6H PRN Jude Barcenas MD    methylPREDNISolone sodium succinate 20 mg Intravenous Daily Gustavo Pinto MD    metoprolol 5 mg Intravenous Q6H Okaranza Pinto MD    norepinephrine 1-30 mcg/min Intravenous Titrated Alana Clause, CRNP Last Rate: 18 mcg/min (09/09/19 0625)   OLANZapine 2 5 mg Intramuscular Q8H PRN Gustavo Pinto MD    ondansetron 4 mg Intravenous Q4H PRRIANA Barcenas MD    oxyCODONE 10 mg Oral Q4H PRN Jude Barcenas MD    pantoprazole 40 mg Intravenous Q24H [facility] Gustavo Pinto MD    potassium chloride 40 mEq Intravenous Once Chelita Zena, CRNP Last Rate: 40 mEq (09/09/19 0324)   propofol 5-50 mcg/kg/min Intravenous Titrated Chelita Zena, CRNP Last Rate: Stopped (09/08/19 2348)   traMADol 50 mg Oral Q8H PRN Jude Barcenas MD          amiodarone 0 5 mg/min Last Rate: 0 5 mg/min (09/08/19 2000)   insulin regular (HumuLIN R,NovoLIN R) infusion 0 3-21 Units/hr Last Rate: 3 Units/hr (09/09/19 0555)   norepinephrine 1-30 mcg/min Last Rate: 18 mcg/min (09/09/19 0625)   propofol 5-50 mcg/kg/min Last Rate: Stopped (09/08/19 3503)         Physical exam:  Vitals: Body mass index is 26 19 kg/m²  Blood pressure 143/80, pulse 102, temperature 98 3 °F (36 8 °C), resp  rate (!) 34, weight 87 6 kg (193 lb 2 oz), SpO2 96 %  ,  Temp  Min: 95 5 °F (35 3 °C)  Max: 102 7 °F (39 3 °C)  IBW: -88 kg    SpO2: 96 %  SpO2 Activity: At Rest  O2 Device: Other (comment)(Vent)      Intake/Output Summary (Last 24 hours) at 9/9/2019 0634  Last data filed at 9/9/2019 0557  Gross per 24 hour   Intake 857 36 ml   Output 927 ml   Net -69 64 ml       Invasive/non-invasive ventilation settings:   Respiratory    Lab Data (Last 4 hours)    None         O2/Vent Data (Last 4 hours)    None              Invasive Devices     Central Venous Catheter Line            CVC Central Lines 09/08/19 Triple 16cm less than 1 day          Peripheral Intravenous Line            Peripheral IV 09/08/19 Dorsal (posterior); Right Hand less than 1 day          Arterial Line            Arterial Line 09/08/19 Left Radial less than 1 day          Drain            Urethral Catheter Latex 16 Fr  2 days    NG/OG/Enteral Tube Orogastric 18 Fr Left mouth less than 1 day          Airway            ETT  Hi-Lo 8 mm less than 1 day                  Physical Exam:  Gen:  Minimally responsive to even painful stimuli  HEENT:  Pupils are equal round reactive to light  The oropharynx is without erythema but is intubated  Neck:  Supple negative for lymphadenopathy  Chest:  Diminished in the bases bilaterally  Cor:  Regular rate and rhythm  Abd:  Soft and nontender  Ext:  There is ischemia to the rate great toe  There is no edema clubbing or cyanosis  Neuro:  Minimally responsive to even painful stimuli  Skin:  Warm and dry      Diagnostic Data:  Lab: I have personally reviewed pertinent lab results     CBC:   Results from last 7 days   Lab Units 09/09/19  0537 09/09/19  0018 09/08/19  0929   WBC Thousand/uL 87 61* 84 14* 88 55*   HEMOGLOBIN g/dL 8 2* 8 7* 8 2*   HEMATOCRIT % 26 7* 28 9* 27 1*   PLATELETS Thousands/uL 18* 23* 26*       CMP:   Results from last 7 days   Lab Units 09/09/19  0018 09/08/19  0738 09/07/19  0449   SODIUM mmol/L 149* 144 145   POTASSIUM mmol/L 2 6* 4 4 3 0*   CHLORIDE mmol/L 107 103 103   CO2 mmol/L 28 28 29   BUN mg/dL 83* 64* 46*   CREATININE mg/dL 2 83* 1 98* 1 85*   CALCIUM mg/dL 9 0 9 2 9 0     PT/INR:   No results found for: PT, INR,   Magnesium:   Results from last 7 days   Lab Units 09/08/19  0738 09/07/19  0449 09/04/19  1417   MAGNESIUM mg/dL 1 7 1 3* 1 7     Phosphorous:       Microbiology:        Imaging:  CT of head is pending    Cardiac lab/EKG/telemetry/ECHO:       VTE Prophylaxis:  SCDs    Code Status: Level 1 - Full Code    NICOLE Wyatt    Portions of the record may have been created with voice recognition software  Occasional wrong word or "sound a like" substitutions may have occurred due to the inherent limitations of voice recognition software  Read the chart carefully and recognize, using context, where substitutions have occurred

## 2019-09-10 PROBLEM — N17.9 AKI (ACUTE KIDNEY INJURY) (HCC): Status: RESOLVED | Noted: 2018-05-30 | Resolved: 2019-01-01

## 2019-09-10 PROBLEM — IMO0001 TRANSITION OF CARE PERFORMED WITH SHARING OF CLINICAL SUMMARY: Status: RESOLVED | Noted: 2018-04-25 | Resolved: 2019-01-01

## 2019-09-10 NOTE — UTILIZATION REVIEW
Continued Stay Review    Date:    9/10/2019                          Current Patient Class:    INPATIENT  Current Level of Care:   MED SURG    HPI:68 y o  male initially admitted on    8/22/2019     Assessment/Plan:   Pt  Cont to req  ICU LOC  D/T  Remaining on vent for airway protection as  He continues with metabolic encephalopathy  Cont  to closely  Monitor  Neuro status,   Amiodarone  Drip  Cont  Oncology  Now  Consulted  For MDS with transformation to  AML  Hypotension  req  levphed  And salvador synephrine  Support  Had an episode of  Afib    ( 11-7    9/10)    And  Amiodarone d rip increased        Pertinent Labs/Diagnostic Results:   Lab Units 09/10/19  0446   WBC Thousand/uL 65 28*   HEMOGLOBIN g/dL 6 7*   HEMATOCRIT % 22 4*   PLATELETS Thousands/uL 13*   NEUTROS ABS Thousands/µL  --    BANDS PCT %  --          Lab Units 09/10/19  0446   SODIUM mmol/L 156*   POTASSIUM mmol/L 4 2   CHLORIDE mmol/L 115*   CO2 mmol/L 24   ANION GAP mmol/L 17*   BUN mg/dL 111*   CREATININE mg/dL 4 50*   EGFR ml/min/1 73sq m 12   CALCIUM mg/dL 8 6   MAGNESIUM mg/dL  --      Results from last 7 days   Lab Units 09/10/19  0446   AST U/L 432*   ALT U/L 97*   ALK PHOS U/L 83   TOTAL PROTEIN g/dL 6 8   ALBUMIN g/dL 2 6*   TOTAL BILIRUBIN mg/dL 1 40*               Vital Signs:   /10/19 1230    132Abnormal   32Abnormal       102/58  72 mmHg  96 %       09/10/19 1200    138Abnormal   36Abnormal       100/48  64 mmHg  97 %       09/10/19 1130    134Abnormal   36Abnormal       104/50  68 mmHg  96 %       09/10/19 1113  100 2 °F (37 9 °C)                     09/10/19 1100    136Abnormal   31Abnormal       98/50  64 mmHg  96 %       09/10/19 1030    132Abnormal   35Abnormal       108/52  70 mmHg  96 %       09/10/19 1000    132Abnormal   29Abnormal       106/50  66 mmHg  97 %       09/10/19 0930    80  27Abnormal       108/42  62 mmHg  95 %             Medications:   Scheduled Meds:   Current Facility-Administered Medications:  acetaminophen 650 mg Rectal Q4H PRN   amiodarone 1 mg/min Intravenous Continuous   chlorhexidine 15 mL Swish & Spit Q12H [facility]   dextran 70-hypromellose 1 drop Both Eyes PRN   dextrose 5 % and sodium chloride 0 45 % 100 mL/hr Intravenous Continuous   insulin lispro 1-6 Units Subcutaneous Q6H [facility]   insulin regular (HumuLIN R,NovoLIN R) infusion 0 3-21 Units/hr Intravenous Titrated   ipratropium-albuterol 3 mL Nebulization TID PRN   lidocaine 1 patch Topical Daily   methylPREDNISolone sodium succinate 20 mg Intravenous Q8H [facility]   norepinephrine 1-30 mcg/min Intravenous Titrated   ondansetron 4 mg Intravenous Q4H PRN   pantoprazole 40 mg Intravenous Q24H [facility]   vasopressin (PITRESSIN) in 0 9 % sodium chloride 100 mL 0 03 Units/min Intravenous Continuous       Discharge Plan:    D    Network Utilization Review Department  Phone: [phone]; Fax [phone]  [email or web address]  ATTENTION: Please call with any questions or concerns to [phone]  and carefully listen to the prompts so that you are directed to the right person  Send all requests for admission clinical reviews, approved or denied determinations and any other requests to fax [phone]   All voicemails are confidential

## 2019-09-10 NOTE — PLAN OF CARE
Problem: INFECTION - ADULT  Goal: Absence of fever/infection during neutropenic period  Description  INTERVENTIONS:  - Monitor WBC    Outcome: Progressing     Problem: METABOLIC, FLUID AND ELECTROLYTES - ADULT  Goal: Electrolytes maintained within normal limits  Description  INTERVENTIONS:  - Monitor labs and assess patient for signs and symptoms of electrolyte imbalances  - Administer electrolyte replacement as ordered  - Monitor response to electrolyte replacements, including repeat lab results as appropriate  - Instruct patient on fluid and nutrition as appropriate  Outcome: Progressing  Goal: Fluid balance maintained  Description  INTERVENTIONS:  - Monitor labs   - Monitor I/O and WT  - Instruct patient on fluid and nutrition as appropriate  - Assess for signs & symptoms of volume excess or deficit  Outcome: Progressing  Goal: Glucose maintained within target range  Description  INTERVENTIONS:  - Monitor Blood Glucose as ordered  - Assess for signs and symptoms of hyperglycemia and hypoglycemia  - Administer ordered medications to maintain glucose within target range  - Assess nutritional intake and initiate nutrition service referral as needed  Outcome: Progressing     Problem: Prexisting or High Potential for Compromised Skin Integrity  Goal: Skin integrity is maintained or improved  Description  INTERVENTIONS:  - Identify patients at risk for skin breakdown  - Assess and monitor skin integrity  - Assess and monitor nutrition and hydration status  - Monitor labs   - Assess for incontinence   - Turn and reposition patient  - Assist with mobility/ambulation  - Relieve pressure over bony prominences  - Avoid friction and shearing  - Provide appropriate hygiene as needed including keeping skin clean and dry  - Evaluate need for skin moisturizer/barrier cream  - Collaborate with interdisciplinary team   - Patient/family teaching  - Consider wound care consult   Outcome: Progressing     Problem: Potential for Falls  Goal: Patient will remain free of falls  Description  INTERVENTIONS:  - Assess patient frequently for physical needs  -  Identify cognitive and physical deficits and behaviors that affect risk of falls  -  [place] fall precautions as indicated by assessment   - Educate patient/family on patient safety including physical limitations  - Instruct patient to call for assistance with activity based on assessment  - Modify environment to reduce risk of injury  - Consider OT/PT consult to assist with strengthening/mobility  Outcome: Not Progressing     Problem: DISCHARGE PLANNING  Goal: Discharge to home or other facility with appropriate resources  Description  INTERVENTIONS:  - Identify barriers to discharge w/patient and caregiver  - Arrange for needed discharge resources and transportation as appropriate  - Identify discharge learning needs (meds, wound care, etc )  - Arrange for interpretive services to assist at discharge as needed  - Refer to Case Management Department for coordinating discharge planning if the patient needs post-hospital services based on physician/advanced practitioner order or complex needs related to functional status, cognitive ability, or social support system  Outcome: Not Progressing     Problem: Knowledge Deficit  Goal: Patient/family/caregiver demonstrates understanding of disease process, treatment plan, medications, and discharge instructions  Description  Complete learning assessment and assess knowledge base    Interventions:  - Provide teaching at level of understanding  - Provide teaching via preferred learning methods  Outcome: Not Progressing     Problem: RESPIRATORY - ADULT  Goal: Achieves optimal ventilation and oxygenation  Description  INTERVENTIONS:  - Assess for changes in respiratory status  - Assess for changes in mentation and behavior  - Position to facilitate oxygenation and minimize respiratory effort  - Oxygen administered by appropriate delivery if ordered  - Initiate smoking cessation education as indicated  - Encourage broncho-pulmonary hygiene including cough, deep breathe, Incentive Spirometry  - Assess the need for suctioning and aspirate as needed  - Assess and instruct to report SOB or any respiratory difficulty  - Respiratory Therapy support as indicated  Outcome: Not Progressing     Problem: SKIN/TISSUE INTEGRITY - ADULT  Goal: Skin integrity remains intact  Description  INTERVENTIONS  - Identify patients at risk for skin breakdown  - Assess and monitor skin integrity  - Assess and monitor nutrition and hydration status  - Monitor labs (i e  albumin)  - Assess for incontinence   - Turn and reposition patient  - Assist with mobility/ambulation  - Relieve pressure over bony prominences  - Avoid friction and shearing  - Provide appropriate hygiene as needed including keeping skin clean and dry  - Evaluate need for skin moisturizer/barrier cream  - Collaborate with interdisciplinary team (i e  Nutrition, Rehabilitation, etc )   - Patient/family teaching  Outcome: Not Progressing  Goal: Incision(s), wounds(s) or drain site(s) healing without S/S of infection  Description  INTERVENTIONS  - Assess and document risk factors for skin impairment   - Assess and document dressing, incision, wound bed, drain sites and surrounding tissue  - Consider nutrition services referral as needed  - Oral mucous membranes remain intact  - Provide patient/ family education  Outcome: Not Progressing     Problem: NEUROSENSORY - ADULT  Goal: Achieves stable or improved neurological status  Description  INTERVENTIONS  - Monitor and report changes in neurological status  - Monitor vital signs such as temperature, blood pressure, glucose, and any other labs ordered   - Initiate measures to prevent increased intracranial pressure  - Monitor for seizure activity and implement precautions if appropriate      Outcome: Not Progressing  Goal: Achieves maximal functionality and self care  Description  INTERVENTIONS  - Monitor swallowing and airway patency with patient fatigue and changes in neurological status  - Encourage and assist patient to increase activity and self care     - Encourage visually impaired, hearing impaired and aphasic patients to use assistive/communication devices  Outcome: Not Progressing     Problem: GENITOURINARY - ADULT  Goal: Maintains or returns to baseline urinary function  Description  INTERVENTIONS:  - Assess urinary function  - Encourage oral fluids to ensure adequate hydration if ordered  - Administer IV fluids as ordered to ensure adequate hydration  - Administer ordered medications as needed  - Offer frequent toileting  - Follow urinary retention protocol if ordered  Outcome: Not Progressing  Goal: Absence of urinary retention  Description  INTERVENTIONS:  - Assess patients ability to void and empty bladder  - Monitor I/O  - Bladder scan as needed  - Discuss with physician/AP medications to alleviate retention as needed  - Discuss catheterization for long term situations as appropriate  Outcome: Not Progressing     Problem: HEMATOLOGIC - ADULT  Goal: Maintains hematologic stability  Description  INTERVENTIONS  - Assess for signs and symptoms of bleeding or hemorrhage  - Monitor labs  - Administer supportive blood products/factors as ordered and appropriate  Outcome: Not Progressing     Problem: MUSCULOSKELETAL - ADULT  Goal: Maintain or return mobility to safest level of function  Description  INTERVENTIONS:  - Assess patient's ability to carry out ADLs; assess patient's baseline for ADL function and identify physical deficits which impact ability to perform ADLs (bathing, care of mouth/teeth, toileting, grooming, dressing, etc )  - Assess/evaluate cause of self-care deficits   - Assess range of motion  - Assess patient's mobility  - Assess patient's need for assistive devices and provide as appropriate  - Encourage maximum independence but intervene and supervise when necessary  - Involve family in performance of ADLs  - Assess for home care needs following discharge   - Consider OT consult to assist with ADL evaluation and planning for discharge  - Provide patient education as appropriate  Outcome: Not Progressing     Problem: SAFETY ADULT  Goal: Patient will remain free of falls  Description  INTERVENTIONS:  - Assess patient frequently for physical needs  -  Identify cognitive and physical deficits and behaviors that affect risk of falls  -  [place] fall precautions as indicated by assessment   - Educate patient/family on patient safety including physical limitations  - Instruct patient to call for assistance with activity based on assessment  - Modify environment to reduce risk of injury  - Consider OT/PT consult to assist with strengthening/mobility  Outcome: Not Progressing     Problem: Nutrition/Hydration-ADULT  Goal: Nutrient/Hydration intake appropriate for improving, restoring or maintaining nutritional needs  Description  Monitor and assess patient's nutrition/hydration status for malnutrition  Collaborate with interdisciplinary team and initiate plan and interventions as ordered  Monitor patient's weight and dietary intake as ordered or per policy  Utilize nutrition screening tool and intervene as necessary  Determine patient's food preferences and provide high-protein, high-caloric foods as appropriate       INTERVENTIONS:  - Monitor oral intake, urinary output, labs, and treatment plans  - Assess nutrition and hydration status and recommend course of action  - Evaluate amount of meals eaten  - Assist patient with eating if necessary   - Allow adequate time for meals  - Recommend/ encourage appropriate diets, oral nutritional supplements, and vitamin/mineral supplements  - Order, calculate, and assess calorie counts as needed  - Assess need for intravenous fluids  - Provide specific nutrition/hydration education as appropriate  - Include patient/family/caregiver in decisions related to nutrition   Outcome: Not Progressing     Problem: PAIN - ADULT  Goal: Verbalizes/displays adequate comfort level or baseline comfort level  Description  Interventions:  - Encourage patient to monitor pain and request assistance  - Assess pain using appropriate pain scale  - Administer analgesics based on type and severity of pain and evaluate response  - Implement non-pharmacological measures as appropriate and evaluate response  - Consider cultural and social influences on pain and pain management  - Notify physician/advanced practitioner if interventions unsuccessful or patient reports new pain  Outcome: Completed     Problem: INFECTION - ADULT  Goal: Absence or prevention of progression during hospitalization  Description  INTERVENTIONS:  - Assess and monitor for signs and symptoms of infection  - Monitor lab/diagnostic results  - Monitor all insertion sites, i e  indwelling lines, tubes, and drains  - Monitor endotracheal if appropriate and nasal secretions for changes in amount and color  - [place] appropriate cooling/warming therapies per order  - Administer medications as ordered  - Instruct and encourage patient and family to use good hand hygiene technique  - Identify and instruct in appropriate isolation precautions for identified infection/condition  Outcome: Completed     Problem: NEUROSENSORY - ADULT  Goal: Remains free of injury related to seizures activity  Description  INTERVENTIONS  - Maintain airway, patient safety  and administer oxygen as ordered  - Monitor patient for seizure activity, document and report duration and description of seizure to physician/advanced practitioner  - If seizure occurs,  ensure patient safety during seizure  - Reorient patient post seizure  - Seizure pads on all 4 side rails  - Instruct patient/family to notify RN of any seizure activity including if an aura is experienced  - Instruct patient/family to call for assistance with activity based on nursing assessment  - Administer anti-seizure medications if ordered    Outcome: Completed     Problem: MUSCULOSKELETAL - ADULT  Goal: Maintain proper alignment of affected body part  Description  INTERVENTIONS:  - Support, maintain and protect limb and body alignment  - Provide patient/ family with appropriate education  Outcome: Completed     Problem: SAFETY,RESTRAINT: NV/NON-SELF DESTRUCTIVE BEHAVIOR  Goal: Remains free of harm/injury (restraint for non violent/non self-detsructive behavior)  Description  INTERVENTIONS:  - Instruct patient/family regarding restraint use   - Assess and monitor physiologic and psychological status   - Provide interventions and comfort measures to meet assessed patient needs   - Identify and implement measures to help patient regain control  - Assess readiness for release of restraint   Outcome: Completed  Goal: Returns to optimal restraint-free functioning  Description  INTERVENTIONS:  - Assess the patient's behavior and symptoms that indicate continued need for restraint  - Identify and implement measures to help patient regain control  - Assess readiness for release of restraint   Outcome: Completed     Problem: BEHAVIOR  Goal: Pt/Family maintain appropriate behavior and adhere to behavioral management agreement, if implemented  Description  INTERVENTIONS:  - Assess the family dynamic   - Encourage verbalization of thoughts and concerns in a socially appropriate manner  - Assess patient/family's coping skills and non-compliant behavior (including use of illegal substances)  - Utilize positive, consistent limit setting strategies supporting safety of patient, staff and others  - Initiate consult with Case Management, Spiritual Care or other ancillary services as appropriate  - If a patient's/visitor's behavior jeopardizes the safety of the patient, staff, or others, refer to organization procedure     - Notify Security of behavior or suspected illegal substances which indicate the need for search of the patient and/or belongings  - Encourage participation in the decision making process about a behavioral management agreement; implement if patient meets criteria  Outcome: Completed     Problem: SELF HARM  Goal: Effect of psychiatric condition will be minimized and patient will be protected from self harm  Description  INTERVENTIONS:  - Assess impact of patient's symptoms on level of functioning, self-care needs and offer support as indicated  - Assess patient/family knowledge of depression, impact on illness and need for teaching  - Provide emotional support, presence and reassurance  - Assess for possible suicidal thoughts, ideation or self-harm  If patient expresses suicidal thoughts or statements do not leave alone, notify physician/AP immediately, initiate suicide precautions, and determine need for continual observation    - initiate consults and referrals as appropriate (a mental health professional, Spiritual Care  Outcome: Completed
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
## 2019-09-10 NOTE — PROGRESS NOTES
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  · Maintain on mechanical ventilation for airway protection as continues with metabolic encephalopathy  2  Acute metabolic encephalopathy   · Monitor neurological exam closely  · Likely due to leukocytic encephalopathy  3  MDS with transformation to AML  · Palliative care involved and recommended no further escalation of care  · Will consult oncology  · Pending oncology will decide plan of care  If a candidate for chemotherapy will continue to treat patient  · Ethics on board, plan for guardianship next Wednesday  · Continue on steroids  4  Acute kidney injury on chronic kidney disease stage III  · Creatinine significant worse today with decreased urine output  · Will discuss with oncology before idea of dialysis  · Will speak with palliative regarding this  5  Atrial fibrillation with RVR  · On amiodarone infusion at 1 mg  6  Hypotension  · Requiring levophed and salvador synephrine support  7  Anemia and thrombocytopenia due to MDS  · Will obtain DIC panel  · Will speak with oncologist regarding plan for chemotherapy prior to transfusions  8  Hypernatremia  · Changed fluids from LR to d5 1/2 NS  · If sodium continues to rise will start on free water flushes    _____________________________________________________________________    HPI/24hr events:   Overnight still maintains on pressors   Had episode of rapid atrial fibrillation requiring increase in amiodarone to 1 mg    Medications:    Current Facility-Administered Medications:  acetaminophen 650 mg Rectal Q4H PRN Sohail Marquez MD    amiodarone 1 mg/min Intravenous Continuous Alisha NICOLE Arenas Last Rate: 1 mg/min (09/10/19 1000)   chlorhexidine 15 mL Swish & Spit Q12H [address] NICOLE Greene    dextran 70-hypromellose 1 drop Both Eyes PRN Jackie Bales MD    dextrose 5 % and sodium chloride 0 45 % 100 mL/hr Intravenous Continuous NICOLE Sheets Last Rate: 100 mL/hr (09/10/19 1001)   insulin lispro 1-6 Units Subcutaneous Q6H [address] Gustavo Pinto MD    insulin regular (HumuLIN R,NovoLIN R) infusion 0 3-21 Units/hr Intravenous Titrated NICOLE Boyd Last Rate: 4 Units/hr (09/10/19 1013)   ipratropium-albuterol 3 mL Nebulization TID PRN Jackie Bales MD    lidocaine 1 patch Topical Daily Jackie Bales MD    methylPREDNISolone sodium succinate 20 mg Intravenous Q8H [address] NICOLE Don    norepinephrine 1-30 mcg/min Intravenous Titrated Caleb Park CRNP Last Rate: 10 mcg/min (09/10/19 1000)   ondansetron 4 mg Intravenous Q4H PRN Jackie Bales MD    pantoprazole 40 mg Intravenous Q24H [address] Gustavo Pinto MD    vasopressin (PITRESSIN) in 0 9 % sodium chloride 100 mL 0 03 Units/min Intravenous Continuous NICOLE Alvarado Last Rate: 0 03 Units/min (09/10/19 1000)         amiodarone 1 mg/min Last Rate: 1 mg/min (09/10/19 1000)   dextrose 5 % and sodium chloride 0 45 % 100 mL/hr Last Rate: 100 mL/hr (09/10/19 1001)   insulin regular (HumuLIN R,NovoLIN R) infusion 0 3-21 Units/hr Last Rate: 4 Units/hr (09/10/19 1013)   norepinephrine 1-30 mcg/min Last Rate: 10 mcg/min (09/10/19 1000)   vasopressin (PITRESSIN) in 0 9 % sodium chloride 100 mL 0 03 Units/min Last Rate: 0 03 Units/min (09/10/19 1000)         Physical exam:  Vitals: Body mass index is 26 4 kg/m²  Blood pressure 143/80, pulse (!) 132, temperature 100 2 °F (37 9 °C), temperature source Temporal, resp  rate (!) 29, weight 88 3 kg (194 lb 10 7 oz), SpO2 97 %  ,  Temp  Min: 95 5 °F (35 3 °C)  Max: 102 7 °F (39 3 °C)  IBW: -88 kg    SpO2: 97 %  SpO2 Activity: At Rest  O2 Device: Other (comment)(Vent)      Intake/Output Summary (Last 24 hours) at 9/10/2019 1136  Last data filed at 9/10/2019 1013  Gross per 24 hour   Intake 5310 97 ml   Output 623 ml   Net 4687 97 ml Invasive/non-invasive ventilation settings:   Respiratory    Lab Data (Last 4 hours)    None         O2/Vent Data (Last 4 hours)      09/10 1130           Vent Mode AC/VC       Resp Rate (BPM) (BPM) 14       Vt (mL) (mL) 450       FIO2 (%) (%) 40       PEEP (cmH2O) (cmH2O) 5       MV 16 1                 Invasive Devices     Central Venous Catheter Line            CVC Central Lines 09/08/19 Triple 16cm 1 day          Peripheral Intravenous Line            Peripheral IV 09/08/19 Dorsal (posterior); Right Hand 2 days          Arterial Line            Arterial Line 09/08/19 Left Radial 1 day          Drain            Urethral Catheter Latex 16 Fr  3 days    NG/OG/Enteral Tube Orogastric 18 Fr Left mouth 1 day          Airway            ETT  Hi-Lo 8 mm 1 day                  Physical Exam:  Gen: Unresponsive  HEENT:  PERRL, normocephalic, atraumatic, o/p clear  Neck:  Supple, no JVD, no adenopathy  Chest:  Coarse breath sounds, small ET secretions  Cor:  Irregular, no murmurs, rubs, or gallops  Abd:  Soft, non-tender, non-distended, bowel sounds present  Ext:  W/d to painful stimuli, no edema  Neuro:  W/d to painful stimuli, no spontaneous eye opening   Skin:  Warm, dry, ecchymosis bilateral toes and left arm      Diagnostic Data:  Lab: I have personally reviewed pertinent lab results     CBC:   Results from last 7 days   Lab Units 09/10/19  0446 09/09/19  0537 09/09/19  0018   WBC Thousand/uL 65 28* 87 61* 84 14*   HEMOGLOBIN g/dL 6 7* 8 2* 8 7*   HEMATOCRIT % 22 4* 26 7* 28 9*   PLATELETS Thousands/uL 13* 18* 23*       CMP:   Results from last 7 days   Lab Units 09/10/19  0446 09/09/19  0537 09/09/19  0018   SODIUM mmol/L 156* 152* 149*   POTASSIUM mmol/L 4 2 3 9 2 6*   CHLORIDE mmol/L 115* 109* 107   CO2 mmol/L 24 28 28   BUN mg/dL 111* 87* 83*   CREATININE mg/dL 4 50* 2 92* 2 83*   CALCIUM mg/dL 8 6 9 1 9 0   ALK PHOS U/L 83  --   --    ALT U/L 97*  --   --    AST U/L 432*  --   --      PT/INR:   No results found for: PT, INR,   Magnesium:   Results from last 7 days   Lab Units 09/08/19  0738 09/07/19  0449 09/04/19  1417   MAGNESIUM mg/dL 1 7 1 3* 1 7     Phosphorous:       Microbiology:        Imaging:  No new imaging    Cardiac lab/EKG/telemetry/ECHO:   A fib    VTE Prophylaxis: SCD's    Code Status: Level 2 - DNAR: but accepts endotracheal intubation    NICOLE Cassidy Sa    Portions of the record may have been created with voice recognition software  Occasional wrong word or "sound a like" substitutions may have occurred due to the inherent limitations of voice recognition software  Read the chart carefully and recognize, using context, where substitutions have occurred

## 2019-09-10 NOTE — PHYSICAL THERAPY NOTE
Physical Therapy Cancellation Note    PT Discontinue Order received  Will D/C PT per orders  Please advise PT when needs change   Tatiana Pac, PT

## 2019-09-11 LAB
ABO GROUP BLD BPU: NORMAL
ABO GROUP BLD BPU: NORMAL
BPU ID: NORMAL
BPU ID: NORMAL
CROSSMATCH: NORMAL
DEPRECATED AT III PPP: 51 % OF NORMAL (ref 92–136)
TPA PPP QL CHRO: 48 % OF NORMAL (ref 77–138)
UNIT DISPENSE STATUS: NORMAL
UNIT DISPENSE STATUS: NORMAL
UNIT PRODUCT CODE: NORMAL
UNIT PRODUCT CODE: NORMAL
UNIT RH: NORMAL
UNIT RH: NORMAL

## 2019-09-11 NOTE — DISCHARGE SUMMARY
Discharge Summary - Jorge Luis Koch  76 y o  male MRN: [id number]    Unit/Bed#: ICU 11 Encounter: [id number] PCP: Lila Pandey MD    Admission Date: 8/22/19  Admission Orders (From admission, onward)     Ordered        08/22/19 1319  Inpatient Admission  Once                     Admitting Diagnosis: Back pain [M54 9]  Leukocytosis [D72 829]  Anemia [D64 9]  Thrombocytopenia ([address] ) [D69 6]    HPI: Jorge Luis Koch  is a 76 y o  male with chronic back pain, uncontrolled diabetes and recently diagnosed myelodysplastic syndrome with bone marrow biopsy who presents with intractable back pain  The patient was found to meet SIRS criteria in the emergency department and blood cultures were obtained  The patient does report chills but was afebrile since his presentation to the emergency department  The patient denies nausea or vomiting, reports normal appetite, denies diarrhea or constipation  The patient denies shortness of breath or chest pain, he is a former smoker and states he has not smoked in years  The patient has recently required blood transfusions  The patient is a limited historian, however, his oriented x3  He denies confusion or memory problems  The patient denies bowel or urinary incontinence  He denies lower extremity weakness  He reports chronic lower extremity numbness which he attributes to his diabetes  Procedures Performed:   Orders Placed This Encounter   Procedures   [address]    Intubation       Summary of Hospital Course: The patient had a prolonged hospital course  He received multiple transfusions for his known recent diagnosis of myelodysplastic disease  He was admitted with intractable low back pain which was thought to be secondary to MDS  He was started on hydroxyurea however he became quite confused complaining of dizziness and fatigue which were thought to be related to the hydroxyurea said this was ultimately discontinued    In addition the patient was noted to have significant peripheral arterial disease with ischemia to the right great toe  He was evaluated by vascular surgery  They recommended continued local wound care in the setting of his profound thrombocytopenia  On  the patient was noted to have increasing confusion and lethargy associated with significant hypotension and inability per to protect his airway  He was ultimately intubated  His post intubation film showed significant mucous plugging of the left lung  Aggressive pulmonary toileting partially relieved this mucus plugging however he continued to have need of mechanical ventilation  His mental status continued to decline  A CT scan of the head was performed which did not reveal intracranial hemorrhage  His white blood cell count was markedly elevated with significant blast noticed which signified transformation of his MDS to possible AML  The patient was unable to make decisions on his own and his family was not willing to make decisions on his behalf  Conditions were placed for the possibility of making him a norman of the state given his inability for decision making  Ethics was consulted and deemed that the patient had an end-stage terminal illness  On September 10, 2018 the patient went into ventricular fibrillation and was noted to have no pulses and no respiratory effort  He was pronounced dead at 8:49 p m       Significant Findings, Care, Treatment and Services Provided:     Complications:  No apparent    Disposition:      Final Diagnosis:  Myelodysplastic syndrome    Resolved Problems  Date Reviewed: 2019    None          Condition at Time of Death:

## 2019-09-11 NOTE — SOCIAL WORK
Patient passed away last evening while still awaiting the guardianship hearing with [facility], which was scheduled for next Wednesday  CM called - as the patient is technically an unclaimed body as his brother declined to participate in any cares  Bacilio Medrano at the coroners office took information, she requested the brothers name and telephone number as she would like to reach out to him and possibly see if he would be willing to sign consents for the patient to either be donated to [facility] or to be sent to [employer] for burial      CM did make the coroners office aware that the brother had refused to participate in any cares while the pt was living, and that he was not notified of the death due to his unwillingness to be a participate in decisions  CM continuing to follow

## 2019-09-11 NOTE — DEATH NOTE
INPATIENT DEATH NOTE  Johnathan King  76 y o  male MRN: [id number]  Unit/Bed#: ICU 11 Encounter: [id number]         Patient's Information  Pronounced by: Neymar Herrmann  Did the patient's death occur in the ED?: No  Did the patient's death occur in the OR?: No  Did the patient's death occur less than 10 days post-op?: No  Did the patient's death occur within 24 hours of admission?: No  Was code status DNR at the time of death?: Yes    PHYSICAL EXAM:  Unresponsive to noxious stimuli, Spontaneous respirations absent, Breath sounds absent, Carotid pulse absent and Heart sounds absent    Medical Examiner notification criteria:  NONE APPLICABLE   Medical Examiner's office notified?:  Yes   Medical Examiner accepted case?:  No  Name of Medical Examiner: n/a         Autopsy Options:  Post-mortem examination declined by next of kin    Primary Service Attending Physician notified?:  yes - Attending:  Cedric Turner MD    Physician/Resident responsible for completing Discharge Summary:  Neymar Herrmann

## 2019-09-12 NOTE — SOCIAL WORK
9/12/2019:   CM reached out to the -Coroners office today, they have been unable to reach the brother and are still working on trying to reach any other family  CM notified them again that through out his entire admission, there was no family willing to make any decisions on behalf of the patient  The are still attempting to find any willing family to sign off on a dispo for this patients body

## 2019-09-13 NOTE — SOCIAL WORK
9/13/2019:    The  was able to get ahold of the brother who is agreeable to science care donation  Cm completed the intake and [employer] will reach out to him now  Awaiting to hear the final dispo from [facility]

## 2019-09-22 LAB — SCAN RESULT: NORMAL

## 2019-09-24 LAB — SCAN RESULT: NORMAL

## 2022-02-28 NOTE — ED NOTES
Patient picked up by Deysi Feliz, EYAL  02/02/19 1147 no decreased eating/drinking/no dizziness/no nausea/no tingling/no vomiting
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
## 2023-04-27 NOTE — ASSESSMENT & PLAN NOTE
"Referring Provider:    No referring provider defined for this encounter.  Subjective:   Patient: Naomy Stephens [id number], :2020   Visit date:2023 9:47 AM    Chief Complaint:  Follow-up (Pt has come in for a tube  check )    HPI:    Prior notes reviewed by myself.  Clinical documentation obtained by nursing staff reviewed.     2-year-old female presents for evaluation recent ear pain.  She has a history of pressure equalization tubes placed by Dr. Nancy Desir in .  Last week she complained of ear pain and she had fever for several days.  Her parents were concerned that she may have an issue with her ear tubes and requested a evaluation.  She is not had any recent otorrhea.  Today she is asymptomatic.      Objective:     Physical Exam:  Vitals:  Temp 97.9 °F (36.6 °C)   Ht 2' 10" (0.864 m)   Wt 15.8 kg (34 lb 13.3 oz)   BMI 21.19 kg/m²   General appearance:  Well developed, well nourished    Ears:  Otoscopy of external auditory canals and tympanic membranes was significant for patent /dry PETs, clinical speech reception thresholds grossly intact, no mass/lesion of auricle.    Nose:  No masses/lesions of external nose, nasal mucosa, septum, and turbinates were within normal limits.    Mouth:  No mass/lesion of lips, teeth, gums, hard/soft palate, tongue, tonsils, or oropharynx.    Neck & Lymphatics:  No cervical lymphadenopathy, no neck mass/crepitus/ asymmetry, trachea is midline, no thyroid enlargement/tenderness/mass.        [x]  Data Reviewed:    Lab Results   Component Value Date    HGB 12.6 2021             Assessment & Plan:   S/P tympanostomy tube placement    RAOM (recurrent acute otitis media)        Ear tubes are patent and dry.  No sign of any infection or other issue.  They are interested in swim plugs, so we will arrange an appointment for them to meet with audiology.  Return to clinic in 6 months with Dr. Chavez.      " Provide pain management per home regimen

## 2023-09-04 NOTE — PROGRESS NOTES
Oncology Progress Note  Gerry Gonzalez  76 y o  male MRN: [id number]  Unit/Bed#: [facility] 906-01 Encounter: [id number]      /58   Pulse 70   Temp 98 4 °F (36 9 °C)   Resp 16   Ht 6' (1 829 m)   Wt 96 kg (211 lb 10 3 oz)   SpO2 97%   BMI 28 70 kg/m²     Subjective:  I feel okay I had a fall in the bathroom last night    Objective:  Denies any fever chills or urgency frequency dysuria hematuria melena or hematochezia  General Appearance:    Alert, oriented        Eyes:    PERRL   Ears:    Normal external ear canals, both ears   Nose:   Nares normal, septum midline   Throat:   Mucosa moist  Pharynx without injection  Neck:   Supple       Lungs:     Clear to auscultation bilaterally   Chest Wall:    No tenderness or deformity    Heart:    Regular rate and rhythm       Abdomen:     Soft, non-tender, bowel sounds +, no organomegaly           Extremities:   Extremities no cyanosis or edema       Skin:   no rash or icterus      Lymph nodes:   Cervical, supraclavicular, and axillary nodes normal   Neurologic:   CNII-XII intact, normal strength, sensation and reflexes     throughout        Recent Results (from the past 48 hour(s))   Fingerstick Glucose (POCT)    Collection Time: 07/29/19 11:46 AM   Result Value Ref Range    POC Glucose 149 (H) 65 - 140 mg/dl   Fibrin split products    Collection Time: 07/29/19  2:20 PM   Result Value Ref Range    FDP >20 <40 (A) <10, >40 <80   Plasminogen activity    Collection Time: 07/29/19  2:20 PM   Result Value Ref Range    Plasminogen 90 77 - 138 % of Normal   Fingerstick Glucose (POCT)    Collection Time: 07/29/19  5:15 PM   Result Value Ref Range    POC Glucose 190 (H) 65 - 140 mg/dl   Fingerstick Glucose (POCT)    Collection Time: 07/29/19  9:20 PM   Result Value Ref Range    POC Glucose 220 (H) 65 - 140 mg/dl   CBC and differential    Collection Time: 07/30/19  5:35 AM   Result Value Ref Range    WBC 34 70 (HH) 4 31 - 10 16 Thousand/uL    RBC 2 70 (L) 3 88 - 5 62 Million/uL    Hemoglobin 7 3 (L) 12 0 - 17 0 g/dL    Hematocrit 24 2 (L) 36 5 - 49 3 %    MCV 90 82 - 98 fL    MCH 27 0 26 8 - 34 3 pg    MCHC 30 2 (L) 31 4 - 37 4 g/dL    RDW 22 3 (H) 11 6 - 15 1 %    Platelets 22 (LL) 041 - 390 Thousands/uL    nRBC 0 /100 WBCs    Neutrophils Relative 69 43 - 75 %    Immat GRANS % 16 (H) 0 - 2 %    Lymphocytes Relative 6 (L) 14 - 44 %    Monocytes Relative 8 4 - 12 %    Eosinophils Relative 0 0 - 6 %    Basophils Relative 1 0 - 1 %    Neutrophils Absolute 24 13 (H) 1 85 - 7 62 Thousands/µL    Immature Grans Absolute >0 50 (H) 0 00 - 0 20 Thousand/uL    Lymphocytes Absolute 2 12 0 60 - 4 47 Thousands/µL    Monocytes Absolute 2 75 (H) 0 17 - 1 22 Thousand/µL    Eosinophils Absolute 0 04 0 00 - 0 61 Thousand/µL    Basophils Absolute 0 17 (H) 0 00 - 0 10 Thousands/µL   Comprehensive metabolic panel    Collection Time: 07/30/19  5:35 AM   Result Value Ref Range    Sodium 133 (L) 136 - 145 mmol/L    Potassium 3 4 (L) 3 5 - 5 3 mmol/L    Chloride 101 100 - 108 mmol/L    CO2 24 21 - 32 mmol/L    ANION GAP 8 4 - 13 mmol/L    BUN 21 5 - 25 mg/dL    Creatinine 1 54 (H) 0 60 - 1 30 mg/dL    Glucose 265 (H) 65 - 140 mg/dL    Calcium 8 5 8 3 - 10 1 mg/dL    AST 8 5 - 45 U/L    ALT 11 (L) 12 - 78 U/L    Alkaline Phosphatase 90 46 - 116 U/L    Total Protein 7 1 6 4 - 8 2 g/dL    Albumin 2 8 (L) 3 5 - 5 0 g/dL    Total Bilirubin 0 54 0 20 - 1 00 mg/dL    eGFR 46 ml/min/1 73sq m   Fingerstick Glucose (POCT)    Collection Time: 07/30/19  7:36 AM   Result Value Ref Range    POC Glucose 271 (H) 65 - 140 mg/dl   Procalcitonin    Collection Time: 07/30/19 10:44 AM   Result Value Ref Range    Procalcitonin 0 53 (H) <=0 25 ng/ml   Fingerstick Glucose (POCT)    Collection Time: 07/30/19 11:53 AM   Result Value Ref Range    POC Glucose 245 (H) 65 - 140 mg/dl   Fingerstick Glucose (POCT)    Collection Time: 07/30/19  4:36 PM   Result Value Ref Range    POC Glucose 243 (H) 65 - 140 mg/dl   Fingerstick Glucose (POCT)    Collection Time: 07/30/19  8:25 PM   Result Value Ref Range    POC Glucose 252 (H) 65 - 140 mg/dl   CBC and Platelet    Collection Time: 07/31/19  5:11 AM   Result Value Ref Range    WBC 31 76 (HH) 4 31 - 10 16 Thousand/uL    RBC 2 65 (L) 3 88 - 5 62 Million/uL    Hemoglobin 7 0 (L) 12 0 - 17 0 g/dL    Hematocrit 24 0 (L) 36 5 - 49 3 %    MCV 91 82 - 98 fL    MCH 26 4 (L) 26 8 - 34 3 pg    MCHC 29 2 (L) 31 4 - 37 4 g/dL    RDW 22 4 (H) 11 6 - 15 1 %    Platelets 22 (LL) 896 - 390 Thousands/uL   Basic metabolic panel    Collection Time: 07/31/19  5:11 AM   Result Value Ref Range    Sodium 133 (L) 136 - 145 mmol/L    Potassium 3 5 3 5 - 5 3 mmol/L    Chloride 101 100 - 108 mmol/L    CO2 25 21 - 32 mmol/L    ANION GAP 7 4 - 13 mmol/L    BUN 20 5 - 25 mg/dL    Creatinine 1 56 (H) 0 60 - 1 30 mg/dL    Glucose 318 (H) 65 - 140 mg/dL    Calcium 8 4 8 3 - 10 1 mg/dL    eGFR 45 ml/min/1 73sq m   Fingerstick Glucose (POCT)    Collection Time: 07/31/19  8:10 AM   Result Value Ref Range    POC Glucose 356 (H) 65 - 140 mg/dl         Ct Abdomen Pelvis With Contrast    Result Date: 7/28/2019  Narrative: CT ABDOMEN AND PELVIS WITH IV CONTRAST INDICATION:   UTI w/ CVa TTP  Concern for pyelo/abscess, stone  COMPARISON:  None  TECHNIQUE:  CT examination of the abdomen and pelvis was performed  Axial, sagittal, and coronal 2D reformatted images were created from the source data and submitted for interpretation  Radiation dose length product (DLP) for this visit:  605 mGy-cm   This examination, like all CT scans performed in the [facility], was performed utilizing techniques to minimize radiation dose exposure, including the use of iterative reconstruction and automated exposure control  IV Contrast:  85 mL of iodixanol (VISIPAQUE) Enteric Contrast:  Enteric contrast was not administered   FINDINGS: ABDOMEN LOWER CHEST:  No clinically significant abnormality identified in the visualized lower chest  LIVER/BILIARY TREE:  Unremarkable  GALLBLADDER:  There are gallstone(s) within the gallbladder, without pericholecystic inflammatory changes  SPLEEN:  Unremarkable  PANCREAS:  Unremarkable  ADRENAL GLANDS:  Unremarkable  KIDNEYS/URETERS:  Unremarkable  No hydronephrosis  STOMACH AND BOWEL:  Unremarkable  APPENDIX:  No findings to suggest appendicitis  ABDOMINOPELVIC CAVITY:  No ascites or free intraperitoneal air  No lymphadenopathy  VESSELS:  Unremarkable for patient's age  PELVIS REPRODUCTIVE ORGANS:  Unremarkable for patient's age  URINARY BLADDER:  Unremarkable  ABDOMINAL WALL/INGUINAL REGIONS:  Unremarkable  OSSEOUS STRUCTURES:  No acute fracture or destructive osseous lesion  Impression: No acute intra-abdominal abnormality  Cholelithiasis  Workstation performed: [id number]         Assessment and plan:    1  Leukocytosis with bandemia most likely secondary to prostatitis or urine tract infection, evaluated by Urology for possible TURP as an outpatient    2  Anemia and thrombocytopenia with positive FDP consistent with DIC, I will continue watchful observation await for lap score, if lap score came back low will proceed with bone marrow biopsy and aspirate    3   I suggest CBC next week and follow up in our office 0 (no pain/absence of nonverbal indicators of pain)

## 2024-10-17 NOTE — OCCUPATIONAL THERAPY NOTE
Hospitalist Progress Note    NAME: Pati Ferreira   :  1953   MRN:  [id number]            Subjective:     Chief Complaint / Reason for Physician Visit Shortness of breath  Patient seen and evaluated at bedside, overnight events reviewed, patient currently has no new complaints.  Discussed with RN events overnight.     Review of Systems:  Symptom Y/N Comments  Symptom Y/N Comments   Fever/Chills N   Chest Pain N    Poor Appetite Y   Edema N    Cough N   Abdominal Pain N    Sputum N   Joint Pain N    SOB/GLASGOW Y   Pruritis/Rash N    Nausea/vomit N   Tolerating PT/OT NA    Diarrhea N   Tolerating Diet Y    Constipation N   Other       Could NOT obtain due to:      Objective:     VITALS:   Last 24hrs VS reviewed since prior progress note. Most recent are:  [unfilled]    Intake/Output Summary (Last 24 hours) at 10/17/2024 1115  Last data filed at 10/17/2024 0658  Gross per 24 hour   Intake 240 ml   Output 2400 ml   Net -2160 ml        PHYSICAL EXAM:  General: Patient appears comfortable    EENT:  EOMI. Anicteric sclerae. MMM  Resp:  CTA bilaterally, no wheezing or rales.  No accessory muscle use  CV:  Regular  rhythm, s1/s2 no m/r/g No edema  GI:  Soft, Non distended, Non tender.  +Bowel sounds  Neurologic:  Alert and oriented X 3, normal speech,   Psych:   Good insight. Not anxious nor agitated  Skin:  No rashes.  No jaundice    Procedures: see electronic medical records for all procedures/Xrays and details which were not copied into this note but were reviewed prior to creation of Plan.      LABS:  I reviewed today's most current labs and imaging studies.  Pertinent labs include:  Recent Labs     10/16/24  1957 10/17/24  0616   WBC 7.8 7.1   HGB 13.1 12.6   HCT 41.9 39.8    287     Recent Labs     10/16/24  1957 10/17/24  0616    138   K 2.8* 2.7*    97   CO2 30 34*   BUN 16 18   MG 1.2* 1.5*   PHOS  --  2.3*   ALT 22  --        Signed: Francisco Fields MD    Medical Decision  Occupational Therapy  OT tx session cancelled this tx session  Pt soundly sleeping  Pt hasnt been sleeping per nursing staff  Will continue to follow as available   MIKAYLA White